# Patient Record
Sex: FEMALE | Race: WHITE | NOT HISPANIC OR LATINO | Employment: OTHER | URBAN - METROPOLITAN AREA
[De-identification: names, ages, dates, MRNs, and addresses within clinical notes are randomized per-mention and may not be internally consistent; named-entity substitution may affect disease eponyms.]

---

## 2017-01-01 ENCOUNTER — APPOINTMENT (OUTPATIENT)
Dept: CT IMAGING | Facility: HOSPITAL | Age: 77
End: 2017-01-01
Attending: PSYCHIATRY & NEUROLOGY

## 2017-01-01 ENCOUNTER — APPOINTMENT (OUTPATIENT)
Dept: GENERAL RADIOLOGY | Facility: HOSPITAL | Age: 77
End: 2017-01-01

## 2017-01-01 ENCOUNTER — HOSPITAL ENCOUNTER (EMERGENCY)
Facility: HOSPITAL | Age: 77
Discharge: ANOTHER HOSPITAL | End: 2017-11-18
Attending: EMERGENCY MEDICINE | Admitting: EMERGENCY MEDICINE

## 2017-01-01 ENCOUNTER — APPOINTMENT (OUTPATIENT)
Dept: MRI IMAGING | Facility: HOSPITAL | Age: 77
End: 2017-01-01

## 2017-01-01 ENCOUNTER — HOSPITAL ENCOUNTER (INPATIENT)
Facility: HOSPITAL | Age: 77
LOS: 202 days | Discharge: NURSING, REHAB OR LONG-TERM CARE | End: 2017-09-01
Attending: FAMILY MEDICINE | Admitting: FAMILY MEDICINE

## 2017-01-01 ENCOUNTER — APPOINTMENT (OUTPATIENT)
Dept: CT IMAGING | Facility: HOSPITAL | Age: 77
End: 2017-01-01
Attending: EMERGENCY MEDICINE

## 2017-01-01 ENCOUNTER — APPOINTMENT (OUTPATIENT)
Dept: CT IMAGING | Facility: HOSPITAL | Age: 77
End: 2017-01-01

## 2017-01-01 ENCOUNTER — APPOINTMENT (OUTPATIENT)
Dept: ULTRASOUND IMAGING | Facility: HOSPITAL | Age: 77
End: 2017-01-01

## 2017-01-01 ENCOUNTER — HOSPITAL ENCOUNTER (INPATIENT)
Facility: HOSPITAL | Age: 77
LOS: 8 days | Discharge: ANOTHER HOSPITAL | End: 2017-09-09
Attending: FAMILY MEDICINE | Admitting: FAMILY MEDICINE

## 2017-01-01 ENCOUNTER — HOSPITAL ENCOUNTER (INPATIENT)
Facility: HOSPITAL | Age: 77
LOS: 8 days | Discharge: NURSING, REHAB OR LONG-TERM CARE | End: 2017-02-11
Attending: HOSPITALIST | Admitting: HOSPITALIST

## 2017-01-01 ENCOUNTER — HOSPITAL ENCOUNTER (INPATIENT)
Facility: HOSPITAL | Age: 77
LOS: 11 days | Discharge: NURSING, REHAB OR LONG-TERM CARE | End: 2017-09-20
Attending: EMERGENCY MEDICINE | Admitting: INTERNAL MEDICINE

## 2017-01-01 ENCOUNTER — HOSPITAL ENCOUNTER (INPATIENT)
Facility: HOSPITAL | Age: 77
LOS: 57 days | Discharge: NURSING, REHAB OR LONG-TERM CARE | End: 2018-01-16
Attending: FAMILY MEDICINE | Admitting: FAMILY MEDICINE

## 2017-01-01 ENCOUNTER — HOSPITAL ENCOUNTER (INPATIENT)
Facility: HOSPITAL | Age: 77
LOS: 58 days | Discharge: ANOTHER HOSPITAL | End: 2017-11-17
Attending: FAMILY MEDICINE | Admitting: FAMILY MEDICINE

## 2017-01-01 ENCOUNTER — APPOINTMENT (OUTPATIENT)
Dept: CARDIOLOGY | Facility: HOSPITAL | Age: 77
End: 2017-01-01
Attending: INTERNAL MEDICINE

## 2017-01-01 ENCOUNTER — HOSPITAL ENCOUNTER (EMERGENCY)
Facility: HOSPITAL | Age: 77
Discharge: HOME | End: 2017-10-18
Attending: EMERGENCY MEDICINE | Admitting: EMERGENCY MEDICINE

## 2017-01-01 VITALS
HEART RATE: 82 BPM | SYSTOLIC BLOOD PRESSURE: 119 MMHG | WEIGHT: 252 LBS | OXYGEN SATURATION: 96 % | DIASTOLIC BLOOD PRESSURE: 70 MMHG | HEIGHT: 63 IN | RESPIRATION RATE: 18 BRPM | TEMPERATURE: 98.3 F | BODY MASS INDEX: 44.65 KG/M2

## 2017-01-01 VITALS
RESPIRATION RATE: 20 BRPM | DIASTOLIC BLOOD PRESSURE: 73 MMHG | BODY MASS INDEX: 44.51 KG/M2 | OXYGEN SATURATION: 97 % | TEMPERATURE: 97.5 F | HEART RATE: 121 BPM | SYSTOLIC BLOOD PRESSURE: 121 MMHG | HEIGHT: 65 IN | WEIGHT: 267.13 LBS

## 2017-01-01 VITALS
DIASTOLIC BLOOD PRESSURE: 79 MMHG | RESPIRATION RATE: 19 BRPM | SYSTOLIC BLOOD PRESSURE: 129 MMHG | WEIGHT: 241.38 LBS | OXYGEN SATURATION: 92 % | HEIGHT: 68 IN | BODY MASS INDEX: 36.58 KG/M2 | TEMPERATURE: 98.4 F | HEART RATE: 112 BPM

## 2017-01-01 VITALS
RESPIRATION RATE: 16 BRPM | OXYGEN SATURATION: 81 % | BODY MASS INDEX: 35.46 KG/M2 | HEIGHT: 68 IN | WEIGHT: 234 LBS | HEART RATE: 171 BPM | TEMPERATURE: 97.5 F | SYSTOLIC BLOOD PRESSURE: 113 MMHG | DIASTOLIC BLOOD PRESSURE: 78 MMHG

## 2017-01-01 VITALS
BODY MASS INDEX: 47.2 KG/M2 | SYSTOLIC BLOOD PRESSURE: 138 MMHG | WEIGHT: 250 LBS | RESPIRATION RATE: 18 BRPM | TEMPERATURE: 98.1 F | HEIGHT: 61 IN | DIASTOLIC BLOOD PRESSURE: 90 MMHG | OXYGEN SATURATION: 98 % | HEART RATE: 106 BPM

## 2017-01-01 VITALS
WEIGHT: 213.5 LBS | BODY MASS INDEX: 32.36 KG/M2 | HEIGHT: 68 IN | DIASTOLIC BLOOD PRESSURE: 83 MMHG | RESPIRATION RATE: 18 BRPM | OXYGEN SATURATION: 99 % | HEART RATE: 92 BPM | TEMPERATURE: 97.9 F | SYSTOLIC BLOOD PRESSURE: 130 MMHG

## 2017-01-01 VITALS
HEART RATE: 91 BPM | OXYGEN SATURATION: 98 % | SYSTOLIC BLOOD PRESSURE: 100 MMHG | TEMPERATURE: 98.7 F | DIASTOLIC BLOOD PRESSURE: 71 MMHG | RESPIRATION RATE: 18 BRPM

## 2017-01-01 DIAGNOSIS — J96.01 ACUTE RESPIRATORY FAILURE WITH HYPOXIA (HCC): ICD-10-CM

## 2017-01-01 DIAGNOSIS — M62.89 MUSCLE HYPERTONICITY: Primary | ICD-10-CM

## 2017-01-01 DIAGNOSIS — R13.12 OROPHARYNGEAL DYSPHAGIA: ICD-10-CM

## 2017-01-01 DIAGNOSIS — N17.9 AKI (ACUTE KIDNEY INJURY) (HCC): ICD-10-CM

## 2017-01-01 DIAGNOSIS — E86.0 DEHYDRATION: ICD-10-CM

## 2017-01-01 DIAGNOSIS — A41.9 SEPSIS, DUE TO UNSPECIFIED ORGANISM: ICD-10-CM

## 2017-01-01 DIAGNOSIS — I69.320 APHASIA AS LATE EFFECT OF CEREBROVASCULAR ACCIDENT: ICD-10-CM

## 2017-01-01 DIAGNOSIS — E87.5 HYPERKALEMIA: ICD-10-CM

## 2017-01-01 DIAGNOSIS — N18.2 CHRONIC KIDNEY DISEASE, STAGE 2, MILDLY DECREASED GFR: ICD-10-CM

## 2017-01-01 DIAGNOSIS — E03.9 ACQUIRED HYPOTHYROIDISM: ICD-10-CM

## 2017-01-01 DIAGNOSIS — R41.82 ALTERED MENTAL STATUS, UNSPECIFIED ALTERED MENTAL STATUS TYPE: Primary | ICD-10-CM

## 2017-01-01 DIAGNOSIS — R41.89 DECREASED LEVEL OF CONSCIOUSNESS: Primary | ICD-10-CM

## 2017-01-01 DIAGNOSIS — N39.0 ACUTE UTI: ICD-10-CM

## 2017-01-01 DIAGNOSIS — K11.20 SIALOADENITIS OF SUBMANDIBULAR GLAND: Primary | ICD-10-CM

## 2017-01-01 DIAGNOSIS — J18.9 PNEUMONIA OF LEFT LUNG DUE TO INFECTIOUS ORGANISM, UNSPECIFIED PART OF LUNG: ICD-10-CM

## 2017-01-01 LAB
25(OH)D3 SERPL-MCNC: 43.1 NG/ML
25(OH)D3 SERPL-MCNC: 49.8 NG/ML
25(OH)D3 SERPL-MCNC: 52.2 NG/ML
ABO + RH BLD: NORMAL
ABO + RH BLD: NORMAL
ABO GROUP BLD: NORMAL
ALBUMIN SERPL-MCNC: 2.2 G/DL (ref 3.5–5.2)
ALBUMIN SERPL-MCNC: 2.6 G/DL (ref 3.5–5.2)
ALBUMIN SERPL-MCNC: 2.7 G/DL (ref 3.5–5.2)
ALBUMIN SERPL-MCNC: 2.8 G/DL (ref 3.5–5.2)
ALBUMIN SERPL-MCNC: 2.9 G/DL (ref 3.5–5.2)
ALBUMIN SERPL-MCNC: 3 G/DL (ref 3.5–5.2)
ALBUMIN SERPL-MCNC: 3.2 G/DL (ref 3.5–5.2)
ALBUMIN SERPL-MCNC: 3.2 G/DL (ref 3.5–5.2)
ALBUMIN SERPL-MCNC: 3.3 G/DL (ref 3.5–5.2)
ALBUMIN/GLOB SERPL: 0.6 G/DL
ALBUMIN/GLOB SERPL: 0.7 G/DL
ALBUMIN/GLOB SERPL: 0.8 G/DL
ALBUMIN/GLOB SERPL: 0.9 G/DL
ALP SERPL-CCNC: 173 U/L (ref 40–129)
ALP SERPL-CCNC: 61 U/L (ref 40–129)
ALP SERPL-CCNC: 64 U/L (ref 40–129)
ALP SERPL-CCNC: 68 U/L (ref 40–129)
ALP SERPL-CCNC: 72 U/L (ref 40–129)
ALP SERPL-CCNC: 90 U/L (ref 40–129)
ALP SERPL-CCNC: 93 U/L (ref 40–129)
ALP SERPL-CCNC: 96 U/L (ref 40–129)
ALT SERPL W P-5'-P-CCNC: 13 U/L (ref 5–33)
ALT SERPL W P-5'-P-CCNC: 46 U/L (ref 5–33)
ALT SERPL W P-5'-P-CCNC: 5 U/L (ref 5–33)
ALT SERPL W P-5'-P-CCNC: 6 U/L (ref 5–33)
ALT SERPL W P-5'-P-CCNC: 7 U/L (ref 5–33)
ALT SERPL W P-5'-P-CCNC: 9 U/L (ref 5–33)
ALT SERPL W P-5'-P-CCNC: 9 U/L (ref 5–33)
ALT SERPL W P-5'-P-CCNC: <5 U/L (ref 5–33)
AMMONIA BLD-SCNC: 21 UMOL/L (ref 11–51)
ANION GAP SERPL CALCULATED.3IONS-SCNC: 10 MMOL/L
ANION GAP SERPL CALCULATED.3IONS-SCNC: 10.3 MMOL/L
ANION GAP SERPL CALCULATED.3IONS-SCNC: 10.3 MMOL/L
ANION GAP SERPL CALCULATED.3IONS-SCNC: 10.4 MMOL/L
ANION GAP SERPL CALCULATED.3IONS-SCNC: 10.5 MMOL/L
ANION GAP SERPL CALCULATED.3IONS-SCNC: 10.6 MMOL/L
ANION GAP SERPL CALCULATED.3IONS-SCNC: 10.7 MMOL/L
ANION GAP SERPL CALCULATED.3IONS-SCNC: 11 MMOL/L
ANION GAP SERPL CALCULATED.3IONS-SCNC: 11 MMOL/L
ANION GAP SERPL CALCULATED.3IONS-SCNC: 11.1 MMOL/L
ANION GAP SERPL CALCULATED.3IONS-SCNC: 11.1 MMOL/L
ANION GAP SERPL CALCULATED.3IONS-SCNC: 11.2 MMOL/L
ANION GAP SERPL CALCULATED.3IONS-SCNC: 11.2 MMOL/L
ANION GAP SERPL CALCULATED.3IONS-SCNC: 11.3 MMOL/L
ANION GAP SERPL CALCULATED.3IONS-SCNC: 11.3 MMOL/L
ANION GAP SERPL CALCULATED.3IONS-SCNC: 11.4 MMOL/L
ANION GAP SERPL CALCULATED.3IONS-SCNC: 11.5 MMOL/L
ANION GAP SERPL CALCULATED.3IONS-SCNC: 11.5 MMOL/L
ANION GAP SERPL CALCULATED.3IONS-SCNC: 11.6 MMOL/L
ANION GAP SERPL CALCULATED.3IONS-SCNC: 11.6 MMOL/L
ANION GAP SERPL CALCULATED.3IONS-SCNC: 11.7 MMOL/L
ANION GAP SERPL CALCULATED.3IONS-SCNC: 11.8 MMOL/L
ANION GAP SERPL CALCULATED.3IONS-SCNC: 11.9 MMOL/L
ANION GAP SERPL CALCULATED.3IONS-SCNC: 11.9 MMOL/L
ANION GAP SERPL CALCULATED.3IONS-SCNC: 12.1 MMOL/L
ANION GAP SERPL CALCULATED.3IONS-SCNC: 12.2 MMOL/L
ANION GAP SERPL CALCULATED.3IONS-SCNC: 12.3 MMOL/L
ANION GAP SERPL CALCULATED.3IONS-SCNC: 12.4 MMOL/L
ANION GAP SERPL CALCULATED.3IONS-SCNC: 12.5 MMOL/L
ANION GAP SERPL CALCULATED.3IONS-SCNC: 12.6 MMOL/L
ANION GAP SERPL CALCULATED.3IONS-SCNC: 12.7 MMOL/L
ANION GAP SERPL CALCULATED.3IONS-SCNC: 12.8 MMOL/L
ANION GAP SERPL CALCULATED.3IONS-SCNC: 13.1 MMOL/L
ANION GAP SERPL CALCULATED.3IONS-SCNC: 13.1 MMOL/L
ANION GAP SERPL CALCULATED.3IONS-SCNC: 13.2 MMOL/L
ANION GAP SERPL CALCULATED.3IONS-SCNC: 13.2 MMOL/L
ANION GAP SERPL CALCULATED.3IONS-SCNC: 13.3 MMOL/L
ANION GAP SERPL CALCULATED.3IONS-SCNC: 13.4 MMOL/L
ANION GAP SERPL CALCULATED.3IONS-SCNC: 13.5 MMOL/L
ANION GAP SERPL CALCULATED.3IONS-SCNC: 13.6 MMOL/L
ANION GAP SERPL CALCULATED.3IONS-SCNC: 13.7 MMOL/L
ANION GAP SERPL CALCULATED.3IONS-SCNC: 13.7 MMOL/L
ANION GAP SERPL CALCULATED.3IONS-SCNC: 13.8 MMOL/L
ANION GAP SERPL CALCULATED.3IONS-SCNC: 13.8 MMOL/L
ANION GAP SERPL CALCULATED.3IONS-SCNC: 13.9 MMOL/L
ANION GAP SERPL CALCULATED.3IONS-SCNC: 14 MMOL/L
ANION GAP SERPL CALCULATED.3IONS-SCNC: 14 MMOL/L
ANION GAP SERPL CALCULATED.3IONS-SCNC: 14.1 MMOL/L
ANION GAP SERPL CALCULATED.3IONS-SCNC: 14.1 MMOL/L
ANION GAP SERPL CALCULATED.3IONS-SCNC: 14.2 MMOL/L
ANION GAP SERPL CALCULATED.3IONS-SCNC: 14.3 MMOL/L
ANION GAP SERPL CALCULATED.3IONS-SCNC: 14.4 MMOL/L
ANION GAP SERPL CALCULATED.3IONS-SCNC: 14.4 MMOL/L
ANION GAP SERPL CALCULATED.3IONS-SCNC: 14.6 MMOL/L
ANION GAP SERPL CALCULATED.3IONS-SCNC: 15 MMOL/L
ANION GAP SERPL CALCULATED.3IONS-SCNC: 15.4 MMOL/L
ANION GAP SERPL CALCULATED.3IONS-SCNC: 15.8 MMOL/L
ANION GAP SERPL CALCULATED.3IONS-SCNC: 16 MMOL/L
ANION GAP SERPL CALCULATED.3IONS-SCNC: 7.2 MMOL/L
ANION GAP SERPL CALCULATED.3IONS-SCNC: 7.6 MMOL/L
ANION GAP SERPL CALCULATED.3IONS-SCNC: 7.6 MMOL/L
ANION GAP SERPL CALCULATED.3IONS-SCNC: 7.8 MMOL/L
ANION GAP SERPL CALCULATED.3IONS-SCNC: 7.8 MMOL/L
ANION GAP SERPL CALCULATED.3IONS-SCNC: 8.4 MMOL/L
ANION GAP SERPL CALCULATED.3IONS-SCNC: 8.6 MMOL/L
ANION GAP SERPL CALCULATED.3IONS-SCNC: 8.8 MMOL/L
ANION GAP SERPL CALCULATED.3IONS-SCNC: 8.8 MMOL/L
ANION GAP SERPL CALCULATED.3IONS-SCNC: 9 MMOL/L
ANION GAP SERPL CALCULATED.3IONS-SCNC: 9 MMOL/L
ANION GAP SERPL CALCULATED.3IONS-SCNC: 9.1 MMOL/L
ANION GAP SERPL CALCULATED.3IONS-SCNC: 9.1 MMOL/L
ANION GAP SERPL CALCULATED.3IONS-SCNC: 9.3 MMOL/L
ANION GAP SERPL CALCULATED.3IONS-SCNC: 9.4 MMOL/L
ANION GAP SERPL CALCULATED.3IONS-SCNC: 9.5 MMOL/L
ANION GAP SERPL CALCULATED.3IONS-SCNC: 9.7 MMOL/L
ANION GAP SERPL CALCULATED.3IONS-SCNC: 9.8 MMOL/L
ANION GAP SERPL CALCULATED.3IONS-SCNC: 9.8 MMOL/L
ANION GAP SERPL CALCULATED.3IONS-SCNC: 9.9 MMOL/L
ANION GAP SERPL CALCULATED.3IONS-SCNC: 9.9 MMOL/L
ANTIBODY TO LOW FREQUENCY ANTIGEN: NORMAL
APTT PPP: 29.3 SECONDS (ref 24.3–38.1)
APTT PPP: 29.4 SECONDS (ref 24.3–38.1)
APTT PPP: 35.2 SECONDS (ref 24.3–38.1)
ARTERIAL PATENCY WRIST A: ABNORMAL
ARTERIAL PATENCY WRIST A: POSITIVE
ARTERIAL PATENCY WRIST A: POSITIVE
ASCENDING AORTA: 2.5 CM
AST SERPL-CCNC: 16 U/L (ref 5–32)
AST SERPL-CCNC: 21 U/L (ref 5–32)
AST SERPL-CCNC: 5 U/L (ref 5–32)
AST SERPL-CCNC: 6 U/L (ref 5–32)
AST SERPL-CCNC: 7 U/L (ref 5–32)
AST SERPL-CCNC: 8 U/L (ref 5–32)
AST SERPL-CCNC: 9 U/L (ref 5–32)
AST SERPL-CCNC: 9 U/L (ref 5–32)
ATMOSPHERIC PRESS: 744 MMHG
ATMOSPHERIC PRESS: 747 MMHG
ATMOSPHERIC PRESS: 747 MMHG
B PERT DNA SPEC QL NAA+PROBE: NOT DETECTED
BACTERIA BLD CULT: ABNORMAL
BACTERIA SPEC AEROBE CULT: ABNORMAL
BACTERIA SPEC AEROBE CULT: NO GROWTH
BACTERIA SPEC AEROBE CULT: NORMAL
BACTERIA UR QL AUTO: ABNORMAL /HPF
BASE EXCESS BLDA CALC-SCNC: 1.1 MMOL/L (ref 0–2)
BASE EXCESS BLDA CALC-SCNC: 1.7 MMOL/L (ref 0–2)
BASE EXCESS BLDA CALC-SCNC: 3.3 MMOL/L (ref 0–2)
BASOPHILS # BLD AUTO: 0.04 10*3/MM3 (ref 0–0.2)
BASOPHILS # BLD AUTO: 0.04 10*3/MM3 (ref 0–0.2)
BASOPHILS # BLD AUTO: 0.05 10*3/MM3 (ref 0–0.2)
BASOPHILS # BLD AUTO: 0.06 10*3/MM3 (ref 0–0.2)
BASOPHILS # BLD AUTO: 0.06 10*3/MM3 (ref 0–0.2)
BASOPHILS # BLD AUTO: 0.08 10*3/MM3 (ref 0–0.2)
BASOPHILS # BLD AUTO: 0.09 10*3/MM3 (ref 0–0.2)
BASOPHILS # BLD AUTO: 0.1 10*3/MM3 (ref 0–0.2)
BASOPHILS # BLD AUTO: 0.1 10*3/MM3 (ref 0–0.2)
BASOPHILS NFR BLD AUTO: 0.4 % (ref 0–2)
BASOPHILS NFR BLD AUTO: 0.6 % (ref 0–2)
BASOPHILS NFR BLD AUTO: 0.7 % (ref 0–2)
BASOPHILS NFR BLD AUTO: 0.7 % (ref 0–2)
BASOPHILS NFR BLD AUTO: 1 % (ref 0–2)
BASOPHILS NFR BLD AUTO: 1.2 % (ref 0–2)
BASOPHILS NFR BLD AUTO: 1.3 % (ref 0–2)
BDY SITE: ABNORMAL
BH BB BLOOD EXPIRATION DATE: NORMAL
BH BB BLOOD EXPIRATION DATE: NORMAL
BH BB BLOOD TYPE BARCODE: 7300
BH BB BLOOD TYPE BARCODE: 7300
BH BB DISPENSE STATUS: NORMAL
BH BB DISPENSE STATUS: NORMAL
BH BB PRODUCT CODE: NORMAL
BH BB PRODUCT CODE: NORMAL
BH BB UNIT NUMBER: NORMAL
BH BB UNIT NUMBER: NORMAL
BH CV ECHO MEAS - ACS: 0.8 CM
BH CV ECHO MEAS - AO MAX PG (FULL): 27.2 MMHG
BH CV ECHO MEAS - AO MAX PG: 29.5 MMHG
BH CV ECHO MEAS - AO MEAN PG (FULL): 15 MMHG
BH CV ECHO MEAS - AO MEAN PG: 16 MMHG
BH CV ECHO MEAS - AO ROOT AREA (BSA CORRECTED): 1.4
BH CV ECHO MEAS - AO ROOT AREA: 7.1 CM^2
BH CV ECHO MEAS - AO ROOT DIAM: 3 CM
BH CV ECHO MEAS - AO V2 MAX: 269.8 CM/SEC
BH CV ECHO MEAS - AO V2 MEAN: 181.8 CM/SEC
BH CV ECHO MEAS - AO V2 VTI: 49.1 CM
BH CV ECHO MEAS - ASC AORTA: 2.5 CM
BH CV ECHO MEAS - AVA(I,A): 1.2 CM^2
BH CV ECHO MEAS - AVA(I,D): 1.2 CM^2
BH CV ECHO MEAS - AVA(V,A): 1.1 CM^2
BH CV ECHO MEAS - AVA(V,D): 1.1 CM^2
BH CV ECHO MEAS - BSA(HAYCOCK): 2.3 M^2
BH CV ECHO MEAS - BSA: 2.1 M^2
BH CV ECHO MEAS - BZI_BMI: 44.3 KILOGRAMS/M^2
BH CV ECHO MEAS - BZI_METRIC_HEIGHT: 157.5 CM
BH CV ECHO MEAS - BZI_METRIC_WEIGHT: 109.8 KG
BH CV ECHO MEAS - CONTRAST EF (2CH): 54.4 ML/M^2
BH CV ECHO MEAS - CONTRAST EF 4CH: 55.6 ML/M^2
BH CV ECHO MEAS - EDV(CUBED): 30.1 ML
BH CV ECHO MEAS - EDV(MOD-SP2): 68.6 ML
BH CV ECHO MEAS - EDV(MOD-SP4): 59.7 ML
BH CV ECHO MEAS - EDV(TEICH): 38.2 ML
BH CV ECHO MEAS - EF(CUBED): 63.6 %
BH CV ECHO MEAS - EF(MOD-SP2): 54.4 %
BH CV ECHO MEAS - EF(MOD-SP4): 55.6 %
BH CV ECHO MEAS - EF(TEICH): 56.6 %
BH CV ECHO MEAS - ESV(CUBED): 10.9 ML
BH CV ECHO MEAS - ESV(MOD-SP2): 31.3 ML
BH CV ECHO MEAS - ESV(MOD-SP4): 26.5 ML
BH CV ECHO MEAS - ESV(TEICH): 16.6 ML
BH CV ECHO MEAS - FS: 28.6 %
BH CV ECHO MEAS - IVS/LVPW: 1
BH CV ECHO MEAS - IVSD: 1.1 CM
BH CV ECHO MEAS - LAT PEAK E' VEL: 7 CM/SEC
BH CV ECHO MEAS - LV DIASTOLIC VOL/BSA (35-75): 28.8 ML/M^2
BH CV ECHO MEAS - LV MASS(C)D: 103 GRAMS
BH CV ECHO MEAS - LV MASS(C)DI: 49.7 GRAMS/M^2
BH CV ECHO MEAS - LV MAX PG: 2.3 MMHG
BH CV ECHO MEAS - LV MEAN PG: 1 MMHG
BH CV ECHO MEAS - LV SYSTOLIC VOL/BSA (12-30): 12.8 ML/M^2
BH CV ECHO MEAS - LV V1 MAX: 75.8 CM/SEC
BH CV ECHO MEAS - LV V1 MEAN: 48.4 CM/SEC
BH CV ECHO MEAS - LV V1 VTI: 15.6 CM
BH CV ECHO MEAS - LVIDD: 3.1 CM
BH CV ECHO MEAS - LVIDS: 2.2 CM
BH CV ECHO MEAS - LVLD AP2: 7.4 CM
BH CV ECHO MEAS - LVLD AP4: 7.6 CM
BH CV ECHO MEAS - LVLS AP2: 6.5 CM
BH CV ECHO MEAS - LVLS AP4: 7 CM
BH CV ECHO MEAS - LVOT AREA (M): 3.8 CM^2
BH CV ECHO MEAS - LVOT AREA: 3.8 CM^2
BH CV ECHO MEAS - LVOT DIAM: 2.2 CM
BH CV ECHO MEAS - LVPWD: 1.1 CM
BH CV ECHO MEAS - MED PEAK E' VEL: 6 CM/SEC
BH CV ECHO MEAS - MV DEC SLOPE: 647 CM/SEC^2
BH CV ECHO MEAS - MV DEC TIME: 0.23 SEC
BH CV ECHO MEAS - MV E MAX VEL: 150.5 CM/SEC
BH CV ECHO MEAS - MV E/A: 2.6
BH CV ECHO MEAS - MV MAX PG: 7.8 MMHG
BH CV ECHO MEAS - MV MEAN PG: 3 MMHG
BH CV ECHO MEAS - MV P1/2T MAX VEL: 143 CM/SEC
BH CV ECHO MEAS - MV P1/2T: 64.7 MSEC
BH CV ECHO MEAS - MV V2 MAX: 140 CM/SEC
BH CV ECHO MEAS - MV V2 MEAN: 79.9 CM/SEC
BH CV ECHO MEAS - MV V2 VTI: 21.2 CM
BH CV ECHO MEAS - MVA P1/2T LCG: 1.5 CM^2
BH CV ECHO MEAS - MVA(P1/2T): 3.4 CM^2
BH CV ECHO MEAS - MVA(VTI): 2.8 CM^2
BH CV ECHO MEAS - PA ACC TIME: 0.1 SEC
BH CV ECHO MEAS - PA MAX PG (FULL): 15.4 MMHG
BH CV ECHO MEAS - PA MAX PG: 17 MMHG
BH CV ECHO MEAS - PA PR(ACCEL): 33.1 MMHG
BH CV ECHO MEAS - PA V2 MAX: 206 CM/SEC
BH CV ECHO MEAS - PVA(V,A): 0.96 CM^2
BH CV ECHO MEAS - PVA(V,D): 0.96 CM^2
BH CV ECHO MEAS - QP/QS: 0.5
BH CV ECHO MEAS - RAP SYSTOLE: 8 MMHG
BH CV ECHO MEAS - RV MAX PG: 1.6 MMHG
BH CV ECHO MEAS - RV MEAN PG: 1 MMHG
BH CV ECHO MEAS - RV V1 MAX: 62.8 CM/SEC
BH CV ECHO MEAS - RV V1 MEAN: 34.7 CM/SEC
BH CV ECHO MEAS - RV V1 VTI: 9.4 CM
BH CV ECHO MEAS - RVOT AREA: 3.1 CM^2
BH CV ECHO MEAS - RVOT DIAM: 2 CM
BH CV ECHO MEAS - RVSP: 40 MMHG
BH CV ECHO MEAS - SI(AO): 167.4 ML/M^2
BH CV ECHO MEAS - SI(CUBED): 9.2 ML/M^2
BH CV ECHO MEAS - SI(LVOT): 28.6 ML/M^2
BH CV ECHO MEAS - SI(MOD-SP2): 18 ML/M^2
BH CV ECHO MEAS - SI(MOD-SP4): 16 ML/M^2
BH CV ECHO MEAS - SI(TEICH): 10.4 ML/M^2
BH CV ECHO MEAS - SUP REN AO DIAM: 2.1 CM
BH CV ECHO MEAS - SV(AO): 346.9 ML
BH CV ECHO MEAS - SV(CUBED): 19.1 ML
BH CV ECHO MEAS - SV(LVOT): 59.3 ML
BH CV ECHO MEAS - SV(MOD-SP2): 37.3 ML
BH CV ECHO MEAS - SV(MOD-SP4): 33.2 ML
BH CV ECHO MEAS - SV(RVOT): 29.4 ML
BH CV ECHO MEAS - SV(TEICH): 21.6 ML
BH CV ECHO MEAS - TR MAX VEL: 312.3 CM/SEC
BH CV VAS BP RIGHT ARM: NORMAL MMHG
BH CV XLRA - RV BASE: 4.9 CM
BH CV XLRA - TDI S': 14 CM/SEC
BILIRUB SERPL-MCNC: 0.2 MG/DL (ref 0.2–1.2)
BILIRUB SERPL-MCNC: 0.3 MG/DL (ref 0.2–1.2)
BILIRUB SERPL-MCNC: <0.2 MG/DL (ref 0.2–1.2)
BILIRUB UR QL STRIP: ABNORMAL
BILIRUB UR QL STRIP: ABNORMAL
BILIRUB UR QL STRIP: NEGATIVE
BLD GP AB SCN SERPL QL: NEGATIVE
BLD GP AB SCN SERPL QL: POSITIVE
BODY TEMPERATURE: 37 C
BUN BLD-MCNC: 10 MG/DL (ref 8–23)
BUN BLD-MCNC: 13 MG/DL (ref 8–23)
BUN BLD-MCNC: 14 MG/DL (ref 8–23)
BUN BLD-MCNC: 15 MG/DL (ref 8–23)
BUN BLD-MCNC: 16 MG/DL (ref 8–23)
BUN BLD-MCNC: 17 MG/DL (ref 8–23)
BUN BLD-MCNC: 18 MG/DL (ref 8–23)
BUN BLD-MCNC: 19 MG/DL (ref 8–23)
BUN BLD-MCNC: 20 MG/DL (ref 8–23)
BUN BLD-MCNC: 21 MG/DL (ref 8–23)
BUN BLD-MCNC: 21 MG/DL (ref 8–23)
BUN BLD-MCNC: 22 MG/DL (ref 8–23)
BUN BLD-MCNC: 24 MG/DL (ref 8–23)
BUN BLD-MCNC: 25 MG/DL (ref 8–23)
BUN BLD-MCNC: 27 MG/DL (ref 8–23)
BUN BLD-MCNC: 28 MG/DL (ref 8–23)
BUN BLD-MCNC: 29 MG/DL (ref 8–23)
BUN BLD-MCNC: 30 MG/DL (ref 8–23)
BUN BLD-MCNC: 31 MG/DL (ref 8–23)
BUN BLD-MCNC: 31 MG/DL (ref 8–23)
BUN BLD-MCNC: 32 MG/DL (ref 8–23)
BUN BLD-MCNC: 32 MG/DL (ref 8–23)
BUN BLD-MCNC: 33 MG/DL (ref 8–23)
BUN BLD-MCNC: 34 MG/DL (ref 8–23)
BUN BLD-MCNC: 35 MG/DL (ref 8–23)
BUN BLD-MCNC: 36 MG/DL (ref 8–23)
BUN BLD-MCNC: 37 MG/DL (ref 8–23)
BUN BLD-MCNC: 38 MG/DL (ref 8–23)
BUN BLD-MCNC: 39 MG/DL (ref 8–23)
BUN BLD-MCNC: 40 MG/DL (ref 8–23)
BUN BLD-MCNC: 41 MG/DL (ref 8–23)
BUN BLD-MCNC: 43 MG/DL (ref 8–23)
BUN BLD-MCNC: 44 MG/DL (ref 8–23)
BUN BLD-MCNC: 44 MG/DL (ref 8–23)
BUN BLD-MCNC: 45 MG/DL (ref 8–23)
BUN BLD-MCNC: 46 MG/DL (ref 8–23)
BUN BLD-MCNC: 50 MG/DL (ref 8–23)
BUN BLD-MCNC: 50 MG/DL (ref 8–23)
BUN BLD-MCNC: 53 MG/DL (ref 8–23)
BUN BLD-MCNC: 6 MG/DL (ref 8–23)
BUN BLD-MCNC: 73 MG/DL (ref 8–23)
BUN BLD-MCNC: 8 MG/DL (ref 8–23)
BUN BLD-MCNC: 82 MG/DL (ref 8–23)
BUN BLD-MCNC: 88 MG/DL (ref 8–23)
BUN BLD-MCNC: 9 MG/DL (ref 8–23)
BUN/CREAT SERPL: 10.4 (ref 7–25)
BUN/CREAT SERPL: 10.5 (ref 7–25)
BUN/CREAT SERPL: 10.8 (ref 7–25)
BUN/CREAT SERPL: 11.3 (ref 7–25)
BUN/CREAT SERPL: 11.3 (ref 7–25)
BUN/CREAT SERPL: 11.5 (ref 7–25)
BUN/CREAT SERPL: 11.8 (ref 7–25)
BUN/CREAT SERPL: 12.2 (ref 7–25)
BUN/CREAT SERPL: 12.3 (ref 7–25)
BUN/CREAT SERPL: 13.7 (ref 7–25)
BUN/CREAT SERPL: 13.9 (ref 7–25)
BUN/CREAT SERPL: 14.1 (ref 7–25)
BUN/CREAT SERPL: 14.3 (ref 7–25)
BUN/CREAT SERPL: 14.4 (ref 7–25)
BUN/CREAT SERPL: 14.6 (ref 7–25)
BUN/CREAT SERPL: 15.5 (ref 7–25)
BUN/CREAT SERPL: 15.7 (ref 7–25)
BUN/CREAT SERPL: 16.7 (ref 7–25)
BUN/CREAT SERPL: 17.4 (ref 7–25)
BUN/CREAT SERPL: 17.9 (ref 7–25)
BUN/CREAT SERPL: 18.2 (ref 7–25)
BUN/CREAT SERPL: 19.6 (ref 7–25)
BUN/CREAT SERPL: 19.7 (ref 7–25)
BUN/CREAT SERPL: 19.8 (ref 7–25)
BUN/CREAT SERPL: 19.9 (ref 7–25)
BUN/CREAT SERPL: 20.1 (ref 7–25)
BUN/CREAT SERPL: 20.2 (ref 7–25)
BUN/CREAT SERPL: 20.2 (ref 7–25)
BUN/CREAT SERPL: 21.3 (ref 7–25)
BUN/CREAT SERPL: 21.4 (ref 7–25)
BUN/CREAT SERPL: 22.2 (ref 7–25)
BUN/CREAT SERPL: 22.2 (ref 7–25)
BUN/CREAT SERPL: 22.3 (ref 7–25)
BUN/CREAT SERPL: 22.4 (ref 7–25)
BUN/CREAT SERPL: 22.5 (ref 7–25)
BUN/CREAT SERPL: 23.1 (ref 7–25)
BUN/CREAT SERPL: 23.2 (ref 7–25)
BUN/CREAT SERPL: 23.3 (ref 7–25)
BUN/CREAT SERPL: 23.9 (ref 7–25)
BUN/CREAT SERPL: 24.3 (ref 7–25)
BUN/CREAT SERPL: 24.4 (ref 7–25)
BUN/CREAT SERPL: 24.5 (ref 7–25)
BUN/CREAT SERPL: 26.1 (ref 7–25)
BUN/CREAT SERPL: 26.3 (ref 7–25)
BUN/CREAT SERPL: 26.5 (ref 7–25)
BUN/CREAT SERPL: 26.5 (ref 7–25)
BUN/CREAT SERPL: 26.9 (ref 7–25)
BUN/CREAT SERPL: 27.5 (ref 7–25)
BUN/CREAT SERPL: 27.8 (ref 7–25)
BUN/CREAT SERPL: 28.3 (ref 7–25)
BUN/CREAT SERPL: 28.7 (ref 7–25)
BUN/CREAT SERPL: 28.9 (ref 7–25)
BUN/CREAT SERPL: 29.2 (ref 7–25)
BUN/CREAT SERPL: 29.2 (ref 7–25)
BUN/CREAT SERPL: 29.7 (ref 7–25)
BUN/CREAT SERPL: 29.9 (ref 7–25)
BUN/CREAT SERPL: 29.9 (ref 7–25)
BUN/CREAT SERPL: 30.1 (ref 7–25)
BUN/CREAT SERPL: 30.3 (ref 7–25)
BUN/CREAT SERPL: 30.8 (ref 7–25)
BUN/CREAT SERPL: 30.9 (ref 7–25)
BUN/CREAT SERPL: 31 (ref 7–25)
BUN/CREAT SERPL: 31.3 (ref 7–25)
BUN/CREAT SERPL: 32 (ref 7–25)
BUN/CREAT SERPL: 32.2 (ref 7–25)
BUN/CREAT SERPL: 32.3 (ref 7–25)
BUN/CREAT SERPL: 32.4 (ref 7–25)
BUN/CREAT SERPL: 32.5 (ref 7–25)
BUN/CREAT SERPL: 32.7 (ref 7–25)
BUN/CREAT SERPL: 33 (ref 7–25)
BUN/CREAT SERPL: 33 (ref 7–25)
BUN/CREAT SERPL: 33.3 (ref 7–25)
BUN/CREAT SERPL: 33.6 (ref 7–25)
BUN/CREAT SERPL: 33.9 (ref 7–25)
BUN/CREAT SERPL: 34 (ref 7–25)
BUN/CREAT SERPL: 34 (ref 7–25)
BUN/CREAT SERPL: 34.5 (ref 7–25)
BUN/CREAT SERPL: 35 (ref 7–25)
BUN/CREAT SERPL: 35.5 (ref 7–25)
BUN/CREAT SERPL: 36 (ref 7–25)
BUN/CREAT SERPL: 36.6 (ref 7–25)
BUN/CREAT SERPL: 37.4 (ref 7–25)
BUN/CREAT SERPL: 38.5 (ref 7–25)
BUN/CREAT SERPL: 38.6 (ref 7–25)
BUN/CREAT SERPL: 38.6 (ref 7–25)
BUN/CREAT SERPL: 38.7 (ref 7–25)
BUN/CREAT SERPL: 38.9 (ref 7–25)
BUN/CREAT SERPL: 40 (ref 7–25)
BUN/CREAT SERPL: 40.2 (ref 7–25)
BUN/CREAT SERPL: 40.2 (ref 7–25)
BUN/CREAT SERPL: 40.6 (ref 7–25)
BUN/CREAT SERPL: 41.2 (ref 7–25)
BUN/CREAT SERPL: 42.5 (ref 7–25)
BUN/CREAT SERPL: 43.4 (ref 7–25)
BUN/CREAT SERPL: 8.8 (ref 7–25)
BUN/CREAT SERPL: 8.9 (ref 7–25)
BUN/CREAT SERPL: 9.2 (ref 7–25)
BUN/CREAT SERPL: 9.3 (ref 7–25)
C PNEUM DNA NPH QL NAA+NON-PROBE: NOT DETECTED
CALCIUM SPEC-SCNC: 10 MG/DL (ref 8.8–10.5)
CALCIUM SPEC-SCNC: 10.1 MG/DL (ref 8.8–10.5)
CALCIUM SPEC-SCNC: 10.4 MG/DL (ref 8.8–10.5)
CALCIUM SPEC-SCNC: 10.4 MG/DL (ref 8.8–10.5)
CALCIUM SPEC-SCNC: 10.5 MG/DL (ref 8.8–10.5)
CALCIUM SPEC-SCNC: 11.8 MG/DL (ref 8.8–10.5)
CALCIUM SPEC-SCNC: 11.9 MG/DL (ref 8.8–10.5)
CALCIUM SPEC-SCNC: 12.6 MG/DL (ref 8.8–10.5)
CALCIUM SPEC-SCNC: 15.4 MG/DL (ref 8.8–10.5)
CALCIUM SPEC-SCNC: 16.5 MG/DL (ref 8.8–10.5)
CALCIUM SPEC-SCNC: 7.4 MG/DL (ref 8.8–10.5)
CALCIUM SPEC-SCNC: 7.7 MG/DL (ref 8.8–10.5)
CALCIUM SPEC-SCNC: 7.8 MG/DL (ref 8.8–10.5)
CALCIUM SPEC-SCNC: 7.9 MG/DL (ref 8.8–10.5)
CALCIUM SPEC-SCNC: 8 MG/DL (ref 8.8–10.5)
CALCIUM SPEC-SCNC: 8.1 MG/DL (ref 8.8–10.5)
CALCIUM SPEC-SCNC: 8.2 MG/DL (ref 8.8–10.5)
CALCIUM SPEC-SCNC: 8.3 MG/DL (ref 8.8–10.5)
CALCIUM SPEC-SCNC: 8.4 MG/DL (ref 8.8–10.5)
CALCIUM SPEC-SCNC: 8.4 MG/DL (ref 8.8–10.5)
CALCIUM SPEC-SCNC: 8.5 MG/DL (ref 8.8–10.5)
CALCIUM SPEC-SCNC: 8.6 MG/DL (ref 8.8–10.5)
CALCIUM SPEC-SCNC: 8.6 MG/DL (ref 8.8–10.5)
CALCIUM SPEC-SCNC: 8.7 MG/DL (ref 8.8–10.5)
CALCIUM SPEC-SCNC: 8.8 MG/DL (ref 8.8–10.5)
CALCIUM SPEC-SCNC: 8.9 MG/DL (ref 8.8–10.5)
CALCIUM SPEC-SCNC: 9 MG/DL (ref 8.8–10.5)
CALCIUM SPEC-SCNC: 9.1 MG/DL (ref 8.8–10.5)
CALCIUM SPEC-SCNC: 9.2 MG/DL (ref 8.8–10.5)
CALCIUM SPEC-SCNC: 9.3 MG/DL (ref 8.8–10.5)
CALCIUM SPEC-SCNC: 9.4 MG/DL (ref 8.8–10.5)
CALCIUM SPEC-SCNC: 9.5 MG/DL (ref 8.8–10.5)
CALCIUM SPEC-SCNC: 9.6 MG/DL (ref 8.8–10.5)
CALCIUM SPEC-SCNC: 9.7 MG/DL (ref 8.8–10.5)
CALCIUM SPEC-SCNC: 9.9 MG/DL (ref 8.8–10.5)
CHLORIDE SERPL-SCNC: 100 MMOL/L (ref 98–107)
CHLORIDE SERPL-SCNC: 101 MMOL/L (ref 98–107)
CHLORIDE SERPL-SCNC: 102 MMOL/L (ref 98–107)
CHLORIDE SERPL-SCNC: 103 MMOL/L (ref 98–107)
CHLORIDE SERPL-SCNC: 104 MMOL/L (ref 98–107)
CHLORIDE SERPL-SCNC: 104 MMOL/L (ref 98–107)
CHLORIDE SERPL-SCNC: 105 MMOL/L (ref 98–107)
CHLORIDE SERPL-SCNC: 106 MMOL/L (ref 98–107)
CHLORIDE SERPL-SCNC: 107 MMOL/L (ref 98–107)
CHLORIDE SERPL-SCNC: 107 MMOL/L (ref 98–107)
CHLORIDE SERPL-SCNC: 108 MMOL/L (ref 98–107)
CHLORIDE SERPL-SCNC: 109 MMOL/L (ref 98–107)
CHLORIDE SERPL-SCNC: 109 MMOL/L (ref 98–107)
CHLORIDE SERPL-SCNC: 110 MMOL/L (ref 98–107)
CHLORIDE SERPL-SCNC: 111 MMOL/L (ref 98–107)
CHLORIDE SERPL-SCNC: 118 MMOL/L (ref 98–107)
CHLORIDE SERPL-SCNC: 94 MMOL/L (ref 98–107)
CHLORIDE SERPL-SCNC: 95 MMOL/L (ref 98–107)
CHLORIDE SERPL-SCNC: 95 MMOL/L (ref 98–107)
CHLORIDE SERPL-SCNC: 96 MMOL/L (ref 98–107)
CHLORIDE SERPL-SCNC: 97 MMOL/L (ref 98–107)
CHLORIDE SERPL-SCNC: 97 MMOL/L (ref 98–107)
CHLORIDE SERPL-SCNC: 98 MMOL/L (ref 98–107)
CHLORIDE SERPL-SCNC: 99 MMOL/L (ref 98–107)
CHLORIDE UR-SCNC: 25 MMOL/L
CHOLEST SERPL-MCNC: 254 MG/DL (ref 0–200)
CK SERPL-CCNC: 20 U/L (ref 26–142)
CLARITY UR: ABNORMAL
CLARITY UR: CLEAR
CLARITY UR: CLEAR
CO2 SERPL-SCNC: 15.4 MMOL/L (ref 22–29)
CO2 SERPL-SCNC: 17.4 MMOL/L (ref 22–29)
CO2 SERPL-SCNC: 18.7 MMOL/L (ref 22–29)
CO2 SERPL-SCNC: 18.7 MMOL/L (ref 22–29)
CO2 SERPL-SCNC: 19.6 MMOL/L (ref 22–29)
CO2 SERPL-SCNC: 20 MMOL/L (ref 22–29)
CO2 SERPL-SCNC: 20.3 MMOL/L (ref 22–29)
CO2 SERPL-SCNC: 20.6 MMOL/L (ref 22–29)
CO2 SERPL-SCNC: 20.8 MMOL/L (ref 22–29)
CO2 SERPL-SCNC: 20.9 MMOL/L (ref 22–29)
CO2 SERPL-SCNC: 21 MMOL/L (ref 22–29)
CO2 SERPL-SCNC: 21.3 MMOL/L (ref 22–29)
CO2 SERPL-SCNC: 21.6 MMOL/L (ref 22–29)
CO2 SERPL-SCNC: 21.7 MMOL/L (ref 22–29)
CO2 SERPL-SCNC: 21.7 MMOL/L (ref 22–29)
CO2 SERPL-SCNC: 21.8 MMOL/L (ref 22–29)
CO2 SERPL-SCNC: 21.9 MMOL/L (ref 22–29)
CO2 SERPL-SCNC: 22.2 MMOL/L (ref 22–29)
CO2 SERPL-SCNC: 22.3 MMOL/L (ref 22–29)
CO2 SERPL-SCNC: 22.5 MMOL/L (ref 22–29)
CO2 SERPL-SCNC: 22.5 MMOL/L (ref 22–29)
CO2 SERPL-SCNC: 22.6 MMOL/L (ref 22–29)
CO2 SERPL-SCNC: 22.7 MMOL/L (ref 22–29)
CO2 SERPL-SCNC: 22.7 MMOL/L (ref 22–29)
CO2 SERPL-SCNC: 22.8 MMOL/L (ref 22–29)
CO2 SERPL-SCNC: 22.8 MMOL/L (ref 22–29)
CO2 SERPL-SCNC: 23 MMOL/L (ref 22–29)
CO2 SERPL-SCNC: 23.1 MMOL/L (ref 22–29)
CO2 SERPL-SCNC: 23.1 MMOL/L (ref 22–29)
CO2 SERPL-SCNC: 23.2 MMOL/L (ref 22–29)
CO2 SERPL-SCNC: 23.3 MMOL/L (ref 22–29)
CO2 SERPL-SCNC: 23.3 MMOL/L (ref 22–29)
CO2 SERPL-SCNC: 23.6 MMOL/L (ref 22–29)
CO2 SERPL-SCNC: 23.7 MMOL/L (ref 22–29)
CO2 SERPL-SCNC: 23.7 MMOL/L (ref 22–29)
CO2 SERPL-SCNC: 23.8 MMOL/L (ref 22–29)
CO2 SERPL-SCNC: 23.9 MMOL/L (ref 22–29)
CO2 SERPL-SCNC: 24.2 MMOL/L (ref 22–29)
CO2 SERPL-SCNC: 24.2 MMOL/L (ref 22–29)
CO2 SERPL-SCNC: 24.3 MMOL/L (ref 22–29)
CO2 SERPL-SCNC: 24.3 MMOL/L (ref 22–29)
CO2 SERPL-SCNC: 24.4 MMOL/L (ref 22–29)
CO2 SERPL-SCNC: 24.5 MMOL/L (ref 22–29)
CO2 SERPL-SCNC: 24.5 MMOL/L (ref 22–29)
CO2 SERPL-SCNC: 24.6 MMOL/L (ref 22–29)
CO2 SERPL-SCNC: 25.1 MMOL/L (ref 22–29)
CO2 SERPL-SCNC: 25.2 MMOL/L (ref 22–29)
CO2 SERPL-SCNC: 25.2 MMOL/L (ref 22–29)
CO2 SERPL-SCNC: 25.4 MMOL/L (ref 22–29)
CO2 SERPL-SCNC: 25.6 MMOL/L (ref 22–29)
CO2 SERPL-SCNC: 25.7 MMOL/L (ref 22–29)
CO2 SERPL-SCNC: 25.8 MMOL/L (ref 22–29)
CO2 SERPL-SCNC: 25.8 MMOL/L (ref 22–29)
CO2 SERPL-SCNC: 25.9 MMOL/L (ref 22–29)
CO2 SERPL-SCNC: 26.1 MMOL/L (ref 22–29)
CO2 SERPL-SCNC: 26.2 MMOL/L (ref 22–29)
CO2 SERPL-SCNC: 26.3 MMOL/L (ref 22–29)
CO2 SERPL-SCNC: 26.3 MMOL/L (ref 22–29)
CO2 SERPL-SCNC: 26.5 MMOL/L (ref 22–29)
CO2 SERPL-SCNC: 26.6 MMOL/L (ref 22–29)
CO2 SERPL-SCNC: 27.2 MMOL/L (ref 22–29)
CO2 SERPL-SCNC: 27.3 MMOL/L (ref 22–29)
CO2 SERPL-SCNC: 27.4 MMOL/L (ref 22–29)
CO2 SERPL-SCNC: 27.4 MMOL/L (ref 22–29)
CO2 SERPL-SCNC: 27.7 MMOL/L (ref 22–29)
CO2 SERPL-SCNC: 27.8 MMOL/L (ref 22–29)
CO2 SERPL-SCNC: 27.9 MMOL/L (ref 22–29)
CO2 SERPL-SCNC: 27.9 MMOL/L (ref 22–29)
CO2 SERPL-SCNC: 28 MMOL/L (ref 22–29)
CO2 SERPL-SCNC: 28.2 MMOL/L (ref 22–29)
CO2 SERPL-SCNC: 28.3 MMOL/L (ref 22–29)
CO2 SERPL-SCNC: 29.5 MMOL/L (ref 22–29)
CO2 SERPL-SCNC: 30 MMOL/L (ref 22–29)
CO2 SERPL-SCNC: 30.4 MMOL/L (ref 22–29)
CO2 SERPL-SCNC: 30.9 MMOL/L (ref 22–29)
CO2 SERPL-SCNC: 32.1 MMOL/L (ref 22–29)
CO2 SERPL-SCNC: 32.2 MMOL/L (ref 22–29)
CO2 SERPL-SCNC: 32.6 MMOL/L (ref 22–29)
CO2 SERPL-SCNC: 33 MMOL/L (ref 22–29)
CO2 SERPL-SCNC: 33.8 MMOL/L (ref 22–29)
CO2 SERPL-SCNC: 34.4 MMOL/L (ref 22–29)
CO2 SERPL-SCNC: 35 MMOL/L (ref 22–29)
CO2 SERPL-SCNC: 35.2 MMOL/L (ref 22–29)
COLOR UR: ABNORMAL
COLOR UR: YELLOW
CREAT BLD-MCNC: 0.68 MG/DL (ref 0.57–1)
CREAT BLD-MCNC: 0.71 MG/DL (ref 0.57–1)
CREAT BLD-MCNC: 0.71 MG/DL (ref 0.57–1)
CREAT BLD-MCNC: 0.74 MG/DL (ref 0.57–1)
CREAT BLD-MCNC: 0.76 MG/DL (ref 0.57–1)
CREAT BLD-MCNC: 0.77 MG/DL (ref 0.57–1)
CREAT BLD-MCNC: 0.8 MG/DL (ref 0.57–1)
CREAT BLD-MCNC: 0.8 MG/DL (ref 0.57–1)
CREAT BLD-MCNC: 0.81 MG/DL (ref 0.57–1)
CREAT BLD-MCNC: 0.81 MG/DL (ref 0.57–1)
CREAT BLD-MCNC: 0.82 MG/DL (ref 0.57–1)
CREAT BLD-MCNC: 0.83 MG/DL (ref 0.57–1)
CREAT BLD-MCNC: 0.83 MG/DL (ref 0.57–1)
CREAT BLD-MCNC: 0.84 MG/DL (ref 0.57–1)
CREAT BLD-MCNC: 0.84 MG/DL (ref 0.57–1)
CREAT BLD-MCNC: 0.85 MG/DL (ref 0.57–1)
CREAT BLD-MCNC: 0.85 MG/DL (ref 0.57–1)
CREAT BLD-MCNC: 0.86 MG/DL (ref 0.57–1)
CREAT BLD-MCNC: 0.87 MG/DL (ref 0.57–1)
CREAT BLD-MCNC: 0.88 MG/DL (ref 0.57–1)
CREAT BLD-MCNC: 0.89 MG/DL (ref 0.57–1)
CREAT BLD-MCNC: 0.9 MG/DL (ref 0.57–1)
CREAT BLD-MCNC: 0.91 MG/DL (ref 0.57–1)
CREAT BLD-MCNC: 0.92 MG/DL (ref 0.57–1)
CREAT BLD-MCNC: 0.93 MG/DL (ref 0.57–1)
CREAT BLD-MCNC: 0.94 MG/DL (ref 0.57–1)
CREAT BLD-MCNC: 0.95 MG/DL (ref 0.57–1)
CREAT BLD-MCNC: 0.97 MG/DL (ref 0.57–1)
CREAT BLD-MCNC: 0.98 MG/DL (ref 0.57–1)
CREAT BLD-MCNC: 0.99 MG/DL (ref 0.57–1)
CREAT BLD-MCNC: 1 MG/DL (ref 0.57–1)
CREAT BLD-MCNC: 1 MG/DL (ref 0.57–1)
CREAT BLD-MCNC: 1.01 MG/DL (ref 0.57–1)
CREAT BLD-MCNC: 1.01 MG/DL (ref 0.57–1)
CREAT BLD-MCNC: 1.02 MG/DL (ref 0.57–1)
CREAT BLD-MCNC: 1.03 MG/DL (ref 0.57–1)
CREAT BLD-MCNC: 1.06 MG/DL (ref 0.57–1)
CREAT BLD-MCNC: 1.07 MG/DL (ref 0.57–1)
CREAT BLD-MCNC: 1.07 MG/DL (ref 0.57–1)
CREAT BLD-MCNC: 1.08 MG/DL (ref 0.57–1)
CREAT BLD-MCNC: 1.08 MG/DL (ref 0.57–1)
CREAT BLD-MCNC: 1.1 MG/DL (ref 0.57–1)
CREAT BLD-MCNC: 1.11 MG/DL (ref 0.57–1)
CREAT BLD-MCNC: 1.12 MG/DL (ref 0.57–1)
CREAT BLD-MCNC: 1.13 MG/DL (ref 0.57–1)
CREAT BLD-MCNC: 1.14 MG/DL (ref 0.57–1)
CREAT BLD-MCNC: 1.15 MG/DL (ref 0.57–1)
CREAT BLD-MCNC: 1.16 MG/DL (ref 0.57–1)
CREAT BLD-MCNC: 1.19 MG/DL (ref 0.57–1)
CREAT BLD-MCNC: 1.21 MG/DL (ref 0.57–1)
CREAT BLD-MCNC: 1.25 MG/DL (ref 0.57–1)
CREAT BLD-MCNC: 1.25 MG/DL (ref 0.57–1)
CREAT BLD-MCNC: 1.27 MG/DL (ref 0.57–1)
CREAT BLD-MCNC: 1.27 MG/DL (ref 0.57–1)
CREAT BLD-MCNC: 1.33 MG/DL (ref 0.57–1)
CREAT BLD-MCNC: 1.37 MG/DL (ref 0.57–1)
CREAT BLD-MCNC: 1.4 MG/DL (ref 0.57–1)
CREAT BLD-MCNC: 1.43 MG/DL (ref 0.57–1)
CREAT BLD-MCNC: 1.45 MG/DL (ref 0.57–1)
CREAT BLD-MCNC: 1.46 MG/DL (ref 0.57–1)
CREAT BLD-MCNC: 1.49 MG/DL (ref 0.57–1)
CREAT BLD-MCNC: 1.54 MG/DL (ref 0.57–1)
CREAT BLD-MCNC: 1.58 MG/DL (ref 0.57–1)
CREAT BLD-MCNC: 1.6 MG/DL (ref 0.57–1)
CREAT BLD-MCNC: 1.76 MG/DL (ref 0.57–1)
CREAT BLD-MCNC: 1.89 MG/DL (ref 0.57–1)
CREAT BLD-MCNC: 1.93 MG/DL (ref 0.57–1)
CREAT BLD-MCNC: 2.17 MG/DL (ref 0.57–1)
CREAT BLD-MCNC: 2.21 MG/DL (ref 0.57–1)
CREAT BLD-MCNC: 2.43 MG/DL (ref 0.57–1)
CREAT BLD-MCNC: 3.13 MG/DL (ref 0.57–1)
CREAT UR-MCNC: 147.6 MG/DL
CROSSMATCH INTERPRETATION: NORMAL
CROSSMATCH INTERPRETATION: NORMAL
D-LACTATE SERPL-SCNC: 0.6 MMOL/L (ref 0.5–2)
D-LACTATE SERPL-SCNC: 0.7 MMOL/L (ref 0.5–2)
D-LACTATE SERPL-SCNC: 0.9 MMOL/L (ref 0.5–2)
D-LACTATE SERPL-SCNC: 1.4 MMOL/L (ref 0.5–2)
D-LACTATE SERPL-SCNC: 2.5 MMOL/L (ref 0.5–2)
DEPRECATED RDW RBC AUTO: 50.4 FL (ref 37–54)
DEPRECATED RDW RBC AUTO: 50.7 FL (ref 37–54)
DEPRECATED RDW RBC AUTO: 51 FL (ref 37–54)
DEPRECATED RDW RBC AUTO: 51.1 FL (ref 37–54)
DEPRECATED RDW RBC AUTO: 51.2 FL (ref 37–54)
DEPRECATED RDW RBC AUTO: 51.9 FL (ref 37–54)
DEPRECATED RDW RBC AUTO: 51.9 FL (ref 37–54)
DEPRECATED RDW RBC AUTO: 52 FL (ref 37–54)
DEPRECATED RDW RBC AUTO: 52 FL (ref 37–54)
DEPRECATED RDW RBC AUTO: 53.1 FL (ref 37–54)
DEPRECATED RDW RBC AUTO: 53.3 FL (ref 37–54)
DEPRECATED RDW RBC AUTO: 53.4 FL (ref 37–54)
DEPRECATED RDW RBC AUTO: 53.4 FL (ref 37–54)
DEPRECATED RDW RBC AUTO: 53.5 FL (ref 37–54)
DEPRECATED RDW RBC AUTO: 53.9 FL (ref 37–54)
DEPRECATED RDW RBC AUTO: 54 FL (ref 37–54)
DEPRECATED RDW RBC AUTO: 54.2 FL (ref 37–54)
DEPRECATED RDW RBC AUTO: 54.3 FL (ref 37–54)
DEPRECATED RDW RBC AUTO: 54.4 FL (ref 37–54)
DEPRECATED RDW RBC AUTO: 54.5 FL (ref 37–54)
DEPRECATED RDW RBC AUTO: 54.7 FL (ref 37–54)
DEPRECATED RDW RBC AUTO: 54.7 FL (ref 37–54)
DEPRECATED RDW RBC AUTO: 54.8 FL (ref 37–54)
DEPRECATED RDW RBC AUTO: 54.9 FL (ref 37–54)
DEPRECATED RDW RBC AUTO: 55.1 FL (ref 37–54)
DEPRECATED RDW RBC AUTO: 55.3 FL (ref 37–54)
DEPRECATED RDW RBC AUTO: 55.3 FL (ref 37–54)
DEPRECATED RDW RBC AUTO: 55.6 FL (ref 37–54)
DEPRECATED RDW RBC AUTO: 55.7 FL (ref 37–54)
DEPRECATED RDW RBC AUTO: 56 FL (ref 37–54)
DEPRECATED RDW RBC AUTO: 56.1 FL (ref 37–54)
DEPRECATED RDW RBC AUTO: 56.2 FL (ref 37–54)
DEPRECATED RDW RBC AUTO: 56.3 FL (ref 37–54)
DEPRECATED RDW RBC AUTO: 56.6 FL (ref 37–54)
DEPRECATED RDW RBC AUTO: 57.2 FL (ref 37–54)
DEPRECATED RDW RBC AUTO: 57.4 FL (ref 37–54)
DEPRECATED RDW RBC AUTO: 57.6 FL (ref 37–54)
DEPRECATED RDW RBC AUTO: 58.6 FL (ref 37–54)
DEPRECATED RDW RBC AUTO: 59.1 FL (ref 37–54)
E/E' RATIO: 23
EOSINOPHIL # BLD AUTO: 0.12 10*3/MM3 (ref 0.1–0.3)
EOSINOPHIL # BLD AUTO: 0.14 10*3/MM3 (ref 0.1–0.3)
EOSINOPHIL # BLD AUTO: 0.15 10*3/MM3 (ref 0.1–0.3)
EOSINOPHIL # BLD AUTO: 0.15 10*3/MM3 (ref 0.1–0.3)
EOSINOPHIL # BLD AUTO: 0.17 10*3/MM3 (ref 0.1–0.3)
EOSINOPHIL # BLD AUTO: 0.22 10*3/MM3 (ref 0.1–0.3)
EOSINOPHIL # BLD AUTO: 0.22 10*3/MM3 (ref 0.1–0.3)
EOSINOPHIL # BLD AUTO: 0.26 10*3/MM3 (ref 0.1–0.3)
EOSINOPHIL # BLD AUTO: 0.3 10*3/MM3 (ref 0.1–0.3)
EOSINOPHIL # BLD MANUAL: 0.08 10*3/MM3 (ref 0.1–0.3)
EOSINOPHIL NFR BLD AUTO: 0.8 % (ref 0–4)
EOSINOPHIL NFR BLD AUTO: 1.5 % (ref 0–4)
EOSINOPHIL NFR BLD AUTO: 1.5 % (ref 0–4)
EOSINOPHIL NFR BLD AUTO: 1.9 % (ref 0–4)
EOSINOPHIL NFR BLD AUTO: 2 % (ref 0–4)
EOSINOPHIL NFR BLD AUTO: 2.7 % (ref 0–4)
EOSINOPHIL NFR BLD AUTO: 2.8 % (ref 0–4)
EOSINOPHIL NFR BLD AUTO: 3.3 % (ref 0–4)
EOSINOPHIL NFR BLD AUTO: 4 % (ref 0–4)
EOSINOPHIL NFR BLD MANUAL: 1 % (ref 0–4)
EOSINOPHIL SPEC QL MICRO: 0 % EOS/100 CELLS (ref 0–0)
ERYTHROCYTE [DISTWIDTH] IN BLOOD BY AUTOMATED COUNT: 14.5 % (ref 11.5–14.5)
ERYTHROCYTE [DISTWIDTH] IN BLOOD BY AUTOMATED COUNT: 14.6 % (ref 11.5–14.5)
ERYTHROCYTE [DISTWIDTH] IN BLOOD BY AUTOMATED COUNT: 14.7 % (ref 11.5–14.5)
ERYTHROCYTE [DISTWIDTH] IN BLOOD BY AUTOMATED COUNT: 14.7 % (ref 11.5–14.5)
ERYTHROCYTE [DISTWIDTH] IN BLOOD BY AUTOMATED COUNT: 14.8 % (ref 11.5–14.5)
ERYTHROCYTE [DISTWIDTH] IN BLOOD BY AUTOMATED COUNT: 14.9 % (ref 11.5–14.5)
ERYTHROCYTE [DISTWIDTH] IN BLOOD BY AUTOMATED COUNT: 15 % (ref 11.5–14.5)
ERYTHROCYTE [DISTWIDTH] IN BLOOD BY AUTOMATED COUNT: 15.1 % (ref 11.5–14.5)
ERYTHROCYTE [DISTWIDTH] IN BLOOD BY AUTOMATED COUNT: 15.1 % (ref 11.5–14.5)
ERYTHROCYTE [DISTWIDTH] IN BLOOD BY AUTOMATED COUNT: 15.2 % (ref 11.5–14.5)
ERYTHROCYTE [DISTWIDTH] IN BLOOD BY AUTOMATED COUNT: 15.3 % (ref 11.5–14.5)
ERYTHROCYTE [DISTWIDTH] IN BLOOD BY AUTOMATED COUNT: 15.4 % (ref 11.5–14.5)
ERYTHROCYTE [DISTWIDTH] IN BLOOD BY AUTOMATED COUNT: 15.5 % (ref 11.5–14.5)
ERYTHROCYTE [DISTWIDTH] IN BLOOD BY AUTOMATED COUNT: 15.8 % (ref 11.5–14.5)
ERYTHROCYTE [DISTWIDTH] IN BLOOD BY AUTOMATED COUNT: 15.9 % (ref 11.5–14.5)
ERYTHROCYTE [DISTWIDTH] IN BLOOD BY AUTOMATED COUNT: 16 % (ref 11.5–14.5)
ERYTHROCYTE [DISTWIDTH] IN BLOOD BY AUTOMATED COUNT: 16 % (ref 11.5–14.5)
ERYTHROCYTE [DISTWIDTH] IN BLOOD BY AUTOMATED COUNT: 16.1 % (ref 11.5–14.5)
ERYTHROCYTE [DISTWIDTH] IN BLOOD BY AUTOMATED COUNT: 16.1 % (ref 11.5–14.5)
ERYTHROCYTE [DISTWIDTH] IN BLOOD BY AUTOMATED COUNT: 16.2 % (ref 11.5–14.5)
FERRITIN SERPL-MCNC: 147.5 NG/ML (ref 13–150)
FLUAV H1 2009 PAND RNA NPH QL NAA+PROBE: NOT DETECTED
FLUAV H1 HA GENE NPH QL NAA+PROBE: NOT DETECTED
FLUAV H3 RNA NPH QL NAA+PROBE: NOT DETECTED
FLUAV SUBTYP SPEC NAA+PROBE: NOT DETECTED
FLUBV RNA ISLT QL NAA+PROBE: NOT DETECTED
FOLATE SERPL-MCNC: 11.93 NG/ML (ref 4.78–20)
FOLATE SERPL-MCNC: >20 NG/ML (ref 4.78–24.2)
GAS FLOW AIRWAY: 2 LPM
GASTROCULT GAST QL: NEGATIVE
GFR SERPL CREATININE-BSD FRML MDRD: 14 ML/MIN/1.73
GFR SERPL CREATININE-BSD FRML MDRD: 19 ML/MIN/1.73
GFR SERPL CREATININE-BSD FRML MDRD: 22 ML/MIN/1.73
GFR SERPL CREATININE-BSD FRML MDRD: 22 ML/MIN/1.73
GFR SERPL CREATININE-BSD FRML MDRD: 25 ML/MIN/1.73
GFR SERPL CREATININE-BSD FRML MDRD: 26 ML/MIN/1.73
GFR SERPL CREATININE-BSD FRML MDRD: 28 ML/MIN/1.73
GFR SERPL CREATININE-BSD FRML MDRD: 31 ML/MIN/1.73
GFR SERPL CREATININE-BSD FRML MDRD: 32 ML/MIN/1.73
GFR SERPL CREATININE-BSD FRML MDRD: 33 ML/MIN/1.73
GFR SERPL CREATININE-BSD FRML MDRD: 34 ML/MIN/1.73
GFR SERPL CREATININE-BSD FRML MDRD: 35 ML/MIN/1.73
GFR SERPL CREATININE-BSD FRML MDRD: 35 ML/MIN/1.73
GFR SERPL CREATININE-BSD FRML MDRD: 36 ML/MIN/1.73
GFR SERPL CREATININE-BSD FRML MDRD: 36 ML/MIN/1.73
GFR SERPL CREATININE-BSD FRML MDRD: 37 ML/MIN/1.73
GFR SERPL CREATININE-BSD FRML MDRD: 39 ML/MIN/1.73
GFR SERPL CREATININE-BSD FRML MDRD: 41 ML/MIN/1.73
GFR SERPL CREATININE-BSD FRML MDRD: 41 ML/MIN/1.73
GFR SERPL CREATININE-BSD FRML MDRD: 42 ML/MIN/1.73
GFR SERPL CREATININE-BSD FRML MDRD: 42 ML/MIN/1.73
GFR SERPL CREATININE-BSD FRML MDRD: 43 ML/MIN/1.73
GFR SERPL CREATININE-BSD FRML MDRD: 44 ML/MIN/1.73
GFR SERPL CREATININE-BSD FRML MDRD: 45 ML/MIN/1.73
GFR SERPL CREATININE-BSD FRML MDRD: 46 ML/MIN/1.73
GFR SERPL CREATININE-BSD FRML MDRD: 46 ML/MIN/1.73
GFR SERPL CREATININE-BSD FRML MDRD: 47 ML/MIN/1.73
GFR SERPL CREATININE-BSD FRML MDRD: 48 ML/MIN/1.73
GFR SERPL CREATININE-BSD FRML MDRD: 48 ML/MIN/1.73
GFR SERPL CREATININE-BSD FRML MDRD: 49 ML/MIN/1.73
GFR SERPL CREATININE-BSD FRML MDRD: 49 ML/MIN/1.73
GFR SERPL CREATININE-BSD FRML MDRD: 50 ML/MIN/1.73
GFR SERPL CREATININE-BSD FRML MDRD: 52 ML/MIN/1.73
GFR SERPL CREATININE-BSD FRML MDRD: 53 ML/MIN/1.73
GFR SERPL CREATININE-BSD FRML MDRD: 54 ML/MIN/1.73
GFR SERPL CREATININE-BSD FRML MDRD: 55 ML/MIN/1.73
GFR SERPL CREATININE-BSD FRML MDRD: 56 ML/MIN/1.73
GFR SERPL CREATININE-BSD FRML MDRD: 57 ML/MIN/1.73
GFR SERPL CREATININE-BSD FRML MDRD: 58 ML/MIN/1.73
GFR SERPL CREATININE-BSD FRML MDRD: 59 ML/MIN/1.73
GFR SERPL CREATININE-BSD FRML MDRD: 59 ML/MIN/1.73
GFR SERPL CREATININE-BSD FRML MDRD: 60 ML/MIN/1.73
GFR SERPL CREATININE-BSD FRML MDRD: 61 ML/MIN/1.73
GFR SERPL CREATININE-BSD FRML MDRD: 62 ML/MIN/1.73
GFR SERPL CREATININE-BSD FRML MDRD: 62 ML/MIN/1.73
GFR SERPL CREATININE-BSD FRML MDRD: 63 ML/MIN/1.73
GFR SERPL CREATININE-BSD FRML MDRD: 64 ML/MIN/1.73
GFR SERPL CREATININE-BSD FRML MDRD: 65 ML/MIN/1.73
GFR SERPL CREATININE-BSD FRML MDRD: 65 ML/MIN/1.73
GFR SERPL CREATININE-BSD FRML MDRD: 66 ML/MIN/1.73
GFR SERPL CREATININE-BSD FRML MDRD: 66 ML/MIN/1.73
GFR SERPL CREATININE-BSD FRML MDRD: 67 ML/MIN/1.73
GFR SERPL CREATININE-BSD FRML MDRD: 67 ML/MIN/1.73
GFR SERPL CREATININE-BSD FRML MDRD: 68 ML/MIN/1.73
GFR SERPL CREATININE-BSD FRML MDRD: 69 ML/MIN/1.73
GFR SERPL CREATININE-BSD FRML MDRD: 69 ML/MIN/1.73
GFR SERPL CREATININE-BSD FRML MDRD: 70 ML/MIN/1.73
GFR SERPL CREATININE-BSD FRML MDRD: 70 ML/MIN/1.73
GFR SERPL CREATININE-BSD FRML MDRD: 73 ML/MIN/1.73
GFR SERPL CREATININE-BSD FRML MDRD: 74 ML/MIN/1.73
GFR SERPL CREATININE-BSD FRML MDRD: 76 ML/MIN/1.73
GFR SERPL CREATININE-BSD FRML MDRD: 80 ML/MIN/1.73
GFR SERPL CREATININE-BSD FRML MDRD: 80 ML/MIN/1.73
GFR SERPL CREATININE-BSD FRML MDRD: 84 ML/MIN/1.73
GLOBULIN UR ELPH-MCNC: 3.1 GM/DL
GLOBULIN UR ELPH-MCNC: 3.5 GM/DL
GLOBULIN UR ELPH-MCNC: 3.5 GM/DL
GLOBULIN UR ELPH-MCNC: 3.7 GM/DL
GLOBULIN UR ELPH-MCNC: 3.8 GM/DL
GLOBULIN UR ELPH-MCNC: 4.3 GM/DL
GLOBULIN UR ELPH-MCNC: 4.4 GM/DL
GLOBULIN UR ELPH-MCNC: 4.4 GM/DL
GLUCOSE BLD-MCNC: 100 MG/DL (ref 65–99)
GLUCOSE BLD-MCNC: 103 MG/DL (ref 65–99)
GLUCOSE BLD-MCNC: 106 MG/DL (ref 65–99)
GLUCOSE BLD-MCNC: 107 MG/DL (ref 65–99)
GLUCOSE BLD-MCNC: 109 MG/DL (ref 65–99)
GLUCOSE BLD-MCNC: 110 MG/DL (ref 65–99)
GLUCOSE BLD-MCNC: 110 MG/DL (ref 65–99)
GLUCOSE BLD-MCNC: 111 MG/DL (ref 65–99)
GLUCOSE BLD-MCNC: 112 MG/DL (ref 65–99)
GLUCOSE BLD-MCNC: 113 MG/DL (ref 65–99)
GLUCOSE BLD-MCNC: 114 MG/DL (ref 65–99)
GLUCOSE BLD-MCNC: 114 MG/DL (ref 65–99)
GLUCOSE BLD-MCNC: 115 MG/DL (ref 65–99)
GLUCOSE BLD-MCNC: 117 MG/DL (ref 65–99)
GLUCOSE BLD-MCNC: 118 MG/DL (ref 65–99)
GLUCOSE BLD-MCNC: 118 MG/DL (ref 65–99)
GLUCOSE BLD-MCNC: 119 MG/DL (ref 65–99)
GLUCOSE BLD-MCNC: 120 MG/DL (ref 65–99)
GLUCOSE BLD-MCNC: 122 MG/DL (ref 65–99)
GLUCOSE BLD-MCNC: 123 MG/DL (ref 65–99)
GLUCOSE BLD-MCNC: 125 MG/DL (ref 65–99)
GLUCOSE BLD-MCNC: 126 MG/DL (ref 65–99)
GLUCOSE BLD-MCNC: 128 MG/DL (ref 65–99)
GLUCOSE BLD-MCNC: 129 MG/DL (ref 65–99)
GLUCOSE BLD-MCNC: 129 MG/DL (ref 65–99)
GLUCOSE BLD-MCNC: 132 MG/DL (ref 65–99)
GLUCOSE BLD-MCNC: 133 MG/DL (ref 65–99)
GLUCOSE BLD-MCNC: 135 MG/DL (ref 65–99)
GLUCOSE BLD-MCNC: 135 MG/DL (ref 65–99)
GLUCOSE BLD-MCNC: 137 MG/DL (ref 65–99)
GLUCOSE BLD-MCNC: 137 MG/DL (ref 65–99)
GLUCOSE BLD-MCNC: 139 MG/DL (ref 65–99)
GLUCOSE BLD-MCNC: 141 MG/DL (ref 65–99)
GLUCOSE BLD-MCNC: 142 MG/DL (ref 65–99)
GLUCOSE BLD-MCNC: 144 MG/DL (ref 65–99)
GLUCOSE BLD-MCNC: 146 MG/DL (ref 65–99)
GLUCOSE BLD-MCNC: 147 MG/DL (ref 65–99)
GLUCOSE BLD-MCNC: 149 MG/DL (ref 65–99)
GLUCOSE BLD-MCNC: 150 MG/DL (ref 65–99)
GLUCOSE BLD-MCNC: 151 MG/DL (ref 65–99)
GLUCOSE BLD-MCNC: 153 MG/DL (ref 65–99)
GLUCOSE BLD-MCNC: 153 MG/DL (ref 65–99)
GLUCOSE BLD-MCNC: 155 MG/DL (ref 65–99)
GLUCOSE BLD-MCNC: 156 MG/DL (ref 65–99)
GLUCOSE BLD-MCNC: 157 MG/DL (ref 65–99)
GLUCOSE BLD-MCNC: 158 MG/DL (ref 65–99)
GLUCOSE BLD-MCNC: 159 MG/DL (ref 65–99)
GLUCOSE BLD-MCNC: 159 MG/DL (ref 65–99)
GLUCOSE BLD-MCNC: 160 MG/DL (ref 65–99)
GLUCOSE BLD-MCNC: 161 MG/DL (ref 65–99)
GLUCOSE BLD-MCNC: 162 MG/DL (ref 65–99)
GLUCOSE BLD-MCNC: 162 MG/DL (ref 65–99)
GLUCOSE BLD-MCNC: 163 MG/DL (ref 65–99)
GLUCOSE BLD-MCNC: 164 MG/DL (ref 65–99)
GLUCOSE BLD-MCNC: 165 MG/DL (ref 65–99)
GLUCOSE BLD-MCNC: 165 MG/DL (ref 65–99)
GLUCOSE BLD-MCNC: 167 MG/DL (ref 65–99)
GLUCOSE BLD-MCNC: 169 MG/DL (ref 65–99)
GLUCOSE BLD-MCNC: 171 MG/DL (ref 65–99)
GLUCOSE BLD-MCNC: 171 MG/DL (ref 65–99)
GLUCOSE BLD-MCNC: 173 MG/DL (ref 65–99)
GLUCOSE BLD-MCNC: 173 MG/DL (ref 65–99)
GLUCOSE BLD-MCNC: 179 MG/DL (ref 65–99)
GLUCOSE BLD-MCNC: 185 MG/DL (ref 65–99)
GLUCOSE BLD-MCNC: 191 MG/DL (ref 65–99)
GLUCOSE BLD-MCNC: 196 MG/DL (ref 65–99)
GLUCOSE BLD-MCNC: 197 MG/DL (ref 65–99)
GLUCOSE BLD-MCNC: 199 MG/DL (ref 65–99)
GLUCOSE BLD-MCNC: 200 MG/DL (ref 65–99)
GLUCOSE BLD-MCNC: 201 MG/DL (ref 65–99)
GLUCOSE BLD-MCNC: 202 MG/DL (ref 65–99)
GLUCOSE BLD-MCNC: 204 MG/DL (ref 65–99)
GLUCOSE BLD-MCNC: 207 MG/DL (ref 65–99)
GLUCOSE BLD-MCNC: 212 MG/DL (ref 65–99)
GLUCOSE BLD-MCNC: 214 MG/DL (ref 65–99)
GLUCOSE BLD-MCNC: 215 MG/DL (ref 65–99)
GLUCOSE BLD-MCNC: 220 MG/DL (ref 65–99)
GLUCOSE BLD-MCNC: 220 MG/DL (ref 65–99)
GLUCOSE BLD-MCNC: 223 MG/DL (ref 65–99)
GLUCOSE BLD-MCNC: 229 MG/DL (ref 65–99)
GLUCOSE BLD-MCNC: 247 MG/DL (ref 65–99)
GLUCOSE BLD-MCNC: 294 MG/DL (ref 65–99)
GLUCOSE BLD-MCNC: 71 MG/DL (ref 65–99)
GLUCOSE BLD-MCNC: 80 MG/DL (ref 65–99)
GLUCOSE BLD-MCNC: 84 MG/DL (ref 65–99)
GLUCOSE BLD-MCNC: 93 MG/DL (ref 65–99)
GLUCOSE BLD-MCNC: 96 MG/DL (ref 65–99)
GLUCOSE BLDC GLUCOMTR-MCNC: 100 MG/DL (ref 70–130)
GLUCOSE BLDC GLUCOMTR-MCNC: 101 MG/DL (ref 70–130)
GLUCOSE BLDC GLUCOMTR-MCNC: 101 MG/DL (ref 70–130)
GLUCOSE BLDC GLUCOMTR-MCNC: 102 MG/DL (ref 70–130)
GLUCOSE BLDC GLUCOMTR-MCNC: 103 MG/DL (ref 70–130)
GLUCOSE BLDC GLUCOMTR-MCNC: 104 MG/DL (ref 70–130)
GLUCOSE BLDC GLUCOMTR-MCNC: 105 MG/DL (ref 70–130)
GLUCOSE BLDC GLUCOMTR-MCNC: 106 MG/DL (ref 70–130)
GLUCOSE BLDC GLUCOMTR-MCNC: 106 MG/DL (ref 70–130)
GLUCOSE BLDC GLUCOMTR-MCNC: 107 MG/DL (ref 70–130)
GLUCOSE BLDC GLUCOMTR-MCNC: 108 MG/DL (ref 70–130)
GLUCOSE BLDC GLUCOMTR-MCNC: 108 MG/DL (ref 70–130)
GLUCOSE BLDC GLUCOMTR-MCNC: 109 MG/DL (ref 70–130)
GLUCOSE BLDC GLUCOMTR-MCNC: 110 MG/DL (ref 70–130)
GLUCOSE BLDC GLUCOMTR-MCNC: 111 MG/DL (ref 70–130)
GLUCOSE BLDC GLUCOMTR-MCNC: 112 MG/DL (ref 70–130)
GLUCOSE BLDC GLUCOMTR-MCNC: 113 MG/DL (ref 70–130)
GLUCOSE BLDC GLUCOMTR-MCNC: 114 MG/DL (ref 70–130)
GLUCOSE BLDC GLUCOMTR-MCNC: 115 MG/DL (ref 70–130)
GLUCOSE BLDC GLUCOMTR-MCNC: 116 MG/DL (ref 70–130)
GLUCOSE BLDC GLUCOMTR-MCNC: 117 MG/DL (ref 70–130)
GLUCOSE BLDC GLUCOMTR-MCNC: 118 MG/DL (ref 70–130)
GLUCOSE BLDC GLUCOMTR-MCNC: 119 MG/DL (ref 70–130)
GLUCOSE BLDC GLUCOMTR-MCNC: 119 MG/DL (ref 70–130)
GLUCOSE BLDC GLUCOMTR-MCNC: 120 MG/DL (ref 70–130)
GLUCOSE BLDC GLUCOMTR-MCNC: 121 MG/DL (ref 70–130)
GLUCOSE BLDC GLUCOMTR-MCNC: 121 MG/DL (ref 70–130)
GLUCOSE BLDC GLUCOMTR-MCNC: 122 MG/DL (ref 70–130)
GLUCOSE BLDC GLUCOMTR-MCNC: 123 MG/DL (ref 70–130)
GLUCOSE BLDC GLUCOMTR-MCNC: 124 MG/DL (ref 70–130)
GLUCOSE BLDC GLUCOMTR-MCNC: 125 MG/DL (ref 70–130)
GLUCOSE BLDC GLUCOMTR-MCNC: 126 MG/DL (ref 70–130)
GLUCOSE BLDC GLUCOMTR-MCNC: 127 MG/DL (ref 70–130)
GLUCOSE BLDC GLUCOMTR-MCNC: 128 MG/DL (ref 70–130)
GLUCOSE BLDC GLUCOMTR-MCNC: 129 MG/DL (ref 70–130)
GLUCOSE BLDC GLUCOMTR-MCNC: 130 MG/DL (ref 70–130)
GLUCOSE BLDC GLUCOMTR-MCNC: 131 MG/DL (ref 70–130)
GLUCOSE BLDC GLUCOMTR-MCNC: 132 MG/DL (ref 70–130)
GLUCOSE BLDC GLUCOMTR-MCNC: 133 MG/DL (ref 70–130)
GLUCOSE BLDC GLUCOMTR-MCNC: 134 MG/DL (ref 70–130)
GLUCOSE BLDC GLUCOMTR-MCNC: 135 MG/DL (ref 70–130)
GLUCOSE BLDC GLUCOMTR-MCNC: 136 MG/DL (ref 70–130)
GLUCOSE BLDC GLUCOMTR-MCNC: 137 MG/DL (ref 70–130)
GLUCOSE BLDC GLUCOMTR-MCNC: 137 MG/DL (ref 70–130)
GLUCOSE BLDC GLUCOMTR-MCNC: 138 MG/DL (ref 70–130)
GLUCOSE BLDC GLUCOMTR-MCNC: 139 MG/DL (ref 70–130)
GLUCOSE BLDC GLUCOMTR-MCNC: 140 MG/DL (ref 70–130)
GLUCOSE BLDC GLUCOMTR-MCNC: 141 MG/DL (ref 70–130)
GLUCOSE BLDC GLUCOMTR-MCNC: 142 MG/DL (ref 70–130)
GLUCOSE BLDC GLUCOMTR-MCNC: 143 MG/DL (ref 70–130)
GLUCOSE BLDC GLUCOMTR-MCNC: 144 MG/DL (ref 70–130)
GLUCOSE BLDC GLUCOMTR-MCNC: 145 MG/DL (ref 70–130)
GLUCOSE BLDC GLUCOMTR-MCNC: 146 MG/DL (ref 70–130)
GLUCOSE BLDC GLUCOMTR-MCNC: 147 MG/DL (ref 70–130)
GLUCOSE BLDC GLUCOMTR-MCNC: 147 MG/DL (ref 70–130)
GLUCOSE BLDC GLUCOMTR-MCNC: 148 MG/DL (ref 70–130)
GLUCOSE BLDC GLUCOMTR-MCNC: 148 MG/DL (ref 70–130)
GLUCOSE BLDC GLUCOMTR-MCNC: 149 MG/DL (ref 70–130)
GLUCOSE BLDC GLUCOMTR-MCNC: 150 MG/DL (ref 70–130)
GLUCOSE BLDC GLUCOMTR-MCNC: 151 MG/DL (ref 70–130)
GLUCOSE BLDC GLUCOMTR-MCNC: 152 MG/DL (ref 70–130)
GLUCOSE BLDC GLUCOMTR-MCNC: 153 MG/DL (ref 70–130)
GLUCOSE BLDC GLUCOMTR-MCNC: 154 MG/DL (ref 70–130)
GLUCOSE BLDC GLUCOMTR-MCNC: 155 MG/DL (ref 70–130)
GLUCOSE BLDC GLUCOMTR-MCNC: 156 MG/DL (ref 70–130)
GLUCOSE BLDC GLUCOMTR-MCNC: 157 MG/DL (ref 70–130)
GLUCOSE BLDC GLUCOMTR-MCNC: 158 MG/DL (ref 70–130)
GLUCOSE BLDC GLUCOMTR-MCNC: 159 MG/DL (ref 70–130)
GLUCOSE BLDC GLUCOMTR-MCNC: 160 MG/DL (ref 70–130)
GLUCOSE BLDC GLUCOMTR-MCNC: 161 MG/DL (ref 70–130)
GLUCOSE BLDC GLUCOMTR-MCNC: 162 MG/DL (ref 70–130)
GLUCOSE BLDC GLUCOMTR-MCNC: 163 MG/DL (ref 70–130)
GLUCOSE BLDC GLUCOMTR-MCNC: 164 MG/DL (ref 70–130)
GLUCOSE BLDC GLUCOMTR-MCNC: 165 MG/DL (ref 70–130)
GLUCOSE BLDC GLUCOMTR-MCNC: 166 MG/DL (ref 70–130)
GLUCOSE BLDC GLUCOMTR-MCNC: 167 MG/DL (ref 70–130)
GLUCOSE BLDC GLUCOMTR-MCNC: 168 MG/DL (ref 70–130)
GLUCOSE BLDC GLUCOMTR-MCNC: 169 MG/DL (ref 70–130)
GLUCOSE BLDC GLUCOMTR-MCNC: 170 MG/DL (ref 70–130)
GLUCOSE BLDC GLUCOMTR-MCNC: 171 MG/DL (ref 70–130)
GLUCOSE BLDC GLUCOMTR-MCNC: 172 MG/DL (ref 70–130)
GLUCOSE BLDC GLUCOMTR-MCNC: 173 MG/DL (ref 70–130)
GLUCOSE BLDC GLUCOMTR-MCNC: 174 MG/DL (ref 70–130)
GLUCOSE BLDC GLUCOMTR-MCNC: 175 MG/DL (ref 70–130)
GLUCOSE BLDC GLUCOMTR-MCNC: 175 MG/DL (ref 70–130)
GLUCOSE BLDC GLUCOMTR-MCNC: 176 MG/DL (ref 70–130)
GLUCOSE BLDC GLUCOMTR-MCNC: 177 MG/DL (ref 70–130)
GLUCOSE BLDC GLUCOMTR-MCNC: 178 MG/DL (ref 70–130)
GLUCOSE BLDC GLUCOMTR-MCNC: 179 MG/DL (ref 70–130)
GLUCOSE BLDC GLUCOMTR-MCNC: 180 MG/DL (ref 70–130)
GLUCOSE BLDC GLUCOMTR-MCNC: 181 MG/DL (ref 70–130)
GLUCOSE BLDC GLUCOMTR-MCNC: 182 MG/DL (ref 70–130)
GLUCOSE BLDC GLUCOMTR-MCNC: 183 MG/DL (ref 70–130)
GLUCOSE BLDC GLUCOMTR-MCNC: 184 MG/DL (ref 70–130)
GLUCOSE BLDC GLUCOMTR-MCNC: 185 MG/DL (ref 70–130)
GLUCOSE BLDC GLUCOMTR-MCNC: 185 MG/DL (ref 70–130)
GLUCOSE BLDC GLUCOMTR-MCNC: 187 MG/DL (ref 70–130)
GLUCOSE BLDC GLUCOMTR-MCNC: 188 MG/DL (ref 70–130)
GLUCOSE BLDC GLUCOMTR-MCNC: 189 MG/DL (ref 70–130)
GLUCOSE BLDC GLUCOMTR-MCNC: 192 MG/DL (ref 70–130)
GLUCOSE BLDC GLUCOMTR-MCNC: 193 MG/DL (ref 70–130)
GLUCOSE BLDC GLUCOMTR-MCNC: 194 MG/DL (ref 70–130)
GLUCOSE BLDC GLUCOMTR-MCNC: 195 MG/DL (ref 70–130)
GLUCOSE BLDC GLUCOMTR-MCNC: 196 MG/DL (ref 70–130)
GLUCOSE BLDC GLUCOMTR-MCNC: 198 MG/DL (ref 70–130)
GLUCOSE BLDC GLUCOMTR-MCNC: 199 MG/DL (ref 70–130)
GLUCOSE BLDC GLUCOMTR-MCNC: 200 MG/DL (ref 70–130)
GLUCOSE BLDC GLUCOMTR-MCNC: 200 MG/DL (ref 70–130)
GLUCOSE BLDC GLUCOMTR-MCNC: 201 MG/DL (ref 70–130)
GLUCOSE BLDC GLUCOMTR-MCNC: 201 MG/DL (ref 70–130)
GLUCOSE BLDC GLUCOMTR-MCNC: 202 MG/DL (ref 70–130)
GLUCOSE BLDC GLUCOMTR-MCNC: 203 MG/DL (ref 70–130)
GLUCOSE BLDC GLUCOMTR-MCNC: 204 MG/DL (ref 70–130)
GLUCOSE BLDC GLUCOMTR-MCNC: 206 MG/DL (ref 70–130)
GLUCOSE BLDC GLUCOMTR-MCNC: 210 MG/DL (ref 70–130)
GLUCOSE BLDC GLUCOMTR-MCNC: 211 MG/DL (ref 70–130)
GLUCOSE BLDC GLUCOMTR-MCNC: 212 MG/DL (ref 70–130)
GLUCOSE BLDC GLUCOMTR-MCNC: 213 MG/DL (ref 70–130)
GLUCOSE BLDC GLUCOMTR-MCNC: 214 MG/DL (ref 70–130)
GLUCOSE BLDC GLUCOMTR-MCNC: 214 MG/DL (ref 70–130)
GLUCOSE BLDC GLUCOMTR-MCNC: 216 MG/DL (ref 70–130)
GLUCOSE BLDC GLUCOMTR-MCNC: 217 MG/DL (ref 70–130)
GLUCOSE BLDC GLUCOMTR-MCNC: 221 MG/DL (ref 70–130)
GLUCOSE BLDC GLUCOMTR-MCNC: 221 MG/DL (ref 70–130)
GLUCOSE BLDC GLUCOMTR-MCNC: 227 MG/DL (ref 70–130)
GLUCOSE BLDC GLUCOMTR-MCNC: 229 MG/DL (ref 70–130)
GLUCOSE BLDC GLUCOMTR-MCNC: 229 MG/DL (ref 70–130)
GLUCOSE BLDC GLUCOMTR-MCNC: 232 MG/DL (ref 70–130)
GLUCOSE BLDC GLUCOMTR-MCNC: 234 MG/DL (ref 70–130)
GLUCOSE BLDC GLUCOMTR-MCNC: 241 MG/DL (ref 70–130)
GLUCOSE BLDC GLUCOMTR-MCNC: 243 MG/DL (ref 70–130)
GLUCOSE BLDC GLUCOMTR-MCNC: 244 MG/DL (ref 70–130)
GLUCOSE BLDC GLUCOMTR-MCNC: 252 MG/DL (ref 70–130)
GLUCOSE BLDC GLUCOMTR-MCNC: 254 MG/DL (ref 70–130)
GLUCOSE BLDC GLUCOMTR-MCNC: 291 MG/DL (ref 70–130)
GLUCOSE BLDC GLUCOMTR-MCNC: 67 MG/DL (ref 70–130)
GLUCOSE BLDC GLUCOMTR-MCNC: 75 MG/DL (ref 70–130)
GLUCOSE BLDC GLUCOMTR-MCNC: 81 MG/DL (ref 70–130)
GLUCOSE BLDC GLUCOMTR-MCNC: 82 MG/DL (ref 70–130)
GLUCOSE BLDC GLUCOMTR-MCNC: 83 MG/DL (ref 70–130)
GLUCOSE BLDC GLUCOMTR-MCNC: 83 MG/DL (ref 70–130)
GLUCOSE BLDC GLUCOMTR-MCNC: 85 MG/DL (ref 70–130)
GLUCOSE BLDC GLUCOMTR-MCNC: 88 MG/DL (ref 70–130)
GLUCOSE BLDC GLUCOMTR-MCNC: 90 MG/DL (ref 70–130)
GLUCOSE BLDC GLUCOMTR-MCNC: 93 MG/DL (ref 70–130)
GLUCOSE BLDC GLUCOMTR-MCNC: 94 MG/DL (ref 70–130)
GLUCOSE BLDC GLUCOMTR-MCNC: 94 MG/DL (ref 70–130)
GLUCOSE BLDC GLUCOMTR-MCNC: 98 MG/DL (ref 70–130)
GLUCOSE UR STRIP-MCNC: NEGATIVE MG/DL
GRAM STN SPEC: ABNORMAL
HADV DNA SPEC NAA+PROBE: NOT DETECTED
HBA1C MFR BLD: 6.2 % (ref 4.8–5.6)
HBA1C MFR BLD: 6.4 % (ref 4.8–5.6)
HBA1C MFR BLD: 6.6 % (ref 4.8–5.6)
HBA1C MFR BLD: 7.5 % (ref 4.8–5.6)
HCO3 BLDA-SCNC: 28.6 MMOL/L (ref 20–26)
HCO3 BLDA-SCNC: 28.7 MMOL/L (ref 20–26)
HCO3 BLDA-SCNC: 30 MMOL/L (ref 20–26)
HCOV 229E RNA SPEC QL NAA+PROBE: NOT DETECTED
HCOV HKU1 RNA SPEC QL NAA+PROBE: NOT DETECTED
HCOV NL63 RNA SPEC QL NAA+PROBE: NOT DETECTED
HCOV OC43 RNA SPEC QL NAA+PROBE: NOT DETECTED
HCT VFR BLD AUTO: 22.8 % (ref 37–47)
HCT VFR BLD AUTO: 23.6 % (ref 37–47)
HCT VFR BLD AUTO: 25.1 % (ref 37–47)
HCT VFR BLD AUTO: 25.2 % (ref 37–47)
HCT VFR BLD AUTO: 25.3 % (ref 37–47)
HCT VFR BLD AUTO: 26.7 % (ref 37–47)
HCT VFR BLD AUTO: 26.7 % (ref 37–47)
HCT VFR BLD AUTO: 27.5 % (ref 37–47)
HCT VFR BLD AUTO: 27.5 % (ref 37–47)
HCT VFR BLD AUTO: 27.9 % (ref 37–47)
HCT VFR BLD AUTO: 28.1 % (ref 37–47)
HCT VFR BLD AUTO: 28.1 % (ref 37–47)
HCT VFR BLD AUTO: 28.3 % (ref 37–47)
HCT VFR BLD AUTO: 29.6 % (ref 37–47)
HCT VFR BLD AUTO: 30 % (ref 37–47)
HCT VFR BLD AUTO: 31.1 % (ref 37–47)
HCT VFR BLD AUTO: 31.4 % (ref 37–47)
HCT VFR BLD AUTO: 31.7 % (ref 37–47)
HCT VFR BLD AUTO: 31.9 % (ref 37–47)
HCT VFR BLD AUTO: 32.1 % (ref 37–47)
HCT VFR BLD AUTO: 32.4 % (ref 37–47)
HCT VFR BLD AUTO: 32.5 % (ref 37–47)
HCT VFR BLD AUTO: 32.7 % (ref 37–47)
HCT VFR BLD AUTO: 32.8 % (ref 37–47)
HCT VFR BLD AUTO: 33.5 % (ref 37–47)
HCT VFR BLD AUTO: 33.6 % (ref 37–47)
HCT VFR BLD AUTO: 34.2 % (ref 37–47)
HCT VFR BLD AUTO: 34.4 % (ref 37–47)
HCT VFR BLD AUTO: 34.7 % (ref 37–47)
HCT VFR BLD AUTO: 35.3 % (ref 37–47)
HCT VFR BLD AUTO: 35.5 % (ref 37–47)
HCT VFR BLD AUTO: 37.2 % (ref 37–47)
HCT VFR BLD AUTO: 37.7 % (ref 37–47)
HCT VFR BLD AUTO: 39 % (ref 37–47)
HCT VFR BLD AUTO: 41.1 % (ref 37–47)
HCT VFR BLD AUTO: 43.4 % (ref 37–47)
HCT VFR BLD AUTO: 45.8 % (ref 37–47)
HDLC SERPL-MCNC: 27 MG/DL (ref 40–60)
HEMOCCULT STL QL: NEGATIVE
HGB BLD-MCNC: 10.1 G/DL (ref 12–16)
HGB BLD-MCNC: 10.1 G/DL (ref 12–16)
HGB BLD-MCNC: 10.3 G/DL (ref 12–16)
HGB BLD-MCNC: 10.4 G/DL (ref 12–16)
HGB BLD-MCNC: 10.4 G/DL (ref 12–16)
HGB BLD-MCNC: 10.5 G/DL (ref 12–16)
HGB BLD-MCNC: 10.5 G/DL (ref 12–16)
HGB BLD-MCNC: 10.6 G/DL (ref 12–16)
HGB BLD-MCNC: 10.7 G/DL (ref 12–16)
HGB BLD-MCNC: 10.9 G/DL (ref 12–16)
HGB BLD-MCNC: 11.4 G/DL (ref 12–16)
HGB BLD-MCNC: 11.5 G/DL (ref 12–16)
HGB BLD-MCNC: 11.6 G/DL (ref 12–16)
HGB BLD-MCNC: 12.5 G/DL (ref 12–16)
HGB BLD-MCNC: 12.7 G/DL (ref 12–16)
HGB BLD-MCNC: 13.2 G/DL (ref 12–16)
HGB BLD-MCNC: 13.7 G/DL (ref 12–16)
HGB BLD-MCNC: 14.6 G/DL (ref 12–16)
HGB BLD-MCNC: 7.6 G/DL (ref 12–16)
HGB BLD-MCNC: 7.8 G/DL (ref 12–16)
HGB BLD-MCNC: 7.8 G/DL (ref 12–16)
HGB BLD-MCNC: 8 G/DL (ref 12–16)
HGB BLD-MCNC: 8.1 G/DL (ref 12–16)
HGB BLD-MCNC: 8.5 G/DL (ref 12–16)
HGB BLD-MCNC: 8.6 G/DL (ref 12–16)
HGB BLD-MCNC: 8.6 G/DL (ref 12–16)
HGB BLD-MCNC: 8.8 G/DL (ref 12–16)
HGB BLD-MCNC: 8.8 G/DL (ref 12–16)
HGB BLD-MCNC: 9.3 G/DL (ref 12–16)
HGB BLD-MCNC: 9.6 G/DL (ref 12–16)
HGB BLD-MCNC: 9.8 G/DL (ref 12–16)
HGB RETIC QN: 29 PG (ref 29.8–36.1)
HGB UR QL STRIP.AUTO: ABNORMAL
HMPV RNA NPH QL NAA+NON-PROBE: NOT DETECTED
HOLD SPECIMEN: NORMAL
HPIV1 RNA SPEC QL NAA+PROBE: NOT DETECTED
HPIV2 RNA SPEC QL NAA+PROBE: NOT DETECTED
HPIV3 RNA NPH QL NAA+PROBE: NOT DETECTED
HPIV4 P GENE NPH QL NAA+PROBE: NOT DETECTED
HYALINE CASTS UR QL AUTO: ABNORMAL /LPF
HYPOCHROMIA BLD QL: ABNORMAL
IMM GRANULOCYTES # BLD: 0.12 10*3/MM3 (ref 0–0.03)
IMM GRANULOCYTES # BLD: 0.23 10*3/MM3 (ref 0–0.03)
IMM GRANULOCYTES # BLD: 0.24 10*3/MM3 (ref 0–0.03)
IMM GRANULOCYTES # BLD: 0.26 10*3/MM3 (ref 0–0.03)
IMM GRANULOCYTES # BLD: 0.28 10*3/MM3 (ref 0–0.03)
IMM GRANULOCYTES # BLD: 0.29 10*3/MM3 (ref 0–0.03)
IMM GRANULOCYTES # BLD: 0.33 10*3/MM3 (ref 0–0.03)
IMM GRANULOCYTES # BLD: 0.4 10*3/MM3 (ref 0–0.03)
IMM GRANULOCYTES # BLD: 0.62 10*3/MM3 (ref 0–0.03)
IMM GRANULOCYTES NFR BLD: 1.4 % (ref 0–0.5)
IMM GRANULOCYTES NFR BLD: 1.5 % (ref 0–0.5)
IMM GRANULOCYTES NFR BLD: 1.9 % (ref 0–0.5)
IMM GRANULOCYTES NFR BLD: 2.4 % (ref 0–0.5)
IMM GRANULOCYTES NFR BLD: 3.8 % (ref 0–0.5)
IMM GRANULOCYTES NFR BLD: 4 % (ref 0–0.5)
IMM GRANULOCYTES NFR BLD: 4.3 % (ref 0–0.5)
IMM GRANULOCYTES NFR BLD: 6.6 % (ref 0–0.5)
IMM GRANULOCYTES NFR BLD: 7.7 % (ref 0–0.5)
IMM RETICS NFR: 23.4 % (ref 3–15.8)
INR PPP: 1.23 (ref 0.9–1.1)
INR PPP: 1.27 (ref 0.9–1.1)
INR PPP: 2 (ref 0.9–1.1)
IRON 24H UR-MRATE: 188 MCG/DL (ref 37–145)
IRON 24H UR-MRATE: 25 MCG/DL (ref 37–145)
IRON 24H UR-MRATE: 32 MCG/DL (ref 37–145)
IRON SATN MFR SERPL: 15 %
IRON SATN MFR SERPL: 18 %
IRON SATN MFR SERPL: ABNORMAL %
ISOLATED FROM: ABNORMAL
KETONES UR QL STRIP: ABNORMAL
KETONES UR QL STRIP: ABNORMAL
KETONES UR QL STRIP: NEGATIVE
LDLC SERPL CALC-MCNC: 172 MG/DL (ref 0–100)
LDLC/HDLC SERPL: 6.38 {RATIO}
LEFT ATRIUM VOLUME INDEX: 40 ML/M2
LEUKOCYTE ESTERASE UR QL STRIP.AUTO: ABNORMAL
LIPASE SERPL-CCNC: 52 U/L (ref 13–60)
LYMPHOCYTES # BLD AUTO: 0.45 10*3/MM3 (ref 0.6–4.8)
LYMPHOCYTES # BLD AUTO: 0.5 10*3/MM3 (ref 0.6–4.8)
LYMPHOCYTES # BLD AUTO: 0.57 10*3/MM3 (ref 0.6–4.8)
LYMPHOCYTES # BLD AUTO: 0.73 10*3/MM3 (ref 0.6–4.8)
LYMPHOCYTES # BLD AUTO: 0.82 10*3/MM3 (ref 0.6–4.8)
LYMPHOCYTES # BLD AUTO: 0.93 10*3/MM3 (ref 0.6–4.8)
LYMPHOCYTES # BLD AUTO: 1.24 10*3/MM3 (ref 0.6–4.8)
LYMPHOCYTES # BLD AUTO: 1.28 10*3/MM3 (ref 0.6–4.8)
LYMPHOCYTES # BLD AUTO: 1.43 10*3/MM3 (ref 0.6–4.8)
LYMPHOCYTES # BLD MANUAL: 1.36 10*3/MM3 (ref 0.6–4.8)
LYMPHOCYTES NFR BLD AUTO: 12.1 % (ref 20–45)
LYMPHOCYTES NFR BLD AUTO: 15.5 % (ref 20–45)
LYMPHOCYTES NFR BLD AUTO: 16.9 % (ref 20–45)
LYMPHOCYTES NFR BLD AUTO: 17.5 % (ref 20–45)
LYMPHOCYTES NFR BLD AUTO: 3.1 % (ref 20–45)
LYMPHOCYTES NFR BLD AUTO: 5.5 % (ref 20–45)
LYMPHOCYTES NFR BLD AUTO: 6.3 % (ref 20–45)
LYMPHOCYTES NFR BLD AUTO: 8.3 % (ref 20–45)
LYMPHOCYTES NFR BLD AUTO: 9.4 % (ref 20–45)
LYMPHOCYTES NFR BLD MANUAL: 12 % (ref 3–8)
LYMPHOCYTES NFR BLD MANUAL: 17 % (ref 20–45)
Lab: ABNORMAL
Lab: ABNORMAL
M PNEUMO IGG SER IA-ACNC: NOT DETECTED
MAGNESIUM SERPL-MCNC: 1.5 MG/DL (ref 1.7–2.5)
MAGNESIUM SERPL-MCNC: 2 MG/DL (ref 1.7–2.5)
MAGNESIUM SERPL-MCNC: 2.6 MG/DL (ref 1.7–2.5)
MCH RBC QN AUTO: 29 PG (ref 27–31)
MCH RBC QN AUTO: 29.2 PG (ref 27–31)
MCH RBC QN AUTO: 29.2 PG (ref 27–31)
MCH RBC QN AUTO: 29.4 PG (ref 27–31)
MCH RBC QN AUTO: 29.6 PG (ref 27–31)
MCH RBC QN AUTO: 29.7 PG (ref 27–31)
MCH RBC QN AUTO: 29.9 PG (ref 27–31)
MCH RBC QN AUTO: 30 PG (ref 27–31)
MCH RBC QN AUTO: 30.9 PG (ref 27–31)
MCH RBC QN AUTO: 31 PG (ref 27–31)
MCH RBC QN AUTO: 31 PG (ref 27–31)
MCH RBC QN AUTO: 31.1 PG (ref 27–31)
MCH RBC QN AUTO: 31.2 PG (ref 27–31)
MCH RBC QN AUTO: 31.3 PG (ref 27–31)
MCH RBC QN AUTO: 31.4 PG (ref 27–31)
MCH RBC QN AUTO: 31.5 PG (ref 27–31)
MCH RBC QN AUTO: 31.6 PG (ref 27–31)
MCH RBC QN AUTO: 31.7 PG (ref 27–31)
MCH RBC QN AUTO: 31.8 PG (ref 27–31)
MCH RBC QN AUTO: 32 PG (ref 27–31)
MCH RBC QN AUTO: 32.2 PG (ref 27–31)
MCHC RBC AUTO-ENTMCNC: 29.7 G/DL (ref 31–37)
MCHC RBC AUTO-ENTMCNC: 30.4 G/DL (ref 31–37)
MCHC RBC AUTO-ENTMCNC: 30.5 G/DL (ref 31–37)
MCHC RBC AUTO-ENTMCNC: 30.8 G/DL (ref 31–37)
MCHC RBC AUTO-ENTMCNC: 30.8 G/DL (ref 31–37)
MCHC RBC AUTO-ENTMCNC: 30.9 G/DL (ref 31–37)
MCHC RBC AUTO-ENTMCNC: 31 G/DL (ref 31–37)
MCHC RBC AUTO-ENTMCNC: 31 G/DL (ref 31–37)
MCHC RBC AUTO-ENTMCNC: 31.2 G/DL (ref 31–37)
MCHC RBC AUTO-ENTMCNC: 31.3 G/DL (ref 31–37)
MCHC RBC AUTO-ENTMCNC: 31.5 G/DL (ref 31–37)
MCHC RBC AUTO-ENTMCNC: 31.6 G/DL (ref 31–37)
MCHC RBC AUTO-ENTMCNC: 31.7 G/DL (ref 31–37)
MCHC RBC AUTO-ENTMCNC: 31.7 G/DL (ref 31–37)
MCHC RBC AUTO-ENTMCNC: 31.8 G/DL (ref 31–37)
MCHC RBC AUTO-ENTMCNC: 31.9 G/DL (ref 31–37)
MCHC RBC AUTO-ENTMCNC: 31.9 G/DL (ref 31–37)
MCHC RBC AUTO-ENTMCNC: 32 G/DL (ref 31–37)
MCHC RBC AUTO-ENTMCNC: 32.1 G/DL (ref 31–37)
MCHC RBC AUTO-ENTMCNC: 32.2 G/DL (ref 31–37)
MCHC RBC AUTO-ENTMCNC: 32.3 G/DL (ref 31–37)
MCHC RBC AUTO-ENTMCNC: 32.4 G/DL (ref 31–37)
MCHC RBC AUTO-ENTMCNC: 32.6 G/DL (ref 31–37)
MCHC RBC AUTO-ENTMCNC: 32.6 G/DL (ref 31–37)
MCHC RBC AUTO-ENTMCNC: 32.7 G/DL (ref 31–37)
MCHC RBC AUTO-ENTMCNC: 32.9 G/DL (ref 31–37)
MCHC RBC AUTO-ENTMCNC: 33.1 G/DL (ref 31–37)
MCHC RBC AUTO-ENTMCNC: 33.2 G/DL (ref 31–37)
MCHC RBC AUTO-ENTMCNC: 33.3 G/DL (ref 31–37)
MCV RBC AUTO: 100 FL (ref 81–99)
MCV RBC AUTO: 100.3 FL (ref 81–99)
MCV RBC AUTO: 100.6 FL (ref 81–99)
MCV RBC AUTO: 101.1 FL (ref 81–99)
MCV RBC AUTO: 101.1 FL (ref 81–99)
MCV RBC AUTO: 93.1 FL (ref 81–99)
MCV RBC AUTO: 93.2 FL (ref 81–99)
MCV RBC AUTO: 94.3 FL (ref 81–99)
MCV RBC AUTO: 94.5 FL (ref 81–99)
MCV RBC AUTO: 94.6 FL (ref 81–99)
MCV RBC AUTO: 95.3 FL (ref 81–99)
MCV RBC AUTO: 95.5 FL (ref 81–99)
MCV RBC AUTO: 95.6 FL (ref 81–99)
MCV RBC AUTO: 95.8 FL (ref 81–99)
MCV RBC AUTO: 95.9 FL (ref 81–99)
MCV RBC AUTO: 96.2 FL (ref 81–99)
MCV RBC AUTO: 96.2 FL (ref 81–99)
MCV RBC AUTO: 96.3 FL (ref 81–99)
MCV RBC AUTO: 96.5 FL (ref 81–99)
MCV RBC AUTO: 96.6 FL (ref 81–99)
MCV RBC AUTO: 96.7 FL (ref 81–99)
MCV RBC AUTO: 96.9 FL (ref 81–99)
MCV RBC AUTO: 97.6 FL (ref 81–99)
MCV RBC AUTO: 97.7 FL (ref 81–99)
MCV RBC AUTO: 97.7 FL (ref 81–99)
MCV RBC AUTO: 97.9 FL (ref 81–99)
MCV RBC AUTO: 98.1 FL (ref 81–99)
MCV RBC AUTO: 98.1 FL (ref 81–99)
MCV RBC AUTO: 98.2 FL (ref 81–99)
MCV RBC AUTO: 98.4 FL (ref 81–99)
MCV RBC AUTO: 98.8 FL (ref 81–99)
MCV RBC AUTO: 99.1 FL (ref 81–99)
MCV RBC AUTO: 99.1 FL (ref 81–99)
MCV RBC AUTO: 99.3 FL (ref 81–99)
MCV RBC AUTO: 99.3 FL (ref 81–99)
MCV RBC AUTO: 99.4 FL (ref 81–99)
MCV RBC AUTO: 99.7 FL (ref 81–99)
MODALITY: ABNORMAL
MONOCYTES # BLD AUTO: 0.45 10*3/MM3 (ref 0–1)
MONOCYTES # BLD AUTO: 0.52 10*3/MM3 (ref 0–1)
MONOCYTES # BLD AUTO: 0.58 10*3/MM3 (ref 0–1)
MONOCYTES # BLD AUTO: 0.63 10*3/MM3 (ref 0–1)
MONOCYTES # BLD AUTO: 0.72 10*3/MM3 (ref 0–1)
MONOCYTES # BLD AUTO: 0.79 10*3/MM3 (ref 0–1)
MONOCYTES # BLD AUTO: 0.8 10*3/MM3 (ref 0–1)
MONOCYTES # BLD AUTO: 0.86 10*3/MM3 (ref 0–1)
MONOCYTES # BLD AUTO: 0.96 10*3/MM3 (ref 0–1)
MONOCYTES # BLD AUTO: 1.35 10*3/MM3 (ref 0–1)
MONOCYTES NFR BLD AUTO: 10.7 % (ref 3–8)
MONOCYTES NFR BLD AUTO: 13.2 % (ref 3–8)
MONOCYTES NFR BLD AUTO: 4.6 % (ref 3–8)
MONOCYTES NFR BLD AUTO: 5.4 % (ref 3–8)
MONOCYTES NFR BLD AUTO: 7.3 % (ref 3–8)
MONOCYTES NFR BLD AUTO: 8 % (ref 3–8)
MONOCYTES NFR BLD AUTO: 8.3 % (ref 3–8)
MONOCYTES NFR BLD AUTO: 8.6 % (ref 3–8)
MONOCYTES NFR BLD AUTO: 8.8 % (ref 3–8)
MRSA SPEC QL CULT: NORMAL
NEUTROPHILS # BLD AUTO: 12.7 10*3/MM3 (ref 1.5–8.3)
NEUTROPHILS # BLD AUTO: 14.25 10*3/MM3 (ref 1.5–8.3)
NEUTROPHILS # BLD AUTO: 4.11 10*3/MM3 (ref 1.5–8.3)
NEUTROPHILS # BLD AUTO: 4.31 10*3/MM3 (ref 1.5–8.3)
NEUTROPHILS # BLD AUTO: 4.99 10*3/MM3 (ref 1.5–8.3)
NEUTROPHILS # BLD AUTO: 5.05 10*3/MM3 (ref 1.5–8.3)
NEUTROPHILS # BLD AUTO: 5.41 10*3/MM3 (ref 1.5–8.3)
NEUTROPHILS # BLD AUTO: 5.61 10*3/MM3 (ref 1.5–8.3)
NEUTROPHILS # BLD AUTO: 6.44 10*3/MM3 (ref 1.5–8.3)
NEUTROPHILS # BLD AUTO: 8.11 10*3/MM3 (ref 1.5–8.3)
NEUTROPHILS NFR BLD AUTO: 63 % (ref 45–70)
NEUTROPHILS NFR BLD AUTO: 65.7 % (ref 45–70)
NEUTROPHILS NFR BLD AUTO: 66 % (ref 45–70)
NEUTROPHILS NFR BLD AUTO: 68.1 % (ref 45–70)
NEUTROPHILS NFR BLD AUTO: 71.5 % (ref 45–70)
NEUTROPHILS NFR BLD AUTO: 81 % (ref 45–70)
NEUTROPHILS NFR BLD AUTO: 82.6 % (ref 45–70)
NEUTROPHILS NFR BLD AUTO: 83.9 % (ref 45–70)
NEUTROPHILS NFR BLD AUTO: 87.5 % (ref 45–70)
NEUTROPHILS NFR BLD MANUAL: 64 % (ref 45–70)
NEUTS BAND NFR BLD MANUAL: 6 % (ref 0–5)
NITRITE UR QL STRIP: NEGATIVE
NITRITE UR QL STRIP: POSITIVE
NRBC BLD MANUAL-RTO: 0 /100 WBC (ref 0–0)
NRBC BLD MANUAL-RTO: 0.2 /100 WBC (ref 0–0)
NT-PROBNP SERPL-MCNC: 1156 PG/ML (ref 5–450)
NT-PROBNP SERPL-MCNC: 2366 PG/ML (ref 5–450)
NT-PROBNP SERPL-MCNC: 3326 PG/ML (ref 5–450)
PCO2 BLDA: 53.4 MM HG (ref 35–45)
PCO2 BLDA: 54 MM HG (ref 35–45)
PCO2 BLDA: 58.7 MM HG (ref 35–45)
PCO2 TEMP ADJ BLD: 53.4 MM HG (ref 35–45)
PCO2 TEMP ADJ BLD: 54 MM HG (ref 35–45)
PCO2 TEMP ADJ BLD: 58.7 MM HG (ref 35–45)
PH BLDA: 7.3 PH UNITS (ref 7.35–7.45)
PH BLDA: 7.33 PH UNITS (ref 7.35–7.45)
PH BLDA: 7.36 PH UNITS (ref 7.35–7.45)
PH UR STRIP.AUTO: 5.5 [PH] (ref 4.5–8)
PH UR STRIP.AUTO: 5.5 [PH] (ref 4.5–8)
PH UR STRIP.AUTO: 6 [PH] (ref 4.5–8)
PH UR STRIP.AUTO: <=5 [PH] (ref 4.5–8)
PH, TEMP CORRECTED: 7.3 PH UNITS (ref 7.35–7.45)
PH, TEMP CORRECTED: 7.33 PH UNITS (ref 7.35–7.45)
PH, TEMP CORRECTED: 7.36 PH UNITS (ref 7.35–7.45)
PHOSPHATE SERPL-MCNC: 2.3 MG/DL (ref 2.7–4.5)
PHOSPHATE SERPL-MCNC: 2.9 MG/DL (ref 2.7–4.5)
PHOSPHATE SERPL-MCNC: 3.1 MG/DL (ref 2.7–4.5)
PHOSPHATE SERPL-MCNC: 3.5 MG/DL (ref 2.7–4.5)
PLATELET # BLD AUTO: 120 10*3/MM3 (ref 140–500)
PLATELET # BLD AUTO: 131 10*3/MM3 (ref 140–500)
PLATELET # BLD AUTO: 132 10*3/MM3 (ref 140–500)
PLATELET # BLD AUTO: 133 10*3/MM3 (ref 140–500)
PLATELET # BLD AUTO: 142 10*3/MM3 (ref 140–500)
PLATELET # BLD AUTO: 148 10*3/MM3 (ref 140–500)
PLATELET # BLD AUTO: 154 10*3/MM3 (ref 140–500)
PLATELET # BLD AUTO: 155 10*3/MM3 (ref 140–500)
PLATELET # BLD AUTO: 157 10*3/MM3 (ref 140–500)
PLATELET # BLD AUTO: 157 10*3/MM3 (ref 140–500)
PLATELET # BLD AUTO: 164 10*3/MM3 (ref 140–500)
PLATELET # BLD AUTO: 167 10*3/MM3 (ref 140–500)
PLATELET # BLD AUTO: 173 10*3/MM3 (ref 140–500)
PLATELET # BLD AUTO: 177 10*3/MM3 (ref 140–500)
PLATELET # BLD AUTO: 186 10*3/MM3 (ref 140–500)
PLATELET # BLD AUTO: 190 10*3/MM3 (ref 140–500)
PLATELET # BLD AUTO: 192 10*3/MM3 (ref 140–500)
PLATELET # BLD AUTO: 194 10*3/MM3 (ref 140–500)
PLATELET # BLD AUTO: 196 10*3/MM3 (ref 140–500)
PLATELET # BLD AUTO: 197 10*3/MM3 (ref 140–500)
PLATELET # BLD AUTO: 202 10*3/MM3 (ref 140–500)
PLATELET # BLD AUTO: 204 10*3/MM3 (ref 140–500)
PLATELET # BLD AUTO: 207 10*3/MM3 (ref 140–500)
PLATELET # BLD AUTO: 208 10*3/MM3 (ref 140–500)
PLATELET # BLD AUTO: 210 10*3/MM3 (ref 140–500)
PLATELET # BLD AUTO: 214 10*3/MM3 (ref 140–500)
PLATELET # BLD AUTO: 218 10*3/MM3 (ref 140–500)
PLATELET # BLD AUTO: 225 10*3/MM3 (ref 140–500)
PLATELET # BLD AUTO: 228 10*3/MM3 (ref 140–500)
PLATELET # BLD AUTO: 238 10*3/MM3 (ref 140–500)
PLATELET # BLD AUTO: 239 10*3/MM3 (ref 140–500)
PLATELET # BLD AUTO: 240 10*3/MM3 (ref 140–500)
PLATELET # BLD AUTO: 240 10*3/MM3 (ref 140–500)
PLATELET # BLD AUTO: 250 10*3/MM3 (ref 140–500)
PLATELET # BLD AUTO: 273 10*3/MM3 (ref 140–500)
PLATELET # BLD AUTO: 280 10*3/MM3 (ref 140–500)
PLATELET # BLD AUTO: 282 10*3/MM3 (ref 140–500)
PMV BLD AUTO: 10 FL (ref 7.4–10.4)
PMV BLD AUTO: 10.1 FL (ref 7.4–10.4)
PMV BLD AUTO: 10.2 FL (ref 7.4–10.4)
PMV BLD AUTO: 10.2 FL (ref 7.4–10.4)
PMV BLD AUTO: 10.3 FL (ref 7.4–10.4)
PMV BLD AUTO: 10.4 FL (ref 7.4–10.4)
PMV BLD AUTO: 10.5 FL (ref 7.4–10.4)
PMV BLD AUTO: 10.6 FL (ref 7.4–10.4)
PMV BLD AUTO: 10.6 FL (ref 7.4–10.4)
PMV BLD AUTO: 10.7 FL (ref 7.4–10.4)
PMV BLD AUTO: 10.8 FL (ref 7.4–10.4)
PMV BLD AUTO: 10.9 FL (ref 7.4–10.4)
PMV BLD AUTO: 11.1 FL (ref 7.4–10.4)
PMV BLD AUTO: 11.2 FL (ref 7.4–10.4)
PMV BLD AUTO: 11.2 FL (ref 7.4–10.4)
PMV BLD AUTO: 11.3 FL (ref 7.4–10.4)
PMV BLD AUTO: 11.3 FL (ref 7.4–10.4)
PMV BLD AUTO: 11.4 FL (ref 7.4–10.4)
PMV BLD AUTO: 11.4 FL (ref 7.4–10.4)
PMV BLD AUTO: 11.6 FL (ref 7.4–10.4)
PMV BLD AUTO: 11.7 FL (ref 7.4–10.4)
PMV BLD AUTO: 11.9 FL (ref 7.4–10.4)
PMV BLD AUTO: 11.9 FL (ref 7.4–10.4)
PMV BLD AUTO: 12 FL (ref 7.4–10.4)
PMV BLD AUTO: 12.4 FL (ref 7.4–10.4)
PMV BLD AUTO: 12.6 FL (ref 7.4–10.4)
PMV BLD AUTO: 9.8 FL (ref 7.4–10.4)
PMV BLD AUTO: 9.8 FL (ref 7.4–10.4)
PO2 BLDA: 109 MM HG (ref 83–108)
PO2 BLDA: 79.6 MM HG (ref 83–108)
PO2 BLDA: 88.5 MM HG (ref 83–108)
PO2 TEMP ADJ BLD: 109 MM HG (ref 83–108)
PO2 TEMP ADJ BLD: 79.6 MM HG (ref 83–108)
PO2 TEMP ADJ BLD: 88.5 MM HG (ref 83–108)
POTASSIUM BLD-SCNC: 2.8 MMOL/L (ref 3.5–5.2)
POTASSIUM BLD-SCNC: 3 MMOL/L (ref 3.5–5.2)
POTASSIUM BLD-SCNC: 3 MMOL/L (ref 3.5–5.2)
POTASSIUM BLD-SCNC: 3.1 MMOL/L (ref 3.5–5.2)
POTASSIUM BLD-SCNC: 3.2 MMOL/L (ref 3.5–5.2)
POTASSIUM BLD-SCNC: 3.3 MMOL/L (ref 3.5–5.2)
POTASSIUM BLD-SCNC: 3.4 MMOL/L (ref 3.5–5.2)
POTASSIUM BLD-SCNC: 3.5 MMOL/L (ref 3.5–5.2)
POTASSIUM BLD-SCNC: 3.6 MMOL/L (ref 3.5–5.2)
POTASSIUM BLD-SCNC: 3.7 MMOL/L (ref 3.5–5.2)
POTASSIUM BLD-SCNC: 3.8 MMOL/L (ref 3.5–5.2)
POTASSIUM BLD-SCNC: 3.9 MMOL/L (ref 3.5–5.2)
POTASSIUM BLD-SCNC: 4 MMOL/L (ref 3.5–5.2)
POTASSIUM BLD-SCNC: 4.1 MMOL/L (ref 3.5–5.2)
POTASSIUM BLD-SCNC: 4.2 MMOL/L (ref 3.5–5.2)
POTASSIUM BLD-SCNC: 4.3 MMOL/L (ref 3.5–5.2)
POTASSIUM BLD-SCNC: 4.4 MMOL/L (ref 3.5–5.2)
POTASSIUM BLD-SCNC: 4.5 MMOL/L (ref 3.5–5.2)
POTASSIUM BLD-SCNC: 4.6 MMOL/L (ref 3.5–5.2)
POTASSIUM BLD-SCNC: 4.7 MMOL/L (ref 3.5–5.2)
POTASSIUM BLD-SCNC: 4.8 MMOL/L (ref 3.5–5.2)
POTASSIUM BLD-SCNC: 4.8 MMOL/L (ref 3.5–5.2)
POTASSIUM BLD-SCNC: 4.9 MMOL/L (ref 3.5–5.2)
POTASSIUM BLD-SCNC: 4.9 MMOL/L (ref 3.5–5.2)
POTASSIUM BLD-SCNC: 5 MMOL/L (ref 3.5–5.2)
POTASSIUM BLD-SCNC: 5.1 MMOL/L (ref 3.5–5.2)
POTASSIUM BLD-SCNC: 5.2 MMOL/L (ref 3.5–5.2)
POTASSIUM BLD-SCNC: 6.4 MMOL/L (ref 3.5–5.2)
PREALB SERPL-MCNC: 7.4 MG/DL (ref 20–40)
PROCALCITONIN SERPL-MCNC: 0.13 NG/ML (ref 0.1–0.25)
PROT SERPL-MCNC: 5.7 G/DL (ref 6–8.5)
PROT SERPL-MCNC: 5.9 G/DL (ref 6–8.5)
PROT SERPL-MCNC: 6.4 G/DL (ref 6–8.5)
PROT SERPL-MCNC: 6.5 G/DL (ref 6–8.5)
PROT SERPL-MCNC: 6.9 G/DL (ref 6–8.5)
PROT SERPL-MCNC: 7.3 G/DL (ref 6–8.5)
PROT SERPL-MCNC: 7.6 G/DL (ref 6–8.5)
PROT SERPL-MCNC: 7.6 G/DL (ref 6–8.5)
PROT UR QL STRIP: ABNORMAL
PROT UR QL STRIP: NEGATIVE
PROT UR-MCNC: 42 MG/DL
PROTHROMBIN TIME: 15.6 SECONDS (ref 12.1–15)
PROTHROMBIN TIME: 16 SECONDS (ref 12.1–15)
PROTHROMBIN TIME: 23 SECONDS (ref 12.1–15)
RBC # BLD AUTO: 2.41 10*6/MM3 (ref 4.2–5.4)
RBC # BLD AUTO: 2.47 10*6/MM3 (ref 4.2–5.4)
RBC # BLD AUTO: 2.57 10*6/MM3 (ref 4.2–5.4)
RBC # BLD AUTO: 2.59 10*6/MM3 (ref 4.2–5.4)
RBC # BLD AUTO: 2.63 10*6/MM3 (ref 4.2–5.4)
RBC # BLD AUTO: 2.76 10*6/MM3 (ref 4.2–5.4)
RBC # BLD AUTO: 2.83 10*6/MM3 (ref 4.2–5.4)
RBC # BLD AUTO: 2.87 10*6/MM3 (ref 4.2–5.4)
RBC # BLD AUTO: 2.89 10*6/MM3 (ref 4.2–5.4)
RBC # BLD AUTO: 2.91 10*6/MM3 (ref 4.2–5.4)
RBC # BLD AUTO: 2.92 10*6/MM3 (ref 4.2–5.4)
RBC # BLD AUTO: 2.94 10*6/MM3 (ref 4.2–5.4)
RBC # BLD AUTO: 2.95 10*6/MM3 (ref 4.2–5.4)
RBC # BLD AUTO: 3.02 10*6/MM3 (ref 4.2–5.4)
RBC # BLD AUTO: 3.03 10*6/MM3 (ref 4.2–5.4)
RBC # BLD AUTO: 3.17 10*6/MM3 (ref 4.2–5.4)
RBC # BLD AUTO: 3.22 10*6/MM3 (ref 4.2–5.4)
RBC # BLD AUTO: 3.22 10*6/MM3 (ref 4.2–5.4)
RBC # BLD AUTO: 3.25 10*6/MM3 (ref 4.2–5.4)
RBC # BLD AUTO: 3.26 10*6/MM3 (ref 4.2–5.4)
RBC # BLD AUTO: 3.29 10*6/MM3 (ref 4.2–5.4)
RBC # BLD AUTO: 3.3 10*6/MM3 (ref 4.2–5.4)
RBC # BLD AUTO: 3.31 10*6/MM3 (ref 4.2–5.4)
RBC # BLD AUTO: 3.33 10*6/MM3 (ref 4.2–5.4)
RBC # BLD AUTO: 3.34 10*6/MM3 (ref 4.2–5.4)
RBC # BLD AUTO: 3.35 10*6/MM3 (ref 4.2–5.4)
RBC # BLD AUTO: 3.37 10*6/MM3 (ref 4.2–5.4)
RBC # BLD AUTO: 3.41 10*6/MM3 (ref 4.2–5.4)
RBC # BLD AUTO: 3.41 10*6/MM3 (ref 4.2–5.4)
RBC # BLD AUTO: 3.45 10*6/MM3 (ref 4.2–5.4)
RBC # BLD AUTO: 3.49 10*6/MM3 (ref 4.2–5.4)
RBC # BLD AUTO: 3.62 10*6/MM3 (ref 4.2–5.4)
RBC # BLD AUTO: 3.68 10*6/MM3 (ref 4.2–5.4)
RBC # BLD AUTO: 3.69 10*6/MM3 (ref 4.2–5.4)
RBC # BLD AUTO: 4.05 10*6/MM3 (ref 4.2–5.4)
RBC # BLD AUTO: 4.05 10*6/MM3 (ref 4.2–5.4)
RBC # BLD AUTO: 4.2 10*6/MM3 (ref 4.2–5.4)
RBC # BLD AUTO: 4.37 10*6/MM3 (ref 4.2–5.4)
RBC # BLD AUTO: 4.69 10*6/MM3 (ref 4.2–5.4)
RBC # UR: ABNORMAL /HPF
REF LAB TEST METHOD: ABNORMAL
RENAL EPI CELLS #/AREA URNS HPF: ABNORMAL /HPF
RETICS/RBC NFR AUTO: 1.41 % (ref 0.5–1.5)
RETICS/RBC NFR AUTO: 1.71 % (ref 0.5–1.5)
RETICS/RBC NFR AUTO: 3.48 % (ref 0.5–1.5)
RH BLD: POSITIVE
RHINOVIRUS RNA SPEC NAA+PROBE: NOT DETECTED
RSV RNA NPH QL NAA+NON-PROBE: NOT DETECTED
SAO2 % BLDCOA: 96.2 % (ref 94–99)
SAO2 % BLDCOA: 97.1 % (ref 94–99)
SAO2 % BLDCOA: 98.7 % (ref 94–99)
SCAN SLIDE: NORMAL
SMALL PLATELETS BLD QL SMEAR: ADEQUATE
SODIUM BLD-SCNC: 131 MMOL/L (ref 136–145)
SODIUM BLD-SCNC: 133 MMOL/L (ref 136–145)
SODIUM BLD-SCNC: 133 MMOL/L (ref 136–145)
SODIUM BLD-SCNC: 134 MMOL/L (ref 136–145)
SODIUM BLD-SCNC: 135 MMOL/L (ref 136–145)
SODIUM BLD-SCNC: 136 MMOL/L (ref 136–145)
SODIUM BLD-SCNC: 137 MMOL/L (ref 136–145)
SODIUM BLD-SCNC: 138 MMOL/L (ref 136–145)
SODIUM BLD-SCNC: 139 MMOL/L (ref 136–145)
SODIUM BLD-SCNC: 140 MMOL/L (ref 136–145)
SODIUM BLD-SCNC: 141 MMOL/L (ref 136–145)
SODIUM BLD-SCNC: 142 MMOL/L (ref 136–145)
SODIUM BLD-SCNC: 143 MMOL/L (ref 136–145)
SODIUM BLD-SCNC: 144 MMOL/L (ref 136–145)
SODIUM BLD-SCNC: 149 MMOL/L (ref 136–145)
SODIUM UR-SCNC: 43 MMOL/L
SP GR UR STRIP: 1.01 (ref 1–1.03)
SP GR UR STRIP: 1.01 (ref 1–1.03)
SP GR UR STRIP: 1.02 (ref 1–1.03)
SQUAMOUS #/AREA URNS HPF: ABNORMAL /HPF
T4 FREE SERPL-MCNC: 1.25 NG/DL (ref 0.93–1.7)
T4 FREE SERPL-MCNC: 1.4 NG/DL (ref 0.93–1.7)
TIBC SERPL-MCNC: 166 MCG/DL (ref 261–478)
TIBC SERPL-MCNC: 174 MCG/DL (ref 261–478)
TIBC SERPL-MCNC: ABNORMAL MCG/DL (ref 261–478)
TRANS CELLS #/AREA URNS HPF: ABNORMAL /HPF
TRIGL SERPL-MCNC: 274 MG/DL (ref 0–150)
TROPONIN T SERPL-MCNC: 0.02 NG/ML (ref 0–0.03)
TSH SERPL DL<=0.05 MIU/L-ACNC: 1.17 MIU/ML (ref 0.27–4.2)
TSH SERPL DL<=0.05 MIU/L-ACNC: 2.06 MIU/ML (ref 0.27–4.2)
TSH SERPL DL<=0.05 MIU/L-ACNC: 2.31 MIU/ML (ref 0.27–4.2)
TSH SERPL DL<=0.05 MIU/L-ACNC: 2.46 MIU/ML (ref 0.27–4.2)
TSH SERPL DL<=0.05 MIU/L-ACNC: 3.26 MIU/ML (ref 0.27–4.2)
UIBC SERPL-MCNC: 141 MCG/DL (ref 112–346)
UIBC SERPL-MCNC: 142 MCG/DL (ref 112–346)
UIBC SERPL-MCNC: <16 MCG/DL (ref 112–346)
UNIT  ABO: NORMAL
UNIT  ABO: NORMAL
UNIT  RH: NORMAL
UNIT  RH: NORMAL
URATE SERPL-MCNC: 4.6 MG/DL (ref 3.4–7)
URATE SERPL-MCNC: 5.2 MG/DL (ref 3.4–7)
URATE SERPL-MCNC: 5.5 MG/DL (ref 3.4–7)
URATE SERPL-MCNC: 6.4 MG/DL (ref 3.4–7)
UROBILINOGEN UR QL STRIP: ABNORMAL
VENTILATOR MODE: ABNORMAL
VIT B12 BLD-MCNC: 1023 PG/ML (ref 211–946)
VIT B12 BLD-MCNC: 318 PG/ML (ref 232–1245)
VLDLC SERPL-MCNC: 54.8 MG/DL (ref 7–27)
VRE SPEC QL CULT: NORMAL
WBC CLUMPS # UR AUTO: ABNORMAL /HPF
WBC MORPH BLD: NORMAL
WBC NRBC COR # BLD: 10.68 10*3/MM3 (ref 4.8–10.8)
WBC NRBC COR # BLD: 12.66 10*3/MM3 (ref 4.8–10.8)
WBC NRBC COR # BLD: 14.53 10*3/MM3 (ref 4.8–10.8)
WBC NRBC COR # BLD: 16.96 10*3/MM3 (ref 4.8–10.8)
WBC NRBC COR # BLD: 5.13 10*3/MM3 (ref 4.8–10.8)
WBC NRBC COR # BLD: 6.03 10*3/MM3 (ref 4.8–10.8)
WBC NRBC COR # BLD: 6.04 10*3/MM3 (ref 4.8–10.8)
WBC NRBC COR # BLD: 6.22 10*3/MM3 (ref 4.8–10.8)
WBC NRBC COR # BLD: 6.25 10*3/MM3 (ref 4.8–10.8)
WBC NRBC COR # BLD: 6.31 10*3/MM3 (ref 4.8–10.8)
WBC NRBC COR # BLD: 6.42 10*3/MM3 (ref 4.8–10.8)
WBC NRBC COR # BLD: 6.44 10*3/MM3 (ref 4.8–10.8)
WBC NRBC COR # BLD: 6.47 10*3/MM3 (ref 4.8–10.8)
WBC NRBC COR # BLD: 6.49 10*3/MM3 (ref 4.8–10.8)
WBC NRBC COR # BLD: 6.58 10*3/MM3 (ref 4.8–10.8)
WBC NRBC COR # BLD: 6.67 10*3/MM3 (ref 4.8–10.8)
WBC NRBC COR # BLD: 6.7 10*3/MM3 (ref 4.8–10.8)
WBC NRBC COR # BLD: 6.76 10*3/MM3 (ref 4.8–10.8)
WBC NRBC COR # BLD: 6.89 10*3/MM3 (ref 4.8–10.8)
WBC NRBC COR # BLD: 7.04 10*3/MM3 (ref 4.8–10.8)
WBC NRBC COR # BLD: 7.32 10*3/MM3 (ref 4.8–10.8)
WBC NRBC COR # BLD: 7.32 10*3/MM3 (ref 4.8–10.8)
WBC NRBC COR # BLD: 7.36 10*3/MM3 (ref 4.8–10.8)
WBC NRBC COR # BLD: 7.59 10*3/MM3 (ref 4.8–10.8)
WBC NRBC COR # BLD: 7.62 10*3/MM3 (ref 4.8–10.8)
WBC NRBC COR # BLD: 7.67 10*3/MM3 (ref 4.8–10.8)
WBC NRBC COR # BLD: 7.8 10*3/MM3 (ref 4.8–10.8)
WBC NRBC COR # BLD: 7.93 10*3/MM3 (ref 4.8–10.8)
WBC NRBC COR # BLD: 7.95 10*3/MM3 (ref 4.8–10.8)
WBC NRBC COR # BLD: 7.96 10*3/MM3 (ref 4.8–10.8)
WBC NRBC COR # BLD: 8.02 10*3/MM3 (ref 4.8–10.8)
WBC NRBC COR # BLD: 8.19 10*3/MM3 (ref 4.8–10.8)
WBC NRBC COR # BLD: 8.41 10*3/MM3 (ref 4.8–10.8)
WBC NRBC COR # BLD: 8.64 10*3/MM3 (ref 4.8–10.8)
WBC NRBC COR # BLD: 8.68 10*3/MM3 (ref 4.8–10.8)
WBC NRBC COR # BLD: 9 10*3/MM3 (ref 4.8–10.8)
WBC NRBC COR # BLD: 9.12 10*3/MM3 (ref 4.8–10.8)
WBC NRBC COR # BLD: 9.27 10*3/MM3 (ref 4.8–10.8)
WBC NRBC COR # BLD: 9.83 10*3/MM3 (ref 4.8–10.8)
WBC UR QL AUTO: ABNORMAL /HPF
WHOLE BLOOD HOLD SPECIMEN: NORMAL
WHOLE BLOOD HOLD SPECIMEN: NORMAL
YEAST URNS QL MICRO: ABNORMAL /HPF

## 2017-01-01 PROCEDURE — 36600 WITHDRAWAL OF ARTERIAL BLOOD: CPT

## 2017-01-01 PROCEDURE — 82962 GLUCOSE BLOOD TEST: CPT

## 2017-01-01 PROCEDURE — 92507 TX SP LANG VOICE COMM INDIV: CPT

## 2017-01-01 PROCEDURE — 94640 AIRWAY INHALATION TREATMENT: CPT

## 2017-01-01 PROCEDURE — 63710000001 INSULIN ASPART PER 5 UNITS: Performed by: FAMILY MEDICINE

## 2017-01-01 PROCEDURE — 96374 THER/PROPH/DIAG INJ IV PUSH: CPT

## 2017-01-01 PROCEDURE — G9160 LANG COMP GOAL STATUS: HCPCS

## 2017-01-01 PROCEDURE — 80053 COMPREHEN METABOLIC PANEL: CPT | Performed by: EMERGENCY MEDICINE

## 2017-01-01 PROCEDURE — 94799 UNLISTED PULMONARY SVC/PX: CPT

## 2017-01-01 PROCEDURE — 80048 BASIC METABOLIC PNL TOTAL CA: CPT | Performed by: FAMILY MEDICINE

## 2017-01-01 PROCEDURE — 63710000001 DIPHENHYDRAMINE PER 50 MG: Performed by: FAMILY MEDICINE

## 2017-01-01 PROCEDURE — 99223 1ST HOSP IP/OBS HIGH 75: CPT | Performed by: INTERNAL MEDICINE

## 2017-01-01 PROCEDURE — 71010 HC CHEST PA OR AP: CPT

## 2017-01-01 PROCEDURE — 99291 CRITICAL CARE FIRST HOUR: CPT | Performed by: EMERGENCY MEDICINE

## 2017-01-01 PROCEDURE — 25010000002 MEROPENEM PER 100 MG: Performed by: FAMILY MEDICINE

## 2017-01-01 PROCEDURE — 93005 ELECTROCARDIOGRAM TRACING: CPT | Performed by: EMERGENCY MEDICINE

## 2017-01-01 PROCEDURE — 84156 ASSAY OF PROTEIN URINE: CPT | Performed by: INTERNAL MEDICINE

## 2017-01-01 PROCEDURE — 85027 COMPLETE CBC AUTOMATED: CPT | Performed by: FAMILY MEDICINE

## 2017-01-01 PROCEDURE — 84550 ASSAY OF BLOOD/URIC ACID: CPT | Performed by: FAMILY MEDICINE

## 2017-01-01 PROCEDURE — 96367 TX/PROPH/DG ADDL SEQ IV INF: CPT

## 2017-01-01 PROCEDURE — 86900 BLOOD TYPING SEROLOGIC ABO: CPT

## 2017-01-01 PROCEDURE — 99222 1ST HOSP IP/OBS MODERATE 55: CPT | Performed by: NURSE PRACTITIONER

## 2017-01-01 PROCEDURE — 25010000002 ENOXAPARIN PER 10 MG: Performed by: INTERNAL MEDICINE

## 2017-01-01 PROCEDURE — 85610 PROTHROMBIN TIME: CPT | Performed by: EMERGENCY MEDICINE

## 2017-01-01 PROCEDURE — 83036 HEMOGLOBIN GLYCOSYLATED A1C: CPT | Performed by: FAMILY MEDICINE

## 2017-01-01 PROCEDURE — 86901 BLOOD TYPING SEROLOGIC RH(D): CPT

## 2017-01-01 PROCEDURE — 85730 THROMBOPLASTIN TIME PARTIAL: CPT | Performed by: EMERGENCY MEDICINE

## 2017-01-01 PROCEDURE — 83605 ASSAY OF LACTIC ACID: CPT | Performed by: EMERGENCY MEDICINE

## 2017-01-01 PROCEDURE — 84443 ASSAY THYROID STIM HORMONE: CPT | Performed by: FAMILY MEDICINE

## 2017-01-01 PROCEDURE — 70450 CT HEAD/BRAIN W/O DYE: CPT

## 2017-01-01 PROCEDURE — 70490 CT SOFT TISSUE NECK W/O DYE: CPT

## 2017-01-01 PROCEDURE — 83550 IRON BINDING TEST: CPT | Performed by: FAMILY MEDICINE

## 2017-01-01 PROCEDURE — 82306 VITAMIN D 25 HYDROXY: CPT | Performed by: FAMILY MEDICINE

## 2017-01-01 PROCEDURE — 86920 COMPATIBILITY TEST SPIN: CPT

## 2017-01-01 PROCEDURE — 99285 EMERGENCY DEPT VISIT HI MDM: CPT

## 2017-01-01 PROCEDURE — 82728 ASSAY OF FERRITIN: CPT | Performed by: INTERNAL MEDICINE

## 2017-01-01 PROCEDURE — C1894 INTRO/SHEATH, NON-LASER: HCPCS

## 2017-01-01 PROCEDURE — 95816 EEG AWAKE AND DROWSY: CPT

## 2017-01-01 PROCEDURE — 25010000002 PIPERACILLIN-TAZOBACTAM: Performed by: INTERNAL MEDICINE

## 2017-01-01 PROCEDURE — 81001 URINALYSIS AUTO W/SCOPE: CPT | Performed by: FAMILY MEDICINE

## 2017-01-01 PROCEDURE — 85025 COMPLETE CBC W/AUTO DIFF WBC: CPT | Performed by: FAMILY MEDICINE

## 2017-01-01 PROCEDURE — 87581 M.PNEUMON DNA AMP PROBE: CPT | Performed by: NURSE PRACTITIONER

## 2017-01-01 PROCEDURE — 85025 COMPLETE CBC W/AUTO DIFF WBC: CPT | Performed by: EMERGENCY MEDICINE

## 2017-01-01 PROCEDURE — 87186 SC STD MICRODIL/AGAR DIL: CPT | Performed by: FAMILY MEDICINE

## 2017-01-01 PROCEDURE — 87086 URINE CULTURE/COLONY COUNT: CPT | Performed by: FAMILY MEDICINE

## 2017-01-01 PROCEDURE — 83540 ASSAY OF IRON: CPT | Performed by: FAMILY MEDICINE

## 2017-01-01 PROCEDURE — 83605 ASSAY OF LACTIC ACID: CPT | Performed by: FAMILY MEDICINE

## 2017-01-01 PROCEDURE — 25010000002 LEVOFLOXACIN PER 250 MG: Performed by: NURSE PRACTITIONER

## 2017-01-01 PROCEDURE — 0HBRXZZ EXCISION OF TOE NAIL, EXTERNAL APPROACH: ICD-10-PCS | Performed by: PODIATRIST

## 2017-01-01 PROCEDURE — 93010 ELECTROCARDIOGRAM REPORT: CPT | Performed by: INTERNAL MEDICINE

## 2017-01-01 PROCEDURE — 82436 ASSAY OF URINE CHLORIDE: CPT | Performed by: INTERNAL MEDICINE

## 2017-01-01 PROCEDURE — 85007 BL SMEAR W/DIFF WBC COUNT: CPT | Performed by: FAMILY MEDICINE

## 2017-01-01 PROCEDURE — G8997 SWALLOW GOAL STATUS: HCPCS

## 2017-01-01 PROCEDURE — 87106 FUNGI IDENTIFICATION YEAST: CPT | Performed by: FAMILY MEDICINE

## 2017-01-01 PROCEDURE — 25010000002 PIPERACILLIN SOD-TAZOBACTAM PER 1 G: Performed by: HOSPITALIST

## 2017-01-01 PROCEDURE — 80048 BASIC METABOLIC PNL TOTAL CA: CPT | Performed by: NURSE PRACTITIONER

## 2017-01-01 PROCEDURE — 82803 BLOOD GASES ANY COMBINATION: CPT

## 2017-01-01 PROCEDURE — 25010000002 IRON SUCROSE PER 1 MG: Performed by: FAMILY MEDICINE

## 2017-01-01 PROCEDURE — 81001 URINALYSIS AUTO W/SCOPE: CPT | Performed by: EMERGENCY MEDICINE

## 2017-01-01 PROCEDURE — 83880 ASSAY OF NATRIURETIC PEPTIDE: CPT | Performed by: FAMILY MEDICINE

## 2017-01-01 PROCEDURE — 25010000002 DIGOXIN PER 500 MCG: Performed by: NURSE PRACTITIONER

## 2017-01-01 PROCEDURE — 85025 COMPLETE CBC W/AUTO DIFF WBC: CPT | Performed by: INTERNAL MEDICINE

## 2017-01-01 PROCEDURE — 82746 ASSAY OF FOLIC ACID SERUM: CPT | Performed by: FAMILY MEDICINE

## 2017-01-01 PROCEDURE — 82270 OCCULT BLOOD FECES: CPT | Performed by: INTERNAL MEDICINE

## 2017-01-01 PROCEDURE — 84100 ASSAY OF PHOSPHORUS: CPT | Performed by: EMERGENCY MEDICINE

## 2017-01-01 PROCEDURE — 83880 ASSAY OF NATRIURETIC PEPTIDE: CPT | Performed by: EMERGENCY MEDICINE

## 2017-01-01 PROCEDURE — 70551 MRI BRAIN STEM W/O DYE: CPT

## 2017-01-01 PROCEDURE — 99231 SBSQ HOSP IP/OBS SF/LOW 25: CPT | Performed by: HOSPITALIST

## 2017-01-01 PROCEDURE — 82272 OCCULT BLD FECES 1-3 TESTS: CPT | Performed by: FAMILY MEDICINE

## 2017-01-01 PROCEDURE — 25010000002 ENOXAPARIN PER 10 MG: Performed by: HOSPITALIST

## 2017-01-01 PROCEDURE — 84550 ASSAY OF BLOOD/URIC ACID: CPT | Performed by: INTERNAL MEDICINE

## 2017-01-01 PROCEDURE — 82607 VITAMIN B-12: CPT | Performed by: FAMILY MEDICINE

## 2017-01-01 PROCEDURE — 80053 COMPREHEN METABOLIC PANEL: CPT | Performed by: FAMILY MEDICINE

## 2017-01-01 PROCEDURE — 63710000001 INSULIN ASPART PER 5 UNITS: Performed by: INTERNAL MEDICINE

## 2017-01-01 PROCEDURE — 87147 CULTURE TYPE IMMUNOLOGIC: CPT | Performed by: EMERGENCY MEDICINE

## 2017-01-01 PROCEDURE — 25010000002 FLUCONAZOLE 100-0.9 MG/50ML-% SOLUTION: Performed by: FAMILY MEDICINE

## 2017-01-01 PROCEDURE — 99238 HOSP IP/OBS DSCHRG MGMT 30/<: CPT | Performed by: INTERNAL MEDICINE

## 2017-01-01 PROCEDURE — 87040 BLOOD CULTURE FOR BACTERIA: CPT | Performed by: EMERGENCY MEDICINE

## 2017-01-01 PROCEDURE — 83605 ASSAY OF LACTIC ACID: CPT | Performed by: NURSE PRACTITIONER

## 2017-01-01 PROCEDURE — 82570 ASSAY OF URINE CREATININE: CPT | Performed by: INTERNAL MEDICINE

## 2017-01-01 PROCEDURE — 93306 TTE W/DOPPLER COMPLETE: CPT

## 2017-01-01 PROCEDURE — 02HV33Z INSERTION OF INFUSION DEVICE INTO SUPERIOR VENA CAVA, PERCUTANEOUS APPROACH: ICD-10-PCS | Performed by: FAMILY MEDICINE

## 2017-01-01 PROCEDURE — 86901 BLOOD TYPING SEROLOGIC RH(D): CPT | Performed by: FAMILY MEDICINE

## 2017-01-01 PROCEDURE — 86900 BLOOD TYPING SEROLOGIC ABO: CPT | Performed by: FAMILY MEDICINE

## 2017-01-01 PROCEDURE — 83735 ASSAY OF MAGNESIUM: CPT | Performed by: INTERNAL MEDICINE

## 2017-01-01 PROCEDURE — 97165 OT EVAL LOW COMPLEX 30 MIN: CPT

## 2017-01-01 PROCEDURE — G8996 SWALLOW CURRENT STATUS: HCPCS

## 2017-01-01 PROCEDURE — 84300 ASSAY OF URINE SODIUM: CPT | Performed by: INTERNAL MEDICINE

## 2017-01-01 PROCEDURE — 25010000002 PIPERACILLIN SOD-TAZOBACTAM PER 1 G: Performed by: INTERNAL MEDICINE

## 2017-01-01 PROCEDURE — 86850 RBC ANTIBODY SCREEN: CPT

## 2017-01-01 PROCEDURE — 93005 ELECTROCARDIOGRAM TRACING: CPT | Performed by: INTERNAL MEDICINE

## 2017-01-01 PROCEDURE — 97530 THERAPEUTIC ACTIVITIES: CPT

## 2017-01-01 PROCEDURE — 80069 RENAL FUNCTION PANEL: CPT | Performed by: INTERNAL MEDICINE

## 2017-01-01 PROCEDURE — 25010000002 INFLUENZA VAC SUBUNIT QUAD 0.5 ML SUSPENSION PREFILLED SYRINGE: Performed by: FAMILY MEDICINE

## 2017-01-01 PROCEDURE — 85046 RETICYTE/HGB CONCENTRATE: CPT | Performed by: INTERNAL MEDICINE

## 2017-01-01 PROCEDURE — 25010000002 AMIODARONE IN DEXTROSE 5% 360 MG/200ML SOLUTION: Performed by: INTERNAL MEDICINE

## 2017-01-01 PROCEDURE — 99232 SBSQ HOSP IP/OBS MODERATE 35: CPT | Performed by: INTERNAL MEDICINE

## 2017-01-01 PROCEDURE — 36430 TRANSFUSION BLD/BLD COMPNT: CPT

## 2017-01-01 PROCEDURE — 86850 RBC ANTIBODY SCREEN: CPT | Performed by: FAMILY MEDICINE

## 2017-01-01 PROCEDURE — 80048 BASIC METABOLIC PNL TOTAL CA: CPT | Performed by: HOSPITALIST

## 2017-01-01 PROCEDURE — 99231 SBSQ HOSP IP/OBS SF/LOW 25: CPT | Performed by: INTERNAL MEDICINE

## 2017-01-01 PROCEDURE — G8998 SWALLOW D/C STATUS: HCPCS

## 2017-01-01 PROCEDURE — 93306 TTE W/DOPPLER COMPLETE: CPT | Performed by: INTERNAL MEDICINE

## 2017-01-01 PROCEDURE — 86922 COMPATIBILITY TEST ANTIGLOB: CPT

## 2017-01-01 PROCEDURE — G9161 LANG COMP D/C STATUS: HCPCS

## 2017-01-01 PROCEDURE — 74000 HC ABDOMEN KUB: CPT

## 2017-01-01 PROCEDURE — 76775 US EXAM ABDO BACK WALL LIM: CPT

## 2017-01-01 PROCEDURE — 92523 SPEECH SOUND LANG COMPREHEN: CPT

## 2017-01-01 PROCEDURE — 93005 ELECTROCARDIOGRAM TRACING: CPT | Performed by: FAMILY MEDICINE

## 2017-01-01 PROCEDURE — 85045 AUTOMATED RETICULOCYTE COUNT: CPT | Performed by: FAMILY MEDICINE

## 2017-01-01 PROCEDURE — 25010000002 CEFTRIAXONE PER 250 MG: Performed by: FAMILY MEDICINE

## 2017-01-01 PROCEDURE — 82550 ASSAY OF CK (CPK): CPT | Performed by: INTERNAL MEDICINE

## 2017-01-01 PROCEDURE — 87486 CHLMYD PNEUM DNA AMP PROBE: CPT | Performed by: NURSE PRACTITIONER

## 2017-01-01 PROCEDURE — 84443 ASSAY THYROID STIM HORMONE: CPT | Performed by: INTERNAL MEDICINE

## 2017-01-01 PROCEDURE — 80048 BASIC METABOLIC PNL TOTAL CA: CPT | Performed by: INTERNAL MEDICINE

## 2017-01-01 PROCEDURE — 84443 ASSAY THYROID STIM HORMONE: CPT | Performed by: EMERGENCY MEDICINE

## 2017-01-01 PROCEDURE — 92610 EVALUATE SWALLOWING FUNCTION: CPT

## 2017-01-01 PROCEDURE — 82140 ASSAY OF AMMONIA: CPT | Performed by: EMERGENCY MEDICINE

## 2017-01-01 PROCEDURE — 87086 URINE CULTURE/COLONY COUNT: CPT | Performed by: EMERGENCY MEDICINE

## 2017-01-01 PROCEDURE — 87150 DNA/RNA AMPLIFIED PROBE: CPT | Performed by: EMERGENCY MEDICINE

## 2017-01-01 PROCEDURE — 96361 HYDRATE IV INFUSION ADD-ON: CPT

## 2017-01-01 PROCEDURE — 25010000002 VANCOMYCIN: Performed by: EMERGENCY MEDICINE

## 2017-01-01 PROCEDURE — 83540 ASSAY OF IRON: CPT | Performed by: INTERNAL MEDICINE

## 2017-01-01 PROCEDURE — 84439 ASSAY OF FREE THYROXINE: CPT | Performed by: NURSE PRACTITIONER

## 2017-01-01 PROCEDURE — 90661 CCIIV3 VAC ABX FR 0.5 ML IM: CPT | Performed by: FAMILY MEDICINE

## 2017-01-01 PROCEDURE — 84439 ASSAY OF FREE THYROXINE: CPT | Performed by: INTERNAL MEDICINE

## 2017-01-01 PROCEDURE — 83036 HEMOGLOBIN GLYCOSYLATED A1C: CPT | Performed by: PSYCHIATRY & NEUROLOGY

## 2017-01-01 PROCEDURE — 87205 SMEAR GRAM STAIN: CPT | Performed by: INTERNAL MEDICINE

## 2017-01-01 PROCEDURE — 25010000002 DIGOXIN PER 500 MCG: Performed by: FAMILY MEDICINE

## 2017-01-01 PROCEDURE — 25010000002 ENOXAPARIN PER 10 MG: Performed by: FAMILY MEDICINE

## 2017-01-01 PROCEDURE — 99285 EMERGENCY DEPT VISIT HI MDM: CPT | Performed by: EMERGENCY MEDICINE

## 2017-01-01 PROCEDURE — 87081 CULTURE SCREEN ONLY: CPT | Performed by: FAMILY MEDICINE

## 2017-01-01 PROCEDURE — 25010000002 FUROSEMIDE PER 20 MG: Performed by: FAMILY MEDICINE

## 2017-01-01 PROCEDURE — 84134 ASSAY OF PREALBUMIN: CPT | Performed by: FAMILY MEDICINE

## 2017-01-01 PROCEDURE — 25010000002 DEXAMETHASONE PER 1 MG: Performed by: EMERGENCY MEDICINE

## 2017-01-01 PROCEDURE — 83550 IRON BINDING TEST: CPT | Performed by: INTERNAL MEDICINE

## 2017-01-01 PROCEDURE — 25010000002 ALTEPLASE 2 MG RECONSTITUTED SOLUTION 1 EACH VIAL: Performed by: FAMILY MEDICINE

## 2017-01-01 PROCEDURE — 86870 RBC ANTIBODY IDENTIFICATION: CPT

## 2017-01-01 PROCEDURE — 85025 COMPLETE CBC W/AUTO DIFF WBC: CPT | Performed by: NURSE PRACTITIONER

## 2017-01-01 PROCEDURE — 25010000002 PIPERACILLIN-TAZOBACTAM: Performed by: EMERGENCY MEDICINE

## 2017-01-01 PROCEDURE — 84145 PROCALCITONIN (PCT): CPT | Performed by: NURSE PRACTITIONER

## 2017-01-01 PROCEDURE — 82746 ASSAY OF FOLIC ACID SERUM: CPT | Performed by: INTERNAL MEDICINE

## 2017-01-01 PROCEDURE — 87798 DETECT AGENT NOS DNA AMP: CPT | Performed by: NURSE PRACTITIONER

## 2017-01-01 PROCEDURE — 82607 VITAMIN B-12: CPT | Performed by: INTERNAL MEDICINE

## 2017-01-01 PROCEDURE — G9159 LANG COMP CURRENT STATUS: HCPCS

## 2017-01-01 PROCEDURE — 84484 ASSAY OF TROPONIN QUANT: CPT | Performed by: EMERGENCY MEDICINE

## 2017-01-01 PROCEDURE — 87633 RESP VIRUS 12-25 TARGETS: CPT | Performed by: NURSE PRACTITIONER

## 2017-01-01 PROCEDURE — 80061 LIPID PANEL: CPT | Performed by: PSYCHIATRY & NEUROLOGY

## 2017-01-01 PROCEDURE — P9016 RBC LEUKOCYTES REDUCED: HCPCS

## 2017-01-01 PROCEDURE — 83690 ASSAY OF LIPASE: CPT | Performed by: EMERGENCY MEDICINE

## 2017-01-01 PROCEDURE — 96365 THER/PROPH/DIAG IV INF INIT: CPT

## 2017-01-01 PROCEDURE — 96375 TX/PRO/DX INJ NEW DRUG ADDON: CPT

## 2017-01-01 PROCEDURE — G0008 ADMIN INFLUENZA VIRUS VAC: HCPCS | Performed by: FAMILY MEDICINE

## 2017-01-01 PROCEDURE — 25010000003 LEVETIRACETAM IN NACL 0.75% 1000 MG/100ML SOLUTION: Performed by: EMERGENCY MEDICINE

## 2017-01-01 PROCEDURE — 87040 BLOOD CULTURE FOR BACTERIA: CPT | Performed by: HOSPITALIST

## 2017-01-01 PROCEDURE — 83735 ASSAY OF MAGNESIUM: CPT | Performed by: EMERGENCY MEDICINE

## 2017-01-01 RX ORDER — MAGNESIUM HYDROXIDE/ALUMINUM HYDROXICE/SIMETHICONE 120; 1200; 1200 MG/30ML; MG/30ML; MG/30ML
30 SUSPENSION ORAL EVERY 6 HOURS PRN
Status: DISCONTINUED | OUTPATIENT
Start: 2017-01-01 | End: 2017-01-01 | Stop reason: CLARIF

## 2017-01-01 RX ORDER — POTASSIUM CHLORIDE 20 MEQ/1
40 TABLET, EXTENDED RELEASE ORAL ONCE
Status: COMPLETED | OUTPATIENT
Start: 2017-01-01 | End: 2017-01-01

## 2017-01-01 RX ORDER — LEVETIRACETAM 500 MG/1
500 TABLET ORAL 2 TIMES DAILY
Status: DISCONTINUED | OUTPATIENT
Start: 2017-01-01 | End: 2017-01-01 | Stop reason: HOSPADM

## 2017-01-01 RX ORDER — L.ACID,PARA/B.BIFIDUM/S.THERM 8B CELL
1 CAPSULE ORAL DAILY
Status: CANCELLED | OUTPATIENT
Start: 2017-01-01

## 2017-01-01 RX ORDER — ALLOPURINOL 100 MG/1
100 TABLET ORAL EVERY 12 HOURS
Status: DISCONTINUED | OUTPATIENT
Start: 2017-01-01 | End: 2017-01-01 | Stop reason: HOSPADM

## 2017-01-01 RX ORDER — BUDESONIDE 0.5 MG/2ML
0.5 INHALANT ORAL
Status: DISCONTINUED | OUTPATIENT
Start: 2017-01-01 | End: 2017-01-01 | Stop reason: HOSPADM

## 2017-01-01 RX ORDER — SENNA AND DOCUSATE SODIUM 50; 8.6 MG/1; MG/1
1 TABLET, FILM COATED ORAL EVERY 12 HOURS SCHEDULED
Status: DISCONTINUED | OUTPATIENT
Start: 2017-01-01 | End: 2017-01-01 | Stop reason: HOSPADM

## 2017-01-01 RX ORDER — FAMOTIDINE 20 MG/1
40 TABLET, FILM COATED ORAL NIGHTLY
Status: DISCONTINUED | OUTPATIENT
Start: 2017-01-01 | End: 2017-01-01 | Stop reason: HOSPADM

## 2017-01-01 RX ORDER — HYDROCODONE BITARTRATE AND ACETAMINOPHEN 5; 325 MG/1; MG/1
TABLET ORAL
Status: COMPLETED
Start: 2017-01-01 | End: 2017-01-01

## 2017-01-01 RX ORDER — LEVOTHYROXINE SODIUM 0.15 MG/1
150 TABLET ORAL
Status: DISCONTINUED | OUTPATIENT
Start: 2017-01-01 | End: 2017-01-01 | Stop reason: HOSPADM

## 2017-01-01 RX ORDER — LEVETIRACETAM 500 MG/1
500 TABLET ORAL 2 TIMES DAILY
Status: DISCONTINUED | OUTPATIENT
Start: 2017-01-01 | End: 2018-01-01 | Stop reason: HOSPADM

## 2017-01-01 RX ORDER — SENNA PLUS 8.6 MG/1
1 TABLET ORAL 2 TIMES DAILY
Status: DISCONTINUED | OUTPATIENT
Start: 2017-01-01 | End: 2017-01-01 | Stop reason: HOSPADM

## 2017-01-01 RX ORDER — NITROGLYCERIN 0.4 MG/1
0.4 TABLET SUBLINGUAL
Status: DISCONTINUED | OUTPATIENT
Start: 2017-01-01 | End: 2017-01-01 | Stop reason: HOSPADM

## 2017-01-01 RX ORDER — DILTIAZEM HYDROCHLORIDE 120 MG/1
240 CAPSULE, COATED, EXTENDED RELEASE ORAL DAILY
Status: DISCONTINUED | OUTPATIENT
Start: 2017-01-01 | End: 2017-01-01 | Stop reason: HOSPADM

## 2017-01-01 RX ORDER — IPRATROPIUM BROMIDE AND ALBUTEROL SULFATE 2.5; .5 MG/3ML; MG/3ML
3 SOLUTION RESPIRATORY (INHALATION)
Status: DISCONTINUED | OUTPATIENT
Start: 2017-01-01 | End: 2017-01-01

## 2017-01-01 RX ORDER — LEVOTHYROXINE SODIUM 0.15 MG/1
150 TABLET ORAL
Status: DISCONTINUED | OUTPATIENT
Start: 2017-01-01 | End: 2018-01-01 | Stop reason: HOSPADM

## 2017-01-01 RX ORDER — SODIUM CHLORIDE 0.9 % (FLUSH) 0.9 %
10 SYRINGE (ML) INJECTION AS NEEDED
Status: DISCONTINUED | OUTPATIENT
Start: 2017-01-01 | End: 2017-01-01

## 2017-01-01 RX ORDER — DILTIAZEM HYDROCHLORIDE 120 MG/1
240 CAPSULE, COATED, EXTENDED RELEASE ORAL EVERY OTHER DAY
Status: DISCONTINUED | OUTPATIENT
Start: 2017-01-01 | End: 2017-01-01

## 2017-01-01 RX ORDER — POTASSIUM CHLORIDE 20 MEQ/1
40 TABLET, EXTENDED RELEASE ORAL
Status: COMPLETED | OUTPATIENT
Start: 2017-01-01 | End: 2017-01-01

## 2017-01-01 RX ORDER — GLIPIZIDE 5 MG/1
5 TABLET, FILM COATED, EXTENDED RELEASE ORAL
Status: CANCELLED | OUTPATIENT
Start: 2017-01-01

## 2017-01-01 RX ORDER — LISINOPRIL 10 MG/1
10 TABLET ORAL
Status: DISCONTINUED | OUTPATIENT
Start: 2017-01-01 | End: 2017-01-01

## 2017-01-01 RX ORDER — L.ACID,PARA/B.BIFIDUM/S.THERM 8B CELL
1 CAPSULE ORAL DAILY
Status: DISCONTINUED | OUTPATIENT
Start: 2017-01-01 | End: 2017-01-01 | Stop reason: HOSPADM

## 2017-01-01 RX ORDER — FUROSEMIDE 40 MG/1
40 TABLET ORAL DAILY
Status: DISCONTINUED | OUTPATIENT
Start: 2017-01-01 | End: 2017-01-01

## 2017-01-01 RX ORDER — OLOPATADINE HYDROCHLORIDE 1 MG/ML
1 SOLUTION/ DROPS OPHTHALMIC 2 TIMES DAILY
Status: DISCONTINUED | OUTPATIENT
Start: 2017-01-01 | End: 2017-01-01 | Stop reason: CLARIF

## 2017-01-01 RX ORDER — SODIUM CHLORIDE 9 MG/ML
INJECTION, SOLUTION INTRAVENOUS
Status: COMPLETED
Start: 2017-01-01 | End: 2017-01-01

## 2017-01-01 RX ORDER — IBANDRONATE SODIUM 150 MG/1
150 TABLET, FILM COATED ORAL
Status: DISCONTINUED | OUTPATIENT
Start: 2017-01-01 | End: 2017-01-01 | Stop reason: HOSPADM

## 2017-01-01 RX ORDER — KETOTIFEN FUMARATE 0.35 MG/ML
1 SOLUTION/ DROPS OPHTHALMIC 2 TIMES DAILY
COMMUNITY

## 2017-01-01 RX ORDER — FLUCONAZOLE 100 MG/1
100 TABLET ORAL EVERY 24 HOURS
Status: COMPLETED | OUTPATIENT
Start: 2017-01-01 | End: 2017-01-01

## 2017-01-01 RX ORDER — DILTIAZEM HYDROCHLORIDE 60 MG/1
120 TABLET, FILM COATED ORAL 2 TIMES DAILY
Status: DISCONTINUED | OUTPATIENT
Start: 2017-01-01 | End: 2018-01-01 | Stop reason: HOSPADM

## 2017-01-01 RX ORDER — MAGNESIUM HYDROXIDE/ALUMINUM HYDROXICE/SIMETHICONE 120; 1200; 1200 MG/30ML; MG/30ML; MG/30ML
30 SUSPENSION ORAL EVERY 6 HOURS PRN
Status: DISCONTINUED | OUTPATIENT
Start: 2017-01-01 | End: 2017-01-01 | Stop reason: HOSPADM

## 2017-01-01 RX ORDER — BUMETANIDE 1 MG/1
2 TABLET ORAL ONCE
Status: COMPLETED | OUTPATIENT
Start: 2017-01-01 | End: 2017-01-01

## 2017-01-01 RX ORDER — SODIUM CHLORIDE 9 MG/ML
75 INJECTION, SOLUTION INTRAVENOUS CONTINUOUS
Status: DISCONTINUED | OUTPATIENT
Start: 2017-01-01 | End: 2017-01-01

## 2017-01-01 RX ORDER — METOPROLOL SUCCINATE 50 MG/1
100 TABLET, EXTENDED RELEASE ORAL EVERY 12 HOURS SCHEDULED
Status: CANCELLED | OUTPATIENT
Start: 2017-01-01

## 2017-01-01 RX ORDER — ACETAMINOPHEN 325 MG/1
650 TABLET ORAL EVERY 4 HOURS PRN
Status: DISCONTINUED | OUTPATIENT
Start: 2017-01-01 | End: 2017-01-01 | Stop reason: HOSPADM

## 2017-01-01 RX ORDER — DIPHENHYDRAMINE HCL 25 MG
25 CAPSULE ORAL EVERY 6 HOURS PRN
Status: DISCONTINUED | OUTPATIENT
Start: 2017-01-01 | End: 2017-01-01 | Stop reason: HOSPADM

## 2017-01-01 RX ORDER — BUDESONIDE 0.5 MG/2ML
0.5 INHALANT ORAL
Status: CANCELLED | OUTPATIENT
Start: 2017-01-01

## 2017-01-01 RX ORDER — SODIUM CHLORIDE 9 MG/ML
150 INJECTION, SOLUTION INTRAVENOUS CONTINUOUS
Status: DISCONTINUED | OUTPATIENT
Start: 2017-01-01 | End: 2017-01-01 | Stop reason: HOSPADM

## 2017-01-01 RX ORDER — AMIODARONE HYDROCHLORIDE 200 MG/1
200 TABLET ORAL EVERY 12 HOURS SCHEDULED
Status: CANCELLED | OUTPATIENT
Start: 2017-01-01

## 2017-01-01 RX ORDER — ONDANSETRON 4 MG/1
4 TABLET, ORALLY DISINTEGRATING ORAL EVERY 6 HOURS PRN
Status: DISCONTINUED | OUTPATIENT
Start: 2017-01-01 | End: 2018-01-01 | Stop reason: HOSPADM

## 2017-01-01 RX ORDER — HYDROCODONE BITARTRATE AND ACETAMINOPHEN 5; 325 MG/1; MG/1
1 TABLET ORAL EVERY 6 HOURS PRN
Status: CANCELLED | OUTPATIENT
Start: 2017-01-01

## 2017-01-01 RX ORDER — ALLOPURINOL 100 MG/1
100 TABLET ORAL 2 TIMES DAILY
Status: CANCELLED | OUTPATIENT
Start: 2017-01-01

## 2017-01-01 RX ORDER — SODIUM CHLORIDE 450 MG/100ML
75 INJECTION, SOLUTION INTRAVENOUS CONTINUOUS
Status: DISCONTINUED | OUTPATIENT
Start: 2017-01-01 | End: 2017-01-01

## 2017-01-01 RX ORDER — KETOTIFEN FUMARATE 0.35 MG/ML
1 SOLUTION/ DROPS OPHTHALMIC 2 TIMES DAILY
Status: DISCONTINUED | OUTPATIENT
Start: 2017-01-01 | End: 2018-01-01 | Stop reason: HOSPADM

## 2017-01-01 RX ORDER — NITROGLYCERIN 0.4 MG/1
0.4 TABLET SUBLINGUAL
Status: CANCELLED | OUTPATIENT
Start: 2017-01-01

## 2017-01-01 RX ORDER — IPRATROPIUM BROMIDE AND ALBUTEROL SULFATE 2.5; .5 MG/3ML; MG/3ML
3 SOLUTION RESPIRATORY (INHALATION)
Status: DISCONTINUED | OUTPATIENT
Start: 2017-01-01 | End: 2017-01-01 | Stop reason: HOSPADM

## 2017-01-01 RX ORDER — SODIUM CHLORIDE 9 MG/ML
100 INJECTION, SOLUTION INTRAVENOUS CONTINUOUS
Status: DISCONTINUED | OUTPATIENT
Start: 2017-01-01 | End: 2017-01-01

## 2017-01-01 RX ORDER — ISOSORBIDE DINITRATE 10 MG/1
10 TABLET ORAL EVERY 12 HOURS SCHEDULED
Status: DISCONTINUED | OUTPATIENT
Start: 2017-01-01 | End: 2017-01-01 | Stop reason: HOSPADM

## 2017-01-01 RX ORDER — SODIUM CHLORIDE 9 MG/ML
500 INJECTION, SOLUTION INTRAVENOUS ONCE
Status: COMPLETED | OUTPATIENT
Start: 2017-01-01 | End: 2017-01-01

## 2017-01-01 RX ORDER — MEGESTROL ACETATE 40 MG/ML
200 SUSPENSION ORAL 2 TIMES DAILY
Status: DISCONTINUED | OUTPATIENT
Start: 2017-01-01 | End: 2017-01-01 | Stop reason: HOSPADM

## 2017-01-01 RX ORDER — ACETAMINOPHEN 325 MG/1
650 TABLET ORAL NIGHTLY
Status: DISCONTINUED | OUTPATIENT
Start: 2017-01-01 | End: 2017-01-01 | Stop reason: HOSPADM

## 2017-01-01 RX ORDER — GUAIFENESIN 600 MG/1
1200 TABLET, EXTENDED RELEASE ORAL 2 TIMES DAILY
Status: CANCELLED | OUTPATIENT
Start: 2017-01-01

## 2017-01-01 RX ORDER — L.ACID,PARA/B.BIFIDUM/S.THERM 8B CELL
1 CAPSULE ORAL DAILY
Status: DISCONTINUED | OUTPATIENT
Start: 2017-01-01 | End: 2018-01-01 | Stop reason: HOSPADM

## 2017-01-01 RX ORDER — POTASSIUM CHLORIDE 20 MEQ/1
20 TABLET, EXTENDED RELEASE ORAL DAILY
Status: DISCONTINUED | OUTPATIENT
Start: 2017-01-01 | End: 2017-01-01

## 2017-01-01 RX ORDER — MIRTAZAPINE 15 MG/1
15 TABLET, FILM COATED ORAL NIGHTLY
Status: DISCONTINUED | OUTPATIENT
Start: 2017-01-01 | End: 2017-01-01

## 2017-01-01 RX ORDER — ACETAMINOPHEN 325 MG/1
650 TABLET ORAL EVERY 4 HOURS PRN
Status: CANCELLED | OUTPATIENT
Start: 2017-01-01

## 2017-01-01 RX ORDER — ISOSORBIDE DINITRATE 10 MG/1
10 TABLET ORAL EVERY 12 HOURS SCHEDULED
Status: CANCELLED | OUTPATIENT
Start: 2017-01-01

## 2017-01-01 RX ORDER — DONEPEZIL HYDROCHLORIDE 5 MG/1
10 TABLET, FILM COATED ORAL NIGHTLY
Status: DISCONTINUED | OUTPATIENT
Start: 2017-01-01 | End: 2017-01-01 | Stop reason: HOSPADM

## 2017-01-01 RX ORDER — SODIUM CHLORIDE, SODIUM LACTATE, POTASSIUM CHLORIDE, CALCIUM CHLORIDE 600; 310; 30; 20 MG/100ML; MG/100ML; MG/100ML; MG/100ML
75 INJECTION, SOLUTION INTRAVENOUS CONTINUOUS
Status: DISCONTINUED | OUTPATIENT
Start: 2017-01-01 | End: 2017-01-01

## 2017-01-01 RX ORDER — POTASSIUM CHLORIDE 1.5 G/1.77G
40 POWDER, FOR SOLUTION ORAL AS NEEDED
Status: DISCONTINUED | OUTPATIENT
Start: 2017-01-01 | End: 2017-01-01 | Stop reason: HOSPADM

## 2017-01-01 RX ORDER — POTASSIUM CHLORIDE 750 MG/1
20 TABLET, EXTENDED RELEASE ORAL DAILY
COMMUNITY

## 2017-01-01 RX ORDER — AMIODARONE HYDROCHLORIDE 200 MG/1
200 TABLET ORAL EVERY 12 HOURS SCHEDULED
Status: DISCONTINUED | OUTPATIENT
Start: 2017-01-01 | End: 2017-01-01 | Stop reason: HOSPADM

## 2017-01-01 RX ORDER — KETOTIFEN FUMARATE 0.35 MG/ML
1 SOLUTION/ DROPS OPHTHALMIC 2 TIMES DAILY
Status: DISCONTINUED | OUTPATIENT
Start: 2017-01-01 | End: 2017-01-01 | Stop reason: HOSPADM

## 2017-01-01 RX ORDER — DONEPEZIL HYDROCHLORIDE 5 MG/1
10 TABLET, FILM COATED ORAL NIGHTLY
Status: DISCONTINUED | OUTPATIENT
Start: 2017-01-01 | End: 2017-01-01 | Stop reason: SDUPTHER

## 2017-01-01 RX ORDER — BISACODYL 5 MG/1
10 TABLET, DELAYED RELEASE ORAL DAILY PRN
Status: DISCONTINUED | OUTPATIENT
Start: 2017-01-01 | End: 2017-01-01 | Stop reason: HOSPADM

## 2017-01-01 RX ORDER — ASPIRIN 81 MG/1
81 TABLET ORAL DAILY
Status: DISCONTINUED | OUTPATIENT
Start: 2017-01-01 | End: 2017-01-01 | Stop reason: HOSPADM

## 2017-01-01 RX ORDER — METOPROLOL TARTRATE 50 MG/1
100 TABLET, FILM COATED ORAL EVERY 12 HOURS SCHEDULED
Status: CANCELLED | OUTPATIENT
Start: 2017-01-01

## 2017-01-01 RX ORDER — IRON POLYSACCHARIDE COMPLEX 150 MG
150 CAPSULE ORAL 2 TIMES DAILY
Status: DISCONTINUED | OUTPATIENT
Start: 2017-01-01 | End: 2018-01-01 | Stop reason: HOSPADM

## 2017-01-01 RX ORDER — AMIODARONE HYDROCHLORIDE 200 MG/1
200 TABLET ORAL 2 TIMES DAILY
Status: DISCONTINUED | OUTPATIENT
Start: 2017-01-01 | End: 2018-01-01 | Stop reason: HOSPADM

## 2017-01-01 RX ORDER — LEVETIRACETAM 10 MG/ML
1000 INJECTION INTRAVASCULAR ONCE
Status: COMPLETED | OUTPATIENT
Start: 2017-01-01 | End: 2017-01-01

## 2017-01-01 RX ORDER — ERGOCALCIFEROL 1.25 MG/1
50000 CAPSULE ORAL
Status: CANCELLED | OUTPATIENT
Start: 2017-01-01

## 2017-01-01 RX ORDER — LEVOFLOXACIN 250 MG/1
250 TABLET ORAL EVERY OTHER DAY
Status: DISCONTINUED | OUTPATIENT
Start: 2017-01-01 | End: 2017-01-01

## 2017-01-01 RX ORDER — DILTIAZEM HYDROCHLORIDE 100 MG/1
INJECTION, POWDER, LYOPHILIZED, FOR SOLUTION INTRAVENOUS
Status: COMPLETED
Start: 2017-01-01 | End: 2017-01-01

## 2017-01-01 RX ORDER — FUROSEMIDE 40 MG/1
40 TABLET ORAL DAILY
Status: CANCELLED | OUTPATIENT
Start: 2017-01-01

## 2017-01-01 RX ORDER — AMIODARONE HYDROCHLORIDE 200 MG/1
200 TABLET ORAL EVERY 12 HOURS SCHEDULED
Status: DISCONTINUED | OUTPATIENT
Start: 2017-01-01 | End: 2017-01-01

## 2017-01-01 RX ORDER — SODIUM CHLORIDE 9 MG/ML
INJECTION, SOLUTION INTRAVENOUS
Status: DISPENSED
Start: 2017-01-01 | End: 2017-01-01

## 2017-01-01 RX ORDER — NICOTINE POLACRILEX 4 MG
15 LOZENGE BUCCAL
Status: DISCONTINUED | OUTPATIENT
Start: 2017-01-01 | End: 2017-01-01 | Stop reason: HOSPADM

## 2017-01-01 RX ORDER — LEVETIRACETAM 500 MG/1
500 TABLET ORAL EVERY 12 HOURS SCHEDULED
Status: CANCELLED | OUTPATIENT
Start: 2017-01-01

## 2017-01-01 RX ORDER — SODIUM CHLORIDE 9 MG/ML
200 INJECTION, SOLUTION INTRAVENOUS ONCE
Status: COMPLETED | OUTPATIENT
Start: 2017-01-01 | End: 2017-01-01

## 2017-01-01 RX ORDER — GLIPIZIDE 5 MG/1
5 TABLET, FILM COATED, EXTENDED RELEASE ORAL
Status: DISCONTINUED | OUTPATIENT
Start: 2017-01-01 | End: 2017-01-01 | Stop reason: HOSPADM

## 2017-01-01 RX ORDER — PANTOPRAZOLE SODIUM 40 MG/10ML
40 INJECTION, POWDER, LYOPHILIZED, FOR SOLUTION INTRAVENOUS
Status: DISCONTINUED | OUTPATIENT
Start: 2017-01-01 | End: 2017-01-01

## 2017-01-01 RX ORDER — SENNA AND DOCUSATE SODIUM 50; 8.6 MG/1; MG/1
1 TABLET, FILM COATED ORAL EVERY 12 HOURS SCHEDULED
Status: CANCELLED | OUTPATIENT
Start: 2017-01-01

## 2017-01-01 RX ORDER — GLIPIZIDE 5 MG/1
5 TABLET ORAL
COMMUNITY

## 2017-01-01 RX ORDER — LEVETIRACETAM 500 MG/1
500 TABLET ORAL EVERY 12 HOURS SCHEDULED
Status: DISCONTINUED | OUTPATIENT
Start: 2017-01-01 | End: 2017-01-01 | Stop reason: HOSPADM

## 2017-01-01 RX ORDER — LEVETIRACETAM 500 MG/1
500 TABLET ORAL 2 TIMES DAILY
Status: CANCELLED | OUTPATIENT
Start: 2017-01-01

## 2017-01-01 RX ORDER — TRAMADOL HYDROCHLORIDE 50 MG/1
100 TABLET ORAL NIGHTLY PRN
Status: DISCONTINUED | OUTPATIENT
Start: 2017-01-01 | End: 2017-01-01

## 2017-01-01 RX ORDER — POTASSIUM CHLORIDE 20 MEQ/1
20 TABLET, EXTENDED RELEASE ORAL DAILY
Status: CANCELLED | OUTPATIENT
Start: 2017-01-01

## 2017-01-01 RX ORDER — BUMETANIDE 0.25 MG/ML
2 INJECTION INTRAMUSCULAR; INTRAVENOUS ONCE
Status: COMPLETED | OUTPATIENT
Start: 2017-01-01 | End: 2017-01-01

## 2017-01-01 RX ORDER — IRON POLYSACCHARIDE COMPLEX 150 MG
150 CAPSULE ORAL 2 TIMES DAILY
Status: DISCONTINUED | OUTPATIENT
Start: 2017-01-01 | End: 2017-01-01 | Stop reason: HOSPADM

## 2017-01-01 RX ORDER — FLUCONAZOLE 100 MG/1
100 TABLET ORAL EVERY 24 HOURS
Status: DISCONTINUED | OUTPATIENT
Start: 2017-01-01 | End: 2017-01-01 | Stop reason: HOSPADM

## 2017-01-01 RX ORDER — LORAZEPAM 0.5 MG/1
0.5 TABLET ORAL EVERY 8 HOURS PRN
Status: DISCONTINUED | OUTPATIENT
Start: 2017-01-01 | End: 2017-01-01 | Stop reason: HOSPADM

## 2017-01-01 RX ORDER — BUMETANIDE 0.25 MG/ML
2 INJECTION INTRAMUSCULAR; INTRAVENOUS ONCE
Status: DISCONTINUED | OUTPATIENT
Start: 2017-01-01 | End: 2017-01-01

## 2017-01-01 RX ORDER — LORAZEPAM 0.5 MG/1
0.5 TABLET ORAL EVERY 8 HOURS PRN
Status: DISCONTINUED | OUTPATIENT
Start: 2017-01-01 | End: 2018-01-01 | Stop reason: HOSPADM

## 2017-01-01 RX ORDER — POTASSIUM CHLORIDE 20 MEQ/1
20 TABLET, EXTENDED RELEASE ORAL DAILY
Status: DISCONTINUED | OUTPATIENT
Start: 2017-01-01 | End: 2017-01-01 | Stop reason: HOSPADM

## 2017-01-01 RX ORDER — MAGNESIUM SULFATE HEPTAHYDRATE 40 MG/ML
INJECTION, SOLUTION INTRAVENOUS
Status: COMPLETED
Start: 2017-01-01 | End: 2017-01-01

## 2017-01-01 RX ORDER — TRAMADOL HYDROCHLORIDE 50 MG/1
100 TABLET ORAL NIGHTLY PRN
Status: CANCELLED | OUTPATIENT
Start: 2017-01-01 | End: 2017-01-01

## 2017-01-01 RX ORDER — IPRATROPIUM BROMIDE AND ALBUTEROL SULFATE 2.5; .5 MG/3ML; MG/3ML
3 SOLUTION RESPIRATORY (INHALATION) EVERY 6 HOURS PRN
Status: DISCONTINUED | OUTPATIENT
Start: 2017-01-01 | End: 2017-01-01 | Stop reason: HOSPADM

## 2017-01-01 RX ORDER — FAMOTIDINE 20 MG/1
20 TABLET, FILM COATED ORAL 2 TIMES DAILY
Status: DISCONTINUED | OUTPATIENT
Start: 2017-01-01 | End: 2017-01-01 | Stop reason: HOSPADM

## 2017-01-01 RX ORDER — DOXAZOSIN 2 MG/1
2 TABLET ORAL EVERY 12 HOURS SCHEDULED
Status: DISCONTINUED | OUTPATIENT
Start: 2017-01-01 | End: 2017-01-01 | Stop reason: HOSPADM

## 2017-01-01 RX ORDER — NICOTINE POLACRILEX 4 MG
15 LOZENGE BUCCAL
Status: DISCONTINUED | OUTPATIENT
Start: 2017-01-01 | End: 2018-01-01 | Stop reason: HOSPADM

## 2017-01-01 RX ORDER — LEVOTHYROXINE SODIUM 0.15 MG/1
150 TABLET ORAL
Status: CANCELLED | OUTPATIENT
Start: 2017-01-01

## 2017-01-01 RX ORDER — FAMOTIDINE 10 MG/ML
20 INJECTION, SOLUTION INTRAVENOUS 2 TIMES DAILY
Status: DISCONTINUED | OUTPATIENT
Start: 2017-01-01 | End: 2017-01-01

## 2017-01-01 RX ORDER — GLIPIZIDE 5 MG/1
5 TABLET ORAL
Status: DISCONTINUED | OUTPATIENT
Start: 2017-01-01 | End: 2017-01-01 | Stop reason: HOSPADM

## 2017-01-01 RX ORDER — SENNA AND DOCUSATE SODIUM 50; 8.6 MG/1; MG/1
2 TABLET, FILM COATED ORAL 2 TIMES DAILY PRN
Status: DISCONTINUED | OUTPATIENT
Start: 2017-01-01 | End: 2018-01-01 | Stop reason: HOSPADM

## 2017-01-01 RX ORDER — ALUMINA, MAGNESIA, AND SIMETHICONE 2400; 2400; 240 MG/30ML; MG/30ML; MG/30ML
15 SUSPENSION ORAL EVERY 6 HOURS PRN
Status: DISCONTINUED | OUTPATIENT
Start: 2017-01-01 | End: 2017-01-01 | Stop reason: HOSPADM

## 2017-01-01 RX ORDER — GLIPIZIDE 2.5 MG/1
2.5 TABLET, EXTENDED RELEASE ORAL
Status: DISCONTINUED | OUTPATIENT
Start: 2017-01-01 | End: 2017-01-01

## 2017-01-01 RX ORDER — FLUCONAZOLE, SODIUM CHLORIDE 2 MG/ML
100 INJECTION INTRAVENOUS DAILY
Status: DISCONTINUED | OUTPATIENT
Start: 2017-01-01 | End: 2017-01-01

## 2017-01-01 RX ORDER — CLINDAMYCIN HYDROCHLORIDE 150 MG/1
450 CAPSULE ORAL 3 TIMES DAILY
Status: ON HOLD | COMMUNITY
End: 2018-01-01

## 2017-01-01 RX ORDER — HYDROCODONE BITARTRATE AND ACETAMINOPHEN 5; 325 MG/1; MG/1
1 TABLET ORAL EVERY 6 HOURS PRN
Status: DISCONTINUED | OUTPATIENT
Start: 2017-01-01 | End: 2017-01-01

## 2017-01-01 RX ORDER — ONDANSETRON 4 MG/1
4 TABLET, FILM COATED ORAL EVERY 6 HOURS PRN
Status: DISCONTINUED | OUTPATIENT
Start: 2017-01-01 | End: 2018-01-01 | Stop reason: HOSPADM

## 2017-01-01 RX ORDER — AMIODARONE HYDROCHLORIDE 200 MG/1
200 TABLET ORAL 2 TIMES DAILY
COMMUNITY

## 2017-01-01 RX ORDER — ADENOSINE 3 MG/ML
12 INJECTION, SOLUTION INTRAVENOUS ONCE
Status: DISCONTINUED | OUTPATIENT
Start: 2017-01-01 | End: 2017-01-01

## 2017-01-01 RX ORDER — ONDANSETRON 2 MG/ML
4 INJECTION INTRAMUSCULAR; INTRAVENOUS EVERY 6 HOURS PRN
Status: DISCONTINUED | OUTPATIENT
Start: 2017-01-01 | End: 2018-01-01 | Stop reason: HOSPADM

## 2017-01-01 RX ORDER — IBANDRONATE SODIUM 150 MG/1
150 TABLET, FILM COATED ORAL
Status: DISCONTINUED | OUTPATIENT
Start: 2017-01-01 | End: 2017-01-01 | Stop reason: CLARIF

## 2017-01-01 RX ORDER — AMIODARONE HYDROCHLORIDE 200 MG/1
200 TABLET ORAL
Status: DISCONTINUED | OUTPATIENT
Start: 2017-01-01 | End: 2017-01-01

## 2017-01-01 RX ORDER — MULTIPLE VITAMINS W/ MINERALS TAB 9MG-400MCG
1 TAB ORAL DAILY
Status: DISCONTINUED | OUTPATIENT
Start: 2017-01-01 | End: 2017-01-01 | Stop reason: HOSPADM

## 2017-01-01 RX ORDER — DIGOXIN 0.25 MG/ML
125 INJECTION INTRAMUSCULAR; INTRAVENOUS ONCE
Status: COMPLETED | OUTPATIENT
Start: 2017-01-01 | End: 2017-01-01

## 2017-01-01 RX ORDER — KETOTIFEN FUMARATE 0.35 MG/ML
1 SOLUTION/ DROPS OPHTHALMIC 2 TIMES DAILY
Status: CANCELLED | OUTPATIENT
Start: 2017-01-01

## 2017-01-01 RX ORDER — GUAIFENESIN 600 MG/1
1200 TABLET, EXTENDED RELEASE ORAL 2 TIMES DAILY
Status: DISCONTINUED | OUTPATIENT
Start: 2017-01-01 | End: 2017-01-01 | Stop reason: HOSPADM

## 2017-01-01 RX ORDER — CLINDAMYCIN HYDROCHLORIDE 150 MG/1
450 CAPSULE ORAL EVERY 8 HOURS SCHEDULED
Status: COMPLETED | OUTPATIENT
Start: 2017-01-01 | End: 2017-01-01

## 2017-01-01 RX ORDER — SENNA PLUS 8.6 MG/1
1 TABLET ORAL 2 TIMES DAILY
Status: CANCELLED | OUTPATIENT
Start: 2017-01-01

## 2017-01-01 RX ORDER — DOXAZOSIN 2 MG/1
2 TABLET ORAL EVERY 12 HOURS SCHEDULED
Status: CANCELLED | OUTPATIENT
Start: 2017-01-01

## 2017-01-01 RX ORDER — FAMOTIDINE 20 MG/1
40 TABLET, FILM COATED ORAL NIGHTLY
Status: CANCELLED | OUTPATIENT
Start: 2017-01-01

## 2017-01-01 RX ORDER — DILTIAZEM HYDROCHLORIDE 5 MG/ML
INJECTION INTRAVENOUS
Status: DISPENSED
Start: 2017-01-01 | End: 2017-01-01

## 2017-01-01 RX ORDER — MAGNESIUM HYDROXIDE/ALUMINUM HYDROXICE/SIMETHICONE 120; 1200; 1200 MG/30ML; MG/30ML; MG/30ML
30 SUSPENSION ORAL EVERY 6 HOURS PRN
Status: CANCELLED | OUTPATIENT
Start: 2017-01-01

## 2017-01-01 RX ORDER — SENNA PLUS 8.6 MG/1
1 TABLET ORAL 2 TIMES DAILY
Status: DISCONTINUED | OUTPATIENT
Start: 2017-01-01 | End: 2017-01-01 | Stop reason: SDUPTHER

## 2017-01-01 RX ORDER — GLIPIZIDE 5 MG/1
5 TABLET ORAL
Status: DISCONTINUED | OUTPATIENT
Start: 2017-01-01 | End: 2017-01-01

## 2017-01-01 RX ORDER — FAMOTIDINE 20 MG/1
20 TABLET, FILM COATED ORAL 2 TIMES DAILY
Status: DISCONTINUED | OUTPATIENT
Start: 2017-01-01 | End: 2018-01-01 | Stop reason: HOSPADM

## 2017-01-01 RX ORDER — POTASSIUM CHLORIDE 750 MG/1
40 CAPSULE, EXTENDED RELEASE ORAL AS NEEDED
Status: DISCONTINUED | OUTPATIENT
Start: 2017-01-01 | End: 2017-01-01 | Stop reason: HOSPADM

## 2017-01-01 RX ORDER — IBANDRONATE SODIUM 150 MG/1
150 TABLET, FILM COATED ORAL
Status: DISCONTINUED | OUTPATIENT
Start: 2017-01-01 | End: 2017-01-01

## 2017-01-01 RX ORDER — TRAMADOL HYDROCHLORIDE 50 MG/1
50 TABLET ORAL
COMMUNITY

## 2017-01-01 RX ORDER — ALLOPURINOL 100 MG/1
100 TABLET ORAL 2 TIMES DAILY
Status: DISCONTINUED | OUTPATIENT
Start: 2017-01-01 | End: 2017-01-01 | Stop reason: HOSPADM

## 2017-01-01 RX ORDER — LEVOFLOXACIN 750 MG/1
750 TABLET ORAL DAILY
Qty: 10 TABLET | Refills: 0 | Status: ON HOLD | OUTPATIENT
Start: 2017-01-01 | End: 2017-01-01

## 2017-01-01 RX ORDER — TRAMADOL HYDROCHLORIDE 50 MG/1
100 TABLET ORAL EVERY 8 HOURS PRN
Status: CANCELLED | OUTPATIENT
Start: 2017-01-01 | End: 2017-01-01

## 2017-01-01 RX ORDER — DILTIAZEM HYDROCHLORIDE 5 MG/ML
5 INJECTION INTRAVENOUS ONCE
Status: COMPLETED | OUTPATIENT
Start: 2017-01-01 | End: 2017-01-01

## 2017-01-01 RX ORDER — DILTIAZEM HYDROCHLORIDE 120 MG/1
240 CAPSULE, COATED, EXTENDED RELEASE ORAL DAILY
Status: CANCELLED | OUTPATIENT
Start: 2017-01-01

## 2017-01-01 RX ORDER — ACETAMINOPHEN 500 MG
500 TABLET ORAL EVERY 4 HOURS PRN
Status: DISCONTINUED | OUTPATIENT
Start: 2017-01-01 | End: 2018-01-01 | Stop reason: HOSPADM

## 2017-01-01 RX ORDER — LEVOFLOXACIN 500 MG/1
500 TABLET, FILM COATED ORAL EVERY 24 HOURS
Status: DISCONTINUED | OUTPATIENT
Start: 2017-01-01 | End: 2017-01-01

## 2017-01-01 RX ORDER — ALLOPURINOL 100 MG/1
100 TABLET ORAL EVERY 12 HOURS
Status: CANCELLED | OUTPATIENT
Start: 2017-01-01

## 2017-01-01 RX ORDER — METOPROLOL TARTRATE 50 MG/1
50 TABLET, FILM COATED ORAL 2 TIMES DAILY
Status: DISCONTINUED | OUTPATIENT
Start: 2017-01-01 | End: 2017-01-01

## 2017-01-01 RX ORDER — DILTIAZEM HYDROCHLORIDE 120 MG/1
240 CAPSULE, COATED, EXTENDED RELEASE ORAL DAILY
Status: DISCONTINUED | OUTPATIENT
Start: 2017-01-01 | End: 2017-01-01

## 2017-01-01 RX ORDER — LISINOPRIL 5 MG/1
5 TABLET ORAL
Status: DISCONTINUED | OUTPATIENT
Start: 2017-01-01 | End: 2017-01-01

## 2017-01-01 RX ORDER — DIGOXIN 0.25 MG/ML
250 INJECTION INTRAMUSCULAR; INTRAVENOUS
Status: DISCONTINUED | OUTPATIENT
Start: 2017-01-01 | End: 2017-01-01

## 2017-01-01 RX ORDER — AMLODIPINE BESYLATE 5 MG/1
5 TABLET ORAL
Status: DISCONTINUED | OUTPATIENT
Start: 2017-01-01 | End: 2017-01-01

## 2017-01-01 RX ORDER — BUMETANIDE 0.25 MG/ML
INJECTION INTRAMUSCULAR; INTRAVENOUS
Status: COMPLETED
Start: 2017-01-01 | End: 2017-01-01

## 2017-01-01 RX ORDER — CLINDAMYCIN PHOSPHATE 600 MG/50ML
600 INJECTION INTRAVENOUS ONCE
Status: COMPLETED | OUTPATIENT
Start: 2017-01-01 | End: 2017-01-01

## 2017-01-01 RX ORDER — ASPIRIN 81 MG/1
81 TABLET ORAL DAILY
Status: DISCONTINUED | OUTPATIENT
Start: 2017-01-01 | End: 2018-01-01 | Stop reason: HOSPADM

## 2017-01-01 RX ORDER — DOXAZOSIN 2 MG/1
2 TABLET ORAL EVERY 12 HOURS SCHEDULED
Status: COMPLETED | OUTPATIENT
Start: 2017-01-01 | End: 2017-01-01

## 2017-01-01 RX ORDER — NALOXONE HYDROCHLORIDE 1 MG/ML
2 INJECTION INTRAMUSCULAR; INTRAVENOUS; SUBCUTANEOUS ONCE
Status: COMPLETED | OUTPATIENT
Start: 2017-01-01 | End: 2017-01-01

## 2017-01-01 RX ORDER — CALCIUM CARBONATE 200(500)MG
1 TABLET,CHEWABLE ORAL 2 TIMES DAILY PRN
Status: DISCONTINUED | OUTPATIENT
Start: 2017-01-01 | End: 2018-01-01 | Stop reason: HOSPADM

## 2017-01-01 RX ORDER — FUROSEMIDE 10 MG/ML
40 INJECTION INTRAMUSCULAR; INTRAVENOUS 2 TIMES DAILY
Status: DISCONTINUED | OUTPATIENT
Start: 2017-01-01 | End: 2017-01-01

## 2017-01-01 RX ORDER — SENNA PLUS 8.6 MG/1
1 TABLET ORAL 2 TIMES DAILY
Status: DISCONTINUED | OUTPATIENT
Start: 2017-01-01 | End: 2018-01-01 | Stop reason: HOSPADM

## 2017-01-01 RX ORDER — SODIUM CHLORIDE 450 MG/100ML
INJECTION, SOLUTION INTRAVENOUS
Status: COMPLETED
Start: 2017-01-01 | End: 2017-01-01

## 2017-01-01 RX ORDER — DONEPEZIL HYDROCHLORIDE 5 MG/1
10 TABLET, FILM COATED ORAL NIGHTLY
Status: CANCELLED | OUTPATIENT
Start: 2017-01-01

## 2017-01-01 RX ORDER — METOPROLOL TARTRATE 50 MG/1
100 TABLET, FILM COATED ORAL EVERY 12 HOURS SCHEDULED
Status: DISCONTINUED | OUTPATIENT
Start: 2017-01-01 | End: 2017-01-01

## 2017-01-01 RX ORDER — LORAZEPAM 0.5 MG/1
0.5 TABLET ORAL EVERY 8 HOURS PRN
Status: CANCELLED | OUTPATIENT
Start: 2017-01-01

## 2017-01-01 RX ORDER — SODIUM CHLORIDE 0.9 % (FLUSH) 0.9 %
10 SYRINGE (ML) INJECTION AS NEEDED
Status: DISCONTINUED | OUTPATIENT
Start: 2017-01-01 | End: 2017-01-01 | Stop reason: HOSPADM

## 2017-01-01 RX ORDER — FAMOTIDINE 20 MG/1
40 TABLET, FILM COATED ORAL 2 TIMES DAILY
Status: DISCONTINUED | OUTPATIENT
Start: 2017-01-01 | End: 2017-01-01 | Stop reason: HOSPADM

## 2017-01-01 RX ORDER — METOPROLOL TARTRATE 50 MG/1
100 TABLET, FILM COATED ORAL EVERY 12 HOURS SCHEDULED
Status: DISCONTINUED | OUTPATIENT
Start: 2017-01-01 | End: 2017-01-01 | Stop reason: HOSPADM

## 2017-01-01 RX ORDER — SODIUM CHLORIDE 0.9 % (FLUSH) 0.9 %
1-10 SYRINGE (ML) INJECTION AS NEEDED
Status: DISCONTINUED | OUTPATIENT
Start: 2017-01-01 | End: 2017-01-01 | Stop reason: HOSPADM

## 2017-01-01 RX ORDER — TRAMADOL HYDROCHLORIDE 50 MG/1
100 TABLET ORAL NIGHTLY PRN
Status: DISCONTINUED | OUTPATIENT
Start: 2017-01-01 | End: 2017-01-01 | Stop reason: HOSPADM

## 2017-01-01 RX ORDER — LEVOTHYROXINE SODIUM ANHYDROUS 100 UG/5ML
100 INJECTION, POWDER, LYOPHILIZED, FOR SOLUTION INTRAVENOUS
Status: DISCONTINUED | OUTPATIENT
Start: 2017-01-01 | End: 2017-01-01

## 2017-01-01 RX ORDER — METOPROLOL SUCCINATE 50 MG/1
100 TABLET, EXTENDED RELEASE ORAL EVERY 12 HOURS SCHEDULED
Status: DISCONTINUED | OUTPATIENT
Start: 2017-01-01 | End: 2017-01-01 | Stop reason: HOSPADM

## 2017-01-01 RX ORDER — LEVETIRACETAM 500 MG/5ML
INJECTION, SOLUTION, CONCENTRATE INTRAVENOUS
Status: DISPENSED
Start: 2017-01-01 | End: 2017-01-01

## 2017-01-01 RX ORDER — FAMOTIDINE 20 MG/1
20 TABLET, FILM COATED ORAL 2 TIMES DAILY
Status: CANCELLED | OUTPATIENT
Start: 2017-01-01

## 2017-01-01 RX ORDER — IRON POLYSACCHARIDE COMPLEX 150 MG
150 CAPSULE ORAL 2 TIMES DAILY
Status: CANCELLED | OUTPATIENT
Start: 2017-01-01

## 2017-01-01 RX ORDER — POTASSIUM CHLORIDE 750 MG/1
40 CAPSULE, EXTENDED RELEASE ORAL EVERY 4 HOURS
Status: DISCONTINUED | OUTPATIENT
Start: 2017-01-01 | End: 2017-01-01

## 2017-01-01 RX ORDER — ALLOPURINOL 100 MG/1
100 TABLET ORAL 2 TIMES DAILY
Status: DISCONTINUED | OUTPATIENT
Start: 2017-01-01 | End: 2018-01-01 | Stop reason: HOSPADM

## 2017-01-01 RX ORDER — MULTIPLE VITAMINS W/ MINERALS TAB 9MG-400MCG
1 TAB ORAL DAILY
Status: CANCELLED | OUTPATIENT
Start: 2017-01-01

## 2017-01-01 RX ORDER — BISACODYL 5 MG/1
5 TABLET, DELAYED RELEASE ORAL DAILY PRN
Status: DISCONTINUED | OUTPATIENT
Start: 2017-01-01 | End: 2018-01-01 | Stop reason: HOSPADM

## 2017-01-01 RX ORDER — ALUMINA, MAGNESIA, AND SIMETHICONE 2400; 2400; 240 MG/30ML; MG/30ML; MG/30ML
15 SUSPENSION ORAL EVERY 6 HOURS PRN
Status: CANCELLED | OUTPATIENT
Start: 2017-01-01

## 2017-01-01 RX ORDER — DEXTROSE MONOHYDRATE 25 G/50ML
25 INJECTION, SOLUTION INTRAVENOUS
Status: DISCONTINUED | OUTPATIENT
Start: 2017-01-01 | End: 2018-01-01 | Stop reason: HOSPADM

## 2017-01-01 RX ORDER — NITROGLYCERIN 0.4 MG/1
0.4 TABLET SUBLINGUAL
Status: DISCONTINUED | OUTPATIENT
Start: 2017-01-01 | End: 2018-01-01 | Stop reason: HOSPADM

## 2017-01-01 RX ORDER — ALBUTEROL SULFATE 2.5 MG/3ML
2.5 SOLUTION RESPIRATORY (INHALATION) EVERY 4 HOURS PRN
Status: DISCONTINUED | OUTPATIENT
Start: 2017-01-01 | End: 2017-01-01

## 2017-01-01 RX ORDER — MAGNESIUM SULFATE HEPTAHYDRATE 40 MG/ML
4 INJECTION, SOLUTION INTRAVENOUS ONCE
Status: COMPLETED | OUTPATIENT
Start: 2017-01-01 | End: 2017-01-01

## 2017-01-01 RX ORDER — IPRATROPIUM BROMIDE AND ALBUTEROL SULFATE 2.5; .5 MG/3ML; MG/3ML
3 SOLUTION RESPIRATORY (INHALATION) EVERY 6 HOURS PRN
Status: CANCELLED | OUTPATIENT
Start: 2017-01-01

## 2017-01-01 RX ORDER — LEVOTHYROXINE SODIUM 0.15 MG/1
150 TABLET ORAL DAILY
Status: CANCELLED | OUTPATIENT
Start: 2017-01-01

## 2017-01-01 RX ORDER — SODIUM POLYSTYRENE SULFONATE 15 G/60ML
30 SUSPENSION ORAL; RECTAL ONCE
Status: COMPLETED | OUTPATIENT
Start: 2017-01-01 | End: 2017-01-01

## 2017-01-01 RX ORDER — METOPROLOL TARTRATE 50 MG/1
50 TABLET, FILM COATED ORAL EVERY 12 HOURS SCHEDULED
Status: DISCONTINUED | OUTPATIENT
Start: 2017-01-01 | End: 2017-01-01

## 2017-01-01 RX ORDER — ASPIRIN 81 MG/1
81 TABLET ORAL DAILY
Status: DISCONTINUED | OUTPATIENT
Start: 2017-01-01 | End: 2017-01-01

## 2017-01-01 RX ORDER — FLUCONAZOLE 100 MG/1
100 TABLET ORAL DAILY
Status: ON HOLD | COMMUNITY
End: 2018-01-01

## 2017-01-01 RX ORDER — FUROSEMIDE 40 MG/1
40 TABLET ORAL DAILY
Status: DISCONTINUED | OUTPATIENT
Start: 2017-01-01 | End: 2017-01-01 | Stop reason: HOSPADM

## 2017-01-01 RX ORDER — LEVOFLOXACIN 750 MG/1
750 TABLET ORAL EVERY 24 HOURS
Status: DISCONTINUED | OUTPATIENT
Start: 2017-01-01 | End: 2017-01-01

## 2017-01-01 RX ORDER — LEVOTHYROXINE SODIUM 0.15 MG/1
150 TABLET ORAL DAILY
Status: DISCONTINUED | OUTPATIENT
Start: 2017-01-01 | End: 2017-01-01 | Stop reason: SDUPTHER

## 2017-01-01 RX ORDER — FLUCONAZOLE 100 MG/1
100 TABLET ORAL EVERY 24 HOURS
Status: CANCELLED | OUTPATIENT
Start: 2017-01-01 | End: 2017-01-01

## 2017-01-01 RX ORDER — L.ACID,PARA/B.BIFIDUM/S.THERM 8B CELL
1 CAPSULE ORAL DAILY
Status: DISCONTINUED | OUTPATIENT
Start: 2017-01-01 | End: 2017-01-01 | Stop reason: SDUPTHER

## 2017-01-01 RX ORDER — DIGOXIN 0.25 MG/ML
250 INJECTION INTRAMUSCULAR; INTRAVENOUS ONCE
Status: COMPLETED | OUTPATIENT
Start: 2017-01-01 | End: 2017-01-01

## 2017-01-01 RX ORDER — DONEPEZIL HYDROCHLORIDE 5 MG/1
10 TABLET, FILM COATED ORAL NIGHTLY
Status: DISCONTINUED | OUTPATIENT
Start: 2017-01-01 | End: 2017-01-01

## 2017-01-01 RX ORDER — TRAMADOL HYDROCHLORIDE 50 MG/1
100 TABLET ORAL EVERY 8 HOURS PRN
Status: DISCONTINUED | OUTPATIENT
Start: 2017-01-01 | End: 2017-01-01 | Stop reason: HOSPADM

## 2017-01-01 RX ORDER — TRAMADOL HYDROCHLORIDE 50 MG/1
50 TABLET ORAL EVERY 6 HOURS PRN
Status: DISCONTINUED | OUTPATIENT
Start: 2017-01-01 | End: 2017-01-01 | Stop reason: HOSPADM

## 2017-01-01 RX ORDER — MEGESTROL ACETATE 40 MG/ML
200 SUSPENSION ORAL 2 TIMES DAILY
COMMUNITY

## 2017-01-01 RX ORDER — LEVOTHYROXINE SODIUM 0.05 MG/1
TABLET ORAL
Status: COMPLETED
Start: 2017-01-01 | End: 2017-01-01

## 2017-01-01 RX ORDER — POTASSIUM CHLORIDE 20 MEQ/1
20 TABLET, EXTENDED RELEASE ORAL 2 TIMES DAILY WITH MEALS
Status: DISCONTINUED | OUTPATIENT
Start: 2017-01-01 | End: 2017-01-01

## 2017-01-01 RX ORDER — DIPHENHYDRAMINE HCL 25 MG
25 CAPSULE ORAL EVERY 6 HOURS PRN
Status: CANCELLED | OUTPATIENT
Start: 2017-01-01

## 2017-01-01 RX ORDER — SULFAMETHOXAZOLE AND TRIMETHOPRIM 800; 160 MG/1; MG/1
1 TABLET ORAL EVERY 12 HOURS SCHEDULED
Status: DISCONTINUED | OUTPATIENT
Start: 2017-01-01 | End: 2017-01-01

## 2017-01-01 RX ORDER — LEVOFLOXACIN 500 MG/1
TABLET, FILM COATED ORAL
Status: COMPLETED
Start: 2017-01-01 | End: 2017-01-01

## 2017-01-01 RX ORDER — LEVOFLOXACIN 5 MG/ML
750 INJECTION, SOLUTION INTRAVENOUS EVERY 24 HOURS
Status: DISCONTINUED | OUTPATIENT
Start: 2017-01-01 | End: 2017-01-01 | Stop reason: HOSPADM

## 2017-01-01 RX ORDER — DEXTROSE MONOHYDRATE 25 G/50ML
25 INJECTION, SOLUTION INTRAVENOUS
Status: DISCONTINUED | OUTPATIENT
Start: 2017-01-01 | End: 2017-01-01 | Stop reason: HOSPADM

## 2017-01-01 RX ORDER — CALCIUM CARBONATE 200(500)MG
1 TABLET,CHEWABLE ORAL EVERY 4 HOURS PRN
Status: DISCONTINUED | OUTPATIENT
Start: 2017-01-01 | End: 2017-01-01

## 2017-01-01 RX ORDER — IPRATROPIUM BROMIDE AND ALBUTEROL SULFATE 2.5; .5 MG/3ML; MG/3ML
3 SOLUTION RESPIRATORY (INHALATION)
Status: CANCELLED | OUTPATIENT
Start: 2017-01-01

## 2017-01-01 RX ORDER — POTASSIUM CHLORIDE 20 MEQ/1
40 TABLET, EXTENDED RELEASE ORAL
Status: DISCONTINUED | OUTPATIENT
Start: 2017-01-01 | End: 2017-01-01

## 2017-01-01 RX ORDER — SODIUM CHLORIDE 0.9 % (FLUSH) 0.9 %
10 SYRINGE (ML) INJECTION EVERY 12 HOURS SCHEDULED
Status: DISCONTINUED | OUTPATIENT
Start: 2017-01-01 | End: 2017-01-01

## 2017-01-01 RX ORDER — TRAMADOL HYDROCHLORIDE 50 MG/1
50 TABLET ORAL EVERY 6 HOURS PRN
Status: DISCONTINUED | OUTPATIENT
Start: 2017-01-01 | End: 2018-01-01 | Stop reason: HOSPADM

## 2017-01-01 RX ORDER — LEVOTHYROXINE SODIUM 0.15 MG/1
150 TABLET ORAL DAILY
Status: DISCONTINUED | OUTPATIENT
Start: 2017-01-01 | End: 2017-01-01 | Stop reason: HOSPADM

## 2017-01-01 RX ORDER — TRAMADOL HYDROCHLORIDE 50 MG/1
50 TABLET ORAL EVERY 4 HOURS PRN
Status: DISPENSED | OUTPATIENT
Start: 2017-01-01 | End: 2017-01-01

## 2017-01-01 RX ORDER — L.ACID,PARA/B.BIFIDUM/S.THERM 8B CELL
1 CAPSULE ORAL DAILY
Status: DISCONTINUED | OUTPATIENT
Start: 2017-01-01 | End: 2017-01-01

## 2017-01-01 RX ORDER — BUDESONIDE 0.5 MG/2ML
0.5 INHALANT ORAL
Status: DISCONTINUED | OUTPATIENT
Start: 2017-01-01 | End: 2017-01-01

## 2017-01-01 RX ORDER — NALOXONE HYDROCHLORIDE 1 MG/ML
INJECTION INTRAMUSCULAR; INTRAVENOUS; SUBCUTANEOUS
Status: COMPLETED
Start: 2017-01-01 | End: 2017-01-01

## 2017-01-01 RX ORDER — FAMOTIDINE 20 MG/1
20 TABLET, FILM COATED ORAL 2 TIMES DAILY
COMMUNITY

## 2017-01-01 RX ORDER — DEXAMETHASONE SODIUM PHOSPHATE 10 MG/ML
INJECTION INTRAMUSCULAR; INTRAVENOUS
Status: DISCONTINUED
Start: 2017-01-01 | End: 2017-01-01 | Stop reason: HOSPADM

## 2017-01-01 RX ORDER — CALCIUM CARBONATE 200(500)MG
1 TABLET,CHEWABLE ORAL EVERY 4 HOURS PRN
Status: DISCONTINUED | OUTPATIENT
Start: 2017-01-01 | End: 2018-01-01 | Stop reason: HOSPADM

## 2017-01-01 RX ORDER — LEVOFLOXACIN 250 MG/1
250 TABLET ORAL EVERY 24 HOURS
Status: DISCONTINUED | OUTPATIENT
Start: 2017-01-01 | End: 2017-01-01 | Stop reason: DRUGHIGH

## 2017-01-01 RX ORDER — POTASSIUM CHLORIDE 750 MG/1
10 TABLET, FILM COATED, EXTENDED RELEASE ORAL DAILY
Status: DISCONTINUED | OUTPATIENT
Start: 2017-01-01 | End: 2017-01-01 | Stop reason: HOSPADM

## 2017-01-01 RX ORDER — ERGOCALCIFEROL 1.25 MG/1
50000 CAPSULE ORAL
Status: DISCONTINUED | OUTPATIENT
Start: 2017-01-01 | End: 2017-01-01 | Stop reason: CLARIF

## 2017-01-01 RX ORDER — MEGESTROL ACETATE 40 MG/ML
200 SUSPENSION ORAL 2 TIMES DAILY
Status: DISCONTINUED | OUTPATIENT
Start: 2017-01-01 | End: 2018-01-01 | Stop reason: HOSPADM

## 2017-01-01 RX ORDER — LEVOFLOXACIN 250 MG/1
250 TABLET ORAL EVERY 24 HOURS
Status: DISCONTINUED | OUTPATIENT
Start: 2017-01-01 | End: 2017-01-01 | Stop reason: HOSPADM

## 2017-01-01 RX ORDER — BUMETANIDE 0.25 MG/ML
2 INJECTION INTRAMUSCULAR; INTRAVENOUS EVERY 12 HOURS
Status: DISCONTINUED | OUTPATIENT
Start: 2017-01-01 | End: 2017-01-01

## 2017-01-01 RX ORDER — ERGOCALCIFEROL 1.25 MG/1
50000 CAPSULE ORAL
Status: DISCONTINUED | OUTPATIENT
Start: 2017-01-01 | End: 2017-01-01 | Stop reason: HOSPADM

## 2017-01-01 RX ORDER — HYDROCODONE BITARTRATE AND ACETAMINOPHEN 5; 325 MG/1; MG/1
1 TABLET ORAL EVERY 6 HOURS PRN
Status: DISCONTINUED | OUTPATIENT
Start: 2017-01-01 | End: 2017-01-01 | Stop reason: HOSPADM

## 2017-01-01 RX ORDER — POTASSIUM CHLORIDE 750 MG/1
10 TABLET, FILM COATED, EXTENDED RELEASE ORAL DAILY
Status: DISCONTINUED | OUTPATIENT
Start: 2017-01-01 | End: 2017-01-01

## 2017-01-01 RX ORDER — BISACODYL 5 MG/1
10 TABLET, DELAYED RELEASE ORAL DAILY PRN
Status: CANCELLED | OUTPATIENT
Start: 2017-01-01

## 2017-01-01 RX ORDER — TERAZOSIN 1 MG/1
2 CAPSULE ORAL EVERY 12 HOURS SCHEDULED
Status: DISCONTINUED | OUTPATIENT
Start: 2017-01-01 | End: 2017-01-01

## 2017-01-01 RX ORDER — SODIUM CHLORIDE 9 MG/ML
INJECTION, SOLUTION INTRAVENOUS
Status: DISCONTINUED
Start: 2017-01-01 | End: 2017-01-01 | Stop reason: HOSPADM

## 2017-01-01 RX ORDER — SODIUM CHLORIDE 9 MG/ML
100 INJECTION, SOLUTION INTRAVENOUS CONTINUOUS
Status: ACTIVE | OUTPATIENT
Start: 2017-01-01 | End: 2017-01-01

## 2017-01-01 RX ORDER — ACETAMINOPHEN 325 MG/1
650 TABLET ORAL EVERY 4 HOURS PRN
Status: DISCONTINUED | OUTPATIENT
Start: 2017-01-01 | End: 2018-01-01 | Stop reason: HOSPADM

## 2017-01-01 RX ADMIN — Medication 150 MG: at 17:44

## 2017-01-01 RX ADMIN — Medication 150 MG: at 17:26

## 2017-01-01 RX ADMIN — GUAIFENESIN 1200 MG: 600 TABLET, EXTENDED RELEASE ORAL at 09:00

## 2017-01-01 RX ADMIN — BUDESONIDE 0.5 MG: 0.5 SUSPENSION RESPIRATORY (INHALATION) at 07:41

## 2017-01-01 RX ADMIN — DOXAZOSIN MESYLATE 2 MG: 2 TABLET ORAL at 08:25

## 2017-01-01 RX ADMIN — Medication 1 TABLET: at 08:45

## 2017-01-01 RX ADMIN — DOXAZOSIN MESYLATE 2 MG: 2 TABLET ORAL at 21:12

## 2017-01-01 RX ADMIN — IPRATROPIUM BROMIDE AND ALBUTEROL SULFATE 3 ML: .5; 3 SOLUTION RESPIRATORY (INHALATION) at 16:30

## 2017-01-01 RX ADMIN — TRAMADOL HYDROCHLORIDE 100 MG: 50 TABLET, FILM COATED ORAL at 20:30

## 2017-01-01 RX ADMIN — GUAIFENESIN 1200 MG: 600 TABLET, EXTENDED RELEASE ORAL at 09:05

## 2017-01-01 RX ADMIN — DEXAMETHASONE SODIUM PHOSPHATE 10 MG: 4 INJECTION, SOLUTION INTRAMUSCULAR; INTRAVENOUS at 22:53

## 2017-01-01 RX ADMIN — SENNOSIDES AND DOCUSATE SODIUM 1 TABLET: 8.6; 5 TABLET ORAL at 08:36

## 2017-01-01 RX ADMIN — GUAIFENESIN 1200 MG: 600 TABLET, EXTENDED RELEASE ORAL at 18:21

## 2017-01-01 RX ADMIN — FAMOTIDINE 40 MG: 20 TABLET, FILM COATED ORAL at 18:02

## 2017-01-01 RX ADMIN — INSULIN ASPART 2 UNITS: 100 INJECTION, SOLUTION INTRAVENOUS; SUBCUTANEOUS at 17:23

## 2017-01-01 RX ADMIN — MICONAZOLE NITRATE: 20 POWDER TOPICAL at 20:51

## 2017-01-01 RX ADMIN — DILTIAZEM HYDROCHLORIDE 120 MG: 60 TABLET, FILM COATED ORAL at 08:39

## 2017-01-01 RX ADMIN — Medication 1 TABLET: at 17:26

## 2017-01-01 RX ADMIN — METOPROLOL SUCCINATE 100 MG: 50 TABLET, EXTENDED RELEASE ORAL at 08:35

## 2017-01-01 RX ADMIN — Medication 1 TABLET: at 08:20

## 2017-01-01 RX ADMIN — FUROSEMIDE 40 MG: 40 TABLET ORAL at 09:37

## 2017-01-01 RX ADMIN — POTASSIUM CHLORIDE 20 MEQ: 1500 TABLET, EXTENDED RELEASE ORAL at 08:23

## 2017-01-01 RX ADMIN — DILTIAZEM HYDROCHLORIDE 120 MG: 60 TABLET, FILM COATED ORAL at 09:34

## 2017-01-01 RX ADMIN — ALLOPURINOL 100 MG: 100 TABLET ORAL at 17:07

## 2017-01-01 RX ADMIN — Medication 1 CAPSULE: at 09:04

## 2017-01-01 RX ADMIN — Medication 150 MG: at 09:53

## 2017-01-01 RX ADMIN — DILTIAZEM HYDROCHLORIDE 300 MG: 180 CAPSULE, COATED, EXTENDED RELEASE ORAL at 11:57

## 2017-01-01 RX ADMIN — STANDARDIZED SENNA CONCENTRATE 1 TABLET: 8.6 TABLET ORAL at 18:02

## 2017-01-01 RX ADMIN — Medication 150 MG: at 16:53

## 2017-01-01 RX ADMIN — TERAZOSIN HYDROCHLORIDE 2 MG: 1 CAPSULE ORAL at 08:12

## 2017-01-01 RX ADMIN — AMIODARONE HYDROCHLORIDE 200 MG: 200 TABLET ORAL at 17:36

## 2017-01-01 RX ADMIN — Medication 1 TABLET: at 17:05

## 2017-01-01 RX ADMIN — ISOSORBIDE DINITRATE 10 MG: 10 TABLET ORAL at 21:40

## 2017-01-01 RX ADMIN — MICONAZOLE NITRATE: 20 POWDER TOPICAL at 08:20

## 2017-01-01 RX ADMIN — GUAIFENESIN 1200 MG: 600 TABLET, EXTENDED RELEASE ORAL at 18:27

## 2017-01-01 RX ADMIN — METOPROLOL SUCCINATE 100 MG: 50 TABLET, EXTENDED RELEASE ORAL at 07:46

## 2017-01-01 RX ADMIN — FAMOTIDINE 20 MG: 20 TABLET, FILM COATED ORAL at 17:49

## 2017-01-01 RX ADMIN — KETOTIFEN FUMARATE 1 DROP: 0.25 SOLUTION/ DROPS OPHTHALMIC at 17:57

## 2017-01-01 RX ADMIN — CALCIUM CARBONATE 500 MG (1,250 MG)-VITAMIN D3 200 UNIT TABLET 500 MG: at 09:47

## 2017-01-01 RX ADMIN — LEVOTHYROXINE SODIUM 150 MCG: 150 TABLET ORAL at 05:23

## 2017-01-01 RX ADMIN — Medication 1 TABLET: at 17:42

## 2017-01-01 RX ADMIN — MICONAZOLE NITRATE 1 APPLICATION: 20 POWDER TOPICAL at 20:55

## 2017-01-01 RX ADMIN — MICONAZOLE NITRATE: 20 POWDER TOPICAL at 08:45

## 2017-01-01 RX ADMIN — CALCIUM CARBONATE 500 MG (1,250 MG)-VITAMIN D3 200 UNIT TABLET 500 MG: at 09:20

## 2017-01-01 RX ADMIN — Medication 150 MG: at 17:33

## 2017-01-01 RX ADMIN — Medication 1 TABLET: at 18:06

## 2017-01-01 RX ADMIN — Medication 1 TABLET: at 08:28

## 2017-01-01 RX ADMIN — DOXAZOSIN MESYLATE 2 MG: 2 TABLET ORAL at 21:02

## 2017-01-01 RX ADMIN — Medication 1 TABLET: at 08:12

## 2017-01-01 RX ADMIN — ALLOPURINOL 100 MG: 100 TABLET ORAL at 09:26

## 2017-01-01 RX ADMIN — LEVETIRACETAM 500 MG: 500 TABLET, FILM COATED ORAL at 19:59

## 2017-01-01 RX ADMIN — APIXABAN 5 MG: 2.5 TABLET, FILM COATED ORAL at 22:11

## 2017-01-01 RX ADMIN — ALLOPURINOL 100 MG: 100 TABLET ORAL at 20:46

## 2017-01-01 RX ADMIN — GUAIFENESIN 1200 MG: 600 TABLET, EXTENDED RELEASE ORAL at 17:40

## 2017-01-01 RX ADMIN — DILTIAZEM HYDROCHLORIDE 240 MG: 120 CAPSULE, COATED, EXTENDED RELEASE ORAL at 09:32

## 2017-01-01 RX ADMIN — TRAMADOL HYDROCHLORIDE 100 MG: 50 TABLET, FILM COATED ORAL at 23:33

## 2017-01-01 RX ADMIN — METOPROLOL TARTRATE 75 MG: 25 TABLET, FILM COATED ORAL at 08:29

## 2017-01-01 RX ADMIN — BUDESONIDE 0.5 MG: 0.5 SUSPENSION RESPIRATORY (INHALATION) at 20:38

## 2017-01-01 RX ADMIN — MEGESTROL ACETATE 200 MG: 40 SUSPENSION ORAL at 08:44

## 2017-01-01 RX ADMIN — MEGESTROL ACETATE 200 MG: 40 SUSPENSION ORAL at 09:23

## 2017-01-01 RX ADMIN — GUAIFENESIN 1200 MG: 600 TABLET, EXTENDED RELEASE ORAL at 07:38

## 2017-01-01 RX ADMIN — FUROSEMIDE 40 MG: 40 TABLET ORAL at 10:38

## 2017-01-01 RX ADMIN — LEVETIRACETAM 500 MG: 500 TABLET, FILM COATED ORAL at 07:27

## 2017-01-01 RX ADMIN — LEVETIRACETAM 500 MG: 500 TABLET, FILM COATED ORAL at 21:46

## 2017-01-01 RX ADMIN — KETOTIFEN FUMARATE 1 DROP: 0.25 SOLUTION/ DROPS OPHTHALMIC at 17:44

## 2017-01-01 RX ADMIN — LEVOTHYROXINE SODIUM 150 MCG: 150 TABLET ORAL at 05:57

## 2017-01-01 RX ADMIN — ISOSORBIDE DINITRATE 10 MG: 10 TABLET ORAL at 20:07

## 2017-01-01 RX ADMIN — SERTRALINE HYDROCHLORIDE 100 MG: 50 TABLET ORAL at 21:09

## 2017-01-01 RX ADMIN — MICONAZOLE NITRATE: 20 POWDER TOPICAL at 22:11

## 2017-01-01 RX ADMIN — GUAIFENESIN 1200 MG: 600 TABLET, EXTENDED RELEASE ORAL at 17:52

## 2017-01-01 RX ADMIN — LINAGLIPTIN 5 MG: 5 TABLET, FILM COATED ORAL at 09:51

## 2017-01-01 RX ADMIN — ACETAMINOPHEN 650 MG: 325 TABLET, FILM COATED ORAL at 20:36

## 2017-01-01 RX ADMIN — TRAMADOL HYDROCHLORIDE 50 MG: 50 TABLET, FILM COATED ORAL at 14:05

## 2017-01-01 RX ADMIN — FAMOTIDINE 20 MG: 20 TABLET, FILM COATED ORAL at 17:00

## 2017-01-01 RX ADMIN — KETOTIFEN FUMARATE 1 DROP: 0.25 SOLUTION/ DROPS OPHTHALMIC at 08:08

## 2017-01-01 RX ADMIN — SERTRALINE HYDROCHLORIDE 100 MG: 50 TABLET ORAL at 20:59

## 2017-01-01 RX ADMIN — APIXABAN 5 MG: 2.5 TABLET, FILM COATED ORAL at 20:52

## 2017-01-01 RX ADMIN — ALLOPURINOL 100 MG: 100 TABLET ORAL at 08:53

## 2017-01-01 RX ADMIN — IPRATROPIUM BROMIDE AND ALBUTEROL SULFATE 3 ML: .5; 3 SOLUTION RESPIRATORY (INHALATION) at 15:46

## 2017-01-01 RX ADMIN — SERTRALINE 100 MG: 50 TABLET, FILM COATED ORAL at 21:50

## 2017-01-01 RX ADMIN — GLIPIZIDE 5 MG: 5 TABLET, FILM COATED, EXTENDED RELEASE ORAL at 08:14

## 2017-01-01 RX ADMIN — FUROSEMIDE 40 MG: 40 TABLET ORAL at 08:57

## 2017-01-01 RX ADMIN — APIXABAN 5 MG: 2.5 TABLET, FILM COATED ORAL at 09:20

## 2017-01-01 RX ADMIN — Medication 1 TABLET: at 09:37

## 2017-01-01 RX ADMIN — METOPROLOL TARTRATE 100 MG: 25 TABLET, FILM COATED ORAL at 09:33

## 2017-01-01 RX ADMIN — Medication 1 TABLET: at 10:44

## 2017-01-01 RX ADMIN — ISOSORBIDE DINITRATE 10 MG: 10 TABLET ORAL at 08:40

## 2017-01-01 RX ADMIN — ISOSORBIDE DINITRATE 10 MG: 10 TABLET ORAL at 22:12

## 2017-01-01 RX ADMIN — APIXABAN 5 MG: 2.5 TABLET, FILM COATED ORAL at 20:59

## 2017-01-01 RX ADMIN — LEVOTHYROXINE SODIUM 150 MCG: 150 TABLET ORAL at 06:20

## 2017-01-01 RX ADMIN — ASPIRIN 81 MG: 81 TABLET, COATED ORAL at 08:27

## 2017-01-01 RX ADMIN — IPRATROPIUM BROMIDE AND ALBUTEROL SULFATE 3 ML: .5; 3 SOLUTION RESPIRATORY (INHALATION) at 09:10

## 2017-01-01 RX ADMIN — SENNOSIDES AND DOCUSATE SODIUM 1 TABLET: 8.6; 5 TABLET ORAL at 08:34

## 2017-01-01 RX ADMIN — CONJUGATED ESTROGENS 1 APPLICATION: 0.62 CREAM VAGINAL at 21:27

## 2017-01-01 RX ADMIN — MICONAZOLE NITRATE: 20 POWDER TOPICAL at 08:52

## 2017-01-01 RX ADMIN — STANDARDIZED SENNA CONCENTRATE 1 TABLET: 8.6 TABLET ORAL at 08:24

## 2017-01-01 RX ADMIN — AMIODARONE HYDROCHLORIDE 200 MG: 200 TABLET ORAL at 08:54

## 2017-01-01 RX ADMIN — METOPROLOL TARTRATE 75 MG: 25 TABLET, FILM COATED ORAL at 08:35

## 2017-01-01 RX ADMIN — GUAIFENESIN 1200 MG: 600 TABLET, EXTENDED RELEASE ORAL at 09:08

## 2017-01-01 RX ADMIN — ALLOPURINOL 100 MG: 100 TABLET ORAL at 08:21

## 2017-01-01 RX ADMIN — SENNOSIDES AND DOCUSATE SODIUM 1 TABLET: 8.6; 5 TABLET ORAL at 09:05

## 2017-01-01 RX ADMIN — FAMOTIDINE 20 MG: 20 TABLET ORAL at 08:34

## 2017-01-01 RX ADMIN — Medication 1 TABLET: at 17:57

## 2017-01-01 RX ADMIN — POTASSIUM CHLORIDE 20 MEQ: 1500 TABLET, EXTENDED RELEASE ORAL at 09:49

## 2017-01-01 RX ADMIN — STANDARDIZED SENNA CONCENTRATE 1 TABLET: 8.6 TABLET ORAL at 08:36

## 2017-01-01 RX ADMIN — TERAZOSIN HYDROCHLORIDE 2 MG: 1 CAPSULE ORAL at 20:38

## 2017-01-01 RX ADMIN — FAMOTIDINE 40 MG: 20 TABLET ORAL at 20:24

## 2017-01-01 RX ADMIN — Medication 1 TABLET: at 10:00

## 2017-01-01 RX ADMIN — ALLOPURINOL 100 MG: 100 TABLET ORAL at 17:31

## 2017-01-01 RX ADMIN — FUROSEMIDE 40 MG: 40 TABLET ORAL at 10:08

## 2017-01-01 RX ADMIN — Medication 10 ML: at 20:31

## 2017-01-01 RX ADMIN — LEVETIRACETAM 500 MG: 500 TABLET, FILM COATED ORAL at 08:57

## 2017-01-01 RX ADMIN — AMIODARONE HYDROCHLORIDE 200 MG: 200 TABLET ORAL at 10:11

## 2017-01-01 RX ADMIN — POTASSIUM CHLORIDE 20 MEQ: 1500 TABLET, EXTENDED RELEASE ORAL at 09:26

## 2017-01-01 RX ADMIN — AMIODARONE HYDROCHLORIDE 200 MG: 200 TABLET ORAL at 08:14

## 2017-01-01 RX ADMIN — FUROSEMIDE 40 MG: 40 TABLET ORAL at 08:12

## 2017-01-01 RX ADMIN — DOXAZOSIN MESYLATE 2 MG: 2 TABLET ORAL at 08:07

## 2017-01-01 RX ADMIN — ALLOPURINOL 100 MG: 100 TABLET ORAL at 10:04

## 2017-01-01 RX ADMIN — IPRATROPIUM BROMIDE AND ALBUTEROL SULFATE 3 ML: .5; 3 SOLUTION RESPIRATORY (INHALATION) at 15:18

## 2017-01-01 RX ADMIN — ISOSORBIDE DINITRATE 10 MG: 10 TABLET ORAL at 21:09

## 2017-01-01 RX ADMIN — Medication 1 CAPSULE: at 09:20

## 2017-01-01 RX ADMIN — DILTIAZEM HYDROCHLORIDE 120 MG: 60 TABLET, FILM COATED ORAL at 18:31

## 2017-01-01 RX ADMIN — MICONAZOLE NITRATE: 20 POWDER TOPICAL at 08:39

## 2017-01-01 RX ADMIN — SENNOSIDES AND DOCUSATE SODIUM 1 TABLET: 8.6; 5 TABLET ORAL at 20:30

## 2017-01-01 RX ADMIN — ALLOPURINOL 100 MG: 100 TABLET ORAL at 08:37

## 2017-01-01 RX ADMIN — LEVETIRACETAM 500 MG: 500 TABLET, FILM COATED ORAL at 20:12

## 2017-01-01 RX ADMIN — MICONAZOLE NITRATE: 20 POWDER TOPICAL at 20:39

## 2017-01-01 RX ADMIN — DONEPEZIL HYDROCHLORIDE 10 MG: 5 TABLET, FILM COATED ORAL at 20:28

## 2017-01-01 RX ADMIN — KETOTIFEN FUMARATE 1 DROP: 0.25 SOLUTION/ DROPS OPHTHALMIC at 17:54

## 2017-01-01 RX ADMIN — Medication 1 CAPSULE: at 08:28

## 2017-01-01 RX ADMIN — KETOTIFEN FUMARATE 1 DROP: 0.25 SOLUTION/ DROPS OPHTHALMIC at 17:58

## 2017-01-01 RX ADMIN — ISOSORBIDE DINITRATE 10 MG: 10 TABLET ORAL at 20:55

## 2017-01-01 RX ADMIN — KETOTIFEN FUMARATE 1 DROP: 0.35 SOLUTION/ DROPS OPHTHALMIC at 08:32

## 2017-01-01 RX ADMIN — FAMOTIDINE 20 MG: 20 TABLET ORAL at 19:19

## 2017-01-01 RX ADMIN — METOPROLOL TARTRATE 75 MG: 25 TABLET, FILM COATED ORAL at 09:03

## 2017-01-01 RX ADMIN — POTASSIUM CHLORIDE 10 MEQ: 750 TABLET, FILM COATED, EXTENDED RELEASE ORAL at 08:26

## 2017-01-01 RX ADMIN — KETOTIFEN FUMARATE 1 DROP: 0.25 SOLUTION/ DROPS OPHTHALMIC at 17:53

## 2017-01-01 RX ADMIN — ALLOPURINOL 100 MG: 100 TABLET ORAL at 10:16

## 2017-01-01 RX ADMIN — Medication 150 MG: at 08:42

## 2017-01-01 RX ADMIN — KETOTIFEN FUMARATE 1 DROP: 0.25 SOLUTION/ DROPS OPHTHALMIC at 17:37

## 2017-01-01 RX ADMIN — ALLOPURINOL 100 MG: 100 TABLET ORAL at 21:02

## 2017-01-01 RX ADMIN — Medication 1 CAPSULE: at 08:36

## 2017-01-01 RX ADMIN — METOPROLOL TARTRATE 25 MG: 25 TABLET, FILM COATED ORAL at 17:47

## 2017-01-01 RX ADMIN — Medication 1 CAPSULE: at 08:40

## 2017-01-01 RX ADMIN — SERTRALINE 100 MG: 50 TABLET, FILM COATED ORAL at 20:24

## 2017-01-01 RX ADMIN — Medication 150 MG: at 17:23

## 2017-01-01 RX ADMIN — IPRATROPIUM BROMIDE AND ALBUTEROL SULFATE 3 ML: .5; 3 SOLUTION RESPIRATORY (INHALATION) at 20:46

## 2017-01-01 RX ADMIN — PIPERACILLIN SODIUM AND TAZOBACTAM SODIUM 3.38 G: 3; .375 INJECTION, POWDER, LYOPHILIZED, FOR SOLUTION INTRAVENOUS at 13:53

## 2017-01-01 RX ADMIN — APIXABAN 5 MG: 2.5 TABLET, FILM COATED ORAL at 21:33

## 2017-01-01 RX ADMIN — ALLOPURINOL 100 MG: 100 TABLET ORAL at 09:07

## 2017-01-01 RX ADMIN — FUROSEMIDE 40 MG: 40 TABLET ORAL at 09:24

## 2017-01-01 RX ADMIN — Medication 1 TABLET: at 17:49

## 2017-01-01 RX ADMIN — MICONAZOLE NITRATE 1 APPLICATION: 20 POWDER TOPICAL at 08:22

## 2017-01-01 RX ADMIN — MICONAZOLE NITRATE: 20 POWDER TOPICAL at 09:51

## 2017-01-01 RX ADMIN — STANDARDIZED SENNA CONCENTRATE 1 TABLET: 8.6 TABLET ORAL at 08:31

## 2017-01-01 RX ADMIN — METOPROLOL SUCCINATE 100 MG: 50 TABLET, EXTENDED RELEASE ORAL at 20:34

## 2017-01-01 RX ADMIN — DIPHENHYDRAMINE HYDROCHLORIDE 25 MG: 25 CAPSULE ORAL at 08:08

## 2017-01-01 RX ADMIN — Medication 1 TABLET: at 08:03

## 2017-01-01 RX ADMIN — DILTIAZEM HYDROCHLORIDE 300 MG: 180 CAPSULE, COATED, EXTENDED RELEASE ORAL at 11:18

## 2017-01-01 RX ADMIN — APIXABAN 5 MG: 2.5 TABLET, FILM COATED ORAL at 20:38

## 2017-01-01 RX ADMIN — POTASSIUM CHLORIDE 20 MEQ: 1500 TABLET, EXTENDED RELEASE ORAL at 10:50

## 2017-01-01 RX ADMIN — KETOTIFEN FUMARATE 1 DROP: 0.25 SOLUTION/ DROPS OPHTHALMIC at 17:26

## 2017-01-01 RX ADMIN — GLIPIZIDE 2.5 MG: 2.5 TABLET, FILM COATED, EXTENDED RELEASE ORAL at 09:00

## 2017-01-01 RX ADMIN — Medication 150 MG: at 08:33

## 2017-01-01 RX ADMIN — IPRATROPIUM BROMIDE AND ALBUTEROL SULFATE 3 ML: .5; 3 SOLUTION RESPIRATORY (INHALATION) at 07:56

## 2017-01-01 RX ADMIN — APIXABAN 5 MG: 2.5 TABLET, FILM COATED ORAL at 07:43

## 2017-01-01 RX ADMIN — APIXABAN 5 MG: 2.5 TABLET, FILM COATED ORAL at 08:43

## 2017-01-01 RX ADMIN — Medication 1 TABLET: at 08:57

## 2017-01-01 RX ADMIN — APIXABAN 5 MG: 2.5 TABLET, FILM COATED ORAL at 08:42

## 2017-01-01 RX ADMIN — SENNOSIDES AND DOCUSATE SODIUM 1 TABLET: 8.6; 5 TABLET ORAL at 10:10

## 2017-01-01 RX ADMIN — Medication 1 TABLET: at 17:22

## 2017-01-01 RX ADMIN — MICONAZOLE NITRATE: 20 POWDER TOPICAL at 08:01

## 2017-01-01 RX ADMIN — SENNOSIDES AND DOCUSATE SODIUM 1 TABLET: 8.6; 5 TABLET ORAL at 09:07

## 2017-01-01 RX ADMIN — METOPROLOL TARTRATE 75 MG: 25 TABLET, FILM COATED ORAL at 08:22

## 2017-01-01 RX ADMIN — Medication 1 TABLET: at 10:08

## 2017-01-01 RX ADMIN — LEVOTHYROXINE SODIUM 150 MCG: 150 TABLET ORAL at 05:16

## 2017-01-01 RX ADMIN — KETOTIFEN FUMARATE 1 DROP: 0.25 SOLUTION/ DROPS OPHTHALMIC at 08:33

## 2017-01-01 RX ADMIN — IPRATROPIUM BROMIDE AND ALBUTEROL SULFATE 3 ML: .5; 3 SOLUTION RESPIRATORY (INHALATION) at 10:14

## 2017-01-01 RX ADMIN — GUAIFENESIN 1200 MG: 600 TABLET, EXTENDED RELEASE ORAL at 17:33

## 2017-01-01 RX ADMIN — SENNOSIDES AND DOCUSATE SODIUM 1 TABLET: 8.6; 5 TABLET ORAL at 20:33

## 2017-01-01 RX ADMIN — POTASSIUM CHLORIDE 20 MEQ: 1500 TABLET, EXTENDED RELEASE ORAL at 09:37

## 2017-01-01 RX ADMIN — ALLOPURINOL 100 MG: 100 TABLET ORAL at 17:37

## 2017-01-01 RX ADMIN — ISOSORBIDE DINITRATE 10 MG: 10 TABLET ORAL at 09:20

## 2017-01-01 RX ADMIN — INSULIN ASPART 3 UNITS: 100 INJECTION, SOLUTION INTRAVENOUS; SUBCUTANEOUS at 11:57

## 2017-01-01 RX ADMIN — FAMOTIDINE 40 MG: 20 TABLET ORAL at 21:41

## 2017-01-01 RX ADMIN — IPRATROPIUM BROMIDE AND ALBUTEROL SULFATE 3 ML: .5; 3 SOLUTION RESPIRATORY (INHALATION) at 11:46

## 2017-01-01 RX ADMIN — POTASSIUM CHLORIDE 20 MEQ: 1500 TABLET, EXTENDED RELEASE ORAL at 09:29

## 2017-01-01 RX ADMIN — ALLOPURINOL 100 MG: 100 TABLET ORAL at 10:00

## 2017-01-01 RX ADMIN — CONJUGATED ESTROGENS 1 APPLICATION: 0.62 CREAM VAGINAL at 19:56

## 2017-01-01 RX ADMIN — STANDARDIZED SENNA CONCENTRATE 1 TABLET: 8.6 TABLET ORAL at 08:30

## 2017-01-01 RX ADMIN — AMIODARONE HYDROCHLORIDE 200 MG: 200 TABLET ORAL at 16:18

## 2017-01-01 RX ADMIN — STANDARDIZED SENNA CONCENTRATE 1 TABLET: 8.6 TABLET ORAL at 09:44

## 2017-01-01 RX ADMIN — TRAMADOL HYDROCHLORIDE 50 MG: 50 TABLET, FILM COATED ORAL at 17:33

## 2017-01-01 RX ADMIN — ALLOPURINOL 100 MG: 100 TABLET ORAL at 08:13

## 2017-01-01 RX ADMIN — MICONAZOLE NITRATE: 20 POWDER TOPICAL at 21:40

## 2017-01-01 RX ADMIN — ALLOPURINOL 100 MG: 100 TABLET ORAL at 08:50

## 2017-01-01 RX ADMIN — SERTRALINE 100 MG: 50 TABLET, FILM COATED ORAL at 22:24

## 2017-01-01 RX ADMIN — APIXABAN 5 MG: 2.5 TABLET, FILM COATED ORAL at 20:13

## 2017-01-01 RX ADMIN — SENNOSIDES AND DOCUSATE SODIUM 1 TABLET: 8.6; 5 TABLET ORAL at 22:35

## 2017-01-01 RX ADMIN — APIXABAN 5 MG: 2.5 TABLET, FILM COATED ORAL at 07:42

## 2017-01-01 RX ADMIN — ALLOPURINOL 100 MG: 100 TABLET ORAL at 09:20

## 2017-01-01 RX ADMIN — KETOTIFEN FUMARATE 1 DROP: 0.25 SOLUTION/ DROPS OPHTHALMIC at 10:50

## 2017-01-01 RX ADMIN — AMIODARONE HYDROCHLORIDE 200 MG: 200 TABLET ORAL at 09:11

## 2017-01-01 RX ADMIN — ALLOPURINOL 100 MG: 100 TABLET ORAL at 08:44

## 2017-01-01 RX ADMIN — ASPIRIN 81 MG: 81 TABLET, COATED ORAL at 09:27

## 2017-01-01 RX ADMIN — FAMOTIDINE 40 MG: 20 TABLET ORAL at 21:01

## 2017-01-01 RX ADMIN — ISOSORBIDE DINITRATE 10 MG: 10 TABLET ORAL at 19:58

## 2017-01-01 RX ADMIN — INSULIN ASPART 3 UNITS: 100 INJECTION, SOLUTION INTRAVENOUS; SUBCUTANEOUS at 20:49

## 2017-01-01 RX ADMIN — ALLOPURINOL 100 MG: 100 TABLET ORAL at 20:39

## 2017-01-01 RX ADMIN — TRAMADOL HYDROCHLORIDE 100 MG: 50 TABLET, FILM COATED ORAL at 23:55

## 2017-01-01 RX ADMIN — LEVETIRACETAM 500 MG: 500 TABLET, FILM COATED ORAL at 09:40

## 2017-01-01 RX ADMIN — LEVETIRACETAM 500 MG: 500 TABLET, FILM COATED ORAL at 20:01

## 2017-01-01 RX ADMIN — STANDARDIZED SENNA CONCENTRATE 1 TABLET: 8.6 TABLET ORAL at 16:57

## 2017-01-01 RX ADMIN — Medication 1 TABLET: at 17:33

## 2017-01-01 RX ADMIN — APIXABAN 5 MG: 2.5 TABLET, FILM COATED ORAL at 21:00

## 2017-01-01 RX ADMIN — INSULIN ASPART 2 UNITS: 100 INJECTION, SOLUTION INTRAVENOUS; SUBCUTANEOUS at 20:45

## 2017-01-01 RX ADMIN — DILTIAZEM HYDROCHLORIDE 120 MG: 60 TABLET, FILM COATED ORAL at 17:13

## 2017-01-01 RX ADMIN — MEGESTROL ACETATE 200 MG: 40 SUSPENSION ORAL at 17:54

## 2017-01-01 RX ADMIN — FAMOTIDINE 20 MG: 10 INJECTION, SOLUTION INTRAVENOUS at 17:50

## 2017-01-01 RX ADMIN — LEVOTHYROXINE SODIUM 150 MCG: 150 TABLET ORAL at 05:53

## 2017-01-01 RX ADMIN — Medication 1 TABLET: at 08:47

## 2017-01-01 RX ADMIN — Medication 1 TABLET: at 17:27

## 2017-01-01 RX ADMIN — LINAGLIPTIN 5 MG: 5 TABLET, FILM COATED ORAL at 08:34

## 2017-01-01 RX ADMIN — LEVETIRACETAM 500 MG: 500 TABLET, FILM COATED ORAL at 09:00

## 2017-01-01 RX ADMIN — DILTIAZEM HYDROCHLORIDE 300 MG: 180 CAPSULE, COATED, EXTENDED RELEASE ORAL at 12:00

## 2017-01-01 RX ADMIN — ALLOPURINOL 100 MG: 100 TABLET ORAL at 08:36

## 2017-01-01 RX ADMIN — IPRATROPIUM BROMIDE AND ALBUTEROL SULFATE 3 ML: .5; 3 SOLUTION RESPIRATORY (INHALATION) at 08:19

## 2017-01-01 RX ADMIN — FAMOTIDINE 20 MG: 20 TABLET ORAL at 18:01

## 2017-01-01 RX ADMIN — LEVETIRACETAM 500 MG: 500 TABLET, FILM COATED ORAL at 07:28

## 2017-01-01 RX ADMIN — KETOTIFEN FUMARATE 1 DROP: 0.25 SOLUTION/ DROPS OPHTHALMIC at 09:46

## 2017-01-01 RX ADMIN — KETOTIFEN FUMARATE 1 DROP: 0.25 SOLUTION/ DROPS OPHTHALMIC at 17:13

## 2017-01-01 RX ADMIN — MICONAZOLE NITRATE: 20 POWDER TOPICAL at 21:13

## 2017-01-01 RX ADMIN — IPRATROPIUM BROMIDE AND ALBUTEROL SULFATE 3 ML: .5; 3 SOLUTION RESPIRATORY (INHALATION) at 08:57

## 2017-01-01 RX ADMIN — SERTRALINE HYDROCHLORIDE 100 MG: 50 TABLET ORAL at 20:11

## 2017-01-01 RX ADMIN — METOPROLOL SUCCINATE 100 MG: 50 TABLET, EXTENDED RELEASE ORAL at 08:48

## 2017-01-01 RX ADMIN — ALLOPURINOL 100 MG: 100 TABLET ORAL at 09:37

## 2017-01-01 RX ADMIN — KETOTIFEN FUMARATE 1 DROP: 0.25 SOLUTION/ DROPS OPHTHALMIC at 17:31

## 2017-01-01 RX ADMIN — Medication 1 CAPSULE: at 08:00

## 2017-01-01 RX ADMIN — FUROSEMIDE 40 MG: 40 TABLET ORAL at 09:00

## 2017-01-01 RX ADMIN — STANDARDIZED SENNA CONCENTRATE 1 TABLET: 8.6 TABLET ORAL at 17:55

## 2017-01-01 RX ADMIN — IBANDRONATE SODIUM 150 MG: 150 TABLET, FILM COATED ORAL at 10:14

## 2017-01-01 RX ADMIN — LEVOTHYROXINE SODIUM 150 MCG: 150 TABLET ORAL at 07:19

## 2017-01-01 RX ADMIN — KETOTIFEN FUMARATE 1 DROP: 0.25 SOLUTION/ DROPS OPHTHALMIC at 10:14

## 2017-01-01 RX ADMIN — BUDESONIDE 0.5 MG: 0.5 SUSPENSION RESPIRATORY (INHALATION) at 08:42

## 2017-01-01 RX ADMIN — Medication 1 CAPSULE: at 08:34

## 2017-01-01 RX ADMIN — ISOSORBIDE DINITRATE 10 MG: 10 TABLET ORAL at 08:23

## 2017-01-01 RX ADMIN — BUDESONIDE 0.5 MG: 0.5 SUSPENSION RESPIRATORY (INHALATION) at 08:30

## 2017-01-01 RX ADMIN — APIXABAN 5 MG: 2.5 TABLET, FILM COATED ORAL at 21:02

## 2017-01-01 RX ADMIN — ISOSORBIDE DINITRATE 10 MG: 10 TABLET ORAL at 20:35

## 2017-01-01 RX ADMIN — FUROSEMIDE 40 MG: 40 TABLET ORAL at 09:20

## 2017-01-01 RX ADMIN — MEGESTROL ACETATE 200 MG: 40 SUSPENSION ORAL at 17:45

## 2017-01-01 RX ADMIN — IPRATROPIUM BROMIDE AND ALBUTEROL SULFATE 3 ML: .5; 3 SOLUTION RESPIRATORY (INHALATION) at 17:40

## 2017-01-01 RX ADMIN — ENOXAPARIN SODIUM 40 MG: 40 INJECTION SUBCUTANEOUS at 19:31

## 2017-01-01 RX ADMIN — METOPROLOL SUCCINATE 100 MG: 50 TABLET, EXTENDED RELEASE ORAL at 20:33

## 2017-01-01 RX ADMIN — DIPHENHYDRAMINE HYDROCHLORIDE 25 MG: 25 CAPSULE ORAL at 22:03

## 2017-01-01 RX ADMIN — KETOTIFEN FUMARATE 1 DROP: 0.25 SOLUTION/ DROPS OPHTHALMIC at 18:33

## 2017-01-01 RX ADMIN — POTASSIUM CHLORIDE 20 MEQ: 1500 TABLET, EXTENDED RELEASE ORAL at 09:17

## 2017-01-01 RX ADMIN — FUROSEMIDE 40 MG: 40 TABLET ORAL at 08:17

## 2017-01-01 RX ADMIN — GUAIFENESIN 1200 MG: 600 TABLET, EXTENDED RELEASE ORAL at 16:47

## 2017-01-01 RX ADMIN — CONJUGATED ESTROGENS 1 APPLICATION: 0.62 CREAM VAGINAL at 20:35

## 2017-01-01 RX ADMIN — STANDARDIZED SENNA CONCENTRATE 1 TABLET: 8.6 TABLET ORAL at 10:36

## 2017-01-01 RX ADMIN — FUROSEMIDE 40 MG: 40 TABLET ORAL at 08:45

## 2017-01-01 RX ADMIN — SENNOSIDES AND DOCUSATE SODIUM 1 TABLET: 8.6; 5 TABLET ORAL at 08:23

## 2017-01-01 RX ADMIN — INSULIN ASPART 3 UNITS: 100 INJECTION, SOLUTION INTRAVENOUS; SUBCUTANEOUS at 12:23

## 2017-01-01 RX ADMIN — SENNOSIDES AND DOCUSATE SODIUM 1 TABLET: 8.6; 5 TABLET ORAL at 20:37

## 2017-01-01 RX ADMIN — Medication 1 CAPSULE: at 09:58

## 2017-01-01 RX ADMIN — ISOSORBIDE DINITRATE 10 MG: 10 TABLET ORAL at 20:47

## 2017-01-01 RX ADMIN — IPRATROPIUM BROMIDE AND ALBUTEROL SULFATE 3 ML: .5; 3 SOLUTION RESPIRATORY (INHALATION) at 21:03

## 2017-01-01 RX ADMIN — FAMOTIDINE 40 MG: 20 TABLET ORAL at 20:53

## 2017-01-01 RX ADMIN — SENNOSIDES AND DOCUSATE SODIUM 1 TABLET: 8.6; 5 TABLET ORAL at 09:00

## 2017-01-01 RX ADMIN — POTASSIUM CHLORIDE 20 MEQ: 1500 TABLET, EXTENDED RELEASE ORAL at 08:51

## 2017-01-01 RX ADMIN — APIXABAN 5 MG: 2.5 TABLET, FILM COATED ORAL at 21:15

## 2017-01-01 RX ADMIN — FAMOTIDINE 40 MG: 20 TABLET ORAL at 20:47

## 2017-01-01 RX ADMIN — METOPROLOL SUCCINATE 100 MG: 50 TABLET, EXTENDED RELEASE ORAL at 08:56

## 2017-01-01 RX ADMIN — BUDESONIDE 0.5 MG: 0.5 SUSPENSION RESPIRATORY (INHALATION) at 21:55

## 2017-01-01 RX ADMIN — ISOSORBIDE DINITRATE 10 MG: 10 TABLET ORAL at 21:02

## 2017-01-01 RX ADMIN — TERAZOSIN HYDROCHLORIDE 2 MG: 1 CAPSULE ORAL at 20:39

## 2017-01-01 RX ADMIN — IPRATROPIUM BROMIDE AND ALBUTEROL SULFATE 3 ML: .5; 3 SOLUTION RESPIRATORY (INHALATION) at 17:42

## 2017-01-01 RX ADMIN — IPRATROPIUM BROMIDE AND ALBUTEROL SULFATE 3 ML: .5; 3 SOLUTION RESPIRATORY (INHALATION) at 15:51

## 2017-01-01 RX ADMIN — METOPROLOL SUCCINATE 100 MG: 50 TABLET, EXTENDED RELEASE ORAL at 20:47

## 2017-01-01 RX ADMIN — FUROSEMIDE 40 MG: 40 TABLET ORAL at 09:21

## 2017-01-01 RX ADMIN — MICONAZOLE NITRATE: 20 POWDER TOPICAL at 10:42

## 2017-01-01 RX ADMIN — ASPIRIN 81 MG: 81 TABLET, COATED ORAL at 08:53

## 2017-01-01 RX ADMIN — IPRATROPIUM BROMIDE AND ALBUTEROL SULFATE 3 ML: .5; 3 SOLUTION RESPIRATORY (INHALATION) at 13:10

## 2017-01-01 RX ADMIN — MICONAZOLE NITRATE: 20 POWDER TOPICAL at 20:17

## 2017-01-01 RX ADMIN — Medication 150 MG: at 08:49

## 2017-01-01 RX ADMIN — ISOSORBIDE DINITRATE 10 MG: 10 TABLET ORAL at 21:45

## 2017-01-01 RX ADMIN — LEVETIRACETAM 500 MG: 500 TABLET, FILM COATED ORAL at 17:16

## 2017-01-01 RX ADMIN — ALLOPURINOL 100 MG: 100 TABLET ORAL at 20:36

## 2017-01-01 RX ADMIN — BUDESONIDE 0.5 MG: 0.5 SUSPENSION RESPIRATORY (INHALATION) at 20:29

## 2017-01-01 RX ADMIN — ISOSORBIDE DINITRATE 10 MG: 10 TABLET ORAL at 20:28

## 2017-01-01 RX ADMIN — SERTRALINE 100 MG: 50 TABLET, FILM COATED ORAL at 22:28

## 2017-01-01 RX ADMIN — APIXABAN 5 MG: 2.5 TABLET, FILM COATED ORAL at 20:28

## 2017-01-01 RX ADMIN — Medication 1 TABLET: at 09:44

## 2017-01-01 RX ADMIN — CALCIUM CARBONATE 500 MG (1,250 MG)-VITAMIN D3 200 UNIT TABLET 500 MG: at 10:25

## 2017-01-01 RX ADMIN — GUAIFENESIN 1200 MG: 600 TABLET, EXTENDED RELEASE ORAL at 17:56

## 2017-01-01 RX ADMIN — DONEPEZIL HYDROCHLORIDE 10 MG: 5 TABLET ORAL at 20:39

## 2017-01-01 RX ADMIN — GUAIFENESIN 1200 MG: 600 TABLET, EXTENDED RELEASE ORAL at 10:15

## 2017-01-01 RX ADMIN — MICONAZOLE NITRATE: 20 POWDER TOPICAL at 09:17

## 2017-01-01 RX ADMIN — SERTRALINE 100 MG: 50 TABLET, FILM COATED ORAL at 21:08

## 2017-01-01 RX ADMIN — Medication 150 MG: at 09:17

## 2017-01-01 RX ADMIN — MEGESTROL ACETATE 200 MG: 40 SUSPENSION ORAL at 17:13

## 2017-01-01 RX ADMIN — METOPROLOL TARTRATE 2.5 MG: 5 INJECTION INTRAVENOUS at 12:28

## 2017-01-01 RX ADMIN — Medication 1 TABLET: at 08:34

## 2017-01-01 RX ADMIN — LEVOTHYROXINE SODIUM 150 MCG: 150 TABLET ORAL at 06:08

## 2017-01-01 RX ADMIN — GUAIFENESIN 1200 MG: 600 TABLET, EXTENDED RELEASE ORAL at 17:23

## 2017-01-01 RX ADMIN — DONEPEZIL HYDROCHLORIDE 10 MG: 5 TABLET ORAL at 20:41

## 2017-01-01 RX ADMIN — Medication 150 MG: at 18:20

## 2017-01-01 RX ADMIN — FAMOTIDINE 20 MG: 20 TABLET ORAL at 08:30

## 2017-01-01 RX ADMIN — Medication 1 CAPSULE: at 09:11

## 2017-01-01 RX ADMIN — APIXABAN 5 MG: 2.5 TABLET, FILM COATED ORAL at 20:47

## 2017-01-01 RX ADMIN — GUAIFENESIN 1200 MG: 600 TABLET, EXTENDED RELEASE ORAL at 08:37

## 2017-01-01 RX ADMIN — BUDESONIDE 0.5 MG: 0.5 SUSPENSION RESPIRATORY (INHALATION) at 21:05

## 2017-01-01 RX ADMIN — DONEPEZIL HYDROCHLORIDE 10 MG: 5 TABLET ORAL at 20:58

## 2017-01-01 RX ADMIN — TRAMADOL HYDROCHLORIDE 50 MG: 50 TABLET, FILM COATED ORAL at 11:35

## 2017-01-01 RX ADMIN — AMIODARONE HYDROCHLORIDE 200 MG: 200 TABLET ORAL at 09:29

## 2017-01-01 RX ADMIN — OFLOXACIN 50000 UNITS: 300 TABLET, COATED ORAL at 12:49

## 2017-01-01 RX ADMIN — Medication 1 TABLET: at 17:07

## 2017-01-01 RX ADMIN — Medication 1 TABLET: at 08:49

## 2017-01-01 RX ADMIN — IPRATROPIUM BROMIDE AND ALBUTEROL SULFATE 3 ML: .5; 3 SOLUTION RESPIRATORY (INHALATION) at 17:24

## 2017-01-01 RX ADMIN — Medication 150 MG: at 18:00

## 2017-01-01 RX ADMIN — KETOTIFEN FUMARATE 1 DROP: 0.25 SOLUTION/ DROPS OPHTHALMIC at 07:41

## 2017-01-01 RX ADMIN — FUROSEMIDE 40 MG: 40 TABLET ORAL at 09:52

## 2017-01-01 RX ADMIN — ASPIRIN 81 MG: 81 TABLET, COATED ORAL at 08:14

## 2017-01-01 RX ADMIN — INSULIN ASPART 2 UNITS: 100 INJECTION, SOLUTION INTRAVENOUS; SUBCUTANEOUS at 17:49

## 2017-01-01 RX ADMIN — ALLOPURINOL 100 MG: 100 TABLET ORAL at 08:17

## 2017-01-01 RX ADMIN — ISOSORBIDE DINITRATE 10 MG: 10 TABLET ORAL at 09:50

## 2017-01-01 RX ADMIN — SENNOSIDES AND DOCUSATE SODIUM 1 TABLET: 8.6; 5 TABLET ORAL at 20:27

## 2017-01-01 RX ADMIN — FAMOTIDINE 40 MG: 20 TABLET ORAL at 20:46

## 2017-01-01 RX ADMIN — ALLOPURINOL 100 MG: 100 TABLET ORAL at 09:51

## 2017-01-01 RX ADMIN — APIXABAN 5 MG: 2.5 TABLET, FILM COATED ORAL at 09:59

## 2017-01-01 RX ADMIN — AMIODARONE HYDROCHLORIDE 200 MG: 200 TABLET ORAL at 17:41

## 2017-01-01 RX ADMIN — Medication 150 MG: at 09:09

## 2017-01-01 RX ADMIN — Medication 150 MG: at 08:39

## 2017-01-01 RX ADMIN — HYDROCODONE BITARTRATE AND ACETAMINOPHEN 1 TABLET: 5; 325 TABLET ORAL at 08:46

## 2017-01-01 RX ADMIN — ISOSORBIDE DINITRATE 10 MG: 10 TABLET ORAL at 09:24

## 2017-01-01 RX ADMIN — GUAIFENESIN 1200 MG: 600 TABLET, EXTENDED RELEASE ORAL at 12:35

## 2017-01-01 RX ADMIN — IPRATROPIUM BROMIDE AND ALBUTEROL SULFATE 3 ML: .5; 3 SOLUTION RESPIRATORY (INHALATION) at 17:13

## 2017-01-01 RX ADMIN — AMIODARONE HYDROCHLORIDE 200 MG: 200 TABLET ORAL at 17:12

## 2017-01-01 RX ADMIN — ISOSORBIDE DINITRATE 10 MG: 10 TABLET ORAL at 09:23

## 2017-01-01 RX ADMIN — INSULIN ASPART 2 UNITS: 100 INJECTION, SOLUTION INTRAVENOUS; SUBCUTANEOUS at 21:23

## 2017-01-01 RX ADMIN — ISOSORBIDE DINITRATE 10 MG: 10 TABLET ORAL at 22:03

## 2017-01-01 RX ADMIN — IPRATROPIUM BROMIDE AND ALBUTEROL SULFATE 3 ML: .5; 3 SOLUTION RESPIRATORY (INHALATION) at 20:40

## 2017-01-01 RX ADMIN — KETOTIFEN FUMARATE 1 DROP: 0.25 SOLUTION/ DROPS OPHTHALMIC at 17:42

## 2017-01-01 RX ADMIN — Medication 150 MG: at 17:35

## 2017-01-01 RX ADMIN — ACETAMINOPHEN 650 MG: 325 TABLET, FILM COATED ORAL at 21:11

## 2017-01-01 RX ADMIN — LEVETIRACETAM 500 MG: 500 TABLET, FILM COATED ORAL at 08:19

## 2017-01-01 RX ADMIN — CONJUGATED ESTROGENS 1 APPLICATION: 0.62 CREAM VAGINAL at 20:33

## 2017-01-01 RX ADMIN — MICONAZOLE NITRATE: 20 POWDER TOPICAL at 20:37

## 2017-01-01 RX ADMIN — KETOTIFEN FUMARATE 1 DROP: 0.25 SOLUTION/ DROPS OPHTHALMIC at 09:29

## 2017-01-01 RX ADMIN — MICONAZOLE NITRATE: 20 POWDER TOPICAL at 20:15

## 2017-01-01 RX ADMIN — LEVOTHYROXINE SODIUM 150 MCG: 150 TABLET ORAL at 05:49

## 2017-01-01 RX ADMIN — CALCIUM CARBONATE 500 MG (1,250 MG)-VITAMIN D3 200 UNIT TABLET 500 MG: at 20:38

## 2017-01-01 RX ADMIN — APIXABAN 5 MG: 2.5 TABLET, FILM COATED ORAL at 20:34

## 2017-01-01 RX ADMIN — Medication 1 TABLET: at 17:35

## 2017-01-01 RX ADMIN — KETOTIFEN FUMARATE 1 DROP: 0.25 SOLUTION/ DROPS OPHTHALMIC at 07:53

## 2017-01-01 RX ADMIN — FAMOTIDINE 20 MG: 20 TABLET ORAL at 09:26

## 2017-01-01 RX ADMIN — BUDESONIDE 0.5 MG: 0.5 SUSPENSION RESPIRATORY (INHALATION) at 20:44

## 2017-01-01 RX ADMIN — GUAIFENESIN 1200 MG: 600 TABLET, EXTENDED RELEASE ORAL at 17:11

## 2017-01-01 RX ADMIN — SERTRALINE 100 MG: 50 TABLET, FILM COATED ORAL at 20:06

## 2017-01-01 RX ADMIN — METOPROLOL SUCCINATE 100 MG: 50 TABLET, EXTENDED RELEASE ORAL at 07:53

## 2017-01-01 RX ADMIN — SENNOSIDES AND DOCUSATE SODIUM 1 TABLET: 8.6; 5 TABLET ORAL at 20:50

## 2017-01-01 RX ADMIN — Medication 150 MG: at 17:58

## 2017-01-01 RX ADMIN — DILTIAZEM HYDROCHLORIDE 300 MG: 180 CAPSULE, COATED, EXTENDED RELEASE ORAL at 11:45

## 2017-01-01 RX ADMIN — FAMOTIDINE 40 MG: 20 TABLET ORAL at 22:33

## 2017-01-01 RX ADMIN — ALLOPURINOL 100 MG: 100 TABLET ORAL at 18:47

## 2017-01-01 RX ADMIN — Medication 1 TABLET: at 09:32

## 2017-01-01 RX ADMIN — FAMOTIDINE 40 MG: 20 TABLET ORAL at 21:31

## 2017-01-01 RX ADMIN — DILTIAZEM HYDROCHLORIDE 300 MG: 180 CAPSULE, COATED, EXTENDED RELEASE ORAL at 13:25

## 2017-01-01 RX ADMIN — DONEPEZIL HYDROCHLORIDE 10 MG: 5 TABLET ORAL at 23:28

## 2017-01-01 RX ADMIN — Medication 1 CAPSULE: at 09:24

## 2017-01-01 RX ADMIN — GUAIFENESIN 1200 MG: 600 TABLET, EXTENDED RELEASE ORAL at 09:20

## 2017-01-01 RX ADMIN — GUAIFENESIN 1200 MG: 600 TABLET, EXTENDED RELEASE ORAL at 18:00

## 2017-01-01 RX ADMIN — SODIUM CHLORIDE 100 ML/HR: 9 INJECTION, SOLUTION INTRAVENOUS at 06:44

## 2017-01-01 RX ADMIN — ALLOPURINOL 100 MG: 100 TABLET ORAL at 08:16

## 2017-01-01 RX ADMIN — KETOTIFEN FUMARATE 1 DROP: 0.35 SOLUTION/ DROPS OPHTHALMIC at 08:14

## 2017-01-01 RX ADMIN — Medication 150 MG: at 08:37

## 2017-01-01 RX ADMIN — FAMOTIDINE 40 MG: 20 TABLET, FILM COATED ORAL at 08:12

## 2017-01-01 RX ADMIN — FAMOTIDINE 40 MG: 20 TABLET ORAL at 21:20

## 2017-01-01 RX ADMIN — Medication 150 MG: at 08:20

## 2017-01-01 RX ADMIN — Medication 150 MG: at 10:22

## 2017-01-01 RX ADMIN — ISOSORBIDE DINITRATE 10 MG: 10 TABLET ORAL at 20:41

## 2017-01-01 RX ADMIN — ISOSORBIDE DINITRATE 10 MG: 10 TABLET ORAL at 10:16

## 2017-01-01 RX ADMIN — SERTRALINE 100 MG: 50 TABLET, FILM COATED ORAL at 21:38

## 2017-01-01 RX ADMIN — CONJUGATED ESTROGENS 1 APPLICATION: 0.62 CREAM VAGINAL at 20:40

## 2017-01-01 RX ADMIN — Medication 1 TABLET: at 08:13

## 2017-01-01 RX ADMIN — POTASSIUM CHLORIDE 20 MEQ: 1500 TABLET, EXTENDED RELEASE ORAL at 08:04

## 2017-01-01 RX ADMIN — METOPROLOL SUCCINATE 100 MG: 50 TABLET, EXTENDED RELEASE ORAL at 09:09

## 2017-01-01 RX ADMIN — CALCIUM CARBONATE 500 MG (1,250 MG)-VITAMIN D3 200 UNIT TABLET 500 MG: at 21:16

## 2017-01-01 RX ADMIN — KETOTIFEN FUMARATE 1 DROP: 0.25 SOLUTION/ DROPS OPHTHALMIC at 17:28

## 2017-01-01 RX ADMIN — LEVETIRACETAM 500 MG: 500 TABLET, FILM COATED ORAL at 08:21

## 2017-01-01 RX ADMIN — Medication 1 TABLET: at 09:26

## 2017-01-01 RX ADMIN — GUAIFENESIN 1200 MG: 600 TABLET, EXTENDED RELEASE ORAL at 17:21

## 2017-01-01 RX ADMIN — DIPHENHYDRAMINE HYDROCHLORIDE 25 MG: 25 CAPSULE ORAL at 20:03

## 2017-01-01 RX ADMIN — ALLOPURINOL 100 MG: 100 TABLET ORAL at 20:27

## 2017-01-01 RX ADMIN — LEVETIRACETAM 500 MG: 500 TABLET, FILM COATED ORAL at 08:31

## 2017-01-01 RX ADMIN — SENNOSIDES AND DOCUSATE SODIUM 1 TABLET: 8.6; 5 TABLET ORAL at 10:00

## 2017-01-01 RX ADMIN — POTASSIUM CHLORIDE 20 MEQ: 1500 TABLET, EXTENDED RELEASE ORAL at 09:00

## 2017-01-01 RX ADMIN — KETOTIFEN FUMARATE 1 DROP: 0.35 SOLUTION/ DROPS OPHTHALMIC at 08:56

## 2017-01-01 RX ADMIN — DONEPEZIL HYDROCHLORIDE 10 MG: 5 TABLET ORAL at 20:28

## 2017-01-01 RX ADMIN — MICONAZOLE NITRATE: 20 POWDER TOPICAL at 22:32

## 2017-01-01 RX ADMIN — SERTRALINE 100 MG: 50 TABLET, FILM COATED ORAL at 23:06

## 2017-01-01 RX ADMIN — ISOSORBIDE DINITRATE 10 MG: 10 TABLET ORAL at 09:00

## 2017-01-01 RX ADMIN — MICONAZOLE NITRATE: 20 POWDER TOPICAL at 21:28

## 2017-01-01 RX ADMIN — Medication 150 MG: at 08:25

## 2017-01-01 RX ADMIN — INSULIN ASPART 3 UNITS: 100 INJECTION, SOLUTION INTRAVENOUS; SUBCUTANEOUS at 17:33

## 2017-01-01 RX ADMIN — BUMETANIDE 2 MG: 0.25 INJECTION INTRAMUSCULAR; INTRAVENOUS at 18:02

## 2017-01-01 RX ADMIN — METOPROLOL TARTRATE 100 MG: 25 TABLET, FILM COATED ORAL at 08:39

## 2017-01-01 RX ADMIN — MICONAZOLE NITRATE: 20 POWDER TOPICAL at 08:33

## 2017-01-01 RX ADMIN — ALLOPURINOL 100 MG: 100 TABLET ORAL at 21:40

## 2017-01-01 RX ADMIN — Medication 1 TABLET: at 08:56

## 2017-01-01 RX ADMIN — SENNOSIDES AND DOCUSATE SODIUM 1 TABLET: 8.6; 5 TABLET ORAL at 20:23

## 2017-01-01 RX ADMIN — ASPIRIN 81 MG: 81 TABLET, COATED ORAL at 08:21

## 2017-01-01 RX ADMIN — LEVOTHYROXINE SODIUM 150 MCG: 150 TABLET ORAL at 06:09

## 2017-01-01 RX ADMIN — GUAIFENESIN 1200 MG: 600 TABLET, EXTENDED RELEASE ORAL at 09:19

## 2017-01-01 RX ADMIN — Medication 1 CAPSULE: at 08:22

## 2017-01-01 RX ADMIN — FAMOTIDINE 40 MG: 20 TABLET ORAL at 21:08

## 2017-01-01 RX ADMIN — INSULIN ASPART 2 UNITS: 100 INJECTION, SOLUTION INTRAVENOUS; SUBCUTANEOUS at 08:18

## 2017-01-01 RX ADMIN — ALLOPURINOL 100 MG: 100 TABLET ORAL at 20:50

## 2017-01-01 RX ADMIN — IPRATROPIUM BROMIDE AND ALBUTEROL SULFATE 3 ML: .5; 3 SOLUTION RESPIRATORY (INHALATION) at 20:25

## 2017-01-01 RX ADMIN — DILTIAZEM HYDROCHLORIDE 300 MG: 180 CAPSULE, COATED, EXTENDED RELEASE ORAL at 11:44

## 2017-01-01 RX ADMIN — LEVOTHYROXINE SODIUM 150 MCG: 150 TABLET ORAL at 05:50

## 2017-01-01 RX ADMIN — Medication 1 CAPSULE: at 09:27

## 2017-01-01 RX ADMIN — SERTRALINE HYDROCHLORIDE 100 MG: 50 TABLET ORAL at 20:31

## 2017-01-01 RX ADMIN — LEVETIRACETAM 500 MG: 500 TABLET, FILM COATED ORAL at 08:08

## 2017-01-01 RX ADMIN — Medication 1 TABLET: at 09:00

## 2017-01-01 RX ADMIN — ISOSORBIDE DINITRATE 10 MG: 10 TABLET ORAL at 08:54

## 2017-01-01 RX ADMIN — Medication 1 TABLET: at 18:00

## 2017-01-01 RX ADMIN — Medication 1 TABLET: at 18:08

## 2017-01-01 RX ADMIN — METOPROLOL SUCCINATE 100 MG: 50 TABLET, EXTENDED RELEASE ORAL at 08:07

## 2017-01-01 RX ADMIN — KETOTIFEN FUMARATE 1 DROP: 0.25 SOLUTION/ DROPS OPHTHALMIC at 08:19

## 2017-01-01 RX ADMIN — GUAIFENESIN 1200 MG: 600 TABLET, EXTENDED RELEASE ORAL at 08:19

## 2017-01-01 RX ADMIN — MICONAZOLE NITRATE: 20 POWDER TOPICAL at 09:22

## 2017-01-01 RX ADMIN — Medication 1 TABLET: at 08:32

## 2017-01-01 RX ADMIN — SERTRALINE 100 MG: 50 TABLET, FILM COATED ORAL at 20:35

## 2017-01-01 RX ADMIN — FAMOTIDINE 40 MG: 20 TABLET ORAL at 21:39

## 2017-01-01 RX ADMIN — Medication 1 TABLET: at 17:21

## 2017-01-01 RX ADMIN — GUAIFENESIN 1200 MG: 600 TABLET, EXTENDED RELEASE ORAL at 17:34

## 2017-01-01 RX ADMIN — SENNOSIDES AND DOCUSATE SODIUM 1 TABLET: 8.6; 5 TABLET ORAL at 23:06

## 2017-01-01 RX ADMIN — LEVETIRACETAM 500 MG: 500 TABLET, FILM COATED ORAL at 09:16

## 2017-01-01 RX ADMIN — APIXABAN 5 MG: 2.5 TABLET, FILM COATED ORAL at 21:55

## 2017-01-01 RX ADMIN — ISOSORBIDE DINITRATE 10 MG: 10 TABLET ORAL at 21:59

## 2017-01-01 RX ADMIN — FAMOTIDINE 20 MG: 20 TABLET, FILM COATED ORAL at 07:44

## 2017-01-01 RX ADMIN — FAMOTIDINE 20 MG: 20 TABLET ORAL at 08:22

## 2017-01-01 RX ADMIN — GUAIFENESIN 1200 MG: 600 TABLET, EXTENDED RELEASE ORAL at 10:48

## 2017-01-01 RX ADMIN — TRAMADOL HYDROCHLORIDE 100 MG: 50 TABLET, FILM COATED ORAL at 17:23

## 2017-01-01 RX ADMIN — POTASSIUM CHLORIDE 20 MEQ: 1500 TABLET, EXTENDED RELEASE ORAL at 09:04

## 2017-01-01 RX ADMIN — METOPROLOL SUCCINATE 100 MG: 50 TABLET, EXTENDED RELEASE ORAL at 09:03

## 2017-01-01 RX ADMIN — DONEPEZIL HYDROCHLORIDE 10 MG: 5 TABLET, FILM COATED ORAL at 20:10

## 2017-01-01 RX ADMIN — ALLOPURINOL 100 MG: 100 TABLET ORAL at 08:28

## 2017-01-01 RX ADMIN — LEVETIRACETAM 500 MG: 500 TABLET, FILM COATED ORAL at 21:22

## 2017-01-01 RX ADMIN — LEVETIRACETAM 500 MG: 500 TABLET, FILM COATED ORAL at 20:23

## 2017-01-01 RX ADMIN — FAMOTIDINE 20 MG: 20 TABLET, FILM COATED ORAL at 18:14

## 2017-01-01 RX ADMIN — Medication 150 MG: at 09:25

## 2017-01-01 RX ADMIN — TRAMADOL HYDROCHLORIDE 50 MG: 50 TABLET, FILM COATED ORAL at 00:13

## 2017-01-01 RX ADMIN — DILTIAZEM HYDROCHLORIDE 300 MG: 180 CAPSULE, COATED, EXTENDED RELEASE ORAL at 13:22

## 2017-01-01 RX ADMIN — STANDARDIZED SENNA CONCENTRATE 1 TABLET: 8.6 TABLET ORAL at 17:34

## 2017-01-01 RX ADMIN — KETOTIFEN FUMARATE 1 DROP: 0.25 SOLUTION/ DROPS OPHTHALMIC at 08:26

## 2017-01-01 RX ADMIN — IPRATROPIUM BROMIDE AND ALBUTEROL SULFATE 3 ML: .5; 3 SOLUTION RESPIRATORY (INHALATION) at 20:54

## 2017-01-01 RX ADMIN — CONJUGATED ESTROGENS 1 APPLICATION: 0.62 CREAM VAGINAL at 21:44

## 2017-01-01 RX ADMIN — ISOSORBIDE DINITRATE 10 MG: 10 TABLET ORAL at 10:08

## 2017-01-01 RX ADMIN — MICONAZOLE NITRATE: 20 POWDER TOPICAL at 20:06

## 2017-01-01 RX ADMIN — Medication 150 MG: at 08:40

## 2017-01-01 RX ADMIN — Medication 1 CAPSULE: at 09:30

## 2017-01-01 RX ADMIN — GUAIFENESIN 1200 MG: 600 TABLET, EXTENDED RELEASE ORAL at 07:54

## 2017-01-01 RX ADMIN — MICONAZOLE NITRATE: 20 POWDER TOPICAL at 20:27

## 2017-01-01 RX ADMIN — GUAIFENESIN 1200 MG: 600 TABLET, EXTENDED RELEASE ORAL at 09:24

## 2017-01-01 RX ADMIN — FAMOTIDINE 40 MG: 20 TABLET, FILM COATED ORAL at 17:04

## 2017-01-01 RX ADMIN — DONEPEZIL HYDROCHLORIDE 10 MG: 5 TABLET ORAL at 21:22

## 2017-01-01 RX ADMIN — LEVETIRACETAM 500 MG: 500 TABLET, FILM COATED ORAL at 18:07

## 2017-01-01 RX ADMIN — DONEPEZIL HYDROCHLORIDE 10 MG: 5 TABLET ORAL at 20:02

## 2017-01-01 RX ADMIN — MEROPENEM 1 G: 1 INJECTION, POWDER, FOR SOLUTION INTRAVENOUS at 11:24

## 2017-01-01 RX ADMIN — IPRATROPIUM BROMIDE AND ALBUTEROL SULFATE 3 ML: .5; 3 SOLUTION RESPIRATORY (INHALATION) at 17:27

## 2017-01-01 RX ADMIN — SERTRALINE 100 MG: 50 TABLET, FILM COATED ORAL at 20:54

## 2017-01-01 RX ADMIN — IPRATROPIUM BROMIDE AND ALBUTEROL SULFATE 3 ML: .5; 3 SOLUTION RESPIRATORY (INHALATION) at 09:53

## 2017-01-01 RX ADMIN — MEGESTROL ACETATE 200 MG: 40 SUSPENSION ORAL at 08:09

## 2017-01-01 RX ADMIN — CALCIUM CARBONATE 500 MG (1,250 MG)-VITAMIN D3 200 UNIT TABLET 500 MG: at 21:58

## 2017-01-01 RX ADMIN — KETOTIFEN FUMARATE 1 DROP: 0.25 SOLUTION/ DROPS OPHTHALMIC at 09:27

## 2017-01-01 RX ADMIN — APIXABAN 5 MG: 2.5 TABLET, FILM COATED ORAL at 09:37

## 2017-01-01 RX ADMIN — ALLOPURINOL 100 MG: 100 TABLET ORAL at 19:59

## 2017-01-01 RX ADMIN — LINAGLIPTIN 5 MG: 5 TABLET, FILM COATED ORAL at 08:13

## 2017-01-01 RX ADMIN — Medication 150 MG: at 08:13

## 2017-01-01 RX ADMIN — GUAIFENESIN 1200 MG: 600 TABLET, EXTENDED RELEASE ORAL at 10:18

## 2017-01-01 RX ADMIN — Medication 1 TABLET: at 08:42

## 2017-01-01 RX ADMIN — METOPROLOL TARTRATE 100 MG: 25 TABLET, FILM COATED ORAL at 12:11

## 2017-01-01 RX ADMIN — AMIODARONE HYDROCHLORIDE 200 MG: 200 TABLET ORAL at 19:21

## 2017-01-01 RX ADMIN — CONJUGATED ESTROGENS 0.75 APPLICATOR: 0.62 CREAM VAGINAL at 20:08

## 2017-01-01 RX ADMIN — DILTIAZEM HYDROCHLORIDE 300 MG: 180 CAPSULE, COATED, EXTENDED RELEASE ORAL at 13:39

## 2017-01-01 RX ADMIN — TRAMADOL HYDROCHLORIDE 50 MG: 50 TABLET, FILM COATED ORAL at 17:16

## 2017-01-01 RX ADMIN — APIXABAN 5 MG: 2.5 TABLET, FILM COATED ORAL at 22:15

## 2017-01-01 RX ADMIN — Medication 1 TABLET: at 09:11

## 2017-01-01 RX ADMIN — IPRATROPIUM BROMIDE AND ALBUTEROL SULFATE 3 ML: .5; 3 SOLUTION RESPIRATORY (INHALATION) at 08:30

## 2017-01-01 RX ADMIN — FLUCONAZOLE 100 MG: 100 TABLET ORAL at 11:24

## 2017-01-01 RX ADMIN — IPRATROPIUM BROMIDE AND ALBUTEROL SULFATE 3 ML: .5; 3 SOLUTION RESPIRATORY (INHALATION) at 18:08

## 2017-01-01 RX ADMIN — METOPROLOL SUCCINATE 100 MG: 50 TABLET, EXTENDED RELEASE ORAL at 07:41

## 2017-01-01 RX ADMIN — GUAIFENESIN 1200 MG: 600 TABLET, EXTENDED RELEASE ORAL at 17:16

## 2017-01-01 RX ADMIN — SERTRALINE HYDROCHLORIDE 100 MG: 50 TABLET ORAL at 21:23

## 2017-01-01 RX ADMIN — Medication 150 MG: at 18:09

## 2017-01-01 RX ADMIN — Medication 1 CAPSULE: at 08:07

## 2017-01-01 RX ADMIN — POTASSIUM CHLORIDE 20 MEQ: 1500 TABLET, EXTENDED RELEASE ORAL at 08:57

## 2017-01-01 RX ADMIN — AMIODARONE HYDROCHLORIDE 200 MG: 200 TABLET ORAL at 08:37

## 2017-01-01 RX ADMIN — STANDARDIZED SENNA CONCENTRATE 1 TABLET: 8.6 TABLET ORAL at 17:16

## 2017-01-01 RX ADMIN — TRAMADOL HYDROCHLORIDE 100 MG: 50 TABLET, FILM COATED ORAL at 20:36

## 2017-01-01 RX ADMIN — MICONAZOLE NITRATE: 20 POWDER TOPICAL at 22:08

## 2017-01-01 RX ADMIN — KETOTIFEN FUMARATE 1 DROP: 0.35 SOLUTION/ DROPS OPHTHALMIC at 17:17

## 2017-01-01 RX ADMIN — ISOSORBIDE DINITRATE 10 MG: 10 TABLET ORAL at 08:20

## 2017-01-01 RX ADMIN — Medication 150 MG: at 08:34

## 2017-01-01 RX ADMIN — ALLOPURINOL 100 MG: 100 TABLET ORAL at 23:08

## 2017-01-01 RX ADMIN — Medication 1 CAPSULE: at 08:20

## 2017-01-01 RX ADMIN — MICONAZOLE NITRATE: 20 POWDER TOPICAL at 20:52

## 2017-01-01 RX ADMIN — SENNOSIDES AND DOCUSATE SODIUM 1 TABLET: 8.6; 5 TABLET ORAL at 20:35

## 2017-01-01 RX ADMIN — Medication 150 MG: at 17:32

## 2017-01-01 RX ADMIN — Medication 150 MG: at 17:48

## 2017-01-01 RX ADMIN — TRAMADOL HYDROCHLORIDE 50 MG: 50 TABLET, FILM COATED ORAL at 00:00

## 2017-01-01 RX ADMIN — DILTIAZEM HYDROCHLORIDE 300 MG: 180 CAPSULE, EXTENDED RELEASE ORAL at 13:11

## 2017-01-01 RX ADMIN — DILTIAZEM HYDROCHLORIDE 120 MG: 60 TABLET, FILM COATED ORAL at 17:41

## 2017-01-01 RX ADMIN — MEROPENEM 1 G: 1 INJECTION, POWDER, FOR SOLUTION INTRAVENOUS at 02:25

## 2017-01-01 RX ADMIN — DILTIAZEM HYDROCHLORIDE 240 MG: 120 CAPSULE, COATED, EXTENDED RELEASE ORAL at 09:28

## 2017-01-01 RX ADMIN — FAMOTIDINE 40 MG: 20 TABLET ORAL at 21:42

## 2017-01-01 RX ADMIN — FAMOTIDINE 40 MG: 20 TABLET ORAL at 23:09

## 2017-01-01 RX ADMIN — METOPROLOL TARTRATE 75 MG: 25 TABLET, FILM COATED ORAL at 17:34

## 2017-01-01 RX ADMIN — Medication 1 TABLET: at 17:11

## 2017-01-01 RX ADMIN — IPRATROPIUM BROMIDE AND ALBUTEROL SULFATE 3 ML: .5; 3 SOLUTION RESPIRATORY (INHALATION) at 17:31

## 2017-01-01 RX ADMIN — APIXABAN 5 MG: 2.5 TABLET, FILM COATED ORAL at 09:16

## 2017-01-01 RX ADMIN — MEGESTROL ACETATE 200 MG: 40 SUSPENSION ORAL at 17:27

## 2017-01-01 RX ADMIN — POTASSIUM CHLORIDE 20 MEQ: 1500 TABLET, EXTENDED RELEASE ORAL at 09:30

## 2017-01-01 RX ADMIN — APIXABAN 5 MG: 2.5 TABLET, FILM COATED ORAL at 10:21

## 2017-01-01 RX ADMIN — IPRATROPIUM BROMIDE AND ALBUTEROL SULFATE 3 ML: .5; 3 SOLUTION RESPIRATORY (INHALATION) at 11:58

## 2017-01-01 RX ADMIN — SENNOSIDES AND DOCUSATE SODIUM 1 TABLET: 8.6; 5 TABLET ORAL at 20:13

## 2017-01-01 RX ADMIN — Medication 150 MG: at 08:19

## 2017-01-01 RX ADMIN — ALLOPURINOL 100 MG: 100 TABLET ORAL at 20:30

## 2017-01-01 RX ADMIN — ISOSORBIDE DINITRATE 10 MG: 10 TABLET ORAL at 20:17

## 2017-01-01 RX ADMIN — ALLOPURINOL 100 MG: 100 TABLET ORAL at 21:59

## 2017-01-01 RX ADMIN — DILTIAZEM HYDROCHLORIDE 240 MG: 120 CAPSULE, COATED, EXTENDED RELEASE ORAL at 08:15

## 2017-01-01 RX ADMIN — Medication 1 CAPSULE: at 09:29

## 2017-01-01 RX ADMIN — Medication 1 TABLET: at 08:51

## 2017-01-01 RX ADMIN — APIXABAN 5 MG: 2.5 TABLET, FILM COATED ORAL at 20:44

## 2017-01-01 RX ADMIN — AMIODARONE HYDROCHLORIDE 200 MG: 200 TABLET ORAL at 20:52

## 2017-01-01 RX ADMIN — MICONAZOLE NITRATE: 20 POWDER TOPICAL at 22:25

## 2017-01-01 RX ADMIN — KETOTIFEN FUMARATE 1 DROP: 0.25 SOLUTION/ DROPS OPHTHALMIC at 12:08

## 2017-01-01 RX ADMIN — APIXABAN 5 MG: 2.5 TABLET, FILM COATED ORAL at 20:23

## 2017-01-01 RX ADMIN — POTASSIUM CHLORIDE 20 MEQ: 1500 TABLET, EXTENDED RELEASE ORAL at 08:43

## 2017-01-01 RX ADMIN — Medication 1 CAPSULE: at 08:47

## 2017-01-01 RX ADMIN — KETOTIFEN FUMARATE 1 DROP: 0.25 SOLUTION/ DROPS OPHTHALMIC at 17:05

## 2017-01-01 RX ADMIN — LEVETIRACETAM 500 MG: 500 TABLET, FILM COATED ORAL at 17:04

## 2017-01-01 RX ADMIN — AMIODARONE HYDROCHLORIDE 200 MG: 200 TABLET ORAL at 20:45

## 2017-01-01 RX ADMIN — LEVETIRACETAM 500 MG: 500 TABLET, FILM COATED ORAL at 18:44

## 2017-01-01 RX ADMIN — MICONAZOLE NITRATE: 20 POWDER TOPICAL at 09:24

## 2017-01-01 RX ADMIN — INSULIN ASPART 2 UNITS: 100 INJECTION, SOLUTION INTRAVENOUS; SUBCUTANEOUS at 12:23

## 2017-01-01 RX ADMIN — SERTRALINE 100 MG: 50 TABLET, FILM COATED ORAL at 21:31

## 2017-01-01 RX ADMIN — DILTIAZEM HYDROCHLORIDE 240 MG: 120 CAPSULE, COATED, EXTENDED RELEASE ORAL at 09:21

## 2017-01-01 RX ADMIN — POTASSIUM CHLORIDE 20 MEQ: 1500 TABLET, EXTENDED RELEASE ORAL at 09:53

## 2017-01-01 RX ADMIN — IPRATROPIUM BROMIDE AND ALBUTEROL SULFATE 3 ML: .5; 3 SOLUTION RESPIRATORY (INHALATION) at 08:20

## 2017-01-01 RX ADMIN — SENNOSIDES AND DOCUSATE SODIUM 1 TABLET: 8.6; 5 TABLET ORAL at 10:26

## 2017-01-01 RX ADMIN — BUDESONIDE 0.5 MG: 0.5 SUSPENSION RESPIRATORY (INHALATION) at 10:22

## 2017-01-01 RX ADMIN — DONEPEZIL HYDROCHLORIDE 10 MG: 5 TABLET ORAL at 22:14

## 2017-01-01 RX ADMIN — Medication 150 MG: at 17:16

## 2017-01-01 RX ADMIN — MEGESTROL ACETATE 200 MG: 40 SUSPENSION ORAL at 08:31

## 2017-01-01 RX ADMIN — MICONAZOLE NITRATE: 20 POWDER TOPICAL at 08:54

## 2017-01-01 RX ADMIN — METOPROLOL SUCCINATE 100 MG: 50 TABLET, EXTENDED RELEASE ORAL at 08:54

## 2017-01-01 RX ADMIN — METOPROLOL SUCCINATE 100 MG: 50 TABLET, EXTENDED RELEASE ORAL at 09:37

## 2017-01-01 RX ADMIN — MICONAZOLE NITRATE: 20 POWDER TOPICAL at 09:23

## 2017-01-01 RX ADMIN — FAMOTIDINE 40 MG: 20 TABLET, FILM COATED ORAL at 18:51

## 2017-01-01 RX ADMIN — APIXABAN 5 MG: 2.5 TABLET, FILM COATED ORAL at 09:49

## 2017-01-01 RX ADMIN — MEGESTROL ACETATE 200 MG: 40 SUSPENSION ORAL at 09:24

## 2017-01-01 RX ADMIN — DONEPEZIL HYDROCHLORIDE 10 MG: 5 TABLET ORAL at 20:14

## 2017-01-01 RX ADMIN — SENNOSIDES AND DOCUSATE SODIUM 1 TABLET: 8.6; 5 TABLET ORAL at 10:23

## 2017-01-01 RX ADMIN — CLINDAMYCIN HYDROCHLORIDE 450 MG: 150 CAPSULE ORAL at 06:21

## 2017-01-01 RX ADMIN — INSULIN ASPART 2 UNITS: 100 INJECTION, SOLUTION INTRAVENOUS; SUBCUTANEOUS at 13:46

## 2017-01-01 RX ADMIN — Medication 150 MG: at 17:38

## 2017-01-01 RX ADMIN — Medication 1 CAPSULE: at 08:31

## 2017-01-01 RX ADMIN — METOPROLOL SUCCINATE 100 MG: 50 TABLET, EXTENDED RELEASE ORAL at 20:26

## 2017-01-01 RX ADMIN — ALLOPURINOL 100 MG: 100 TABLET ORAL at 17:34

## 2017-01-01 RX ADMIN — TRAMADOL HYDROCHLORIDE 100 MG: 50 TABLET, FILM COATED ORAL at 20:35

## 2017-01-01 RX ADMIN — LEVOFLOXACIN 750 MG: 500 TABLET, FILM COATED ORAL at 22:20

## 2017-01-01 RX ADMIN — LEVETIRACETAM 500 MG: 500 TABLET, FILM COATED ORAL at 21:01

## 2017-01-01 RX ADMIN — METOPROLOL TARTRATE 100 MG: 50 TABLET, FILM COATED ORAL at 09:17

## 2017-01-01 RX ADMIN — ISOSORBIDE DINITRATE 10 MG: 10 TABLET ORAL at 09:45

## 2017-01-01 RX ADMIN — METOPROLOL SUCCINATE 100 MG: 50 TABLET, EXTENDED RELEASE ORAL at 21:53

## 2017-01-01 RX ADMIN — SENNOSIDES AND DOCUSATE SODIUM 1 TABLET: 8.6; 5 TABLET ORAL at 08:29

## 2017-01-01 RX ADMIN — OFLOXACIN 50000 UNITS: 300 TABLET, COATED ORAL at 13:05

## 2017-01-01 RX ADMIN — KETOTIFEN FUMARATE 1 DROP: 0.25 SOLUTION/ DROPS OPHTHALMIC at 17:36

## 2017-01-01 RX ADMIN — MICONAZOLE NITRATE 1 APPLICATION: 20 POWDER TOPICAL at 20:04

## 2017-01-01 RX ADMIN — LEVOTHYROXINE SODIUM 150 MCG: 150 TABLET ORAL at 06:22

## 2017-01-01 RX ADMIN — ACETAMINOPHEN 650 MG: 325 TABLET, FILM COATED ORAL at 20:31

## 2017-01-01 RX ADMIN — LEVETIRACETAM 500 MG: 500 TABLET, FILM COATED ORAL at 17:37

## 2017-01-01 RX ADMIN — MEGESTROL ACETATE 200 MG: 40 SUSPENSION ORAL at 08:55

## 2017-01-01 RX ADMIN — FUROSEMIDE 40 MG: 40 TABLET ORAL at 09:59

## 2017-01-01 RX ADMIN — METOPROLOL SUCCINATE 100 MG: 50 TABLET, EXTENDED RELEASE ORAL at 08:14

## 2017-01-01 RX ADMIN — FAMOTIDINE 40 MG: 20 TABLET ORAL at 21:50

## 2017-01-01 RX ADMIN — KETOTIFEN FUMARATE 1 DROP: 0.25 SOLUTION/ DROPS OPHTHALMIC at 17:21

## 2017-01-01 RX ADMIN — LEVETIRACETAM 500 MG: 500 TABLET, FILM COATED ORAL at 22:34

## 2017-01-01 RX ADMIN — GUAIFENESIN 1200 MG: 600 TABLET, EXTENDED RELEASE ORAL at 08:54

## 2017-01-01 RX ADMIN — GUAIFENESIN 1200 MG: 600 TABLET, EXTENDED RELEASE ORAL at 08:34

## 2017-01-01 RX ADMIN — Medication 150 MG: at 09:18

## 2017-01-01 RX ADMIN — KETOTIFEN FUMARATE 1 DROP: 0.25 SOLUTION/ DROPS OPHTHALMIC at 17:34

## 2017-01-01 RX ADMIN — ALLOPURINOL 100 MG: 100 TABLET ORAL at 08:35

## 2017-01-01 RX ADMIN — ALLOPURINOL 100 MG: 100 TABLET ORAL at 17:42

## 2017-01-01 RX ADMIN — ALLOPURINOL 100 MG: 100 TABLET ORAL at 21:22

## 2017-01-01 RX ADMIN — Medication 150 MG: at 17:46

## 2017-01-01 RX ADMIN — ISOSORBIDE DINITRATE 10 MG: 10 TABLET ORAL at 21:54

## 2017-01-01 RX ADMIN — SERTRALINE HYDROCHLORIDE 100 MG: 50 TABLET ORAL at 20:46

## 2017-01-01 RX ADMIN — TRAMADOL HYDROCHLORIDE 100 MG: 50 TABLET, FILM COATED ORAL at 17:36

## 2017-01-01 RX ADMIN — LEVETIRACETAM 500 MG: 500 TABLET, FILM COATED ORAL at 17:54

## 2017-01-01 RX ADMIN — BUDESONIDE 0.5 MG: 0.5 SUSPENSION RESPIRATORY (INHALATION) at 20:02

## 2017-01-01 RX ADMIN — MICONAZOLE NITRATE: 20 POWDER TOPICAL at 20:21

## 2017-01-01 RX ADMIN — MICONAZOLE NITRATE: 20 POWDER TOPICAL at 20:36

## 2017-01-01 RX ADMIN — Medication 1 TABLET: at 08:44

## 2017-01-01 RX ADMIN — METOPROLOL TARTRATE 50 MG: 50 TABLET, FILM COATED ORAL at 17:52

## 2017-01-01 RX ADMIN — Medication 150 MG: at 08:31

## 2017-01-01 RX ADMIN — POTASSIUM CHLORIDE 20 MEQ: 1500 TABLET, EXTENDED RELEASE ORAL at 08:28

## 2017-01-01 RX ADMIN — LEVETIRACETAM 500 MG: 500 TABLET, FILM COATED ORAL at 17:46

## 2017-01-01 RX ADMIN — POTASSIUM CHLORIDE 40 MEQ: 1500 TABLET, EXTENDED RELEASE ORAL at 18:55

## 2017-01-01 RX ADMIN — APIXABAN 5 MG: 2.5 TABLET, FILM COATED ORAL at 08:32

## 2017-01-01 RX ADMIN — METOPROLOL TARTRATE 25 MG: 25 TABLET, FILM COATED ORAL at 17:08

## 2017-01-01 RX ADMIN — MICONAZOLE NITRATE: 20 POWDER TOPICAL at 20:12

## 2017-01-01 RX ADMIN — METOPROLOL SUCCINATE 100 MG: 50 TABLET, EXTENDED RELEASE ORAL at 20:23

## 2017-01-01 RX ADMIN — METOPROLOL SUCCINATE 100 MG: 50 TABLET, EXTENDED RELEASE ORAL at 09:44

## 2017-01-01 RX ADMIN — FAMOTIDINE 20 MG: 20 TABLET, FILM COATED ORAL at 18:02

## 2017-01-01 RX ADMIN — GUAIFENESIN 1200 MG: 600 TABLET, EXTENDED RELEASE ORAL at 17:14

## 2017-01-01 RX ADMIN — MEGESTROL ACETATE 200 MG: 40 SUSPENSION ORAL at 16:51

## 2017-01-01 RX ADMIN — POTASSIUM CHLORIDE 40 MEQ: 1500 TABLET, EXTENDED RELEASE ORAL at 13:01

## 2017-01-01 RX ADMIN — FUROSEMIDE 40 MG: 40 TABLET ORAL at 08:13

## 2017-01-01 RX ADMIN — Medication 10 ML: at 09:49

## 2017-01-01 RX ADMIN — CONJUGATED ESTROGENS 1 APPLICATION: 0.62 CREAM VAGINAL at 20:47

## 2017-01-01 RX ADMIN — SENNOSIDES AND DOCUSATE SODIUM 1 TABLET: 8.6; 5 TABLET ORAL at 09:11

## 2017-01-01 RX ADMIN — ALLOPURINOL 100 MG: 100 TABLET ORAL at 09:19

## 2017-01-01 RX ADMIN — LEVETIRACETAM 500 MG: 500 TABLET, FILM COATED ORAL at 08:28

## 2017-01-01 RX ADMIN — MICONAZOLE NITRATE: 20 POWDER TOPICAL at 20:57

## 2017-01-01 RX ADMIN — Medication 150 MG: at 08:28

## 2017-01-01 RX ADMIN — METOPROLOL SUCCINATE 100 MG: 50 TABLET, EXTENDED RELEASE ORAL at 20:40

## 2017-01-01 RX ADMIN — KETOTIFEN FUMARATE 1 DROP: 0.35 SOLUTION/ DROPS OPHTHALMIC at 17:11

## 2017-01-01 RX ADMIN — LEVOTHYROXINE SODIUM 150 MCG: 150 TABLET ORAL at 05:42

## 2017-01-01 RX ADMIN — Medication 1 TABLET: at 08:00

## 2017-01-01 RX ADMIN — GUAIFENESIN 1200 MG: 600 TABLET, EXTENDED RELEASE ORAL at 10:30

## 2017-01-01 RX ADMIN — STANDARDIZED SENNA CONCENTRATE 1 TABLET: 8.6 TABLET ORAL at 17:56

## 2017-01-01 RX ADMIN — INSULIN ASPART 2 UNITS: 100 INJECTION, SOLUTION INTRAVENOUS; SUBCUTANEOUS at 20:33

## 2017-01-01 RX ADMIN — MICONAZOLE NITRATE: 20 POWDER TOPICAL at 09:47

## 2017-01-01 RX ADMIN — LEVETIRACETAM 500 MG: 500 TABLET, FILM COATED ORAL at 08:24

## 2017-01-01 RX ADMIN — STANDARDIZED SENNA CONCENTRATE 1 TABLET: 8.6 TABLET ORAL at 09:28

## 2017-01-01 RX ADMIN — IPRATROPIUM BROMIDE AND ALBUTEROL SULFATE 3 ML: .5; 3 SOLUTION RESPIRATORY (INHALATION) at 12:46

## 2017-01-01 RX ADMIN — FAMOTIDINE 20 MG: 20 TABLET ORAL at 09:11

## 2017-01-01 RX ADMIN — LINAGLIPTIN 5 MG: 5 TABLET, FILM COATED ORAL at 08:00

## 2017-01-01 RX ADMIN — Medication 1 TABLET: at 17:12

## 2017-01-01 RX ADMIN — ISOSORBIDE DINITRATE 10 MG: 10 TABLET ORAL at 11:09

## 2017-01-01 RX ADMIN — Medication 150 MG: at 17:30

## 2017-01-01 RX ADMIN — APIXABAN 5 MG: 2.5 TABLET, FILM COATED ORAL at 20:54

## 2017-01-01 RX ADMIN — Medication 150 MG: at 09:28

## 2017-01-01 RX ADMIN — LEVOTHYROXINE SODIUM 150 MCG: 150 TABLET ORAL at 05:20

## 2017-01-01 RX ADMIN — LEVETIRACETAM 500 MG: 500 TABLET, FILM COATED ORAL at 08:20

## 2017-01-01 RX ADMIN — GUAIFENESIN 1200 MG: 600 TABLET, EXTENDED RELEASE ORAL at 08:48

## 2017-01-01 RX ADMIN — LEVETIRACETAM 500 MG: 500 TABLET, FILM COATED ORAL at 09:57

## 2017-01-01 RX ADMIN — MICONAZOLE NITRATE: 20 POWDER TOPICAL at 10:51

## 2017-01-01 RX ADMIN — IPRATROPIUM BROMIDE AND ALBUTEROL SULFATE 3 ML: .5; 3 SOLUTION RESPIRATORY (INHALATION) at 13:14

## 2017-01-01 RX ADMIN — STANDARDIZED SENNA CONCENTRATE 1 TABLET: 8.6 TABLET ORAL at 18:01

## 2017-01-01 RX ADMIN — KETOTIFEN FUMARATE 1 DROP: 0.25 SOLUTION/ DROPS OPHTHALMIC at 09:09

## 2017-01-01 RX ADMIN — POTASSIUM CHLORIDE 20 MEQ: 1500 TABLET, EXTENDED RELEASE ORAL at 10:51

## 2017-01-01 RX ADMIN — ALLOPURINOL 100 MG: 100 TABLET ORAL at 10:24

## 2017-01-01 RX ADMIN — DILTIAZEM HYDROCHLORIDE 240 MG: 120 CAPSULE, COATED, EXTENDED RELEASE ORAL at 08:51

## 2017-01-01 RX ADMIN — AMIODARONE HYDROCHLORIDE 200 MG: 200 TABLET ORAL at 21:05

## 2017-01-01 RX ADMIN — Medication 1 TABLET: at 17:38

## 2017-01-01 RX ADMIN — IPRATROPIUM BROMIDE AND ALBUTEROL SULFATE 3 ML: .5; 3 SOLUTION RESPIRATORY (INHALATION) at 16:00

## 2017-01-01 RX ADMIN — DONEPEZIL HYDROCHLORIDE 10 MG: 5 TABLET ORAL at 20:54

## 2017-01-01 RX ADMIN — Medication 1 CAPSULE: at 09:19

## 2017-01-01 RX ADMIN — Medication 150 MG: at 18:07

## 2017-01-01 RX ADMIN — Medication 1 TABLET: at 18:19

## 2017-01-01 RX ADMIN — MEGESTROL ACETATE 200 MG: 40 SUSPENSION ORAL at 17:30

## 2017-01-01 RX ADMIN — KETOTIFEN FUMARATE 1 DROP: 0.25 SOLUTION/ DROPS OPHTHALMIC at 17:23

## 2017-01-01 RX ADMIN — IPRATROPIUM BROMIDE AND ALBUTEROL SULFATE 3 ML: .5; 3 SOLUTION RESPIRATORY (INHALATION) at 08:08

## 2017-01-01 RX ADMIN — TRAMADOL HYDROCHLORIDE 50 MG: 50 TABLET, FILM COATED ORAL at 21:10

## 2017-01-01 RX ADMIN — Medication 1 TABLET: at 08:37

## 2017-01-01 RX ADMIN — SENNOSIDES AND DOCUSATE SODIUM 1 TABLET: 8.6; 5 TABLET ORAL at 20:46

## 2017-01-01 RX ADMIN — KETOTIFEN FUMARATE 1 DROP: 0.35 SOLUTION/ DROPS OPHTHALMIC at 17:27

## 2017-01-01 RX ADMIN — SERTRALINE 100 MG: 50 TABLET, FILM COATED ORAL at 22:34

## 2017-01-01 RX ADMIN — ACETAMINOPHEN 650 MG: 325 TABLET, FILM COATED ORAL at 10:45

## 2017-01-01 RX ADMIN — METOPROLOL SUCCINATE 100 MG: 50 TABLET, EXTENDED RELEASE ORAL at 08:29

## 2017-01-01 RX ADMIN — BUDESONIDE 0.5 MG: 0.5 SUSPENSION RESPIRATORY (INHALATION) at 09:07

## 2017-01-01 RX ADMIN — DONEPEZIL HYDROCHLORIDE 10 MG: 5 TABLET, FILM COATED ORAL at 20:57

## 2017-01-01 RX ADMIN — INSULIN ASPART 3 UNITS: 100 INJECTION, SOLUTION INTRAVENOUS; SUBCUTANEOUS at 08:31

## 2017-01-01 RX ADMIN — METOPROLOL TARTRATE 100 MG: 25 TABLET, FILM COATED ORAL at 08:43

## 2017-01-01 RX ADMIN — DONEPEZIL HYDROCHLORIDE 10 MG: 5 TABLET ORAL at 20:33

## 2017-01-01 RX ADMIN — CONJUGATED ESTROGENS 0.75 APPLICATION: 0.62 CREAM VAGINAL at 21:19

## 2017-01-01 RX ADMIN — Medication 1 TABLET: at 08:43

## 2017-01-01 RX ADMIN — METOPROLOL TARTRATE 25 MG: 25 TABLET, FILM COATED ORAL at 06:55

## 2017-01-01 RX ADMIN — SERTRALINE 100 MG: 50 TABLET, FILM COATED ORAL at 20:34

## 2017-01-01 RX ADMIN — Medication 1 TABLET: at 08:48

## 2017-01-01 RX ADMIN — SERTRALINE 100 MG: 50 TABLET, FILM COATED ORAL at 20:39

## 2017-01-01 RX ADMIN — Medication 1 TABLET: at 09:20

## 2017-01-01 RX ADMIN — METOPROLOL SUCCINATE 100 MG: 50 TABLET, EXTENDED RELEASE ORAL at 09:05

## 2017-01-01 RX ADMIN — Medication 1 TABLET: at 09:19

## 2017-01-01 RX ADMIN — POTASSIUM CHLORIDE 40 MEQ: 1500 TABLET, EXTENDED RELEASE ORAL at 10:11

## 2017-01-01 RX ADMIN — DONEPEZIL HYDROCHLORIDE 10 MG: 5 TABLET, FILM COATED ORAL at 20:31

## 2017-01-01 RX ADMIN — DONEPEZIL HYDROCHLORIDE 10 MG: 5 TABLET, FILM COATED ORAL at 20:16

## 2017-01-01 RX ADMIN — MICONAZOLE NITRATE: 20 POWDER TOPICAL at 21:44

## 2017-01-01 RX ADMIN — DILTIAZEM HYDROCHLORIDE 300 MG: 180 CAPSULE, COATED, EXTENDED RELEASE ORAL at 12:09

## 2017-01-01 RX ADMIN — MEGESTROL ACETATE 200 MG: 40 SUSPENSION ORAL at 17:23

## 2017-01-01 RX ADMIN — POTASSIUM CHLORIDE 20 MEQ: 1500 TABLET, EXTENDED RELEASE ORAL at 17:50

## 2017-01-01 RX ADMIN — DIGOXIN 250 MCG: 0.25 INJECTION INTRAMUSCULAR; INTRAVENOUS at 13:43

## 2017-01-01 RX ADMIN — BUDESONIDE 0.5 MG: 0.5 SUSPENSION RESPIRATORY (INHALATION) at 09:39

## 2017-01-01 RX ADMIN — ISOSORBIDE DINITRATE 10 MG: 10 TABLET ORAL at 09:56

## 2017-01-01 RX ADMIN — ISOSORBIDE DINITRATE 10 MG: 10 TABLET ORAL at 20:56

## 2017-01-01 RX ADMIN — SERTRALINE 100 MG: 50 TABLET, FILM COATED ORAL at 21:33

## 2017-01-01 RX ADMIN — LEVOTHYROXINE SODIUM 150 MCG: 150 TABLET ORAL at 05:59

## 2017-01-01 RX ADMIN — ALLOPURINOL 100 MG: 100 TABLET ORAL at 08:30

## 2017-01-01 RX ADMIN — GUAIFENESIN 1200 MG: 600 TABLET, EXTENDED RELEASE ORAL at 09:53

## 2017-01-01 RX ADMIN — KETOTIFEN FUMARATE 1 DROP: 0.25 SOLUTION/ DROPS OPHTHALMIC at 08:42

## 2017-01-01 RX ADMIN — POTASSIUM CHLORIDE 10 MEQ: 750 TABLET, FILM COATED, EXTENDED RELEASE ORAL at 09:13

## 2017-01-01 RX ADMIN — KETOTIFEN FUMARATE 1 DROP: 0.25 SOLUTION/ DROPS OPHTHALMIC at 18:00

## 2017-01-01 RX ADMIN — ISOSORBIDE DINITRATE 10 MG: 10 TABLET ORAL at 08:31

## 2017-01-01 RX ADMIN — MICONAZOLE NITRATE: 20 POWDER TOPICAL at 20:29

## 2017-01-01 RX ADMIN — DILTIAZEM HYDROCHLORIDE 240 MG: 120 CAPSULE, COATED, EXTENDED RELEASE ORAL at 08:26

## 2017-01-01 RX ADMIN — BUDESONIDE 0.5 MG: 0.5 SUSPENSION RESPIRATORY (INHALATION) at 10:15

## 2017-01-01 RX ADMIN — Medication 1 TABLET: at 08:54

## 2017-01-01 RX ADMIN — DILTIAZEM HYDROCHLORIDE 240 MG: 120 CAPSULE, COATED, EXTENDED RELEASE ORAL at 08:41

## 2017-01-01 RX ADMIN — MICONAZOLE NITRATE: 20 POWDER TOPICAL at 09:00

## 2017-01-01 RX ADMIN — METOPROLOL SUCCINATE 100 MG: 50 TABLET, EXTENDED RELEASE ORAL at 09:20

## 2017-01-01 RX ADMIN — ALLOPURINOL 100 MG: 100 TABLET ORAL at 09:21

## 2017-01-01 RX ADMIN — METOPROLOL TARTRATE 75 MG: 25 TABLET, FILM COATED ORAL at 08:17

## 2017-01-01 RX ADMIN — GUAIFENESIN 1200 MG: 600 TABLET, EXTENDED RELEASE ORAL at 08:14

## 2017-01-01 RX ADMIN — LEVETIRACETAM 500 MG: 500 TABLET, FILM COATED ORAL at 22:44

## 2017-01-01 RX ADMIN — KETOTIFEN FUMARATE 1 DROP: 0.25 SOLUTION/ DROPS OPHTHALMIC at 17:04

## 2017-01-01 RX ADMIN — ISOSORBIDE DINITRATE 10 MG: 10 TABLET ORAL at 09:19

## 2017-01-01 RX ADMIN — IPRATROPIUM BROMIDE AND ALBUTEROL SULFATE 3 ML: .5; 3 SOLUTION RESPIRATORY (INHALATION) at 12:30

## 2017-01-01 RX ADMIN — LEVETIRACETAM 500 MG: 500 TABLET, FILM COATED ORAL at 17:15

## 2017-01-01 RX ADMIN — SENNOSIDES AND DOCUSATE SODIUM 1 TABLET: 8.6; 5 TABLET ORAL at 09:03

## 2017-01-01 RX ADMIN — MICONAZOLE NITRATE: 20 POWDER TOPICAL at 21:02

## 2017-01-01 RX ADMIN — Medication 150 MG: at 18:49

## 2017-01-01 RX ADMIN — TERAZOSIN HYDROCHLORIDE 2 MG: 1 CAPSULE ORAL at 20:42

## 2017-01-01 RX ADMIN — CONJUGATED ESTROGENS 0.75 APPLICATOR: 0.62 CREAM VAGINAL at 20:24

## 2017-01-01 RX ADMIN — FUROSEMIDE 40 MG: 40 TABLET ORAL at 09:01

## 2017-01-01 RX ADMIN — APIXABAN 5 MG: 2.5 TABLET, FILM COATED ORAL at 10:14

## 2017-01-01 RX ADMIN — KETOTIFEN FUMARATE 1 DROP: 0.25 SOLUTION/ DROPS OPHTHALMIC at 17:24

## 2017-01-01 RX ADMIN — SENNOSIDES AND DOCUSATE SODIUM 1 TABLET: 8.6; 5 TABLET ORAL at 21:02

## 2017-01-01 RX ADMIN — BUDESONIDE 0.5 MG: 0.5 SUSPENSION RESPIRATORY (INHALATION) at 20:13

## 2017-01-01 RX ADMIN — IPRATROPIUM BROMIDE AND ALBUTEROL SULFATE 3 ML: .5; 3 SOLUTION RESPIRATORY (INHALATION) at 20:19

## 2017-01-01 RX ADMIN — LEVETIRACETAM 500 MG: 500 TABLET, FILM COATED ORAL at 20:55

## 2017-01-01 RX ADMIN — IPRATROPIUM BROMIDE AND ALBUTEROL SULFATE 3 ML: .5; 3 SOLUTION RESPIRATORY (INHALATION) at 16:41

## 2017-01-01 RX ADMIN — BUDESONIDE 0.5 MG: 0.5 SUSPENSION RESPIRATORY (INHALATION) at 22:25

## 2017-01-01 RX ADMIN — LEVETIRACETAM 500 MG: 500 TABLET, FILM COATED ORAL at 08:42

## 2017-01-01 RX ADMIN — ALLOPURINOL 100 MG: 100 TABLET ORAL at 09:49

## 2017-01-01 RX ADMIN — GUAIFENESIN 1200 MG: 600 TABLET, EXTENDED RELEASE ORAL at 10:09

## 2017-01-01 RX ADMIN — ALLOPURINOL 100 MG: 100 TABLET ORAL at 20:58

## 2017-01-01 RX ADMIN — FUROSEMIDE 40 MG: 40 TABLET ORAL at 09:40

## 2017-01-01 RX ADMIN — LEVETIRACETAM 500 MG: 500 TABLET, FILM COATED ORAL at 08:41

## 2017-01-01 RX ADMIN — ISOSORBIDE DINITRATE 10 MG: 10 TABLET ORAL at 23:00

## 2017-01-01 RX ADMIN — MICONAZOLE NITRATE: 20 POWDER TOPICAL at 08:47

## 2017-01-01 RX ADMIN — ALLOPURINOL 100 MG: 100 TABLET ORAL at 22:03

## 2017-01-01 RX ADMIN — LEVOTHYROXINE SODIUM 150 MCG: 150 TABLET ORAL at 06:12

## 2017-01-01 RX ADMIN — FAMOTIDINE 40 MG: 20 TABLET ORAL at 20:30

## 2017-01-01 RX ADMIN — ALLOPURINOL 100 MG: 100 TABLET ORAL at 10:45

## 2017-01-01 RX ADMIN — MICONAZOLE NITRATE: 20 POWDER TOPICAL at 20:40

## 2017-01-01 RX ADMIN — MICONAZOLE NITRATE: 20 POWDER TOPICAL at 10:01

## 2017-01-01 RX ADMIN — IRON SUCROSE 200 MG: 20 INJECTION, SOLUTION INTRAVENOUS at 07:54

## 2017-01-01 RX ADMIN — GUAIFENESIN 1200 MG: 600 TABLET, EXTENDED RELEASE ORAL at 09:48

## 2017-01-01 RX ADMIN — Medication 10 ML: at 20:37

## 2017-01-01 RX ADMIN — LEVOTHYROXINE SODIUM 150 MCG: 150 TABLET ORAL at 09:14

## 2017-01-01 RX ADMIN — Medication 10 ML: at 20:42

## 2017-01-01 RX ADMIN — METOPROLOL SUCCINATE 100 MG: 50 TABLET, EXTENDED RELEASE ORAL at 20:54

## 2017-01-01 RX ADMIN — Medication 150 MG: at 08:57

## 2017-01-01 RX ADMIN — Medication 1 TABLET: at 19:03

## 2017-01-01 RX ADMIN — LEVETIRACETAM 500 MG: 500 TABLET, FILM COATED ORAL at 08:38

## 2017-01-01 RX ADMIN — DIPHENHYDRAMINE HYDROCHLORIDE 25 MG: 25 CAPSULE ORAL at 06:26

## 2017-01-01 RX ADMIN — BUDESONIDE 0.5 MG: 0.5 SUSPENSION RESPIRATORY (INHALATION) at 07:58

## 2017-01-01 RX ADMIN — APIXABAN 5 MG: 2.5 TABLET, FILM COATED ORAL at 07:52

## 2017-01-01 RX ADMIN — ALLOPURINOL 100 MG: 100 TABLET ORAL at 09:03

## 2017-01-01 RX ADMIN — STANDARDIZED SENNA CONCENTRATE 1 TABLET: 8.6 TABLET ORAL at 17:28

## 2017-01-01 RX ADMIN — Medication 1 CAPSULE: at 08:12

## 2017-01-01 RX ADMIN — LEVETIRACETAM 500 MG: 500 TABLET, FILM COATED ORAL at 12:11

## 2017-01-01 RX ADMIN — DONEPEZIL HYDROCHLORIDE 10 MG: 5 TABLET ORAL at 21:29

## 2017-01-01 RX ADMIN — METOPROLOL SUCCINATE 100 MG: 50 TABLET, EXTENDED RELEASE ORAL at 21:28

## 2017-01-01 RX ADMIN — DILTIAZEM HYDROCHLORIDE 300 MG: 180 CAPSULE, COATED, EXTENDED RELEASE ORAL at 11:55

## 2017-01-01 RX ADMIN — Medication 1 CAPSULE: at 11:18

## 2017-01-01 RX ADMIN — MICONAZOLE NITRATE: 20 POWDER TOPICAL at 09:44

## 2017-01-01 RX ADMIN — INSULIN ASPART 2 UNITS: 100 INJECTION, SOLUTION INTRAVENOUS; SUBCUTANEOUS at 09:33

## 2017-01-01 RX ADMIN — ISOSORBIDE DINITRATE 10 MG: 10 TABLET ORAL at 08:27

## 2017-01-01 RX ADMIN — ISOSORBIDE DINITRATE 10 MG: 10 TABLET ORAL at 08:21

## 2017-01-01 RX ADMIN — TRAMADOL HYDROCHLORIDE 50 MG: 50 TABLET, FILM COATED ORAL at 00:44

## 2017-01-01 RX ADMIN — APIXABAN 5 MG: 2.5 TABLET, FILM COATED ORAL at 08:48

## 2017-01-01 RX ADMIN — DIPHENHYDRAMINE HYDROCHLORIDE 25 MG: 25 CAPSULE ORAL at 20:12

## 2017-01-01 RX ADMIN — Medication 150 MG: at 07:47

## 2017-01-01 RX ADMIN — FAMOTIDINE 20 MG: 20 TABLET, FILM COATED ORAL at 17:34

## 2017-01-01 RX ADMIN — FUROSEMIDE 40 MG: 40 TABLET ORAL at 09:02

## 2017-01-01 RX ADMIN — SERTRALINE 100 MG: 50 TABLET, FILM COATED ORAL at 20:01

## 2017-01-01 RX ADMIN — ACETAMINOPHEN 650 MG: 325 TABLET, FILM COATED ORAL at 21:52

## 2017-01-01 RX ADMIN — IPRATROPIUM BROMIDE AND ALBUTEROL SULFATE 3 ML: .5; 3 SOLUTION RESPIRATORY (INHALATION) at 16:49

## 2017-01-01 RX ADMIN — Medication 1 CAPSULE: at 09:25

## 2017-01-01 RX ADMIN — CONJUGATED ESTROGENS 1 APPLICATION: 0.62 CREAM VAGINAL at 21:42

## 2017-01-01 RX ADMIN — Medication 1 CAPSULE: at 09:23

## 2017-01-01 RX ADMIN — FUROSEMIDE 40 MG: 40 TABLET ORAL at 09:39

## 2017-01-01 RX ADMIN — SERTRALINE 100 MG: 50 TABLET, FILM COATED ORAL at 20:23

## 2017-01-01 RX ADMIN — Medication 150 MG: at 18:11

## 2017-01-01 RX ADMIN — BUDESONIDE 0.5 MG: 0.5 SUSPENSION RESPIRATORY (INHALATION) at 10:17

## 2017-01-01 RX ADMIN — METOPROLOL TARTRATE 75 MG: 25 TABLET, FILM COATED ORAL at 09:10

## 2017-01-01 RX ADMIN — DILTIAZEM HYDROCHLORIDE 300 MG: 180 CAPSULE, COATED, EXTENDED RELEASE ORAL at 13:15

## 2017-01-01 RX ADMIN — APIXABAN 5 MG: 2.5 TABLET, FILM COATED ORAL at 20:37

## 2017-01-01 RX ADMIN — FAMOTIDINE 40 MG: 20 TABLET ORAL at 22:47

## 2017-01-01 RX ADMIN — LEVETIRACETAM 500 MG: 500 TABLET, FILM COATED ORAL at 21:02

## 2017-01-01 RX ADMIN — FUROSEMIDE 40 MG: 40 TABLET ORAL at 09:53

## 2017-01-01 RX ADMIN — SENNOSIDES AND DOCUSATE SODIUM 1 TABLET: 8.6; 5 TABLET ORAL at 21:45

## 2017-01-01 RX ADMIN — GUAIFENESIN 1200 MG: 600 TABLET, EXTENDED RELEASE ORAL at 08:08

## 2017-01-01 RX ADMIN — Medication 150 MG: at 07:42

## 2017-01-01 RX ADMIN — LEVOTHYROXINE SODIUM 150 MCG: 150 TABLET ORAL at 06:45

## 2017-01-01 RX ADMIN — LEVETIRACETAM 500 MG: 500 TABLET, FILM COATED ORAL at 20:35

## 2017-01-01 RX ADMIN — Medication 1 CAPSULE: at 10:30

## 2017-01-01 RX ADMIN — MEGESTROL ACETATE 200 MG: 40 SUSPENSION ORAL at 08:54

## 2017-01-01 RX ADMIN — LEVETIRACETAM 500 MG: 500 TABLET, FILM COATED ORAL at 20:29

## 2017-01-01 RX ADMIN — APIXABAN 5 MG: 2.5 TABLET, FILM COATED ORAL at 10:49

## 2017-01-01 RX ADMIN — FUROSEMIDE 40 MG: 40 TABLET ORAL at 08:48

## 2017-01-01 RX ADMIN — Medication 150 MG: at 09:36

## 2017-01-01 RX ADMIN — ALLOPURINOL 100 MG: 100 TABLET ORAL at 17:41

## 2017-01-01 RX ADMIN — DONEPEZIL HYDROCHLORIDE 10 MG: 5 TABLET, FILM COATED ORAL at 21:45

## 2017-01-01 RX ADMIN — MICONAZOLE NITRATE: 20 POWDER TOPICAL at 20:04

## 2017-01-01 RX ADMIN — BUDESONIDE 0.5 MG: 0.5 SUSPENSION RESPIRATORY (INHALATION) at 20:27

## 2017-01-01 RX ADMIN — METOPROLOL TARTRATE 25 MG: 25 TABLET ORAL at 18:38

## 2017-01-01 RX ADMIN — Medication 1 TABLET: at 09:34

## 2017-01-01 RX ADMIN — DONEPEZIL HYDROCHLORIDE 10 MG: 5 TABLET ORAL at 20:34

## 2017-01-01 RX ADMIN — MICONAZOLE NITRATE: 20 POWDER TOPICAL at 20:54

## 2017-01-01 RX ADMIN — POTASSIUM CHLORIDE 20 MEQ: 1500 TABLET, EXTENDED RELEASE ORAL at 10:44

## 2017-01-01 RX ADMIN — AMIODARONE HYDROCHLORIDE 200 MG: 200 TABLET ORAL at 09:48

## 2017-01-01 RX ADMIN — KETOTIFEN FUMARATE 1 DROP: 0.25 SOLUTION/ DROPS OPHTHALMIC at 17:29

## 2017-01-01 RX ADMIN — LEVOTHYROXINE SODIUM 150 MCG: 150 TABLET ORAL at 05:33

## 2017-01-01 RX ADMIN — MICONAZOLE NITRATE: 20 POWDER TOPICAL at 21:59

## 2017-01-01 RX ADMIN — FAMOTIDINE 20 MG: 20 TABLET, FILM COATED ORAL at 17:13

## 2017-01-01 RX ADMIN — CALCIUM CARBONATE 500 MG (1,250 MG)-VITAMIN D3 200 UNIT TABLET 500 MG: at 22:42

## 2017-01-01 RX ADMIN — LEVOTHYROXINE SODIUM 150 MCG: 150 TABLET ORAL at 06:06

## 2017-01-01 RX ADMIN — BUDESONIDE 0.5 MG: 0.5 SUSPENSION RESPIRATORY (INHALATION) at 08:41

## 2017-01-01 RX ADMIN — Medication 1 CAPSULE: at 09:32

## 2017-01-01 RX ADMIN — Medication 150 MG: at 08:32

## 2017-01-01 RX ADMIN — FUROSEMIDE 40 MG: 10 INJECTION, SOLUTION INTRAMUSCULAR; INTRAVENOUS at 08:24

## 2017-01-01 RX ADMIN — GUAIFENESIN 1200 MG: 600 TABLET, EXTENDED RELEASE ORAL at 18:35

## 2017-01-01 RX ADMIN — FAMOTIDINE 20 MG: 20 TABLET, FILM COATED ORAL at 17:41

## 2017-01-01 RX ADMIN — TRAMADOL HYDROCHLORIDE 100 MG: 50 TABLET, FILM COATED ORAL at 06:11

## 2017-01-01 RX ADMIN — DONEPEZIL HYDROCHLORIDE 10 MG: 5 TABLET ORAL at 20:03

## 2017-01-01 RX ADMIN — ALLOPURINOL 100 MG: 100 TABLET ORAL at 12:12

## 2017-01-01 RX ADMIN — KETOTIFEN FUMARATE 1 DROP: 0.25 SOLUTION/ DROPS OPHTHALMIC at 09:53

## 2017-01-01 RX ADMIN — FUROSEMIDE 40 MG: 40 TABLET ORAL at 09:09

## 2017-01-01 RX ADMIN — APIXABAN 5 MG: 2.5 TABLET, FILM COATED ORAL at 21:31

## 2017-01-01 RX ADMIN — ALLOPURINOL 100 MG: 100 TABLET ORAL at 10:09

## 2017-01-01 RX ADMIN — STANDARDIZED SENNA CONCENTRATE 1 TABLET: 8.6 TABLET ORAL at 08:23

## 2017-01-01 RX ADMIN — METOPROLOL TARTRATE 2.5 MG: 1 INJECTION, SOLUTION INTRAVENOUS at 01:21

## 2017-01-01 RX ADMIN — IPRATROPIUM BROMIDE AND ALBUTEROL SULFATE 3 ML: .5; 3 SOLUTION RESPIRATORY (INHALATION) at 09:39

## 2017-01-01 RX ADMIN — GUAIFENESIN 1200 MG: 600 TABLET, EXTENDED RELEASE ORAL at 08:46

## 2017-01-01 RX ADMIN — GUAIFENESIN 1200 MG: 600 TABLET, EXTENDED RELEASE ORAL at 17:22

## 2017-01-01 RX ADMIN — ALLOPURINOL 100 MG: 100 TABLET ORAL at 20:33

## 2017-01-01 RX ADMIN — Medication 150 MG: at 18:27

## 2017-01-01 RX ADMIN — DILTIAZEM HYDROCHLORIDE 120 MG: 60 TABLET, FILM COATED ORAL at 17:38

## 2017-01-01 RX ADMIN — Medication 1 TABLET: at 08:09

## 2017-01-01 RX ADMIN — ALLOPURINOL 100 MG: 100 TABLET ORAL at 09:09

## 2017-01-01 RX ADMIN — ALLOPURINOL 100 MG: 100 TABLET ORAL at 20:41

## 2017-01-01 RX ADMIN — DIPHENHYDRAMINE HYDROCHLORIDE 25 MG: 25 CAPSULE ORAL at 13:19

## 2017-01-01 RX ADMIN — ISOSORBIDE DINITRATE 10 MG: 10 TABLET ORAL at 20:38

## 2017-01-01 RX ADMIN — POTASSIUM CHLORIDE 20 MEQ: 1500 TABLET, EXTENDED RELEASE ORAL at 08:41

## 2017-01-01 RX ADMIN — FAMOTIDINE 40 MG: 20 TABLET ORAL at 20:38

## 2017-01-01 RX ADMIN — FAMOTIDINE 40 MG: 20 TABLET ORAL at 20:08

## 2017-01-01 RX ADMIN — ALLOPURINOL 100 MG: 100 TABLET ORAL at 08:32

## 2017-01-01 RX ADMIN — DICLOFENAC SODIUM 4 G: 10 GEL TOPICAL at 18:24

## 2017-01-01 RX ADMIN — FAMOTIDINE 20 MG: 20 TABLET, FILM COATED ORAL at 17:17

## 2017-01-01 RX ADMIN — ALLOPURINOL 100 MG: 100 TABLET ORAL at 17:17

## 2017-01-01 RX ADMIN — SENNOSIDES AND DOCUSATE SODIUM 1 TABLET: 8.6; 5 TABLET ORAL at 20:31

## 2017-01-01 RX ADMIN — LEVETIRACETAM 500 MG: 500 TABLET, FILM COATED ORAL at 20:45

## 2017-01-01 RX ADMIN — SENNOSIDES AND DOCUSATE SODIUM 1 TABLET: 8.6; 5 TABLET ORAL at 08:37

## 2017-01-01 RX ADMIN — ALLOPURINOL 100 MG: 100 TABLET ORAL at 09:29

## 2017-01-01 RX ADMIN — CLINDAMYCIN HYDROCHLORIDE 450 MG: 150 CAPSULE ORAL at 21:41

## 2017-01-01 RX ADMIN — MICONAZOLE NITRATE: 20 POWDER TOPICAL at 08:08

## 2017-01-01 RX ADMIN — FUROSEMIDE 40 MG: 40 TABLET ORAL at 08:00

## 2017-01-01 RX ADMIN — BUDESONIDE 0.5 MG: 0.5 SUSPENSION RESPIRATORY (INHALATION) at 22:44

## 2017-01-01 RX ADMIN — DILTIAZEM HYDROCHLORIDE 300 MG: 180 CAPSULE, COATED, EXTENDED RELEASE ORAL at 12:25

## 2017-01-01 RX ADMIN — Medication 150 MG: at 17:40

## 2017-01-01 RX ADMIN — Medication 1 TABLET: at 19:19

## 2017-01-01 RX ADMIN — Medication 1 CAPSULE: at 08:14

## 2017-01-01 RX ADMIN — KETOTIFEN FUMARATE 1 DROP: 0.25 SOLUTION/ DROPS OPHTHALMIC at 17:27

## 2017-01-01 RX ADMIN — BUDESONIDE 0.5 MG: 0.5 SUSPENSION RESPIRATORY (INHALATION) at 08:52

## 2017-01-01 RX ADMIN — ALLOPURINOL 100 MG: 100 TABLET ORAL at 23:32

## 2017-01-01 RX ADMIN — APIXABAN 5 MG: 2.5 TABLET, FILM COATED ORAL at 08:40

## 2017-01-01 RX ADMIN — MICONAZOLE NITRATE: 20 POWDER TOPICAL at 22:33

## 2017-01-01 RX ADMIN — ACETAMINOPHEN 650 MG: 325 TABLET, FILM COATED ORAL at 05:18

## 2017-01-01 RX ADMIN — APIXABAN 5 MG: 2.5 TABLET, FILM COATED ORAL at 08:53

## 2017-01-01 RX ADMIN — IPRATROPIUM BROMIDE AND ALBUTEROL SULFATE 3 ML: .5; 3 SOLUTION RESPIRATORY (INHALATION) at 17:43

## 2017-01-01 RX ADMIN — ALLOPURINOL 100 MG: 100 TABLET ORAL at 08:34

## 2017-01-01 RX ADMIN — Medication 150 MG: at 08:27

## 2017-01-01 RX ADMIN — FAMOTIDINE 40 MG: 20 TABLET, FILM COATED ORAL at 21:11

## 2017-01-01 RX ADMIN — STANDARDIZED SENNA CONCENTRATE 1 TABLET: 8.6 TABLET ORAL at 09:53

## 2017-01-01 RX ADMIN — ISOSORBIDE DINITRATE 10 MG: 10 TABLET ORAL at 21:29

## 2017-01-01 RX ADMIN — MICONAZOLE NITRATE: 20 POWDER TOPICAL at 22:15

## 2017-01-01 RX ADMIN — LEVETIRACETAM 500 MG: 500 TABLET, FILM COATED ORAL at 08:34

## 2017-01-01 RX ADMIN — APIXABAN 5 MG: 2.5 TABLET, FILM COATED ORAL at 09:53

## 2017-01-01 RX ADMIN — DICLOFENAC SODIUM 4 G: 10 GEL TOPICAL at 21:49

## 2017-01-01 RX ADMIN — Medication 1 TABLET: at 16:25

## 2017-01-01 RX ADMIN — LINAGLIPTIN 5 MG: 5 TABLET, FILM COATED ORAL at 08:14

## 2017-01-01 RX ADMIN — ISOSORBIDE DINITRATE 10 MG: 10 TABLET ORAL at 20:20

## 2017-01-01 RX ADMIN — LEVETIRACETAM 500 MG: 500 TABLET, FILM COATED ORAL at 20:15

## 2017-01-01 RX ADMIN — LEVETIRACETAM 500 MG: 500 TABLET, FILM COATED ORAL at 21:09

## 2017-01-01 RX ADMIN — LINAGLIPTIN 5 MG: 5 TABLET, FILM COATED ORAL at 08:18

## 2017-01-01 RX ADMIN — MICONAZOLE NITRATE: 20 POWDER TOPICAL at 22:04

## 2017-01-01 RX ADMIN — LEVETIRACETAM 500 MG: 500 TABLET, FILM COATED ORAL at 10:35

## 2017-01-01 RX ADMIN — LEVOTHYROXINE SODIUM 150 MCG: 150 TABLET ORAL at 06:01

## 2017-01-01 RX ADMIN — KETOTIFEN FUMARATE 1 DROP: 0.25 SOLUTION/ DROPS OPHTHALMIC at 17:09

## 2017-01-01 RX ADMIN — Medication 1 CAPSULE: at 09:16

## 2017-01-01 RX ADMIN — BUDESONIDE 0.5 MG: 0.5 SUSPENSION RESPIRATORY (INHALATION) at 07:37

## 2017-01-01 RX ADMIN — BUDESONIDE 0.5 MG: 0.5 SUSPENSION RESPIRATORY (INHALATION) at 20:33

## 2017-01-01 RX ADMIN — METOPROLOL SUCCINATE 100 MG: 50 TABLET, EXTENDED RELEASE ORAL at 08:32

## 2017-01-01 RX ADMIN — ISOSORBIDE DINITRATE 10 MG: 10 TABLET ORAL at 20:14

## 2017-01-01 RX ADMIN — APIXABAN 5 MG: 2.5 TABLET, FILM COATED ORAL at 08:21

## 2017-01-01 RX ADMIN — AMIODARONE HYDROCHLORIDE 200 MG: 200 TABLET ORAL at 17:30

## 2017-01-01 RX ADMIN — LEVETIRACETAM 500 MG: 500 TABLET, FILM COATED ORAL at 17:13

## 2017-01-01 RX ADMIN — Medication 10 ML: at 09:25

## 2017-01-01 RX ADMIN — BUDESONIDE 0.5 MG: 0.5 SUSPENSION RESPIRATORY (INHALATION) at 19:33

## 2017-01-01 RX ADMIN — INSULIN ASPART 3 UNITS: 100 INJECTION, SOLUTION INTRAVENOUS; SUBCUTANEOUS at 13:04

## 2017-01-01 RX ADMIN — IPRATROPIUM BROMIDE AND ALBUTEROL SULFATE 3 ML: .5; 3 SOLUTION RESPIRATORY (INHALATION) at 22:30

## 2017-01-01 RX ADMIN — MICONAZOLE NITRATE: 20 POWDER TOPICAL at 09:04

## 2017-01-01 RX ADMIN — APIXABAN 5 MG: 2.5 TABLET, FILM COATED ORAL at 19:59

## 2017-01-01 RX ADMIN — MICONAZOLE NITRATE: 20 POWDER TOPICAL at 21:41

## 2017-01-01 RX ADMIN — Medication 1 TABLET: at 09:27

## 2017-01-01 RX ADMIN — ISOSORBIDE DINITRATE 10 MG: 10 TABLET ORAL at 20:31

## 2017-01-01 RX ADMIN — IPRATROPIUM BROMIDE AND ALBUTEROL SULFATE 3 ML: .5; 3 SOLUTION RESPIRATORY (INHALATION) at 07:27

## 2017-01-01 RX ADMIN — BUDESONIDE 0.5 MG: 0.5 SUSPENSION RESPIRATORY (INHALATION) at 20:37

## 2017-01-01 RX ADMIN — Medication 150 MG: at 09:26

## 2017-01-01 RX ADMIN — GUAIFENESIN 1200 MG: 600 TABLET, EXTENDED RELEASE ORAL at 17:49

## 2017-01-01 RX ADMIN — INSULIN ASPART 2 UNITS: 100 INJECTION, SOLUTION INTRAVENOUS; SUBCUTANEOUS at 12:30

## 2017-01-01 RX ADMIN — FUROSEMIDE 40 MG: 40 TABLET ORAL at 08:33

## 2017-01-01 RX ADMIN — SENNOSIDES AND DOCUSATE SODIUM 1 TABLET: 8.6; 5 TABLET ORAL at 20:39

## 2017-01-01 RX ADMIN — ALLOPURINOL 100 MG: 100 TABLET ORAL at 08:00

## 2017-01-01 RX ADMIN — POTASSIUM CHLORIDE 10 MEQ: 750 TABLET, FILM COATED, EXTENDED RELEASE ORAL at 08:35

## 2017-01-01 RX ADMIN — LEVOTHYROXINE SODIUM 150 MCG: 150 TABLET ORAL at 06:05

## 2017-01-01 RX ADMIN — FAMOTIDINE 40 MG: 20 TABLET ORAL at 20:09

## 2017-01-01 RX ADMIN — KETOTIFEN FUMARATE 1 DROP: 0.25 SOLUTION/ DROPS OPHTHALMIC at 08:41

## 2017-01-01 RX ADMIN — GUAIFENESIN 1200 MG: 600 TABLET, EXTENDED RELEASE ORAL at 17:54

## 2017-01-01 RX ADMIN — APIXABAN 5 MG: 2.5 TABLET, FILM COATED ORAL at 10:41

## 2017-01-01 RX ADMIN — APIXABAN 5 MG: 2.5 TABLET, FILM COATED ORAL at 20:31

## 2017-01-01 RX ADMIN — Medication 1 TABLET: at 17:56

## 2017-01-01 RX ADMIN — SODIUM CHLORIDE 125 ML/HR: 9 INJECTION, SOLUTION INTRAVENOUS at 07:15

## 2017-01-01 RX ADMIN — FUROSEMIDE 40 MG: 10 INJECTION, SOLUTION INTRAMUSCULAR; INTRAVENOUS at 09:15

## 2017-01-01 RX ADMIN — POTASSIUM CHLORIDE 20 MEQ: 1500 TABLET, EXTENDED RELEASE ORAL at 09:52

## 2017-01-01 RX ADMIN — POTASSIUM CHLORIDE 20 MEQ: 1500 TABLET, EXTENDED RELEASE ORAL at 08:10

## 2017-01-01 RX ADMIN — ACETAMINOPHEN 650 MG: 325 TABLET, FILM COATED ORAL at 20:25

## 2017-01-01 RX ADMIN — LEVETIRACETAM 500 MG: 500 TABLET, FILM COATED ORAL at 23:33

## 2017-01-01 RX ADMIN — GUAIFENESIN 1200 MG: 600 TABLET, EXTENDED RELEASE ORAL at 08:32

## 2017-01-01 RX ADMIN — CALCIUM CARBONATE 500 MG (1,250 MG)-VITAMIN D3 200 UNIT TABLET 500 MG: at 08:45

## 2017-01-01 RX ADMIN — MICONAZOLE NITRATE: 20 POWDER TOPICAL at 20:10

## 2017-01-01 RX ADMIN — POTASSIUM CHLORIDE 20 MEQ: 1500 TABLET, EXTENDED RELEASE ORAL at 08:54

## 2017-01-01 RX ADMIN — DONEPEZIL HYDROCHLORIDE 10 MG: 5 TABLET ORAL at 20:40

## 2017-01-01 RX ADMIN — ALLOPURINOL 100 MG: 100 TABLET ORAL at 20:29

## 2017-01-01 RX ADMIN — FAMOTIDINE 40 MG: 20 TABLET ORAL at 20:27

## 2017-01-01 RX ADMIN — IPRATROPIUM BROMIDE AND ALBUTEROL SULFATE 3 ML: .5; 3 SOLUTION RESPIRATORY (INHALATION) at 17:30

## 2017-01-01 RX ADMIN — ASPIRIN 81 MG: 81 TABLET, COATED ORAL at 09:02

## 2017-01-01 RX ADMIN — MICONAZOLE NITRATE: 20 POWDER TOPICAL at 20:43

## 2017-01-01 RX ADMIN — GUAIFENESIN 1200 MG: 600 TABLET, EXTENDED RELEASE ORAL at 17:42

## 2017-01-01 RX ADMIN — Medication 1 CAPSULE: at 09:48

## 2017-01-01 RX ADMIN — DILTIAZEM HYDROCHLORIDE 240 MG: 120 CAPSULE, COATED, EXTENDED RELEASE ORAL at 09:30

## 2017-01-01 RX ADMIN — METOPROLOL SUCCINATE 100 MG: 50 TABLET, EXTENDED RELEASE ORAL at 20:16

## 2017-01-01 RX ADMIN — SENNOSIDES AND DOCUSATE SODIUM 1 TABLET: 8.6; 5 TABLET ORAL at 20:43

## 2017-01-01 RX ADMIN — ISOSORBIDE DINITRATE 10 MG: 10 TABLET ORAL at 08:28

## 2017-01-01 RX ADMIN — ACETAMINOPHEN 650 MG: 325 TABLET, FILM COATED ORAL at 20:37

## 2017-01-01 RX ADMIN — INSULIN ASPART 2 UNITS: 100 INJECTION, SOLUTION INTRAVENOUS; SUBCUTANEOUS at 17:10

## 2017-01-01 RX ADMIN — FAMOTIDINE 20 MG: 20 TABLET, FILM COATED ORAL at 08:37

## 2017-01-01 RX ADMIN — MICONAZOLE NITRATE: 20 POWDER TOPICAL at 08:32

## 2017-01-01 RX ADMIN — IPRATROPIUM BROMIDE AND ALBUTEROL SULFATE 3 ML: .5; 3 SOLUTION RESPIRATORY (INHALATION) at 17:29

## 2017-01-01 RX ADMIN — POTASSIUM CHLORIDE 20 MEQ: 1500 TABLET, EXTENDED RELEASE ORAL at 10:31

## 2017-01-01 RX ADMIN — SENNOSIDES AND DOCUSATE SODIUM 1 TABLET: 8.6; 5 TABLET ORAL at 20:54

## 2017-01-01 RX ADMIN — ERGOCALCIFEROL 50000 UNITS: 1.25 CAPSULE ORAL at 16:24

## 2017-01-01 RX ADMIN — ASPIRIN 81 MG: 81 TABLET, COATED ORAL at 08:20

## 2017-01-01 RX ADMIN — Medication 10 ML: at 20:22

## 2017-01-01 RX ADMIN — IPRATROPIUM BROMIDE AND ALBUTEROL SULFATE 3 ML: .5; 3 SOLUTION RESPIRATORY (INHALATION) at 17:00

## 2017-01-01 RX ADMIN — ALLOPURINOL 100 MG: 100 TABLET ORAL at 20:49

## 2017-01-01 RX ADMIN — LEVETIRACETAM 500 MG: 500 TABLET, FILM COATED ORAL at 20:09

## 2017-01-01 RX ADMIN — Medication 1 TABLET: at 09:50

## 2017-01-01 RX ADMIN — ALLOPURINOL 100 MG: 100 TABLET ORAL at 10:05

## 2017-01-01 RX ADMIN — MICONAZOLE NITRATE: 20 POWDER TOPICAL at 20:31

## 2017-01-01 RX ADMIN — IPRATROPIUM BROMIDE AND ALBUTEROL SULFATE 3 ML: .5; 3 SOLUTION RESPIRATORY (INHALATION) at 21:32

## 2017-01-01 RX ADMIN — METOPROLOL TARTRATE 100 MG: 50 TABLET, FILM COATED ORAL at 08:40

## 2017-01-01 RX ADMIN — SENNOSIDES AND DOCUSATE SODIUM 1 TABLET: 8.6; 5 TABLET ORAL at 08:25

## 2017-01-01 RX ADMIN — DOXAZOSIN MESYLATE 2 MG: 2 TABLET ORAL at 10:16

## 2017-01-01 RX ADMIN — ALLOPURINOL 100 MG: 100 TABLET ORAL at 08:40

## 2017-01-01 RX ADMIN — LEVETIRACETAM 500 MG: 500 TABLET, FILM COATED ORAL at 18:02

## 2017-01-01 RX ADMIN — DILTIAZEM HYDROCHLORIDE 300 MG: 180 CAPSULE, COATED, EXTENDED RELEASE ORAL at 12:57

## 2017-01-01 RX ADMIN — KETOTIFEN FUMARATE 1 DROP: 0.25 SOLUTION/ DROPS OPHTHALMIC at 09:10

## 2017-01-01 RX ADMIN — LEVETIRACETAM 500 MG: 500 TABLET, FILM COATED ORAL at 09:47

## 2017-01-01 RX ADMIN — LORAZEPAM 0.5 MG: 0.5 TABLET ORAL at 20:56

## 2017-01-01 RX ADMIN — DIPHENHYDRAMINE HYDROCHLORIDE 25 MG: 25 CAPSULE ORAL at 20:08

## 2017-01-01 RX ADMIN — APIXABAN 5 MG: 2.5 TABLET, FILM COATED ORAL at 20:58

## 2017-01-01 RX ADMIN — METOPROLOL TARTRATE 100 MG: 25 TABLET, FILM COATED ORAL at 08:14

## 2017-01-01 RX ADMIN — SENNOSIDES AND DOCUSATE SODIUM 1 TABLET: 8.6; 5 TABLET ORAL at 08:00

## 2017-01-01 RX ADMIN — BUDESONIDE 0.5 MG: 0.5 SUSPENSION RESPIRATORY (INHALATION) at 09:29

## 2017-01-01 RX ADMIN — METOPROLOL SUCCINATE 100 MG: 50 TABLET, EXTENDED RELEASE ORAL at 08:08

## 2017-01-01 RX ADMIN — GUAIFENESIN 1200 MG: 600 TABLET, EXTENDED RELEASE ORAL at 17:20

## 2017-01-01 RX ADMIN — MICONAZOLE NITRATE: 20 POWDER TOPICAL at 21:32

## 2017-01-01 RX ADMIN — IPRATROPIUM BROMIDE AND ALBUTEROL SULFATE 3 ML: .5; 3 SOLUTION RESPIRATORY (INHALATION) at 23:31

## 2017-01-01 RX ADMIN — GUAIFENESIN 1200 MG: 600 TABLET, EXTENDED RELEASE ORAL at 17:37

## 2017-01-01 RX ADMIN — IPRATROPIUM BROMIDE AND ALBUTEROL SULFATE 3 ML: .5; 3 SOLUTION RESPIRATORY (INHALATION) at 18:19

## 2017-01-01 RX ADMIN — IPRATROPIUM BROMIDE AND ALBUTEROL SULFATE 3 ML: .5; 3 SOLUTION RESPIRATORY (INHALATION) at 17:58

## 2017-01-01 RX ADMIN — Medication 150 MG: at 17:49

## 2017-01-01 RX ADMIN — APIXABAN 5 MG: 2.5 TABLET, FILM COATED ORAL at 20:51

## 2017-01-01 RX ADMIN — AMIODARONE HYDROCHLORIDE 200 MG: 200 TABLET ORAL at 08:35

## 2017-01-01 RX ADMIN — ASPIRIN 81 MG: 81 TABLET, COATED ORAL at 08:41

## 2017-01-01 RX ADMIN — DILTIAZEM HYDROCHLORIDE 300 MG: 180 CAPSULE, COATED, EXTENDED RELEASE ORAL at 13:28

## 2017-01-01 RX ADMIN — SODIUM CHLORIDE 1000 ML: 9 INJECTION, SOLUTION INTRAVENOUS at 22:21

## 2017-01-01 RX ADMIN — MICONAZOLE NITRATE: 20 POWDER TOPICAL at 09:01

## 2017-01-01 RX ADMIN — METOPROLOL TARTRATE 75 MG: 25 TABLET, FILM COATED ORAL at 08:28

## 2017-01-01 RX ADMIN — ISOSORBIDE DINITRATE 10 MG: 10 TABLET ORAL at 09:07

## 2017-01-01 RX ADMIN — LEVOTHYROXINE SODIUM 150 MCG: 150 TABLET ORAL at 06:10

## 2017-01-01 RX ADMIN — Medication 150 MG: at 08:24

## 2017-01-01 RX ADMIN — ISOSORBIDE DINITRATE 10 MG: 10 TABLET ORAL at 09:37

## 2017-01-01 RX ADMIN — Medication 150 MG: at 18:06

## 2017-01-01 RX ADMIN — FUROSEMIDE 40 MG: 40 TABLET ORAL at 09:19

## 2017-01-01 RX ADMIN — METOPROLOL SUCCINATE 100 MG: 50 TABLET, EXTENDED RELEASE ORAL at 20:20

## 2017-01-01 RX ADMIN — POTASSIUM CHLORIDE 20 MEQ: 1500 TABLET, EXTENDED RELEASE ORAL at 08:53

## 2017-01-01 RX ADMIN — AMIODARONE HYDROCHLORIDE 200 MG: 200 TABLET ORAL at 08:18

## 2017-01-01 RX ADMIN — IPRATROPIUM BROMIDE AND ALBUTEROL SULFATE 3 ML: .5; 3 SOLUTION RESPIRATORY (INHALATION) at 20:39

## 2017-01-01 RX ADMIN — Medication 1 TABLET: at 08:18

## 2017-01-01 RX ADMIN — SENNOSIDES AND DOCUSATE SODIUM 1 TABLET: 8.6; 5 TABLET ORAL at 09:22

## 2017-01-01 RX ADMIN — SENNOSIDES AND DOCUSATE SODIUM 1 TABLET: 8.6; 5 TABLET ORAL at 10:14

## 2017-01-01 RX ADMIN — BUDESONIDE 0.5 MG: 0.5 SUSPENSION RESPIRATORY (INHALATION) at 19:59

## 2017-01-01 RX ADMIN — POTASSIUM CHLORIDE 20 MEQ: 1500 TABLET, EXTENDED RELEASE ORAL at 08:42

## 2017-01-01 RX ADMIN — Medication 150 MG: at 08:41

## 2017-01-01 RX ADMIN — MEGESTROL ACETATE 200 MG: 40 SUSPENSION ORAL at 17:53

## 2017-01-01 RX ADMIN — LEVOTHYROXINE SODIUM 150 MCG: 150 TABLET ORAL at 05:26

## 2017-01-01 RX ADMIN — MICONAZOLE NITRATE: 20 POWDER TOPICAL at 09:26

## 2017-01-01 RX ADMIN — KETOTIFEN FUMARATE 1 DROP: 0.25 SOLUTION/ DROPS OPHTHALMIC at 17:48

## 2017-01-01 RX ADMIN — SERTRALINE HYDROCHLORIDE 100 MG: 50 TABLET ORAL at 20:28

## 2017-01-01 RX ADMIN — FUROSEMIDE 40 MG: 40 TABLET ORAL at 08:30

## 2017-01-01 RX ADMIN — INSULIN ASPART 5 UNITS: 100 INJECTION, SOLUTION INTRAVENOUS; SUBCUTANEOUS at 20:17

## 2017-01-01 RX ADMIN — INSULIN ASPART 2 UNITS: 100 INJECTION, SOLUTION INTRAVENOUS; SUBCUTANEOUS at 17:20

## 2017-01-01 RX ADMIN — LEVETIRACETAM 500 MG: 500 TABLET, FILM COATED ORAL at 10:05

## 2017-01-01 RX ADMIN — APIXABAN 5 MG: 2.5 TABLET, FILM COATED ORAL at 09:54

## 2017-01-01 RX ADMIN — GUAIFENESIN 1200 MG: 600 TABLET, EXTENDED RELEASE ORAL at 17:30

## 2017-01-01 RX ADMIN — SENNOSIDES AND DOCUSATE SODIUM 1 TABLET: 8.6; 5 TABLET ORAL at 09:50

## 2017-01-01 RX ADMIN — DONEPEZIL HYDROCHLORIDE 10 MG: 5 TABLET, FILM COATED ORAL at 21:44

## 2017-01-01 RX ADMIN — MICONAZOLE NITRATE: 20 POWDER TOPICAL at 08:14

## 2017-01-01 RX ADMIN — Medication 1 CAPSULE: at 11:44

## 2017-01-01 RX ADMIN — LINAGLIPTIN 5 MG: 5 TABLET, FILM COATED ORAL at 09:33

## 2017-01-01 RX ADMIN — LEVETIRACETAM 500 MG: 500 TABLET, FILM COATED ORAL at 20:33

## 2017-01-01 RX ADMIN — METOPROLOL TARTRATE 100 MG: 25 TABLET, FILM COATED ORAL at 20:00

## 2017-01-01 RX ADMIN — SERTRALINE HYDROCHLORIDE 100 MG: 50 TABLET ORAL at 19:50

## 2017-01-01 RX ADMIN — LEVOTHYROXINE SODIUM 150 MCG: 150 TABLET ORAL at 07:09

## 2017-01-01 RX ADMIN — SERTRALINE 100 MG: 50 TABLET, FILM COATED ORAL at 20:47

## 2017-01-01 RX ADMIN — KETOTIFEN FUMARATE 1 DROP: 0.25 SOLUTION/ DROPS OPHTHALMIC at 17:30

## 2017-01-01 RX ADMIN — MICONAZOLE NITRATE: 20 POWDER TOPICAL at 09:32

## 2017-01-01 RX ADMIN — SENNOSIDES AND DOCUSATE SODIUM 1 TABLET: 8.6; 5 TABLET ORAL at 09:37

## 2017-01-01 RX ADMIN — STANDARDIZED SENNA CONCENTRATE 1 TABLET: 8.6 TABLET ORAL at 17:42

## 2017-01-01 RX ADMIN — SENNOSIDES AND DOCUSATE SODIUM 1 TABLET: 8.6; 5 TABLET ORAL at 20:25

## 2017-01-01 RX ADMIN — DILTIAZEM HYDROCHLORIDE 300 MG: 180 CAPSULE, COATED, EXTENDED RELEASE ORAL at 12:56

## 2017-01-01 RX ADMIN — STANDARDIZED SENNA CONCENTRATE 1 TABLET: 8.6 TABLET ORAL at 17:10

## 2017-01-01 RX ADMIN — MEGESTROL ACETATE 200 MG: 40 SUSPENSION ORAL at 08:41

## 2017-01-01 RX ADMIN — INSULIN ASPART 4 UNITS: 100 INJECTION, SOLUTION INTRAVENOUS; SUBCUTANEOUS at 17:21

## 2017-01-01 RX ADMIN — METOPROLOL SUCCINATE 100 MG: 50 TABLET, EXTENDED RELEASE ORAL at 09:56

## 2017-01-01 RX ADMIN — BUDESONIDE 0.5 MG: 0.5 SUSPENSION RESPIRATORY (INHALATION) at 20:48

## 2017-01-01 RX ADMIN — DILTIAZEM HYDROCHLORIDE 300 MG: 180 CAPSULE, COATED, EXTENDED RELEASE ORAL at 12:48

## 2017-01-01 RX ADMIN — ISOSORBIDE DINITRATE 10 MG: 10 TABLET ORAL at 21:24

## 2017-01-01 RX ADMIN — BUDESONIDE 0.5 MG: 0.5 SUSPENSION RESPIRATORY (INHALATION) at 20:41

## 2017-01-01 RX ADMIN — GUAIFENESIN 1200 MG: 600 TABLET, EXTENDED RELEASE ORAL at 17:48

## 2017-01-01 RX ADMIN — GUAIFENESIN 1200 MG: 600 TABLET, EXTENDED RELEASE ORAL at 08:12

## 2017-01-01 RX ADMIN — SENNOSIDES AND DOCUSATE SODIUM 1 TABLET: 8.6; 5 TABLET ORAL at 21:41

## 2017-01-01 RX ADMIN — ALLOPURINOL 100 MG: 100 TABLET ORAL at 20:52

## 2017-01-01 RX ADMIN — DILTIAZEM HYDROCHLORIDE 300 MG: 180 CAPSULE, COATED, EXTENDED RELEASE ORAL at 13:01

## 2017-01-01 RX ADMIN — METOPROLOL SUCCINATE 100 MG: 50 TABLET, EXTENDED RELEASE ORAL at 20:52

## 2017-01-01 RX ADMIN — MICONAZOLE NITRATE: 20 POWDER TOPICAL at 22:05

## 2017-01-01 RX ADMIN — ISOSORBIDE DINITRATE 10 MG: 10 TABLET ORAL at 21:53

## 2017-01-01 RX ADMIN — ACETAMINOPHEN 650 MG: 325 TABLET, FILM COATED ORAL at 02:17

## 2017-01-01 RX ADMIN — ISOSORBIDE DINITRATE 10 MG: 10 TABLET ORAL at 08:00

## 2017-01-01 RX ADMIN — ISOSORBIDE DINITRATE 10 MG: 10 TABLET ORAL at 10:31

## 2017-01-01 RX ADMIN — KETOTIFEN FUMARATE 1 DROP: 0.35 SOLUTION/ DROPS OPHTHALMIC at 10:27

## 2017-01-01 RX ADMIN — LEVOTHYROXINE SODIUM 150 MCG: 150 TABLET ORAL at 05:46

## 2017-01-01 RX ADMIN — FUROSEMIDE 40 MG: 40 TABLET ORAL at 08:08

## 2017-01-01 RX ADMIN — LINAGLIPTIN 5 MG: 5 TABLET, FILM COATED ORAL at 09:22

## 2017-01-01 RX ADMIN — ALLOPURINOL 100 MG: 100 TABLET ORAL at 16:58

## 2017-01-01 RX ADMIN — LEVETIRACETAM 500 MG: 500 TABLET, FILM COATED ORAL at 10:09

## 2017-01-01 RX ADMIN — KETOTIFEN FUMARATE 1 DROP: 0.25 SOLUTION/ DROPS OPHTHALMIC at 08:20

## 2017-01-01 RX ADMIN — ALLOPURINOL 100 MG: 100 TABLET ORAL at 08:03

## 2017-01-01 RX ADMIN — METOPROLOL TARTRATE 50 MG: 50 TABLET, FILM COATED ORAL at 09:37

## 2017-01-01 RX ADMIN — SERTRALINE 100 MG: 50 TABLET, FILM COATED ORAL at 20:44

## 2017-01-01 RX ADMIN — LEVOTHYROXINE SODIUM 150 MCG: 150 TABLET ORAL at 05:47

## 2017-01-01 RX ADMIN — KETOTIFEN FUMARATE 1 DROP: 0.25 SOLUTION/ DROPS OPHTHALMIC at 12:38

## 2017-01-01 RX ADMIN — DILTIAZEM HYDROCHLORIDE 120 MG: 60 TABLET, FILM COATED ORAL at 17:55

## 2017-01-01 RX ADMIN — APIXABAN 5 MG: 2.5 TABLET, FILM COATED ORAL at 20:41

## 2017-01-01 RX ADMIN — Medication 150 MG: at 10:08

## 2017-01-01 RX ADMIN — DILTIAZEM HYDROCHLORIDE 300 MG: 180 CAPSULE, COATED, EXTENDED RELEASE ORAL at 12:08

## 2017-01-01 RX ADMIN — DILTIAZEM HYDROCHLORIDE 300 MG: 180 CAPSULE, COATED, EXTENDED RELEASE ORAL at 13:19

## 2017-01-01 RX ADMIN — APIXABAN 5 MG: 2.5 TABLET, FILM COATED ORAL at 21:07

## 2017-01-01 RX ADMIN — FUROSEMIDE 40 MG: 40 TABLET ORAL at 09:17

## 2017-01-01 RX ADMIN — Medication 1 CAPSULE: at 08:45

## 2017-01-01 RX ADMIN — METOPROLOL TARTRATE 100 MG: 25 TABLET, FILM COATED ORAL at 08:47

## 2017-01-01 RX ADMIN — LEVOTHYROXINE SODIUM 150 MCG: 150 TABLET ORAL at 06:03

## 2017-01-01 RX ADMIN — SERTRALINE 100 MG: 50 TABLET, FILM COATED ORAL at 20:17

## 2017-01-01 RX ADMIN — Medication 1 TABLET: at 17:40

## 2017-01-01 RX ADMIN — KETOTIFEN FUMARATE 1 DROP: 0.25 SOLUTION/ DROPS OPHTHALMIC at 09:31

## 2017-01-01 RX ADMIN — FLUCONAZOLE 100 MG: 100 TABLET ORAL at 10:50

## 2017-01-01 RX ADMIN — KETOTIFEN FUMARATE 1 DROP: 0.35 SOLUTION/ DROPS OPHTHALMIC at 18:08

## 2017-01-01 RX ADMIN — POTASSIUM CHLORIDE 30 MEQ: 1500 TABLET, EXTENDED RELEASE ORAL at 08:54

## 2017-01-01 RX ADMIN — BUDESONIDE 0.5 MG: 0.5 SUSPENSION RESPIRATORY (INHALATION) at 20:21

## 2017-01-01 RX ADMIN — ALLOPURINOL 100 MG: 100 TABLET ORAL at 20:20

## 2017-01-01 RX ADMIN — IPRATROPIUM BROMIDE AND ALBUTEROL SULFATE 3 ML: .5; 3 SOLUTION RESPIRATORY (INHALATION) at 13:20

## 2017-01-01 RX ADMIN — APIXABAN 5 MG: 2.5 TABLET, FILM COATED ORAL at 08:29

## 2017-01-01 RX ADMIN — FAMOTIDINE 20 MG: 20 TABLET, FILM COATED ORAL at 10:50

## 2017-01-01 RX ADMIN — LEVOTHYROXINE SODIUM 150 MCG: 150 TABLET ORAL at 06:11

## 2017-01-01 RX ADMIN — GUAIFENESIN 1200 MG: 600 TABLET, EXTENDED RELEASE ORAL at 08:20

## 2017-01-01 RX ADMIN — ALLOPURINOL 100 MG: 100 TABLET ORAL at 09:18

## 2017-01-01 RX ADMIN — POTASSIUM CHLORIDE 20 MEQ: 1500 TABLET, EXTENDED RELEASE ORAL at 08:14

## 2017-01-01 RX ADMIN — APIXABAN 5 MG: 2.5 TABLET, FILM COATED ORAL at 12:23

## 2017-01-01 RX ADMIN — DILTIAZEM HYDROCHLORIDE 300 MG: 180 CAPSULE, COATED, EXTENDED RELEASE ORAL at 11:00

## 2017-01-01 RX ADMIN — ISOSORBIDE DINITRATE 10 MG: 10 TABLET ORAL at 20:37

## 2017-01-01 RX ADMIN — AMIODARONE HYDROCHLORIDE 200 MG: 200 TABLET ORAL at 08:23

## 2017-01-01 RX ADMIN — METOPROLOL TARTRATE 100 MG: 50 TABLET, FILM COATED ORAL at 22:42

## 2017-01-01 RX ADMIN — DONEPEZIL HYDROCHLORIDE 10 MG: 5 TABLET ORAL at 17:21

## 2017-01-01 RX ADMIN — MICONAZOLE NITRATE: 20 POWDER TOPICAL at 11:03

## 2017-01-01 RX ADMIN — METOPROLOL SUCCINATE 100 MG: 50 TABLET, EXTENDED RELEASE ORAL at 20:01

## 2017-01-01 RX ADMIN — Medication 150 MG: at 17:43

## 2017-01-01 RX ADMIN — GUAIFENESIN 1200 MG: 600 TABLET, EXTENDED RELEASE ORAL at 08:24

## 2017-01-01 RX ADMIN — IPRATROPIUM BROMIDE AND ALBUTEROL SULFATE 3 ML: .5; 3 SOLUTION RESPIRATORY (INHALATION) at 20:01

## 2017-01-01 RX ADMIN — IPRATROPIUM BROMIDE AND ALBUTEROL SULFATE 3 ML: .5; 3 SOLUTION RESPIRATORY (INHALATION) at 21:11

## 2017-01-01 RX ADMIN — LEVETIRACETAM 500 MG: 500 TABLET, FILM COATED ORAL at 08:04

## 2017-01-01 RX ADMIN — BUDESONIDE 0.5 MG: 0.5 SUSPENSION RESPIRATORY (INHALATION) at 20:23

## 2017-01-01 RX ADMIN — Medication 1 CAPSULE: at 08:51

## 2017-01-01 RX ADMIN — LORAZEPAM 0.5 MG: 0.5 TABLET ORAL at 20:33

## 2017-01-01 RX ADMIN — IPRATROPIUM BROMIDE AND ALBUTEROL SULFATE 3 ML: .5; 3 SOLUTION RESPIRATORY (INHALATION) at 08:00

## 2017-01-01 RX ADMIN — Medication 150 MG: at 17:13

## 2017-01-01 RX ADMIN — ACETAMINOPHEN 500 MG: 500 TABLET, FILM COATED ORAL at 17:12

## 2017-01-01 RX ADMIN — AMIODARONE HYDROCHLORIDE 200 MG: 200 TABLET ORAL at 09:52

## 2017-01-01 RX ADMIN — KETOTIFEN FUMARATE 1 DROP: 0.25 SOLUTION/ DROPS OPHTHALMIC at 09:00

## 2017-01-01 RX ADMIN — STANDARDIZED SENNA CONCENTRATE 1 TABLET: 8.6 TABLET ORAL at 17:31

## 2017-01-01 RX ADMIN — ALLOPURINOL 100 MG: 100 TABLET ORAL at 20:08

## 2017-01-01 RX ADMIN — Medication 150 MG: at 09:30

## 2017-01-01 RX ADMIN — BUDESONIDE 0.5 MG: 0.5 SUSPENSION RESPIRATORY (INHALATION) at 10:14

## 2017-01-01 RX ADMIN — ALLOPURINOL 100 MG: 100 TABLET ORAL at 18:00

## 2017-01-01 RX ADMIN — ISOSORBIDE DINITRATE 10 MG: 10 TABLET ORAL at 20:52

## 2017-01-01 RX ADMIN — METOPROLOL SUCCINATE 100 MG: 50 TABLET, EXTENDED RELEASE ORAL at 20:05

## 2017-01-01 RX ADMIN — AMIODARONE HYDROCHLORIDE 200 MG: 200 TABLET ORAL at 17:55

## 2017-01-01 RX ADMIN — APIXABAN 5 MG: 2.5 TABLET, FILM COATED ORAL at 21:45

## 2017-01-01 RX ADMIN — ALLOPURINOL 100 MG: 100 TABLET ORAL at 08:31

## 2017-01-01 RX ADMIN — APIXABAN 5 MG: 2.5 TABLET, FILM COATED ORAL at 08:09

## 2017-01-01 RX ADMIN — IPRATROPIUM BROMIDE AND ALBUTEROL SULFATE 3 ML: .5; 3 SOLUTION RESPIRATORY (INHALATION) at 11:50

## 2017-01-01 RX ADMIN — SENNOSIDES AND DOCUSATE SODIUM 1 TABLET: 8.6; 5 TABLET ORAL at 09:20

## 2017-01-01 RX ADMIN — Medication 1 TABLET: at 17:50

## 2017-01-01 RX ADMIN — KETOTIFEN FUMARATE 1 DROP: 0.25 SOLUTION/ DROPS OPHTHALMIC at 09:21

## 2017-01-01 RX ADMIN — Medication 1 TABLET: at 17:31

## 2017-01-01 RX ADMIN — LEVETIRACETAM 500 MG: 500 TABLET, FILM COATED ORAL at 20:28

## 2017-01-01 RX ADMIN — SERTRALINE 100 MG: 50 TABLET, FILM COATED ORAL at 20:28

## 2017-01-01 RX ADMIN — Medication 1 TABLET: at 10:22

## 2017-01-01 RX ADMIN — FUROSEMIDE 40 MG: 40 TABLET ORAL at 10:45

## 2017-01-01 RX ADMIN — BUMETANIDE 2 MG: 0.25 INJECTION INTRAMUSCULAR; INTRAVENOUS at 11:55

## 2017-01-01 RX ADMIN — ALLOPURINOL 100 MG: 100 TABLET ORAL at 08:39

## 2017-01-01 RX ADMIN — APIXABAN 5 MG: 2.5 TABLET, FILM COATED ORAL at 20:39

## 2017-01-01 RX ADMIN — Medication 150 MG: at 17:42

## 2017-01-01 RX ADMIN — METOPROLOL TARTRATE 75 MG: 25 TABLET, FILM COATED ORAL at 17:37

## 2017-01-01 RX ADMIN — DONEPEZIL HYDROCHLORIDE 10 MG: 5 TABLET ORAL at 20:56

## 2017-01-01 RX ADMIN — BUDESONIDE 0.5 MG: 0.5 SUSPENSION RESPIRATORY (INHALATION) at 20:40

## 2017-01-01 RX ADMIN — FAMOTIDINE 40 MG: 20 TABLET, FILM COATED ORAL at 20:28

## 2017-01-01 RX ADMIN — APIXABAN 5 MG: 2.5 TABLET, FILM COATED ORAL at 08:07

## 2017-01-01 RX ADMIN — Medication 1 CAPSULE: at 08:23

## 2017-01-01 RX ADMIN — Medication 10 ML: at 20:20

## 2017-01-01 RX ADMIN — ENOXAPARIN SODIUM 110 MG: 120 INJECTION SUBCUTANEOUS at 21:07

## 2017-01-01 RX ADMIN — AMIODARONE HYDROCHLORIDE 200 MG: 200 TABLET ORAL at 08:36

## 2017-01-01 RX ADMIN — AMIODARONE HYDROCHLORIDE 200 MG: 200 TABLET ORAL at 08:19

## 2017-01-01 RX ADMIN — GUAIFENESIN 1200 MG: 600 TABLET, EXTENDED RELEASE ORAL at 18:16

## 2017-01-01 RX ADMIN — ALLOPURINOL 100 MG: 100 TABLET ORAL at 20:37

## 2017-01-01 RX ADMIN — ACETAMINOPHEN 650 MG: 325 TABLET, FILM COATED ORAL at 20:46

## 2017-01-01 RX ADMIN — APIXABAN 5 MG: 2.5 TABLET, FILM COATED ORAL at 23:06

## 2017-01-01 RX ADMIN — ALLOPURINOL 100 MG: 100 TABLET ORAL at 12:23

## 2017-01-01 RX ADMIN — METOPROLOL TARTRATE 75 MG: 25 TABLET, FILM COATED ORAL at 09:31

## 2017-01-01 RX ADMIN — METOPROLOL TARTRATE 75 MG: 25 TABLET, FILM COATED ORAL at 10:09

## 2017-01-01 RX ADMIN — KETOTIFEN FUMARATE 1 DROP: 0.25 SOLUTION/ DROPS OPHTHALMIC at 08:31

## 2017-01-01 RX ADMIN — TRAMADOL HYDROCHLORIDE 50 MG: 50 TABLET, FILM COATED ORAL at 20:52

## 2017-01-01 RX ADMIN — METOPROLOL TARTRATE 100 MG: 25 TABLET, FILM COATED ORAL at 20:42

## 2017-01-01 RX ADMIN — POTASSIUM CHLORIDE 20 MEQ: 1500 TABLET, EXTENDED RELEASE ORAL at 12:39

## 2017-01-01 RX ADMIN — SENNOSIDES AND DOCUSATE SODIUM 1 TABLET: 8.6; 5 TABLET ORAL at 09:36

## 2017-01-01 RX ADMIN — MICONAZOLE NITRATE: 20 POWDER TOPICAL at 09:41

## 2017-01-01 RX ADMIN — MICONAZOLE NITRATE: 20 POWDER TOPICAL at 08:48

## 2017-01-01 RX ADMIN — ISOSORBIDE DINITRATE 10 MG: 10 TABLET ORAL at 20:40

## 2017-01-01 RX ADMIN — SERTRALINE 100 MG: 50 TABLET, FILM COATED ORAL at 20:33

## 2017-01-01 RX ADMIN — SERTRALINE 100 MG: 50 TABLET, FILM COATED ORAL at 20:25

## 2017-01-01 RX ADMIN — SODIUM CHLORIDE 100 ML/HR: 4.5 INJECTION, SOLUTION INTRAVENOUS at 00:43

## 2017-01-01 RX ADMIN — FUROSEMIDE 40 MG: 40 TABLET ORAL at 09:36

## 2017-01-01 RX ADMIN — MICONAZOLE NITRATE 1 APPLICATION: 20 POWDER TOPICAL at 20:33

## 2017-01-01 RX ADMIN — FAMOTIDINE 20 MG: 20 TABLET ORAL at 17:05

## 2017-01-01 RX ADMIN — Medication 10 ML: at 19:58

## 2017-01-01 RX ADMIN — IPRATROPIUM BROMIDE AND ALBUTEROL SULFATE 3 ML: .5; 3 SOLUTION RESPIRATORY (INHALATION) at 16:13

## 2017-01-01 RX ADMIN — FUROSEMIDE 40 MG: 40 TABLET ORAL at 09:29

## 2017-01-01 RX ADMIN — IPRATROPIUM BROMIDE AND ALBUTEROL SULFATE 3 ML: .5; 3 SOLUTION RESPIRATORY (INHALATION) at 20:38

## 2017-01-01 RX ADMIN — APIXABAN 5 MG: 2.5 TABLET, FILM COATED ORAL at 22:20

## 2017-01-01 RX ADMIN — ISOSORBIDE DINITRATE 10 MG: 10 TABLET ORAL at 08:41

## 2017-01-01 RX ADMIN — KETOTIFEN FUMARATE 1 DROP: 0.35 SOLUTION/ DROPS OPHTHALMIC at 17:35

## 2017-01-01 RX ADMIN — ALLOPURINOL 100 MG: 100 TABLET ORAL at 07:44

## 2017-01-01 RX ADMIN — DILTIAZEM HYDROCHLORIDE 300 MG: 180 CAPSULE, COATED, EXTENDED RELEASE ORAL at 13:48

## 2017-01-01 RX ADMIN — METOPROLOL TARTRATE 75 MG: 25 TABLET, FILM COATED ORAL at 09:27

## 2017-01-01 RX ADMIN — METOPROLOL TARTRATE 75 MG: 25 TABLET, FILM COATED ORAL at 17:59

## 2017-01-01 RX ADMIN — GLIPIZIDE 5 MG: 2.5 TABLET, FILM COATED, EXTENDED RELEASE ORAL at 09:17

## 2017-01-01 RX ADMIN — SODIUM CHLORIDE 75 ML/HR: 4.5 INJECTION, SOLUTION INTRAVENOUS at 20:49

## 2017-01-01 RX ADMIN — GUAIFENESIN 1200 MG: 600 TABLET, EXTENDED RELEASE ORAL at 09:39

## 2017-01-01 RX ADMIN — Medication 150 MG: at 08:01

## 2017-01-01 RX ADMIN — LEVETIRACETAM 500 MG: 500 TABLET, FILM COATED ORAL at 22:23

## 2017-01-01 RX ADMIN — BUDESONIDE 0.5 MG: 0.5 SUSPENSION RESPIRATORY (INHALATION) at 10:38

## 2017-01-01 RX ADMIN — IRON SUCROSE 200 MG: 20 INJECTION, SOLUTION INTRAVENOUS at 09:37

## 2017-01-01 RX ADMIN — AMIODARONE HYDROCHLORIDE 200 MG: 200 TABLET ORAL at 22:03

## 2017-01-01 RX ADMIN — DOXAZOSIN MESYLATE 2 MG: 2 TABLET ORAL at 10:21

## 2017-01-01 RX ADMIN — APIXABAN 5 MG: 2.5 TABLET, FILM COATED ORAL at 20:48

## 2017-01-01 RX ADMIN — FAMOTIDINE 40 MG: 20 TABLET ORAL at 20:31

## 2017-01-01 RX ADMIN — AMIODARONE HYDROCHLORIDE 200 MG: 200 TABLET ORAL at 22:46

## 2017-01-01 RX ADMIN — LEVETIRACETAM 500 MG: 500 TABLET, FILM COATED ORAL at 20:37

## 2017-01-01 RX ADMIN — POTASSIUM CHLORIDE 20 MEQ: 1500 TABLET, EXTENDED RELEASE ORAL at 10:08

## 2017-01-01 RX ADMIN — Medication 150 MG: at 09:59

## 2017-01-01 RX ADMIN — SENNOSIDES AND DOCUSATE SODIUM 1 TABLET: 8.6; 5 TABLET ORAL at 08:40

## 2017-01-01 RX ADMIN — DILTIAZEM HYDROCHLORIDE 300 MG: 180 CAPSULE, COATED, EXTENDED RELEASE ORAL at 13:11

## 2017-01-01 RX ADMIN — APIXABAN 5 MG: 2.5 TABLET, FILM COATED ORAL at 21:09

## 2017-01-01 RX ADMIN — METOPROLOL SUCCINATE 100 MG: 50 TABLET, EXTENDED RELEASE ORAL at 23:05

## 2017-01-01 RX ADMIN — LEVETIRACETAM 500 MG: 500 TABLET, FILM COATED ORAL at 17:23

## 2017-01-01 RX ADMIN — ISOSORBIDE DINITRATE 10 MG: 10 TABLET ORAL at 08:52

## 2017-01-01 RX ADMIN — METOPROLOL TARTRATE 75 MG: 25 TABLET, FILM COATED ORAL at 17:28

## 2017-01-01 RX ADMIN — BUDESONIDE 0.5 MG: 0.5 SUSPENSION RESPIRATORY (INHALATION) at 08:13

## 2017-01-01 RX ADMIN — ALLOPURINOL 100 MG: 100 TABLET ORAL at 08:01

## 2017-01-01 RX ADMIN — IPRATROPIUM BROMIDE AND ALBUTEROL SULFATE 3 ML: .5; 3 SOLUTION RESPIRATORY (INHALATION) at 13:41

## 2017-01-01 RX ADMIN — LEVETIRACETAM 500 MG: 500 TABLET, FILM COATED ORAL at 10:00

## 2017-01-01 RX ADMIN — LEVETIRACETAM 500 MG: 500 TABLET, FILM COATED ORAL at 20:24

## 2017-01-01 RX ADMIN — FAMOTIDINE 20 MG: 20 TABLET, FILM COATED ORAL at 18:45

## 2017-01-01 RX ADMIN — POTASSIUM CHLORIDE 10 MEQ: 750 TABLET, FILM COATED, EXTENDED RELEASE ORAL at 10:15

## 2017-01-01 RX ADMIN — FUROSEMIDE 40 MG: 40 TABLET ORAL at 09:08

## 2017-01-01 RX ADMIN — SERTRALINE HYDROCHLORIDE 100 MG: 50 TABLET ORAL at 20:24

## 2017-01-01 RX ADMIN — LEVETIRACETAM 500 MG: 500 TABLET, FILM COATED ORAL at 08:51

## 2017-01-01 RX ADMIN — BUDESONIDE 0.5 MG: 0.5 SUSPENSION RESPIRATORY (INHALATION) at 21:32

## 2017-01-01 RX ADMIN — Medication 10 ML: at 20:44

## 2017-01-01 RX ADMIN — BUDESONIDE 0.5 MG: 0.5 SUSPENSION RESPIRATORY (INHALATION) at 10:49

## 2017-01-01 RX ADMIN — ALLOPURINOL 100 MG: 100 TABLET ORAL at 09:44

## 2017-01-01 RX ADMIN — LEVETIRACETAM 500 MG: 500 TABLET, FILM COATED ORAL at 20:46

## 2017-01-01 RX ADMIN — KETOTIFEN FUMARATE 1 DROP: 0.25 SOLUTION/ DROPS OPHTHALMIC at 17:18

## 2017-01-01 RX ADMIN — SERTRALINE HYDROCHLORIDE 100 MG: 50 TABLET ORAL at 19:47

## 2017-01-01 RX ADMIN — IPRATROPIUM BROMIDE AND ALBUTEROL SULFATE 3 ML: .5; 3 SOLUTION RESPIRATORY (INHALATION) at 08:31

## 2017-01-01 RX ADMIN — SERTRALINE HYDROCHLORIDE 100 MG: 50 TABLET ORAL at 20:18

## 2017-01-01 RX ADMIN — GUAIFENESIN 1200 MG: 600 TABLET, EXTENDED RELEASE ORAL at 17:12

## 2017-01-01 RX ADMIN — APIXABAN 5 MG: 2.5 TABLET, FILM COATED ORAL at 21:21

## 2017-01-01 RX ADMIN — Medication 150 MG: at 08:35

## 2017-01-01 RX ADMIN — BUDESONIDE 0.5 MG: 0.5 SUSPENSION RESPIRATORY (INHALATION) at 08:38

## 2017-01-01 RX ADMIN — FAMOTIDINE 20 MG: 20 TABLET, FILM COATED ORAL at 08:29

## 2017-01-01 RX ADMIN — ALLOPURINOL 100 MG: 100 TABLET ORAL at 20:45

## 2017-01-01 RX ADMIN — GLIPIZIDE 5 MG: 5 TABLET, FILM COATED, EXTENDED RELEASE ORAL at 09:06

## 2017-01-01 RX ADMIN — SENNOSIDES AND DOCUSATE SODIUM 1 TABLET: 8.6; 5 TABLET ORAL at 20:20

## 2017-01-01 RX ADMIN — INSULIN ASPART 3 UNITS: 100 INJECTION, SOLUTION INTRAVENOUS; SUBCUTANEOUS at 17:13

## 2017-01-01 RX ADMIN — AMIODARONE HYDROCHLORIDE 200 MG: 200 TABLET ORAL at 20:31

## 2017-01-01 RX ADMIN — DILTIAZEM HYDROCHLORIDE 240 MG: 120 CAPSULE, COATED, EXTENDED RELEASE ORAL at 08:30

## 2017-01-01 RX ADMIN — MICONAZOLE NITRATE 1 APPLICATION: 20 POWDER TOPICAL at 08:37

## 2017-01-01 RX ADMIN — Medication 1 CAPSULE: at 08:50

## 2017-01-01 RX ADMIN — VANCOMYCIN HYDROCHLORIDE 1500 MG: 750 INJECTION, POWDER, LYOPHILIZED, FOR SOLUTION INTRAVENOUS at 23:58

## 2017-01-01 RX ADMIN — ACETAMINOPHEN 650 MG: 325 TABLET, FILM COATED ORAL at 20:56

## 2017-01-01 RX ADMIN — SENNOSIDES AND DOCUSATE SODIUM 1 TABLET: 8.6; 5 TABLET ORAL at 21:21

## 2017-01-01 RX ADMIN — FAMOTIDINE 20 MG: 20 TABLET, FILM COATED ORAL at 17:30

## 2017-01-01 RX ADMIN — METOPROLOL TARTRATE 25 MG: 25 TABLET ORAL at 21:58

## 2017-01-01 RX ADMIN — Medication 1 TABLET: at 17:54

## 2017-01-01 RX ADMIN — POTASSIUM CHLORIDE 10 MEQ: 750 TABLET, FILM COATED, EXTENDED RELEASE ORAL at 08:12

## 2017-01-01 RX ADMIN — FAMOTIDINE 20 MG: 20 TABLET, FILM COATED ORAL at 16:51

## 2017-01-01 RX ADMIN — DONEPEZIL HYDROCHLORIDE 10 MG: 5 TABLET ORAL at 20:32

## 2017-01-01 RX ADMIN — LEVETIRACETAM 500 MG: 500 TABLET, FILM COATED ORAL at 09:33

## 2017-01-01 RX ADMIN — ALLOPURINOL 100 MG: 100 TABLET ORAL at 08:12

## 2017-01-01 RX ADMIN — CONJUGATED ESTROGENS 1 APPLICATION: 0.62 CREAM VAGINAL at 20:51

## 2017-01-01 RX ADMIN — IRON SUCROSE 200 MG: 20 INJECTION, SOLUTION INTRAVENOUS at 09:04

## 2017-01-01 RX ADMIN — KETOTIFEN FUMARATE 1 DROP: 0.25 SOLUTION/ DROPS OPHTHALMIC at 09:35

## 2017-01-01 RX ADMIN — ALLOPURINOL 100 MG: 100 TABLET ORAL at 21:31

## 2017-01-01 RX ADMIN — LINAGLIPTIN 5 MG: 5 TABLET, FILM COATED ORAL at 08:52

## 2017-01-01 RX ADMIN — CALCIUM CARBONATE 500 MG (1,250 MG)-VITAMIN D3 200 UNIT TABLET 500 MG: at 08:11

## 2017-01-01 RX ADMIN — FAMOTIDINE 20 MG: 20 TABLET, FILM COATED ORAL at 10:09

## 2017-01-01 RX ADMIN — KETOTIFEN FUMARATE 1 DROP: 0.25 SOLUTION/ DROPS OPHTHALMIC at 08:16

## 2017-01-01 RX ADMIN — KETOTIFEN FUMARATE 1 DROP: 0.35 SOLUTION/ DROPS OPHTHALMIC at 08:40

## 2017-01-01 RX ADMIN — Medication 1 TABLET: at 08:11

## 2017-01-01 RX ADMIN — ISOSORBIDE DINITRATE 10 MG: 10 TABLET ORAL at 11:17

## 2017-01-01 RX ADMIN — KETOTIFEN FUMARATE 1 DROP: 0.25 SOLUTION/ DROPS OPHTHALMIC at 10:11

## 2017-01-01 RX ADMIN — POTASSIUM CHLORIDE 20 MEQ: 1500 TABLET, EXTENDED RELEASE ORAL at 10:09

## 2017-01-01 RX ADMIN — STANDARDIZED SENNA CONCENTRATE 1 TABLET: 8.6 TABLET ORAL at 08:26

## 2017-01-01 RX ADMIN — MICONAZOLE NITRATE 1 APPLICATION: 20 POWDER TOPICAL at 10:43

## 2017-01-01 RX ADMIN — ISOSORBIDE DINITRATE 10 MG: 10 TABLET ORAL at 10:23

## 2017-01-01 RX ADMIN — METOPROLOL TARTRATE 75 MG: 25 TABLET, FILM COATED ORAL at 08:24

## 2017-01-01 RX ADMIN — KETOTIFEN FUMARATE 1 DROP: 0.35 SOLUTION/ DROPS OPHTHALMIC at 17:40

## 2017-01-01 RX ADMIN — SENNOSIDES AND DOCUSATE SODIUM 1 TABLET: 8.6; 5 TABLET ORAL at 21:14

## 2017-01-01 RX ADMIN — DONEPEZIL HYDROCHLORIDE 10 MG: 5 TABLET ORAL at 21:54

## 2017-01-01 RX ADMIN — METOPROLOL SUCCINATE 100 MG: 50 TABLET, EXTENDED RELEASE ORAL at 08:31

## 2017-01-01 RX ADMIN — SERTRALINE 100 MG: 50 TABLET, FILM COATED ORAL at 20:27

## 2017-01-01 RX ADMIN — Medication 1 TABLET: at 17:29

## 2017-01-01 RX ADMIN — DONEPEZIL HYDROCHLORIDE 10 MG: 5 TABLET ORAL at 22:24

## 2017-01-01 RX ADMIN — METOPROLOL TARTRATE 100 MG: 50 TABLET, FILM COATED ORAL at 21:26

## 2017-01-01 RX ADMIN — Medication 1 CAPSULE: at 07:59

## 2017-01-01 RX ADMIN — SERTRALINE 100 MG: 50 TABLET, FILM COATED ORAL at 20:32

## 2017-01-01 RX ADMIN — Medication 150 MG: at 17:34

## 2017-01-01 RX ADMIN — SENNOSIDES AND DOCUSATE SODIUM 1 TABLET: 8.6; 5 TABLET ORAL at 09:19

## 2017-01-01 RX ADMIN — SERTRALINE 100 MG: 50 TABLET, FILM COATED ORAL at 21:00

## 2017-01-01 RX ADMIN — APIXABAN 5 MG: 2.5 TABLET, FILM COATED ORAL at 20:32

## 2017-01-01 RX ADMIN — Medication 150 MG: at 17:54

## 2017-01-01 RX ADMIN — ALLOPURINOL 100 MG: 100 TABLET ORAL at 10:22

## 2017-01-01 RX ADMIN — POTASSIUM CHLORIDE 10 MEQ: 750 TABLET, FILM COATED, EXTENDED RELEASE ORAL at 08:52

## 2017-01-01 RX ADMIN — ALLOPURINOL 100 MG: 100 TABLET ORAL at 08:43

## 2017-01-01 RX ADMIN — MICONAZOLE NITRATE: 20 POWDER TOPICAL at 08:49

## 2017-01-01 RX ADMIN — APIXABAN 5 MG: 2.5 TABLET, FILM COATED ORAL at 08:45

## 2017-01-01 RX ADMIN — KETOTIFEN FUMARATE 1 DROP: 0.25 SOLUTION/ DROPS OPHTHALMIC at 18:50

## 2017-01-01 RX ADMIN — SENNOSIDES AND DOCUSATE SODIUM 1 TABLET: 8.6; 5 TABLET ORAL at 09:59

## 2017-01-01 RX ADMIN — Medication 1 TABLET: at 08:41

## 2017-01-01 RX ADMIN — METOPROLOL SUCCINATE 100 MG: 50 TABLET, EXTENDED RELEASE ORAL at 20:25

## 2017-01-01 RX ADMIN — DONEPEZIL HYDROCHLORIDE 10 MG: 5 TABLET, FILM COATED ORAL at 20:41

## 2017-01-01 RX ADMIN — DILTIAZEM HYDROCHLORIDE 240 MG: 120 CAPSULE, COATED, EXTENDED RELEASE ORAL at 09:59

## 2017-01-01 RX ADMIN — METOPROLOL TARTRATE 75 MG: 25 TABLET, FILM COATED ORAL at 17:36

## 2017-01-01 RX ADMIN — DONEPEZIL HYDROCHLORIDE 10 MG: 5 TABLET ORAL at 20:23

## 2017-01-01 RX ADMIN — APIXABAN 5 MG: 2.5 TABLET, FILM COATED ORAL at 08:34

## 2017-01-01 RX ADMIN — MICONAZOLE NITRATE: 20 POWDER TOPICAL at 20:41

## 2017-01-01 RX ADMIN — IPRATROPIUM BROMIDE AND ALBUTEROL SULFATE 3 ML: .5; 3 SOLUTION RESPIRATORY (INHALATION) at 17:35

## 2017-01-01 RX ADMIN — Medication 1 TABLET: at 09:24

## 2017-01-01 RX ADMIN — KETOTIFEN FUMARATE 1 DROP: 0.25 SOLUTION/ DROPS OPHTHALMIC at 09:22

## 2017-01-01 RX ADMIN — Medication 1 TABLET: at 09:36

## 2017-01-01 RX ADMIN — GUAIFENESIN 1200 MG: 600 TABLET, EXTENDED RELEASE ORAL at 08:51

## 2017-01-01 RX ADMIN — LEVOTHYROXINE SODIUM 150 MCG: 150 TABLET ORAL at 09:46

## 2017-01-01 RX ADMIN — LEVETIRACETAM 500 MG: 500 TABLET, FILM COATED ORAL at 12:25

## 2017-01-01 RX ADMIN — Medication 150 MG: at 17:08

## 2017-01-01 RX ADMIN — FAMOTIDINE 20 MG: 20 TABLET ORAL at 17:56

## 2017-01-01 RX ADMIN — LEVOTHYROXINE SODIUM 150 MCG: 150 TABLET ORAL at 06:27

## 2017-01-01 RX ADMIN — LEVOTHYROXINE SODIUM 150 MCG: 150 TABLET ORAL at 06:00

## 2017-01-01 RX ADMIN — GUAIFENESIN 1200 MG: 600 TABLET, EXTENDED RELEASE ORAL at 17:27

## 2017-01-01 RX ADMIN — LEVETIRACETAM 500 MG: 500 TABLET, FILM COATED ORAL at 20:05

## 2017-01-01 RX ADMIN — IPRATROPIUM BROMIDE AND ALBUTEROL SULFATE 3 ML: .5; 3 SOLUTION RESPIRATORY (INHALATION) at 20:27

## 2017-01-01 RX ADMIN — ALLOPURINOL 100 MG: 100 TABLET ORAL at 21:54

## 2017-01-01 RX ADMIN — POTASSIUM CHLORIDE 20 MEQ: 1500 TABLET, EXTENDED RELEASE ORAL at 08:50

## 2017-01-01 RX ADMIN — Medication 1 TABLET: at 10:15

## 2017-01-01 RX ADMIN — ALLOPURINOL 100 MG: 100 TABLET ORAL at 18:08

## 2017-01-01 RX ADMIN — MICONAZOLE NITRATE: 20 POWDER TOPICAL at 10:56

## 2017-01-01 RX ADMIN — Medication 150 MG: at 17:52

## 2017-01-01 RX ADMIN — KETOTIFEN FUMARATE 1 DROP: 0.25 SOLUTION/ DROPS OPHTHALMIC at 09:04

## 2017-01-01 RX ADMIN — ISOSORBIDE DINITRATE 10 MG: 10 TABLET ORAL at 20:23

## 2017-01-01 RX ADMIN — METOPROLOL SUCCINATE 100 MG: 50 TABLET, EXTENDED RELEASE ORAL at 07:28

## 2017-01-01 RX ADMIN — Medication 1 TABLET: at 17:20

## 2017-01-01 RX ADMIN — Medication 1 CAPSULE: at 08:35

## 2017-01-01 RX ADMIN — SERTRALINE HYDROCHLORIDE 100 MG: 50 TABLET ORAL at 20:22

## 2017-01-01 RX ADMIN — MICONAZOLE NITRATE: 20 POWDER TOPICAL at 09:09

## 2017-01-01 RX ADMIN — ASPIRIN 81 MG: 81 TABLET, COATED ORAL at 08:25

## 2017-01-01 RX ADMIN — BUDESONIDE 0.5 MG: 0.5 SUSPENSION RESPIRATORY (INHALATION) at 09:11

## 2017-01-01 RX ADMIN — MEGESTROL ACETATE 200 MG: 40 SUSPENSION ORAL at 10:08

## 2017-01-01 RX ADMIN — POTASSIUM CHLORIDE 20 MEQ: 1500 TABLET, EXTENDED RELEASE ORAL at 09:33

## 2017-01-01 RX ADMIN — MEGESTROL ACETATE 200 MG: 40 SUSPENSION ORAL at 19:20

## 2017-01-01 RX ADMIN — Medication 150 MG: at 08:43

## 2017-01-01 RX ADMIN — GUAIFENESIN 1200 MG: 600 TABLET, EXTENDED RELEASE ORAL at 09:11

## 2017-01-01 RX ADMIN — LEVETIRACETAM 500 MG: 500 TABLET, FILM COATED ORAL at 17:34

## 2017-01-01 RX ADMIN — LEVETIRACETAM 500 MG: 500 TABLET, FILM COATED ORAL at 18:14

## 2017-01-01 RX ADMIN — KETOTIFEN FUMARATE 1 DROP: 0.25 SOLUTION/ DROPS OPHTHALMIC at 09:11

## 2017-01-01 RX ADMIN — Medication 1 TABLET: at 17:13

## 2017-01-01 RX ADMIN — AMIODARONE HYDROCHLORIDE 200 MG: 200 TABLET ORAL at 20:03

## 2017-01-01 RX ADMIN — INSULIN ASPART 2 UNITS: 100 INJECTION, SOLUTION INTRAVENOUS; SUBCUTANEOUS at 21:21

## 2017-01-01 RX ADMIN — Medication 150 MG: at 17:39

## 2017-01-01 RX ADMIN — ACETAMINOPHEN 650 MG: 325 TABLET, FILM COATED ORAL at 21:17

## 2017-01-01 RX ADMIN — ISOSORBIDE DINITRATE 10 MG: 10 TABLET ORAL at 21:00

## 2017-01-01 RX ADMIN — INSULIN ASPART 3 UNITS: 100 INJECTION, SOLUTION INTRAVENOUS; SUBCUTANEOUS at 20:15

## 2017-01-01 RX ADMIN — ALLOPURINOL 100 MG: 100 TABLET ORAL at 21:00

## 2017-01-01 RX ADMIN — STANDARDIZED SENNA CONCENTRATE 1 TABLET: 8.6 TABLET ORAL at 16:45

## 2017-01-01 RX ADMIN — APIXABAN 5 MG: 2.5 TABLET, FILM COATED ORAL at 20:27

## 2017-01-01 RX ADMIN — AMIODARONE HYDROCHLORIDE 200 MG: 200 TABLET ORAL at 20:16

## 2017-01-01 RX ADMIN — LINAGLIPTIN 5 MG: 5 TABLET, FILM COATED ORAL at 08:30

## 2017-01-01 RX ADMIN — DILTIAZEM HYDROCHLORIDE 300 MG: 180 CAPSULE, COATED, EXTENDED RELEASE ORAL at 12:45

## 2017-01-01 RX ADMIN — ALLOPURINOL 100 MG: 100 TABLET ORAL at 08:45

## 2017-01-01 RX ADMIN — IPRATROPIUM BROMIDE AND ALBUTEROL SULFATE 3 ML: .5; 3 SOLUTION RESPIRATORY (INHALATION) at 09:05

## 2017-01-01 RX ADMIN — ALLOPURINOL 100 MG: 100 TABLET ORAL at 08:57

## 2017-01-01 RX ADMIN — LEVOTHYROXINE SODIUM 150 MCG: 150 TABLET ORAL at 06:47

## 2017-01-01 RX ADMIN — GUAIFENESIN 1200 MG: 600 TABLET, EXTENDED RELEASE ORAL at 09:26

## 2017-01-01 RX ADMIN — APIXABAN 5 MG: 2.5 TABLET, FILM COATED ORAL at 22:35

## 2017-01-01 RX ADMIN — MICONAZOLE NITRATE: 20 POWDER TOPICAL at 08:36

## 2017-01-01 RX ADMIN — DILTIAZEM HYDROCHLORIDE 300 MG: 180 CAPSULE, COATED, EXTENDED RELEASE ORAL at 12:54

## 2017-01-01 RX ADMIN — ISOSORBIDE DINITRATE 10 MG: 10 TABLET ORAL at 09:36

## 2017-01-01 RX ADMIN — LEVETIRACETAM 500 MG: 500 TABLET, FILM COATED ORAL at 09:24

## 2017-01-01 RX ADMIN — APIXABAN 5 MG: 2.5 TABLET, FILM COATED ORAL at 08:30

## 2017-01-01 RX ADMIN — APIXABAN 5 MG: 2.5 TABLET, FILM COATED ORAL at 09:13

## 2017-01-01 RX ADMIN — DONEPEZIL HYDROCHLORIDE 10 MG: 5 TABLET ORAL at 23:17

## 2017-01-01 RX ADMIN — POTASSIUM CHLORIDE 10 MEQ: 750 TABLET, FILM COATED, EXTENDED RELEASE ORAL at 09:02

## 2017-01-01 RX ADMIN — FAMOTIDINE 20 MG: 20 TABLET, FILM COATED ORAL at 17:23

## 2017-01-01 RX ADMIN — SENNOSIDES AND DOCUSATE SODIUM 1 TABLET: 8.6; 5 TABLET ORAL at 20:21

## 2017-01-01 RX ADMIN — ALLOPURINOL 100 MG: 100 TABLET ORAL at 17:47

## 2017-01-01 RX ADMIN — METOPROLOL SUCCINATE 100 MG: 50 TABLET, EXTENDED RELEASE ORAL at 08:42

## 2017-01-01 RX ADMIN — FAMOTIDINE 40 MG: 20 TABLET ORAL at 20:34

## 2017-01-01 RX ADMIN — FUROSEMIDE 40 MG: 40 TABLET ORAL at 09:35

## 2017-01-01 RX ADMIN — ALLOPURINOL 100 MG: 100 TABLET ORAL at 20:05

## 2017-01-01 RX ADMIN — ALLOPURINOL 100 MG: 100 TABLET ORAL at 08:25

## 2017-01-01 RX ADMIN — ISOSORBIDE DINITRATE 10 MG: 10 TABLET ORAL at 22:24

## 2017-01-01 RX ADMIN — LEVETIRACETAM 500 MG: 500 TABLET, FILM COATED ORAL at 08:39

## 2017-01-01 RX ADMIN — LEVETIRACETAM 500 MG: 500 TABLET, FILM COATED ORAL at 17:31

## 2017-01-01 RX ADMIN — MICONAZOLE NITRATE: 20 POWDER TOPICAL at 09:25

## 2017-01-01 RX ADMIN — DILTIAZEM HYDROCHLORIDE 120 MG: 60 TABLET, FILM COATED ORAL at 09:28

## 2017-01-01 RX ADMIN — MICONAZOLE NITRATE: 20 POWDER TOPICAL at 20:53

## 2017-01-01 RX ADMIN — DIPHENHYDRAMINE HYDROCHLORIDE 25 MG: 25 CAPSULE ORAL at 20:19

## 2017-01-01 RX ADMIN — METOPROLOL TARTRATE 25 MG: 25 TABLET, FILM COATED ORAL at 10:07

## 2017-01-01 RX ADMIN — ISOSORBIDE DINITRATE 10 MG: 10 TABLET ORAL at 22:47

## 2017-01-01 RX ADMIN — MICONAZOLE NITRATE 1 APPLICATION: 20 POWDER TOPICAL at 08:19

## 2017-01-01 RX ADMIN — Medication 1 TABLET: at 09:28

## 2017-01-01 RX ADMIN — DILTIAZEM HYDROCHLORIDE 300 MG: 180 CAPSULE, COATED, EXTENDED RELEASE ORAL at 12:16

## 2017-01-01 RX ADMIN — MEGESTROL ACETATE 200 MG: 40 SUSPENSION ORAL at 17:24

## 2017-01-01 RX ADMIN — ISOSORBIDE DINITRATE 10 MG: 10 TABLET ORAL at 07:46

## 2017-01-01 RX ADMIN — TRAMADOL HYDROCHLORIDE 100 MG: 50 TABLET, FILM COATED ORAL at 16:47

## 2017-01-01 RX ADMIN — KETOTIFEN FUMARATE 1 DROP: 0.25 SOLUTION/ DROPS OPHTHALMIC at 16:56

## 2017-01-01 RX ADMIN — TRAMADOL HYDROCHLORIDE 100 MG: 50 TABLET, FILM COATED ORAL at 17:30

## 2017-01-01 RX ADMIN — LEVETIRACETAM 500 MG: 500 TABLET, FILM COATED ORAL at 10:10

## 2017-01-01 RX ADMIN — DOXAZOSIN MESYLATE 2 MG: 2 TABLET ORAL at 09:58

## 2017-01-01 RX ADMIN — ISOSORBIDE DINITRATE 10 MG: 10 TABLET ORAL at 20:33

## 2017-01-01 RX ADMIN — GUAIFENESIN 1200 MG: 600 TABLET, EXTENDED RELEASE ORAL at 10:13

## 2017-01-01 RX ADMIN — ISOSORBIDE DINITRATE 10 MG: 10 TABLET ORAL at 20:51

## 2017-01-01 RX ADMIN — ISOSORBIDE DINITRATE 10 MG: 10 TABLET ORAL at 07:28

## 2017-01-01 RX ADMIN — SODIUM BICARBONATE: 84 INJECTION, SOLUTION INTRAVENOUS at 09:16

## 2017-01-01 RX ADMIN — SENNOSIDES AND DOCUSATE SODIUM 1 TABLET: 8.6; 5 TABLET ORAL at 08:42

## 2017-01-01 RX ADMIN — MEGESTROL ACETATE 200 MG: 40 SUSPENSION ORAL at 18:08

## 2017-01-01 RX ADMIN — LEVETIRACETAM 500 MG: 500 TABLET, FILM COATED ORAL at 18:10

## 2017-01-01 RX ADMIN — IPRATROPIUM BROMIDE AND ALBUTEROL SULFATE 3 ML: .5; 3 SOLUTION RESPIRATORY (INHALATION) at 21:01

## 2017-01-01 RX ADMIN — INSULIN ASPART 2 UNITS: 100 INJECTION, SOLUTION INTRAVENOUS; SUBCUTANEOUS at 12:04

## 2017-01-01 RX ADMIN — ISOSORBIDE DINITRATE 10 MG: 10 TABLET ORAL at 09:27

## 2017-01-01 RX ADMIN — DONEPEZIL HYDROCHLORIDE 10 MG: 5 TABLET ORAL at 20:55

## 2017-01-01 RX ADMIN — DILTIAZEM HYDROCHLORIDE 300 MG: 180 CAPSULE, COATED, EXTENDED RELEASE ORAL at 12:40

## 2017-01-01 RX ADMIN — STANDARDIZED SENNA CONCENTRATE 1 TABLET: 8.6 TABLET ORAL at 17:13

## 2017-01-01 RX ADMIN — Medication 150 MG: at 17:12

## 2017-01-01 RX ADMIN — ISOSORBIDE DINITRATE 10 MG: 10 TABLET ORAL at 08:47

## 2017-01-01 RX ADMIN — TRAMADOL HYDROCHLORIDE 100 MG: 50 TABLET, FILM COATED ORAL at 16:21

## 2017-01-01 RX ADMIN — BUDESONIDE 0.5 MG: 0.5 SUSPENSION RESPIRATORY (INHALATION) at 21:03

## 2017-01-01 RX ADMIN — Medication 150 MG: at 09:19

## 2017-01-01 RX ADMIN — Medication 10 ML: at 08:53

## 2017-01-01 RX ADMIN — Medication 1 CAPSULE: at 10:08

## 2017-01-01 RX ADMIN — KETOTIFEN FUMARATE 1 DROP: 0.25 SOLUTION/ DROPS OPHTHALMIC at 14:41

## 2017-01-01 RX ADMIN — DOXAZOSIN MESYLATE 2 MG: 2 TABLET ORAL at 10:24

## 2017-01-01 RX ADMIN — Medication 1 TABLET: at 09:18

## 2017-01-01 RX ADMIN — KETOTIFEN FUMARATE 1 DROP: 0.25 SOLUTION/ DROPS OPHTHALMIC at 08:43

## 2017-01-01 RX ADMIN — METOPROLOL TARTRATE 75 MG: 25 TABLET, FILM COATED ORAL at 08:43

## 2017-01-01 RX ADMIN — Medication 150 MG: at 08:08

## 2017-01-01 RX ADMIN — SENNOSIDES AND DOCUSATE SODIUM 1 TABLET: 8.6; 5 TABLET ORAL at 20:17

## 2017-01-01 RX ADMIN — ALLOPURINOL 100 MG: 100 TABLET ORAL at 08:41

## 2017-01-01 RX ADMIN — KETOTIFEN FUMARATE 1 DROP: 0.25 SOLUTION/ DROPS OPHTHALMIC at 09:25

## 2017-01-01 RX ADMIN — AMIODARONE HYDROCHLORIDE 200 MG: 200 TABLET ORAL at 17:13

## 2017-01-01 RX ADMIN — LEVETIRACETAM 500 MG: 500 TABLET, FILM COATED ORAL at 09:04

## 2017-01-01 RX ADMIN — DILTIAZEM HYDROCHLORIDE 120 MG: 60 TABLET, FILM COATED ORAL at 17:26

## 2017-01-01 RX ADMIN — Medication 150 MG: at 17:29

## 2017-01-01 RX ADMIN — LEVETIRACETAM 500 MG: 500 TABLET, FILM COATED ORAL at 08:56

## 2017-01-01 RX ADMIN — FUROSEMIDE 40 MG: 40 TABLET ORAL at 11:17

## 2017-01-01 RX ADMIN — SERTRALINE 100 MG: 50 TABLET, FILM COATED ORAL at 21:35

## 2017-01-01 RX ADMIN — TERAZOSIN HYDROCHLORIDE 2 MG: 1 CAPSULE ORAL at 08:34

## 2017-01-01 RX ADMIN — Medication 1 TABLET: at 08:29

## 2017-01-01 RX ADMIN — IPRATROPIUM BROMIDE AND ALBUTEROL SULFATE 3 ML: .5; 3 SOLUTION RESPIRATORY (INHALATION) at 16:05

## 2017-01-01 RX ADMIN — KETOTIFEN FUMARATE 1 DROP: 0.25 SOLUTION/ DROPS OPHTHALMIC at 18:07

## 2017-01-01 RX ADMIN — ALLOPURINOL 100 MG: 100 TABLET ORAL at 07:59

## 2017-01-01 RX ADMIN — FUROSEMIDE 40 MG: 40 TABLET ORAL at 09:03

## 2017-01-01 RX ADMIN — FUROSEMIDE 40 MG: 40 TABLET ORAL at 09:32

## 2017-01-01 RX ADMIN — LEVOTHYROXINE SODIUM 150 MCG: 150 TABLET ORAL at 06:46

## 2017-01-01 RX ADMIN — IPRATROPIUM BROMIDE AND ALBUTEROL SULFATE 3 ML: .5; 3 SOLUTION RESPIRATORY (INHALATION) at 13:28

## 2017-01-01 RX ADMIN — ISOSORBIDE DINITRATE 10 MG: 10 TABLET ORAL at 08:13

## 2017-01-01 RX ADMIN — METOPROLOL SUCCINATE 100 MG: 50 TABLET, EXTENDED RELEASE ORAL at 09:11

## 2017-01-01 RX ADMIN — CONJUGATED ESTROGENS 1 APPLICATION: 0.62 CREAM VAGINAL at 21:05

## 2017-01-01 RX ADMIN — METOPROLOL TARTRATE 25 MG: 25 TABLET, FILM COATED ORAL at 17:17

## 2017-01-01 RX ADMIN — APIXABAN 5 MG: 2.5 TABLET, FILM COATED ORAL at 20:08

## 2017-01-01 RX ADMIN — GUAIFENESIN 1200 MG: 600 TABLET, EXTENDED RELEASE ORAL at 17:31

## 2017-01-01 RX ADMIN — SERTRALINE 100 MG: 50 TABLET, FILM COATED ORAL at 20:53

## 2017-01-01 RX ADMIN — ASPIRIN 81 MG: 81 TABLET, COATED ORAL at 08:31

## 2017-01-01 RX ADMIN — INSULIN ASPART 5 UNITS: 100 INJECTION, SOLUTION INTRAVENOUS; SUBCUTANEOUS at 11:43

## 2017-01-01 RX ADMIN — POTASSIUM CHLORIDE 20 MEQ: 1500 TABLET, EXTENDED RELEASE ORAL at 08:08

## 2017-01-01 RX ADMIN — ALLOPURINOL 100 MG: 100 TABLET ORAL at 10:35

## 2017-01-01 RX ADMIN — BUDESONIDE 0.5 MG: 0.5 SUSPENSION RESPIRATORY (INHALATION) at 07:40

## 2017-01-01 RX ADMIN — TERAZOSIN HYDROCHLORIDE 2 MG: 1 CAPSULE ORAL at 09:33

## 2017-01-01 RX ADMIN — LEVETIRACETAM 500 MG: 500 TABLET, FILM COATED ORAL at 20:32

## 2017-01-01 RX ADMIN — SENNOSIDES AND DOCUSATE SODIUM 1 TABLET: 8.6; 5 TABLET ORAL at 09:27

## 2017-01-01 RX ADMIN — SENNOSIDES AND DOCUSATE SODIUM 1 TABLET: 8.6; 5 TABLET ORAL at 20:26

## 2017-01-01 RX ADMIN — STANDARDIZED SENNA CONCENTRATE 1 TABLET: 8.6 TABLET ORAL at 07:45

## 2017-01-01 RX ADMIN — LINAGLIPTIN 5 MG: 5 TABLET, FILM COATED ORAL at 09:54

## 2017-01-01 RX ADMIN — BUDESONIDE 0.5 MG: 0.5 SUSPENSION RESPIRATORY (INHALATION) at 08:19

## 2017-01-01 RX ADMIN — DONEPEZIL HYDROCHLORIDE 10 MG: 5 TABLET ORAL at 22:12

## 2017-01-01 RX ADMIN — LINAGLIPTIN 5 MG: 5 TABLET, FILM COATED ORAL at 09:30

## 2017-01-01 RX ADMIN — MICONAZOLE NITRATE: 20 POWDER TOPICAL at 21:19

## 2017-01-01 RX ADMIN — APIXABAN 5 MG: 2.5 TABLET, FILM COATED ORAL at 08:44

## 2017-01-01 RX ADMIN — Medication 150 MG: at 17:41

## 2017-01-01 RX ADMIN — APIXABAN 5 MG: 2.5 TABLET, FILM COATED ORAL at 09:00

## 2017-01-01 RX ADMIN — ISOSORBIDE DINITRATE 10 MG: 10 TABLET ORAL at 23:05

## 2017-01-01 RX ADMIN — BUDESONIDE 0.5 MG: 0.5 SUSPENSION RESPIRATORY (INHALATION) at 20:01

## 2017-01-01 RX ADMIN — GUAIFENESIN 1200 MG: 600 TABLET, EXTENDED RELEASE ORAL at 08:42

## 2017-01-01 RX ADMIN — POTASSIUM CHLORIDE 20 MEQ: 1500 TABLET, EXTENDED RELEASE ORAL at 09:36

## 2017-01-01 RX ADMIN — Medication 150 MG: at 09:44

## 2017-01-01 RX ADMIN — DONEPEZIL HYDROCHLORIDE 10 MG: 5 TABLET ORAL at 20:47

## 2017-01-01 RX ADMIN — DILTIAZEM HYDROCHLORIDE 300 MG: 180 CAPSULE, COATED, EXTENDED RELEASE ORAL at 12:27

## 2017-01-01 RX ADMIN — Medication 1 TABLET: at 08:08

## 2017-01-01 RX ADMIN — Medication 1 CAPSULE: at 08:30

## 2017-01-01 RX ADMIN — KETOTIFEN FUMARATE 1 DROP: 0.25 SOLUTION/ DROPS OPHTHALMIC at 08:49

## 2017-01-01 RX ADMIN — SENNOSIDES AND DOCUSATE SODIUM 1 TABLET: 8.6; 5 TABLET ORAL at 20:03

## 2017-01-01 RX ADMIN — FAMOTIDINE 40 MG: 20 TABLET ORAL at 20:40

## 2017-01-01 RX ADMIN — Medication 1 TABLET: at 09:12

## 2017-01-01 RX ADMIN — Medication 1 CAPSULE: at 08:41

## 2017-01-01 RX ADMIN — ISOSORBIDE DINITRATE 10 MG: 10 TABLET ORAL at 08:32

## 2017-01-01 RX ADMIN — Medication 1 CAPSULE: at 09:52

## 2017-01-01 RX ADMIN — Medication 10 ML: at 21:23

## 2017-01-01 RX ADMIN — DILTIAZEM HYDROCHLORIDE 300 MG: 180 CAPSULE, COATED, EXTENDED RELEASE ORAL at 12:12

## 2017-01-01 RX ADMIN — DILTIAZEM HYDROCHLORIDE 300 MG: 180 CAPSULE, COATED, EXTENDED RELEASE ORAL at 11:59

## 2017-01-01 RX ADMIN — APIXABAN 5 MG: 2.5 TABLET, FILM COATED ORAL at 10:09

## 2017-01-01 RX ADMIN — Medication 150 MG: at 08:10

## 2017-01-01 RX ADMIN — KETOTIFEN FUMARATE 1 DROP: 0.25 SOLUTION/ DROPS OPHTHALMIC at 08:00

## 2017-01-01 RX ADMIN — METOPROLOL TARTRATE 75 MG: 25 TABLET, FILM COATED ORAL at 17:31

## 2017-01-01 RX ADMIN — LEVETIRACETAM 500 MG: 500 TABLET, FILM COATED ORAL at 08:23

## 2017-01-01 RX ADMIN — BUDESONIDE 0.5 MG: 0.5 SUSPENSION RESPIRATORY (INHALATION) at 20:53

## 2017-01-01 RX ADMIN — INSULIN ASPART 3 UNITS: 100 INJECTION, SOLUTION INTRAVENOUS; SUBCUTANEOUS at 11:59

## 2017-01-01 RX ADMIN — DONEPEZIL HYDROCHLORIDE 10 MG: 5 TABLET ORAL at 21:45

## 2017-01-01 RX ADMIN — FAMOTIDINE 20 MG: 20 TABLET, FILM COATED ORAL at 08:50

## 2017-01-01 RX ADMIN — Medication 1 TABLET: at 08:16

## 2017-01-01 RX ADMIN — GUAIFENESIN 1200 MG: 600 TABLET, EXTENDED RELEASE ORAL at 09:49

## 2017-01-01 RX ADMIN — ALLOPURINOL 100 MG: 100 TABLET ORAL at 07:43

## 2017-01-01 RX ADMIN — FUROSEMIDE 40 MG: 40 TABLET ORAL at 09:31

## 2017-01-01 RX ADMIN — IPRATROPIUM BROMIDE AND ALBUTEROL SULFATE 3 ML: .5; 3 SOLUTION RESPIRATORY (INHALATION) at 20:53

## 2017-01-01 RX ADMIN — METOPROLOL TARTRATE 100 MG: 25 TABLET, FILM COATED ORAL at 22:14

## 2017-01-01 RX ADMIN — APIXABAN 5 MG: 2.5 TABLET, FILM COATED ORAL at 21:35

## 2017-01-01 RX ADMIN — SENNOSIDES AND DOCUSATE SODIUM 1 TABLET: 8.6; 5 TABLET ORAL at 20:53

## 2017-01-01 RX ADMIN — IPRATROPIUM BROMIDE AND ALBUTEROL SULFATE 3 ML: .5; 3 SOLUTION RESPIRATORY (INHALATION) at 12:49

## 2017-01-01 RX ADMIN — GUAIFENESIN 1200 MG: 600 TABLET, EXTENDED RELEASE ORAL at 17:18

## 2017-01-01 RX ADMIN — Medication 150 MG: at 12:11

## 2017-01-01 RX ADMIN — APIXABAN 5 MG: 2.5 TABLET, FILM COATED ORAL at 20:35

## 2017-01-01 RX ADMIN — STANDARDIZED SENNA CONCENTRATE 1 TABLET: 8.6 TABLET ORAL at 17:17

## 2017-01-01 RX ADMIN — LEVETIRACETAM 500 MG: 500 TABLET, FILM COATED ORAL at 09:20

## 2017-01-01 RX ADMIN — MEGESTROL ACETATE 200 MG: 40 SUSPENSION ORAL at 09:20

## 2017-01-01 RX ADMIN — SERTRALINE 100 MG: 50 TABLET, FILM COATED ORAL at 21:37

## 2017-01-01 RX ADMIN — IPRATROPIUM BROMIDE AND ALBUTEROL SULFATE 3 ML: .5; 3 SOLUTION RESPIRATORY (INHALATION) at 22:38

## 2017-01-01 RX ADMIN — METOPROLOL SUCCINATE 100 MG: 50 TABLET, EXTENDED RELEASE ORAL at 21:50

## 2017-01-01 RX ADMIN — AMIODARONE HYDROCHLORIDE 200 MG: 200 TABLET ORAL at 10:04

## 2017-01-01 RX ADMIN — METOPROLOL TARTRATE 75 MG: 25 TABLET, FILM COATED ORAL at 17:12

## 2017-01-01 RX ADMIN — ASPIRIN 81 MG: 81 TABLET, COATED ORAL at 09:59

## 2017-01-01 RX ADMIN — DILTIAZEM HYDROCHLORIDE 300 MG: 180 CAPSULE, COATED, EXTENDED RELEASE ORAL at 12:07

## 2017-01-01 RX ADMIN — Medication 1 CAPSULE: at 08:10

## 2017-01-01 RX ADMIN — GUAIFENESIN 1200 MG: 600 TABLET, EXTENDED RELEASE ORAL at 10:14

## 2017-01-01 RX ADMIN — MICONAZOLE NITRATE: 20 POWDER TOPICAL at 20:44

## 2017-01-01 RX ADMIN — METOPROLOL TARTRATE 100 MG: 25 TABLET, FILM COATED ORAL at 23:08

## 2017-01-01 RX ADMIN — FUROSEMIDE 40 MG: 40 TABLET ORAL at 09:11

## 2017-01-01 RX ADMIN — Medication 1 TABLET: at 10:21

## 2017-01-01 RX ADMIN — LEVETIRACETAM 500 MG: 500 TABLET, FILM COATED ORAL at 08:25

## 2017-01-01 RX ADMIN — FAMOTIDINE 20 MG: 20 TABLET, FILM COATED ORAL at 09:14

## 2017-01-01 RX ADMIN — FAMOTIDINE 40 MG: 20 TABLET ORAL at 22:25

## 2017-01-01 RX ADMIN — DONEPEZIL HYDROCHLORIDE 10 MG: 5 TABLET ORAL at 20:38

## 2017-01-01 RX ADMIN — AMIODARONE HYDROCHLORIDE 200 MG: 200 TABLET ORAL at 20:25

## 2017-01-01 RX ADMIN — KETOTIFEN FUMARATE 1 DROP: 0.25 SOLUTION/ DROPS OPHTHALMIC at 17:56

## 2017-01-01 RX ADMIN — SERTRALINE 100 MG: 50 TABLET, FILM COATED ORAL at 20:29

## 2017-01-01 RX ADMIN — LEVOTHYROXINE SODIUM 150 MCG: 150 TABLET ORAL at 06:41

## 2017-01-01 RX ADMIN — ENOXAPARIN SODIUM 100 MG: 100 INJECTION SUBCUTANEOUS at 13:33

## 2017-01-01 RX ADMIN — Medication 150 MG: at 18:05

## 2017-01-01 RX ADMIN — ALLOPURINOL 100 MG: 100 TABLET ORAL at 22:35

## 2017-01-01 RX ADMIN — Medication 1 CAPSULE: at 09:26

## 2017-01-01 RX ADMIN — FAMOTIDINE 40 MG: 20 TABLET ORAL at 20:03

## 2017-01-01 RX ADMIN — IPRATROPIUM BROMIDE AND ALBUTEROL SULFATE 3 ML: .5; 3 SOLUTION RESPIRATORY (INHALATION) at 13:03

## 2017-01-01 RX ADMIN — SENNOSIDES AND DOCUSATE SODIUM 1 TABLET: 8.6; 5 TABLET ORAL at 09:40

## 2017-01-01 RX ADMIN — ISOSORBIDE DINITRATE 10 MG: 10 TABLET ORAL at 20:53

## 2017-01-01 RX ADMIN — LINAGLIPTIN 5 MG: 5 TABLET, FILM COATED ORAL at 09:27

## 2017-01-01 RX ADMIN — LEVETIRACETAM 500 MG: 500 TABLET, FILM COATED ORAL at 10:23

## 2017-01-01 RX ADMIN — LEVETIRACETAM 500 MG: 500 TABLET, FILM COATED ORAL at 09:35

## 2017-01-01 RX ADMIN — STANDARDIZED SENNA CONCENTRATE 1 TABLET: 8.6 TABLET ORAL at 08:35

## 2017-01-01 RX ADMIN — FUROSEMIDE 40 MG: 40 TABLET ORAL at 08:03

## 2017-01-01 RX ADMIN — POTASSIUM CHLORIDE 20 MEQ: 1500 TABLET, EXTENDED RELEASE ORAL at 08:27

## 2017-01-01 RX ADMIN — DONEPEZIL HYDROCHLORIDE 10 MG: 5 TABLET, FILM COATED ORAL at 21:09

## 2017-01-01 RX ADMIN — APIXABAN 5 MG: 2.5 TABLET, FILM COATED ORAL at 23:25

## 2017-01-01 RX ADMIN — Medication 150 MG: at 09:08

## 2017-01-01 RX ADMIN — STANDARDIZED SENNA CONCENTRATE 1 TABLET: 8.6 TABLET ORAL at 18:05

## 2017-01-01 RX ADMIN — ALLOPURINOL 100 MG: 100 TABLET ORAL at 16:18

## 2017-01-01 RX ADMIN — DONEPEZIL HYDROCHLORIDE 10 MG: 5 TABLET ORAL at 21:38

## 2017-01-01 RX ADMIN — MICONAZOLE NITRATE: 20 POWDER TOPICAL at 13:02

## 2017-01-01 RX ADMIN — Medication 150 MG: at 08:52

## 2017-01-01 RX ADMIN — Medication 150 MG: at 08:14

## 2017-01-01 RX ADMIN — FAMOTIDINE 40 MG: 20 TABLET ORAL at 21:29

## 2017-01-01 RX ADMIN — TERAZOSIN HYDROCHLORIDE 2 MG: 1 CAPSULE ORAL at 08:42

## 2017-01-01 RX ADMIN — FAMOTIDINE 40 MG: 20 TABLET ORAL at 23:33

## 2017-01-01 RX ADMIN — KETOTIFEN FUMARATE 1 DROP: 0.25 SOLUTION/ DROPS OPHTHALMIC at 08:37

## 2017-01-01 RX ADMIN — SENNOSIDES AND DOCUSATE SODIUM 1 TABLET: 8.6; 5 TABLET ORAL at 08:43

## 2017-01-01 RX ADMIN — Medication 1 CAPSULE: at 10:35

## 2017-01-01 RX ADMIN — ALLOPURINOL 100 MG: 100 TABLET ORAL at 09:33

## 2017-01-01 RX ADMIN — SENNOSIDES AND DOCUSATE SODIUM 1 TABLET: 8.6; 5 TABLET ORAL at 20:42

## 2017-01-01 RX ADMIN — Medication 1 CAPSULE: at 09:31

## 2017-01-01 RX ADMIN — FLUCONAZOLE 100 MG: 2 INJECTION INTRAVENOUS at 13:38

## 2017-01-01 RX ADMIN — APIXABAN 5 MG: 2.5 TABLET, FILM COATED ORAL at 20:30

## 2017-01-01 RX ADMIN — INSULIN ASPART 2 UNITS: 100 INJECTION, SOLUTION INTRAVENOUS; SUBCUTANEOUS at 18:02

## 2017-01-01 RX ADMIN — MICONAZOLE NITRATE: 20 POWDER TOPICAL at 08:38

## 2017-01-01 RX ADMIN — KETOTIFEN FUMARATE 1 DROP: 0.35 SOLUTION/ DROPS OPHTHALMIC at 17:59

## 2017-01-01 RX ADMIN — IPRATROPIUM BROMIDE AND ALBUTEROL SULFATE 3 ML: .5; 3 SOLUTION RESPIRATORY (INHALATION) at 08:51

## 2017-01-01 RX ADMIN — BUDESONIDE 0.5 MG: 0.5 SUSPENSION RESPIRATORY (INHALATION) at 20:42

## 2017-01-01 RX ADMIN — FLUCONAZOLE 100 MG: 100 TABLET ORAL at 11:41

## 2017-01-01 RX ADMIN — ALLOPURINOL 100 MG: 100 TABLET ORAL at 17:30

## 2017-01-01 RX ADMIN — LEVETIRACETAM 500 MG: 500 TABLET, FILM COATED ORAL at 20:51

## 2017-01-01 RX ADMIN — ENOXAPARIN SODIUM 110 MG: 120 INJECTION SUBCUTANEOUS at 08:42

## 2017-01-01 RX ADMIN — SERTRALINE 100 MG: 50 TABLET, FILM COATED ORAL at 20:12

## 2017-01-01 RX ADMIN — METOPROLOL SUCCINATE 100 MG: 50 TABLET, EXTENDED RELEASE ORAL at 21:22

## 2017-01-01 RX ADMIN — FUROSEMIDE 40 MG: 40 TABLET ORAL at 08:28

## 2017-01-01 RX ADMIN — MICONAZOLE NITRATE: 20 POWDER TOPICAL at 20:14

## 2017-01-01 RX ADMIN — KETOTIFEN FUMARATE 1 DROP: 0.35 SOLUTION/ DROPS OPHTHALMIC at 08:34

## 2017-01-01 RX ADMIN — METOPROLOL SUCCINATE 100 MG: 50 TABLET, EXTENDED RELEASE ORAL at 21:15

## 2017-01-01 RX ADMIN — AMIODARONE HYDROCHLORIDE 200 MG: 200 TABLET ORAL at 17:22

## 2017-01-01 RX ADMIN — ISOSORBIDE DINITRATE 10 MG: 10 TABLET ORAL at 08:07

## 2017-01-01 RX ADMIN — FUROSEMIDE 40 MG: 40 TABLET ORAL at 08:54

## 2017-01-01 RX ADMIN — SODIUM CHLORIDE 75 ML/HR: 9 INJECTION, SOLUTION INTRAVENOUS at 13:53

## 2017-01-01 RX ADMIN — Medication 1 TABLET: at 08:22

## 2017-01-01 RX ADMIN — AMIODARONE HYDROCHLORIDE 200 MG: 200 TABLET ORAL at 09:00

## 2017-01-01 RX ADMIN — BUDESONIDE 0.5 MG: 0.5 SUSPENSION RESPIRATORY (INHALATION) at 08:21

## 2017-01-01 RX ADMIN — STANDARDIZED SENNA CONCENTRATE 1 TABLET: 8.6 TABLET ORAL at 10:12

## 2017-01-01 RX ADMIN — GUAIFENESIN 1200 MG: 600 TABLET, EXTENDED RELEASE ORAL at 18:42

## 2017-01-01 RX ADMIN — IPRATROPIUM BROMIDE AND ALBUTEROL SULFATE 3 ML: .5; 3 SOLUTION RESPIRATORY (INHALATION) at 20:43

## 2017-01-01 RX ADMIN — Medication 150 MG: at 17:21

## 2017-01-01 RX ADMIN — SENNOSIDES AND DOCUSATE SODIUM 1 TABLET: 8.6; 5 TABLET ORAL at 08:19

## 2017-01-01 RX ADMIN — STANDARDIZED SENNA CONCENTRATE 1 TABLET: 8.6 TABLET ORAL at 10:15

## 2017-01-01 RX ADMIN — GUAIFENESIN 1200 MG: 600 TABLET, EXTENDED RELEASE ORAL at 17:07

## 2017-01-01 RX ADMIN — SERTRALINE 100 MG: 50 TABLET, FILM COATED ORAL at 20:43

## 2017-01-01 RX ADMIN — KETOTIFEN FUMARATE 1 DROP: 0.25 SOLUTION/ DROPS OPHTHALMIC at 09:50

## 2017-01-01 RX ADMIN — KETOTIFEN FUMARATE 1 DROP: 0.25 SOLUTION/ DROPS OPHTHALMIC at 08:40

## 2017-01-01 RX ADMIN — MICONAZOLE NITRATE: 20 POWDER TOPICAL at 10:14

## 2017-01-01 RX ADMIN — ALLOPURINOL 100 MG: 100 TABLET ORAL at 21:29

## 2017-01-01 RX ADMIN — METOPROLOL TARTRATE 25 MG: 25 TABLET, FILM COATED ORAL at 12:25

## 2017-01-01 RX ADMIN — AMIODARONE HYDROCHLORIDE 200 MG: 200 TABLET ORAL at 17:53

## 2017-01-01 RX ADMIN — FAMOTIDINE 40 MG: 20 TABLET ORAL at 21:22

## 2017-01-01 RX ADMIN — Medication 150 MG: at 17:27

## 2017-01-01 RX ADMIN — AMIODARONE HYDROCHLORIDE 200 MG: 200 TABLET ORAL at 08:53

## 2017-01-01 RX ADMIN — MEGESTROL ACETATE 200 MG: 40 SUSPENSION ORAL at 17:55

## 2017-01-01 RX ADMIN — CONJUGATED ESTROGENS 0.75 APPLICATION: 0.62 CREAM VAGINAL at 20:41

## 2017-01-01 RX ADMIN — BUDESONIDE 0.5 MG: 0.5 SUSPENSION RESPIRATORY (INHALATION) at 20:28

## 2017-01-01 RX ADMIN — IPRATROPIUM BROMIDE AND ALBUTEROL SULFATE 3 ML: .5; 3 SOLUTION RESPIRATORY (INHALATION) at 19:33

## 2017-01-01 RX ADMIN — MICONAZOLE NITRATE: 20 POWDER TOPICAL at 08:37

## 2017-01-01 RX ADMIN — FAMOTIDINE 40 MG: 20 TABLET ORAL at 21:03

## 2017-01-01 RX ADMIN — Medication 1 TABLET: at 17:51

## 2017-01-01 RX ADMIN — TRAMADOL HYDROCHLORIDE 100 MG: 50 TABLET, FILM COATED ORAL at 12:56

## 2017-01-01 RX ADMIN — Medication 1 TABLET: at 18:45

## 2017-01-01 RX ADMIN — SERTRALINE HYDROCHLORIDE 100 MG: 50 TABLET ORAL at 19:58

## 2017-01-01 RX ADMIN — IPRATROPIUM BROMIDE AND ALBUTEROL SULFATE 3 ML: .5; 3 SOLUTION RESPIRATORY (INHALATION) at 10:20

## 2017-01-01 RX ADMIN — STANDARDIZED SENNA CONCENTRATE 1 TABLET: 8.6 TABLET ORAL at 17:05

## 2017-01-01 RX ADMIN — APIXABAN 5 MG: 2.5 TABLET, FILM COATED ORAL at 20:33

## 2017-01-01 RX ADMIN — METOPROLOL SUCCINATE 100 MG: 50 TABLET, EXTENDED RELEASE ORAL at 22:35

## 2017-01-01 RX ADMIN — LINAGLIPTIN 5 MG: 5 TABLET, FILM COATED ORAL at 08:28

## 2017-01-01 RX ADMIN — LINAGLIPTIN 5 MG: 5 TABLET, FILM COATED ORAL at 09:04

## 2017-01-01 RX ADMIN — FAMOTIDINE 20 MG: 20 TABLET, FILM COATED ORAL at 09:48

## 2017-01-01 RX ADMIN — MEGESTROL ACETATE 200 MG: 40 SUSPENSION ORAL at 08:34

## 2017-01-01 RX ADMIN — GUAIFENESIN 1200 MG: 600 TABLET, EXTENDED RELEASE ORAL at 08:28

## 2017-01-01 RX ADMIN — POTASSIUM CHLORIDE 20 MEQ: 1500 TABLET, EXTENDED RELEASE ORAL at 07:42

## 2017-01-01 RX ADMIN — Medication 150 MG: at 11:15

## 2017-01-01 RX ADMIN — Medication 1 TABLET: at 08:24

## 2017-01-01 RX ADMIN — LEVETIRACETAM 500 MG: 500 TABLET, FILM COATED ORAL at 23:01

## 2017-01-01 RX ADMIN — ALLOPURINOL 100 MG: 100 TABLET ORAL at 21:03

## 2017-01-01 RX ADMIN — SENNOSIDES AND DOCUSATE SODIUM 1 TABLET: 8.6; 5 TABLET ORAL at 09:49

## 2017-01-01 RX ADMIN — Medication 1 TABLET: at 09:57

## 2017-01-01 RX ADMIN — Medication 1 CAPSULE: at 07:46

## 2017-01-01 RX ADMIN — Medication 1 CAPSULE: at 09:18

## 2017-01-01 RX ADMIN — ISOSORBIDE DINITRATE 10 MG: 10 TABLET ORAL at 20:34

## 2017-01-01 RX ADMIN — Medication 10 ML: at 08:42

## 2017-01-01 RX ADMIN — DILTIAZEM HYDROCHLORIDE 120 MG: 60 TABLET, FILM COATED ORAL at 18:14

## 2017-01-01 RX ADMIN — DILTIAZEM HYDROCHLORIDE 120 MG: 60 TABLET, FILM COATED ORAL at 08:00

## 2017-01-01 RX ADMIN — SENNOSIDES AND DOCUSATE SODIUM 1 TABLET: 8.6; 5 TABLET ORAL at 08:09

## 2017-01-01 RX ADMIN — Medication 1 TABLET: at 09:51

## 2017-01-01 RX ADMIN — MICONAZOLE NITRATE: 20 POWDER TOPICAL at 19:59

## 2017-01-01 RX ADMIN — GUAIFENESIN 1200 MG: 600 TABLET, EXTENDED RELEASE ORAL at 17:09

## 2017-01-01 RX ADMIN — MICONAZOLE NITRATE: 20 POWDER TOPICAL at 22:49

## 2017-01-01 RX ADMIN — GUAIFENESIN 1200 MG: 600 TABLET, EXTENDED RELEASE ORAL at 17:26

## 2017-01-01 RX ADMIN — POTASSIUM CHLORIDE 20 MEQ: 1500 TABLET, EXTENDED RELEASE ORAL at 09:16

## 2017-01-01 RX ADMIN — ALLOPURINOL 100 MG: 100 TABLET ORAL at 22:08

## 2017-01-01 RX ADMIN — KETOTIFEN FUMARATE 1 DROP: 0.35 SOLUTION/ DROPS OPHTHALMIC at 17:44

## 2017-01-01 RX ADMIN — IPRATROPIUM BROMIDE AND ALBUTEROL SULFATE 3 ML: .5; 3 SOLUTION RESPIRATORY (INHALATION) at 07:55

## 2017-01-01 RX ADMIN — GUAIFENESIN 1200 MG: 600 TABLET, EXTENDED RELEASE ORAL at 10:26

## 2017-01-01 RX ADMIN — ISOSORBIDE DINITRATE 10 MG: 10 TABLET ORAL at 09:16

## 2017-01-01 RX ADMIN — APIXABAN 5 MG: 2.5 TABLET, FILM COATED ORAL at 20:01

## 2017-01-01 RX ADMIN — IPRATROPIUM BROMIDE AND ALBUTEROL SULFATE 3 ML: .5; 3 SOLUTION RESPIRATORY (INHALATION) at 16:09

## 2017-01-01 RX ADMIN — KETOTIFEN FUMARATE 1 DROP: 0.25 SOLUTION/ DROPS OPHTHALMIC at 16:28

## 2017-01-01 RX ADMIN — DILTIAZEM HYDROCHLORIDE 300 MG: 180 CAPSULE, COATED, EXTENDED RELEASE ORAL at 12:01

## 2017-01-01 RX ADMIN — ALLOPURINOL 100 MG: 100 TABLET ORAL at 09:28

## 2017-01-01 RX ADMIN — ALLOPURINOL 100 MG: 100 TABLET ORAL at 08:51

## 2017-01-01 RX ADMIN — METOPROLOL TARTRATE 75 MG: 25 TABLET, FILM COATED ORAL at 08:49

## 2017-01-01 RX ADMIN — DOXAZOSIN MESYLATE 2 MG: 2 TABLET ORAL at 21:16

## 2017-01-01 RX ADMIN — Medication 150 MG: at 10:20

## 2017-01-01 RX ADMIN — MICONAZOLE NITRATE: 20 POWDER TOPICAL at 08:27

## 2017-01-01 RX ADMIN — DOXAZOSIN MESYLATE 2 MG: 2 TABLET ORAL at 23:04

## 2017-01-01 RX ADMIN — AMIODARONE HYDROCHLORIDE 200 MG: 200 TABLET ORAL at 08:27

## 2017-01-01 RX ADMIN — GUAIFENESIN 1200 MG: 600 TABLET, EXTENDED RELEASE ORAL at 08:25

## 2017-01-01 RX ADMIN — LINAGLIPTIN 5 MG: 5 TABLET, FILM COATED ORAL at 09:09

## 2017-01-01 RX ADMIN — SENNOSIDES AND DOCUSATE SODIUM 1 TABLET: 8.6; 5 TABLET ORAL at 08:32

## 2017-01-01 RX ADMIN — KETOTIFEN FUMARATE 1 DROP: 0.25 SOLUTION/ DROPS OPHTHALMIC at 08:44

## 2017-01-01 RX ADMIN — ALLOPURINOL 100 MG: 100 TABLET ORAL at 07:28

## 2017-01-01 RX ADMIN — TRAMADOL HYDROCHLORIDE 100 MG: 50 TABLET, FILM COATED ORAL at 22:40

## 2017-01-01 RX ADMIN — Medication 150 MG: at 07:44

## 2017-01-01 RX ADMIN — MEROPENEM 1 G: 1 INJECTION, POWDER, FOR SOLUTION INTRAVENOUS at 18:22

## 2017-01-01 RX ADMIN — POTASSIUM CHLORIDE 20 MEQ: 1500 TABLET, EXTENDED RELEASE ORAL at 08:30

## 2017-01-01 RX ADMIN — MICONAZOLE NITRATE: 20 POWDER TOPICAL at 20:48

## 2017-01-01 RX ADMIN — DILTIAZEM HYDROCHLORIDE 120 MG: 60 TABLET, FILM COATED ORAL at 08:34

## 2017-01-01 RX ADMIN — GUAIFENESIN 1200 MG: 600 TABLET, EXTENDED RELEASE ORAL at 08:41

## 2017-01-01 RX ADMIN — ALLOPURINOL 100 MG: 100 TABLET ORAL at 17:38

## 2017-01-01 RX ADMIN — MICONAZOLE NITRATE: 20 POWDER TOPICAL at 22:24

## 2017-01-01 RX ADMIN — ALLOPURINOL 100 MG: 100 TABLET ORAL at 20:02

## 2017-01-01 RX ADMIN — POTASSIUM CHLORIDE 20 MEQ: 1500 TABLET, EXTENDED RELEASE ORAL at 08:48

## 2017-01-01 RX ADMIN — CONJUGATED ESTROGENS 0.75 APPLICATION: 0.62 CREAM VAGINAL at 20:13

## 2017-01-01 RX ADMIN — METOPROLOL TARTRATE 75 MG: 25 TABLET, FILM COATED ORAL at 10:39

## 2017-01-01 RX ADMIN — GUAIFENESIN 1200 MG: 600 TABLET, EXTENDED RELEASE ORAL at 08:30

## 2017-01-01 RX ADMIN — LINAGLIPTIN 5 MG: 5 TABLET, FILM COATED ORAL at 08:54

## 2017-01-01 RX ADMIN — DILTIAZEM HYDROCHLORIDE 240 MG: 120 CAPSULE, COATED, EXTENDED RELEASE ORAL at 09:45

## 2017-01-01 RX ADMIN — ALLOPURINOL 100 MG: 100 TABLET ORAL at 17:22

## 2017-01-01 RX ADMIN — KETOTIFEN FUMARATE 1 DROP: 0.25 SOLUTION/ DROPS OPHTHALMIC at 09:03

## 2017-01-01 RX ADMIN — Medication 150 MG: at 17:37

## 2017-01-01 RX ADMIN — METOPROLOL TARTRATE 75 MG: 25 TABLET, FILM COATED ORAL at 17:35

## 2017-01-01 RX ADMIN — ALLOPURINOL 100 MG: 100 TABLET ORAL at 08:08

## 2017-01-01 RX ADMIN — DILTIAZEM HYDROCHLORIDE 240 MG: 120 CAPSULE, COATED, EXTENDED RELEASE ORAL at 11:41

## 2017-01-01 RX ADMIN — LEVETIRACETAM 500 MG: 500 TABLET, FILM COATED ORAL at 23:05

## 2017-01-01 RX ADMIN — LEVOTHYROXINE SODIUM 150 MCG: 150 TABLET ORAL at 06:51

## 2017-01-01 RX ADMIN — GUAIFENESIN 1200 MG: 600 TABLET, EXTENDED RELEASE ORAL at 10:11

## 2017-01-01 RX ADMIN — LEVETIRACETAM 500 MG: 500 TABLET, FILM COATED ORAL at 21:12

## 2017-01-01 RX ADMIN — IPRATROPIUM BROMIDE AND ALBUTEROL SULFATE 3 ML: .5; 3 SOLUTION RESPIRATORY (INHALATION) at 20:37

## 2017-01-01 RX ADMIN — Medication 1 TABLET: at 08:55

## 2017-01-01 RX ADMIN — Medication 1 TABLET: at 09:14

## 2017-01-01 RX ADMIN — ALLOPURINOL 100 MG: 100 TABLET ORAL at 10:08

## 2017-01-01 RX ADMIN — Medication 1 TABLET: at 18:11

## 2017-01-01 RX ADMIN — SENNOSIDES AND DOCUSATE SODIUM 1 TABLET: 8.6; 5 TABLET ORAL at 20:28

## 2017-01-01 RX ADMIN — IPRATROPIUM BROMIDE AND ALBUTEROL SULFATE 3 ML: .5; 3 SOLUTION RESPIRATORY (INHALATION) at 09:27

## 2017-01-01 RX ADMIN — ACETAMINOPHEN 650 MG: 325 TABLET, FILM COATED ORAL at 10:59

## 2017-01-01 RX ADMIN — ALLOPURINOL 100 MG: 100 TABLET ORAL at 22:33

## 2017-01-01 RX ADMIN — FAMOTIDINE 40 MG: 20 TABLET ORAL at 20:32

## 2017-01-01 RX ADMIN — APIXABAN 5 MG: 2.5 TABLET, FILM COATED ORAL at 21:03

## 2017-01-01 RX ADMIN — BUDESONIDE 0.5 MG: 0.5 SUSPENSION RESPIRATORY (INHALATION) at 08:31

## 2017-01-01 RX ADMIN — STANDARDIZED SENNA CONCENTRATE 1 TABLET: 8.6 TABLET ORAL at 09:24

## 2017-01-01 RX ADMIN — ALLOPURINOL 100 MG: 100 TABLET ORAL at 20:35

## 2017-01-01 RX ADMIN — APIXABAN 5 MG: 2.5 TABLET, FILM COATED ORAL at 21:38

## 2017-01-01 RX ADMIN — IPRATROPIUM BROMIDE AND ALBUTEROL SULFATE 3 ML: .5; 3 SOLUTION RESPIRATORY (INHALATION) at 17:48

## 2017-01-01 RX ADMIN — CONJUGATED ESTROGENS 0.75 APPLICATOR: 0.62 CREAM VAGINAL at 19:49

## 2017-01-01 RX ADMIN — Medication 10 ML: at 21:04

## 2017-01-01 RX ADMIN — METOPROLOL SUCCINATE 100 MG: 50 TABLET, EXTENDED RELEASE ORAL at 23:06

## 2017-01-01 RX ADMIN — DILTIAZEM HYDROCHLORIDE 300 MG: 180 CAPSULE, COATED, EXTENDED RELEASE ORAL at 13:14

## 2017-01-01 RX ADMIN — APIXABAN 5 MG: 2.5 TABLET, FILM COATED ORAL at 20:56

## 2017-01-01 RX ADMIN — GUAIFENESIN 1200 MG: 600 TABLET, EXTENDED RELEASE ORAL at 09:51

## 2017-01-01 RX ADMIN — DICLOFENAC SODIUM 4 G: 10 GEL TOPICAL at 02:16

## 2017-01-01 RX ADMIN — TRAMADOL HYDROCHLORIDE 50 MG: 50 TABLET, FILM COATED ORAL at 00:12

## 2017-01-01 RX ADMIN — LEVETIRACETAM 500 MG: 500 TABLET, FILM COATED ORAL at 09:03

## 2017-01-01 RX ADMIN — MICONAZOLE NITRATE: 20 POWDER TOPICAL at 09:52

## 2017-01-01 RX ADMIN — ALLOPURINOL 100 MG: 100 TABLET ORAL at 21:01

## 2017-01-01 RX ADMIN — KETOTIFEN FUMARATE 1 DROP: 0.35 SOLUTION/ DROPS OPHTHALMIC at 17:05

## 2017-01-01 RX ADMIN — ALLOPURINOL 100 MG: 100 TABLET ORAL at 20:47

## 2017-01-01 RX ADMIN — ISOSORBIDE DINITRATE 10 MG: 10 TABLET ORAL at 10:00

## 2017-01-01 RX ADMIN — MICONAZOLE NITRATE 1 APPLICATION: 20 POWDER TOPICAL at 08:35

## 2017-01-01 RX ADMIN — ALLOPURINOL 100 MG: 100 TABLET ORAL at 17:46

## 2017-01-01 RX ADMIN — ALLOPURINOL 100 MG: 100 TABLET ORAL at 09:50

## 2017-01-01 RX ADMIN — Medication 150 MG: at 18:48

## 2017-01-01 RX ADMIN — DIPHENHYDRAMINE HYDROCHLORIDE 25 MG: 25 CAPSULE ORAL at 20:23

## 2017-01-01 RX ADMIN — APIXABAN 5 MG: 2.5 TABLET, FILM COATED ORAL at 23:09

## 2017-01-01 RX ADMIN — SENNOSIDES AND DOCUSATE SODIUM 1 TABLET: 8.6; 5 TABLET ORAL at 09:26

## 2017-01-01 RX ADMIN — Medication 1 TABLET: at 17:43

## 2017-01-01 RX ADMIN — ISOSORBIDE DINITRATE 10 MG: 10 TABLET ORAL at 08:39

## 2017-01-01 RX ADMIN — DILTIAZEM HYDROCHLORIDE 300 MG: 180 CAPSULE, COATED, EXTENDED RELEASE ORAL at 12:14

## 2017-01-01 RX ADMIN — METOPROLOL SUCCINATE 100 MG: 50 TABLET, EXTENDED RELEASE ORAL at 21:20

## 2017-01-01 RX ADMIN — Medication 1 CAPSULE: at 07:27

## 2017-01-01 RX ADMIN — LINAGLIPTIN 5 MG: 5 TABLET, FILM COATED ORAL at 07:45

## 2017-01-01 RX ADMIN — AMIODARONE HYDROCHLORIDE 200 MG: 200 TABLET ORAL at 08:32

## 2017-01-01 RX ADMIN — OFLOXACIN 50000 UNITS: 300 TABLET, COATED ORAL at 11:57

## 2017-01-01 RX ADMIN — BUDESONIDE 0.5 MG: 0.5 SUSPENSION RESPIRATORY (INHALATION) at 10:29

## 2017-01-01 RX ADMIN — MICONAZOLE NITRATE: 20 POWDER TOPICAL at 07:52

## 2017-01-01 RX ADMIN — CONJUGATED ESTROGENS 0.75 APPLICATOR: 0.62 CREAM VAGINAL at 20:47

## 2017-01-01 RX ADMIN — LINAGLIPTIN 5 MG: 5 TABLET, FILM COATED ORAL at 08:38

## 2017-01-01 RX ADMIN — MEGESTROL ACETATE 200 MG: 40 SUSPENSION ORAL at 08:30

## 2017-01-01 RX ADMIN — KETOTIFEN FUMARATE 1 DROP: 0.25 SOLUTION/ DROPS OPHTHALMIC at 18:43

## 2017-01-01 RX ADMIN — APIXABAN 5 MG: 2.5 TABLET, FILM COATED ORAL at 09:02

## 2017-01-01 RX ADMIN — ALLOPURINOL 100 MG: 100 TABLET ORAL at 20:34

## 2017-01-01 RX ADMIN — GUAIFENESIN 1200 MG: 600 TABLET, EXTENDED RELEASE ORAL at 18:24

## 2017-01-01 RX ADMIN — APIXABAN 5 MG: 2.5 TABLET, FILM COATED ORAL at 08:20

## 2017-01-01 RX ADMIN — FAMOTIDINE 20 MG: 20 TABLET ORAL at 17:17

## 2017-01-01 RX ADMIN — METOPROLOL SUCCINATE 100 MG: 50 TABLET, EXTENDED RELEASE ORAL at 08:40

## 2017-01-01 RX ADMIN — LEVETIRACETAM 500 MG: 500 TABLET, FILM COATED ORAL at 10:21

## 2017-01-01 RX ADMIN — Medication 1 TABLET: at 08:52

## 2017-01-01 RX ADMIN — Medication 150 MG: at 09:27

## 2017-01-01 RX ADMIN — METOPROLOL TARTRATE 25 MG: 25 TABLET, FILM COATED ORAL at 17:33

## 2017-01-01 RX ADMIN — ALLOPURINOL 100 MG: 100 TABLET ORAL at 09:17

## 2017-01-01 RX ADMIN — POTASSIUM CHLORIDE 20 MEQ: 1500 TABLET, EXTENDED RELEASE ORAL at 09:48

## 2017-01-01 RX ADMIN — LEVOTHYROXINE SODIUM 150 MCG: 150 TABLET ORAL at 05:56

## 2017-01-01 RX ADMIN — LEVETIRACETAM 500 MG: 500 TABLET, FILM COATED ORAL at 20:39

## 2017-01-01 RX ADMIN — IPRATROPIUM BROMIDE AND ALBUTEROL SULFATE 3 ML: .5; 3 SOLUTION RESPIRATORY (INHALATION) at 08:35

## 2017-01-01 RX ADMIN — ALLOPURINOL 100 MG: 100 TABLET ORAL at 10:32

## 2017-01-01 RX ADMIN — Medication 1 TABLET: at 08:23

## 2017-01-01 RX ADMIN — FAMOTIDINE 20 MG: 20 TABLET ORAL at 17:16

## 2017-01-01 RX ADMIN — DONEPEZIL HYDROCHLORIDE 10 MG: 5 TABLET, FILM COATED ORAL at 21:16

## 2017-01-01 RX ADMIN — KETOTIFEN FUMARATE 1 DROP: 0.25 SOLUTION/ DROPS OPHTHALMIC at 18:14

## 2017-01-01 RX ADMIN — APIXABAN 5 MG: 2.5 TABLET, FILM COATED ORAL at 21:22

## 2017-01-01 RX ADMIN — KETOTIFEN FUMARATE 1 DROP: 0.25 SOLUTION/ DROPS OPHTHALMIC at 08:32

## 2017-01-01 RX ADMIN — KETOTIFEN FUMARATE 1 DROP: 0.25 SOLUTION/ DROPS OPHTHALMIC at 08:10

## 2017-01-01 RX ADMIN — AMIODARONE HYDROCHLORIDE 200 MG: 200 TABLET ORAL at 21:22

## 2017-01-01 RX ADMIN — LEVETIRACETAM 500 MG: 500 TABLET, FILM COATED ORAL at 20:17

## 2017-01-01 RX ADMIN — APIXABAN 5 MG: 2.5 TABLET, FILM COATED ORAL at 22:07

## 2017-01-01 RX ADMIN — FAMOTIDINE 20 MG: 20 TABLET ORAL at 18:14

## 2017-01-01 RX ADMIN — TRAMADOL HYDROCHLORIDE 50 MG: 50 TABLET, FILM COATED ORAL at 20:09

## 2017-01-01 RX ADMIN — STANDARDIZED SENNA CONCENTRATE 1 TABLET: 8.6 TABLET ORAL at 08:15

## 2017-01-01 RX ADMIN — SERTRALINE HYDROCHLORIDE 100 MG: 50 TABLET ORAL at 20:08

## 2017-01-01 RX ADMIN — TRAMADOL HYDROCHLORIDE 50 MG: 50 TABLET, FILM COATED ORAL at 14:03

## 2017-01-01 RX ADMIN — CLINDAMYCIN HYDROCHLORIDE 450 MG: 150 CAPSULE ORAL at 13:59

## 2017-01-01 RX ADMIN — AMIODARONE HYDROCHLORIDE 200 MG: 200 TABLET ORAL at 08:51

## 2017-01-01 RX ADMIN — MICONAZOLE NITRATE: 20 POWDER TOPICAL at 22:59

## 2017-01-01 RX ADMIN — Medication 150 MG: at 08:47

## 2017-01-01 RX ADMIN — IPRATROPIUM BROMIDE AND ALBUTEROL SULFATE 3 ML: .5; 3 SOLUTION RESPIRATORY (INHALATION) at 20:33

## 2017-01-01 RX ADMIN — DONEPEZIL HYDROCHLORIDE 10 MG: 5 TABLET ORAL at 20:08

## 2017-01-01 RX ADMIN — METOPROLOL SUCCINATE 100 MG: 50 TABLET, EXTENDED RELEASE ORAL at 09:22

## 2017-01-01 RX ADMIN — Medication 150 MG: at 17:57

## 2017-01-01 RX ADMIN — SODIUM CHLORIDE 125 ML/HR: 9 INJECTION, SOLUTION INTRAVENOUS at 15:05

## 2017-01-01 RX ADMIN — AMIODARONE HYDROCHLORIDE 200 MG: 200 TABLET ORAL at 20:28

## 2017-01-01 RX ADMIN — ISOSORBIDE DINITRATE 10 MG: 10 TABLET ORAL at 23:11

## 2017-01-01 RX ADMIN — FUROSEMIDE 40 MG: 40 TABLET ORAL at 08:32

## 2017-01-01 RX ADMIN — GUAIFENESIN 1200 MG: 600 TABLET, EXTENDED RELEASE ORAL at 18:05

## 2017-01-01 RX ADMIN — LEVOTHYROXINE SODIUM 150 MCG: 150 TABLET ORAL at 04:56

## 2017-01-01 RX ADMIN — MEGESTROL ACETATE 200 MG: 40 SUSPENSION ORAL at 08:22

## 2017-01-01 RX ADMIN — INSULIN ASPART 2 UNITS: 100 INJECTION, SOLUTION INTRAVENOUS; SUBCUTANEOUS at 21:04

## 2017-01-01 RX ADMIN — FAMOTIDINE 20 MG: 20 TABLET ORAL at 17:21

## 2017-01-01 RX ADMIN — SERTRALINE 100 MG: 50 TABLET, FILM COATED ORAL at 22:14

## 2017-01-01 RX ADMIN — DONEPEZIL HYDROCHLORIDE 10 MG: 5 TABLET ORAL at 22:48

## 2017-01-01 RX ADMIN — LEVOTHYROXINE SODIUM 150 MCG: 150 TABLET ORAL at 05:29

## 2017-01-01 RX ADMIN — Medication 150 MG: at 08:04

## 2017-01-01 RX ADMIN — DIPHENHYDRAMINE HYDROCHLORIDE 25 MG: 25 CAPSULE ORAL at 19:56

## 2017-01-01 RX ADMIN — SENNOSIDES AND DOCUSATE SODIUM 1 TABLET: 8.6; 5 TABLET ORAL at 08:30

## 2017-01-01 RX ADMIN — AMIODARONE HYDROCHLORIDE 200 MG: 200 TABLET ORAL at 17:56

## 2017-01-01 RX ADMIN — DILTIAZEM HYDROCHLORIDE 5 MG/HR: 100 INJECTION, POWDER, LYOPHILIZED, FOR SOLUTION INTRAVENOUS at 00:53

## 2017-01-01 RX ADMIN — DILTIAZEM HYDROCHLORIDE 120 MG: 60 TABLET, FILM COATED ORAL at 09:11

## 2017-01-01 RX ADMIN — FAMOTIDINE 20 MG: 20 TABLET ORAL at 17:13

## 2017-01-01 RX ADMIN — APIXABAN 5 MG: 2.5 TABLET, FILM COATED ORAL at 20:53

## 2017-01-01 RX ADMIN — Medication 150 MG: at 21:38

## 2017-01-01 RX ADMIN — CONJUGATED ESTROGENS 1 APPLICATION: 0.62 CREAM VAGINAL at 21:28

## 2017-01-01 RX ADMIN — IPRATROPIUM BROMIDE AND ALBUTEROL SULFATE 3 ML: .5; 3 SOLUTION RESPIRATORY (INHALATION) at 08:10

## 2017-01-01 RX ADMIN — FAMOTIDINE 20 MG: 20 TABLET, FILM COATED ORAL at 16:52

## 2017-01-01 RX ADMIN — MICONAZOLE NITRATE: 20 POWDER TOPICAL at 08:43

## 2017-01-01 RX ADMIN — Medication 1 CAPSULE: at 08:29

## 2017-01-01 RX ADMIN — STANDARDIZED SENNA CONCENTRATE 1 TABLET: 8.6 TABLET ORAL at 08:37

## 2017-01-01 RX ADMIN — ISOSORBIDE DINITRATE 10 MG: 10 TABLET ORAL at 21:36

## 2017-01-01 RX ADMIN — IPRATROPIUM BROMIDE AND ALBUTEROL SULFATE 3 ML: .5; 3 SOLUTION RESPIRATORY (INHALATION) at 08:33

## 2017-01-01 RX ADMIN — SERTRALINE 100 MG: 50 TABLET, FILM COATED ORAL at 22:32

## 2017-01-01 RX ADMIN — BUDESONIDE 0.5 MG: 0.5 SUSPENSION RESPIRATORY (INHALATION) at 08:45

## 2017-01-01 RX ADMIN — DONEPEZIL HYDROCHLORIDE 10 MG: 5 TABLET, FILM COATED ORAL at 20:44

## 2017-01-01 RX ADMIN — METOPROLOL SUCCINATE 100 MG: 50 TABLET, EXTENDED RELEASE ORAL at 20:29

## 2017-01-01 RX ADMIN — FUROSEMIDE 40 MG: 40 TABLET ORAL at 10:16

## 2017-01-01 RX ADMIN — Medication 1 TABLET: at 07:30

## 2017-01-01 RX ADMIN — DONEPEZIL HYDROCHLORIDE 10 MG: 5 TABLET ORAL at 22:59

## 2017-01-01 RX ADMIN — LEVOTHYROXINE SODIUM 150 MCG: 150 TABLET ORAL at 06:18

## 2017-01-01 RX ADMIN — METOPROLOL SUCCINATE 100 MG: 50 TABLET, EXTENDED RELEASE ORAL at 20:17

## 2017-01-01 RX ADMIN — INSULIN ASPART 3 UNITS: 100 INJECTION, SOLUTION INTRAVENOUS; SUBCUTANEOUS at 20:34

## 2017-01-01 RX ADMIN — AMIODARONE HYDROCHLORIDE 200 MG: 200 TABLET ORAL at 07:58

## 2017-01-01 RX ADMIN — SERTRALINE HYDROCHLORIDE 100 MG: 50 TABLET ORAL at 20:07

## 2017-01-01 RX ADMIN — Medication 150 MG: at 08:00

## 2017-01-01 RX ADMIN — MULTIPLE VITAMINS W/ MINERALS TAB 1 TABLET: TAB at 09:16

## 2017-01-01 RX ADMIN — MEGESTROL ACETATE 200 MG: 40 SUSPENSION ORAL at 08:33

## 2017-01-01 RX ADMIN — ISOSORBIDE DINITRATE 10 MG: 10 TABLET ORAL at 20:46

## 2017-01-01 RX ADMIN — ALLOPURINOL 100 MG: 100 TABLET ORAL at 17:23

## 2017-01-01 RX ADMIN — LEVETIRACETAM 500 MG: 500 TABLET, FILM COATED ORAL at 09:22

## 2017-01-01 RX ADMIN — Medication 150 MG: at 09:05

## 2017-01-01 RX ADMIN — SENNOSIDES AND DOCUSATE SODIUM 1 TABLET: 8.6; 5 TABLET ORAL at 09:30

## 2017-01-01 RX ADMIN — BUDESONIDE 0.5 MG: 0.5 SUSPENSION RESPIRATORY (INHALATION) at 09:27

## 2017-01-01 RX ADMIN — SENNOSIDES AND DOCUSATE SODIUM 1 TABLET: 8.6; 5 TABLET ORAL at 21:39

## 2017-01-01 RX ADMIN — STANDARDIZED SENNA CONCENTRATE 1 TABLET: 8.6 TABLET ORAL at 09:11

## 2017-01-01 RX ADMIN — DOXAZOSIN MESYLATE 2 MG: 2 TABLET ORAL at 20:57

## 2017-01-01 RX ADMIN — LEVOTHYROXINE SODIUM 150 MCG: 150 TABLET ORAL at 05:11

## 2017-01-01 RX ADMIN — ISOSORBIDE DINITRATE 10 MG: 10 TABLET ORAL at 21:50

## 2017-01-01 RX ADMIN — DONEPEZIL HYDROCHLORIDE 10 MG: 5 TABLET ORAL at 20:45

## 2017-01-01 RX ADMIN — FAMOTIDINE 40 MG: 20 TABLET ORAL at 20:26

## 2017-01-01 RX ADMIN — IPRATROPIUM BROMIDE AND ALBUTEROL SULFATE 3 ML: .5; 3 SOLUTION RESPIRATORY (INHALATION) at 11:56

## 2017-01-01 RX ADMIN — SENNOSIDES AND DOCUSATE SODIUM 1 TABLET: 8.6; 5 TABLET ORAL at 12:34

## 2017-01-01 RX ADMIN — ALLOPURINOL 100 MG: 100 TABLET ORAL at 21:42

## 2017-01-01 RX ADMIN — STANDARDIZED SENNA CONCENTRATE 1 TABLET: 8.6 TABLET ORAL at 08:18

## 2017-01-01 RX ADMIN — APIXABAN 5 MG: 2.5 TABLET, FILM COATED ORAL at 08:25

## 2017-01-01 RX ADMIN — Medication 1 TABLET: at 10:18

## 2017-01-01 RX ADMIN — IPRATROPIUM BROMIDE AND ALBUTEROL SULFATE 3 ML: .5; 3 SOLUTION RESPIRATORY (INHALATION) at 09:11

## 2017-01-01 RX ADMIN — ALLOPURINOL 100 MG: 100 TABLET ORAL at 10:14

## 2017-01-01 RX ADMIN — APIXABAN 5 MG: 2.5 TABLET, FILM COATED ORAL at 20:15

## 2017-01-01 RX ADMIN — IPRATROPIUM BROMIDE AND ALBUTEROL SULFATE 3 ML: .5; 3 SOLUTION RESPIRATORY (INHALATION) at 13:54

## 2017-01-01 RX ADMIN — TERAZOSIN HYDROCHLORIDE 2 MG: 1 CAPSULE ORAL at 20:34

## 2017-01-01 RX ADMIN — APIXABAN 5 MG: 2.5 TABLET, FILM COATED ORAL at 09:24

## 2017-01-01 RX ADMIN — DILTIAZEM HYDROCHLORIDE 300 MG: 180 CAPSULE, COATED, EXTENDED RELEASE ORAL at 11:36

## 2017-01-01 RX ADMIN — LEVOTHYROXINE SODIUM 150 MCG: 150 TABLET ORAL at 05:52

## 2017-01-01 RX ADMIN — POTASSIUM CHLORIDE 20 MEQ: 1500 TABLET, EXTENDED RELEASE ORAL at 09:56

## 2017-01-01 RX ADMIN — AMIODARONE HYDROCHLORIDE 200 MG: 200 TABLET ORAL at 17:21

## 2017-01-01 RX ADMIN — DOCUSATE SODIUM AND SENNOSIDES 1 TABLET: 8.6; 5 TABLET, FILM COATED ORAL at 21:26

## 2017-01-01 RX ADMIN — SENNOSIDES AND DOCUSATE SODIUM 1 TABLET: 8.6; 5 TABLET ORAL at 21:50

## 2017-01-01 RX ADMIN — DILTIAZEM HYDROCHLORIDE 240 MG: 120 CAPSULE, COATED, EXTENDED RELEASE ORAL at 10:50

## 2017-01-01 RX ADMIN — BUDESONIDE 0.5 MG: 0.5 SUSPENSION RESPIRATORY (INHALATION) at 09:24

## 2017-01-01 RX ADMIN — Medication 150 MG: at 08:11

## 2017-01-01 RX ADMIN — FAMOTIDINE 20 MG: 20 TABLET ORAL at 08:33

## 2017-01-01 RX ADMIN — Medication 10 ML: at 08:16

## 2017-01-01 RX ADMIN — CALCIUM CARBONATE 500 MG (1,250 MG)-VITAMIN D3 200 UNIT TABLET 500 MG: at 08:41

## 2017-01-01 RX ADMIN — FAMOTIDINE 40 MG: 20 TABLET ORAL at 21:36

## 2017-01-01 RX ADMIN — DONEPEZIL HYDROCHLORIDE 10 MG: 5 TABLET ORAL at 20:18

## 2017-01-01 RX ADMIN — KETOTIFEN FUMARATE 1 DROP: 0.25 SOLUTION/ DROPS OPHTHALMIC at 09:16

## 2017-01-01 RX ADMIN — METOPROLOL SUCCINATE 100 MG: 50 TABLET, EXTENDED RELEASE ORAL at 08:49

## 2017-01-01 RX ADMIN — Medication 10 ML: at 20:38

## 2017-01-01 RX ADMIN — Medication 150 MG: at 09:32

## 2017-01-01 RX ADMIN — SERTRALINE 100 MG: 50 TABLET, FILM COATED ORAL at 20:38

## 2017-01-01 RX ADMIN — DONEPEZIL HYDROCHLORIDE 10 MG: 5 TABLET, FILM COATED ORAL at 20:35

## 2017-01-01 RX ADMIN — METOPROLOL SUCCINATE 100 MG: 50 TABLET, EXTENDED RELEASE ORAL at 22:46

## 2017-01-01 RX ADMIN — APIXABAN 5 MG: 2.5 TABLET, FILM COATED ORAL at 20:29

## 2017-01-01 RX ADMIN — LEVETIRACETAM 500 MG: 500 TABLET, FILM COATED ORAL at 09:37

## 2017-01-01 RX ADMIN — FAMOTIDINE 40 MG: 20 TABLET ORAL at 22:07

## 2017-01-01 RX ADMIN — CONJUGATED ESTROGENS 0.75 APPLICATOR: 0.62 CREAM VAGINAL at 20:28

## 2017-01-01 RX ADMIN — ISOSORBIDE DINITRATE 10 MG: 10 TABLET ORAL at 20:18

## 2017-01-01 RX ADMIN — KETOTIFEN FUMARATE 1 DROP: 0.25 SOLUTION/ DROPS OPHTHALMIC at 08:11

## 2017-01-01 RX ADMIN — GUAIFENESIN 1200 MG: 600 TABLET, EXTENDED RELEASE ORAL at 17:59

## 2017-01-01 RX ADMIN — DONEPEZIL HYDROCHLORIDE 10 MG: 5 TABLET, FILM COATED ORAL at 21:26

## 2017-01-01 RX ADMIN — IPRATROPIUM BROMIDE AND ALBUTEROL SULFATE 3 ML: .5; 3 SOLUTION RESPIRATORY (INHALATION) at 14:09

## 2017-01-01 RX ADMIN — DILTIAZEM HYDROCHLORIDE 300 MG: 180 CAPSULE, COATED, EXTENDED RELEASE ORAL at 14:34

## 2017-01-01 RX ADMIN — DILTIAZEM HYDROCHLORIDE 240 MG: 120 CAPSULE, COATED, EXTENDED RELEASE ORAL at 12:31

## 2017-01-01 RX ADMIN — GUAIFENESIN 1200 MG: 600 TABLET, EXTENDED RELEASE ORAL at 18:01

## 2017-01-01 RX ADMIN — SERTRALINE 100 MG: 50 TABLET, FILM COATED ORAL at 21:29

## 2017-01-01 RX ADMIN — ALLOPURINOL 100 MG: 100 TABLET ORAL at 17:05

## 2017-01-01 RX ADMIN — KETOTIFEN FUMARATE 1 DROP: 0.25 SOLUTION/ DROPS OPHTHALMIC at 17:49

## 2017-01-01 RX ADMIN — ALLOPURINOL 100 MG: 100 TABLET ORAL at 08:10

## 2017-01-01 RX ADMIN — INSULIN ASPART 2 UNITS: 100 INJECTION, SOLUTION INTRAVENOUS; SUBCUTANEOUS at 16:59

## 2017-01-01 RX ADMIN — AMIODARONE HYDROCHLORIDE 200 MG: 200 TABLET ORAL at 20:48

## 2017-01-01 RX ADMIN — INSULIN ASPART 3 UNITS: 100 INJECTION, SOLUTION INTRAVENOUS; SUBCUTANEOUS at 18:03

## 2017-01-01 RX ADMIN — Medication 150 MG: at 09:33

## 2017-01-01 RX ADMIN — LINAGLIPTIN 5 MG: 5 TABLET, FILM COATED ORAL at 09:37

## 2017-01-01 RX ADMIN — LEVETIRACETAM 500 MG: 500 TABLET, FILM COATED ORAL at 20:34

## 2017-01-01 RX ADMIN — AMIODARONE HYDROCHLORIDE 200 MG: 200 TABLET ORAL at 17:31

## 2017-01-01 RX ADMIN — LEVOFLOXACIN 500 MG: 500 TABLET, FILM COATED ORAL at 10:45

## 2017-01-01 RX ADMIN — Medication 1 CAPSULE: at 10:11

## 2017-01-01 RX ADMIN — POTASSIUM CHLORIDE 40 MEQ: 1500 TABLET, EXTENDED RELEASE ORAL at 15:00

## 2017-01-01 RX ADMIN — SERTRALINE 100 MG: 50 TABLET, FILM COATED ORAL at 20:08

## 2017-01-01 RX ADMIN — Medication 1 TABLET: at 09:06

## 2017-01-01 RX ADMIN — METOPROLOL SUCCINATE 100 MG: 50 TABLET, EXTENDED RELEASE ORAL at 20:35

## 2017-01-01 RX ADMIN — KETOTIFEN FUMARATE 1 DROP: 0.25 SOLUTION/ DROPS OPHTHALMIC at 08:38

## 2017-01-01 RX ADMIN — IPRATROPIUM BROMIDE AND ALBUTEROL SULFATE 3 ML: .5; 3 SOLUTION RESPIRATORY (INHALATION) at 08:29

## 2017-01-01 RX ADMIN — ISOSORBIDE DINITRATE 10 MG: 10 TABLET ORAL at 22:06

## 2017-01-01 RX ADMIN — ASPIRIN 81 MG: 81 TABLET, COATED ORAL at 08:29

## 2017-01-01 RX ADMIN — METOPROLOL TARTRATE 75 MG: 25 TABLET, FILM COATED ORAL at 08:30

## 2017-01-01 RX ADMIN — IPRATROPIUM BROMIDE AND ALBUTEROL SULFATE 3 ML: .5; 3 SOLUTION RESPIRATORY (INHALATION) at 12:01

## 2017-01-01 RX ADMIN — SERTRALINE 100 MG: 50 TABLET, FILM COATED ORAL at 20:22

## 2017-01-01 RX ADMIN — APIXABAN 5 MG: 2.5 TABLET, FILM COATED ORAL at 09:27

## 2017-01-01 RX ADMIN — FUROSEMIDE 40 MG: 40 TABLET ORAL at 10:10

## 2017-01-01 RX ADMIN — APIXABAN 5 MG: 2.5 TABLET, FILM COATED ORAL at 09:36

## 2017-01-01 RX ADMIN — GUAIFENESIN 1200 MG: 600 TABLET, EXTENDED RELEASE ORAL at 08:11

## 2017-01-01 RX ADMIN — ISOSORBIDE DINITRATE 10 MG: 10 TABLET ORAL at 08:12

## 2017-01-01 RX ADMIN — Medication 150 MG: at 18:01

## 2017-01-01 RX ADMIN — ALLOPURINOL 100 MG: 100 TABLET ORAL at 08:49

## 2017-01-01 RX ADMIN — FAMOTIDINE 40 MG: 20 TABLET ORAL at 20:15

## 2017-01-01 RX ADMIN — FUROSEMIDE 40 MG: 40 TABLET ORAL at 09:07

## 2017-01-01 RX ADMIN — Medication 1 TABLET: at 18:02

## 2017-01-01 RX ADMIN — FUROSEMIDE 40 MG: 40 TABLET ORAL at 08:55

## 2017-01-01 RX ADMIN — KETOTIFEN FUMARATE 1 DROP: 0.35 SOLUTION/ DROPS OPHTHALMIC at 08:00

## 2017-01-01 RX ADMIN — METOPROLOL SUCCINATE 100 MG: 50 TABLET, EXTENDED RELEASE ORAL at 20:39

## 2017-01-01 RX ADMIN — Medication 10 ML: at 09:11

## 2017-01-01 RX ADMIN — FAMOTIDINE 20 MG: 20 TABLET ORAL at 17:38

## 2017-01-01 RX ADMIN — METOPROLOL TARTRATE 100 MG: 25 TABLET, FILM COATED ORAL at 09:34

## 2017-01-01 RX ADMIN — IPRATROPIUM BROMIDE AND ALBUTEROL SULFATE 3 ML: .5; 3 SOLUTION RESPIRATORY (INHALATION) at 22:59

## 2017-01-01 RX ADMIN — CALCIUM CARBONATE 500 MG (1,250 MG)-VITAMIN D3 200 UNIT TABLET 500 MG: at 08:35

## 2017-01-01 RX ADMIN — METOPROLOL TARTRATE 75 MG: 25 TABLET, FILM COATED ORAL at 08:31

## 2017-01-01 RX ADMIN — SERTRALINE 100 MG: 50 TABLET, FILM COATED ORAL at 22:46

## 2017-01-01 RX ADMIN — METOPROLOL SUCCINATE 100 MG: 50 TABLET, EXTENDED RELEASE ORAL at 20:46

## 2017-01-01 RX ADMIN — GUAIFENESIN 1200 MG: 600 TABLET, EXTENDED RELEASE ORAL at 07:46

## 2017-01-01 RX ADMIN — CONJUGATED ESTROGENS 1 APPLICATION: 0.62 CREAM VAGINAL at 20:43

## 2017-01-01 RX ADMIN — DONEPEZIL HYDROCHLORIDE 10 MG: 5 TABLET ORAL at 20:26

## 2017-01-01 RX ADMIN — ALLOPURINOL 100 MG: 100 TABLET ORAL at 22:21

## 2017-01-01 RX ADMIN — SERTRALINE HYDROCHLORIDE 100 MG: 50 TABLET ORAL at 21:42

## 2017-01-01 RX ADMIN — POTASSIUM CHLORIDE 20 MEQ: 1500 TABLET, EXTENDED RELEASE ORAL at 09:20

## 2017-01-01 RX ADMIN — ISOSORBIDE DINITRATE 10 MG: 10 TABLET ORAL at 21:44

## 2017-01-01 RX ADMIN — GUAIFENESIN 1200 MG: 600 TABLET, EXTENDED RELEASE ORAL at 18:34

## 2017-01-01 RX ADMIN — FAMOTIDINE 20 MG: 20 TABLET, FILM COATED ORAL at 17:44

## 2017-01-01 RX ADMIN — Medication 150 MG: at 18:02

## 2017-01-01 RX ADMIN — METOPROLOL SUCCINATE 100 MG: 50 TABLET, EXTENDED RELEASE ORAL at 19:58

## 2017-01-01 RX ADMIN — SERTRALINE 100 MG: 50 TABLET, FILM COATED ORAL at 21:01

## 2017-01-01 RX ADMIN — ACETAMINOPHEN 650 MG: 325 TABLET, FILM COATED ORAL at 17:25

## 2017-01-01 RX ADMIN — FAMOTIDINE 20 MG: 20 TABLET ORAL at 17:28

## 2017-01-01 RX ADMIN — MICONAZOLE NITRATE: 20 POWDER TOPICAL at 22:37

## 2017-01-01 RX ADMIN — FAMOTIDINE 40 MG: 20 TABLET ORAL at 21:56

## 2017-01-01 RX ADMIN — APIXABAN 5 MG: 2.5 TABLET, FILM COATED ORAL at 09:01

## 2017-01-01 RX ADMIN — DIPHENHYDRAMINE HYDROCHLORIDE 25 MG: 25 CAPSULE ORAL at 20:40

## 2017-01-01 RX ADMIN — DOXAZOSIN MESYLATE 2 MG: 2 TABLET ORAL at 22:11

## 2017-01-01 RX ADMIN — MEGESTROL ACETATE 200 MG: 40 SUSPENSION ORAL at 17:16

## 2017-01-01 RX ADMIN — ALLOPURINOL 100 MG: 100 TABLET ORAL at 10:15

## 2017-01-01 RX ADMIN — FAMOTIDINE 20 MG: 20 TABLET, FILM COATED ORAL at 17:33

## 2017-01-01 RX ADMIN — SERTRALINE 100 MG: 50 TABLET, FILM COATED ORAL at 21:36

## 2017-01-01 RX ADMIN — SERTRALINE HYDROCHLORIDE 100 MG: 50 TABLET ORAL at 20:50

## 2017-01-01 RX ADMIN — ACETAMINOPHEN 650 MG: 325 TABLET, FILM COATED ORAL at 20:39

## 2017-01-01 RX ADMIN — OFLOXACIN 50000 UNITS: 300 TABLET, COATED ORAL at 14:04

## 2017-01-01 RX ADMIN — MICONAZOLE NITRATE: 20 POWDER TOPICAL at 08:50

## 2017-01-01 RX ADMIN — TRAMADOL HYDROCHLORIDE 100 MG: 50 TABLET, FILM COATED ORAL at 13:19

## 2017-01-01 RX ADMIN — Medication 1 TABLET: at 08:39

## 2017-01-01 RX ADMIN — MEGESTROL ACETATE 200 MG: 40 SUSPENSION ORAL at 17:41

## 2017-01-01 RX ADMIN — ASPIRIN 81 MG: 81 TABLET, COATED ORAL at 09:29

## 2017-01-01 RX ADMIN — IPRATROPIUM BROMIDE AND ALBUTEROL SULFATE 3 ML: .5; 3 SOLUTION RESPIRATORY (INHALATION) at 18:44

## 2017-01-01 RX ADMIN — ISOSORBIDE DINITRATE 10 MG: 10 TABLET ORAL at 21:01

## 2017-01-01 RX ADMIN — Medication 1 TABLET: at 17:24

## 2017-01-01 RX ADMIN — SERTRALINE HYDROCHLORIDE 100 MG: 50 TABLET ORAL at 20:16

## 2017-01-01 RX ADMIN — FLUCONAZOLE 100 MG: 100 TABLET ORAL at 09:23

## 2017-01-01 RX ADMIN — ASPIRIN 81 MG: 81 TABLET, COATED ORAL at 10:50

## 2017-01-01 RX ADMIN — METOPROLOL TARTRATE 75 MG: 25 TABLET, FILM COATED ORAL at 08:37

## 2017-01-01 RX ADMIN — ALLOPURINOL 100 MG: 100 TABLET ORAL at 10:11

## 2017-01-01 RX ADMIN — KETOTIFEN FUMARATE 1 DROP: 0.25 SOLUTION/ DROPS OPHTHALMIC at 17:52

## 2017-01-01 RX ADMIN — DILTIAZEM HYDROCHLORIDE 120 MG: 60 TABLET, FILM COATED ORAL at 18:22

## 2017-01-01 RX ADMIN — MICONAZOLE NITRATE: 20 POWDER TOPICAL at 21:23

## 2017-01-01 RX ADMIN — ASPIRIN 81 MG: 81 TABLET, COATED ORAL at 09:52

## 2017-01-01 RX ADMIN — Medication 1 TABLET: at 16:53

## 2017-01-01 RX ADMIN — MEGESTROL ACETATE 200 MG: 40 SUSPENSION ORAL at 09:29

## 2017-01-01 RX ADMIN — BUDESONIDE 0.5 MG: 0.5 SUSPENSION RESPIRATORY (INHALATION) at 20:22

## 2017-01-01 RX ADMIN — POTASSIUM CHLORIDE 10 MEQ: 750 TABLET, FILM COATED, EXTENDED RELEASE ORAL at 08:45

## 2017-01-01 RX ADMIN — METOPROLOL SUCCINATE 100 MG: 50 TABLET, EXTENDED RELEASE ORAL at 09:28

## 2017-01-01 RX ADMIN — ACETAMINOPHEN 650 MG: 325 TABLET, FILM COATED ORAL at 21:15

## 2017-01-01 RX ADMIN — KETOTIFEN FUMARATE 1 DROP: 0.25 SOLUTION/ DROPS OPHTHALMIC at 17:16

## 2017-01-01 RX ADMIN — DILTIAZEM HYDROCHLORIDE 300 MG: 180 CAPSULE, COATED, EXTENDED RELEASE ORAL at 11:32

## 2017-01-01 RX ADMIN — SENNOSIDES AND DOCUSATE SODIUM 1 TABLET: 8.6; 5 TABLET ORAL at 21:33

## 2017-01-01 RX ADMIN — METOPROLOL TARTRATE 100 MG: 25 TABLET, FILM COATED ORAL at 09:32

## 2017-01-01 RX ADMIN — LEVETIRACETAM 500 MG: 500 TABLET, FILM COATED ORAL at 10:46

## 2017-01-01 RX ADMIN — METOPROLOL SUCCINATE 100 MG: 50 TABLET, EXTENDED RELEASE ORAL at 10:15

## 2017-01-01 RX ADMIN — Medication 1 TABLET: at 18:14

## 2017-01-01 RX ADMIN — ISOSORBIDE DINITRATE 10 MG: 10 TABLET ORAL at 08:44

## 2017-01-01 RX ADMIN — SENNOSIDES AND DOCUSATE SODIUM 1 TABLET: 8.6; 5 TABLET ORAL at 10:15

## 2017-01-01 RX ADMIN — METOPROLOL TARTRATE 75 MG: 25 TABLET, FILM COATED ORAL at 18:08

## 2017-01-01 RX ADMIN — FAMOTIDINE 20 MG: 20 TABLET, FILM COATED ORAL at 18:08

## 2017-01-01 RX ADMIN — LEVETIRACETAM 500 MG: 500 TABLET, FILM COATED ORAL at 09:07

## 2017-01-01 RX ADMIN — INSULIN ASPART 2 UNITS: 100 INJECTION, SOLUTION INTRAVENOUS; SUBCUTANEOUS at 21:17

## 2017-01-01 RX ADMIN — Medication 150 MG: at 17:14

## 2017-01-01 RX ADMIN — LEVOTHYROXINE SODIUM 150 MCG: 150 TABLET ORAL at 05:55

## 2017-01-01 RX ADMIN — MICONAZOLE NITRATE: 20 POWDER TOPICAL at 21:55

## 2017-01-01 RX ADMIN — DILTIAZEM HYDROCHLORIDE 120 MG: 60 TABLET, FILM COATED ORAL at 17:30

## 2017-01-01 RX ADMIN — APIXABAN 5 MG: 2.5 TABLET, FILM COATED ORAL at 22:46

## 2017-01-01 RX ADMIN — APIXABAN 5 MG: 2.5 TABLET, FILM COATED ORAL at 09:44

## 2017-01-01 RX ADMIN — ALLOPURINOL 100 MG: 100 TABLET ORAL at 09:12

## 2017-01-01 RX ADMIN — Medication 10 ML: at 08:12

## 2017-01-01 RX ADMIN — MICONAZOLE NITRATE: 20 POWDER TOPICAL at 20:47

## 2017-01-01 RX ADMIN — BUDESONIDE 0.5 MG: 0.5 SUSPENSION RESPIRATORY (INHALATION) at 22:33

## 2017-01-01 RX ADMIN — SENNOSIDES AND DOCUSATE SODIUM 1 TABLET: 8.6; 5 TABLET ORAL at 20:08

## 2017-01-01 RX ADMIN — CONJUGATED ESTROGENS 1 APPLICATION: 0.62 CREAM VAGINAL at 20:31

## 2017-01-01 RX ADMIN — MICONAZOLE NITRATE: 20 POWDER TOPICAL at 20:24

## 2017-01-01 RX ADMIN — DONEPEZIL HYDROCHLORIDE 10 MG: 5 TABLET, FILM COATED ORAL at 21:24

## 2017-01-01 RX ADMIN — LEVOTHYROXINE SODIUM 150 MCG: 150 TABLET ORAL at 05:37

## 2017-01-01 RX ADMIN — LEVETIRACETAM 500 MG: 500 TABLET, FILM COATED ORAL at 17:55

## 2017-01-01 RX ADMIN — METOPROLOL TARTRATE 100 MG: 25 TABLET, FILM COATED ORAL at 21:58

## 2017-01-01 RX ADMIN — Medication 150 MG: at 17:17

## 2017-01-01 RX ADMIN — ISOSORBIDE DINITRATE 10 MG: 10 TABLET ORAL at 20:27

## 2017-01-01 RX ADMIN — METOPROLOL TARTRATE 75 MG: 25 TABLET, FILM COATED ORAL at 09:26

## 2017-01-01 RX ADMIN — GUAIFENESIN 1200 MG: 600 TABLET, EXTENDED RELEASE ORAL at 17:13

## 2017-01-01 RX ADMIN — STANDARDIZED SENNA CONCENTRATE 1 TABLET: 8.6 TABLET ORAL at 18:29

## 2017-01-01 RX ADMIN — BUDESONIDE 0.5 MG: 0.5 SUSPENSION RESPIRATORY (INHALATION) at 07:27

## 2017-01-01 RX ADMIN — Medication 150 MG: at 19:19

## 2017-01-01 RX ADMIN — METOPROLOL SUCCINATE 100 MG: 50 TABLET, EXTENDED RELEASE ORAL at 10:12

## 2017-01-01 RX ADMIN — POTASSIUM CHLORIDE 20 MEQ: 1500 TABLET, EXTENDED RELEASE ORAL at 08:19

## 2017-01-01 RX ADMIN — LEVOTHYROXINE SODIUM 150 MCG: 150 TABLET ORAL at 05:54

## 2017-01-01 RX ADMIN — CONJUGATED ESTROGENS 1 APPLICATION: 0.62 CREAM VAGINAL at 20:16

## 2017-01-01 RX ADMIN — KETOTIFEN FUMARATE 1 DROP: 0.25 SOLUTION/ DROPS OPHTHALMIC at 12:24

## 2017-01-01 RX ADMIN — KETOTIFEN FUMARATE 1 DROP: 0.25 SOLUTION/ DROPS OPHTHALMIC at 09:19

## 2017-01-01 RX ADMIN — SENNOSIDES AND DOCUSATE SODIUM 1 TABLET: 8.6; 5 TABLET ORAL at 21:55

## 2017-01-01 RX ADMIN — DILTIAZEM HYDROCHLORIDE 300 MG: 180 CAPSULE, COATED, EXTENDED RELEASE ORAL at 13:10

## 2017-01-01 RX ADMIN — METOPROLOL SUCCINATE 100 MG: 50 TABLET, EXTENDED RELEASE ORAL at 21:07

## 2017-01-01 RX ADMIN — METOPROLOL SUCCINATE 100 MG: 50 TABLET, EXTENDED RELEASE ORAL at 09:01

## 2017-01-01 RX ADMIN — SERTRALINE HYDROCHLORIDE 100 MG: 50 TABLET ORAL at 20:23

## 2017-01-01 RX ADMIN — LEVETIRACETAM 500 MG: 500 TABLET, FILM COATED ORAL at 08:35

## 2017-01-01 RX ADMIN — APIXABAN 5 MG: 2.5 TABLET, FILM COATED ORAL at 20:19

## 2017-01-01 RX ADMIN — Medication 150 MG: at 17:07

## 2017-01-01 RX ADMIN — TUBERCULIN PURIFIED PROTEIN DERIVATIVE 5 UNITS: 5 INJECTION INTRADERMAL at 17:37

## 2017-01-01 RX ADMIN — LINAGLIPTIN 5 MG: 5 TABLET, FILM COATED ORAL at 08:40

## 2017-01-01 RX ADMIN — FAMOTIDINE 40 MG: 20 TABLET ORAL at 20:49

## 2017-01-01 RX ADMIN — DIPHENHYDRAMINE HYDROCHLORIDE 25 MG: 25 CAPSULE ORAL at 00:35

## 2017-01-01 RX ADMIN — TRAMADOL HYDROCHLORIDE 100 MG: 50 TABLET, FILM COATED ORAL at 22:11

## 2017-01-01 RX ADMIN — POTASSIUM CHLORIDE 20 MEQ: 1500 TABLET, EXTENDED RELEASE ORAL at 07:45

## 2017-01-01 RX ADMIN — INSULIN ASPART 5 UNITS: 100 INJECTION, SOLUTION INTRAVENOUS; SUBCUTANEOUS at 20:24

## 2017-01-01 RX ADMIN — FAMOTIDINE 20 MG: 20 TABLET, FILM COATED ORAL at 10:15

## 2017-01-01 RX ADMIN — GUAIFENESIN 1200 MG: 600 TABLET, EXTENDED RELEASE ORAL at 16:54

## 2017-01-01 RX ADMIN — IBANDRONATE SODIUM 150 MG: 150 TABLET, FILM COATED ORAL at 11:05

## 2017-01-01 RX ADMIN — GUAIFENESIN 1200 MG: 600 TABLET, EXTENDED RELEASE ORAL at 09:57

## 2017-01-01 RX ADMIN — CONJUGATED ESTROGENS 1 APPLICATION: 0.62 CREAM VAGINAL at 20:49

## 2017-01-01 RX ADMIN — MICONAZOLE NITRATE: 20 POWDER TOPICAL at 08:35

## 2017-01-01 RX ADMIN — IPRATROPIUM BROMIDE AND ALBUTEROL SULFATE 3 ML: .5; 3 SOLUTION RESPIRATORY (INHALATION) at 09:03

## 2017-01-01 RX ADMIN — FAMOTIDINE 20 MG: 20 TABLET, FILM COATED ORAL at 20:15

## 2017-01-01 RX ADMIN — GUAIFENESIN 1200 MG: 600 TABLET, EXTENDED RELEASE ORAL at 09:03

## 2017-01-01 RX ADMIN — GLIPIZIDE 5 MG: 2.5 TABLET, FILM COATED, EXTENDED RELEASE ORAL at 09:54

## 2017-01-01 RX ADMIN — METOPROLOL SUCCINATE 100 MG: 50 TABLET, EXTENDED RELEASE ORAL at 20:07

## 2017-01-01 RX ADMIN — LEVETIRACETAM 500 MG: 500 TABLET, FILM COATED ORAL at 20:36

## 2017-01-01 RX ADMIN — LEVETIRACETAM 500 MG: 500 TABLET, FILM COATED ORAL at 08:01

## 2017-01-01 RX ADMIN — GUAIFENESIN 1200 MG: 600 TABLET, EXTENDED RELEASE ORAL at 18:43

## 2017-01-01 RX ADMIN — POTASSIUM CHLORIDE 20 MEQ: 1500 TABLET, EXTENDED RELEASE ORAL at 09:02

## 2017-01-01 RX ADMIN — Medication 150 MG: at 08:29

## 2017-01-01 RX ADMIN — MIRTAZAPINE 15 MG: 15 TABLET, FILM COATED ORAL at 20:50

## 2017-01-01 RX ADMIN — MICONAZOLE NITRATE: 20 POWDER TOPICAL at 08:34

## 2017-01-01 RX ADMIN — DILTIAZEM HYDROCHLORIDE 120 MG: 60 TABLET, FILM COATED ORAL at 08:14

## 2017-01-01 RX ADMIN — DONEPEZIL HYDROCHLORIDE 10 MG: 5 TABLET ORAL at 22:15

## 2017-01-01 RX ADMIN — LEVETIRACETAM 500 MG: 500 TABLET, FILM COATED ORAL at 21:34

## 2017-01-01 RX ADMIN — GUAIFENESIN 1200 MG: 600 TABLET, EXTENDED RELEASE ORAL at 18:06

## 2017-01-01 RX ADMIN — SENNOSIDES AND DOCUSATE SODIUM 1 TABLET: 8.6; 5 TABLET ORAL at 22:59

## 2017-01-01 RX ADMIN — METOPROLOL SUCCINATE 100 MG: 50 TABLET, EXTENDED RELEASE ORAL at 08:46

## 2017-01-01 RX ADMIN — METOPROLOL SUCCINATE 100 MG: 50 TABLET, EXTENDED RELEASE ORAL at 09:31

## 2017-01-01 RX ADMIN — TRAMADOL HYDROCHLORIDE 100 MG: 50 TABLET, FILM COATED ORAL at 20:43

## 2017-01-01 RX ADMIN — APIXABAN 5 MG: 2.5 TABLET, FILM COATED ORAL at 21:24

## 2017-01-01 RX ADMIN — FAMOTIDINE 20 MG: 10 INJECTION, SOLUTION INTRAVENOUS at 08:30

## 2017-01-01 RX ADMIN — GUAIFENESIN 1200 MG: 600 TABLET, EXTENDED RELEASE ORAL at 18:04

## 2017-01-01 RX ADMIN — KETOTIFEN FUMARATE 1 DROP: 0.25 SOLUTION/ DROPS OPHTHALMIC at 08:23

## 2017-01-01 RX ADMIN — INSULIN ASPART 2 UNITS: 100 INJECTION, SOLUTION INTRAVENOUS; SUBCUTANEOUS at 13:41

## 2017-01-01 RX ADMIN — KETOTIFEN FUMARATE 1 DROP: 0.25 SOLUTION/ DROPS OPHTHALMIC at 08:35

## 2017-01-01 RX ADMIN — Medication 150 MG: at 08:17

## 2017-01-01 RX ADMIN — Medication 1 TABLET: at 09:49

## 2017-01-01 RX ADMIN — SENNOSIDES AND DOCUSATE SODIUM 1 TABLET: 8.6; 5 TABLET ORAL at 20:47

## 2017-01-01 RX ADMIN — AMIODARONE HYDROCHLORIDE 200 MG: 200 TABLET ORAL at 20:26

## 2017-01-01 RX ADMIN — APIXABAN 5 MG: 2.5 TABLET, FILM COATED ORAL at 20:21

## 2017-01-01 RX ADMIN — CONJUGATED ESTROGENS 1 APPLICATION: 0.62 CREAM VAGINAL at 20:23

## 2017-01-01 RX ADMIN — IPRATROPIUM BROMIDE AND ALBUTEROL SULFATE 3 ML: .5; 3 SOLUTION RESPIRATORY (INHALATION) at 09:47

## 2017-01-01 RX ADMIN — SERTRALINE 100 MG: 50 TABLET, FILM COATED ORAL at 21:21

## 2017-01-01 RX ADMIN — MICONAZOLE NITRATE: 20 POWDER TOPICAL at 20:22

## 2017-01-01 RX ADMIN — MICONAZOLE NITRATE: 20 POWDER TOPICAL at 08:40

## 2017-01-01 RX ADMIN — LEVETIRACETAM 500 MG: 500 TABLET, FILM COATED ORAL at 08:29

## 2017-01-01 RX ADMIN — FAMOTIDINE 20 MG: 20 TABLET, FILM COATED ORAL at 09:21

## 2017-01-01 RX ADMIN — METOPROLOL SUCCINATE 100 MG: 50 TABLET, EXTENDED RELEASE ORAL at 09:26

## 2017-01-01 RX ADMIN — IPRATROPIUM BROMIDE AND ALBUTEROL SULFATE 3 ML: .5; 3 SOLUTION RESPIRATORY (INHALATION) at 23:04

## 2017-01-01 RX ADMIN — POTASSIUM CHLORIDE 20 MEQ: 1500 TABLET, EXTENDED RELEASE ORAL at 09:44

## 2017-01-01 RX ADMIN — KETOTIFEN FUMARATE 1 DROP: 0.25 SOLUTION/ DROPS OPHTHALMIC at 09:32

## 2017-01-01 RX ADMIN — FAMOTIDINE 20 MG: 20 TABLET ORAL at 08:32

## 2017-01-01 RX ADMIN — CALCIUM CARBONATE 500 MG (1,250 MG)-VITAMIN D3 200 UNIT TABLET 500 MG: at 21:04

## 2017-01-01 RX ADMIN — MICONAZOLE NITRATE: 20 POWDER TOPICAL at 21:01

## 2017-01-01 RX ADMIN — Medication 1 TABLET: at 09:03

## 2017-01-01 RX ADMIN — LEVETIRACETAM 500 MG: 500 TABLET, FILM COATED ORAL at 21:24

## 2017-01-01 RX ADMIN — DILTIAZEM HYDROCHLORIDE 300 MG: 180 CAPSULE, COATED, EXTENDED RELEASE ORAL at 13:12

## 2017-01-01 RX ADMIN — LEVOTHYROXINE SODIUM 150 MCG: 150 TABLET ORAL at 04:45

## 2017-01-01 RX ADMIN — SENNOSIDES AND DOCUSATE SODIUM 1 TABLET: 8.6; 5 TABLET ORAL at 09:44

## 2017-01-01 RX ADMIN — KETOTIFEN FUMARATE 1 DROP: 0.35 SOLUTION/ DROPS OPHTHALMIC at 17:41

## 2017-01-01 RX ADMIN — Medication 1 TABLET: at 09:39

## 2017-01-01 RX ADMIN — ALLOPURINOL 100 MG: 100 TABLET ORAL at 10:41

## 2017-01-01 RX ADMIN — AMIODARONE HYDROCHLORIDE 200 MG: 200 TABLET ORAL at 17:17

## 2017-01-01 RX ADMIN — STANDARDIZED SENNA CONCENTRATE 1 TABLET: 8.6 TABLET ORAL at 08:38

## 2017-01-01 RX ADMIN — APIXABAN 5 MG: 2.5 TABLET, FILM COATED ORAL at 09:05

## 2017-01-01 RX ADMIN — SODIUM CHLORIDE 200 ML/HR: 9 INJECTION, SOLUTION INTRAVENOUS at 23:05

## 2017-01-01 RX ADMIN — GUAIFENESIN 1200 MG: 600 TABLET, EXTENDED RELEASE ORAL at 18:12

## 2017-01-01 RX ADMIN — STANDARDIZED SENNA CONCENTRATE 1 TABLET: 8.6 TABLET ORAL at 18:03

## 2017-01-01 RX ADMIN — DONEPEZIL HYDROCHLORIDE 10 MG: 5 TABLET, FILM COATED ORAL at 21:59

## 2017-01-01 RX ADMIN — Medication 1 CAPSULE: at 08:11

## 2017-01-01 RX ADMIN — LEVETIRACETAM 500 MG: 500 TABLET, FILM COATED ORAL at 08:43

## 2017-01-01 RX ADMIN — KETOTIFEN FUMARATE 1 DROP: 0.25 SOLUTION/ DROPS OPHTHALMIC at 17:46

## 2017-01-01 RX ADMIN — FUROSEMIDE 40 MG: 40 TABLET ORAL at 08:34

## 2017-01-01 RX ADMIN — KETOTIFEN FUMARATE 1 DROP: 0.25 SOLUTION/ DROPS OPHTHALMIC at 17:50

## 2017-01-01 RX ADMIN — GUAIFENESIN 1200 MG: 600 TABLET, EXTENDED RELEASE ORAL at 10:23

## 2017-01-01 RX ADMIN — APIXABAN 5 MG: 2.5 TABLET, FILM COATED ORAL at 19:58

## 2017-01-01 RX ADMIN — Medication 150 MG: at 09:49

## 2017-01-01 RX ADMIN — IRON SUCROSE 200 MG: 20 INJECTION, SOLUTION INTRAVENOUS at 09:41

## 2017-01-01 RX ADMIN — POTASSIUM CHLORIDE 20 MEQ: 1500 TABLET, EXTENDED RELEASE ORAL at 09:21

## 2017-01-01 RX ADMIN — Medication 1 TABLET: at 18:35

## 2017-01-01 RX ADMIN — IPRATROPIUM BROMIDE AND ALBUTEROL SULFATE 3 ML: .5; 3 SOLUTION RESPIRATORY (INHALATION) at 12:09

## 2017-01-01 RX ADMIN — Medication 1 CAPSULE: at 08:16

## 2017-01-01 RX ADMIN — APIXABAN 5 MG: 2.5 TABLET, FILM COATED ORAL at 10:30

## 2017-01-01 RX ADMIN — LEVETIRACETAM 500 MG: 500 TABLET, FILM COATED ORAL at 20:20

## 2017-01-01 RX ADMIN — MICONAZOLE NITRATE: 20 POWDER TOPICAL at 08:31

## 2017-01-01 RX ADMIN — FUROSEMIDE 40 MG: 40 TABLET ORAL at 10:00

## 2017-01-01 RX ADMIN — DILTIAZEM HYDROCHLORIDE 120 MG: 60 TABLET, FILM COATED ORAL at 09:27

## 2017-01-01 RX ADMIN — FUROSEMIDE 40 MG: 40 TABLET ORAL at 07:47

## 2017-01-01 RX ADMIN — DONEPEZIL HYDROCHLORIDE 10 MG: 5 TABLET, FILM COATED ORAL at 20:36

## 2017-01-01 RX ADMIN — MICONAZOLE NITRATE: 20 POWDER TOPICAL at 08:02

## 2017-01-01 RX ADMIN — SENNOSIDES AND DOCUSATE SODIUM 1 TABLET: 8.6; 5 TABLET ORAL at 09:12

## 2017-01-01 RX ADMIN — APIXABAN 5 MG: 2.5 TABLET, FILM COATED ORAL at 09:52

## 2017-01-01 RX ADMIN — FAMOTIDINE 20 MG: 20 TABLET ORAL at 09:27

## 2017-01-01 RX ADMIN — LEVOTHYROXINE SODIUM 150 MCG: 150 TABLET ORAL at 06:07

## 2017-01-01 RX ADMIN — SENNOSIDES AND DOCUSATE SODIUM 1 TABLET: 8.6; 5 TABLET ORAL at 09:53

## 2017-01-01 RX ADMIN — DONEPEZIL HYDROCHLORIDE 10 MG: 5 TABLET, FILM COATED ORAL at 20:37

## 2017-01-01 RX ADMIN — Medication 1 TABLET: at 18:27

## 2017-01-01 RX ADMIN — GUAIFENESIN 1200 MG: 600 TABLET, EXTENDED RELEASE ORAL at 09:18

## 2017-01-01 RX ADMIN — DILTIAZEM HYDROCHLORIDE 120 MG: 60 TABLET, FILM COATED ORAL at 17:14

## 2017-01-01 RX ADMIN — CONJUGATED ESTROGENS 0.75 APPLICATION: 0.62 CREAM VAGINAL at 20:33

## 2017-01-01 RX ADMIN — ISOSORBIDE DINITRATE 10 MG: 10 TABLET ORAL at 08:30

## 2017-01-01 RX ADMIN — ACETAMINOPHEN 650 MG: 325 TABLET, FILM COATED ORAL at 05:36

## 2017-01-01 RX ADMIN — FAMOTIDINE 40 MG: 20 TABLET, FILM COATED ORAL at 09:46

## 2017-01-01 RX ADMIN — LEVOTHYROXINE SODIUM 150 MCG: 150 TABLET ORAL at 06:15

## 2017-01-01 RX ADMIN — SERTRALINE HYDROCHLORIDE 100 MG: 50 TABLET, FILM COATED ORAL at 21:16

## 2017-01-01 RX ADMIN — INSULIN ASPART 3 UNITS: 100 INJECTION, SOLUTION INTRAVENOUS; SUBCUTANEOUS at 17:37

## 2017-01-01 RX ADMIN — KETOTIFEN FUMARATE 1 DROP: 0.25 SOLUTION/ DROPS OPHTHALMIC at 17:51

## 2017-01-01 RX ADMIN — Medication 150 MG: at 10:40

## 2017-01-01 RX ADMIN — MICONAZOLE NITRATE: 20 POWDER TOPICAL at 08:21

## 2017-01-01 RX ADMIN — FAMOTIDINE 40 MG: 20 TABLET ORAL at 20:59

## 2017-01-01 RX ADMIN — IPRATROPIUM BROMIDE AND ALBUTEROL SULFATE 3 ML: .5; 3 SOLUTION RESPIRATORY (INHALATION) at 21:00

## 2017-01-01 RX ADMIN — ALLOPURINOL 100 MG: 100 TABLET ORAL at 10:49

## 2017-01-01 RX ADMIN — APIXABAN 5 MG: 2.5 TABLET, FILM COATED ORAL at 08:23

## 2017-01-01 RX ADMIN — DONEPEZIL HYDROCHLORIDE 10 MG: 5 TABLET, FILM COATED ORAL at 22:08

## 2017-01-01 RX ADMIN — LISINOPRIL 10 MG: 10 TABLET ORAL at 09:42

## 2017-01-01 RX ADMIN — LEVETIRACETAM 500 MG: 500 TABLET, FILM COATED ORAL at 09:18

## 2017-01-01 RX ADMIN — STANDARDIZED SENNA CONCENTRATE 1 TABLET: 8.6 TABLET ORAL at 09:54

## 2017-01-01 RX ADMIN — FUROSEMIDE 40 MG: 40 TABLET ORAL at 08:46

## 2017-01-01 RX ADMIN — GUAIFENESIN 1200 MG: 600 TABLET, EXTENDED RELEASE ORAL at 07:28

## 2017-01-01 RX ADMIN — CALCIUM CARBONATE 500 MG (1,250 MG)-VITAMIN D3 200 UNIT TABLET 500 MG: at 22:34

## 2017-01-01 RX ADMIN — FLUCONAZOLE 100 MG: 100 TABLET ORAL at 09:11

## 2017-01-01 RX ADMIN — LEVETIRACETAM 500 MG: 500 TABLET, FILM COATED ORAL at 08:30

## 2017-01-01 RX ADMIN — LEVOFLOXACIN 750 MG: 750 TABLET, FILM COATED ORAL at 22:19

## 2017-01-01 RX ADMIN — POTASSIUM CHLORIDE 20 MEQ: 1500 TABLET, EXTENDED RELEASE ORAL at 12:12

## 2017-01-01 RX ADMIN — FUROSEMIDE 40 MG: 40 TABLET ORAL at 08:24

## 2017-01-01 RX ADMIN — STANDARDIZED SENNA CONCENTRATE 1 TABLET: 8.6 TABLET ORAL at 18:07

## 2017-01-01 RX ADMIN — POTASSIUM CHLORIDE 20 MEQ: 1500 TABLET, EXTENDED RELEASE ORAL at 09:08

## 2017-01-01 RX ADMIN — METOPROLOL SUCCINATE 100 MG: 50 TABLET, EXTENDED RELEASE ORAL at 09:08

## 2017-01-01 RX ADMIN — INSULIN ASPART 2 UNITS: 100 INJECTION, SOLUTION INTRAVENOUS; SUBCUTANEOUS at 18:47

## 2017-01-01 RX ADMIN — STANDARDIZED SENNA CONCENTRATE 1 TABLET: 8.6 TABLET ORAL at 17:45

## 2017-01-01 RX ADMIN — Medication 150 MG: at 17:56

## 2017-01-01 RX ADMIN — BUDESONIDE 0.5 MG: 0.5 SUSPENSION RESPIRATORY (INHALATION) at 09:14

## 2017-01-01 RX ADMIN — MICONAZOLE NITRATE: 20 POWDER TOPICAL at 10:06

## 2017-01-01 RX ADMIN — GLIPIZIDE 2.5 MG: 2.5 TABLET, FILM COATED, EXTENDED RELEASE ORAL at 10:23

## 2017-01-01 RX ADMIN — Medication 150 MG: at 10:32

## 2017-01-01 RX ADMIN — DILTIAZEM HYDROCHLORIDE 5 MG/HR: 100 INJECTION, POWDER, LYOPHILIZED, FOR SOLUTION INTRAVENOUS at 17:47

## 2017-01-01 RX ADMIN — GLIPIZIDE 5 MG: 5 TABLET, FILM COATED, EXTENDED RELEASE ORAL at 08:08

## 2017-01-01 RX ADMIN — INSULIN ASPART 2 UNITS: 100 INJECTION, SOLUTION INTRAVENOUS; SUBCUTANEOUS at 17:33

## 2017-01-01 RX ADMIN — METOPROLOL SUCCINATE 100 MG: 50 TABLET, EXTENDED RELEASE ORAL at 08:01

## 2017-01-01 RX ADMIN — GUAIFENESIN 1200 MG: 600 TABLET, EXTENDED RELEASE ORAL at 17:41

## 2017-01-01 RX ADMIN — ALLOPURINOL 100 MG: 100 TABLET ORAL at 17:13

## 2017-01-01 RX ADMIN — SERTRALINE HYDROCHLORIDE 100 MG: 50 TABLET ORAL at 20:03

## 2017-01-01 RX ADMIN — MEROPENEM 1 G: 1 INJECTION, POWDER, FOR SOLUTION INTRAVENOUS at 02:58

## 2017-01-01 RX ADMIN — DILTIAZEM HYDROCHLORIDE 240 MG: 120 CAPSULE, COATED, EXTENDED RELEASE ORAL at 09:37

## 2017-01-01 RX ADMIN — IPRATROPIUM BROMIDE AND ALBUTEROL SULFATE 3 ML: .5; 3 SOLUTION RESPIRATORY (INHALATION) at 13:09

## 2017-01-01 RX ADMIN — METOPROLOL SUCCINATE 100 MG: 50 TABLET, EXTENDED RELEASE ORAL at 21:38

## 2017-01-01 RX ADMIN — LEVETIRACETAM 500 MG: 500 TABLET, FILM COATED ORAL at 22:20

## 2017-01-01 RX ADMIN — LEVOTHYROXINE SODIUM 150 MCG: 150 TABLET ORAL at 05:51

## 2017-01-01 RX ADMIN — APIXABAN 5 MG: 2.5 TABLET, FILM COATED ORAL at 08:00

## 2017-01-01 RX ADMIN — LEVETIRACETAM 500 MG: 500 TABLET, FILM COATED ORAL at 17:49

## 2017-01-01 RX ADMIN — LEVETIRACETAM 500 MG: 500 TABLET, FILM COATED ORAL at 21:26

## 2017-01-01 RX ADMIN — POTASSIUM CHLORIDE 20 MEQ: 1500 TABLET, EXTENDED RELEASE ORAL at 10:15

## 2017-01-01 RX ADMIN — DONEPEZIL HYDROCHLORIDE 10 MG: 5 TABLET ORAL at 20:59

## 2017-01-01 RX ADMIN — MEGESTROL ACETATE 200 MG: 40 SUSPENSION ORAL at 08:40

## 2017-01-01 RX ADMIN — METOPROLOL TARTRATE 75 MG: 25 TABLET, FILM COATED ORAL at 18:02

## 2017-01-01 RX ADMIN — ALLOPURINOL 100 MG: 100 TABLET ORAL at 21:30

## 2017-01-01 RX ADMIN — KETOTIFEN FUMARATE 1 DROP: 0.25 SOLUTION/ DROPS OPHTHALMIC at 08:52

## 2017-01-01 RX ADMIN — INSULIN ASPART 2 UNITS: 100 INJECTION, SOLUTION INTRAVENOUS; SUBCUTANEOUS at 11:59

## 2017-01-01 RX ADMIN — ALLOPURINOL 100 MG: 100 TABLET ORAL at 09:47

## 2017-01-01 RX ADMIN — BUDESONIDE 0.5 MG: 0.5 SUSPENSION RESPIRATORY (INHALATION) at 09:15

## 2017-01-01 RX ADMIN — OFLOXACIN 50000 UNITS: 300 TABLET, COATED ORAL at 12:00

## 2017-01-01 RX ADMIN — PIPERACILLIN SODIUM AND TAZOBACTAM SODIUM 3.38 G: 3; .375 INJECTION, POWDER, LYOPHILIZED, FOR SOLUTION INTRAVENOUS at 05:47

## 2017-01-01 RX ADMIN — MICONAZOLE NITRATE: 20 POWDER TOPICAL at 20:01

## 2017-01-01 RX ADMIN — LEVOTHYROXINE SODIUM 150 MCG: 150 TABLET ORAL at 05:15

## 2017-01-01 RX ADMIN — GUAIFENESIN 1200 MG: 600 TABLET, EXTENDED RELEASE ORAL at 08:23

## 2017-01-01 RX ADMIN — Medication 150 MG: at 09:23

## 2017-01-01 RX ADMIN — CONJUGATED ESTROGENS 0.75 APPLICATION: 0.62 CREAM VAGINAL at 21:39

## 2017-01-01 RX ADMIN — INSULIN ASPART 2 UNITS: 100 INJECTION, SOLUTION INTRAVENOUS; SUBCUTANEOUS at 07:53

## 2017-01-01 RX ADMIN — ASPIRIN 81 MG: 81 TABLET, COATED ORAL at 10:11

## 2017-01-01 RX ADMIN — BUDESONIDE 0.5 MG: 0.5 SUSPENSION RESPIRATORY (INHALATION) at 09:19

## 2017-01-01 RX ADMIN — FUROSEMIDE 40 MG: 40 TABLET ORAL at 07:59

## 2017-01-01 RX ADMIN — LEVETIRACETAM 500 MG: 500 TABLET, FILM COATED ORAL at 20:48

## 2017-01-01 RX ADMIN — INSULIN ASPART 2 UNITS: 100 INJECTION, SOLUTION INTRAVENOUS; SUBCUTANEOUS at 17:54

## 2017-01-01 RX ADMIN — DOXAZOSIN MESYLATE 2 MG: 2 TABLET ORAL at 21:04

## 2017-01-01 RX ADMIN — FAMOTIDINE 20 MG: 10 INJECTION, SOLUTION INTRAVENOUS at 08:32

## 2017-01-01 RX ADMIN — METOPROLOL TARTRATE 75 MG: 25 TABLET, FILM COATED ORAL at 08:25

## 2017-01-01 RX ADMIN — METOPROLOL SUCCINATE 100 MG: 50 TABLET, EXTENDED RELEASE ORAL at 08:57

## 2017-01-01 RX ADMIN — SERTRALINE HYDROCHLORIDE 100 MG: 50 TABLET ORAL at 20:47

## 2017-01-01 RX ADMIN — SERTRALINE 100 MG: 50 TABLET, FILM COATED ORAL at 21:09

## 2017-01-01 RX ADMIN — METOPROLOL SUCCINATE 100 MG: 50 TABLET, EXTENDED RELEASE ORAL at 23:10

## 2017-01-01 RX ADMIN — DOXAZOSIN MESYLATE 2 MG: 2 TABLET ORAL at 23:33

## 2017-01-01 RX ADMIN — Medication 1 CAPSULE: at 08:54

## 2017-01-01 RX ADMIN — INSULIN ASPART 3 UNITS: 100 INJECTION, SOLUTION INTRAVENOUS; SUBCUTANEOUS at 19:51

## 2017-01-01 RX ADMIN — MEROPENEM 1 G: 1 INJECTION, POWDER, FOR SOLUTION INTRAVENOUS at 02:42

## 2017-01-01 RX ADMIN — DILTIAZEM HYDROCHLORIDE 120 MG: 60 TABLET, FILM COATED ORAL at 08:29

## 2017-01-01 RX ADMIN — CALCIUM CARBONATE 500 MG (1,250 MG)-VITAMIN D3 200 UNIT TABLET 500 MG: at 10:50

## 2017-01-01 RX ADMIN — Medication 1 TABLET: at 17:30

## 2017-01-01 RX ADMIN — APIXABAN 5 MG: 2.5 TABLET, FILM COATED ORAL at 09:26

## 2017-01-01 RX ADMIN — DICLOFENAC SODIUM 4 G: 10 GEL TOPICAL at 20:15

## 2017-01-01 RX ADMIN — POTASSIUM CHLORIDE 20 MEQ: 1500 TABLET, EXTENDED RELEASE ORAL at 08:38

## 2017-01-01 RX ADMIN — MICONAZOLE NITRATE: 20 POWDER TOPICAL at 09:37

## 2017-01-01 RX ADMIN — IPRATROPIUM BROMIDE AND ALBUTEROL SULFATE 3 ML: .5; 3 SOLUTION RESPIRATORY (INHALATION) at 13:12

## 2017-01-01 RX ADMIN — TRAMADOL HYDROCHLORIDE 100 MG: 50 TABLET, FILM COATED ORAL at 23:20

## 2017-01-01 RX ADMIN — METOPROLOL SUCCINATE 100 MG: 50 TABLET, EXTENDED RELEASE ORAL at 21:01

## 2017-01-01 RX ADMIN — FUROSEMIDE 40 MG: 40 TABLET ORAL at 08:09

## 2017-01-01 RX ADMIN — SENNOSIDES AND DOCUSATE SODIUM 1 TABLET: 8.6; 5 TABLET ORAL at 08:27

## 2017-01-01 RX ADMIN — ASPIRIN 81 MG: 81 TABLET, COATED ORAL at 08:35

## 2017-01-01 RX ADMIN — MEGESTROL ACETATE 200 MG: 40 SUSPENSION ORAL at 17:11

## 2017-01-01 RX ADMIN — ALLOPURINOL 100 MG: 100 TABLET ORAL at 18:51

## 2017-01-01 RX ADMIN — LEVETIRACETAM 500 MG: 500 TABLET, FILM COATED ORAL at 10:59

## 2017-01-01 RX ADMIN — ALLOPURINOL 100 MG: 100 TABLET ORAL at 17:55

## 2017-01-01 RX ADMIN — ALLOPURINOL 100 MG: 100 TABLET ORAL at 22:44

## 2017-01-01 RX ADMIN — METOPROLOL TARTRATE 25 MG: 25 TABLET, FILM COATED ORAL at 17:43

## 2017-01-01 RX ADMIN — FUROSEMIDE 40 MG: 40 TABLET ORAL at 10:14

## 2017-01-01 RX ADMIN — ALLOPURINOL 100 MG: 100 TABLET ORAL at 08:11

## 2017-01-01 RX ADMIN — ALLOPURINOL 100 MG: 100 TABLET ORAL at 09:00

## 2017-01-01 RX ADMIN — Medication 1 TABLET: at 08:30

## 2017-01-01 RX ADMIN — FUROSEMIDE 40 MG: 10 INJECTION, SOLUTION INTRAMUSCULAR; INTRAVENOUS at 17:21

## 2017-01-01 RX ADMIN — ALLOPURINOL 100 MG: 100 TABLET ORAL at 21:07

## 2017-01-01 RX ADMIN — STANDARDIZED SENNA CONCENTRATE 1 TABLET: 8.6 TABLET ORAL at 08:16

## 2017-01-01 RX ADMIN — SENNOSIDES AND DOCUSATE SODIUM 1 TABLET: 8.6; 5 TABLET ORAL at 20:48

## 2017-01-01 RX ADMIN — BUDESONIDE 0.5 MG: 0.5 SUSPENSION RESPIRATORY (INHALATION) at 08:54

## 2017-01-01 RX ADMIN — ALLOPURINOL 100 MG: 100 TABLET ORAL at 08:23

## 2017-01-01 RX ADMIN — APIXABAN 5 MG: 2.5 TABLET, FILM COATED ORAL at 08:08

## 2017-01-01 RX ADMIN — MICONAZOLE NITRATE 1 APPLICATION: 20 POWDER TOPICAL at 08:42

## 2017-01-01 RX ADMIN — STANDARDIZED SENNA CONCENTRATE 1 TABLET: 8.6 TABLET ORAL at 09:27

## 2017-01-01 RX ADMIN — LEVOTHYROXINE SODIUM 150 MCG: 150 TABLET ORAL at 06:17

## 2017-01-01 RX ADMIN — METOPROLOL TARTRATE 75 MG: 25 TABLET, FILM COATED ORAL at 08:38

## 2017-01-01 RX ADMIN — KETOTIFEN FUMARATE 1 DROP: 0.25 SOLUTION/ DROPS OPHTHALMIC at 23:03

## 2017-01-01 RX ADMIN — STANDARDIZED SENNA CONCENTRATE 1 TABLET: 8.6 TABLET ORAL at 09:26

## 2017-01-01 RX ADMIN — AMIODARONE HYDROCHLORIDE 200 MG: 200 TABLET ORAL at 20:57

## 2017-01-01 RX ADMIN — SENNOSIDES AND DOCUSATE SODIUM 1 TABLET: 8.6; 5 TABLET ORAL at 08:46

## 2017-01-01 RX ADMIN — KETOTIFEN FUMARATE 1 DROP: 0.25 SOLUTION/ DROPS OPHTHALMIC at 08:07

## 2017-01-01 RX ADMIN — IPRATROPIUM BROMIDE AND ALBUTEROL SULFATE 3 ML: .5; 3 SOLUTION RESPIRATORY (INHALATION) at 08:12

## 2017-01-01 RX ADMIN — IPRATROPIUM BROMIDE AND ALBUTEROL SULFATE 3 ML: .5; 3 SOLUTION RESPIRATORY (INHALATION) at 10:50

## 2017-01-01 RX ADMIN — INSULIN ASPART 3 UNITS: 100 INJECTION, SOLUTION INTRAVENOUS; SUBCUTANEOUS at 17:01

## 2017-01-01 RX ADMIN — LEVOTHYROXINE SODIUM 150 MCG: 150 TABLET ORAL at 06:04

## 2017-01-01 RX ADMIN — DILTIAZEM HYDROCHLORIDE 300 MG: 180 CAPSULE, COATED, EXTENDED RELEASE ORAL at 12:04

## 2017-01-01 RX ADMIN — LEVOTHYROXINE SODIUM 150 MCG: 150 TABLET ORAL at 06:53

## 2017-01-01 RX ADMIN — TERAZOSIN HYDROCHLORIDE 2 MG: 1 CAPSULE ORAL at 20:16

## 2017-01-01 RX ADMIN — Medication 150 MG: at 11:52

## 2017-01-01 RX ADMIN — MICONAZOLE NITRATE: 20 POWDER TOPICAL at 20:56

## 2017-01-01 RX ADMIN — DILTIAZEM HYDROCHLORIDE 300 MG: 180 CAPSULE, COATED, EXTENDED RELEASE ORAL at 14:10

## 2017-01-01 RX ADMIN — Medication 1 TABLET: at 08:14

## 2017-01-01 RX ADMIN — DILTIAZEM HYDROCHLORIDE 300 MG: 180 CAPSULE, COATED, EXTENDED RELEASE ORAL at 13:05

## 2017-01-01 RX ADMIN — AMIODARONE HYDROCHLORIDE 200 MG: 200 TABLET ORAL at 20:41

## 2017-01-01 RX ADMIN — METOPROLOL SUCCINATE 100 MG: 50 TABLET, EXTENDED RELEASE ORAL at 08:34

## 2017-01-01 RX ADMIN — METOPROLOL SUCCINATE 100 MG: 50 TABLET, EXTENDED RELEASE ORAL at 20:00

## 2017-01-01 RX ADMIN — FAMOTIDINE 20 MG: 20 TABLET, FILM COATED ORAL at 08:20

## 2017-01-01 RX ADMIN — KETOTIFEN FUMARATE 1 DROP: 0.25 SOLUTION/ DROPS OPHTHALMIC at 08:48

## 2017-01-01 RX ADMIN — MEGESTROL ACETATE 200 MG: 40 SUSPENSION ORAL at 10:12

## 2017-01-01 RX ADMIN — DONEPEZIL HYDROCHLORIDE 10 MG: 5 TABLET ORAL at 22:27

## 2017-01-01 RX ADMIN — MEGESTROL ACETATE 200 MG: 40 SUSPENSION ORAL at 18:02

## 2017-01-01 RX ADMIN — LEVETIRACETAM 500 MG: 500 TABLET, FILM COATED ORAL at 08:33

## 2017-01-01 RX ADMIN — Medication 1 TABLET: at 18:34

## 2017-01-01 RX ADMIN — APIXABAN 5 MG: 2.5 TABLET, FILM COATED ORAL at 20:20

## 2017-01-01 RX ADMIN — LEVETIRACETAM 500 MG: 500 TABLET, FILM COATED ORAL at 17:00

## 2017-01-01 RX ADMIN — POTASSIUM CHLORIDE 40 MEQ: 1500 TABLET, EXTENDED RELEASE ORAL at 16:07

## 2017-01-01 RX ADMIN — LEVETIRACETAM 500 MG: 500 TABLET, FILM COATED ORAL at 08:54

## 2017-01-01 RX ADMIN — GUAIFENESIN 1200 MG: 600 TABLET, EXTENDED RELEASE ORAL at 18:03

## 2017-01-01 RX ADMIN — GUAIFENESIN 1200 MG: 600 TABLET, EXTENDED RELEASE ORAL at 09:35

## 2017-01-01 RX ADMIN — DOXAZOSIN MESYLATE 2 MG: 2 TABLET ORAL at 09:21

## 2017-01-01 RX ADMIN — ASPIRIN 81 MG: 81 TABLET, COATED ORAL at 09:28

## 2017-01-01 RX ADMIN — KETOTIFEN FUMARATE 1 DROP: 0.25 SOLUTION/ DROPS OPHTHALMIC at 16:50

## 2017-01-01 RX ADMIN — ISOSORBIDE DINITRATE 10 MG: 10 TABLET ORAL at 08:29

## 2017-01-01 RX ADMIN — LORAZEPAM 0.5 MG: 0.5 TABLET ORAL at 23:42

## 2017-01-01 RX ADMIN — Medication 1 CAPSULE: at 10:32

## 2017-01-01 RX ADMIN — IBANDRONATE SODIUM 150 MG: 150 TABLET, FILM COATED ORAL at 11:20

## 2017-01-01 RX ADMIN — METOPROLOL SUCCINATE 100 MG: 50 TABLET, EXTENDED RELEASE ORAL at 20:42

## 2017-01-01 RX ADMIN — GUAIFENESIN 1200 MG: 600 TABLET, EXTENDED RELEASE ORAL at 17:19

## 2017-01-01 RX ADMIN — Medication 1 CAPSULE: at 10:34

## 2017-01-01 RX ADMIN — ALLOPURINOL 100 MG: 100 TABLET ORAL at 08:18

## 2017-01-01 RX ADMIN — DONEPEZIL HYDROCHLORIDE 10 MG: 5 TABLET, FILM COATED ORAL at 20:54

## 2017-01-01 RX ADMIN — METOPROLOL TARTRATE 100 MG: 50 TABLET, FILM COATED ORAL at 20:58

## 2017-01-01 RX ADMIN — ALLOPURINOL 100 MG: 100 TABLET ORAL at 20:23

## 2017-01-01 RX ADMIN — FUROSEMIDE 40 MG: 40 TABLET ORAL at 08:31

## 2017-01-01 RX ADMIN — LINAGLIPTIN 5 MG: 5 TABLET, FILM COATED ORAL at 09:48

## 2017-01-01 RX ADMIN — APIXABAN 5 MG: 2.5 TABLET, FILM COATED ORAL at 08:15

## 2017-01-01 RX ADMIN — MICONAZOLE NITRATE: 20 POWDER TOPICAL at 09:27

## 2017-01-01 RX ADMIN — ALLOPURINOL 100 MG: 100 TABLET ORAL at 08:27

## 2017-01-01 RX ADMIN — ALLOPURINOL 100 MG: 100 TABLET ORAL at 17:36

## 2017-01-01 RX ADMIN — AMIODARONE HYDROCHLORIDE 200 MG: 200 TABLET ORAL at 19:51

## 2017-01-01 RX ADMIN — POTASSIUM CHLORIDE 10 MEQ: 750 TABLET, FILM COATED, EXTENDED RELEASE ORAL at 07:47

## 2017-01-01 RX ADMIN — DONEPEZIL HYDROCHLORIDE 10 MG: 5 TABLET ORAL at 21:01

## 2017-01-01 RX ADMIN — BUDESONIDE 0.5 MG: 0.5 SUSPENSION RESPIRATORY (INHALATION) at 08:33

## 2017-01-01 RX ADMIN — KETOTIFEN FUMARATE 1 DROP: 0.35 SOLUTION/ DROPS OPHTHALMIC at 09:31

## 2017-01-01 RX ADMIN — Medication 1 TABLET: at 09:17

## 2017-01-01 RX ADMIN — SERTRALINE HYDROCHLORIDE 75 MG: 50 TABLET ORAL at 20:12

## 2017-01-01 RX ADMIN — POTASSIUM CHLORIDE 20 MEQ: 1500 TABLET, EXTENDED RELEASE ORAL at 09:27

## 2017-01-01 RX ADMIN — KETOTIFEN FUMARATE 1 DROP: 0.35 SOLUTION/ DROPS OPHTHALMIC at 08:25

## 2017-01-01 RX ADMIN — APIXABAN 5 MG: 2.5 TABLET, FILM COATED ORAL at 21:16

## 2017-01-01 RX ADMIN — MICONAZOLE NITRATE: 20 POWDER TOPICAL at 20:28

## 2017-01-01 RX ADMIN — DOXAZOSIN MESYLATE 2 MG: 2 TABLET ORAL at 08:22

## 2017-01-01 RX ADMIN — MICONAZOLE NITRATE: 20 POWDER TOPICAL at 09:05

## 2017-01-01 RX ADMIN — METOPROLOL TARTRATE 25 MG: 25 TABLET, FILM COATED ORAL at 17:34

## 2017-01-01 RX ADMIN — IPRATROPIUM BROMIDE AND ALBUTEROL SULFATE 3 ML: .5; 3 SOLUTION RESPIRATORY (INHALATION) at 20:02

## 2017-01-01 RX ADMIN — POTASSIUM CHLORIDE 20 MEQ: 1500 TABLET, EXTENDED RELEASE ORAL at 09:05

## 2017-01-01 RX ADMIN — FAMOTIDINE 20 MG: 20 TABLET ORAL at 16:29

## 2017-01-01 RX ADMIN — KETOTIFEN FUMARATE 1 DROP: 0.25 SOLUTION/ DROPS OPHTHALMIC at 10:47

## 2017-01-01 RX ADMIN — FAMOTIDINE 20 MG: 20 TABLET ORAL at 17:55

## 2017-01-01 RX ADMIN — METOPROLOL TARTRATE 75 MG: 25 TABLET, FILM COATED ORAL at 08:15

## 2017-01-01 RX ADMIN — FAMOTIDINE 20 MG: 20 TABLET, FILM COATED ORAL at 08:43

## 2017-01-01 RX ADMIN — ISOSORBIDE DINITRATE 10 MG: 10 TABLET ORAL at 08:11

## 2017-01-01 RX ADMIN — DILTIAZEM HYDROCHLORIDE 300 MG: 180 CAPSULE, COATED, EXTENDED RELEASE ORAL at 12:35

## 2017-01-01 RX ADMIN — DILTIAZEM HYDROCHLORIDE 300 MG: 180 CAPSULE, COATED, EXTENDED RELEASE ORAL at 13:02

## 2017-01-01 RX ADMIN — SENNOSIDES AND DOCUSATE SODIUM 1 TABLET: 8.6; 5 TABLET ORAL at 20:34

## 2017-01-01 RX ADMIN — Medication 1 TABLET: at 10:16

## 2017-01-01 RX ADMIN — LINAGLIPTIN 5 MG: 5 TABLET, FILM COATED ORAL at 09:19

## 2017-01-01 RX ADMIN — ALLOPURINOL 100 MG: 100 TABLET ORAL at 17:33

## 2017-01-01 RX ADMIN — FAMOTIDINE 20 MG: 20 TABLET, FILM COATED ORAL at 08:26

## 2017-01-01 RX ADMIN — Medication 1 TABLET: at 08:17

## 2017-01-01 RX ADMIN — APIXABAN 5 MG: 2.5 TABLET, FILM COATED ORAL at 20:25

## 2017-01-01 RX ADMIN — DONEPEZIL HYDROCHLORIDE 10 MG: 5 TABLET ORAL at 21:03

## 2017-01-01 RX ADMIN — LEVOTHYROXINE SODIUM 150 MCG: 150 TABLET ORAL at 04:55

## 2017-01-01 RX ADMIN — TRAMADOL HYDROCHLORIDE 100 MG: 50 TABLET, FILM COATED ORAL at 20:02

## 2017-01-01 RX ADMIN — LEVETIRACETAM 500 MG: 500 TABLET, FILM COATED ORAL at 20:40

## 2017-01-01 RX ADMIN — MICONAZOLE NITRATE: 20 POWDER TOPICAL at 09:21

## 2017-01-01 RX ADMIN — METOPROLOL SUCCINATE 100 MG: 50 TABLET, EXTENDED RELEASE ORAL at 09:35

## 2017-01-01 RX ADMIN — KETOTIFEN FUMARATE 1 DROP: 0.25 SOLUTION/ DROPS OPHTHALMIC at 07:27

## 2017-01-01 RX ADMIN — KETOTIFEN FUMARATE 1 DROP: 0.25 SOLUTION/ DROPS OPHTHALMIC at 18:19

## 2017-01-01 RX ADMIN — IPRATROPIUM BROMIDE AND ALBUTEROL SULFATE 3 ML: .5; 3 SOLUTION RESPIRATORY (INHALATION) at 20:45

## 2017-01-01 RX ADMIN — METOPROLOL SUCCINATE 100 MG: 50 TABLET, EXTENDED RELEASE ORAL at 07:59

## 2017-01-01 RX ADMIN — KETOTIFEN FUMARATE 1 DROP: 0.25 SOLUTION/ DROPS OPHTHALMIC at 09:02

## 2017-01-01 RX ADMIN — METOPROLOL TARTRATE 75 MG: 25 TABLET, FILM COATED ORAL at 07:44

## 2017-01-01 RX ADMIN — CONJUGATED ESTROGENS 0.75 APPLICATION: 0.62 CREAM VAGINAL at 21:42

## 2017-01-01 RX ADMIN — ASPIRIN 81 MG: 81 TABLET, COATED ORAL at 08:34

## 2017-01-01 RX ADMIN — BUDESONIDE 0.5 MG: 0.5 SUSPENSION RESPIRATORY (INHALATION) at 09:21

## 2017-01-01 RX ADMIN — DOCUSATE SODIUM AND SENNOSIDES 1 TABLET: 8.6; 5 TABLET, FILM COATED ORAL at 08:39

## 2017-01-01 RX ADMIN — DONEPEZIL HYDROCHLORIDE 10 MG: 5 TABLET ORAL at 21:00

## 2017-01-01 RX ADMIN — Medication 1 TABLET: at 09:53

## 2017-01-01 RX ADMIN — SERTRALINE 100 MG: 50 TABLET, FILM COATED ORAL at 20:05

## 2017-01-01 RX ADMIN — SERTRALINE HYDROCHLORIDE 100 MG: 50 TABLET ORAL at 20:21

## 2017-01-01 RX ADMIN — ISOSORBIDE DINITRATE 10 MG: 10 TABLET ORAL at 09:40

## 2017-01-01 RX ADMIN — TRAMADOL HYDROCHLORIDE 100 MG: 50 TABLET, FILM COATED ORAL at 17:53

## 2017-01-01 RX ADMIN — CONJUGATED ESTROGENS 1 APPLICATION: 0.62 CREAM VAGINAL at 20:57

## 2017-01-01 RX ADMIN — METOPROLOL SUCCINATE 100 MG: 50 TABLET, EXTENDED RELEASE ORAL at 20:49

## 2017-01-01 RX ADMIN — METOPROLOL TARTRATE 25 MG: 25 TABLET, FILM COATED ORAL at 09:46

## 2017-01-01 RX ADMIN — STANDARDIZED SENNA CONCENTRATE 1 TABLET: 8.6 TABLET ORAL at 08:42

## 2017-01-01 RX ADMIN — ACETAMINOPHEN 650 MG: 325 TABLET, FILM COATED ORAL at 21:50

## 2017-01-01 RX ADMIN — IPRATROPIUM BROMIDE AND ALBUTEROL SULFATE 3 ML: .5; 3 SOLUTION RESPIRATORY (INHALATION) at 12:25

## 2017-01-01 RX ADMIN — GUAIFENESIN 1200 MG: 600 TABLET, EXTENDED RELEASE ORAL at 18:17

## 2017-01-01 RX ADMIN — ISOSORBIDE DINITRATE 10 MG: 10 TABLET ORAL at 22:46

## 2017-01-01 RX ADMIN — APIXABAN 5 MG: 2.5 TABLET, FILM COATED ORAL at 09:06

## 2017-01-01 RX ADMIN — DILTIAZEM HYDROCHLORIDE 300 MG: 180 CAPSULE, EXTENDED RELEASE ORAL at 12:00

## 2017-01-01 RX ADMIN — ASPIRIN 81 MG: 81 TABLET, COATED ORAL at 09:47

## 2017-01-01 RX ADMIN — METOPROLOL TARTRATE 100 MG: 25 TABLET, FILM COATED ORAL at 20:36

## 2017-01-01 RX ADMIN — METOPROLOL SUCCINATE 100 MG: 50 TABLET, EXTENDED RELEASE ORAL at 08:28

## 2017-01-01 RX ADMIN — METOPROLOL SUCCINATE 100 MG: 50 TABLET, EXTENDED RELEASE ORAL at 09:43

## 2017-01-01 RX ADMIN — Medication 1 TABLET: at 09:55

## 2017-01-01 RX ADMIN — LEVOTHYROXINE SODIUM 150 MCG: 150 TABLET ORAL at 06:13

## 2017-01-01 RX ADMIN — ISOSORBIDE DINITRATE 10 MG: 10 TABLET ORAL at 20:03

## 2017-01-01 RX ADMIN — FAMOTIDINE 40 MG: 20 TABLET ORAL at 20:22

## 2017-01-01 RX ADMIN — ACETAMINOPHEN 650 MG: 325 TABLET, FILM COATED ORAL at 17:39

## 2017-01-01 RX ADMIN — Medication 150 MG: at 17:31

## 2017-01-01 RX ADMIN — OFLOXACIN 50000 UNITS: 300 TABLET, COATED ORAL at 11:44

## 2017-01-01 RX ADMIN — LEVETIRACETAM 500 MG: 500 TABLET, FILM COATED ORAL at 18:08

## 2017-01-01 RX ADMIN — INSULIN ASPART 2 UNITS: 100 INJECTION, SOLUTION INTRAVENOUS; SUBCUTANEOUS at 17:55

## 2017-01-01 RX ADMIN — Medication 1 CAPSULE: at 08:32

## 2017-01-01 RX ADMIN — SERTRALINE HYDROCHLORIDE 100 MG: 50 TABLET ORAL at 20:09

## 2017-01-01 RX ADMIN — DILTIAZEM HYDROCHLORIDE 300 MG: 180 CAPSULE, COATED, EXTENDED RELEASE ORAL at 12:02

## 2017-01-01 RX ADMIN — AMIODARONE HYDROCHLORIDE 200 MG: 200 TABLET ORAL at 20:20

## 2017-01-01 RX ADMIN — BUDESONIDE 0.5 MG: 0.5 SUSPENSION RESPIRATORY (INHALATION) at 09:20

## 2017-01-01 RX ADMIN — ISOSORBIDE DINITRATE 10 MG: 10 TABLET ORAL at 10:10

## 2017-01-01 RX ADMIN — SENNOSIDES AND DOCUSATE SODIUM 1 TABLET: 8.6; 5 TABLET ORAL at 20:32

## 2017-01-01 RX ADMIN — METOPROLOL TARTRATE 75 MG: 25 TABLET, FILM COATED ORAL at 08:19

## 2017-01-01 RX ADMIN — AMIODARONE HYDROCHLORIDE 200 MG: 200 TABLET ORAL at 08:44

## 2017-01-01 RX ADMIN — SENNOSIDES AND DOCUSATE SODIUM 1 TABLET: 8.6; 5 TABLET ORAL at 22:48

## 2017-01-01 RX ADMIN — ALLOPURINOL 100 MG: 100 TABLET ORAL at 08:24

## 2017-01-01 RX ADMIN — TRAMADOL HYDROCHLORIDE 50 MG: 50 TABLET, FILM COATED ORAL at 20:07

## 2017-01-01 RX ADMIN — STANDARDIZED SENNA CONCENTRATE 1 TABLET: 8.6 TABLET ORAL at 16:52

## 2017-01-01 RX ADMIN — METOPROLOL TARTRATE 75 MG: 25 TABLET, FILM COATED ORAL at 17:06

## 2017-01-01 RX ADMIN — ALLOPURINOL 100 MG: 100 TABLET ORAL at 08:29

## 2017-01-01 RX ADMIN — LEVETIRACETAM 500 MG: 500 TABLET, FILM COATED ORAL at 22:03

## 2017-01-01 RX ADMIN — Medication 1 CAPSULE: at 10:14

## 2017-01-01 RX ADMIN — APIXABAN 5 MG: 2.5 TABLET, FILM COATED ORAL at 09:18

## 2017-01-01 RX ADMIN — SENNOSIDES AND DOCUSATE SODIUM 1 TABLET: 8.6; 5 TABLET ORAL at 08:13

## 2017-01-01 RX ADMIN — LEVOFLOXACIN 750 MG: 750 TABLET, FILM COATED ORAL at 23:09

## 2017-01-01 RX ADMIN — METOPROLOL SUCCINATE 100 MG: 50 TABLET, EXTENDED RELEASE ORAL at 20:10

## 2017-01-01 RX ADMIN — Medication 150 MG: at 18:14

## 2017-01-01 RX ADMIN — Medication 150 MG: at 18:19

## 2017-01-01 RX ADMIN — GUAIFENESIN 1200 MG: 600 TABLET, EXTENDED RELEASE ORAL at 09:40

## 2017-01-01 RX ADMIN — OFLOXACIN 50000 UNITS: 300 TABLET, COATED ORAL at 13:16

## 2017-01-01 RX ADMIN — KETOTIFEN FUMARATE 1 DROP: 0.25 SOLUTION/ DROPS OPHTHALMIC at 18:34

## 2017-01-01 RX ADMIN — GUAIFENESIN 1200 MG: 600 TABLET, EXTENDED RELEASE ORAL at 17:57

## 2017-01-01 RX ADMIN — Medication 10 ML: at 08:30

## 2017-01-01 RX ADMIN — DILTIAZEM HYDROCHLORIDE 120 MG: 60 TABLET, FILM COATED ORAL at 08:27

## 2017-01-01 RX ADMIN — Medication 150 MG: at 08:54

## 2017-01-01 RX ADMIN — FAMOTIDINE 20 MG: 20 TABLET, FILM COATED ORAL at 10:34

## 2017-01-01 RX ADMIN — MICONAZOLE NITRATE 1 APPLICATION: 20 POWDER TOPICAL at 08:30

## 2017-01-01 RX ADMIN — APIXABAN 5 MG: 2.5 TABLET, FILM COATED ORAL at 21:11

## 2017-01-01 RX ADMIN — METOPROLOL SUCCINATE 100 MG: 50 TABLET, EXTENDED RELEASE ORAL at 20:53

## 2017-01-01 RX ADMIN — ALLOPURINOL 100 MG: 100 TABLET ORAL at 17:44

## 2017-01-01 RX ADMIN — Medication 150 MG: at 16:34

## 2017-01-01 RX ADMIN — MICONAZOLE NITRATE: 20 POWDER TOPICAL at 23:01

## 2017-01-01 RX ADMIN — FUROSEMIDE 40 MG: 40 TABLET ORAL at 10:17

## 2017-01-01 RX ADMIN — POTASSIUM CHLORIDE 20 MEQ: 1500 TABLET, EXTENDED RELEASE ORAL at 08:32

## 2017-01-01 RX ADMIN — INSULIN ASPART 3 UNITS: 100 INJECTION, SOLUTION INTRAVENOUS; SUBCUTANEOUS at 20:31

## 2017-01-01 RX ADMIN — MEGESTROL ACETATE 200 MG: 40 SUSPENSION ORAL at 09:27

## 2017-01-01 RX ADMIN — TRAMADOL HYDROCHLORIDE 100 MG: 50 TABLET, FILM COATED ORAL at 22:20

## 2017-01-01 RX ADMIN — IPRATROPIUM BROMIDE AND ALBUTEROL SULFATE 3 ML: .5; 3 SOLUTION RESPIRATORY (INHALATION) at 17:20

## 2017-01-01 RX ADMIN — GUAIFENESIN 1200 MG: 600 TABLET, EXTENDED RELEASE ORAL at 08:27

## 2017-01-01 RX ADMIN — DIPHENHYDRAMINE HYDROCHLORIDE 25 MG: 25 CAPSULE ORAL at 22:30

## 2017-01-01 RX ADMIN — DILTIAZEM HYDROCHLORIDE 300 MG: 180 CAPSULE, COATED, EXTENDED RELEASE ORAL at 12:28

## 2017-01-01 RX ADMIN — SERTRALINE HYDROCHLORIDE 100 MG: 50 TABLET ORAL at 22:22

## 2017-01-01 RX ADMIN — ACETAMINOPHEN 650 MG: 325 TABLET, FILM COATED ORAL at 22:37

## 2017-01-01 RX ADMIN — KETOTIFEN FUMARATE 1 DROP: 0.25 SOLUTION/ DROPS OPHTHALMIC at 10:59

## 2017-01-01 RX ADMIN — STANDARDIZED SENNA CONCENTRATE 1 TABLET: 8.6 TABLET ORAL at 17:47

## 2017-01-01 RX ADMIN — LEVOTHYROXINE SODIUM 150 MCG: 150 TABLET ORAL at 06:02

## 2017-01-01 RX ADMIN — MICONAZOLE NITRATE: 20 POWDER TOPICAL at 20:33

## 2017-01-01 RX ADMIN — SERTRALINE HYDROCHLORIDE 100 MG: 50 TABLET ORAL at 20:57

## 2017-01-01 RX ADMIN — APIXABAN 5 MG: 2.5 TABLET, FILM COATED ORAL at 09:22

## 2017-01-01 RX ADMIN — KETOTIFEN FUMARATE 1 DROP: 0.35 SOLUTION/ DROPS OPHTHALMIC at 18:14

## 2017-01-01 RX ADMIN — Medication 1 CAPSULE: at 10:53

## 2017-01-01 RX ADMIN — ISOSORBIDE DINITRATE 10 MG: 10 TABLET ORAL at 20:05

## 2017-01-01 RX ADMIN — AMIODARONE HYDROCHLORIDE 200 MG: 200 TABLET ORAL at 09:28

## 2017-01-01 RX ADMIN — SENNOSIDES AND DOCUSATE SODIUM 1 TABLET: 8.6; 5 TABLET ORAL at 09:32

## 2017-01-01 RX ADMIN — INSULIN ASPART 3 UNITS: 100 INJECTION, SOLUTION INTRAVENOUS; SUBCUTANEOUS at 13:09

## 2017-01-01 RX ADMIN — KETOTIFEN FUMARATE 1 DROP: 0.25 SOLUTION/ DROPS OPHTHALMIC at 18:20

## 2017-01-01 RX ADMIN — ASPIRIN 81 MG: 81 TABLET, COATED ORAL at 09:23

## 2017-01-01 RX ADMIN — ACETAMINOPHEN 650 MG: 325 TABLET, FILM COATED ORAL at 21:07

## 2017-01-01 RX ADMIN — INSULIN ASPART 2 UNITS: 100 INJECTION, SOLUTION INTRAVENOUS; SUBCUTANEOUS at 12:03

## 2017-01-01 RX ADMIN — INSULIN ASPART 3 UNITS: 100 INJECTION, SOLUTION INTRAVENOUS; SUBCUTANEOUS at 20:18

## 2017-01-01 RX ADMIN — AMIODARONE HYDROCHLORIDE 200 MG: 200 TABLET ORAL at 09:32

## 2017-01-01 RX ADMIN — LEVETIRACETAM 500 MG: 500 TABLET, FILM COATED ORAL at 09:12

## 2017-01-01 RX ADMIN — DILTIAZEM HYDROCHLORIDE 300 MG: 180 CAPSULE, COATED, EXTENDED RELEASE ORAL at 12:33

## 2017-01-01 RX ADMIN — KETOTIFEN FUMARATE 1 DROP: 0.25 SOLUTION/ DROPS OPHTHALMIC at 17:33

## 2017-01-01 RX ADMIN — FUROSEMIDE 40 MG: 40 TABLET ORAL at 10:20

## 2017-01-01 RX ADMIN — METOPROLOL TARTRATE 75 MG: 25 TABLET, FILM COATED ORAL at 17:55

## 2017-01-01 RX ADMIN — MEGESTROL ACETATE 200 MG: 40 SUSPENSION ORAL at 17:31

## 2017-01-01 RX ADMIN — LEVETIRACETAM 500 MG: 500 TABLET, FILM COATED ORAL at 22:30

## 2017-01-01 RX ADMIN — FUROSEMIDE 40 MG: 40 TABLET ORAL at 08:11

## 2017-01-01 RX ADMIN — ISOSORBIDE DINITRATE 10 MG: 10 TABLET ORAL at 09:47

## 2017-01-01 RX ADMIN — FUROSEMIDE 40 MG: 40 TABLET ORAL at 10:09

## 2017-01-01 RX ADMIN — TERAZOSIN HYDROCHLORIDE 2 MG: 1 CAPSULE ORAL at 08:31

## 2017-01-01 RX ADMIN — GUAIFENESIN 1200 MG: 600 TABLET, EXTENDED RELEASE ORAL at 17:05

## 2017-01-01 RX ADMIN — CONJUGATED ESTROGENS 0.75 APPLICATION: 0.62 CREAM VAGINAL at 20:15

## 2017-01-01 RX ADMIN — LEVOTHYROXINE SODIUM 150 MCG: 150 TABLET ORAL at 05:40

## 2017-01-01 RX ADMIN — ALLOPURINOL 100 MG: 100 TABLET ORAL at 08:22

## 2017-01-01 RX ADMIN — IPRATROPIUM BROMIDE AND ALBUTEROL SULFATE 3 ML: .5; 3 SOLUTION RESPIRATORY (INHALATION) at 14:06

## 2017-01-01 RX ADMIN — BUDESONIDE 0.5 MG: 0.5 SUSPENSION RESPIRATORY (INHALATION) at 08:20

## 2017-01-01 RX ADMIN — APIXABAN 5 MG: 2.5 TABLET, FILM COATED ORAL at 21:12

## 2017-01-01 RX ADMIN — APIXABAN 5 MG: 2.5 TABLET, FILM COATED ORAL at 22:04

## 2017-01-01 RX ADMIN — TRAMADOL HYDROCHLORIDE 100 MG: 50 TABLET, FILM COATED ORAL at 23:10

## 2017-01-01 RX ADMIN — OFLOXACIN 50000 UNITS: 300 TABLET, COATED ORAL at 12:43

## 2017-01-01 RX ADMIN — APIXABAN 5 MG: 2.5 TABLET, FILM COATED ORAL at 11:10

## 2017-01-01 RX ADMIN — LEVETIRACETAM 500 MG: 500 TABLET, FILM COATED ORAL at 20:14

## 2017-01-01 RX ADMIN — TRAMADOL HYDROCHLORIDE 50 MG: 50 TABLET, FILM COATED ORAL at 22:04

## 2017-01-01 RX ADMIN — MEGESTROL ACETATE 200 MG: 40 SUSPENSION ORAL at 17:42

## 2017-01-01 RX ADMIN — Medication 1 TABLET: at 17:03

## 2017-01-01 RX ADMIN — ALLOPURINOL 100 MG: 100 TABLET ORAL at 18:10

## 2017-01-01 RX ADMIN — SENNOSIDES AND DOCUSATE SODIUM 1 TABLET: 8.6; 5 TABLET ORAL at 21:22

## 2017-01-01 RX ADMIN — Medication 1 TABLET: at 17:37

## 2017-01-01 RX ADMIN — POTASSIUM CHLORIDE 40 MEQ: 1500 TABLET, EXTENDED RELEASE ORAL at 08:43

## 2017-01-01 RX ADMIN — ALLOPURINOL 100 MG: 100 TABLET ORAL at 20:53

## 2017-01-01 RX ADMIN — LEVETIRACETAM 500 MG: 500 TABLET, FILM COATED ORAL at 20:50

## 2017-01-01 RX ADMIN — METOPROLOL SUCCINATE 100 MG: 50 TABLET, EXTENDED RELEASE ORAL at 20:27

## 2017-01-01 RX ADMIN — STANDARDIZED SENNA CONCENTRATE 1 TABLET: 8.6 TABLET ORAL at 09:37

## 2017-01-01 RX ADMIN — MICONAZOLE NITRATE: 20 POWDER TOPICAL at 20:35

## 2017-01-01 RX ADMIN — METOPROLOL TARTRATE 100 MG: 50 TABLET, FILM COATED ORAL at 21:17

## 2017-01-01 RX ADMIN — DILTIAZEM HYDROCHLORIDE 240 MG: 120 CAPSULE, COATED, EXTENDED RELEASE ORAL at 09:11

## 2017-01-01 RX ADMIN — MICONAZOLE NITRATE: 20 POWDER TOPICAL at 22:09

## 2017-01-01 RX ADMIN — SERTRALINE HYDROCHLORIDE 100 MG: 50 TABLET ORAL at 19:56

## 2017-01-01 RX ADMIN — ALLOPURINOL 100 MG: 100 TABLET ORAL at 20:13

## 2017-01-01 RX ADMIN — AMIODARONE HYDROCHLORIDE 200 MG: 200 TABLET ORAL at 20:06

## 2017-01-01 RX ADMIN — GLIPIZIDE 5 MG: 2.5 TABLET, FILM COATED, EXTENDED RELEASE ORAL at 09:35

## 2017-01-01 RX ADMIN — FAMOTIDINE 20 MG: 20 TABLET, FILM COATED ORAL at 09:32

## 2017-01-01 RX ADMIN — Medication 1 CAPSULE: at 08:56

## 2017-01-01 RX ADMIN — ISOSORBIDE DINITRATE 10 MG: 10 TABLET ORAL at 09:51

## 2017-01-01 RX ADMIN — SENNOSIDES AND DOCUSATE SODIUM 1 TABLET: 8.6; 5 TABLET ORAL at 20:36

## 2017-01-01 RX ADMIN — FAMOTIDINE 40 MG: 20 TABLET ORAL at 20:25

## 2017-01-01 RX ADMIN — SERTRALINE 100 MG: 50 TABLET, FILM COATED ORAL at 21:58

## 2017-01-01 RX ADMIN — KETOTIFEN FUMARATE 1 DROP: 0.25 SOLUTION/ DROPS OPHTHALMIC at 16:44

## 2017-01-01 RX ADMIN — Medication 1 TABLET: at 18:17

## 2017-01-01 RX ADMIN — METOPROLOL SUCCINATE 100 MG: 50 TABLET, EXTENDED RELEASE ORAL at 09:51

## 2017-01-01 RX ADMIN — FAMOTIDINE 40 MG: 20 TABLET ORAL at 20:00

## 2017-01-01 RX ADMIN — LEVETIRACETAM 500 MG: 500 TABLET, FILM COATED ORAL at 17:56

## 2017-01-01 RX ADMIN — GUAIFENESIN 1200 MG: 600 TABLET, EXTENDED RELEASE ORAL at 09:12

## 2017-01-01 RX ADMIN — Medication 1 TABLET: at 17:28

## 2017-01-01 RX ADMIN — METOPROLOL TARTRATE 75 MG: 25 TABLET, FILM COATED ORAL at 09:23

## 2017-01-01 RX ADMIN — APIXABAN 5 MG: 2.5 TABLET, FILM COATED ORAL at 22:17

## 2017-01-01 RX ADMIN — ALLOPURINOL 100 MG: 100 TABLET ORAL at 22:09

## 2017-01-01 RX ADMIN — LEVETIRACETAM 500 MG: 500 TABLET, FILM COATED ORAL at 20:31

## 2017-01-01 RX ADMIN — FUROSEMIDE 40 MG: 40 TABLET ORAL at 08:29

## 2017-01-01 RX ADMIN — DILTIAZEM HYDROCHLORIDE 240 MG: 120 CAPSULE, COATED, EXTENDED RELEASE ORAL at 08:29

## 2017-01-01 RX ADMIN — LINAGLIPTIN 5 MG: 5 TABLET, FILM COATED ORAL at 08:47

## 2017-01-01 RX ADMIN — BUDESONIDE 0.5 MG: 0.5 SUSPENSION RESPIRATORY (INHALATION) at 08:58

## 2017-01-01 RX ADMIN — METOPROLOL TARTRATE 75 MG: 25 TABLET, FILM COATED ORAL at 17:47

## 2017-01-01 RX ADMIN — KETOTIFEN FUMARATE 1 DROP: 0.35 SOLUTION/ DROPS OPHTHALMIC at 17:21

## 2017-01-01 RX ADMIN — DILTIAZEM HYDROCHLORIDE 300 MG: 180 CAPSULE, COATED, EXTENDED RELEASE ORAL at 11:49

## 2017-01-01 RX ADMIN — Medication 10 ML: at 13:14

## 2017-01-01 RX ADMIN — SENNOSIDES AND DOCUSATE SODIUM 1 TABLET: 8.6; 5 TABLET ORAL at 19:59

## 2017-01-01 RX ADMIN — APIXABAN 5 MG: 2.5 TABLET, FILM COATED ORAL at 08:49

## 2017-01-01 RX ADMIN — GUAIFENESIN 1200 MG: 600 TABLET, EXTENDED RELEASE ORAL at 10:20

## 2017-01-01 RX ADMIN — CLINDAMYCIN HYDROCHLORIDE 450 MG: 150 CAPSULE ORAL at 05:34

## 2017-01-01 RX ADMIN — LEVOTHYROXINE SODIUM 150 MCG: 150 TABLET ORAL at 05:39

## 2017-01-01 RX ADMIN — IBANDRONATE SODIUM 150 MG: 150 TABLET, FILM COATED ORAL at 18:13

## 2017-01-01 RX ADMIN — Medication 1 TABLET: at 17:41

## 2017-01-01 RX ADMIN — METOPROLOL SUCCINATE 100 MG: 50 TABLET, EXTENDED RELEASE ORAL at 08:33

## 2017-01-01 RX ADMIN — Medication 150 MG: at 10:16

## 2017-01-01 RX ADMIN — CONJUGATED ESTROGENS 0.75 APPLICATION: 0.62 CREAM VAGINAL at 20:54

## 2017-01-01 RX ADMIN — ERGOCALCIFEROL 50000 UNITS: 1.25 CAPSULE ORAL at 15:00

## 2017-01-01 RX ADMIN — ISOSORBIDE DINITRATE 10 MG: 10 TABLET ORAL at 07:54

## 2017-01-01 RX ADMIN — SENNOSIDES AND DOCUSATE SODIUM 1 TABLET: 8.6; 5 TABLET ORAL at 20:45

## 2017-01-01 RX ADMIN — DONEPEZIL HYDROCHLORIDE 10 MG: 5 TABLET ORAL at 20:53

## 2017-01-01 RX ADMIN — DOXAZOSIN MESYLATE 2 MG: 2 TABLET ORAL at 20:43

## 2017-01-01 RX ADMIN — ALLOPURINOL 100 MG: 100 TABLET ORAL at 21:35

## 2017-01-01 RX ADMIN — Medication 1 TABLET: at 08:38

## 2017-01-01 RX ADMIN — FAMOTIDINE 20 MG: 20 TABLET, FILM COATED ORAL at 10:35

## 2017-01-01 RX ADMIN — FAMOTIDINE 40 MG: 20 TABLET ORAL at 20:35

## 2017-01-01 RX ADMIN — MICONAZOLE NITRATE: 20 POWDER TOPICAL at 08:07

## 2017-01-01 RX ADMIN — DILTIAZEM HYDROCHLORIDE 240 MG: 120 CAPSULE, COATED, EXTENDED RELEASE ORAL at 12:23

## 2017-01-01 RX ADMIN — DICLOFENAC SODIUM 4 G: 10 GEL TOPICAL at 21:26

## 2017-01-01 RX ADMIN — LINAGLIPTIN 5 MG: 5 TABLET, FILM COATED ORAL at 08:36

## 2017-01-01 RX ADMIN — POTASSIUM CHLORIDE 20 MEQ: 1500 TABLET, EXTENDED RELEASE ORAL at 09:07

## 2017-01-01 RX ADMIN — SERTRALINE HYDROCHLORIDE 100 MG: 50 TABLET ORAL at 20:27

## 2017-01-01 RX ADMIN — ISOSORBIDE DINITRATE 10 MG: 10 TABLET ORAL at 08:53

## 2017-01-01 RX ADMIN — Medication 1 TABLET: at 10:23

## 2017-01-01 RX ADMIN — ISOSORBIDE DINITRATE 10 MG: 10 TABLET ORAL at 21:08

## 2017-01-01 RX ADMIN — METOPROLOL TARTRATE 100 MG: 25 TABLET, FILM COATED ORAL at 23:03

## 2017-01-01 RX ADMIN — BUDESONIDE 0.5 MG: 0.5 SUSPENSION RESPIRATORY (INHALATION) at 07:46

## 2017-01-01 RX ADMIN — ALLOPURINOL 100 MG: 100 TABLET ORAL at 23:00

## 2017-01-01 RX ADMIN — ALLOPURINOL 100 MG: 100 TABLET ORAL at 17:00

## 2017-01-01 RX ADMIN — APIXABAN 5 MG: 2.5 TABLET, FILM COATED ORAL at 22:23

## 2017-01-01 RX ADMIN — Medication 1 TABLET: at 09:04

## 2017-01-01 RX ADMIN — GUAIFENESIN 1200 MG: 600 TABLET, EXTENDED RELEASE ORAL at 18:50

## 2017-01-01 RX ADMIN — CONJUGATED ESTROGENS 0.75 APPLICATOR: 0.62 CREAM VAGINAL at 20:06

## 2017-01-01 RX ADMIN — GLIPIZIDE 5 MG: 5 TABLET ORAL at 17:34

## 2017-01-01 RX ADMIN — MEGESTROL ACETATE 200 MG: 40 SUSPENSION ORAL at 09:10

## 2017-01-01 RX ADMIN — METOPROLOL SUCCINATE 100 MG: 50 TABLET, EXTENDED RELEASE ORAL at 21:33

## 2017-01-01 RX ADMIN — ALLOPURINOL 100 MG: 100 TABLET ORAL at 08:26

## 2017-01-01 RX ADMIN — ALLOPURINOL 100 MG: 100 TABLET ORAL at 20:17

## 2017-01-01 RX ADMIN — AMIODARONE HYDROCHLORIDE 200 MG: 200 TABLET ORAL at 08:38

## 2017-01-01 RX ADMIN — Medication 1 TABLET: at 09:52

## 2017-01-01 RX ADMIN — GUAIFENESIN 1200 MG: 600 TABLET, EXTENDED RELEASE ORAL at 09:17

## 2017-01-01 RX ADMIN — FAMOTIDINE 20 MG: 20 TABLET, FILM COATED ORAL at 17:35

## 2017-01-01 RX ADMIN — TRAMADOL HYDROCHLORIDE 50 MG: 50 TABLET, FILM COATED ORAL at 23:50

## 2017-01-01 RX ADMIN — OFLOXACIN 50000 UNITS: 300 TABLET, COATED ORAL at 11:01

## 2017-01-01 RX ADMIN — METOPROLOL TARTRATE 25 MG: 25 TABLET, FILM COATED ORAL at 17:28

## 2017-01-01 RX ADMIN — CONJUGATED ESTROGENS 0.75 APPLICATOR: 0.62 CREAM VAGINAL at 20:23

## 2017-01-01 RX ADMIN — METOPROLOL SUCCINATE 100 MG: 50 TABLET, EXTENDED RELEASE ORAL at 09:55

## 2017-01-01 RX ADMIN — OFLOXACIN 50000 UNITS: 300 TABLET, COATED ORAL at 10:50

## 2017-01-01 RX ADMIN — ACETAMINOPHEN 650 MG: 325 TABLET, FILM COATED ORAL at 20:02

## 2017-01-01 RX ADMIN — Medication 1 CAPSULE: at 08:38

## 2017-01-01 RX ADMIN — ENOXAPARIN SODIUM 40 MG: 40 INJECTION SUBCUTANEOUS at 18:30

## 2017-01-01 RX ADMIN — Medication 1 TABLET: at 17:16

## 2017-01-01 RX ADMIN — APIXABAN 5 MG: 2.5 TABLET, FILM COATED ORAL at 08:54

## 2017-01-01 RX ADMIN — GUAIFENESIN 1200 MG: 600 TABLET, EXTENDED RELEASE ORAL at 10:06

## 2017-01-01 RX ADMIN — DILTIAZEM HYDROCHLORIDE 300 MG: 180 CAPSULE, COATED, EXTENDED RELEASE ORAL at 13:32

## 2017-01-01 RX ADMIN — INSULIN ASPART 3 UNITS: 100 INJECTION, SOLUTION INTRAVENOUS; SUBCUTANEOUS at 20:24

## 2017-01-01 RX ADMIN — Medication 150 MG: at 08:56

## 2017-01-01 RX ADMIN — DILTIAZEM HYDROCHLORIDE 300 MG: 180 CAPSULE, COATED, EXTENDED RELEASE ORAL at 13:30

## 2017-01-01 RX ADMIN — LEVETIRACETAM 500 MG: 500 TABLET, FILM COATED ORAL at 20:27

## 2017-01-01 RX ADMIN — AMIODARONE HYDROCHLORIDE 200 MG: 200 TABLET ORAL at 17:38

## 2017-01-01 RX ADMIN — DONEPEZIL HYDROCHLORIDE 10 MG: 5 TABLET ORAL at 22:06

## 2017-01-01 RX ADMIN — MICONAZOLE NITRATE: 20 POWDER TOPICAL at 09:53

## 2017-01-01 RX ADMIN — LEVOTHYROXINE SODIUM 150 MCG: 150 TABLET ORAL at 06:54

## 2017-01-01 RX ADMIN — INSULIN ASPART 2 UNITS: 100 INJECTION, SOLUTION INTRAVENOUS; SUBCUTANEOUS at 18:16

## 2017-01-01 RX ADMIN — FUROSEMIDE 40 MG: 40 TABLET ORAL at 09:48

## 2017-01-01 RX ADMIN — METOPROLOL SUCCINATE 100 MG: 50 TABLET, EXTENDED RELEASE ORAL at 22:05

## 2017-01-01 RX ADMIN — FAMOTIDINE 40 MG: 20 TABLET ORAL at 21:54

## 2017-01-01 RX ADMIN — METOPROLOL SUCCINATE 100 MG: 50 TABLET, EXTENDED RELEASE ORAL at 07:38

## 2017-01-01 RX ADMIN — SENNOSIDES AND DOCUSATE SODIUM 1 TABLET: 8.6; 5 TABLET ORAL at 09:21

## 2017-01-01 RX ADMIN — METOPROLOL TARTRATE 100 MG: 25 TABLET, FILM COATED ORAL at 09:18

## 2017-01-01 RX ADMIN — KETOTIFEN FUMARATE 1 DROP: 0.35 SOLUTION/ DROPS OPHTHALMIC at 18:30

## 2017-01-01 RX ADMIN — SENNOSIDES AND DOCUSATE SODIUM 1 TABLET: 8.6; 5 TABLET ORAL at 09:09

## 2017-01-01 RX ADMIN — Medication 1 CAPSULE: at 09:17

## 2017-01-01 RX ADMIN — AMIODARONE HYDROCHLORIDE 200 MG: 200 TABLET ORAL at 08:26

## 2017-01-01 RX ADMIN — IPRATROPIUM BROMIDE AND ALBUTEROL SULFATE 3 ML: .5; 3 SOLUTION RESPIRATORY (INHALATION) at 20:49

## 2017-01-01 RX ADMIN — STANDARDIZED SENNA CONCENTRATE 1 TABLET: 8.6 TABLET ORAL at 08:32

## 2017-01-01 RX ADMIN — MICONAZOLE NITRATE: 20 POWDER TOPICAL at 22:36

## 2017-01-01 RX ADMIN — DILTIAZEM HYDROCHLORIDE 120 MG: 60 TABLET, FILM COATED ORAL at 17:12

## 2017-01-01 RX ADMIN — GUAIFENESIN 1200 MG: 600 TABLET, EXTENDED RELEASE ORAL at 17:39

## 2017-01-01 RX ADMIN — ALLOPURINOL 100 MG: 100 TABLET ORAL at 11:12

## 2017-01-01 RX ADMIN — SENNOSIDES AND DOCUSATE SODIUM 1 TABLET: 8.6; 5 TABLET ORAL at 21:11

## 2017-01-01 RX ADMIN — LINAGLIPTIN 5 MG: 5 TABLET, FILM COATED ORAL at 09:00

## 2017-01-01 RX ADMIN — ALLOPURINOL 100 MG: 100 TABLET ORAL at 17:52

## 2017-01-01 RX ADMIN — SERTRALINE HYDROCHLORIDE 75 MG: 50 TABLET ORAL at 20:44

## 2017-01-01 RX ADMIN — LEVOTHYROXINE SODIUM 150 MCG: 150 TABLET ORAL at 06:26

## 2017-01-01 RX ADMIN — APIXABAN 5 MG: 2.5 TABLET, FILM COATED ORAL at 08:11

## 2017-01-01 RX ADMIN — INSULIN ASPART 3 UNITS: 100 INJECTION, SOLUTION INTRAVENOUS; SUBCUTANEOUS at 17:42

## 2017-01-01 RX ADMIN — KETOTIFEN FUMARATE 1 DROP: 0.25 SOLUTION/ DROPS OPHTHALMIC at 09:24

## 2017-01-01 RX ADMIN — DOXAZOSIN MESYLATE 2 MG: 2 TABLET ORAL at 20:38

## 2017-01-01 RX ADMIN — GUAIFENESIN 1200 MG: 600 TABLET, EXTENDED RELEASE ORAL at 16:29

## 2017-01-01 RX ADMIN — METOPROLOL TARTRATE 25 MG: 25 TABLET, FILM COATED ORAL at 11:42

## 2017-01-01 RX ADMIN — METOPROLOL SUCCINATE 100 MG: 50 TABLET, EXTENDED RELEASE ORAL at 20:48

## 2017-01-01 RX ADMIN — SERTRALINE 100 MG: 50 TABLET, FILM COATED ORAL at 20:13

## 2017-01-01 RX ADMIN — KETOTIFEN FUMARATE 1 DROP: 0.25 SOLUTION/ DROPS OPHTHALMIC at 08:56

## 2017-01-01 RX ADMIN — IPRATROPIUM BROMIDE AND ALBUTEROL SULFATE 3 ML: .5; 3 SOLUTION RESPIRATORY (INHALATION) at 11:45

## 2017-01-01 RX ADMIN — DONEPEZIL HYDROCHLORIDE 10 MG: 5 TABLET ORAL at 19:57

## 2017-01-01 RX ADMIN — Medication 150 MG: at 17:18

## 2017-01-01 RX ADMIN — ALLOPURINOL 100 MG: 100 TABLET ORAL at 21:15

## 2017-01-01 RX ADMIN — IPRATROPIUM BROMIDE AND ALBUTEROL SULFATE 3 ML: .5; 3 SOLUTION RESPIRATORY (INHALATION) at 20:22

## 2017-01-01 RX ADMIN — Medication 1 CAPSULE: at 09:01

## 2017-01-01 RX ADMIN — SENNOSIDES AND DOCUSATE SODIUM 1 TABLET: 8.6; 5 TABLET ORAL at 07:51

## 2017-01-01 RX ADMIN — LINAGLIPTIN 5 MG: 5 TABLET, FILM COATED ORAL at 09:20

## 2017-01-01 RX ADMIN — MICONAZOLE NITRATE: 20 POWDER TOPICAL at 20:30

## 2017-01-01 RX ADMIN — LEVETIRACETAM 500 MG: 500 TABLET, FILM COATED ORAL at 08:17

## 2017-01-01 RX ADMIN — ALLOPURINOL 100 MG: 100 TABLET ORAL at 17:12

## 2017-01-01 RX ADMIN — LEVETIRACETAM 500 MG: 500 TABLET, FILM COATED ORAL at 21:45

## 2017-01-01 RX ADMIN — STANDARDIZED SENNA CONCENTRATE 1 TABLET: 8.6 TABLET ORAL at 08:29

## 2017-01-01 RX ADMIN — ISOSORBIDE DINITRATE 10 MG: 10 TABLET ORAL at 21:33

## 2017-01-01 RX ADMIN — GUAIFENESIN 1200 MG: 600 TABLET, EXTENDED RELEASE ORAL at 07:59

## 2017-01-01 RX ADMIN — Medication 1 CAPSULE: at 09:35

## 2017-01-01 RX ADMIN — SENNOSIDES AND DOCUSATE SODIUM 1 TABLET: 8.6; 5 TABLET ORAL at 21:08

## 2017-01-01 RX ADMIN — LEVETIRACETAM 500 MG: 500 TABLET, FILM COATED ORAL at 07:52

## 2017-01-01 RX ADMIN — FAMOTIDINE 20 MG: 20 TABLET, FILM COATED ORAL at 17:40

## 2017-01-01 RX ADMIN — METOPROLOL TARTRATE 75 MG: 25 TABLET, FILM COATED ORAL at 08:26

## 2017-01-01 RX ADMIN — LEVOTHYROXINE SODIUM 150 MCG: 150 TABLET ORAL at 08:12

## 2017-01-01 RX ADMIN — GUAIFENESIN 1200 MG: 600 TABLET, EXTENDED RELEASE ORAL at 17:51

## 2017-01-01 RX ADMIN — ALLOPURINOL 100 MG: 100 TABLET ORAL at 09:31

## 2017-01-01 RX ADMIN — LEVETIRACETAM 500 MG: 500 TABLET, FILM COATED ORAL at 20:54

## 2017-01-01 RX ADMIN — BUDESONIDE 0.5 MG: 0.5 SUSPENSION RESPIRATORY (INHALATION) at 10:20

## 2017-01-01 RX ADMIN — DILTIAZEM HYDROCHLORIDE 300 MG: 180 CAPSULE, COATED, EXTENDED RELEASE ORAL at 12:43

## 2017-01-01 RX ADMIN — APIXABAN 5 MG: 2.5 TABLET, FILM COATED ORAL at 21:58

## 2017-01-01 RX ADMIN — STANDARDIZED SENNA CONCENTRATE 1 TABLET: 8.6 TABLET ORAL at 09:32

## 2017-01-01 RX ADMIN — AMIODARONE HYDROCHLORIDE 200 MG: 200 TABLET ORAL at 08:22

## 2017-01-01 RX ADMIN — TRAMADOL HYDROCHLORIDE 100 MG: 50 TABLET, FILM COATED ORAL at 22:23

## 2017-01-01 RX ADMIN — ALLOPURINOL 100 MG: 100 TABLET ORAL at 20:38

## 2017-01-01 RX ADMIN — KETOTIFEN FUMARATE 1 DROP: 0.25 SOLUTION/ DROPS OPHTHALMIC at 17:20

## 2017-01-01 RX ADMIN — DOCUSATE SODIUM AND SENNOSIDES 1 TABLET: 8.6; 5 TABLET, FILM COATED ORAL at 08:53

## 2017-01-01 RX ADMIN — MICONAZOLE NITRATE: 20 POWDER TOPICAL at 08:30

## 2017-01-01 RX ADMIN — ALLOPURINOL 100 MG: 100 TABLET ORAL at 09:59

## 2017-01-01 RX ADMIN — INSULIN ASPART 2 UNITS: 100 INJECTION, SOLUTION INTRAVENOUS; SUBCUTANEOUS at 08:22

## 2017-01-01 RX ADMIN — KETOTIFEN FUMARATE 1 DROP: 0.35 SOLUTION/ DROPS OPHTHALMIC at 17:55

## 2017-01-01 RX ADMIN — LINAGLIPTIN 5 MG: 5 TABLET, FILM COATED ORAL at 08:44

## 2017-01-01 RX ADMIN — POTASSIUM CHLORIDE 20 MEQ: 1500 TABLET, EXTENDED RELEASE ORAL at 08:46

## 2017-01-01 RX ADMIN — GUAIFENESIN 1200 MG: 600 TABLET, EXTENDED RELEASE ORAL at 17:35

## 2017-01-01 RX ADMIN — BUDESONIDE 0.5 MG: 0.5 SUSPENSION RESPIRATORY (INHALATION) at 08:35

## 2017-01-01 RX ADMIN — DILTIAZEM HYDROCHLORIDE 120 MG: 60 TABLET, FILM COATED ORAL at 08:30

## 2017-01-01 RX ADMIN — MICONAZOLE NITRATE: 20 POWDER TOPICAL at 20:25

## 2017-01-01 RX ADMIN — ALLOPURINOL 100 MG: 100 TABLET ORAL at 18:26

## 2017-01-01 RX ADMIN — DOXAZOSIN MESYLATE 2 MG: 2 TABLET ORAL at 22:35

## 2017-01-01 RX ADMIN — IPRATROPIUM BROMIDE AND ALBUTEROL SULFATE 3 ML: .5; 3 SOLUTION RESPIRATORY (INHALATION) at 09:00

## 2017-01-01 RX ADMIN — APIXABAN 5 MG: 2.5 TABLET, FILM COATED ORAL at 20:55

## 2017-01-01 RX ADMIN — Medication 1 CAPSULE: at 08:17

## 2017-01-01 RX ADMIN — MAGNESIUM SULFATE HEPTAHYDRATE 4 G: 40 INJECTION, SOLUTION INTRAVENOUS at 01:20

## 2017-01-01 RX ADMIN — SENNOSIDES AND DOCUSATE SODIUM 1 TABLET: 8.6; 5 TABLET ORAL at 21:38

## 2017-01-01 RX ADMIN — DILTIAZEM HYDROCHLORIDE 240 MG: 120 CAPSULE, COATED, EXTENDED RELEASE ORAL at 08:19

## 2017-01-01 RX ADMIN — METOPROLOL TARTRATE 75 MG: 25 TABLET, FILM COATED ORAL at 17:24

## 2017-01-01 RX ADMIN — ACETAMINOPHEN 650 MG: 325 TABLET, FILM COATED ORAL at 14:27

## 2017-01-01 RX ADMIN — LINAGLIPTIN 5 MG: 5 TABLET, FILM COATED ORAL at 08:50

## 2017-01-01 RX ADMIN — METOPROLOL SUCCINATE 100 MG: 50 TABLET, EXTENDED RELEASE ORAL at 08:45

## 2017-01-01 RX ADMIN — Medication 1 TABLET: at 17:58

## 2017-01-01 RX ADMIN — DILTIAZEM HYDROCHLORIDE 300 MG: 180 CAPSULE, COATED, EXTENDED RELEASE ORAL at 13:29

## 2017-01-01 RX ADMIN — Medication 1 TABLET: at 09:25

## 2017-01-01 RX ADMIN — APIXABAN 5 MG: 2.5 TABLET, FILM COATED ORAL at 22:27

## 2017-01-01 RX ADMIN — MICONAZOLE NITRATE: 20 POWDER TOPICAL at 08:19

## 2017-01-01 RX ADMIN — DILTIAZEM HYDROCHLORIDE 240 MG: 120 CAPSULE, COATED, EXTENDED RELEASE ORAL at 08:54

## 2017-01-01 RX ADMIN — POTASSIUM CHLORIDE 20 MEQ: 1500 TABLET, EXTENDED RELEASE ORAL at 09:22

## 2017-01-01 RX ADMIN — APIXABAN 5 MG: 2.5 TABLET, FILM COATED ORAL at 08:31

## 2017-01-01 RX ADMIN — DONEPEZIL HYDROCHLORIDE 10 MG: 5 TABLET ORAL at 21:11

## 2017-01-01 RX ADMIN — ALLOPURINOL 100 MG: 100 TABLET ORAL at 09:54

## 2017-01-01 RX ADMIN — ASPIRIN 81 MG: 81 TABLET, COATED ORAL at 08:22

## 2017-01-01 RX ADMIN — POTASSIUM CHLORIDE 20 MEQ: 1500 TABLET, EXTENDED RELEASE ORAL at 10:16

## 2017-01-01 RX ADMIN — Medication 1 CAPSULE: at 08:48

## 2017-01-01 RX ADMIN — FAMOTIDINE 40 MG: 20 TABLET ORAL at 21:00

## 2017-01-01 RX ADMIN — APIXABAN 5 MG: 2.5 TABLET, FILM COATED ORAL at 22:03

## 2017-01-01 RX ADMIN — AMIODARONE HYDROCHLORIDE 200 MG: 200 TABLET ORAL at 10:49

## 2017-01-01 RX ADMIN — STANDARDIZED SENNA CONCENTRATE 1 TABLET: 8.6 TABLET ORAL at 08:14

## 2017-01-01 RX ADMIN — IPRATROPIUM BROMIDE AND ALBUTEROL SULFATE 3 ML: .5; 3 SOLUTION RESPIRATORY (INHALATION) at 13:49

## 2017-01-01 RX ADMIN — AMIODARONE HYDROCHLORIDE 200 MG: 200 TABLET ORAL at 08:50

## 2017-01-01 RX ADMIN — ASPIRIN 81 MG: 81 TABLET, COATED ORAL at 09:24

## 2017-01-01 RX ADMIN — METOPROLOL SUCCINATE 100 MG: 50 TABLET, EXTENDED RELEASE ORAL at 09:21

## 2017-01-01 RX ADMIN — LEVOTHYROXINE SODIUM 150 MCG: 150 TABLET ORAL at 05:18

## 2017-01-01 RX ADMIN — FAMOTIDINE 40 MG: 20 TABLET ORAL at 20:23

## 2017-01-01 RX ADMIN — MICONAZOLE NITRATE: 20 POWDER TOPICAL at 21:37

## 2017-01-01 RX ADMIN — ACETAMINOPHEN 650 MG: 325 TABLET, FILM COATED ORAL at 17:40

## 2017-01-01 RX ADMIN — CALCIUM CARBONATE 500 MG (1,250 MG)-VITAMIN D3 200 UNIT TABLET 500 MG: at 22:44

## 2017-01-01 RX ADMIN — POTASSIUM CHLORIDE 20 MEQ: 1500 TABLET, EXTENDED RELEASE ORAL at 09:25

## 2017-01-01 RX ADMIN — Medication 150 MG: at 18:33

## 2017-01-01 RX ADMIN — ISOSORBIDE DINITRATE 10 MG: 10 TABLET ORAL at 10:51

## 2017-01-01 RX ADMIN — IPRATROPIUM BROMIDE AND ALBUTEROL SULFATE 3 ML: .5; 3 SOLUTION RESPIRATORY (INHALATION) at 13:56

## 2017-01-01 RX ADMIN — MICONAZOLE NITRATE: 20 POWDER TOPICAL at 21:06

## 2017-01-01 RX ADMIN — ISOSORBIDE DINITRATE 10 MG: 10 TABLET ORAL at 21:18

## 2017-01-01 RX ADMIN — SERTRALINE HYDROCHLORIDE 100 MG: 50 TABLET ORAL at 20:36

## 2017-01-01 RX ADMIN — METOPROLOL TARTRATE 25 MG: 25 TABLET, FILM COATED ORAL at 18:51

## 2017-01-01 RX ADMIN — KETOTIFEN FUMARATE 1 DROP: 0.25 SOLUTION/ DROPS OPHTHALMIC at 07:55

## 2017-01-01 RX ADMIN — LEVETIRACETAM 500 MG: 500 TABLET, FILM COATED ORAL at 09:11

## 2017-01-01 RX ADMIN — APIXABAN 5 MG: 2.5 TABLET, FILM COATED ORAL at 08:26

## 2017-01-01 RX ADMIN — MEGESTROL ACETATE 200 MG: 40 SUSPENSION ORAL at 17:33

## 2017-01-01 RX ADMIN — APIXABAN 5 MG: 2.5 TABLET, FILM COATED ORAL at 09:32

## 2017-01-01 RX ADMIN — STANDARDIZED SENNA CONCENTRATE 1 TABLET: 8.6 TABLET ORAL at 08:39

## 2017-01-01 RX ADMIN — LEVOTHYROXINE SODIUM 150 MCG: 150 TABLET ORAL at 05:48

## 2017-01-01 RX ADMIN — LEVOTHYROXINE SODIUM 150 MCG: 150 TABLET ORAL at 06:23

## 2017-01-01 RX ADMIN — INSULIN ASPART 2 UNITS: 100 INJECTION, SOLUTION INTRAVENOUS; SUBCUTANEOUS at 17:50

## 2017-01-01 RX ADMIN — DILTIAZEM HYDROCHLORIDE 300 MG: 180 CAPSULE, EXTENDED RELEASE ORAL at 12:56

## 2017-01-01 RX ADMIN — LEVETIRACETAM 500 MG: 500 TABLET, FILM COATED ORAL at 08:32

## 2017-01-01 RX ADMIN — ISOSORBIDE DINITRATE 10 MG: 10 TABLET ORAL at 21:55

## 2017-01-01 RX ADMIN — ACETAMINOPHEN 650 MG: 325 TABLET, FILM COATED ORAL at 23:30

## 2017-01-01 RX ADMIN — KETOTIFEN FUMARATE 1 DROP: 0.25 SOLUTION/ DROPS OPHTHALMIC at 08:25

## 2017-01-01 RX ADMIN — DIPHENHYDRAMINE HYDROCHLORIDE 25 MG: 25 CAPSULE ORAL at 06:07

## 2017-01-01 RX ADMIN — Medication 150 MG: at 08:30

## 2017-01-01 RX ADMIN — AMIODARONE HYDROCHLORIDE 200 MG: 200 TABLET ORAL at 17:33

## 2017-01-01 RX ADMIN — LEVETIRACETAM 500 MG: 500 TABLET, FILM COATED ORAL at 20:56

## 2017-01-01 RX ADMIN — APIXABAN 5 MG: 2.5 TABLET, FILM COATED ORAL at 07:28

## 2017-01-01 RX ADMIN — PIPERACILLIN SODIUM AND TAZOBACTAM SODIUM 3.38 G: 3; .375 INJECTION, POWDER, LYOPHILIZED, FOR SOLUTION INTRAVENOUS at 21:26

## 2017-01-01 RX ADMIN — METOPROLOL SUCCINATE 100 MG: 50 TABLET, EXTENDED RELEASE ORAL at 22:59

## 2017-01-01 RX ADMIN — APIXABAN 5 MG: 2.5 TABLET, FILM COATED ORAL at 20:42

## 2017-01-01 RX ADMIN — APIXABAN 5 MG: 2.5 TABLET, FILM COATED ORAL at 09:09

## 2017-01-01 RX ADMIN — LINAGLIPTIN 5 MG: 5 TABLET, FILM COATED ORAL at 08:07

## 2017-01-01 RX ADMIN — Medication 1 CAPSULE: at 09:33

## 2017-01-01 RX ADMIN — GUAIFENESIN 1200 MG: 600 TABLET, EXTENDED RELEASE ORAL at 07:42

## 2017-01-01 RX ADMIN — DIPHENHYDRAMINE HYDROCHLORIDE 25 MG: 25 CAPSULE ORAL at 20:28

## 2017-01-01 RX ADMIN — DILTIAZEM HYDROCHLORIDE 300 MG: 180 CAPSULE, EXTENDED RELEASE ORAL at 13:38

## 2017-01-01 RX ADMIN — Medication 1 TABLET: at 18:43

## 2017-01-01 RX ADMIN — Medication 10 ML: at 20:30

## 2017-01-01 RX ADMIN — Medication 10 ML: at 20:05

## 2017-01-01 RX ADMIN — ALLOPURINOL 100 MG: 100 TABLET ORAL at 09:58

## 2017-01-01 RX ADMIN — LEVETIRACETAM 500 MG: 500 TABLET, FILM COATED ORAL at 17:51

## 2017-01-01 RX ADMIN — MEROPENEM 1 G: 1 INJECTION, POWDER, FOR SOLUTION INTRAVENOUS at 02:33

## 2017-01-01 RX ADMIN — METOPROLOL SUCCINATE 100 MG: 50 TABLET, EXTENDED RELEASE ORAL at 09:06

## 2017-01-01 RX ADMIN — LINAGLIPTIN 5 MG: 5 TABLET, FILM COATED ORAL at 07:50

## 2017-01-01 RX ADMIN — LEVETIRACETAM 500 MG: 500 TABLET, FILM COATED ORAL at 09:08

## 2017-01-01 RX ADMIN — METOPROLOL TARTRATE 75 MG: 25 TABLET, FILM COATED ORAL at 08:32

## 2017-01-01 RX ADMIN — METOPROLOL TARTRATE 100 MG: 25 TABLET, FILM COATED ORAL at 22:45

## 2017-01-01 RX ADMIN — MICONAZOLE NITRATE 1 APPLICATION: 20 POWDER TOPICAL at 21:36

## 2017-01-01 RX ADMIN — LEVOTHYROXINE SODIUM 150 MCG: 150 TABLET ORAL at 06:16

## 2017-01-01 RX ADMIN — SENNOSIDES AND DOCUSATE SODIUM 1 TABLET: 8.6; 5 TABLET ORAL at 08:54

## 2017-01-01 RX ADMIN — FAMOTIDINE 20 MG: 20 TABLET, FILM COATED ORAL at 08:15

## 2017-01-01 RX ADMIN — FAMOTIDINE 40 MG: 20 TABLET, FILM COATED ORAL at 17:43

## 2017-01-01 RX ADMIN — ALLOPURINOL 100 MG: 100 TABLET ORAL at 18:37

## 2017-01-01 RX ADMIN — MEGESTROL ACETATE 200 MG: 40 SUSPENSION ORAL at 08:01

## 2017-01-01 RX ADMIN — POTASSIUM CHLORIDE 20 MEQ: 1500 TABLET, EXTENDED RELEASE ORAL at 09:54

## 2017-01-01 RX ADMIN — CONJUGATED ESTROGENS 1 APPLICATION: 0.62 CREAM VAGINAL at 20:03

## 2017-01-01 RX ADMIN — BUDESONIDE 0.5 MG: 0.5 SUSPENSION RESPIRATORY (INHALATION) at 11:34

## 2017-01-01 RX ADMIN — ISOSORBIDE DINITRATE 10 MG: 10 TABLET ORAL at 07:59

## 2017-01-01 RX ADMIN — BUDESONIDE 0.5 MG: 0.5 SUSPENSION RESPIRATORY (INHALATION) at 19:58

## 2017-01-01 RX ADMIN — ALLOPURINOL 100 MG: 100 TABLET ORAL at 09:25

## 2017-01-01 RX ADMIN — STANDARDIZED SENNA CONCENTRATE 1 TABLET: 8.6 TABLET ORAL at 17:27

## 2017-01-01 RX ADMIN — IPRATROPIUM BROMIDE AND ALBUTEROL SULFATE 3 ML: .5; 3 SOLUTION RESPIRATORY (INHALATION) at 11:53

## 2017-01-01 RX ADMIN — LEVETIRACETAM 500 MG: 500 TABLET, FILM COATED ORAL at 08:26

## 2017-01-01 RX ADMIN — GUAIFENESIN 1200 MG: 600 TABLET, EXTENDED RELEASE ORAL at 17:50

## 2017-01-01 RX ADMIN — POTASSIUM CHLORIDE 20 MEQ: 1500 TABLET, EXTENDED RELEASE ORAL at 08:24

## 2017-01-01 RX ADMIN — KETOTIFEN FUMARATE 1 DROP: 0.25 SOLUTION/ DROPS OPHTHALMIC at 18:21

## 2017-01-01 RX ADMIN — IPRATROPIUM BROMIDE AND ALBUTEROL SULFATE 3 ML: .5; 3 SOLUTION RESPIRATORY (INHALATION) at 16:16

## 2017-01-01 RX ADMIN — KETOTIFEN FUMARATE 1 DROP: 0.25 SOLUTION/ DROPS OPHTHALMIC at 10:24

## 2017-01-01 RX ADMIN — BUDESONIDE 0.5 MG: 0.5 SUSPENSION RESPIRATORY (INHALATION) at 07:54

## 2017-01-01 RX ADMIN — KETOTIFEN FUMARATE 1 DROP: 0.35 SOLUTION/ DROPS OPHTHALMIC at 19:16

## 2017-01-01 RX ADMIN — Medication 150 MG: at 08:46

## 2017-01-01 RX ADMIN — METOPROLOL TARTRATE 25 MG: 25 TABLET, FILM COATED ORAL at 09:31

## 2017-01-01 RX ADMIN — LEVETIRACETAM 500 MG: 500 TABLET, FILM COATED ORAL at 17:22

## 2017-01-01 RX ADMIN — DONEPEZIL HYDROCHLORIDE 10 MG: 5 TABLET ORAL at 21:58

## 2017-01-01 RX ADMIN — Medication 10 ML: at 10:40

## 2017-01-01 RX ADMIN — SODIUM CHLORIDE 100 ML/HR: 4.5 INJECTION, SOLUTION INTRAVENOUS at 08:28

## 2017-01-01 RX ADMIN — METOPROLOL TARTRATE 2.5 MG: 1 INJECTION, SOLUTION INTRAVENOUS at 13:53

## 2017-01-01 RX ADMIN — GUAIFENESIN 1200 MG: 600 TABLET, EXTENDED RELEASE ORAL at 18:02

## 2017-01-01 RX ADMIN — LEVETIRACETAM 500 MG: 500 TABLET, FILM COATED ORAL at 21:38

## 2017-01-01 RX ADMIN — POTASSIUM CHLORIDE 20 MEQ: 1500 TABLET, EXTENDED RELEASE ORAL at 09:11

## 2017-01-01 RX ADMIN — KETOTIFEN FUMARATE 1 DROP: 0.25 SOLUTION/ DROPS OPHTHALMIC at 10:07

## 2017-01-01 RX ADMIN — Medication 1 TABLET: at 17:09

## 2017-01-01 RX ADMIN — DOXAZOSIN MESYLATE 2 MG: 2 TABLET ORAL at 20:34

## 2017-01-01 RX ADMIN — LEVETIRACETAM 500 MG: 500 TABLET, FILM COATED ORAL at 20:52

## 2017-01-01 RX ADMIN — BUDESONIDE 0.5 MG: 0.5 SUSPENSION RESPIRATORY (INHALATION) at 21:00

## 2017-01-01 RX ADMIN — DILTIAZEM HYDROCHLORIDE 300 MG: 180 CAPSULE, COATED, EXTENDED RELEASE ORAL at 13:09

## 2017-01-01 RX ADMIN — SERTRALINE HYDROCHLORIDE 100 MG: 50 TABLET ORAL at 21:16

## 2017-01-01 RX ADMIN — SERTRALINE 100 MG: 50 TABLET, FILM COATED ORAL at 22:12

## 2017-01-01 RX ADMIN — MEGESTROL ACETATE 200 MG: 40 SUSPENSION ORAL at 08:24

## 2017-01-01 RX ADMIN — APIXABAN 5 MG: 2.5 TABLET, FILM COATED ORAL at 07:54

## 2017-01-01 RX ADMIN — LEVETIRACETAM 500 MG: 500 TABLET, FILM COATED ORAL at 23:08

## 2017-01-01 RX ADMIN — Medication 1 TABLET: at 07:38

## 2017-01-01 RX ADMIN — LEVOTHYROXINE SODIUM 150 MCG: 150 TABLET ORAL at 06:35

## 2017-01-01 RX ADMIN — AMIODARONE HYDROCHLORIDE 0.5 MG/MIN: 1.8 INJECTION, SOLUTION INTRAVENOUS at 08:27

## 2017-01-01 RX ADMIN — AMIODARONE HYDROCHLORIDE 200 MG: 200 TABLET ORAL at 09:04

## 2017-01-01 RX ADMIN — MICONAZOLE NITRATE 1 APPLICATION: 20 POWDER TOPICAL at 08:27

## 2017-01-01 RX ADMIN — METOPROLOL SUCCINATE 100 MG: 50 TABLET, EXTENDED RELEASE ORAL at 08:20

## 2017-01-01 RX ADMIN — POTASSIUM CHLORIDE 20 MEQ: 1500 TABLET, EXTENDED RELEASE ORAL at 09:51

## 2017-01-01 RX ADMIN — LEVETIRACETAM 500 MG: 500 TABLET, FILM COATED ORAL at 21:20

## 2017-01-01 RX ADMIN — ALLOPURINOL 100 MG: 100 TABLET ORAL at 20:32

## 2017-01-01 RX ADMIN — MICONAZOLE NITRATE: 20 POWDER TOPICAL at 22:12

## 2017-01-01 RX ADMIN — METOPROLOL TARTRATE 75 MG: 25 TABLET, FILM COATED ORAL at 17:38

## 2017-01-01 RX ADMIN — GUAIFENESIN 1200 MG: 600 TABLET, EXTENDED RELEASE ORAL at 17:53

## 2017-01-01 RX ADMIN — DONEPEZIL HYDROCHLORIDE 10 MG: 5 TABLET, FILM COATED ORAL at 20:50

## 2017-01-01 RX ADMIN — POTASSIUM CHLORIDE 20 MEQ: 1500 TABLET, EXTENDED RELEASE ORAL at 08:18

## 2017-01-01 RX ADMIN — APIXABAN 5 MG: 2.5 TABLET, FILM COATED ORAL at 09:17

## 2017-01-01 RX ADMIN — APIXABAN 5 MG: 2.5 TABLET, FILM COATED ORAL at 09:46

## 2017-01-01 RX ADMIN — ALLOPURINOL 100 MG: 100 TABLET ORAL at 18:05

## 2017-01-01 RX ADMIN — SENNOSIDES AND DOCUSATE SODIUM 1 TABLET: 8.6; 5 TABLET ORAL at 07:41

## 2017-01-01 RX ADMIN — KETOTIFEN FUMARATE 1 DROP: 0.25 SOLUTION/ DROPS OPHTHALMIC at 10:35

## 2017-01-01 RX ADMIN — BUDESONIDE 0.5 MG: 0.5 SUSPENSION RESPIRATORY (INHALATION) at 21:39

## 2017-01-01 RX ADMIN — METOPROLOL TARTRATE 75 MG: 25 TABLET, FILM COATED ORAL at 17:52

## 2017-01-01 RX ADMIN — STANDARDIZED SENNA CONCENTRATE 1 TABLET: 8.6 TABLET ORAL at 18:14

## 2017-01-01 RX ADMIN — INSULIN ASPART 2 UNITS: 100 INJECTION, SOLUTION INTRAVENOUS; SUBCUTANEOUS at 13:14

## 2017-01-01 RX ADMIN — ALLOPURINOL 100 MG: 100 TABLET ORAL at 07:51

## 2017-01-01 RX ADMIN — ISOSORBIDE DINITRATE 10 MG: 10 TABLET ORAL at 20:32

## 2017-01-01 RX ADMIN — SENNOSIDES AND DOCUSATE SODIUM 1 TABLET: 8.6; 5 TABLET ORAL at 08:58

## 2017-01-01 RX ADMIN — FAMOTIDINE 20 MG: 20 TABLET ORAL at 08:19

## 2017-01-01 RX ADMIN — APIXABAN 5 MG: 2.5 TABLET, FILM COATED ORAL at 10:16

## 2017-01-01 RX ADMIN — LEVETIRACETAM 500 MG: 500 TABLET, FILM COATED ORAL at 20:38

## 2017-01-01 RX ADMIN — Medication 1 TABLET: at 18:05

## 2017-01-01 RX ADMIN — IPRATROPIUM BROMIDE AND ALBUTEROL SULFATE 3 ML: .5; 3 SOLUTION RESPIRATORY (INHALATION) at 09:30

## 2017-01-01 RX ADMIN — Medication 1 CAPSULE: at 09:47

## 2017-01-01 RX ADMIN — SODIUM CHLORIDE, POTASSIUM CHLORIDE, SODIUM LACTATE AND CALCIUM CHLORIDE 75 ML/HR: 600; 310; 30; 20 INJECTION, SOLUTION INTRAVENOUS at 03:21

## 2017-01-01 RX ADMIN — TRAMADOL HYDROCHLORIDE 100 MG: 50 TABLET, FILM COATED ORAL at 20:03

## 2017-01-01 RX ADMIN — GUAIFENESIN 1200 MG: 600 TABLET, EXTENDED RELEASE ORAL at 08:40

## 2017-01-01 RX ADMIN — DIPHENHYDRAMINE HYDROCHLORIDE 25 MG: 25 CAPSULE ORAL at 20:09

## 2017-01-01 RX ADMIN — SERTRALINE HYDROCHLORIDE 100 MG: 50 TABLET, FILM COATED ORAL at 21:22

## 2017-01-01 RX ADMIN — FUROSEMIDE 40 MG: 40 TABLET ORAL at 09:26

## 2017-01-01 RX ADMIN — SERTRALINE 100 MG: 50 TABLET, FILM COATED ORAL at 23:09

## 2017-01-01 RX ADMIN — SULFAMETHOXAZOLE AND TRIMETHOPRIM 160 MG: 800; 160 TABLET ORAL at 09:29

## 2017-01-01 RX ADMIN — IPRATROPIUM BROMIDE AND ALBUTEROL SULFATE 3 ML: .5; 3 SOLUTION RESPIRATORY (INHALATION) at 09:44

## 2017-01-01 RX ADMIN — METOPROLOL TARTRATE 2.5 MG: 1 INJECTION, SOLUTION INTRAVENOUS at 06:32

## 2017-01-01 RX ADMIN — FAMOTIDINE 20 MG: 20 TABLET ORAL at 08:29

## 2017-01-01 RX ADMIN — LEVOFLOXACIN 750 MG: 5 INJECTION, SOLUTION INTRAVENOUS at 09:12

## 2017-01-01 RX ADMIN — ALLOPURINOL 100 MG: 100 TABLET ORAL at 09:11

## 2017-01-01 RX ADMIN — KETOTIFEN FUMARATE 1 DROP: 0.25 SOLUTION/ DROPS OPHTHALMIC at 18:11

## 2017-01-01 RX ADMIN — Medication 1 TABLET: at 09:05

## 2017-01-01 RX ADMIN — APIXABAN 5 MG: 2.5 TABLET, FILM COATED ORAL at 20:03

## 2017-01-01 RX ADMIN — ASPIRIN 81 MG: 81 TABLET, COATED ORAL at 08:30

## 2017-01-01 RX ADMIN — SERTRALINE 100 MG: 50 TABLET, FILM COATED ORAL at 19:59

## 2017-01-01 RX ADMIN — Medication 1 TABLET: at 11:21

## 2017-01-01 RX ADMIN — LEVETIRACETAM 500 MG: 500 TABLET, FILM COATED ORAL at 10:14

## 2017-01-01 RX ADMIN — GUAIFENESIN 1200 MG: 600 TABLET, EXTENDED RELEASE ORAL at 08:31

## 2017-01-01 RX ADMIN — GUAIFENESIN 1200 MG: 600 TABLET, EXTENDED RELEASE ORAL at 18:32

## 2017-01-01 RX ADMIN — SENNOSIDES AND DOCUSATE SODIUM 1 TABLET: 8.6; 5 TABLET ORAL at 08:11

## 2017-01-01 RX ADMIN — FUROSEMIDE 40 MG: 40 TABLET ORAL at 08:41

## 2017-01-01 RX ADMIN — TRAMADOL HYDROCHLORIDE 50 MG: 50 TABLET, FILM COATED ORAL at 20:26

## 2017-01-01 RX ADMIN — LEVETIRACETAM 500 MG: 500 TABLET, FILM COATED ORAL at 17:47

## 2017-01-01 RX ADMIN — FAMOTIDINE 20 MG: 20 TABLET ORAL at 18:10

## 2017-01-01 RX ADMIN — IPRATROPIUM BROMIDE AND ALBUTEROL SULFATE 3 ML: .5; 3 SOLUTION RESPIRATORY (INHALATION) at 08:13

## 2017-01-01 RX ADMIN — KETOTIFEN FUMARATE 1 DROP: 0.35 SOLUTION/ DROPS OPHTHALMIC at 08:19

## 2017-01-01 RX ADMIN — METOPROLOL TARTRATE 75 MG: 25 TABLET, FILM COATED ORAL at 17:13

## 2017-01-01 RX ADMIN — MICONAZOLE NITRATE 1 APPLICATION: 20 POWDER TOPICAL at 21:55

## 2017-01-01 RX ADMIN — SERTRALINE 100 MG: 50 TABLET, FILM COATED ORAL at 20:45

## 2017-01-01 RX ADMIN — POTASSIUM CHLORIDE 40 MEQ: 1500 TABLET, EXTENDED RELEASE ORAL at 06:04

## 2017-01-01 RX ADMIN — SENNOSIDES AND DOCUSATE SODIUM 1 TABLET: 8.6; 5 TABLET ORAL at 20:55

## 2017-01-01 RX ADMIN — MICONAZOLE NITRATE: 20 POWDER TOPICAL at 10:19

## 2017-01-01 RX ADMIN — SODIUM CHLORIDE 75 ML/HR: 9 INJECTION, SOLUTION INTRAVENOUS at 00:48

## 2017-01-01 RX ADMIN — AMIODARONE HYDROCHLORIDE 200 MG: 200 TABLET ORAL at 08:34

## 2017-01-01 RX ADMIN — CONJUGATED ESTROGENS 1 APPLICATION: 0.62 CREAM VAGINAL at 21:11

## 2017-01-01 RX ADMIN — LEVETIRACETAM 500 MG: 500 TABLET, FILM COATED ORAL at 10:15

## 2017-01-01 RX ADMIN — MICONAZOLE NITRATE: 20 POWDER TOPICAL at 08:44

## 2017-01-01 RX ADMIN — FUROSEMIDE 40 MG: 40 TABLET ORAL at 08:37

## 2017-01-01 RX ADMIN — AMIODARONE HYDROCHLORIDE 200 MG: 200 TABLET ORAL at 18:05

## 2017-01-01 RX ADMIN — ALLOPURINOL 100 MG: 100 TABLET ORAL at 08:19

## 2017-01-01 RX ADMIN — METOPROLOL SUCCINATE 100 MG: 50 TABLET, EXTENDED RELEASE ORAL at 09:39

## 2017-01-01 RX ADMIN — POTASSIUM CHLORIDE 10 MEQ: 750 TABLET, FILM COATED, EXTENDED RELEASE ORAL at 09:22

## 2017-01-01 RX ADMIN — FAMOTIDINE 20 MG: 20 TABLET, FILM COATED ORAL at 17:27

## 2017-01-01 RX ADMIN — METOPROLOL SUCCINATE 100 MG: 50 TABLET, EXTENDED RELEASE ORAL at 20:15

## 2017-01-01 RX ADMIN — POTASSIUM CHLORIDE 10 MEQ: 750 TABLET, FILM COATED, EXTENDED RELEASE ORAL at 09:55

## 2017-01-01 RX ADMIN — MICONAZOLE NITRATE 1 APPLICATION: 20 POWDER TOPICAL at 21:54

## 2017-01-01 RX ADMIN — DOXAZOSIN MESYLATE 2 MG: 2 TABLET ORAL at 08:41

## 2017-01-01 RX ADMIN — AMIODARONE HYDROCHLORIDE 200 MG: 200 TABLET ORAL at 10:18

## 2017-01-01 RX ADMIN — SENNOSIDES AND DOCUSATE SODIUM 1 TABLET: 8.6; 5 TABLET ORAL at 09:51

## 2017-01-01 RX ADMIN — LEVETIRACETAM 500 MG: 500 TABLET, FILM COATED ORAL at 22:47

## 2017-01-01 RX ADMIN — KETOTIFEN FUMARATE 1 DROP: 0.35 SOLUTION/ DROPS OPHTHALMIC at 17:13

## 2017-01-01 RX ADMIN — ISOSORBIDE DINITRATE 10 MG: 10 TABLET ORAL at 22:20

## 2017-01-01 RX ADMIN — Medication 1 TABLET: at 08:40

## 2017-01-01 RX ADMIN — SERTRALINE 100 MG: 50 TABLET, FILM COATED ORAL at 21:24

## 2017-01-01 RX ADMIN — POTASSIUM CHLORIDE 20 MEQ: 1500 TABLET, EXTENDED RELEASE ORAL at 10:43

## 2017-01-01 RX ADMIN — MICONAZOLE NITRATE: 20 POWDER TOPICAL at 08:16

## 2017-01-01 RX ADMIN — FAMOTIDINE 20 MG: 20 TABLET ORAL at 18:05

## 2017-01-01 RX ADMIN — FAMOTIDINE 20 MG: 20 TABLET ORAL at 19:03

## 2017-01-01 RX ADMIN — POTASSIUM CHLORIDE 20 MEQ: 1500 TABLET, EXTENDED RELEASE ORAL at 09:55

## 2017-01-01 RX ADMIN — FAMOTIDINE 40 MG: 20 TABLET ORAL at 20:55

## 2017-01-01 RX ADMIN — SENNOSIDES AND DOCUSATE SODIUM 1 TABLET: 8.6; 5 TABLET ORAL at 09:18

## 2017-01-01 RX ADMIN — TRAMADOL HYDROCHLORIDE 50 MG: 50 TABLET, FILM COATED ORAL at 15:49

## 2017-01-01 RX ADMIN — POTASSIUM CHLORIDE 20 MEQ: 1500 TABLET, EXTENDED RELEASE ORAL at 08:22

## 2017-01-01 RX ADMIN — DILTIAZEM HYDROCHLORIDE 300 MG: 180 CAPSULE, COATED, EXTENDED RELEASE ORAL at 13:00

## 2017-01-01 RX ADMIN — SERTRALINE HYDROCHLORIDE 100 MG: 50 TABLET ORAL at 20:06

## 2017-01-01 RX ADMIN — Medication 1 TABLET: at 07:41

## 2017-01-01 RX ADMIN — ISOSORBIDE DINITRATE 10 MG: 10 TABLET ORAL at 20:08

## 2017-01-01 RX ADMIN — STANDARDIZED SENNA CONCENTRATE 1 TABLET: 8.6 TABLET ORAL at 09:52

## 2017-01-01 RX ADMIN — MICONAZOLE NITRATE: 20 POWDER TOPICAL at 21:43

## 2017-01-01 RX ADMIN — Medication 1 CAPSULE: at 10:15

## 2017-01-01 RX ADMIN — ALLOPURINOL 100 MG: 100 TABLET ORAL at 20:15

## 2017-01-01 RX ADMIN — CONJUGATED ESTROGENS 0.75 APPLICATOR: 0.62 CREAM VAGINAL at 20:04

## 2017-01-01 RX ADMIN — ISOSORBIDE DINITRATE 10 MG: 10 TABLET ORAL at 08:49

## 2017-01-01 RX ADMIN — DONEPEZIL HYDROCHLORIDE 10 MG: 5 TABLET, FILM COATED ORAL at 21:04

## 2017-01-01 RX ADMIN — TRAMADOL HYDROCHLORIDE 100 MG: 50 TABLET, FILM COATED ORAL at 22:21

## 2017-01-01 RX ADMIN — GLIPIZIDE 5 MG: 5 TABLET ORAL at 08:32

## 2017-01-01 RX ADMIN — AMIODARONE HYDROCHLORIDE 200 MG: 200 TABLET ORAL at 20:36

## 2017-01-01 RX ADMIN — ISOSORBIDE DINITRATE 10 MG: 10 TABLET ORAL at 21:11

## 2017-01-01 RX ADMIN — IPRATROPIUM BROMIDE AND ALBUTEROL SULFATE 3 ML: .5; 3 SOLUTION RESPIRATORY (INHALATION) at 20:24

## 2017-01-01 RX ADMIN — MEGESTROL ACETATE 200 MG: 40 SUSPENSION ORAL at 08:52

## 2017-01-01 RX ADMIN — ACETAMINOPHEN 650 MG: 325 TABLET, FILM COATED ORAL at 00:25

## 2017-01-01 RX ADMIN — FUROSEMIDE 40 MG: 40 TABLET ORAL at 10:23

## 2017-01-01 RX ADMIN — STANDARDIZED SENNA CONCENTRATE 1 TABLET: 8.6 TABLET ORAL at 17:49

## 2017-01-01 RX ADMIN — GLIPIZIDE 5 MG: 5 TABLET ORAL at 08:54

## 2017-01-01 RX ADMIN — DILTIAZEM HYDROCHLORIDE 300 MG: 180 CAPSULE, COATED, EXTENDED RELEASE ORAL at 13:21

## 2017-01-01 RX ADMIN — STANDARDIZED SENNA CONCENTRATE 1 TABLET: 8.6 TABLET ORAL at 17:40

## 2017-01-01 RX ADMIN — FAMOTIDINE 20 MG: 20 TABLET ORAL at 17:06

## 2017-01-01 RX ADMIN — DOCUSATE SODIUM AND SENNOSIDES 1 TABLET: 8.6; 5 TABLET, FILM COATED ORAL at 20:59

## 2017-01-01 RX ADMIN — ISOSORBIDE DINITRATE 10 MG: 10 TABLET ORAL at 07:42

## 2017-01-01 RX ADMIN — DILTIAZEM HYDROCHLORIDE 120 MG: 60 TABLET, FILM COATED ORAL at 08:42

## 2017-01-01 RX ADMIN — AMIODARONE HYDROCHLORIDE 200 MG: 200 TABLET ORAL at 08:28

## 2017-01-01 RX ADMIN — Medication 10 ML: at 09:28

## 2017-01-01 RX ADMIN — FUROSEMIDE 40 MG: 40 TABLET ORAL at 08:35

## 2017-01-01 RX ADMIN — MEGESTROL ACETATE 200 MG: 40 SUSPENSION ORAL at 18:15

## 2017-01-01 RX ADMIN — KETOTIFEN FUMARATE 1 DROP: 0.25 SOLUTION/ DROPS OPHTHALMIC at 17:07

## 2017-01-01 RX ADMIN — ALLOPURINOL 100 MG: 100 TABLET ORAL at 21:45

## 2017-01-01 RX ADMIN — IPRATROPIUM BROMIDE AND ALBUTEROL SULFATE 3 ML: .5; 3 SOLUTION RESPIRATORY (INHALATION) at 09:34

## 2017-01-01 RX ADMIN — DOCUSATE SODIUM AND SENNOSIDES 1 TABLET: 8.6; 5 TABLET, FILM COATED ORAL at 20:27

## 2017-01-01 RX ADMIN — TRAMADOL HYDROCHLORIDE 100 MG: 50 TABLET, FILM COATED ORAL at 20:37

## 2017-01-01 RX ADMIN — Medication 150 MG: at 16:29

## 2017-01-01 RX ADMIN — SENNOSIDES AND DOCUSATE SODIUM 1 TABLET: 8.6; 5 TABLET ORAL at 08:57

## 2017-01-01 RX ADMIN — KETOTIFEN FUMARATE 1 DROP: 0.25 SOLUTION/ DROPS OPHTHALMIC at 18:05

## 2017-01-01 RX ADMIN — INSULIN ASPART 2 UNITS: 100 INJECTION, SOLUTION INTRAVENOUS; SUBCUTANEOUS at 08:07

## 2017-01-01 RX ADMIN — ACETAMINOPHEN 650 MG: 325 TABLET, FILM COATED ORAL at 12:23

## 2017-01-01 RX ADMIN — ALLOPURINOL 100 MG: 100 TABLET ORAL at 23:09

## 2017-01-01 RX ADMIN — IPRATROPIUM BROMIDE AND ALBUTEROL SULFATE 3 ML: .5; 3 SOLUTION RESPIRATORY (INHALATION) at 08:47

## 2017-01-01 RX ADMIN — ACETAMINOPHEN 650 MG: 325 TABLET, FILM COATED ORAL at 21:46

## 2017-01-01 RX ADMIN — ISOSORBIDE DINITRATE 10 MG: 10 TABLET ORAL at 21:31

## 2017-01-01 RX ADMIN — TRAMADOL HYDROCHLORIDE 50 MG: 50 TABLET, FILM COATED ORAL at 04:15

## 2017-01-01 RX ADMIN — APIXABAN 5 MG: 2.5 TABLET, FILM COATED ORAL at 11:20

## 2017-01-01 RX ADMIN — Medication 150 MG: at 18:12

## 2017-01-01 RX ADMIN — METOPROLOL SUCCINATE 100 MG: 50 TABLET, EXTENDED RELEASE ORAL at 21:00

## 2017-01-01 RX ADMIN — ALLOPURINOL 100 MG: 100 TABLET ORAL at 08:42

## 2017-01-01 RX ADMIN — SENNOSIDES AND DOCUSATE SODIUM 1 TABLET: 8.6; 5 TABLET ORAL at 09:04

## 2017-01-01 RX ADMIN — APIXABAN 5 MG: 2.5 TABLET, FILM COATED ORAL at 20:00

## 2017-01-01 RX ADMIN — FAMOTIDINE 20 MG: 20 TABLET, FILM COATED ORAL at 17:55

## 2017-01-01 RX ADMIN — GUAIFENESIN 1200 MG: 600 TABLET, EXTENDED RELEASE ORAL at 10:16

## 2017-01-01 RX ADMIN — METOPROLOL TARTRATE 100 MG: 25 TABLET, FILM COATED ORAL at 22:32

## 2017-01-01 RX ADMIN — SERTRALINE 100 MG: 50 TABLET, FILM COATED ORAL at 20:19

## 2017-01-01 RX ADMIN — IPRATROPIUM BROMIDE AND ALBUTEROL SULFATE 3 ML: .5; 3 SOLUTION RESPIRATORY (INHALATION) at 08:41

## 2017-01-01 RX ADMIN — DONEPEZIL HYDROCHLORIDE 10 MG: 5 TABLET ORAL at 21:35

## 2017-01-01 RX ADMIN — Medication 1 TABLET: at 17:14

## 2017-01-01 RX ADMIN — FUROSEMIDE 40 MG: 40 TABLET ORAL at 09:51

## 2017-01-01 RX ADMIN — NIFEDIPINE 240 MG: 10 CAPSULE ORAL at 08:41

## 2017-01-01 RX ADMIN — AMIODARONE HYDROCHLORIDE 200 MG: 200 TABLET ORAL at 09:21

## 2017-01-01 RX ADMIN — POTASSIUM CHLORIDE 10 MEQ: 750 TABLET, FILM COATED, EXTENDED RELEASE ORAL at 08:30

## 2017-01-01 RX ADMIN — MEGESTROL ACETATE 200 MG: 40 SUSPENSION ORAL at 17:36

## 2017-01-01 RX ADMIN — ALLOPURINOL 100 MG: 100 TABLET ORAL at 20:40

## 2017-01-01 RX ADMIN — ISOSORBIDE DINITRATE 10 MG: 10 TABLET ORAL at 21:22

## 2017-01-01 RX ADMIN — CALCIUM CARBONATE 500 MG (1,250 MG)-VITAMIN D3 200 UNIT TABLET 500 MG: at 21:19

## 2017-01-01 RX ADMIN — METOPROLOL TARTRATE 75 MG: 25 TABLET, FILM COATED ORAL at 16:19

## 2017-01-01 RX ADMIN — POTASSIUM CHLORIDE 10 MEQ: 750 TABLET, FILM COATED, EXTENDED RELEASE ORAL at 11:14

## 2017-01-01 RX ADMIN — LEVETIRACETAM 500 MG: 500 TABLET, FILM COATED ORAL at 20:47

## 2017-01-01 RX ADMIN — CONJUGATED ESTROGENS 1 APPLICATION: 0.62 CREAM VAGINAL at 20:28

## 2017-01-01 RX ADMIN — IPRATROPIUM BROMIDE AND ALBUTEROL SULFATE 3 ML: .5; 3 SOLUTION RESPIRATORY (INHALATION) at 08:48

## 2017-01-01 RX ADMIN — CONJUGATED ESTROGENS 1 APPLICATION: 0.62 CREAM VAGINAL at 20:01

## 2017-01-01 RX ADMIN — Medication 150 MG: at 08:51

## 2017-01-01 RX ADMIN — FAMOTIDINE 20 MG: 20 TABLET, FILM COATED ORAL at 17:54

## 2017-01-01 RX ADMIN — BUDESONIDE 0.5 MG: 0.5 SUSPENSION RESPIRATORY (INHALATION) at 10:08

## 2017-01-01 RX ADMIN — AMIODARONE HYDROCHLORIDE 200 MG: 200 TABLET ORAL at 22:07

## 2017-01-01 RX ADMIN — ISOSORBIDE DINITRATE 10 MG: 10 TABLET ORAL at 21:30

## 2017-01-01 RX ADMIN — APIXABAN 5 MG: 2.5 TABLET, FILM COATED ORAL at 08:28

## 2017-01-01 RX ADMIN — DILTIAZEM HYDROCHLORIDE 120 MG: 60 TABLET, FILM COATED ORAL at 17:21

## 2017-01-01 RX ADMIN — GUAIFENESIN 1200 MG: 600 TABLET, EXTENDED RELEASE ORAL at 18:10

## 2017-01-01 RX ADMIN — ISOSORBIDE DINITRATE 10 MG: 10 TABLET ORAL at 21:38

## 2017-01-01 RX ADMIN — STANDARDIZED SENNA CONCENTRATE 1 TABLET: 8.6 TABLET ORAL at 19:04

## 2017-01-01 RX ADMIN — POTASSIUM CHLORIDE 20 MEQ: 1500 TABLET, EXTENDED RELEASE ORAL at 08:39

## 2017-01-01 RX ADMIN — Medication 150 MG: at 07:41

## 2017-01-01 RX ADMIN — Medication 1 TABLET: at 09:31

## 2017-01-01 RX ADMIN — BUDESONIDE 0.5 MG: 0.5 SUSPENSION RESPIRATORY (INHALATION) at 19:13

## 2017-01-01 RX ADMIN — MEGESTROL ACETATE 200 MG: 40 SUSPENSION ORAL at 08:51

## 2017-01-01 RX ADMIN — KETOTIFEN FUMARATE 1 DROP: 0.35 SOLUTION/ DROPS OPHTHALMIC at 17:24

## 2017-01-01 RX ADMIN — METOPROLOL SUCCINATE 100 MG: 50 TABLET, EXTENDED RELEASE ORAL at 22:08

## 2017-01-01 RX ADMIN — SENNOSIDES AND DOCUSATE SODIUM 1 TABLET: 8.6; 5 TABLET ORAL at 07:29

## 2017-01-01 RX ADMIN — DONEPEZIL HYDROCHLORIDE 10 MG: 5 TABLET, FILM COATED ORAL at 20:46

## 2017-01-01 RX ADMIN — METOPROLOL TARTRATE 25 MG: 25 TABLET, FILM COATED ORAL at 09:47

## 2017-01-01 RX ADMIN — DONEPEZIL HYDROCHLORIDE 10 MG: 5 TABLET, FILM COATED ORAL at 20:03

## 2017-01-01 RX ADMIN — ALLOPURINOL 100 MG: 100 TABLET ORAL at 20:25

## 2017-01-01 RX ADMIN — LEVETIRACETAM 500 MG: 500 TABLET, FILM COATED ORAL at 17:58

## 2017-01-01 RX ADMIN — TRAMADOL HYDROCHLORIDE 100 MG: 50 TABLET, FILM COATED ORAL at 20:20

## 2017-01-01 RX ADMIN — MEGESTROL ACETATE 200 MG: 40 SUSPENSION ORAL at 18:03

## 2017-01-01 RX ADMIN — ISOSORBIDE DINITRATE 10 MG: 10 TABLET ORAL at 08:19

## 2017-01-01 RX ADMIN — DILTIAZEM HYDROCHLORIDE 240 MG: 120 CAPSULE, COATED, EXTENDED RELEASE ORAL at 08:38

## 2017-01-01 RX ADMIN — FUROSEMIDE 40 MG: 40 TABLET ORAL at 08:36

## 2017-01-01 RX ADMIN — TRAMADOL HYDROCHLORIDE 50 MG: 50 TABLET, FILM COATED ORAL at 20:21

## 2017-01-01 RX ADMIN — DILTIAZEM HYDROCHLORIDE 300 MG: 180 CAPSULE, COATED, EXTENDED RELEASE ORAL at 12:37

## 2017-01-01 RX ADMIN — KETOTIFEN FUMARATE 1 DROP: 0.25 SOLUTION/ DROPS OPHTHALMIC at 08:30

## 2017-01-01 RX ADMIN — BUDESONIDE 0.5 MG: 0.5 SUSPENSION RESPIRATORY (INHALATION) at 20:36

## 2017-01-01 RX ADMIN — BUDESONIDE 0.5 MG: 0.5 SUSPENSION RESPIRATORY (INHALATION) at 07:49

## 2017-01-01 RX ADMIN — Medication 150 MG: at 07:59

## 2017-01-01 RX ADMIN — DONEPEZIL HYDROCHLORIDE 10 MG: 5 TABLET ORAL at 21:08

## 2017-01-01 RX ADMIN — FUROSEMIDE 40 MG: 40 TABLET ORAL at 08:53

## 2017-01-01 RX ADMIN — DONEPEZIL HYDROCHLORIDE 10 MG: 5 TABLET ORAL at 20:29

## 2017-01-01 RX ADMIN — METOPROLOL TARTRATE 75 MG: 25 TABLET, FILM COATED ORAL at 17:27

## 2017-01-01 RX ADMIN — IPRATROPIUM BROMIDE AND ALBUTEROL SULFATE 3 ML: .5; 3 SOLUTION RESPIRATORY (INHALATION) at 10:31

## 2017-01-01 RX ADMIN — SERTRALINE HYDROCHLORIDE 100 MG: 50 TABLET ORAL at 20:32

## 2017-01-01 RX ADMIN — MICONAZOLE NITRATE: 20 POWDER TOPICAL at 14:39

## 2017-01-01 RX ADMIN — LEVETIRACETAM 500 MG: 500 TABLET, FILM COATED ORAL at 08:12

## 2017-01-01 RX ADMIN — ISOSORBIDE DINITRATE 10 MG: 10 TABLET ORAL at 20:54

## 2017-01-01 RX ADMIN — KETOTIFEN FUMARATE 1 DROP: 0.25 SOLUTION/ DROPS OPHTHALMIC at 17:47

## 2017-01-01 RX ADMIN — STANDARDIZED SENNA CONCENTRATE 1 TABLET: 8.6 TABLET ORAL at 08:43

## 2017-01-01 RX ADMIN — SERTRALINE 100 MG: 50 TABLET, FILM COATED ORAL at 21:02

## 2017-01-01 RX ADMIN — KETOTIFEN FUMARATE 1 DROP: 0.25 SOLUTION/ DROPS OPHTHALMIC at 10:34

## 2017-01-01 RX ADMIN — INSULIN ASPART 2 UNITS: 100 INJECTION, SOLUTION INTRAVENOUS; SUBCUTANEOUS at 09:49

## 2017-01-01 RX ADMIN — APIXABAN 5 MG: 2.5 TABLET, FILM COATED ORAL at 08:35

## 2017-01-01 RX ADMIN — METOPROLOL TARTRATE 100 MG: 25 TABLET, FILM COATED ORAL at 22:38

## 2017-01-01 RX ADMIN — LEVETIRACETAM 500 MG: 500 TABLET, FILM COATED ORAL at 20:08

## 2017-01-01 RX ADMIN — METOPROLOL TARTRATE 25 MG: 25 TABLET, FILM COATED ORAL at 08:26

## 2017-01-01 RX ADMIN — POTASSIUM CHLORIDE 20 MEQ: 1500 TABLET, EXTENDED RELEASE ORAL at 08:35

## 2017-01-01 RX ADMIN — ACETAMINOPHEN 650 MG: 325 TABLET, FILM COATED ORAL at 20:40

## 2017-01-01 RX ADMIN — KETOTIFEN FUMARATE 1 DROP: 0.25 SOLUTION/ DROPS OPHTHALMIC at 08:24

## 2017-01-01 RX ADMIN — LEVETIRACETAM 500 MG: 500 TABLET, FILM COATED ORAL at 21:33

## 2017-01-01 RX ADMIN — Medication 150 MG: at 17:05

## 2017-01-01 RX ADMIN — LEVETIRACETAM 500 MG: 500 TABLET, FILM COATED ORAL at 20:18

## 2017-01-01 RX ADMIN — LEVOFLOXACIN 750 MG: 5 INJECTION, SOLUTION INTRAVENOUS at 08:51

## 2017-01-01 RX ADMIN — ALLOPURINOL 100 MG: 100 TABLET ORAL at 09:35

## 2017-01-01 RX ADMIN — POTASSIUM CHLORIDE 20 MEQ: 1500 TABLET, EXTENDED RELEASE ORAL at 08:36

## 2017-01-01 RX ADMIN — ISOSORBIDE DINITRATE 10 MG: 10 TABLET ORAL at 10:15

## 2017-01-01 RX ADMIN — SENNOSIDES AND DOCUSATE SODIUM 1 TABLET: 8.6; 5 TABLET ORAL at 07:59

## 2017-01-01 RX ADMIN — LEVETIRACETAM 500 MG: 500 TABLET, FILM COATED ORAL at 21:44

## 2017-01-01 RX ADMIN — POTASSIUM CHLORIDE 20 MEQ: 1500 TABLET, EXTENDED RELEASE ORAL at 08:40

## 2017-01-01 RX ADMIN — LEVETIRACETAM 500 MG: 500 TABLET, FILM COATED ORAL at 09:29

## 2017-01-01 RX ADMIN — DILTIAZEM HYDROCHLORIDE 300 MG: 180 CAPSULE, COATED, EXTENDED RELEASE ORAL at 11:46

## 2017-01-01 RX ADMIN — DONEPEZIL HYDROCHLORIDE 10 MG: 5 TABLET ORAL at 20:42

## 2017-01-01 RX ADMIN — ISOSORBIDE DINITRATE 10 MG: 10 TABLET ORAL at 21:34

## 2017-01-01 RX ADMIN — KETOTIFEN FUMARATE 1 DROP: 0.25 SOLUTION/ DROPS OPHTHALMIC at 08:51

## 2017-01-01 RX ADMIN — DONEPEZIL HYDROCHLORIDE 10 MG: 5 TABLET, FILM COATED ORAL at 20:52

## 2017-01-01 RX ADMIN — POTASSIUM CHLORIDE 20 MEQ: 1500 TABLET, EXTENDED RELEASE ORAL at 10:24

## 2017-01-01 RX ADMIN — IPRATROPIUM BROMIDE AND ALBUTEROL SULFATE 3 ML: .5; 3 SOLUTION RESPIRATORY (INHALATION) at 16:54

## 2017-01-01 RX ADMIN — APIXABAN 5 MG: 2.5 TABLET, FILM COATED ORAL at 21:39

## 2017-01-01 RX ADMIN — DONEPEZIL HYDROCHLORIDE 10 MG: 5 TABLET ORAL at 23:05

## 2017-01-01 RX ADMIN — TRAMADOL HYDROCHLORIDE 100 MG: 50 TABLET, FILM COATED ORAL at 08:00

## 2017-01-01 RX ADMIN — ACETAMINOPHEN 650 MG: 325 TABLET, FILM COATED ORAL at 20:13

## 2017-01-01 RX ADMIN — BUDESONIDE 0.5 MG: 0.5 SUSPENSION RESPIRATORY (INHALATION) at 21:08

## 2017-01-01 RX ADMIN — BUDESONIDE 0.5 MG: 0.5 SUSPENSION RESPIRATORY (INHALATION) at 08:25

## 2017-01-01 RX ADMIN — CALCIUM CARBONATE 500 MG (1,250 MG)-VITAMIN D3 200 UNIT TABLET 500 MG: at 20:42

## 2017-01-01 RX ADMIN — Medication 1 TABLET: at 07:57

## 2017-01-01 RX ADMIN — ISOSORBIDE DINITRATE 10 MG: 10 TABLET ORAL at 21:47

## 2017-01-01 RX ADMIN — KETOTIFEN FUMARATE 1 DROP: 0.25 SOLUTION/ DROPS OPHTHALMIC at 09:47

## 2017-01-01 RX ADMIN — LEVETIRACETAM 500 MG: 500 TABLET, FILM COATED ORAL at 10:34

## 2017-01-01 RX ADMIN — BUDESONIDE 0.5 MG: 0.5 SUSPENSION RESPIRATORY (INHALATION) at 20:46

## 2017-01-01 RX ADMIN — IPRATROPIUM BROMIDE AND ALBUTEROL SULFATE 3 ML: .5; 3 SOLUTION RESPIRATORY (INHALATION) at 10:13

## 2017-01-01 RX ADMIN — AMIODARONE HYDROCHLORIDE 200 MG: 200 TABLET ORAL at 08:30

## 2017-01-01 RX ADMIN — METOPROLOL TARTRATE 25 MG: 25 TABLET, FILM COATED ORAL at 17:04

## 2017-01-01 RX ADMIN — ACETAMINOPHEN 650 MG: 325 TABLET, FILM COATED ORAL at 13:07

## 2017-01-01 RX ADMIN — MEGESTROL ACETATE 200 MG: 40 SUSPENSION ORAL at 08:23

## 2017-01-01 RX ADMIN — ALLOPURINOL 100 MG: 100 TABLET ORAL at 22:17

## 2017-01-01 RX ADMIN — MICONAZOLE NITRATE: 20 POWDER TOPICAL at 08:28

## 2017-01-01 RX ADMIN — STANDARDIZED SENNA CONCENTRATE 1 TABLET: 8.6 TABLET ORAL at 17:32

## 2017-01-01 RX ADMIN — IPRATROPIUM BROMIDE AND ALBUTEROL SULFATE 3 ML: .5; 3 SOLUTION RESPIRATORY (INHALATION) at 07:59

## 2017-01-01 RX ADMIN — MEGESTROL ACETATE 200 MG: 40 SUSPENSION ORAL at 16:44

## 2017-01-01 RX ADMIN — LEVETIRACETAM 500 MG: 500 TABLET, FILM COATED ORAL at 09:27

## 2017-01-01 RX ADMIN — KETOTIFEN FUMARATE 1 DROP: 0.35 SOLUTION/ DROPS OPHTHALMIC at 08:35

## 2017-01-01 RX ADMIN — AMIODARONE HYDROCHLORIDE 200 MG: 200 TABLET ORAL at 21:48

## 2017-01-01 RX ADMIN — DONEPEZIL HYDROCHLORIDE 10 MG: 5 TABLET ORAL at 22:33

## 2017-01-01 RX ADMIN — Medication 150 MG: at 09:35

## 2017-01-01 RX ADMIN — BUDESONIDE 0.5 MG: 0.5 SUSPENSION RESPIRATORY (INHALATION) at 09:26

## 2017-01-01 RX ADMIN — LEVETIRACETAM 500 MG: 500 TABLET, FILM COATED ORAL at 20:30

## 2017-01-01 RX ADMIN — POTASSIUM CHLORIDE 20 MEQ: 1500 TABLET, EXTENDED RELEASE ORAL at 08:06

## 2017-01-01 RX ADMIN — NIFEDIPINE 240 MG: 10 CAPSULE ORAL at 10:27

## 2017-01-01 RX ADMIN — METOPROLOL TARTRATE 75 MG: 25 TABLET, FILM COATED ORAL at 17:49

## 2017-01-01 RX ADMIN — DILTIAZEM HYDROCHLORIDE 300 MG: 180 CAPSULE, COATED, EXTENDED RELEASE ORAL at 12:11

## 2017-01-01 RX ADMIN — STANDARDIZED SENNA CONCENTRATE 1 TABLET: 8.6 TABLET ORAL at 08:25

## 2017-01-01 RX ADMIN — TRAMADOL HYDROCHLORIDE 50 MG: 50 TABLET, FILM COATED ORAL at 22:45

## 2017-01-01 RX ADMIN — SENNOSIDES AND DOCUSATE SODIUM 1 TABLET: 8.6; 5 TABLET ORAL at 08:31

## 2017-01-01 RX ADMIN — LEVOFLOXACIN 750 MG: 5 INJECTION, SOLUTION INTRAVENOUS at 10:31

## 2017-01-01 RX ADMIN — Medication 10 ML: at 09:03

## 2017-01-01 RX ADMIN — GUAIFENESIN 1200 MG: 600 TABLET, EXTENDED RELEASE ORAL at 08:36

## 2017-01-01 RX ADMIN — GUAIFENESIN 1200 MG: 600 TABLET, EXTENDED RELEASE ORAL at 18:29

## 2017-01-01 RX ADMIN — IPRATROPIUM BROMIDE AND ALBUTEROL SULFATE 3 ML: .5; 3 SOLUTION RESPIRATORY (INHALATION) at 16:18

## 2017-01-01 RX ADMIN — FAMOTIDINE 40 MG: 20 TABLET, FILM COATED ORAL at 09:29

## 2017-01-01 RX ADMIN — CONJUGATED ESTROGENS 1 APPLICATION: 0.62 CREAM VAGINAL at 20:36

## 2017-01-01 RX ADMIN — IPRATROPIUM BROMIDE AND ALBUTEROL SULFATE 3 ML: .5; 3 SOLUTION RESPIRATORY (INHALATION) at 18:06

## 2017-01-01 RX ADMIN — KETOTIFEN FUMARATE 1 DROP: 0.35 SOLUTION/ DROPS OPHTHALMIC at 18:29

## 2017-01-01 RX ADMIN — SERTRALINE 100 MG: 50 TABLET, FILM COATED ORAL at 22:13

## 2017-01-01 RX ADMIN — IPRATROPIUM BROMIDE AND ALBUTEROL SULFATE 3 ML: .5; 3 SOLUTION RESPIRATORY (INHALATION) at 23:00

## 2017-01-01 RX ADMIN — SERTRALINE HYDROCHLORIDE 75 MG: 50 TABLET ORAL at 17:31

## 2017-01-01 RX ADMIN — DONEPEZIL HYDROCHLORIDE 10 MG: 5 TABLET ORAL at 20:31

## 2017-01-01 RX ADMIN — APIXABAN 5 MG: 2.5 TABLET, FILM COATED ORAL at 08:13

## 2017-01-01 RX ADMIN — INSULIN ASPART 2 UNITS: 100 INJECTION, SOLUTION INTRAVENOUS; SUBCUTANEOUS at 22:15

## 2017-01-01 RX ADMIN — LINAGLIPTIN 5 MG: 5 TABLET, FILM COATED ORAL at 10:21

## 2017-01-01 RX ADMIN — Medication 10 ML: at 08:35

## 2017-01-01 RX ADMIN — FAMOTIDINE 20 MG: 20 TABLET, FILM COATED ORAL at 17:42

## 2017-01-01 RX ADMIN — DILTIAZEM HYDROCHLORIDE 240 MG: 120 CAPSULE, COATED, EXTENDED RELEASE ORAL at 08:53

## 2017-01-01 RX ADMIN — KETOTIFEN FUMARATE 1 DROP: 0.25 SOLUTION/ DROPS OPHTHALMIC at 08:34

## 2017-01-01 RX ADMIN — MICONAZOLE NITRATE: 20 POWDER TOPICAL at 09:33

## 2017-01-01 RX ADMIN — KETOTIFEN FUMARATE 1 DROP: 0.35 SOLUTION/ DROPS OPHTHALMIC at 08:39

## 2017-01-01 RX ADMIN — KETOTIFEN FUMARATE 1 DROP: 0.25 SOLUTION/ DROPS OPHTHALMIC at 09:51

## 2017-01-01 RX ADMIN — ALLOPURINOL 100 MG: 100 TABLET ORAL at 22:15

## 2017-01-01 RX ADMIN — KETOTIFEN FUMARATE 1 DROP: 0.25 SOLUTION/ DROPS OPHTHALMIC at 09:26

## 2017-01-01 RX ADMIN — ALLOPURINOL 100 MG: 100 TABLET ORAL at 17:26

## 2017-01-01 RX ADMIN — DONEPEZIL HYDROCHLORIDE 10 MG: 5 TABLET ORAL at 20:35

## 2017-01-01 RX ADMIN — SODIUM CHLORIDE 1000 ML: 9 INJECTION, SOLUTION INTRAVENOUS at 23:06

## 2017-01-01 RX ADMIN — LINAGLIPTIN 5 MG: 5 TABLET, FILM COATED ORAL at 08:58

## 2017-01-01 RX ADMIN — TRAMADOL HYDROCHLORIDE 100 MG: 50 TABLET, FILM COATED ORAL at 16:17

## 2017-01-01 RX ADMIN — GUAIFENESIN 1200 MG: 600 TABLET, EXTENDED RELEASE ORAL at 07:43

## 2017-01-01 RX ADMIN — APIXABAN 5 MG: 2.5 TABLET, FILM COATED ORAL at 22:48

## 2017-01-01 RX ADMIN — MICONAZOLE NITRATE: 20 POWDER TOPICAL at 09:11

## 2017-01-01 RX ADMIN — AMIODARONE HYDROCHLORIDE 200 MG: 200 TABLET ORAL at 08:08

## 2017-01-01 RX ADMIN — POTASSIUM CHLORIDE 20 MEQ: 1500 TABLET, EXTENDED RELEASE ORAL at 08:00

## 2017-01-01 RX ADMIN — DILTIAZEM HYDROCHLORIDE 120 MG: 60 TABLET, FILM COATED ORAL at 17:52

## 2017-01-01 RX ADMIN — IPRATROPIUM BROMIDE AND ALBUTEROL SULFATE 3 ML: .5; 3 SOLUTION RESPIRATORY (INHALATION) at 21:30

## 2017-01-01 RX ADMIN — ASPIRIN 81 MG: 81 TABLET, COATED ORAL at 08:18

## 2017-01-01 RX ADMIN — TRAMADOL HYDROCHLORIDE 50 MG: 50 TABLET, FILM COATED ORAL at 20:27

## 2017-01-01 RX ADMIN — ALLOPURINOL 100 MG: 100 TABLET ORAL at 08:20

## 2017-01-01 RX ADMIN — INSULIN ASPART 2 UNITS: 100 INJECTION, SOLUTION INTRAVENOUS; SUBCUTANEOUS at 17:28

## 2017-01-01 RX ADMIN — FAMOTIDINE 40 MG: 20 TABLET ORAL at 19:57

## 2017-01-01 RX ADMIN — Medication 1 TABLET: at 08:36

## 2017-01-01 RX ADMIN — LEVETIRACETAM 500 MG: 500 TABLET, FILM COATED ORAL at 17:26

## 2017-01-01 RX ADMIN — APIXABAN 5 MG: 2.5 TABLET, FILM COATED ORAL at 08:52

## 2017-01-01 RX ADMIN — SERTRALINE HYDROCHLORIDE 100 MG: 50 TABLET ORAL at 20:20

## 2017-01-01 RX ADMIN — Medication 10 ML: at 21:03

## 2017-01-01 RX ADMIN — GLIPIZIDE 5 MG: 2.5 TABLET, FILM COATED, EXTENDED RELEASE ORAL at 08:56

## 2017-01-01 RX ADMIN — METOPROLOL SUCCINATE 100 MG: 50 TABLET, EXTENDED RELEASE ORAL at 21:11

## 2017-01-01 RX ADMIN — CONJUGATED ESTROGENS 0.75 APPLICATION: 0.62 CREAM VAGINAL at 20:55

## 2017-01-01 RX ADMIN — BUDESONIDE 0.5 MG: 0.5 SUSPENSION RESPIRATORY (INHALATION) at 20:35

## 2017-01-01 RX ADMIN — DIPHENHYDRAMINE HYDROCHLORIDE 25 MG: 25 CAPSULE ORAL at 20:06

## 2017-01-01 RX ADMIN — TERAZOSIN HYDROCHLORIDE 2 MG: 1 CAPSULE ORAL at 09:12

## 2017-01-01 RX ADMIN — SERTRALINE 100 MG: 50 TABLET, FILM COATED ORAL at 20:42

## 2017-01-01 RX ADMIN — FAMOTIDINE 40 MG: 20 TABLET, FILM COATED ORAL at 09:58

## 2017-01-01 RX ADMIN — LEVOTHYROXINE SODIUM 150 MCG: 150 TABLET ORAL at 06:29

## 2017-01-01 RX ADMIN — Medication 1 CAPSULE: at 08:09

## 2017-01-01 RX ADMIN — DONEPEZIL HYDROCHLORIDE 10 MG: 5 TABLET ORAL at 22:19

## 2017-01-01 RX ADMIN — ALLOPURINOL 100 MG: 100 TABLET ORAL at 17:16

## 2017-01-01 RX ADMIN — POTASSIUM CHLORIDE 20 MEQ: 1500 TABLET, EXTENDED RELEASE ORAL at 08:34

## 2017-01-01 RX ADMIN — DONEPEZIL HYDROCHLORIDE 10 MG: 5 TABLET, FILM COATED ORAL at 20:20

## 2017-01-01 RX ADMIN — ALLOPURINOL 100 MG: 100 TABLET ORAL at 17:24

## 2017-01-01 RX ADMIN — ISOSORBIDE DINITRATE 10 MG: 10 TABLET ORAL at 22:35

## 2017-01-01 RX ADMIN — STANDARDIZED SENNA CONCENTRATE 1 TABLET: 8.6 TABLET ORAL at 17:26

## 2017-01-01 RX ADMIN — DONEPEZIL HYDROCHLORIDE 10 MG: 5 TABLET ORAL at 20:48

## 2017-01-01 RX ADMIN — Medication 150 MG: at 17:53

## 2017-01-01 RX ADMIN — POTASSIUM CHLORIDE 40 MEQ: 20 TABLET, EXTENDED RELEASE ORAL at 08:39

## 2017-01-01 RX ADMIN — FAMOTIDINE 40 MG: 20 TABLET ORAL at 20:45

## 2017-01-01 RX ADMIN — Medication 1 TABLET: at 10:12

## 2017-01-01 RX ADMIN — Medication 150 MG: at 08:44

## 2017-01-01 RX ADMIN — ISOSORBIDE DINITRATE 10 MG: 10 TABLET ORAL at 23:27

## 2017-01-01 RX ADMIN — APIXABAN 5 MG: 2.5 TABLET, FILM COATED ORAL at 10:00

## 2017-01-01 RX ADMIN — BUDESONIDE 0.5 MG: 0.5 SUSPENSION RESPIRATORY (INHALATION) at 09:44

## 2017-01-01 RX ADMIN — MEGESTROL ACETATE 200 MG: 40 SUSPENSION ORAL at 17:57

## 2017-01-01 RX ADMIN — IPRATROPIUM BROMIDE AND ALBUTEROL SULFATE 3 ML: .5; 3 SOLUTION RESPIRATORY (INHALATION) at 13:07

## 2017-01-01 RX ADMIN — Medication 150 MG: at 08:23

## 2017-01-01 RX ADMIN — APIXABAN 5 MG: 2.5 TABLET, FILM COATED ORAL at 21:08

## 2017-01-01 RX ADMIN — KETOTIFEN FUMARATE 1 DROP: 0.25 SOLUTION/ DROPS OPHTHALMIC at 09:15

## 2017-01-01 RX ADMIN — IPRATROPIUM BROMIDE AND ALBUTEROL SULFATE 3 ML: .5; 3 SOLUTION RESPIRATORY (INHALATION) at 09:43

## 2017-01-01 RX ADMIN — ISOSORBIDE DINITRATE 10 MG: 10 TABLET ORAL at 08:35

## 2017-01-01 RX ADMIN — ASPIRIN 81 MG: 81 TABLET, COATED ORAL at 08:32

## 2017-01-01 RX ADMIN — BUDESONIDE 0.5 MG: 0.5 SUSPENSION RESPIRATORY (INHALATION) at 22:11

## 2017-01-01 RX ADMIN — ASPIRIN 81 MG: 81 TABLET, COATED ORAL at 08:52

## 2017-01-01 RX ADMIN — KETOTIFEN FUMARATE 1 DROP: 0.25 SOLUTION/ DROPS OPHTHALMIC at 17:38

## 2017-01-01 RX ADMIN — LEVETIRACETAM 1000 MG: 10 INJECTION INTRAVENOUS at 23:44

## 2017-01-01 RX ADMIN — STANDARDIZED SENNA CONCENTRATE 1 TABLET: 8.6 TABLET ORAL at 17:52

## 2017-01-01 RX ADMIN — LEVETIRACETAM 500 MG: 500 TABLET, FILM COATED ORAL at 18:04

## 2017-01-01 RX ADMIN — SERTRALINE 100 MG: 50 TABLET, FILM COATED ORAL at 20:03

## 2017-01-01 RX ADMIN — INSULIN ASPART 2 UNITS: 100 INJECTION, SOLUTION INTRAVENOUS; SUBCUTANEOUS at 20:11

## 2017-01-01 RX ADMIN — LEVOFLOXACIN 750 MG: 5 INJECTION, SOLUTION INTRAVENOUS at 08:49

## 2017-01-01 RX ADMIN — IPRATROPIUM BROMIDE AND ALBUTEROL SULFATE 3 ML: .5; 3 SOLUTION RESPIRATORY (INHALATION) at 22:14

## 2017-01-01 RX ADMIN — ISOSORBIDE DINITRATE 10 MG: 10 TABLET ORAL at 20:12

## 2017-01-01 RX ADMIN — MICONAZOLE NITRATE: 20 POWDER TOPICAL at 20:38

## 2017-01-01 RX ADMIN — Medication 1 CAPSULE: at 08:26

## 2017-01-01 RX ADMIN — DONEPEZIL HYDROCHLORIDE 10 MG: 5 TABLET, FILM COATED ORAL at 20:55

## 2017-01-01 RX ADMIN — IPRATROPIUM BROMIDE AND ALBUTEROL SULFATE 3 ML: .5; 3 SOLUTION RESPIRATORY (INHALATION) at 07:46

## 2017-01-01 RX ADMIN — LINAGLIPTIN 5 MG: 5 TABLET, FILM COATED ORAL at 08:39

## 2017-01-01 RX ADMIN — METOPROLOL SUCCINATE 100 MG: 50 TABLET, EXTENDED RELEASE ORAL at 07:56

## 2017-01-01 RX ADMIN — ISOSORBIDE DINITRATE 10 MG: 10 TABLET ORAL at 09:58

## 2017-01-01 RX ADMIN — ACETAMINOPHEN 650 MG: 325 TABLET, FILM COATED ORAL at 14:58

## 2017-01-01 RX ADMIN — APIXABAN 5 MG: 2.5 TABLET, FILM COATED ORAL at 10:18

## 2017-01-01 RX ADMIN — SERTRALINE 100 MG: 50 TABLET, FILM COATED ORAL at 23:33

## 2017-01-01 RX ADMIN — SERTRALINE HYDROCHLORIDE 100 MG: 50 TABLET, FILM COATED ORAL at 21:58

## 2017-01-01 RX ADMIN — PANTOPRAZOLE SODIUM 40 MG: 40 INJECTION, POWDER, FOR SOLUTION INTRAVENOUS at 06:04

## 2017-01-01 RX ADMIN — FAMOTIDINE 40 MG: 20 TABLET, FILM COATED ORAL at 20:46

## 2017-01-01 RX ADMIN — MICONAZOLE NITRATE: 20 POWDER TOPICAL at 09:28

## 2017-01-01 RX ADMIN — DILTIAZEM HYDROCHLORIDE 300 MG: 180 CAPSULE, COATED, EXTENDED RELEASE ORAL at 13:23

## 2017-01-01 RX ADMIN — IPRATROPIUM BROMIDE AND ALBUTEROL SULFATE 3 ML: .5; 3 SOLUTION RESPIRATORY (INHALATION) at 16:12

## 2017-01-01 RX ADMIN — STANDARDIZED SENNA CONCENTRATE 1 TABLET: 8.6 TABLET ORAL at 17:23

## 2017-01-01 RX ADMIN — DOCUSATE SODIUM AND SENNOSIDES 1 TABLET: 8.6; 5 TABLET, FILM COATED ORAL at 22:43

## 2017-01-01 RX ADMIN — BUDESONIDE 0.5 MG: 0.5 SUSPENSION RESPIRATORY (INHALATION) at 09:23

## 2017-01-01 RX ADMIN — Medication 150 MG: at 17:36

## 2017-01-01 RX ADMIN — ISOSORBIDE DINITRATE 10 MG: 10 TABLET ORAL at 09:49

## 2017-01-01 RX ADMIN — DILTIAZEM HYDROCHLORIDE 300 MG: 180 CAPSULE, COATED, EXTENDED RELEASE ORAL at 12:59

## 2017-01-01 RX ADMIN — KETOTIFEN FUMARATE 1 DROP: 0.25 SOLUTION/ DROPS OPHTHALMIC at 17:12

## 2017-01-01 RX ADMIN — METOPROLOL SUCCINATE 100 MG: 50 TABLET, EXTENDED RELEASE ORAL at 20:31

## 2017-01-01 RX ADMIN — ALLOPURINOL 100 MG: 100 TABLET ORAL at 20:09

## 2017-01-01 RX ADMIN — IPRATROPIUM BROMIDE AND ALBUTEROL SULFATE 3 ML: .5; 3 SOLUTION RESPIRATORY (INHALATION) at 17:10

## 2017-01-01 RX ADMIN — LEVETIRACETAM 500 MG: 500 TABLET, FILM COATED ORAL at 20:58

## 2017-01-01 RX ADMIN — Medication 1 TABLET: at 09:29

## 2017-01-01 RX ADMIN — Medication 1 CAPSULE: at 08:53

## 2017-01-01 RX ADMIN — ALLOPURINOL 100 MG: 100 TABLET ORAL at 08:14

## 2017-01-01 RX ADMIN — FUROSEMIDE 40 MG: 40 TABLET ORAL at 07:44

## 2017-01-01 RX ADMIN — GUAIFENESIN 1200 MG: 600 TABLET, EXTENDED RELEASE ORAL at 18:44

## 2017-01-01 RX ADMIN — IPRATROPIUM BROMIDE AND ALBUTEROL SULFATE 3 ML: .5; 3 SOLUTION RESPIRATORY (INHALATION) at 09:20

## 2017-01-01 RX ADMIN — Medication 1 TABLET: at 11:16

## 2017-01-01 RX ADMIN — KETOTIFEN FUMARATE 1 DROP: 0.25 SOLUTION/ DROPS OPHTHALMIC at 09:05

## 2017-01-01 RX ADMIN — BUDESONIDE 0.5 MG: 0.5 SUSPENSION RESPIRATORY (INHALATION) at 20:14

## 2017-01-01 RX ADMIN — LEVOTHYROXINE SODIUM 150 MCG: 150 TABLET ORAL at 06:25

## 2017-01-01 RX ADMIN — DOXAZOSIN MESYLATE 2 MG: 2 TABLET ORAL at 21:11

## 2017-01-01 RX ADMIN — SERTRALINE HYDROCHLORIDE 100 MG: 50 TABLET ORAL at 21:27

## 2017-01-01 RX ADMIN — LEVETIRACETAM 500 MG: 500 TABLET, FILM COATED ORAL at 20:57

## 2017-01-01 RX ADMIN — Medication 1 TABLET: at 09:30

## 2017-01-01 RX ADMIN — FAMOTIDINE 40 MG: 20 TABLET ORAL at 22:10

## 2017-01-01 RX ADMIN — TRAMADOL HYDROCHLORIDE 100 MG: 50 TABLET, FILM COATED ORAL at 00:55

## 2017-01-01 RX ADMIN — FAMOTIDINE 40 MG: 20 TABLET ORAL at 21:10

## 2017-01-01 RX ADMIN — DOXAZOSIN MESYLATE 2 MG: 2 TABLET ORAL at 09:23

## 2017-01-01 RX ADMIN — DILTIAZEM HYDROCHLORIDE 300 MG: 180 CAPSULE, COATED, EXTENDED RELEASE ORAL at 13:54

## 2017-01-01 RX ADMIN — APIXABAN 5 MG: 2.5 TABLET, FILM COATED ORAL at 21:19

## 2017-01-01 RX ADMIN — LEVETIRACETAM 500 MG: 500 TABLET, FILM COATED ORAL at 09:45

## 2017-01-01 RX ADMIN — ALLOPURINOL 100 MG: 100 TABLET ORAL at 10:50

## 2017-01-01 RX ADMIN — MEGESTROL ACETATE 200 MG: 40 SUSPENSION ORAL at 09:48

## 2017-01-01 RX ADMIN — LINAGLIPTIN 5 MG: 5 TABLET, FILM COATED ORAL at 08:33

## 2017-01-01 RX ADMIN — SERTRALINE 100 MG: 50 TABLET, FILM COATED ORAL at 21:13

## 2017-01-01 RX ADMIN — LEVOTHYROXINE SODIUM 150 MCG: 150 TABLET ORAL at 08:20

## 2017-01-01 RX ADMIN — APIXABAN 5 MG: 2.5 TABLET, FILM COATED ORAL at 07:55

## 2017-01-01 RX ADMIN — IPRATROPIUM BROMIDE AND ALBUTEROL SULFATE 3 ML: .5; 3 SOLUTION RESPIRATORY (INHALATION) at 17:07

## 2017-01-01 RX ADMIN — DICLOFENAC SODIUM 4 G: 10 GEL TOPICAL at 23:57

## 2017-01-01 RX ADMIN — LEVETIRACETAM 500 MG: 500 TABLET, FILM COATED ORAL at 08:45

## 2017-01-01 RX ADMIN — Medication 1 TABLET: at 09:09

## 2017-01-01 RX ADMIN — DILTIAZEM HYDROCHLORIDE 300 MG: 180 CAPSULE, COATED, EXTENDED RELEASE ORAL at 12:13

## 2017-01-01 RX ADMIN — IPRATROPIUM BROMIDE AND ALBUTEROL SULFATE 3 ML: .5; 3 SOLUTION RESPIRATORY (INHALATION) at 09:31

## 2017-01-01 RX ADMIN — APIXABAN 5 MG: 2.5 TABLET, FILM COATED ORAL at 09:35

## 2017-01-01 RX ADMIN — IPRATROPIUM BROMIDE AND ALBUTEROL SULFATE 3 ML: .5; 3 SOLUTION RESPIRATORY (INHALATION) at 20:05

## 2017-01-01 RX ADMIN — MEROPENEM 1 G: 1 INJECTION, POWDER, FOR SOLUTION INTRAVENOUS at 17:53

## 2017-01-01 RX ADMIN — FUROSEMIDE 40 MG: 40 TABLET ORAL at 07:42

## 2017-01-01 RX ADMIN — ALLOPURINOL 100 MG: 100 TABLET ORAL at 18:02

## 2017-01-01 RX ADMIN — MULTIPLE VITAMINS W/ MINERALS TAB 1 TABLET: TAB at 08:53

## 2017-01-01 RX ADMIN — METOPROLOL TARTRATE 100 MG: 25 TABLET, FILM COATED ORAL at 08:22

## 2017-01-01 RX ADMIN — FUROSEMIDE 40 MG: 40 TABLET ORAL at 08:43

## 2017-01-01 RX ADMIN — FAMOTIDINE 20 MG: 20 TABLET ORAL at 17:31

## 2017-01-01 RX ADMIN — METOPROLOL SUCCINATE 100 MG: 50 TABLET, EXTENDED RELEASE ORAL at 09:25

## 2017-01-01 RX ADMIN — DONEPEZIL HYDROCHLORIDE 10 MG: 5 TABLET ORAL at 20:43

## 2017-01-01 RX ADMIN — MICONAZOLE NITRATE: 20 POWDER TOPICAL at 11:44

## 2017-01-01 RX ADMIN — APIXABAN 5 MG: 2.5 TABLET, FILM COATED ORAL at 21:26

## 2017-01-01 RX ADMIN — IPRATROPIUM BROMIDE AND ALBUTEROL SULFATE 3 ML: .5; 3 SOLUTION RESPIRATORY (INHALATION) at 20:35

## 2017-01-01 RX ADMIN — GUAIFENESIN 1200 MG: 600 TABLET, EXTENDED RELEASE ORAL at 10:00

## 2017-01-01 RX ADMIN — METOPROLOL TARTRATE 100 MG: 25 TABLET, FILM COATED ORAL at 21:43

## 2017-01-01 RX ADMIN — SENNOSIDES AND DOCUSATE SODIUM 1 TABLET: 8.6; 5 TABLET ORAL at 07:46

## 2017-01-01 RX ADMIN — LEVETIRACETAM 500 MG: 500 TABLET, FILM COATED ORAL at 09:09

## 2017-01-01 RX ADMIN — Medication 1 CAPSULE: at 09:05

## 2017-01-01 RX ADMIN — ISOSORBIDE DINITRATE 10 MG: 10 TABLET ORAL at 20:29

## 2017-01-01 RX ADMIN — Medication 150 MG: at 20:18

## 2017-01-01 RX ADMIN — Medication 1 TABLET: at 16:47

## 2017-01-01 RX ADMIN — METOPROLOL TARTRATE 75 MG: 25 TABLET, FILM COATED ORAL at 17:21

## 2017-01-01 RX ADMIN — LINAGLIPTIN 5 MG: 5 TABLET, FILM COATED ORAL at 08:17

## 2017-01-01 RX ADMIN — FUROSEMIDE 40 MG: 40 TABLET ORAL at 08:25

## 2017-01-01 RX ADMIN — CONJUGATED ESTROGENS 0.75 APPLICATION: 0.62 CREAM VAGINAL at 20:24

## 2017-01-01 RX ADMIN — ALLOPURINOL 100 MG: 100 TABLET ORAL at 21:48

## 2017-01-01 RX ADMIN — DIPHENHYDRAMINE HYDROCHLORIDE 25 MG: 25 CAPSULE ORAL at 22:48

## 2017-01-01 RX ADMIN — LEVETIRACETAM 500 MG: 500 TABLET, FILM COATED ORAL at 22:46

## 2017-01-01 RX ADMIN — DOXAZOSIN MESYLATE 2 MG: 2 TABLET ORAL at 08:53

## 2017-01-01 RX ADMIN — KETOTIFEN FUMARATE 1 DROP: 0.25 SOLUTION/ DROPS OPHTHALMIC at 17:17

## 2017-01-01 RX ADMIN — LEVETIRACETAM 500 MG: 500 TABLET, FILM COATED ORAL at 08:11

## 2017-01-01 RX ADMIN — INSULIN ASPART 2 UNITS: 100 INJECTION, SOLUTION INTRAVENOUS; SUBCUTANEOUS at 21:46

## 2017-01-01 RX ADMIN — IPRATROPIUM BROMIDE AND ALBUTEROL SULFATE 3 ML: .5; 3 SOLUTION RESPIRATORY (INHALATION) at 12:24

## 2017-01-01 RX ADMIN — KETOTIFEN FUMARATE 1 DROP: 0.25 SOLUTION/ DROPS OPHTHALMIC at 10:23

## 2017-01-01 RX ADMIN — ALLOPURINOL 100 MG: 100 TABLET ORAL at 20:00

## 2017-01-01 RX ADMIN — KETOTIFEN FUMARATE 1 DROP: 0.25 SOLUTION/ DROPS OPHTHALMIC at 09:18

## 2017-01-01 RX ADMIN — Medication 150 MG: at 10:52

## 2017-01-01 RX ADMIN — BUDESONIDE 0.5 MG: 0.5 SUSPENSION RESPIRATORY (INHALATION) at 21:02

## 2017-01-01 RX ADMIN — FUROSEMIDE 40 MG: 40 TABLET ORAL at 08:47

## 2017-01-01 RX ADMIN — ACETAMINOPHEN 650 MG: 325 TABLET, FILM COATED ORAL at 05:52

## 2017-01-01 RX ADMIN — ALLOPURINOL 100 MG: 100 TABLET ORAL at 21:58

## 2017-01-01 RX ADMIN — SODIUM CHLORIDE 500 ML: 9 INJECTION, SOLUTION INTRAVENOUS at 07:51

## 2017-01-01 RX ADMIN — METOPROLOL SUCCINATE 100 MG: 50 TABLET, EXTENDED RELEASE ORAL at 22:45

## 2017-01-01 RX ADMIN — DONEPEZIL HYDROCHLORIDE 10 MG: 5 TABLET ORAL at 20:44

## 2017-01-01 RX ADMIN — IPRATROPIUM BROMIDE AND ALBUTEROL SULFATE 3 ML: .5; 3 SOLUTION RESPIRATORY (INHALATION) at 14:24

## 2017-01-01 RX ADMIN — MICONAZOLE NITRATE 1 APPLICATION: 20 POWDER TOPICAL at 10:32

## 2017-01-01 RX ADMIN — POTASSIUM CHLORIDE 10 MEQ: 750 TABLET, FILM COATED, EXTENDED RELEASE ORAL at 08:36

## 2017-01-01 RX ADMIN — DIPHENHYDRAMINE HYDROCHLORIDE 25 MG: 25 CAPSULE ORAL at 00:58

## 2017-01-01 RX ADMIN — PIPERACILLIN SODIUM AND TAZOBACTAM SODIUM 3.38 G: 3; .375 INJECTION, POWDER, LYOPHILIZED, FOR SOLUTION INTRAVENOUS at 14:46

## 2017-01-01 RX ADMIN — MICONAZOLE NITRATE: 20 POWDER TOPICAL at 21:03

## 2017-01-01 RX ADMIN — Medication 1 TABLET: at 10:32

## 2017-01-01 RX ADMIN — BUDESONIDE 0.5 MG: 0.5 SUSPENSION RESPIRATORY (INHALATION) at 08:08

## 2017-01-01 RX ADMIN — DILTIAZEM HYDROCHLORIDE 240 MG: 120 CAPSULE, COATED, EXTENDED RELEASE ORAL at 08:25

## 2017-01-01 RX ADMIN — FAMOTIDINE 40 MG: 20 TABLET ORAL at 20:48

## 2017-01-01 RX ADMIN — Medication 150 MG: at 09:03

## 2017-01-01 RX ADMIN — CALCIUM CARBONATE 500 MG (1,250 MG)-VITAMIN D3 200 UNIT TABLET 500 MG: at 22:33

## 2017-01-01 RX ADMIN — FAMOTIDINE 40 MG: 20 TABLET ORAL at 20:39

## 2017-01-01 RX ADMIN — METOPROLOL SUCCINATE 100 MG: 50 TABLET, EXTENDED RELEASE ORAL at 21:41

## 2017-01-01 RX ADMIN — DILTIAZEM HYDROCHLORIDE 120 MG: 60 TABLET, FILM COATED ORAL at 07:59

## 2017-01-01 RX ADMIN — STANDARDIZED SENNA CONCENTRATE 1 TABLET: 8.6 TABLET ORAL at 08:45

## 2017-01-01 RX ADMIN — IPRATROPIUM BROMIDE AND ALBUTEROL SULFATE 3 ML: .5; 3 SOLUTION RESPIRATORY (INHALATION) at 17:18

## 2017-01-01 RX ADMIN — Medication 150 MG: at 09:40

## 2017-01-01 RX ADMIN — SERTRALINE 100 MG: 50 TABLET, FILM COATED ORAL at 22:47

## 2017-01-01 RX ADMIN — GUAIFENESIN 1200 MG: 600 TABLET, EXTENDED RELEASE ORAL at 08:00

## 2017-01-01 RX ADMIN — Medication 150 MG: at 18:24

## 2017-01-01 RX ADMIN — Medication 10 ML: at 20:45

## 2017-01-01 RX ADMIN — APIXABAN 5 MG: 2.5 TABLET, FILM COATED ORAL at 21:05

## 2017-01-01 RX ADMIN — STANDARDIZED SENNA CONCENTRATE 1 TABLET: 8.6 TABLET ORAL at 08:19

## 2017-01-01 RX ADMIN — ISOSORBIDE DINITRATE 10 MG: 10 TABLET ORAL at 09:18

## 2017-01-01 RX ADMIN — ALLOPURINOL 100 MG: 100 TABLET ORAL at 21:21

## 2017-01-01 RX ADMIN — DILTIAZEM HYDROCHLORIDE 240 MG: 120 CAPSULE, COATED, EXTENDED RELEASE ORAL at 07:44

## 2017-01-01 RX ADMIN — MEROPENEM 1 G: 1 INJECTION, POWDER, FOR SOLUTION INTRAVENOUS at 18:47

## 2017-01-01 RX ADMIN — METOPROLOL TARTRATE 25 MG: 25 TABLET, FILM COATED ORAL at 18:14

## 2017-01-01 RX ADMIN — ALLOPURINOL 100 MG: 100 TABLET ORAL at 17:43

## 2017-01-01 RX ADMIN — Medication 1 TABLET: at 09:56

## 2017-01-01 RX ADMIN — LEVOFLOXACIN 500 MG: 500 TABLET, FILM COATED ORAL at 10:33

## 2017-01-01 RX ADMIN — STANDARDIZED SENNA CONCENTRATE 1 TABLET: 8.6 TABLET ORAL at 09:48

## 2017-01-01 RX ADMIN — KETOTIFEN FUMARATE 1 DROP: 0.35 SOLUTION/ DROPS OPHTHALMIC at 10:12

## 2017-01-01 RX ADMIN — INSULIN ASPART 2 UNITS: 100 INJECTION, SOLUTION INTRAVENOUS; SUBCUTANEOUS at 17:46

## 2017-01-01 RX ADMIN — MICONAZOLE NITRATE: 20 POWDER TOPICAL at 08:55

## 2017-01-01 RX ADMIN — Medication 1 TABLET: at 07:47

## 2017-01-01 RX ADMIN — ISOSORBIDE DINITRATE 10 MG: 10 TABLET ORAL at 08:14

## 2017-01-01 RX ADMIN — Medication 1 TABLET: at 09:54

## 2017-01-01 RX ADMIN — IPRATROPIUM BROMIDE AND ALBUTEROL SULFATE 3 ML: .5; 3 SOLUTION RESPIRATORY (INHALATION) at 08:42

## 2017-01-01 RX ADMIN — BUDESONIDE 0.5 MG: 0.5 SUSPENSION RESPIRATORY (INHALATION) at 09:06

## 2017-01-01 RX ADMIN — METOPROLOL SUCCINATE 100 MG: 50 TABLET, EXTENDED RELEASE ORAL at 09:27

## 2017-01-01 RX ADMIN — DONEPEZIL HYDROCHLORIDE 10 MG: 5 TABLET ORAL at 22:13

## 2017-01-01 RX ADMIN — IPRATROPIUM BROMIDE AND ALBUTEROL SULFATE 3 ML: .5; 3 SOLUTION RESPIRATORY (INHALATION) at 22:10

## 2017-01-01 RX ADMIN — BUDESONIDE 0.5 MG: 0.5 SUSPENSION RESPIRATORY (INHALATION) at 08:34

## 2017-01-01 RX ADMIN — DIPHENHYDRAMINE HYDROCHLORIDE 25 MG: 25 CAPSULE ORAL at 20:26

## 2017-01-01 RX ADMIN — Medication 1 CAPSULE: at 09:07

## 2017-01-01 RX ADMIN — GUAIFENESIN 1200 MG: 600 TABLET, EXTENDED RELEASE ORAL at 08:44

## 2017-01-01 RX ADMIN — POTASSIUM CHLORIDE 20 MEQ: 1500 TABLET, EXTENDED RELEASE ORAL at 10:02

## 2017-01-01 RX ADMIN — SENNOSIDES AND DOCUSATE SODIUM 1 TABLET: 8.6; 5 TABLET ORAL at 21:29

## 2017-01-01 RX ADMIN — LEVOTHYROXINE SODIUM 150 MCG: 150 TABLET ORAL at 06:28

## 2017-01-01 RX ADMIN — POTASSIUM CHLORIDE 40 MEQ: 20 TABLET, EXTENDED RELEASE ORAL at 13:43

## 2017-01-01 RX ADMIN — GUAIFENESIN 1200 MG: 600 TABLET, EXTENDED RELEASE ORAL at 09:36

## 2017-01-01 RX ADMIN — GUAIFENESIN 1200 MG: 600 TABLET, EXTENDED RELEASE ORAL at 08:45

## 2017-01-01 RX ADMIN — DILTIAZEM HYDROCHLORIDE 300 MG: 180 CAPSULE, COATED, EXTENDED RELEASE ORAL at 13:03

## 2017-01-01 RX ADMIN — DOXAZOSIN MESYLATE 2 MG: 2 TABLET ORAL at 21:23

## 2017-01-01 RX ADMIN — Medication 1 TABLET: at 07:42

## 2017-01-01 RX ADMIN — TRAMADOL HYDROCHLORIDE 100 MG: 50 TABLET, FILM COATED ORAL at 22:15

## 2017-01-01 RX ADMIN — ASPIRIN 81 MG: 81 TABLET, COATED ORAL at 09:26

## 2017-01-01 RX ADMIN — LEVETIRACETAM 500 MG: 500 TABLET, FILM COATED ORAL at 09:41

## 2017-01-01 RX ADMIN — MICONAZOLE NITRATE: 20 POWDER TOPICAL at 10:41

## 2017-01-01 RX ADMIN — ALLOPURINOL 100 MG: 100 TABLET ORAL at 17:11

## 2017-01-01 RX ADMIN — ALLOPURINOL 100 MG: 100 TABLET ORAL at 08:07

## 2017-01-01 RX ADMIN — METOPROLOL TARTRATE 100 MG: 25 TABLET, FILM COATED ORAL at 21:33

## 2017-01-01 RX ADMIN — STANDARDIZED SENNA CONCENTRATE 1 TABLET: 8.6 TABLET ORAL at 17:30

## 2017-01-01 RX ADMIN — CLINDAMYCIN HYDROCHLORIDE 450 MG: 150 CAPSULE ORAL at 15:22

## 2017-01-01 RX ADMIN — TRAMADOL HYDROCHLORIDE 50 MG: 50 TABLET, FILM COATED ORAL at 19:46

## 2017-01-01 RX ADMIN — MEGESTROL ACETATE 200 MG: 40 SUSPENSION ORAL at 08:42

## 2017-01-01 RX ADMIN — TRAMADOL HYDROCHLORIDE 100 MG: 50 TABLET, FILM COATED ORAL at 08:31

## 2017-01-01 RX ADMIN — ISOSORBIDE DINITRATE 10 MG: 10 TABLET ORAL at 09:25

## 2017-01-01 RX ADMIN — FAMOTIDINE 40 MG: 20 TABLET ORAL at 21:37

## 2017-01-01 RX ADMIN — KETOTIFEN FUMARATE 1 DROP: 0.35 SOLUTION/ DROPS OPHTHALMIC at 08:58

## 2017-01-01 RX ADMIN — BUDESONIDE 0.5 MG: 0.5 SUSPENSION RESPIRATORY (INHALATION) at 10:24

## 2017-01-01 RX ADMIN — LEVETIRACETAM 500 MG: 500 TABLET, FILM COATED ORAL at 10:16

## 2017-01-01 RX ADMIN — LEVETIRACETAM 500 MG: 500 TABLET, FILM COATED ORAL at 23:30

## 2017-01-01 RX ADMIN — LEVETIRACETAM 500 MG: 500 TABLET, FILM COATED ORAL at 18:01

## 2017-01-01 RX ADMIN — Medication 150 MG: at 16:44

## 2017-01-01 RX ADMIN — DIPHENHYDRAMINE HYDROCHLORIDE 25 MG: 25 CAPSULE ORAL at 20:05

## 2017-01-01 RX ADMIN — LEVOFLOXACIN 750 MG: 750 TABLET, FILM COATED ORAL at 22:16

## 2017-01-01 RX ADMIN — SENNOSIDES AND DOCUSATE SODIUM 1 TABLET: 8.6; 5 TABLET ORAL at 07:38

## 2017-01-01 RX ADMIN — BUDESONIDE 0.5 MG: 0.5 SUSPENSION RESPIRATORY (INHALATION) at 09:50

## 2017-01-01 RX ADMIN — LEVETIRACETAM 500 MG: 500 TABLET, FILM COATED ORAL at 20:59

## 2017-01-01 RX ADMIN — DOXAZOSIN MESYLATE 2 MG: 2 TABLET ORAL at 10:51

## 2017-01-01 RX ADMIN — MEGESTROL ACETATE 200 MG: 40 SUSPENSION ORAL at 17:40

## 2017-01-01 RX ADMIN — STANDARDIZED SENNA CONCENTRATE 1 TABLET: 8.6 TABLET ORAL at 09:31

## 2017-01-01 RX ADMIN — ISOSORBIDE DINITRATE 10 MG: 10 TABLET ORAL at 09:53

## 2017-01-01 RX ADMIN — Medication 150 MG: at 08:26

## 2017-01-01 RX ADMIN — NIFEDIPINE 240 MG: 10 CAPSULE ORAL at 08:45

## 2017-01-01 RX ADMIN — METOPROLOL TARTRATE 25 MG: 25 TABLET ORAL at 08:36

## 2017-01-01 RX ADMIN — LEVETIRACETAM 500 MG: 500 TABLET, FILM COATED ORAL at 17:44

## 2017-01-01 RX ADMIN — INSULIN ASPART 3 UNITS: 100 INJECTION, SOLUTION INTRAVENOUS; SUBCUTANEOUS at 20:25

## 2017-01-01 RX ADMIN — FAMOTIDINE 20 MG: 20 TABLET, FILM COATED ORAL at 17:36

## 2017-01-01 RX ADMIN — INSULIN ASPART 3 UNITS: 100 INJECTION, SOLUTION INTRAVENOUS; SUBCUTANEOUS at 12:38

## 2017-01-01 RX ADMIN — KETOTIFEN FUMARATE 1 DROP: 0.35 SOLUTION/ DROPS OPHTHALMIC at 07:42

## 2017-01-01 RX ADMIN — ISOSORBIDE DINITRATE 10 MG: 10 TABLET ORAL at 09:29

## 2017-01-01 RX ADMIN — IPRATROPIUM BROMIDE AND ALBUTEROL SULFATE 3 ML: .5; 3 SOLUTION RESPIRATORY (INHALATION) at 21:29

## 2017-01-01 RX ADMIN — MICONAZOLE NITRATE 1 APPLICATION: 20 POWDER TOPICAL at 08:14

## 2017-01-01 RX ADMIN — STANDARDIZED SENNA CONCENTRATE 1 TABLET: 8.6 TABLET ORAL at 12:12

## 2017-01-01 RX ADMIN — IPRATROPIUM BROMIDE AND ALBUTEROL SULFATE 3 ML: .5; 3 SOLUTION RESPIRATORY (INHALATION) at 13:15

## 2017-01-01 RX ADMIN — ALLOPURINOL 100 MG: 100 TABLET ORAL at 18:14

## 2017-01-01 RX ADMIN — ISOSORBIDE DINITRATE 10 MG: 10 TABLET ORAL at 08:42

## 2017-01-01 RX ADMIN — DONEPEZIL HYDROCHLORIDE 10 MG: 5 TABLET ORAL at 21:50

## 2017-01-01 RX ADMIN — IPRATROPIUM BROMIDE AND ALBUTEROL SULFATE 3 ML: .5; 3 SOLUTION RESPIRATORY (INHALATION) at 07:41

## 2017-01-01 RX ADMIN — FUROSEMIDE 40 MG: 40 TABLET ORAL at 10:52

## 2017-01-01 RX ADMIN — SENNOSIDES AND DOCUSATE SODIUM 1 TABLET: 8.6; 5 TABLET ORAL at 20:49

## 2017-01-01 RX ADMIN — ALLOPURINOL 100 MG: 100 TABLET ORAL at 09:56

## 2017-01-01 RX ADMIN — TRAMADOL HYDROCHLORIDE 100 MG: 50 TABLET, FILM COATED ORAL at 17:43

## 2017-01-01 RX ADMIN — AMIODARONE HYDROCHLORIDE 200 MG: 200 TABLET ORAL at 22:09

## 2017-01-01 RX ADMIN — MICONAZOLE NITRATE: 20 POWDER TOPICAL at 22:45

## 2017-01-01 RX ADMIN — ALLOPURINOL 100 MG: 100 TABLET ORAL at 08:33

## 2017-01-01 RX ADMIN — FAMOTIDINE 20 MG: 20 TABLET, FILM COATED ORAL at 08:36

## 2017-01-01 RX ADMIN — Medication 10 ML: at 20:02

## 2017-01-01 RX ADMIN — IPRATROPIUM BROMIDE AND ALBUTEROL SULFATE 3 ML: .5; 3 SOLUTION RESPIRATORY (INHALATION) at 18:43

## 2017-01-01 RX ADMIN — DILTIAZEM HYDROCHLORIDE 240 MG: 120 CAPSULE, COATED, EXTENDED RELEASE ORAL at 09:29

## 2017-01-01 RX ADMIN — IBANDRONATE SODIUM 150 MG: 150 TABLET, FILM COATED ORAL at 09:26

## 2017-01-01 RX ADMIN — APIXABAN 5 MG: 2.5 TABLET, FILM COATED ORAL at 20:18

## 2017-01-01 RX ADMIN — FAMOTIDINE 40 MG: 20 TABLET, FILM COATED ORAL at 20:56

## 2017-01-01 RX ADMIN — AMIODARONE HYDROCHLORIDE 200 MG: 200 TABLET ORAL at 20:47

## 2017-01-01 RX ADMIN — APIXABAN 5 MG: 2.5 TABLET, FILM COATED ORAL at 20:36

## 2017-01-01 RX ADMIN — STANDARDIZED SENNA CONCENTRATE 1 TABLET: 8.6 TABLET ORAL at 17:36

## 2017-01-01 RX ADMIN — DILTIAZEM HYDROCHLORIDE 120 MG: 60 TABLET, FILM COATED ORAL at 08:22

## 2017-01-01 RX ADMIN — ALLOPURINOL 100 MG: 100 TABLET ORAL at 09:48

## 2017-01-01 RX ADMIN — BUDESONIDE 0.5 MG: 0.5 SUSPENSION RESPIRATORY (INHALATION) at 07:42

## 2017-01-01 RX ADMIN — FAMOTIDINE 40 MG: 20 TABLET ORAL at 20:11

## 2017-01-01 RX ADMIN — Medication 1 CAPSULE: at 09:46

## 2017-01-01 RX ADMIN — MICONAZOLE NITRATE: 20 POWDER TOPICAL at 20:18

## 2017-01-01 RX ADMIN — STANDARDIZED SENNA CONCENTRATE 1 TABLET: 8.6 TABLET ORAL at 11:01

## 2017-01-01 RX ADMIN — MICONAZOLE NITRATE: 20 POWDER TOPICAL at 09:38

## 2017-01-01 RX ADMIN — IPRATROPIUM BROMIDE AND ALBUTEROL SULFATE 3 ML: .5; 3 SOLUTION RESPIRATORY (INHALATION) at 07:37

## 2017-01-01 RX ADMIN — BUDESONIDE 0.5 MG: 0.5 SUSPENSION RESPIRATORY (INHALATION) at 20:43

## 2017-01-01 RX ADMIN — DOCUSATE SODIUM AND SENNOSIDES 1 TABLET: 8.6; 5 TABLET, FILM COATED ORAL at 21:00

## 2017-01-01 RX ADMIN — DILTIAZEM HYDROCHLORIDE 300 MG: 180 CAPSULE, COATED, EXTENDED RELEASE ORAL at 12:42

## 2017-01-01 RX ADMIN — GUAIFENESIN 1200 MG: 600 TABLET, EXTENDED RELEASE ORAL at 09:27

## 2017-01-01 RX ADMIN — LEVETIRACETAM 500 MG: 500 TABLET, FILM COATED ORAL at 07:54

## 2017-01-01 RX ADMIN — FAMOTIDINE 20 MG: 20 TABLET, FILM COATED ORAL at 18:07

## 2017-01-01 RX ADMIN — LEVOTHYROXINE SODIUM 150 MCG: 150 TABLET ORAL at 06:33

## 2017-01-01 RX ADMIN — LEVETIRACETAM 500 MG: 500 TABLET, FILM COATED ORAL at 07:58

## 2017-01-01 RX ADMIN — IPRATROPIUM BROMIDE AND ALBUTEROL SULFATE 3 ML: .5; 3 SOLUTION RESPIRATORY (INHALATION) at 08:52

## 2017-01-01 RX ADMIN — KETOTIFEN FUMARATE 1 DROP: 0.35 SOLUTION/ DROPS OPHTHALMIC at 18:02

## 2017-01-01 RX ADMIN — METOPROLOL TARTRATE 75 MG: 25 TABLET, FILM COATED ORAL at 09:19

## 2017-01-01 RX ADMIN — IPRATROPIUM BROMIDE AND ALBUTEROL SULFATE 3 ML: .5; 3 SOLUTION RESPIRATORY (INHALATION) at 09:26

## 2017-01-01 RX ADMIN — Medication 150 MG: at 10:44

## 2017-01-01 RX ADMIN — GUAIFENESIN 1200 MG: 600 TABLET, EXTENDED RELEASE ORAL at 08:33

## 2017-01-01 RX ADMIN — BUDESONIDE 0.5 MG: 0.5 SUSPENSION RESPIRATORY (INHALATION) at 08:01

## 2017-01-01 RX ADMIN — IPRATROPIUM BROMIDE AND ALBUTEROL SULFATE 3 ML: .5; 3 SOLUTION RESPIRATORY (INHALATION) at 22:28

## 2017-01-01 RX ADMIN — TRAMADOL HYDROCHLORIDE 100 MG: 50 TABLET, FILM COATED ORAL at 22:47

## 2017-01-01 RX ADMIN — Medication 1 CAPSULE: at 09:36

## 2017-01-01 RX ADMIN — IPRATROPIUM BROMIDE AND ALBUTEROL SULFATE 3 ML: .5; 3 SOLUTION RESPIRATORY (INHALATION) at 17:49

## 2017-01-01 RX ADMIN — Medication 1 TABLET: at 17:18

## 2017-01-01 RX ADMIN — Medication 1 TABLET: at 17:32

## 2017-01-01 RX ADMIN — METOPROLOL TARTRATE 75 MG: 25 TABLET, FILM COATED ORAL at 08:39

## 2017-01-01 RX ADMIN — DILTIAZEM HYDROCHLORIDE 240 MG: 120 CAPSULE, COATED, EXTENDED RELEASE ORAL at 08:35

## 2017-01-01 RX ADMIN — POTASSIUM CHLORIDE 20 MEQ: 1500 TABLET, EXTENDED RELEASE ORAL at 07:39

## 2017-01-01 RX ADMIN — METOPROLOL SUCCINATE 100 MG: 50 TABLET, EXTENDED RELEASE ORAL at 10:06

## 2017-01-01 RX ADMIN — ALLOPURINOL 100 MG: 100 TABLET ORAL at 22:46

## 2017-01-01 RX ADMIN — METOPROLOL TARTRATE 75 MG: 25 TABLET, FILM COATED ORAL at 08:58

## 2017-01-01 RX ADMIN — CALCIUM CARBONATE 500 MG (1,250 MG)-VITAMIN D3 200 UNIT TABLET 500 MG: at 11:19

## 2017-01-01 RX ADMIN — METOPROLOL SUCCINATE 100 MG: 50 TABLET, EXTENDED RELEASE ORAL at 08:11

## 2017-01-01 RX ADMIN — METOPROLOL SUCCINATE 100 MG: 50 TABLET, EXTENDED RELEASE ORAL at 08:24

## 2017-01-01 RX ADMIN — DILTIAZEM HYDROCHLORIDE 120 MG: 60 TABLET, FILM COATED ORAL at 17:16

## 2017-01-01 RX ADMIN — Medication 1 TABLET: at 08:59

## 2017-01-01 RX ADMIN — SERTRALINE HYDROCHLORIDE 100 MG: 50 TABLET ORAL at 20:19

## 2017-01-01 RX ADMIN — ALLOPURINOL 100 MG: 100 TABLET ORAL at 09:08

## 2017-01-01 RX ADMIN — METOPROLOL TARTRATE 75 MG: 25 TABLET, FILM COATED ORAL at 18:01

## 2017-01-01 RX ADMIN — ALLOPURINOL 100 MG: 100 TABLET ORAL at 09:04

## 2017-01-01 RX ADMIN — ISOSORBIDE DINITRATE 10 MG: 10 TABLET ORAL at 07:41

## 2017-01-01 RX ADMIN — MEGESTROL ACETATE 200 MG: 40 SUSPENSION ORAL at 17:46

## 2017-01-01 RX ADMIN — FUROSEMIDE 40 MG: 40 TABLET ORAL at 08:42

## 2017-01-01 RX ADMIN — SENNOSIDES AND DOCUSATE SODIUM 1 TABLET: 8.6; 5 TABLET ORAL at 21:35

## 2017-01-01 RX ADMIN — MEGESTROL ACETATE 200 MG: 40 SUSPENSION ORAL at 17:12

## 2017-01-01 RX ADMIN — LEVOTHYROXINE SODIUM 150 MCG: 150 TABLET ORAL at 06:32

## 2017-01-01 RX ADMIN — POTASSIUM CHLORIDE 20 MEQ: 1500 TABLET, EXTENDED RELEASE ORAL at 09:28

## 2017-01-01 RX ADMIN — DONEPEZIL HYDROCHLORIDE 10 MG: 5 TABLET ORAL at 21:55

## 2017-01-01 RX ADMIN — METOPROLOL TARTRATE 75 MG: 25 TABLET, FILM COATED ORAL at 10:10

## 2017-01-01 RX ADMIN — FAMOTIDINE 20 MG: 20 TABLET ORAL at 17:22

## 2017-01-01 RX ADMIN — IPRATROPIUM BROMIDE AND ALBUTEROL SULFATE 3 ML: .5; 3 SOLUTION RESPIRATORY (INHALATION) at 15:36

## 2017-01-01 RX ADMIN — LEVETIRACETAM 500 MG: 500 TABLET, FILM COATED ORAL at 09:19

## 2017-01-01 RX ADMIN — DONEPEZIL HYDROCHLORIDE 10 MG: 5 TABLET ORAL at 20:36

## 2017-01-01 RX ADMIN — SENNOSIDES AND DOCUSATE SODIUM 1 TABLET: 8.6; 5 TABLET ORAL at 21:23

## 2017-01-01 RX ADMIN — Medication 1 CAPSULE: at 09:53

## 2017-01-01 RX ADMIN — GUAIFENESIN 1200 MG: 600 TABLET, EXTENDED RELEASE ORAL at 17:29

## 2017-01-01 RX ADMIN — METOPROLOL TARTRATE 75 MG: 25 TABLET, FILM COATED ORAL at 08:53

## 2017-01-01 RX ADMIN — DIPHENHYDRAMINE HYDROCHLORIDE 25 MG: 25 CAPSULE ORAL at 21:53

## 2017-01-01 RX ADMIN — IPRATROPIUM BROMIDE AND ALBUTEROL SULFATE 3 ML: .5; 3 SOLUTION RESPIRATORY (INHALATION) at 21:58

## 2017-01-01 RX ADMIN — ISOSORBIDE DINITRATE 10 MG: 10 TABLET ORAL at 08:37

## 2017-01-01 RX ADMIN — ISOSORBIDE DINITRATE 10 MG: 10 TABLET ORAL at 21:04

## 2017-01-01 RX ADMIN — IPRATROPIUM BROMIDE AND ALBUTEROL SULFATE 3 ML: .5; 3 SOLUTION RESPIRATORY (INHALATION) at 09:06

## 2017-01-01 RX ADMIN — IPRATROPIUM BROMIDE AND ALBUTEROL SULFATE 3 ML: .5; 3 SOLUTION RESPIRATORY (INHALATION) at 09:48

## 2017-01-01 RX ADMIN — ALLOPURINOL 100 MG: 100 TABLET ORAL at 23:03

## 2017-01-01 RX ADMIN — ALLOPURINOL 100 MG: 100 TABLET ORAL at 10:30

## 2017-01-01 RX ADMIN — STANDARDIZED SENNA CONCENTRATE 1 TABLET: 8.6 TABLET ORAL at 17:44

## 2017-01-01 RX ADMIN — METOPROLOL SUCCINATE 100 MG: 50 TABLET, EXTENDED RELEASE ORAL at 09:00

## 2017-01-01 RX ADMIN — BUDESONIDE 0.5 MG: 0.5 SUSPENSION RESPIRATORY (INHALATION) at 20:25

## 2017-01-01 RX ADMIN — METOPROLOL TARTRATE 75 MG: 25 TABLET, FILM COATED ORAL at 18:28

## 2017-01-01 RX ADMIN — Medication 1 TABLET: at 08:25

## 2017-01-01 RX ADMIN — IPRATROPIUM BROMIDE AND ALBUTEROL SULFATE 3 ML: .5; 3 SOLUTION RESPIRATORY (INHALATION) at 17:26

## 2017-01-01 RX ADMIN — METOPROLOL SUCCINATE 100 MG: 50 TABLET, EXTENDED RELEASE ORAL at 20:45

## 2017-01-01 RX ADMIN — FAMOTIDINE 20 MG: 20 TABLET ORAL at 08:14

## 2017-01-01 RX ADMIN — GUAIFENESIN 1200 MG: 600 TABLET, EXTENDED RELEASE ORAL at 08:09

## 2017-01-01 RX ADMIN — POTASSIUM CHLORIDE 10 MEQ: 750 TABLET, FILM COATED, EXTENDED RELEASE ORAL at 08:28

## 2017-01-01 RX ADMIN — DONEPEZIL HYDROCHLORIDE 10 MG: 5 TABLET, FILM COATED ORAL at 20:34

## 2017-01-01 RX ADMIN — GUAIFENESIN 1200 MG: 600 TABLET, EXTENDED RELEASE ORAL at 09:10

## 2017-01-01 RX ADMIN — STANDARDIZED SENNA CONCENTRATE 1 TABLET: 8.6 TABLET ORAL at 16:59

## 2017-01-01 RX ADMIN — SERTRALINE 100 MG: 50 TABLET, FILM COATED ORAL at 23:17

## 2017-01-01 RX ADMIN — Medication 150 MG: at 09:52

## 2017-01-01 RX ADMIN — IPRATROPIUM BROMIDE AND ALBUTEROL SULFATE 3 ML: .5; 3 SOLUTION RESPIRATORY (INHALATION) at 20:44

## 2017-01-01 RX ADMIN — Medication 1 CAPSULE: at 08:52

## 2017-01-01 RX ADMIN — METOPROLOL TARTRATE 75 MG: 25 TABLET, FILM COATED ORAL at 17:41

## 2017-01-01 RX ADMIN — Medication 1 CAPSULE: at 08:06

## 2017-01-01 RX ADMIN — METOPROLOL SUCCINATE 100 MG: 50 TABLET, EXTENDED RELEASE ORAL at 10:08

## 2017-01-01 RX ADMIN — ISOSORBIDE DINITRATE 10 MG: 10 TABLET ORAL at 12:11

## 2017-01-01 RX ADMIN — Medication 1 CAPSULE: at 08:39

## 2017-01-01 RX ADMIN — TRAMADOL HYDROCHLORIDE 50 MG: 50 TABLET, FILM COATED ORAL at 00:34

## 2017-01-01 RX ADMIN — KETOTIFEN FUMARATE 1 DROP: 0.25 SOLUTION/ DROPS OPHTHALMIC at 10:03

## 2017-01-01 RX ADMIN — BUDESONIDE 0.5 MG: 0.5 SUSPENSION RESPIRATORY (INHALATION) at 08:09

## 2017-01-01 RX ADMIN — Medication 150 MG: at 17:22

## 2017-01-01 RX ADMIN — SENNOSIDES AND DOCUSATE SODIUM 1 TABLET: 8.6; 5 TABLET ORAL at 20:38

## 2017-01-01 RX ADMIN — CONJUGATED ESTROGENS 0.75 APPLICATOR: 0.62 CREAM VAGINAL at 21:12

## 2017-01-01 RX ADMIN — INSULIN ASPART 2 UNITS: 100 INJECTION, SOLUTION INTRAVENOUS; SUBCUTANEOUS at 17:31

## 2017-01-01 RX ADMIN — DIPHENHYDRAMINE HYDROCHLORIDE 25 MG: 25 CAPSULE ORAL at 23:15

## 2017-01-01 RX ADMIN — DONEPEZIL HYDROCHLORIDE 10 MG: 5 TABLET, FILM COATED ORAL at 21:22

## 2017-01-01 RX ADMIN — TRAMADOL HYDROCHLORIDE 100 MG: 50 TABLET, FILM COATED ORAL at 21:36

## 2017-01-01 RX ADMIN — TRAMADOL HYDROCHLORIDE 100 MG: 50 TABLET, FILM COATED ORAL at 20:12

## 2017-01-01 RX ADMIN — SERTRALINE HYDROCHLORIDE 100 MG: 50 TABLET ORAL at 20:45

## 2017-01-01 RX ADMIN — LEVETIRACETAM 500 MG: 500 TABLET, FILM COATED ORAL at 16:56

## 2017-01-01 RX ADMIN — GUAIFENESIN 1200 MG: 600 TABLET, EXTENDED RELEASE ORAL at 17:43

## 2017-01-01 RX ADMIN — DILTIAZEM HYDROCHLORIDE 240 MG: 120 CAPSULE, COATED, EXTENDED RELEASE ORAL at 08:28

## 2017-01-01 RX ADMIN — Medication 1 TABLET: at 18:03

## 2017-01-01 RX ADMIN — SODIUM CHLORIDE 100 ML/HR: 9 INJECTION, SOLUTION INTRAVENOUS at 14:54

## 2017-01-01 RX ADMIN — Medication 1 TABLET: at 09:08

## 2017-01-01 RX ADMIN — ALLOPURINOL 100 MG: 100 TABLET ORAL at 08:58

## 2017-01-01 RX ADMIN — PIPERACILLIN SODIUM AND TAZOBACTAM SODIUM 3.38 G: 3; .375 INJECTION, POWDER, LYOPHILIZED, FOR SOLUTION INTRAVENOUS at 13:33

## 2017-01-01 RX ADMIN — INSULIN ASPART 2 UNITS: 100 INJECTION, SOLUTION INTRAVENOUS; SUBCUTANEOUS at 18:30

## 2017-01-01 RX ADMIN — Medication 150 MG: at 17:51

## 2017-01-01 RX ADMIN — SENNOSIDES AND DOCUSATE SODIUM 1 TABLET: 8.6; 5 TABLET ORAL at 08:10

## 2017-01-01 RX ADMIN — FUROSEMIDE 40 MG: 40 TABLET ORAL at 10:50

## 2017-01-01 RX ADMIN — Medication 150 MG: at 17:28

## 2017-01-01 RX ADMIN — Medication 1 CAPSULE: at 12:11

## 2017-01-01 RX ADMIN — APIXABAN 5 MG: 2.5 TABLET, FILM COATED ORAL at 12:35

## 2017-01-01 RX ADMIN — Medication 1 TABLET: at 18:20

## 2017-01-01 RX ADMIN — FUROSEMIDE 40 MG: 40 TABLET ORAL at 08:18

## 2017-01-01 RX ADMIN — LEVETIRACETAM 500 MG: 500 TABLET, FILM COATED ORAL at 09:56

## 2017-01-01 RX ADMIN — MIRTAZAPINE 15 MG: 15 TABLET, FILM COATED ORAL at 21:16

## 2017-01-01 RX ADMIN — ALLOPURINOL 100 MG: 100 TABLET ORAL at 08:15

## 2017-01-01 RX ADMIN — ACETAMINOPHEN 650 MG: 325 TABLET, FILM COATED ORAL at 20:34

## 2017-01-01 RX ADMIN — IPRATROPIUM BROMIDE AND ALBUTEROL SULFATE 3 ML: .5; 3 SOLUTION RESPIRATORY (INHALATION) at 22:50

## 2017-01-01 RX ADMIN — SERTRALINE HYDROCHLORIDE 100 MG: 50 TABLET ORAL at 20:15

## 2017-01-01 RX ADMIN — METOPROLOL TARTRATE 75 MG: 25 TABLET, FILM COATED ORAL at 08:00

## 2017-01-01 RX ADMIN — ALLOPURINOL 100 MG: 100 TABLET ORAL at 20:56

## 2017-01-01 RX ADMIN — DILTIAZEM HYDROCHLORIDE 120 MG: 60 TABLET, FILM COATED ORAL at 18:07

## 2017-01-01 RX ADMIN — KETOTIFEN FUMARATE 1 DROP: 0.25 SOLUTION/ DROPS OPHTHALMIC at 08:54

## 2017-01-01 RX ADMIN — FAMOTIDINE 40 MG: 20 TABLET ORAL at 20:33

## 2017-01-01 RX ADMIN — ALLOPURINOL 100 MG: 100 TABLET ORAL at 21:33

## 2017-01-01 RX ADMIN — METOPROLOL SUCCINATE 100 MG: 50 TABLET, EXTENDED RELEASE ORAL at 10:29

## 2017-01-01 RX ADMIN — DILTIAZEM HYDROCHLORIDE 120 MG: 60 TABLET, FILM COATED ORAL at 08:35

## 2017-01-01 RX ADMIN — MICONAZOLE NITRATE: 20 POWDER TOPICAL at 08:51

## 2017-01-01 RX ADMIN — STANDARDIZED SENNA CONCENTRATE 1 TABLET: 8.6 TABLET ORAL at 17:46

## 2017-01-01 RX ADMIN — APIXABAN 5 MG: 2.5 TABLET, FILM COATED ORAL at 09:15

## 2017-01-01 RX ADMIN — FAMOTIDINE 40 MG: 20 TABLET, FILM COATED ORAL at 08:20

## 2017-01-01 RX ADMIN — METOPROLOL SUCCINATE 100 MG: 50 TABLET, EXTENDED RELEASE ORAL at 09:07

## 2017-01-01 RX ADMIN — ALLOPURINOL 100 MG: 100 TABLET ORAL at 20:28

## 2017-01-01 RX ADMIN — FUROSEMIDE 40 MG: 40 TABLET ORAL at 08:44

## 2017-01-01 RX ADMIN — Medication 1 TABLET: at 11:20

## 2017-01-01 RX ADMIN — FAMOTIDINE 40 MG: 20 TABLET, FILM COATED ORAL at 18:07

## 2017-01-01 RX ADMIN — MICONAZOLE NITRATE: 20 POWDER TOPICAL at 14:37

## 2017-01-01 RX ADMIN — SERTRALINE HYDROCHLORIDE 75 MG: 50 TABLET ORAL at 20:33

## 2017-01-01 RX ADMIN — ISOSORBIDE DINITRATE 10 MG: 10 TABLET ORAL at 08:46

## 2017-01-01 RX ADMIN — IPRATROPIUM BROMIDE AND ALBUTEROL SULFATE 3 ML: .5; 3 SOLUTION RESPIRATORY (INHALATION) at 17:01

## 2017-01-01 RX ADMIN — ISOSORBIDE DINITRATE 10 MG: 10 TABLET ORAL at 09:01

## 2017-01-01 RX ADMIN — BUDESONIDE 0.5 MG: 0.5 SUSPENSION RESPIRATORY (INHALATION) at 21:11

## 2017-01-01 RX ADMIN — APIXABAN 5 MG: 2.5 TABLET, FILM COATED ORAL at 08:50

## 2017-01-01 RX ADMIN — METOPROLOL TARTRATE 75 MG: 25 TABLET, FILM COATED ORAL at 08:14

## 2017-01-01 RX ADMIN — CALCIUM CARBONATE 500 MG (1,250 MG)-VITAMIN D3 200 UNIT TABLET 500 MG: at 09:31

## 2017-01-01 RX ADMIN — IPRATROPIUM BROMIDE AND ALBUTEROL SULFATE 3 ML: .5; 3 SOLUTION RESPIRATORY (INHALATION) at 12:39

## 2017-01-01 RX ADMIN — DOXAZOSIN MESYLATE 2 MG: 2 TABLET ORAL at 23:08

## 2017-01-01 RX ADMIN — ASPIRIN 81 MG: 81 TABLET, COATED ORAL at 08:08

## 2017-01-01 RX ADMIN — METOPROLOL TARTRATE 25 MG: 25 TABLET, FILM COATED ORAL at 17:51

## 2017-01-01 RX ADMIN — GUAIFENESIN 1200 MG: 600 TABLET, EXTENDED RELEASE ORAL at 09:37

## 2017-01-01 RX ADMIN — GLIPIZIDE 5 MG: 5 TABLET, FILM COATED, EXTENDED RELEASE ORAL at 09:20

## 2017-01-01 RX ADMIN — KETOTIFEN FUMARATE 1 DROP: 0.25 SOLUTION/ DROPS OPHTHALMIC at 17:32

## 2017-01-01 RX ADMIN — IPRATROPIUM BROMIDE AND ALBUTEROL SULFATE 3 ML: .5; 3 SOLUTION RESPIRATORY (INHALATION) at 11:30

## 2017-01-01 RX ADMIN — METOPROLOL TARTRATE 100 MG: 25 TABLET, FILM COATED ORAL at 20:38

## 2017-01-01 RX ADMIN — ALLOPURINOL 100 MG: 100 TABLET ORAL at 08:38

## 2017-01-01 RX ADMIN — FAMOTIDINE 20 MG: 20 TABLET ORAL at 08:00

## 2017-01-01 RX ADMIN — KETOTIFEN FUMARATE 1 DROP: 0.25 SOLUTION/ DROPS OPHTHALMIC at 18:12

## 2017-01-01 RX ADMIN — METOPROLOL SUCCINATE 100 MG: 50 TABLET, EXTENDED RELEASE ORAL at 08:36

## 2017-01-01 RX ADMIN — GLIPIZIDE 5 MG: 2.5 TABLET, FILM COATED, EXTENDED RELEASE ORAL at 08:24

## 2017-01-01 RX ADMIN — METOPROLOL SUCCINATE 100 MG: 50 TABLET, EXTENDED RELEASE ORAL at 10:48

## 2017-01-01 RX ADMIN — DILTIAZEM HYDROCHLORIDE 120 MG: 60 TABLET, FILM COATED ORAL at 09:53

## 2017-01-01 RX ADMIN — KETOTIFEN FUMARATE 1 DROP: 0.25 SOLUTION/ DROPS OPHTHALMIC at 09:52

## 2017-01-01 RX ADMIN — DIPHENHYDRAMINE HYDROCHLORIDE 25 MG: 25 CAPSULE ORAL at 00:23

## 2017-01-01 RX ADMIN — TRAMADOL HYDROCHLORIDE 50 MG: 50 TABLET, FILM COATED ORAL at 21:12

## 2017-01-01 RX ADMIN — APIXABAN 5 MG: 2.5 TABLET, FILM COATED ORAL at 21:30

## 2017-01-01 RX ADMIN — METOPROLOL SUCCINATE 100 MG: 50 TABLET, EXTENDED RELEASE ORAL at 20:56

## 2017-01-01 RX ADMIN — TRAMADOL HYDROCHLORIDE 100 MG: 50 TABLET, FILM COATED ORAL at 04:51

## 2017-01-01 RX ADMIN — LEVETIRACETAM 500 MG: 500 TABLET, FILM COATED ORAL at 08:36

## 2017-01-01 RX ADMIN — CONJUGATED ESTROGENS 1 APPLICATION: 0.62 CREAM VAGINAL at 22:02

## 2017-01-01 RX ADMIN — OFLOXACIN 50000 UNITS: 300 TABLET, COATED ORAL at 14:18

## 2017-01-01 RX ADMIN — MICONAZOLE NITRATE: 20 POWDER TOPICAL at 10:03

## 2017-01-01 RX ADMIN — GUAIFENESIN 1200 MG: 600 TABLET, EXTENDED RELEASE ORAL at 17:24

## 2017-01-01 RX ADMIN — ALLOPURINOL 100 MG: 100 TABLET ORAL at 09:27

## 2017-01-01 RX ADMIN — MICONAZOLE NITRATE: 20 POWDER TOPICAL at 23:06

## 2017-01-01 RX ADMIN — DIPHENHYDRAMINE HYDROCHLORIDE 25 MG: 25 CAPSULE ORAL at 00:03

## 2017-01-01 RX ADMIN — METOPROLOL SUCCINATE 100 MG: 50 TABLET, EXTENDED RELEASE ORAL at 21:56

## 2017-01-01 RX ADMIN — METOPROLOL SUCCINATE 100 MG: 50 TABLET, EXTENDED RELEASE ORAL at 08:44

## 2017-01-01 RX ADMIN — IPRATROPIUM BROMIDE AND ALBUTEROL SULFATE 3 ML: .5; 3 SOLUTION RESPIRATORY (INHALATION) at 12:40

## 2017-01-01 RX ADMIN — DONEPEZIL HYDROCHLORIDE 10 MG: 5 TABLET ORAL at 20:52

## 2017-01-01 RX ADMIN — Medication 150 MG: at 17:24

## 2017-01-01 RX ADMIN — LEVOTHYROXINE SODIUM 150 MCG: 150 TABLET ORAL at 06:50

## 2017-01-01 RX ADMIN — DONEPEZIL HYDROCHLORIDE 10 MG: 5 TABLET ORAL at 20:17

## 2017-01-01 RX ADMIN — FAMOTIDINE 20 MG: 20 TABLET, FILM COATED ORAL at 08:28

## 2017-01-01 RX ADMIN — Medication 1 TABLET: at 09:21

## 2017-01-01 RX ADMIN — STANDARDIZED SENNA CONCENTRATE 1 TABLET: 8.6 TABLET ORAL at 18:50

## 2017-01-01 RX ADMIN — Medication 10 ML: at 20:51

## 2017-01-01 RX ADMIN — Medication 1 CAPSULE: at 09:51

## 2017-01-01 RX ADMIN — APIXABAN 5 MG: 2.5 TABLET, FILM COATED ORAL at 23:16

## 2017-01-01 RX ADMIN — LEVETIRACETAM 500 MG: 500 TABLET, FILM COATED ORAL at 21:39

## 2017-01-01 RX ADMIN — Medication 1 CAPSULE: at 09:59

## 2017-01-01 RX ADMIN — KETOTIFEN FUMARATE 1 DROP: 0.35 SOLUTION/ DROPS OPHTHALMIC at 17:53

## 2017-01-01 RX ADMIN — MICONAZOLE NITRATE: 20 POWDER TOPICAL at 22:13

## 2017-01-01 RX ADMIN — TRAMADOL HYDROCHLORIDE 100 MG: 50 TABLET, FILM COATED ORAL at 02:28

## 2017-01-01 RX ADMIN — CONJUGATED ESTROGENS 0.75 APPLICATION: 0.62 CREAM VAGINAL at 17:19

## 2017-01-01 RX ADMIN — METOPROLOL SUCCINATE 100 MG: 50 TABLET, EXTENDED RELEASE ORAL at 08:12

## 2017-01-01 RX ADMIN — BUDESONIDE 0.5 MG: 0.5 SUSPENSION RESPIRATORY (INHALATION) at 07:53

## 2017-01-01 RX ADMIN — LEVETIRACETAM 500 MG: 500 TABLET, FILM COATED ORAL at 08:53

## 2017-01-01 RX ADMIN — Medication 1 CAPSULE: at 08:21

## 2017-01-01 RX ADMIN — KETOTIFEN FUMARATE 1 DROP: 0.25 SOLUTION/ DROPS OPHTHALMIC at 10:04

## 2017-01-01 RX ADMIN — METOPROLOL SUCCINATE 100 MG: 50 TABLET, EXTENDED RELEASE ORAL at 20:36

## 2017-01-01 RX ADMIN — LEVETIRACETAM 500 MG: 500 TABLET, FILM COATED ORAL at 20:26

## 2017-01-01 RX ADMIN — LINAGLIPTIN 5 MG: 5 TABLET, FILM COATED ORAL at 08:29

## 2017-01-01 RX ADMIN — DOCUSATE SODIUM AND SENNOSIDES 1 TABLET: 8.6; 5 TABLET, FILM COATED ORAL at 21:45

## 2017-01-01 RX ADMIN — SENNOSIDES AND DOCUSATE SODIUM 1 TABLET: 8.6; 5 TABLET ORAL at 08:20

## 2017-01-01 RX ADMIN — ISOSORBIDE DINITRATE 10 MG: 10 TABLET ORAL at 22:19

## 2017-01-01 RX ADMIN — POTASSIUM CHLORIDE 20 MEQ: 1500 TABLET, EXTENDED RELEASE ORAL at 10:23

## 2017-01-01 RX ADMIN — AMIODARONE HYDROCHLORIDE 200 MG: 200 TABLET ORAL at 20:54

## 2017-01-01 RX ADMIN — LEVETIRACETAM 500 MG: 500 TABLET, FILM COATED ORAL at 11:42

## 2017-01-01 RX ADMIN — IPRATROPIUM BROMIDE AND ALBUTEROL SULFATE 3 ML: .5; 3 SOLUTION RESPIRATORY (INHALATION) at 07:30

## 2017-01-01 RX ADMIN — KETOTIFEN FUMARATE 1 DROP: 0.25 SOLUTION/ DROPS OPHTHALMIC at 11:03

## 2017-01-01 RX ADMIN — POTASSIUM CHLORIDE 20 MEQ: 1500 TABLET, EXTENDED RELEASE ORAL at 07:27

## 2017-01-01 RX ADMIN — GUAIFENESIN 1200 MG: 600 TABLET, EXTENDED RELEASE ORAL at 11:20

## 2017-01-01 RX ADMIN — MICONAZOLE NITRATE 1 APPLICATION: 20 POWDER TOPICAL at 08:39

## 2017-01-01 RX ADMIN — SENNOSIDES AND DOCUSATE SODIUM 1 TABLET: 8.6; 5 TABLET ORAL at 09:33

## 2017-01-01 RX ADMIN — Medication 1 CAPSULE: at 10:43

## 2017-01-01 RX ADMIN — POTASSIUM CHLORIDE 20 MEQ: 1500 TABLET, EXTENDED RELEASE ORAL at 09:24

## 2017-01-01 RX ADMIN — FUROSEMIDE 40 MG: 40 TABLET ORAL at 10:31

## 2017-01-01 RX ADMIN — FUROSEMIDE 40 MG: 40 TABLET ORAL at 10:32

## 2017-01-01 RX ADMIN — KETOTIFEN FUMARATE 1 DROP: 0.25 SOLUTION/ DROPS OPHTHALMIC at 09:01

## 2017-01-01 RX ADMIN — APIXABAN 5 MG: 2.5 TABLET, FILM COATED ORAL at 12:10

## 2017-01-01 RX ADMIN — BUDESONIDE 0.5 MG: 0.5 SUSPENSION RESPIRATORY (INHALATION) at 21:01

## 2017-01-01 RX ADMIN — Medication 10 ML: at 08:47

## 2017-01-01 RX ADMIN — STANDARDIZED SENNA CONCENTRATE 1 TABLET: 8.6 TABLET ORAL at 10:34

## 2017-01-01 RX ADMIN — METOPROLOL SUCCINATE 100 MG: 50 TABLET, EXTENDED RELEASE ORAL at 09:38

## 2017-01-01 RX ADMIN — SENNOSIDES AND DOCUSATE SODIUM 1 TABLET: 8.6; 5 TABLET ORAL at 09:35

## 2017-01-01 RX ADMIN — FAMOTIDINE 40 MG: 20 TABLET ORAL at 21:38

## 2017-01-01 RX ADMIN — MIRTAZAPINE 15 MG: 15 TABLET, FILM COATED ORAL at 20:25

## 2017-01-01 RX ADMIN — AMIODARONE HYDROCHLORIDE 200 MG: 200 TABLET ORAL at 08:21

## 2017-01-01 RX ADMIN — BUDESONIDE 0.5 MG: 0.5 SUSPENSION RESPIRATORY (INHALATION) at 08:36

## 2017-01-01 RX ADMIN — APIXABAN 5 MG: 2.5 TABLET, FILM COATED ORAL at 09:12

## 2017-01-01 RX ADMIN — APIXABAN 5 MG: 2.5 TABLET, FILM COATED ORAL at 20:49

## 2017-01-01 RX ADMIN — MICONAZOLE NITRATE: 20 POWDER TOPICAL at 07:46

## 2017-01-01 RX ADMIN — APIXABAN 5 MG: 2.5 TABLET, FILM COATED ORAL at 20:02

## 2017-01-01 RX ADMIN — DILTIAZEM HYDROCHLORIDE 120 MG: 60 TABLET, FILM COATED ORAL at 17:22

## 2017-01-01 RX ADMIN — ISOSORBIDE DINITRATE 10 MG: 10 TABLET ORAL at 20:42

## 2017-01-01 RX ADMIN — ALLOPURINOL 100 MG: 100 TABLET ORAL at 20:42

## 2017-01-01 RX ADMIN — LEVETIRACETAM 500 MG: 500 TABLET, FILM COATED ORAL at 21:50

## 2017-01-01 RX ADMIN — DIPHENHYDRAMINE HYDROCHLORIDE 25 MG: 25 CAPSULE ORAL at 14:15

## 2017-01-01 RX ADMIN — PIPERACILLIN SODIUM AND TAZOBACTAM SODIUM 3.38 G: 3; .375 INJECTION, POWDER, LYOPHILIZED, FOR SOLUTION INTRAVENOUS at 05:50

## 2017-01-01 RX ADMIN — MICONAZOLE NITRATE: 20 POWDER TOPICAL at 22:22

## 2017-01-01 RX ADMIN — METOPROLOL TARTRATE 75 MG: 25 TABLET, FILM COATED ORAL at 09:28

## 2017-01-01 RX ADMIN — STANDARDIZED SENNA CONCENTRATE 1 TABLET: 8.6 TABLET ORAL at 08:34

## 2017-01-01 RX ADMIN — AMIODARONE HYDROCHLORIDE 200 MG: 200 TABLET ORAL at 09:33

## 2017-01-01 RX ADMIN — SERTRALINE HYDROCHLORIDE 100 MG: 50 TABLET ORAL at 21:26

## 2017-01-01 RX ADMIN — LEVETIRACETAM 500 MG: 500 TABLET, FILM COATED ORAL at 21:30

## 2017-01-01 RX ADMIN — Medication 1 CAPSULE: at 09:13

## 2017-01-01 RX ADMIN — BUDESONIDE 0.5 MG: 0.5 SUSPENSION RESPIRATORY (INHALATION) at 11:45

## 2017-01-01 RX ADMIN — FUROSEMIDE 40 MG: 40 TABLET ORAL at 09:49

## 2017-01-01 RX ADMIN — FAMOTIDINE 40 MG: 20 TABLET ORAL at 21:34

## 2017-01-01 RX ADMIN — APIXABAN 5 MG: 2.5 TABLET, FILM COATED ORAL at 09:47

## 2017-01-01 RX ADMIN — SERTRALINE HYDROCHLORIDE 100 MG: 50 TABLET ORAL at 20:04

## 2017-01-01 RX ADMIN — LEVETIRACETAM 500 MG: 500 TABLET, FILM COATED ORAL at 09:49

## 2017-01-01 RX ADMIN — Medication 1 TABLET: at 07:55

## 2017-01-01 RX ADMIN — APIXABAN 5 MG: 2.5 TABLET, FILM COATED ORAL at 21:04

## 2017-01-01 RX ADMIN — MICONAZOLE NITRATE: 20 POWDER TOPICAL at 11:02

## 2017-01-01 RX ADMIN — AMIODARONE HYDROCHLORIDE 200 MG: 200 TABLET ORAL at 08:41

## 2017-01-01 RX ADMIN — Medication 1 CAPSULE: at 09:49

## 2017-01-01 RX ADMIN — METOPROLOL SUCCINATE 100 MG: 50 TABLET, EXTENDED RELEASE ORAL at 08:30

## 2017-01-01 RX ADMIN — Medication 150 MG: at 19:05

## 2017-01-01 RX ADMIN — TRAMADOL HYDROCHLORIDE 50 MG: 50 TABLET, FILM COATED ORAL at 19:50

## 2017-01-01 RX ADMIN — APIXABAN 5 MG: 2.5 TABLET, FILM COATED ORAL at 10:46

## 2017-01-01 RX ADMIN — DILTIAZEM HYDROCHLORIDE 120 MG: 60 TABLET, FILM COATED ORAL at 17:01

## 2017-01-01 RX ADMIN — DONEPEZIL HYDROCHLORIDE 10 MG: 5 TABLET ORAL at 21:41

## 2017-01-01 RX ADMIN — CLINDAMYCIN HYDROCHLORIDE 450 MG: 150 CAPSULE ORAL at 21:35

## 2017-01-01 RX ADMIN — FAMOTIDINE 20 MG: 20 TABLET, FILM COATED ORAL at 08:53

## 2017-01-01 RX ADMIN — IPRATROPIUM BROMIDE AND ALBUTEROL SULFATE 3 ML: .5; 3 SOLUTION RESPIRATORY (INHALATION) at 08:39

## 2017-01-01 RX ADMIN — CALCIUM CARBONATE 500 MG (1,250 MG)-VITAMIN D3 200 UNIT TABLET 500 MG: at 20:39

## 2017-01-01 RX ADMIN — DILTIAZEM HYDROCHLORIDE 300 MG: 180 CAPSULE, EXTENDED RELEASE ORAL at 15:04

## 2017-01-01 RX ADMIN — INSULIN ASPART 2 UNITS: 100 INJECTION, SOLUTION INTRAVENOUS; SUBCUTANEOUS at 08:36

## 2017-01-01 RX ADMIN — Medication 150 MG: at 18:04

## 2017-01-01 RX ADMIN — KETOTIFEN FUMARATE 1 DROP: 0.25 SOLUTION/ DROPS OPHTHALMIC at 09:07

## 2017-01-01 RX ADMIN — METOPROLOL TARTRATE 75 MG: 25 TABLET, FILM COATED ORAL at 17:46

## 2017-01-01 RX ADMIN — TRAMADOL HYDROCHLORIDE 50 MG: 50 TABLET, FILM COATED ORAL at 06:11

## 2017-01-01 RX ADMIN — GUAIFENESIN 1200 MG: 600 TABLET, EXTENDED RELEASE ORAL at 17:28

## 2017-01-01 RX ADMIN — LEVETIRACETAM 500 MG: 500 TABLET, FILM COATED ORAL at 22:13

## 2017-01-01 RX ADMIN — MICONAZOLE NITRATE: 20 POWDER TOPICAL at 08:41

## 2017-01-01 RX ADMIN — LINAGLIPTIN 5 MG: 5 TABLET, FILM COATED ORAL at 11:37

## 2017-01-01 RX ADMIN — METOPROLOL SUCCINATE 100 MG: 50 TABLET, EXTENDED RELEASE ORAL at 08:23

## 2017-01-01 RX ADMIN — Medication 150 MG: at 09:34

## 2017-01-01 RX ADMIN — APIXABAN 5 MG: 2.5 TABLET, FILM COATED ORAL at 08:33

## 2017-01-01 RX ADMIN — MICONAZOLE NITRATE: 20 POWDER TOPICAL at 09:20

## 2017-01-01 RX ADMIN — METOPROLOL TARTRATE 2.5 MG: 1 INJECTION, SOLUTION INTRAVENOUS at 00:08

## 2017-01-01 RX ADMIN — KETOTIFEN FUMARATE 1 DROP: 0.25 SOLUTION/ DROPS OPHTHALMIC at 17:43

## 2017-01-01 RX ADMIN — FAMOTIDINE 40 MG: 20 TABLET ORAL at 21:33

## 2017-01-01 RX ADMIN — MULTIPLE VITAMINS W/ MINERALS TAB 1 TABLET: TAB at 09:58

## 2017-01-01 RX ADMIN — LEVETIRACETAM 500 MG: 500 TABLET, FILM COATED ORAL at 17:35

## 2017-01-01 RX ADMIN — MICONAZOLE NITRATE: 20 POWDER TOPICAL at 09:29

## 2017-01-01 RX ADMIN — ALLOPURINOL 100 MG: 100 TABLET ORAL at 08:56

## 2017-01-01 RX ADMIN — FAMOTIDINE 40 MG: 20 TABLET ORAL at 20:28

## 2017-01-01 RX ADMIN — LORAZEPAM 0.5 MG: 0.5 TABLET ORAL at 20:34

## 2017-01-01 RX ADMIN — BUDESONIDE 0.5 MG: 0.5 SUSPENSION RESPIRATORY (INHALATION) at 08:26

## 2017-01-01 RX ADMIN — Medication 1 TABLET: at 08:33

## 2017-01-01 RX ADMIN — ALLOPURINOL 100 MG: 100 TABLET ORAL at 20:59

## 2017-01-01 RX ADMIN — SODIUM POLYSTYRENE SULFONATE 30 G: 15 SUSPENSION ORAL; RECTAL at 13:15

## 2017-01-01 RX ADMIN — DONEPEZIL HYDROCHLORIDE 10 MG: 5 TABLET ORAL at 23:32

## 2017-01-01 RX ADMIN — IPRATROPIUM BROMIDE AND ALBUTEROL SULFATE 3 ML: .5; 3 SOLUTION RESPIRATORY (INHALATION) at 21:07

## 2017-01-01 RX ADMIN — AMIODARONE HYDROCHLORIDE 200 MG: 200 TABLET ORAL at 20:35

## 2017-01-01 RX ADMIN — GLIPIZIDE 2.5 MG: 2.5 TABLET, FILM COATED, EXTENDED RELEASE ORAL at 09:12

## 2017-01-01 RX ADMIN — DILTIAZEM HYDROCHLORIDE 300 MG: 180 CAPSULE, COATED, EXTENDED RELEASE ORAL at 13:17

## 2017-01-01 RX ADMIN — DONEPEZIL HYDROCHLORIDE 10 MG: 5 TABLET ORAL at 20:46

## 2017-01-01 RX ADMIN — ACETAMINOPHEN 650 MG: 325 TABLET, FILM COATED ORAL at 21:27

## 2017-01-01 RX ADMIN — SERTRALINE 100 MG: 50 TABLET, FILM COATED ORAL at 22:19

## 2017-01-01 RX ADMIN — SERTRALINE 100 MG: 50 TABLET, FILM COATED ORAL at 21:12

## 2017-01-01 RX ADMIN — ALLOPURINOL 100 MG: 100 TABLET ORAL at 09:52

## 2017-01-01 RX ADMIN — INSULIN ASPART 2 UNITS: 100 INJECTION, SOLUTION INTRAVENOUS; SUBCUTANEOUS at 20:43

## 2017-01-01 RX ADMIN — FAMOTIDINE 40 MG: 20 TABLET, FILM COATED ORAL at 21:58

## 2017-01-01 RX ADMIN — FUROSEMIDE 40 MG: 40 TABLET ORAL at 07:51

## 2017-01-01 RX ADMIN — MICONAZOLE NITRATE: 20 POWDER TOPICAL at 21:46

## 2017-01-01 RX ADMIN — FAMOTIDINE 20 MG: 20 TABLET, FILM COATED ORAL at 09:59

## 2017-01-01 RX ADMIN — METOPROLOL SUCCINATE 100 MG: 50 TABLET, EXTENDED RELEASE ORAL at 21:03

## 2017-01-01 RX ADMIN — AMIODARONE HYDROCHLORIDE 200 MG: 200 TABLET ORAL at 20:22

## 2017-01-01 RX ADMIN — ALLOPURINOL 100 MG: 100 TABLET ORAL at 20:31

## 2017-01-01 RX ADMIN — IPRATROPIUM BROMIDE AND ALBUTEROL SULFATE 3 ML: .5; 3 SOLUTION RESPIRATORY (INHALATION) at 23:10

## 2017-01-01 RX ADMIN — FAMOTIDINE 20 MG: 20 TABLET, FILM COATED ORAL at 09:19

## 2017-01-01 RX ADMIN — MICONAZOLE NITRATE: 20 POWDER TOPICAL at 08:11

## 2017-01-01 RX ADMIN — DIPHENHYDRAMINE HYDROCHLORIDE 25 MG: 25 CAPSULE ORAL at 08:06

## 2017-01-01 RX ADMIN — DONEPEZIL HYDROCHLORIDE 10 MG: 5 TABLET ORAL at 20:25

## 2017-01-01 RX ADMIN — METOPROLOL SUCCINATE 100 MG: 50 TABLET, EXTENDED RELEASE ORAL at 08:13

## 2017-01-01 RX ADMIN — METOPROLOL TARTRATE 25 MG: 25 TABLET, FILM COATED ORAL at 17:11

## 2017-01-01 RX ADMIN — TRAMADOL HYDROCHLORIDE 50 MG: 50 TABLET, FILM COATED ORAL at 04:56

## 2017-01-01 RX ADMIN — LEVETIRACETAM 500 MG: 500 TABLET, FILM COATED ORAL at 22:18

## 2017-01-01 RX ADMIN — METOPROLOL TARTRATE 75 MG: 25 TABLET, FILM COATED ORAL at 19:23

## 2017-01-01 RX ADMIN — DILTIAZEM HYDROCHLORIDE 240 MG: 120 CAPSULE, COATED, EXTENDED RELEASE ORAL at 10:34

## 2017-01-01 RX ADMIN — SODIUM CHLORIDE 75 ML/HR: 9 INJECTION, SOLUTION INTRAVENOUS at 05:16

## 2017-01-01 RX ADMIN — SERTRALINE 100 MG: 50 TABLET, FILM COATED ORAL at 21:54

## 2017-01-01 RX ADMIN — AMPICILLIN SODIUM AND SULBACTAM SODIUM 3 G: 2; 1 INJECTION, POWDER, FOR SOLUTION INTRAMUSCULAR; INTRAVENOUS at 13:38

## 2017-01-01 RX ADMIN — MEGESTROL ACETATE 200 MG: 40 SUSPENSION ORAL at 08:21

## 2017-01-01 RX ADMIN — Medication 150 MG: at 18:44

## 2017-01-01 RX ADMIN — SENNOSIDES AND DOCUSATE SODIUM 1 TABLET: 8.6; 5 TABLET ORAL at 20:29

## 2017-01-01 RX ADMIN — GUAIFENESIN 1200 MG: 600 TABLET, EXTENDED RELEASE ORAL at 12:08

## 2017-01-01 RX ADMIN — LEVETIRACETAM 500 MG: 500 TABLET, FILM COATED ORAL at 22:33

## 2017-01-01 RX ADMIN — CLINDAMYCIN PHOSPHATE 600 MG: 12 INJECTION, SOLUTION INTRAVENOUS at 23:28

## 2017-01-01 RX ADMIN — MICONAZOLE NITRATE: 20 POWDER TOPICAL at 23:22

## 2017-01-01 RX ADMIN — ISOSORBIDE DINITRATE 10 MG: 10 TABLET ORAL at 21:43

## 2017-01-01 RX ADMIN — METOPROLOL TARTRATE 75 MG: 25 TABLET, FILM COATED ORAL at 18:14

## 2017-01-01 RX ADMIN — SENNOSIDES AND DOCUSATE SODIUM 1 TABLET: 8.6; 5 TABLET ORAL at 09:08

## 2017-01-01 RX ADMIN — POTASSIUM CHLORIDE 10 MEQ: 750 TABLET, FILM COATED, EXTENDED RELEASE ORAL at 08:54

## 2017-01-01 RX ADMIN — Medication 150 MG: at 09:01

## 2017-01-01 RX ADMIN — APIXABAN 5 MG: 2.5 TABLET, FILM COATED ORAL at 20:05

## 2017-01-01 RX ADMIN — BUDESONIDE 0.5 MG: 0.5 SUSPENSION RESPIRATORY (INHALATION) at 21:30

## 2017-01-01 RX ADMIN — FAMOTIDINE 40 MG: 20 TABLET ORAL at 20:02

## 2017-01-01 RX ADMIN — MEGESTROL ACETATE 200 MG: 40 SUSPENSION ORAL at 09:03

## 2017-01-01 RX ADMIN — GLIPIZIDE 5 MG: 5 TABLET ORAL at 17:35

## 2017-01-01 RX ADMIN — LINAGLIPTIN 5 MG: 5 TABLET, FILM COATED ORAL at 08:32

## 2017-01-01 RX ADMIN — KETOTIFEN FUMARATE 1 DROP: 0.35 SOLUTION/ DROPS OPHTHALMIC at 08:31

## 2017-01-01 RX ADMIN — FAMOTIDINE 20 MG: 20 TABLET ORAL at 10:12

## 2017-01-01 RX ADMIN — ISOSORBIDE DINITRATE 10 MG: 10 TABLET ORAL at 20:45

## 2017-01-01 RX ADMIN — SERTRALINE 100 MG: 50 TABLET, FILM COATED ORAL at 20:30

## 2017-01-01 RX ADMIN — Medication 1 CAPSULE: at 09:40

## 2017-01-01 RX ADMIN — INSULIN ASPART 3 UNITS: 100 INJECTION, SOLUTION INTRAVENOUS; SUBCUTANEOUS at 12:02

## 2017-01-01 RX ADMIN — METOPROLOL SUCCINATE 100 MG: 50 TABLET, EXTENDED RELEASE ORAL at 20:30

## 2017-01-01 RX ADMIN — TRAMADOL HYDROCHLORIDE 100 MG: 50 TABLET, FILM COATED ORAL at 16:44

## 2017-01-01 RX ADMIN — CALCIUM CARBONATE 500 MG (1,250 MG)-VITAMIN D3 200 UNIT TABLET 500 MG: at 10:51

## 2017-01-01 RX ADMIN — Medication 10 ML: at 20:16

## 2017-01-01 RX ADMIN — DILTIAZEM HYDROCHLORIDE 240 MG: 120 CAPSULE, COATED, EXTENDED RELEASE ORAL at 08:43

## 2017-01-01 RX ADMIN — IPRATROPIUM BROMIDE AND ALBUTEROL SULFATE 3 ML: .5; 3 SOLUTION RESPIRATORY (INHALATION) at 12:47

## 2017-01-01 RX ADMIN — POTASSIUM CHLORIDE 20 MEQ: 1500 TABLET, EXTENDED RELEASE ORAL at 08:12

## 2017-01-01 RX ADMIN — FUROSEMIDE 40 MG: 40 TABLET ORAL at 09:47

## 2017-01-01 RX ADMIN — FAMOTIDINE 20 MG: 20 TABLET ORAL at 17:00

## 2017-01-01 RX ADMIN — ALLOPURINOL 100 MG: 100 TABLET ORAL at 17:40

## 2017-01-01 RX ADMIN — LEVETIRACETAM 500 MG: 500 TABLET, FILM COATED ORAL at 09:48

## 2017-01-01 RX ADMIN — FAMOTIDINE 40 MG: 20 TABLET ORAL at 20:36

## 2017-01-01 RX ADMIN — INSULIN ASPART 2 UNITS: 100 INJECTION, SOLUTION INTRAVENOUS; SUBCUTANEOUS at 20:51

## 2017-01-01 RX ADMIN — METOPROLOL SUCCINATE 100 MG: 50 TABLET, EXTENDED RELEASE ORAL at 09:59

## 2017-01-01 RX ADMIN — MICONAZOLE NITRATE 1 APPLICATION: 20 POWDER TOPICAL at 08:34

## 2017-01-01 RX ADMIN — METOPROLOL TARTRATE 100 MG: 25 TABLET, FILM COATED ORAL at 10:11

## 2017-01-01 RX ADMIN — ISOSORBIDE DINITRATE 10 MG: 10 TABLET ORAL at 08:36

## 2017-01-01 RX ADMIN — METOPROLOL TARTRATE 75 MG: 25 TABLET, FILM COATED ORAL at 08:54

## 2017-01-01 RX ADMIN — BUDESONIDE 0.5 MG: 0.5 SUSPENSION RESPIRATORY (INHALATION) at 09:48

## 2017-01-01 RX ADMIN — LINAGLIPTIN 5 MG: 5 TABLET, FILM COATED ORAL at 10:10

## 2017-01-01 RX ADMIN — GLIPIZIDE 5 MG: 5 TABLET ORAL at 08:59

## 2017-01-01 RX ADMIN — Medication 150 MG: at 09:54

## 2017-01-01 RX ADMIN — KETOTIFEN FUMARATE 1 DROP: 0.25 SOLUTION/ DROPS OPHTHALMIC at 17:39

## 2017-01-01 RX ADMIN — AMIODARONE HYDROCHLORIDE 200 MG: 200 TABLET ORAL at 20:00

## 2017-01-01 RX ADMIN — DILTIAZEM HYDROCHLORIDE 300 MG: 180 CAPSULE, COATED, EXTENDED RELEASE ORAL at 12:24

## 2017-01-01 RX ADMIN — KETOTIFEN FUMARATE 1 DROP: 0.25 SOLUTION/ DROPS OPHTHALMIC at 08:02

## 2017-01-01 RX ADMIN — IPRATROPIUM BROMIDE AND ALBUTEROL SULFATE 3 ML: .5; 3 SOLUTION RESPIRATORY (INHALATION) at 08:04

## 2017-01-01 RX ADMIN — ISOSORBIDE DINITRATE 10 MG: 10 TABLET ORAL at 20:26

## 2017-01-01 RX ADMIN — SENNOSIDES AND DOCUSATE SODIUM 1 TABLET: 8.6; 5 TABLET ORAL at 23:04

## 2017-01-01 RX ADMIN — METOPROLOL SUCCINATE 100 MG: 50 TABLET, EXTENDED RELEASE ORAL at 07:43

## 2017-01-01 RX ADMIN — LEVETIRACETAM 500 MG: 500 TABLET, FILM COATED ORAL at 08:55

## 2017-01-01 RX ADMIN — GUAIFENESIN 1200 MG: 600 TABLET, EXTENDED RELEASE ORAL at 17:38

## 2017-01-01 RX ADMIN — Medication 150 MG: at 09:20

## 2017-01-01 RX ADMIN — DONEPEZIL HYDROCHLORIDE 10 MG: 5 TABLET, FILM COATED ORAL at 20:12

## 2017-01-01 RX ADMIN — LEVOTHYROXINE SODIUM 150 MCG: 150 TABLET ORAL at 06:36

## 2017-01-01 RX ADMIN — MICONAZOLE NITRATE: 20 POWDER TOPICAL at 21:21

## 2017-01-01 RX ADMIN — APIXABAN 5 MG: 2.5 TABLET, FILM COATED ORAL at 09:33

## 2017-01-01 RX ADMIN — METOPROLOL TARTRATE 100 MG: 25 TABLET, FILM COATED ORAL at 20:45

## 2017-01-01 RX ADMIN — GLIPIZIDE 2.5 MG: 2.5 TABLET, FILM COATED, EXTENDED RELEASE ORAL at 09:27

## 2017-01-01 RX ADMIN — ALLOPURINOL 100 MG: 100 TABLET ORAL at 09:22

## 2017-01-01 RX ADMIN — Medication 1 TABLET: at 18:47

## 2017-01-01 RX ADMIN — METOPROLOL SUCCINATE 100 MG: 50 TABLET, EXTENDED RELEASE ORAL at 20:59

## 2017-01-01 RX ADMIN — SERTRALINE HYDROCHLORIDE 100 MG: 50 TABLET ORAL at 20:02

## 2017-01-01 RX ADMIN — KETOTIFEN FUMARATE 1 DROP: 0.25 SOLUTION/ DROPS OPHTHALMIC at 18:47

## 2017-01-01 RX ADMIN — BUDESONIDE 0.5 MG: 0.5 SUSPENSION RESPIRATORY (INHALATION) at 18:20

## 2017-01-01 RX ADMIN — Medication 1 TABLET: at 08:27

## 2017-01-01 RX ADMIN — LEVETIRACETAM 500 MG: 500 TABLET, FILM COATED ORAL at 18:27

## 2017-01-01 RX ADMIN — POTASSIUM CHLORIDE 20 MEQ: 1500 TABLET, EXTENDED RELEASE ORAL at 08:17

## 2017-01-01 RX ADMIN — BUDESONIDE 0.5 MG: 0.5 SUSPENSION RESPIRATORY (INHALATION) at 09:09

## 2017-01-01 RX ADMIN — FAMOTIDINE 20 MG: 20 TABLET ORAL at 18:04

## 2017-01-01 RX ADMIN — MICONAZOLE NITRATE 1 APPLICATION: 20 POWDER TOPICAL at 21:01

## 2017-01-01 RX ADMIN — AMIODARONE HYDROCHLORIDE 200 MG: 200 TABLET ORAL at 09:31

## 2017-01-01 RX ADMIN — STANDARDIZED SENNA CONCENTRATE 1 TABLET: 8.6 TABLET ORAL at 18:04

## 2017-01-01 RX ADMIN — Medication 1 TABLET: at 17:39

## 2017-01-01 RX ADMIN — IPRATROPIUM BROMIDE AND ALBUTEROL SULFATE 3 ML: .5; 3 SOLUTION RESPIRATORY (INHALATION) at 07:54

## 2017-01-01 RX ADMIN — ISOSORBIDE DINITRATE 10 MG: 10 TABLET ORAL at 20:48

## 2017-01-01 RX ADMIN — INSULIN ASPART 3 UNITS: 100 INJECTION, SOLUTION INTRAVENOUS; SUBCUTANEOUS at 13:02

## 2017-01-01 RX ADMIN — ACETAMINOPHEN 650 MG: 325 TABLET, FILM COATED ORAL at 20:18

## 2017-01-01 RX ADMIN — LEVETIRACETAM 500 MG: 500 TABLET, FILM COATED ORAL at 08:44

## 2017-01-01 RX ADMIN — Medication 150 MG: at 23:05

## 2017-01-01 RX ADMIN — LEVOTHYROXINE SODIUM 150 MCG: 150 TABLET ORAL at 06:24

## 2017-01-01 RX ADMIN — Medication 10 ML: at 09:05

## 2017-01-01 RX ADMIN — STANDARDIZED SENNA CONCENTRATE 1 TABLET: 8.6 TABLET ORAL at 08:28

## 2017-01-01 RX ADMIN — METOPROLOL TARTRATE 50 MG: 50 TABLET, FILM COATED ORAL at 17:13

## 2017-01-01 RX ADMIN — FAMOTIDINE 40 MG: 20 TABLET ORAL at 21:12

## 2017-01-01 RX ADMIN — DONEPEZIL HYDROCHLORIDE 10 MG: 5 TABLET, FILM COATED ORAL at 20:25

## 2017-01-01 RX ADMIN — SENNOSIDES AND DOCUSATE SODIUM 1 TABLET: 8.6; 5 TABLET ORAL at 08:45

## 2017-01-01 RX ADMIN — IPRATROPIUM BROMIDE AND ALBUTEROL SULFATE 3 ML: .5; 3 SOLUTION RESPIRATORY (INHALATION) at 08:26

## 2017-01-01 RX ADMIN — IPRATROPIUM BROMIDE AND ALBUTEROL SULFATE 3 ML: .5; 3 SOLUTION RESPIRATORY (INHALATION) at 18:03

## 2017-01-01 RX ADMIN — IPRATROPIUM BROMIDE AND ALBUTEROL SULFATE 3 ML: .5; 3 SOLUTION RESPIRATORY (INHALATION) at 12:56

## 2017-01-01 RX ADMIN — LEVETIRACETAM 500 MG: 500 TABLET, FILM COATED ORAL at 08:40

## 2017-01-01 RX ADMIN — TRAMADOL HYDROCHLORIDE 100 MG: 50 TABLET, FILM COATED ORAL at 21:07

## 2017-01-01 RX ADMIN — CALCIUM CARBONATE 500 MG (1,250 MG)-VITAMIN D3 200 UNIT TABLET 500 MG: at 21:03

## 2017-01-01 RX ADMIN — SENNOSIDES AND DOCUSATE SODIUM 1 TABLET: 8.6; 5 TABLET ORAL at 10:16

## 2017-01-01 RX ADMIN — GUAIFENESIN 1200 MG: 600 TABLET, EXTENDED RELEASE ORAL at 09:34

## 2017-01-01 RX ADMIN — Medication 1 TABLET: at 09:16

## 2017-01-01 RX ADMIN — APIXABAN 5 MG: 2.5 TABLET, FILM COATED ORAL at 21:50

## 2017-01-01 RX ADMIN — KETOTIFEN FUMARATE 1 DROP: 0.25 SOLUTION/ DROPS OPHTHALMIC at 10:13

## 2017-01-01 RX ADMIN — ALLOPURINOL 100 MG: 100 TABLET ORAL at 07:55

## 2017-01-01 RX ADMIN — DILTIAZEM HYDROCHLORIDE 120 MG: 60 TABLET, FILM COATED ORAL at 18:04

## 2017-01-01 RX ADMIN — Medication 1 TABLET: at 09:23

## 2017-01-01 RX ADMIN — TRAMADOL HYDROCHLORIDE 100 MG: 50 TABLET, FILM COATED ORAL at 11:45

## 2017-01-01 RX ADMIN — MICONAZOLE NITRATE: 20 POWDER TOPICAL at 10:26

## 2017-01-01 RX ADMIN — Medication 1 CAPSULE: at 08:18

## 2017-01-01 RX ADMIN — DICLOFENAC SODIUM 4 G: 10 GEL TOPICAL at 20:40

## 2017-01-01 RX ADMIN — IPRATROPIUM BROMIDE AND ALBUTEROL SULFATE 3 ML: .5; 3 SOLUTION RESPIRATORY (INHALATION) at 21:54

## 2017-01-01 RX ADMIN — GUAIFENESIN 1200 MG: 600 TABLET, EXTENDED RELEASE ORAL at 20:16

## 2017-01-01 RX ADMIN — APIXABAN 5 MG: 2.5 TABLET, FILM COATED ORAL at 09:21

## 2017-01-01 RX ADMIN — CONJUGATED ESTROGENS 0.75 APPLICATOR: 0.62 CREAM VAGINAL at 21:42

## 2017-01-01 RX ADMIN — Medication 150 MG: at 10:00

## 2017-01-01 RX ADMIN — FAMOTIDINE 40 MG: 20 TABLET ORAL at 22:59

## 2017-01-01 RX ADMIN — Medication 1 TABLET: at 08:01

## 2017-01-01 RX ADMIN — IPRATROPIUM BROMIDE AND ALBUTEROL SULFATE 3 ML: .5; 3 SOLUTION RESPIRATORY (INHALATION) at 13:11

## 2017-01-01 RX ADMIN — FUROSEMIDE 40 MG: 40 TABLET ORAL at 08:20

## 2017-01-01 RX ADMIN — ALLOPURINOL 100 MG: 100 TABLET ORAL at 07:42

## 2017-01-01 RX ADMIN — CALCIUM CARBONATE 500 MG (1,250 MG)-VITAMIN D3 200 UNIT TABLET 500 MG: at 09:18

## 2017-01-01 RX ADMIN — METOPROLOL TARTRATE 75 MG: 25 TABLET, FILM COATED ORAL at 17:05

## 2017-01-01 RX ADMIN — ALLOPURINOL 100 MG: 100 TABLET ORAL at 09:46

## 2017-01-01 RX ADMIN — BUDESONIDE 0.5 MG: 0.5 SUSPENSION RESPIRATORY (INHALATION) at 09:28

## 2017-01-01 RX ADMIN — SERTRALINE 100 MG: 50 TABLET, FILM COATED ORAL at 20:48

## 2017-01-01 RX ADMIN — METOPROLOL SUCCINATE 100 MG: 50 TABLET, EXTENDED RELEASE ORAL at 20:41

## 2017-01-01 RX ADMIN — STANDARDIZED SENNA CONCENTRATE 1 TABLET: 8.6 TABLET ORAL at 17:37

## 2017-01-01 RX ADMIN — DILTIAZEM HYDROCHLORIDE 240 MG: 120 CAPSULE, COATED, EXTENDED RELEASE ORAL at 09:22

## 2017-01-01 RX ADMIN — MICONAZOLE NITRATE: 20 POWDER TOPICAL at 21:00

## 2017-01-01 RX ADMIN — ISOSORBIDE DINITRATE 10 MG: 10 TABLET ORAL at 09:04

## 2017-01-01 RX ADMIN — ALLOPURINOL 100 MG: 100 TABLET ORAL at 22:26

## 2017-01-01 RX ADMIN — GUAIFENESIN 1200 MG: 600 TABLET, EXTENDED RELEASE ORAL at 09:07

## 2017-01-01 RX ADMIN — DOXAZOSIN MESYLATE 2 MG: 2 TABLET ORAL at 21:58

## 2017-01-01 RX ADMIN — SERTRALINE 100 MG: 50 TABLET, FILM COATED ORAL at 21:20

## 2017-01-01 RX ADMIN — APIXABAN 5 MG: 2.5 TABLET, FILM COATED ORAL at 20:43

## 2017-01-01 RX ADMIN — ALLOPURINOL 100 MG: 100 TABLET ORAL at 21:08

## 2017-01-01 RX ADMIN — Medication 1 CAPSULE: at 08:33

## 2017-01-01 RX ADMIN — FAMOTIDINE 40 MG: 20 TABLET ORAL at 22:34

## 2017-01-01 RX ADMIN — KETOTIFEN FUMARATE 1 DROP: 0.25 SOLUTION/ DROPS OPHTHALMIC at 18:02

## 2017-01-01 RX ADMIN — FAMOTIDINE 40 MG: 20 TABLET ORAL at 21:02

## 2017-01-01 RX ADMIN — GUAIFENESIN 1200 MG: 600 TABLET, EXTENDED RELEASE ORAL at 17:32

## 2017-01-01 RX ADMIN — SENNOSIDES AND DOCUSATE SODIUM 1 TABLET: 8.6; 5 TABLET ORAL at 09:13

## 2017-01-01 RX ADMIN — ISOSORBIDE DINITRATE 10 MG: 10 TABLET ORAL at 20:43

## 2017-01-01 RX ADMIN — METOPROLOL SUCCINATE 100 MG: 50 TABLET, EXTENDED RELEASE ORAL at 17:21

## 2017-01-01 RX ADMIN — FUROSEMIDE 40 MG: 40 TABLET ORAL at 08:27

## 2017-01-01 RX ADMIN — LEVOTHYROXINE SODIUM 150 MCG: 150 TABLET ORAL at 05:08

## 2017-01-01 RX ADMIN — DOCUSATE SODIUM AND SENNOSIDES 1 TABLET: 8.6; 5 TABLET, FILM COATED ORAL at 08:41

## 2017-01-01 RX ADMIN — FAMOTIDINE 20 MG: 20 TABLET, FILM COATED ORAL at 17:46

## 2017-01-01 RX ADMIN — STANDARDIZED SENNA CONCENTRATE 1 TABLET: 8.6 TABLET ORAL at 17:21

## 2017-01-01 RX ADMIN — SERTRALINE 100 MG: 50 TABLET, FILM COATED ORAL at 20:41

## 2017-01-01 RX ADMIN — INSULIN ASPART 2 UNITS: 100 INJECTION, SOLUTION INTRAVENOUS; SUBCUTANEOUS at 20:36

## 2017-01-01 RX ADMIN — TERAZOSIN HYDROCHLORIDE 2 MG: 1 CAPSULE ORAL at 20:23

## 2017-01-01 RX ADMIN — SENNOSIDES AND DOCUSATE SODIUM 1 TABLET: 8.6; 5 TABLET ORAL at 20:59

## 2017-01-01 RX ADMIN — IPRATROPIUM BROMIDE AND ALBUTEROL SULFATE 3 ML: .5; 3 SOLUTION RESPIRATORY (INHALATION) at 11:05

## 2017-01-01 RX ADMIN — FUROSEMIDE 40 MG: 40 TABLET ORAL at 08:51

## 2017-01-01 RX ADMIN — SENNOSIDES AND DOCUSATE SODIUM 1 TABLET: 8.6; 5 TABLET ORAL at 22:53

## 2017-01-01 RX ADMIN — LEVETIRACETAM 500 MG: 500 TABLET, FILM COATED ORAL at 08:52

## 2017-01-01 RX ADMIN — ISOSORBIDE DINITRATE 10 MG: 10 TABLET ORAL at 21:25

## 2017-01-01 RX ADMIN — KETOTIFEN FUMARATE 1 DROP: 0.25 SOLUTION/ DROPS OPHTHALMIC at 18:10

## 2017-01-01 RX ADMIN — Medication 1 TABLET: at 07:39

## 2017-01-01 RX ADMIN — METOPROLOL TARTRATE 100 MG: 25 TABLET, FILM COATED ORAL at 20:35

## 2017-01-01 RX ADMIN — KETOTIFEN FUMARATE 1 DROP: 0.25 SOLUTION/ DROPS OPHTHALMIC at 17:25

## 2017-01-01 RX ADMIN — SERTRALINE 100 MG: 50 TABLET, FILM COATED ORAL at 20:09

## 2017-01-01 RX ADMIN — FAMOTIDINE 40 MG: 20 TABLET ORAL at 22:49

## 2017-01-01 RX ADMIN — POTASSIUM CHLORIDE 20 MEQ: 1500 TABLET, EXTENDED RELEASE ORAL at 09:12

## 2017-01-01 RX ADMIN — IPRATROPIUM BROMIDE AND ALBUTEROL SULFATE 3 ML: .5; 3 SOLUTION RESPIRATORY (INHALATION) at 21:04

## 2017-01-01 RX ADMIN — FAMOTIDINE 40 MG: 20 TABLET ORAL at 21:24

## 2017-01-01 RX ADMIN — Medication 150 MG: at 09:13

## 2017-01-01 RX ADMIN — APIXABAN 5 MG: 2.5 TABLET, FILM COATED ORAL at 09:45

## 2017-01-01 RX ADMIN — ISOSORBIDE DINITRATE 10 MG: 10 TABLET ORAL at 08:04

## 2017-01-01 RX ADMIN — LEVETIRACETAM 500 MG: 500 TABLET, FILM COATED ORAL at 12:34

## 2017-01-01 RX ADMIN — ALLOPURINOL 100 MG: 100 TABLET ORAL at 10:34

## 2017-01-01 RX ADMIN — DILTIAZEM HYDROCHLORIDE 120 MG: 60 TABLET, FILM COATED ORAL at 09:19

## 2017-01-01 RX ADMIN — DILTIAZEM HYDROCHLORIDE 240 MG: 120 CAPSULE, COATED, EXTENDED RELEASE ORAL at 08:21

## 2017-01-01 RX ADMIN — Medication 10 ML: at 20:40

## 2017-01-01 RX ADMIN — ALLOPURINOL 100 MG: 100 TABLET ORAL at 09:23

## 2017-01-01 RX ADMIN — APIXABAN 5 MG: 2.5 TABLET, FILM COATED ORAL at 08:14

## 2017-01-01 RX ADMIN — ALLOPURINOL 100 MG: 100 TABLET ORAL at 16:51

## 2017-01-01 RX ADMIN — SENNOSIDES AND DOCUSATE SODIUM 1 TABLET: 8.6; 5 TABLET ORAL at 21:58

## 2017-01-01 RX ADMIN — TRAMADOL HYDROCHLORIDE 50 MG: 50 TABLET, FILM COATED ORAL at 20:41

## 2017-01-01 RX ADMIN — IPRATROPIUM BROMIDE AND ALBUTEROL SULFATE 3 ML: .5; 3 SOLUTION RESPIRATORY (INHALATION) at 07:19

## 2017-01-01 RX ADMIN — FAMOTIDINE 20 MG: 20 TABLET, FILM COATED ORAL at 08:51

## 2017-01-01 RX ADMIN — AMIODARONE HYDROCHLORIDE 200 MG: 200 TABLET ORAL at 09:37

## 2017-01-01 RX ADMIN — METOPROLOL TARTRATE 75 MG: 25 TABLET, FILM COATED ORAL at 08:40

## 2017-01-01 RX ADMIN — Medication 1 TABLET: at 18:04

## 2017-01-01 RX ADMIN — TUBERCULIN PURIFIED PROTEIN DERIVATIVE 5 UNITS: 5 INJECTION INTRADERMAL at 18:17

## 2017-01-01 RX ADMIN — POTASSIUM CHLORIDE 20 MEQ: 1500 TABLET, EXTENDED RELEASE ORAL at 08:44

## 2017-01-01 RX ADMIN — ALLOPURINOL 100 MG: 100 TABLET ORAL at 22:34

## 2017-01-01 RX ADMIN — FUROSEMIDE 40 MG: 40 TABLET ORAL at 10:39

## 2017-01-01 RX ADMIN — DONEPEZIL HYDROCHLORIDE 10 MG: 5 TABLET ORAL at 23:04

## 2017-01-01 RX ADMIN — MICONAZOLE NITRATE: 20 POWDER TOPICAL at 20:16

## 2017-01-01 RX ADMIN — LEVETIRACETAM 500 MG: 500 TABLET, FILM COATED ORAL at 21:13

## 2017-01-01 RX ADMIN — DILTIAZEM HYDROCHLORIDE 120 MG: 60 TABLET, FILM COATED ORAL at 16:29

## 2017-01-01 RX ADMIN — ACETAMINOPHEN 650 MG: 325 TABLET, FILM COATED ORAL at 05:59

## 2017-01-01 RX ADMIN — ISOSORBIDE DINITRATE 10 MG: 10 TABLET ORAL at 17:22

## 2017-01-01 RX ADMIN — Medication 1 CAPSULE: at 09:21

## 2017-01-01 RX ADMIN — GLIPIZIDE 5 MG: 5 TABLET, FILM COATED, EXTENDED RELEASE ORAL at 08:40

## 2017-01-01 RX ADMIN — METOPROLOL SUCCINATE 100 MG: 50 TABLET, EXTENDED RELEASE ORAL at 21:37

## 2017-01-01 RX ADMIN — FAMOTIDINE 40 MG: 20 TABLET ORAL at 20:29

## 2017-01-01 RX ADMIN — MICONAZOLE NITRATE: 20 POWDER TOPICAL at 09:31

## 2017-01-01 RX ADMIN — METOPROLOL SUCCINATE 100 MG: 50 TABLET, EXTENDED RELEASE ORAL at 08:38

## 2017-01-01 RX ADMIN — IPRATROPIUM BROMIDE AND ALBUTEROL SULFATE 3 ML: .5; 3 SOLUTION RESPIRATORY (INHALATION) at 11:44

## 2017-01-01 RX ADMIN — INSULIN ASPART 2 UNITS: 100 INJECTION, SOLUTION INTRAVENOUS; SUBCUTANEOUS at 08:39

## 2017-01-01 RX ADMIN — CALCIUM CARBONATE 500 MG (1,250 MG)-VITAMIN D3 200 UNIT TABLET 500 MG: at 08:13

## 2017-01-01 RX ADMIN — CALCIUM CARBONATE 500 MG (1,250 MG)-VITAMIN D3 200 UNIT TABLET 500 MG: at 22:12

## 2017-01-01 RX ADMIN — DILTIAZEM HYDROCHLORIDE 300 MG: 180 CAPSULE, COATED, EXTENDED RELEASE ORAL at 13:35

## 2017-01-01 RX ADMIN — ALLOPURINOL 100 MG: 100 TABLET ORAL at 21:20

## 2017-01-01 RX ADMIN — LEVOFLOXACIN 250 MG: 250 TABLET, FILM COATED ORAL at 16:31

## 2017-01-01 RX ADMIN — Medication 1 CAPSULE: at 07:54

## 2017-01-01 RX ADMIN — CONJUGATED ESTROGENS 0.75 APPLICATOR: 0.62 CREAM VAGINAL at 20:26

## 2017-01-01 RX ADMIN — ALLOPURINOL 100 MG: 100 TABLET ORAL at 17:51

## 2017-01-01 RX ADMIN — APIXABAN 5 MG: 2.5 TABLET, FILM COATED ORAL at 22:32

## 2017-01-01 RX ADMIN — KETOTIFEN FUMARATE 1 DROP: 0.25 SOLUTION/ DROPS OPHTHALMIC at 21:57

## 2017-01-01 RX ADMIN — MEGESTROL ACETATE 200 MG: 40 SUSPENSION ORAL at 08:18

## 2017-01-01 RX ADMIN — GUAIFENESIN 1200 MG: 600 TABLET, EXTENDED RELEASE ORAL at 08:16

## 2017-01-01 RX ADMIN — KETOTIFEN FUMARATE 1 DROP: 0.25 SOLUTION/ DROPS OPHTHALMIC at 17:11

## 2017-01-01 RX ADMIN — CALCIUM CARBONATE 500 MG (1,250 MG)-VITAMIN D3 200 UNIT TABLET 500 MG: at 22:26

## 2017-01-01 RX ADMIN — FAMOTIDINE 20 MG: 20 TABLET, FILM COATED ORAL at 09:02

## 2017-01-01 RX ADMIN — ACETAMINOPHEN 650 MG: 325 TABLET, FILM COATED ORAL at 20:47

## 2017-01-01 RX ADMIN — Medication 1 TABLET: at 10:24

## 2017-01-01 RX ADMIN — AMIODARONE HYDROCHLORIDE 200 MG: 200 TABLET ORAL at 17:02

## 2017-01-01 RX ADMIN — FUROSEMIDE 40 MG: 40 TABLET ORAL at 08:49

## 2017-01-01 RX ADMIN — IPRATROPIUM BROMIDE AND ALBUTEROL SULFATE 3 ML: .5; 3 SOLUTION RESPIRATORY (INHALATION) at 14:23

## 2017-01-01 RX ADMIN — SERTRALINE HYDROCHLORIDE 100 MG: 50 TABLET ORAL at 21:00

## 2017-01-01 RX ADMIN — DONEPEZIL HYDROCHLORIDE 10 MG: 5 TABLET ORAL at 21:15

## 2017-01-01 RX ADMIN — METOPROLOL SUCCINATE 100 MG: 50 TABLET, EXTENDED RELEASE ORAL at 10:17

## 2017-01-01 RX ADMIN — DILTIAZEM HYDROCHLORIDE 120 MG: 60 TABLET, FILM COATED ORAL at 08:24

## 2017-01-01 RX ADMIN — APIXABAN 5 MG: 2.5 TABLET, FILM COATED ORAL at 09:40

## 2017-01-01 RX ADMIN — AMIODARONE HYDROCHLORIDE 200 MG: 200 TABLET ORAL at 08:45

## 2017-01-01 RX ADMIN — Medication 1 TABLET: at 18:12

## 2017-01-01 RX ADMIN — SERTRALINE 100 MG: 50 TABLET, FILM COATED ORAL at 19:57

## 2017-01-01 RX ADMIN — LEVETIRACETAM 500 MG: 500 TABLET, FILM COATED ORAL at 11:14

## 2017-01-01 RX ADMIN — MEGESTROL ACETATE 200 MG: 40 SUSPENSION ORAL at 09:51

## 2017-01-01 RX ADMIN — FUROSEMIDE 40 MG: 40 TABLET ORAL at 09:25

## 2017-01-01 RX ADMIN — Medication 1 TABLET: at 08:35

## 2017-01-01 RX ADMIN — IPRATROPIUM BROMIDE AND ALBUTEROL SULFATE 3 ML: .5; 3 SOLUTION RESPIRATORY (INHALATION) at 18:01

## 2017-01-01 RX ADMIN — DILTIAZEM HYDROCHLORIDE 300 MG: 180 CAPSULE, COATED, EXTENDED RELEASE ORAL at 12:58

## 2017-01-01 RX ADMIN — Medication 1 TABLET: at 09:35

## 2017-01-01 RX ADMIN — LEVETIRACETAM 500 MG: 500 TABLET, FILM COATED ORAL at 08:58

## 2017-01-01 RX ADMIN — INSULIN ASPART 5 UNITS: 100 INJECTION, SOLUTION INTRAVENOUS; SUBCUTANEOUS at 18:20

## 2017-01-01 RX ADMIN — TRAMADOL HYDROCHLORIDE 50 MG: 50 TABLET, FILM COATED ORAL at 20:53

## 2017-01-01 RX ADMIN — ALLOPURINOL 100 MG: 100 TABLET ORAL at 18:49

## 2017-01-01 RX ADMIN — MICONAZOLE NITRATE: 20 POWDER TOPICAL at 08:58

## 2017-01-01 RX ADMIN — IPRATROPIUM BROMIDE AND ALBUTEROL SULFATE 3 ML: .5; 3 SOLUTION RESPIRATORY (INHALATION) at 08:32

## 2017-01-01 RX ADMIN — LEVETIRACETAM 500 MG: 500 TABLET, FILM COATED ORAL at 21:11

## 2017-01-01 RX ADMIN — LEVETIRACETAM 500 MG: 500 TABLET, FILM COATED ORAL at 16:52

## 2017-01-01 RX ADMIN — CONJUGATED ESTROGENS 0.75 APPLICATION: 0.62 CREAM VAGINAL at 20:17

## 2017-01-01 RX ADMIN — BUDESONIDE 0.5 MG: 0.5 SUSPENSION RESPIRATORY (INHALATION) at 22:13

## 2017-01-01 RX ADMIN — APIXABAN 5 MG: 2.5 TABLET, FILM COATED ORAL at 21:47

## 2017-01-01 RX ADMIN — AMIODARONE HYDROCHLORIDE 200 MG: 200 TABLET ORAL at 18:00

## 2017-01-01 RX ADMIN — Medication 150 MG: at 10:15

## 2017-01-01 RX ADMIN — ALLOPURINOL 100 MG: 100 TABLET ORAL at 20:26

## 2017-01-01 RX ADMIN — POTASSIUM CHLORIDE 10 MEQ: 750 TABLET, FILM COATED, EXTENDED RELEASE ORAL at 11:01

## 2017-01-01 RX ADMIN — METOPROLOL SUCCINATE 100 MG: 50 TABLET, EXTENDED RELEASE ORAL at 09:24

## 2017-01-01 RX ADMIN — MICONAZOLE NITRATE: 20 POWDER TOPICAL at 21:09

## 2017-01-01 RX ADMIN — APIXABAN 5 MG: 2.5 TABLET, FILM COATED ORAL at 08:56

## 2017-01-01 RX ADMIN — NIFEDIPINE 240 MG: 10 CAPSULE ORAL at 08:20

## 2017-01-01 RX ADMIN — Medication 150 MG: at 17:06

## 2017-01-01 RX ADMIN — APIXABAN 5 MG: 2.5 TABLET, FILM COATED ORAL at 08:19

## 2017-01-01 RX ADMIN — ALLOPURINOL 100 MG: 100 TABLET ORAL at 07:54

## 2017-01-01 RX ADMIN — FAMOTIDINE 40 MG: 20 TABLET ORAL at 21:21

## 2017-01-01 RX ADMIN — METOPROLOL TARTRATE 75 MG: 25 TABLET, FILM COATED ORAL at 09:25

## 2017-01-01 RX ADMIN — PIPERACILLIN SODIUM AND TAZOBACTAM SODIUM 3.38 G: 3; .375 INJECTION, POWDER, LYOPHILIZED, FOR SOLUTION INTRAVENOUS at 06:32

## 2017-01-01 RX ADMIN — AMIODARONE HYDROCHLORIDE 200 MG: 200 TABLET ORAL at 17:05

## 2017-01-01 RX ADMIN — SERTRALINE HYDROCHLORIDE 100 MG: 50 TABLET ORAL at 20:52

## 2017-01-01 RX ADMIN — METOPROLOL TARTRATE 100 MG: 25 TABLET, FILM COATED ORAL at 09:04

## 2017-01-01 RX ADMIN — APIXABAN 5 MG: 2.5 TABLET, FILM COATED ORAL at 08:17

## 2017-01-01 RX ADMIN — LEVETIRACETAM 500 MG: 500 TABLET, FILM COATED ORAL at 21:56

## 2017-01-01 RX ADMIN — SENNOSIDES AND DOCUSATE SODIUM 1 TABLET: 8.6; 5 TABLET ORAL at 23:33

## 2017-01-01 RX ADMIN — SERTRALINE 100 MG: 50 TABLET, FILM COATED ORAL at 21:27

## 2017-01-01 RX ADMIN — ALLOPURINOL 100 MG: 100 TABLET ORAL at 21:09

## 2017-01-01 RX ADMIN — AMIODARONE HYDROCHLORIDE 200 MG: 200 TABLET ORAL at 08:17

## 2017-01-01 RX ADMIN — LEVETIRACETAM 500 MG: 500 TABLET, FILM COATED ORAL at 21:03

## 2017-01-01 RX ADMIN — GUAIFENESIN 1200 MG: 600 TABLET, EXTENDED RELEASE ORAL at 08:57

## 2017-01-01 RX ADMIN — TRAMADOL HYDROCHLORIDE 50 MG: 50 TABLET, FILM COATED ORAL at 20:17

## 2017-01-01 RX ADMIN — DILTIAZEM HYDROCHLORIDE 240 MG: 120 CAPSULE, COATED, EXTENDED RELEASE ORAL at 14:37

## 2017-01-01 RX ADMIN — FAMOTIDINE 20 MG: 20 TABLET ORAL at 17:26

## 2017-01-01 RX ADMIN — KETOTIFEN FUMARATE 1 DROP: 0.35 SOLUTION/ DROPS OPHTHALMIC at 17:31

## 2017-01-01 RX ADMIN — CALCIUM CARBONATE 500 MG (1,250 MG)-VITAMIN D3 200 UNIT TABLET 500 MG: at 08:23

## 2017-01-01 RX ADMIN — MICONAZOLE NITRATE: 20 POWDER TOPICAL at 10:15

## 2017-01-01 RX ADMIN — ASPIRIN 81 MG: 81 TABLET, COATED ORAL at 09:48

## 2017-01-01 RX ADMIN — FUROSEMIDE 40 MG: 10 INJECTION, SOLUTION INTRAMUSCULAR; INTRAVENOUS at 17:44

## 2017-01-01 RX ADMIN — LEVETIRACETAM 500 MG: 500 TABLET, FILM COATED ORAL at 17:42

## 2017-01-01 RX ADMIN — DONEPEZIL HYDROCHLORIDE 10 MG: 5 TABLET, FILM COATED ORAL at 20:40

## 2017-01-01 RX ADMIN — Medication 150 MG: at 08:09

## 2017-01-01 RX ADMIN — ASPIRIN 81 MG: 81 TABLET, COATED ORAL at 08:01

## 2017-01-01 RX ADMIN — DONEPEZIL HYDROCHLORIDE 10 MG: 5 TABLET ORAL at 23:08

## 2017-01-01 RX ADMIN — METOPROLOL TARTRATE 75 MG: 25 TABLET, FILM COATED ORAL at 09:51

## 2017-01-01 RX ADMIN — ISOSORBIDE DINITRATE 10 MG: 10 TABLET ORAL at 08:38

## 2017-01-01 RX ADMIN — GUAIFENESIN 1200 MG: 600 TABLET, EXTENDED RELEASE ORAL at 09:31

## 2017-01-01 RX ADMIN — DOCUSATE SODIUM AND SENNOSIDES 1 TABLET: 8.6; 5 TABLET, FILM COATED ORAL at 20:46

## 2017-01-01 RX ADMIN — SERTRALINE HYDROCHLORIDE 100 MG: 50 TABLET ORAL at 20:33

## 2017-01-01 RX ADMIN — APIXABAN 5 MG: 2.5 TABLET, FILM COATED ORAL at 17:20

## 2017-01-01 RX ADMIN — ASPIRIN 81 MG: 81 TABLET, COATED ORAL at 08:12

## 2017-01-01 RX ADMIN — DILTIAZEM HYDROCHLORIDE 300 MG: 180 CAPSULE, COATED, EXTENDED RELEASE ORAL at 12:20

## 2017-01-01 RX ADMIN — LINAGLIPTIN 5 MG: 5 TABLET, FILM COATED ORAL at 09:24

## 2017-01-01 RX ADMIN — IPRATROPIUM BROMIDE AND ALBUTEROL SULFATE 3 ML: .5; 3 SOLUTION RESPIRATORY (INHALATION) at 19:13

## 2017-01-01 RX ADMIN — SENNOSIDES AND DOCUSATE SODIUM 1 TABLET: 8.6; 5 TABLET ORAL at 21:57

## 2017-01-01 RX ADMIN — AMIODARONE HYDROCHLORIDE 200 MG: 200 TABLET ORAL at 20:13

## 2017-01-01 RX ADMIN — CONJUGATED ESTROGENS 0.75 APPLICATION: 0.62 CREAM VAGINAL at 21:58

## 2017-01-01 RX ADMIN — KETOTIFEN FUMARATE 1 DROP: 0.25 SOLUTION/ DROPS OPHTHALMIC at 18:27

## 2017-01-01 RX ADMIN — IPRATROPIUM BROMIDE AND ALBUTEROL SULFATE 3 ML: .5; 3 SOLUTION RESPIRATORY (INHALATION) at 08:40

## 2017-01-01 RX ADMIN — Medication 150 MG: at 16:47

## 2017-01-01 RX ADMIN — APIXABAN 5 MG: 2.5 TABLET, FILM COATED ORAL at 10:57

## 2017-01-01 RX ADMIN — KETOTIFEN FUMARATE 1 DROP: 0.25 SOLUTION/ DROPS OPHTHALMIC at 09:58

## 2017-01-01 RX ADMIN — STANDARDIZED SENNA CONCENTRATE 1 TABLET: 8.6 TABLET ORAL at 17:14

## 2017-01-01 RX ADMIN — GUAIFENESIN 1200 MG: 600 TABLET, EXTENDED RELEASE ORAL at 08:21

## 2017-01-01 RX ADMIN — MEGESTROL ACETATE 200 MG: 40 SUSPENSION ORAL at 08:38

## 2017-01-01 RX ADMIN — POTASSIUM CHLORIDE 20 MEQ: 1500 TABLET, EXTENDED RELEASE ORAL at 08:49

## 2017-01-01 RX ADMIN — KETOTIFEN FUMARATE 1 DROP: 0.25 SOLUTION/ DROPS OPHTHALMIC at 17:40

## 2017-01-01 RX ADMIN — MICONAZOLE NITRATE: 20 POWDER TOPICAL at 20:08

## 2017-01-01 RX ADMIN — INSULIN ASPART 3 UNITS: 100 INJECTION, SOLUTION INTRAVENOUS; SUBCUTANEOUS at 21:08

## 2017-01-01 RX ADMIN — MEGESTROL ACETATE 200 MG: 40 SUSPENSION ORAL at 17:32

## 2017-01-01 RX ADMIN — POTASSIUM CHLORIDE 20 MEQ: 1500 TABLET, EXTENDED RELEASE ORAL at 09:14

## 2017-01-01 RX ADMIN — KETOTIFEN FUMARATE 1 DROP: 0.25 SOLUTION/ DROPS OPHTHALMIC at 09:54

## 2017-01-01 RX ADMIN — MEGESTROL ACETATE 200 MG: 40 SUSPENSION ORAL at 10:20

## 2017-01-01 RX ADMIN — FAMOTIDINE 40 MG: 20 TABLET ORAL at 20:21

## 2017-01-01 RX ADMIN — METOPROLOL SUCCINATE 100 MG: 50 TABLET, EXTENDED RELEASE ORAL at 20:51

## 2017-01-01 RX ADMIN — STANDARDIZED SENNA CONCENTRATE 1 TABLET: 8.6 TABLET ORAL at 17:29

## 2017-01-01 RX ADMIN — METOPROLOL SUCCINATE 100 MG: 50 TABLET, EXTENDED RELEASE ORAL at 09:48

## 2017-01-01 RX ADMIN — BUDESONIDE 0.5 MG: 0.5 SUSPENSION RESPIRATORY (INHALATION) at 08:16

## 2017-01-01 RX ADMIN — KETOTIFEN FUMARATE 1 DROP: 0.25 SOLUTION/ DROPS OPHTHALMIC at 07:38

## 2017-01-01 RX ADMIN — BUDESONIDE 0.5 MG: 0.5 SUSPENSION RESPIRATORY (INHALATION) at 09:43

## 2017-01-01 RX ADMIN — METOPROLOL TARTRATE 100 MG: 25 TABLET, FILM COATED ORAL at 20:41

## 2017-01-01 RX ADMIN — CALCIUM CARBONATE 500 MG (1,250 MG)-VITAMIN D3 200 UNIT TABLET 500 MG: at 09:26

## 2017-01-01 RX ADMIN — BUDESONIDE 0.5 MG: 0.5 SUSPENSION RESPIRATORY (INHALATION) at 07:59

## 2017-01-01 RX ADMIN — ALLOPURINOL 100 MG: 100 TABLET ORAL at 21:12

## 2017-01-01 RX ADMIN — Medication 1 TABLET: at 18:09

## 2017-01-01 RX ADMIN — IPRATROPIUM BROMIDE AND ALBUTEROL SULFATE 3 ML: .5; 3 SOLUTION RESPIRATORY (INHALATION) at 17:21

## 2017-01-01 RX ADMIN — FAMOTIDINE 40 MG: 20 TABLET ORAL at 20:16

## 2017-01-01 RX ADMIN — ISOSORBIDE DINITRATE 10 MG: 10 TABLET ORAL at 10:07

## 2017-01-01 RX ADMIN — STANDARDIZED SENNA CONCENTRATE 1 TABLET: 8.6 TABLET ORAL at 18:47

## 2017-01-01 RX ADMIN — ACETAMINOPHEN 650 MG: 325 TABLET, FILM COATED ORAL at 21:42

## 2017-01-01 RX ADMIN — SENNOSIDES AND DOCUSATE SODIUM 1 TABLET: 8.6; 5 TABLET ORAL at 08:28

## 2017-01-01 RX ADMIN — KETOTIFEN FUMARATE 1 DROP: 0.35 SOLUTION/ DROPS OPHTHALMIC at 17:56

## 2017-01-01 RX ADMIN — LEVETIRACETAM 500 MG: 500 TABLET, FILM COATED ORAL at 17:52

## 2017-01-01 RX ADMIN — MICONAZOLE NITRATE: 20 POWDER TOPICAL at 08:56

## 2017-01-01 RX ADMIN — METOPROLOL TARTRATE 100 MG: 50 TABLET, FILM COATED ORAL at 08:51

## 2017-01-01 RX ADMIN — POTASSIUM CHLORIDE 20 MEQ: 1500 TABLET, EXTENDED RELEASE ORAL at 09:35

## 2017-01-01 RX ADMIN — LEVETIRACETAM 500 MG: 500 TABLET, FILM COATED ORAL at 17:41

## 2017-01-01 RX ADMIN — Medication 150 MG: at 17:50

## 2017-01-01 RX ADMIN — INSULIN ASPART 2 UNITS: 100 INJECTION, SOLUTION INTRAVENOUS; SUBCUTANEOUS at 21:18

## 2017-01-01 RX ADMIN — KETOTIFEN FUMARATE 1 DROP: 0.25 SOLUTION/ DROPS OPHTHALMIC at 17:45

## 2017-01-01 RX ADMIN — SENNOSIDES AND DOCUSATE SODIUM 1 TABLET: 8.6; 5 TABLET ORAL at 23:15

## 2017-01-01 RX ADMIN — APIXABAN 5 MG: 2.5 TABLET, FILM COATED ORAL at 23:08

## 2017-01-01 RX ADMIN — SODIUM CHLORIDE 100 ML/HR: 4.5 INJECTION, SOLUTION INTRAVENOUS at 06:21

## 2017-01-01 RX ADMIN — Medication 150 MG: at 08:48

## 2017-01-01 RX ADMIN — SERTRALINE 100 MG: 50 TABLET, FILM COATED ORAL at 22:48

## 2017-01-01 RX ADMIN — KETOTIFEN FUMARATE 1 DROP: 0.25 SOLUTION/ DROPS OPHTHALMIC at 18:42

## 2017-01-01 RX ADMIN — LEVETIRACETAM 500 MG: 500 TABLET, FILM COATED ORAL at 08:00

## 2017-01-01 RX ADMIN — FUROSEMIDE 40 MG: 40 TABLET ORAL at 09:44

## 2017-01-01 RX ADMIN — METOPROLOL TARTRATE 100 MG: 25 TABLET, FILM COATED ORAL at 11:17

## 2017-01-01 RX ADMIN — LINAGLIPTIN 5 MG: 5 TABLET, FILM COATED ORAL at 08:21

## 2017-01-01 RX ADMIN — DILTIAZEM HYDROCHLORIDE 240 MG: 120 CAPSULE, COATED, EXTENDED RELEASE ORAL at 08:36

## 2017-01-01 RX ADMIN — ASPIRIN 81 MG: 81 TABLET, COATED ORAL at 08:28

## 2017-01-01 RX ADMIN — ISOSORBIDE DINITRATE 10 MG: 10 TABLET ORAL at 22:07

## 2017-01-01 RX ADMIN — CONJUGATED ESTROGENS 1 APPLICATION: 0.62 CREAM VAGINAL at 22:09

## 2017-01-01 RX ADMIN — AMIODARONE HYDROCHLORIDE 200 MG: 200 TABLET ORAL at 19:57

## 2017-01-01 RX ADMIN — MICONAZOLE NITRATE: 20 POWDER TOPICAL at 20:20

## 2017-01-01 RX ADMIN — DONEPEZIL HYDROCHLORIDE 10 MG: 5 TABLET ORAL at 21:34

## 2017-01-01 RX ADMIN — Medication 150 MG: at 10:23

## 2017-01-01 RX ADMIN — APIXABAN 5 MG: 2.5 TABLET, FILM COATED ORAL at 10:05

## 2017-01-01 RX ADMIN — FAMOTIDINE 20 MG: 20 TABLET, FILM COATED ORAL at 17:16

## 2017-01-01 RX ADMIN — LEVETIRACETAM 500 MG: 500 TABLET, FILM COATED ORAL at 20:43

## 2017-01-01 RX ADMIN — ISOSORBIDE DINITRATE 10 MG: 10 TABLET ORAL at 12:56

## 2017-01-01 RX ADMIN — APIXABAN 5 MG: 2.5 TABLET, FILM COATED ORAL at 21:44

## 2017-01-01 RX ADMIN — DILTIAZEM HYDROCHLORIDE 300 MG: 180 CAPSULE, COATED, EXTENDED RELEASE ORAL at 12:55

## 2017-01-01 RX ADMIN — AMIODARONE HYDROCHLORIDE 200 MG: 200 TABLET ORAL at 10:35

## 2017-01-01 RX ADMIN — MEROPENEM 1 G: 1 INJECTION, POWDER, FOR SOLUTION INTRAVENOUS at 18:15

## 2017-01-01 RX ADMIN — METOPROLOL TARTRATE 100 MG: 25 TABLET, FILM COATED ORAL at 20:03

## 2017-01-01 RX ADMIN — ISOSORBIDE DINITRATE 10 MG: 10 TABLET ORAL at 20:39

## 2017-01-01 RX ADMIN — SERTRALINE HYDROCHLORIDE 100 MG: 50 TABLET ORAL at 20:25

## 2017-01-01 RX ADMIN — SODIUM CHLORIDE, POTASSIUM CHLORIDE, SODIUM LACTATE AND CALCIUM CHLORIDE 75 ML/HR: 600; 310; 30; 20 INJECTION, SOLUTION INTRAVENOUS at 11:53

## 2017-01-01 RX ADMIN — KETOTIFEN FUMARATE 1 DROP: 0.25 SOLUTION/ DROPS OPHTHALMIC at 10:17

## 2017-01-01 RX ADMIN — LINAGLIPTIN 5 MG: 5 TABLET, FILM COATED ORAL at 09:08

## 2017-01-01 RX ADMIN — GUAIFENESIN 1200 MG: 600 TABLET, EXTENDED RELEASE ORAL at 16:44

## 2017-01-01 RX ADMIN — INSULIN ASPART 2 UNITS: 100 INJECTION, SOLUTION INTRAVENOUS; SUBCUTANEOUS at 17:15

## 2017-01-01 RX ADMIN — FAMOTIDINE 20 MG: 20 TABLET, FILM COATED ORAL at 08:45

## 2017-01-01 RX ADMIN — ALLOPURINOL 100 MG: 100 TABLET ORAL at 21:04

## 2017-01-01 RX ADMIN — LINAGLIPTIN 5 MG: 5 TABLET, FILM COATED ORAL at 08:41

## 2017-01-01 RX ADMIN — FAMOTIDINE 20 MG: 20 TABLET ORAL at 17:12

## 2017-01-01 RX ADMIN — FUROSEMIDE 40 MG: 40 TABLET ORAL at 08:14

## 2017-01-01 RX ADMIN — LEVETIRACETAM 500 MG: 500 TABLET, FILM COATED ORAL at 09:14

## 2017-01-01 RX ADMIN — KETOTIFEN FUMARATE 1 DROP: 0.25 SOLUTION/ DROPS OPHTHALMIC at 17:35

## 2017-01-01 RX ADMIN — METOPROLOL TARTRATE 2.5 MG: 1 INJECTION, SOLUTION INTRAVENOUS at 06:36

## 2017-01-01 RX ADMIN — DONEPEZIL HYDROCHLORIDE 10 MG: 5 TABLET, FILM COATED ORAL at 20:01

## 2017-01-01 RX ADMIN — DONEPEZIL HYDROCHLORIDE 10 MG: 5 TABLET, FILM COATED ORAL at 19:51

## 2017-01-01 RX ADMIN — APIXABAN 5 MG: 2.5 TABLET, FILM COATED ORAL at 08:01

## 2017-01-01 RX ADMIN — METOPROLOL TARTRATE 75 MG: 25 TABLET, FILM COATED ORAL at 09:54

## 2017-01-01 RX ADMIN — BUDESONIDE 0.5 MG: 0.5 SUSPENSION RESPIRATORY (INHALATION) at 09:22

## 2017-01-01 RX ADMIN — BUDESONIDE 0.5 MG: 0.5 SUSPENSION RESPIRATORY (INHALATION) at 20:20

## 2017-01-01 RX ADMIN — KETOTIFEN FUMARATE 1 DROP: 0.25 SOLUTION/ DROPS OPHTHALMIC at 09:40

## 2017-01-01 RX ADMIN — MICONAZOLE NITRATE: 20 POWDER TOPICAL at 10:08

## 2017-01-01 RX ADMIN — STANDARDIZED SENNA CONCENTRATE 1 TABLET: 8.6 TABLET ORAL at 08:20

## 2017-01-01 RX ADMIN — FAMOTIDINE 40 MG: 20 TABLET ORAL at 20:42

## 2017-01-01 RX ADMIN — MICONAZOLE NITRATE: 20 POWDER TOPICAL at 10:16

## 2017-01-01 RX ADMIN — Medication 1 TABLET: at 08:02

## 2017-01-01 RX ADMIN — KETOTIFEN FUMARATE 1 DROP: 0.25 SOLUTION/ DROPS OPHTHALMIC at 09:28

## 2017-01-01 RX ADMIN — METOPROLOL TARTRATE 75 MG: 25 TABLET, FILM COATED ORAL at 09:52

## 2017-01-01 RX ADMIN — IPRATROPIUM BROMIDE AND ALBUTEROL SULFATE 3 ML: .5; 3 SOLUTION RESPIRATORY (INHALATION) at 11:32

## 2017-01-01 RX ADMIN — GUAIFENESIN 1200 MG: 600 TABLET, EXTENDED RELEASE ORAL at 11:10

## 2017-01-01 RX ADMIN — LEVETIRACETAM 500 MG: 500 TABLET, FILM COATED ORAL at 17:05

## 2017-01-01 RX ADMIN — DOCUSATE SODIUM AND SENNOSIDES 1 TABLET: 8.6; 5 TABLET, FILM COATED ORAL at 21:22

## 2017-01-01 RX ADMIN — SERTRALINE 100 MG: 50 TABLET, FILM COATED ORAL at 21:55

## 2017-01-01 RX ADMIN — DICLOFENAC SODIUM 4 G: 10 GEL TOPICAL at 20:41

## 2017-01-01 RX ADMIN — LEVETIRACETAM 500 MG: 500 TABLET, FILM COATED ORAL at 18:49

## 2017-01-01 RX ADMIN — SENNOSIDES AND DOCUSATE SODIUM 1 TABLET: 8.6; 5 TABLET ORAL at 21:54

## 2017-01-01 RX ADMIN — STANDARDIZED SENNA CONCENTRATE 1 TABLET: 8.6 TABLET ORAL at 08:54

## 2017-01-01 RX ADMIN — KETOTIFEN FUMARATE 1 DROP: 0.25 SOLUTION/ DROPS OPHTHALMIC at 09:37

## 2017-01-01 RX ADMIN — LEVETIRACETAM 500 MG: 500 TABLET, FILM COATED ORAL at 09:25

## 2017-01-01 RX ADMIN — SERTRALINE HYDROCHLORIDE 100 MG: 50 TABLET ORAL at 21:03

## 2017-01-01 RX ADMIN — OFLOXACIN 50000 UNITS: 300 TABLET, COATED ORAL at 13:09

## 2017-01-01 RX ADMIN — FAMOTIDINE 20 MG: 20 TABLET ORAL at 20:26

## 2017-01-01 RX ADMIN — KETOTIFEN FUMARATE 1 DROP: 0.35 SOLUTION/ DROPS OPHTHALMIC at 18:37

## 2017-01-01 RX ADMIN — MEROPENEM 1 G: 1 INJECTION, POWDER, FOR SOLUTION INTRAVENOUS at 10:09

## 2017-01-01 RX ADMIN — KETOTIFEN FUMARATE 1 DROP: 0.35 SOLUTION/ DROPS OPHTHALMIC at 09:27

## 2017-01-01 RX ADMIN — LEVETIRACETAM 500 MG: 500 TABLET, FILM COATED ORAL at 17:38

## 2017-01-01 RX ADMIN — SENNOSIDES AND DOCUSATE SODIUM 1 TABLET: 8.6; 5 TABLET ORAL at 10:45

## 2017-01-01 RX ADMIN — Medication 1 TABLET: at 17:53

## 2017-01-01 RX ADMIN — MICONAZOLE NITRATE: 20 POWDER TOPICAL at 08:22

## 2017-01-01 RX ADMIN — APIXABAN 5 MG: 2.5 TABLET, FILM COATED ORAL at 21:27

## 2017-01-01 RX ADMIN — LEVETIRACETAM 500 MG: 500 TABLET, FILM COATED ORAL at 10:30

## 2017-01-01 RX ADMIN — MICONAZOLE NITRATE: 20 POWDER TOPICAL at 19:46

## 2017-01-01 RX ADMIN — DILTIAZEM HYDROCHLORIDE 300 MG: 180 CAPSULE, COATED, EXTENDED RELEASE ORAL at 11:53

## 2017-01-01 RX ADMIN — POTASSIUM CHLORIDE 20 MEQ: 1500 TABLET, EXTENDED RELEASE ORAL at 08:29

## 2017-01-01 RX ADMIN — LINAGLIPTIN 5 MG: 5 TABLET, FILM COATED ORAL at 08:08

## 2017-01-01 RX ADMIN — DONEPEZIL HYDROCHLORIDE 10 MG: 5 TABLET, FILM COATED ORAL at 22:43

## 2017-01-01 RX ADMIN — METOPROLOL SUCCINATE 100 MG: 50 TABLET, EXTENDED RELEASE ORAL at 21:35

## 2017-01-01 RX ADMIN — TRAMADOL HYDROCHLORIDE 100 MG: 50 TABLET, FILM COATED ORAL at 21:24

## 2017-01-01 RX ADMIN — SENNOSIDES AND DOCUSATE SODIUM 1 TABLET: 8.6; 5 TABLET ORAL at 21:30

## 2017-01-01 RX ADMIN — Medication 1 TABLET: at 21:49

## 2017-01-01 RX ADMIN — MEGESTROL ACETATE 200 MG: 40 SUSPENSION ORAL at 18:01

## 2017-01-01 RX ADMIN — SERTRALINE 100 MG: 50 TABLET, FILM COATED ORAL at 21:34

## 2017-01-01 RX ADMIN — FAMOTIDINE 40 MG: 20 TABLET, FILM COATED ORAL at 20:27

## 2017-01-01 RX ADMIN — LEVETIRACETAM 500 MG: 500 TABLET, FILM COATED ORAL at 17:21

## 2017-01-01 RX ADMIN — GUAIFENESIN 1200 MG: 600 TABLET, EXTENDED RELEASE ORAL at 08:35

## 2017-01-01 RX ADMIN — ALLOPURINOL 100 MG: 100 TABLET ORAL at 17:49

## 2017-01-01 RX ADMIN — TRAMADOL HYDROCHLORIDE 50 MG: 50 TABLET, FILM COATED ORAL at 05:00

## 2017-01-01 RX ADMIN — METOPROLOL TARTRATE 100 MG: 25 TABLET, FILM COATED ORAL at 20:43

## 2017-01-01 RX ADMIN — LEVETIRACETAM 500 MG: 500 TABLET, FILM COATED ORAL at 20:41

## 2017-01-01 RX ADMIN — GUAIFENESIN 1200 MG: 600 TABLET, EXTENDED RELEASE ORAL at 09:44

## 2017-01-01 RX ADMIN — IPRATROPIUM BROMIDE AND ALBUTEROL SULFATE 3 ML: .5; 3 SOLUTION RESPIRATORY (INHALATION) at 21:42

## 2017-01-01 RX ADMIN — LEVETIRACETAM 500 MG: 500 TABLET, FILM COATED ORAL at 21:53

## 2017-01-01 RX ADMIN — ALLOPURINOL 100 MG: 100 TABLET ORAL at 09:24

## 2017-01-01 RX ADMIN — STANDARDIZED SENNA CONCENTRATE 1 TABLET: 8.6 TABLET ORAL at 17:59

## 2017-01-01 RX ADMIN — SERTRALINE 100 MG: 50 TABLET, FILM COATED ORAL at 20:07

## 2017-01-01 RX ADMIN — APIXABAN 5 MG: 2.5 TABLET, FILM COATED ORAL at 20:46

## 2017-01-01 RX ADMIN — METOPROLOL TARTRATE 75 MG: 25 TABLET, FILM COATED ORAL at 18:46

## 2017-01-01 RX ADMIN — METOPROLOL TARTRATE 100 MG: 25 TABLET, FILM COATED ORAL at 22:29

## 2017-01-01 RX ADMIN — FUROSEMIDE 40 MG: 40 TABLET ORAL at 08:10

## 2017-01-01 RX ADMIN — Medication 1 TABLET: at 09:43

## 2017-01-01 RX ADMIN — OFLOXACIN 50000 UNITS: 300 TABLET, COATED ORAL at 13:11

## 2017-01-01 RX ADMIN — KETOTIFEN FUMARATE 1 DROP: 0.35 SOLUTION/ DROPS OPHTHALMIC at 09:00

## 2017-01-01 RX ADMIN — FAMOTIDINE 20 MG: 20 TABLET, FILM COATED ORAL at 08:34

## 2017-01-01 RX ADMIN — MEGESTROL ACETATE 200 MG: 40 SUSPENSION ORAL at 16:56

## 2017-01-01 RX ADMIN — KETOTIFEN FUMARATE 1 DROP: 0.25 SOLUTION/ DROPS OPHTHALMIC at 09:33

## 2017-01-01 RX ADMIN — MICONAZOLE NITRATE: 20 POWDER TOPICAL at 21:39

## 2017-01-01 RX ADMIN — ACETAMINOPHEN 650 MG: 325 TABLET, FILM COATED ORAL at 05:39

## 2017-01-01 RX ADMIN — DILTIAZEM HYDROCHLORIDE 240 MG: 120 CAPSULE, COATED, EXTENDED RELEASE ORAL at 09:48

## 2017-01-01 RX ADMIN — DILTIAZEM HYDROCHLORIDE 300 MG: 180 CAPSULE, COATED, EXTENDED RELEASE ORAL at 13:18

## 2017-01-01 RX ADMIN — APIXABAN 5 MG: 2.5 TABLET, FILM COATED ORAL at 21:01

## 2017-01-01 RX ADMIN — GUAIFENESIN 1200 MG: 600 TABLET, EXTENDED RELEASE ORAL at 17:36

## 2017-01-01 RX ADMIN — DONEPEZIL HYDROCHLORIDE 10 MG: 5 TABLET, FILM COATED ORAL at 20:42

## 2017-01-01 RX ADMIN — MICONAZOLE NITRATE: 20 POWDER TOPICAL at 10:36

## 2017-01-01 RX ADMIN — BUDESONIDE 0.5 MG: 0.5 SUSPENSION RESPIRATORY (INHALATION) at 10:31

## 2017-01-01 RX ADMIN — MIRTAZAPINE 15 MG: 15 TABLET, FILM COATED ORAL at 20:27

## 2017-01-01 RX ADMIN — FAMOTIDINE 40 MG: 20 TABLET ORAL at 22:03

## 2017-01-01 RX ADMIN — CLINDAMYCIN HYDROCHLORIDE 450 MG: 150 CAPSULE ORAL at 20:54

## 2017-01-01 RX ADMIN — APIXABAN 5 MG: 2.5 TABLET, FILM COATED ORAL at 21:54

## 2017-01-01 RX ADMIN — INSULIN ASPART 3 UNITS: 100 INJECTION, SOLUTION INTRAVENOUS; SUBCUTANEOUS at 17:21

## 2017-01-01 RX ADMIN — INSULIN ASPART 3 UNITS: 100 INJECTION, SOLUTION INTRAVENOUS; SUBCUTANEOUS at 12:05

## 2017-01-01 RX ADMIN — Medication 1 TABLET: at 16:55

## 2017-01-01 RX ADMIN — MEGESTROL ACETATE 200 MG: 40 SUSPENSION ORAL at 08:08

## 2017-01-01 RX ADMIN — APIXABAN 2.5 MG: 2.5 TABLET, FILM COATED ORAL at 09:29

## 2017-01-01 RX ADMIN — CALCIUM CARBONATE 500 MG (1,250 MG)-VITAMIN D3 200 UNIT TABLET 500 MG: at 12:11

## 2017-01-01 RX ADMIN — KETOTIFEN FUMARATE 1 DROP: 0.25 SOLUTION/ DROPS OPHTHALMIC at 09:17

## 2017-01-01 RX ADMIN — SERTRALINE 100 MG: 50 TABLET, FILM COATED ORAL at 20:36

## 2017-01-01 RX ADMIN — Medication 1 TABLET: at 07:44

## 2017-01-01 RX ADMIN — IPRATROPIUM BROMIDE AND ALBUTEROL SULFATE 3 ML: .5; 3 SOLUTION RESPIRATORY (INHALATION) at 17:11

## 2017-01-01 RX ADMIN — Medication 1 TABLET: at 08:31

## 2017-01-01 RX ADMIN — MEGESTROL ACETATE 200 MG: 40 SUSPENSION ORAL at 11:00

## 2017-01-01 RX ADMIN — ALLOPURINOL 100 MG: 100 TABLET ORAL at 17:18

## 2017-01-01 RX ADMIN — BUDESONIDE 0.5 MG: 0.5 SUSPENSION RESPIRATORY (INHALATION) at 22:58

## 2017-01-01 RX ADMIN — IPRATROPIUM BROMIDE AND ALBUTEROL SULFATE 3 ML: .5; 3 SOLUTION RESPIRATORY (INHALATION) at 09:19

## 2017-01-01 RX ADMIN — SERTRALINE 100 MG: 50 TABLET, FILM COATED ORAL at 22:45

## 2017-01-01 RX ADMIN — Medication 150 MG: at 17:45

## 2017-01-01 RX ADMIN — ISOSORBIDE DINITRATE 10 MG: 10 TABLET ORAL at 20:30

## 2017-01-01 RX ADMIN — DILTIAZEM HYDROCHLORIDE 300 MG: 180 CAPSULE, COATED, EXTENDED RELEASE ORAL at 12:38

## 2017-01-01 RX ADMIN — AMIODARONE HYDROCHLORIDE 200 MG: 200 TABLET ORAL at 20:34

## 2017-01-01 RX ADMIN — SERTRALINE 100 MG: 50 TABLET, FILM COATED ORAL at 20:21

## 2017-01-01 RX ADMIN — Medication 150 MG: at 08:45

## 2017-01-01 RX ADMIN — IPRATROPIUM BROMIDE AND ALBUTEROL SULFATE 3 ML: .5; 3 SOLUTION RESPIRATORY (INHALATION) at 10:24

## 2017-01-01 RX ADMIN — MICONAZOLE NITRATE 1 APPLICATION: 20 POWDER TOPICAL at 09:57

## 2017-01-01 RX ADMIN — METOPROLOL SUCCINATE 100 MG: 50 TABLET, EXTENDED RELEASE ORAL at 20:28

## 2017-01-01 RX ADMIN — SENNOSIDES AND DOCUSATE SODIUM 1 TABLET: 8.6; 5 TABLET ORAL at 10:21

## 2017-01-01 RX ADMIN — MEGESTROL ACETATE 200 MG: 40 SUSPENSION ORAL at 08:14

## 2017-01-01 RX ADMIN — Medication 1 TABLET: at 18:21

## 2017-01-01 RX ADMIN — AMIODARONE HYDROCHLORIDE 200 MG: 200 TABLET ORAL at 18:04

## 2017-01-01 RX ADMIN — Medication 150 MG: at 16:54

## 2017-01-01 RX ADMIN — FUROSEMIDE 40 MG: 40 TABLET ORAL at 07:43

## 2017-01-01 RX ADMIN — LINAGLIPTIN 5 MG: 5 TABLET, FILM COATED ORAL at 10:36

## 2017-01-01 RX ADMIN — LEVETIRACETAM 500 MG: 500 TABLET, FILM COATED ORAL at 19:05

## 2017-01-01 RX ADMIN — FAMOTIDINE 40 MG: 20 TABLET ORAL at 22:46

## 2017-01-01 RX ADMIN — Medication 1 TABLET: at 17:47

## 2017-01-01 RX ADMIN — ALLOPURINOL 100 MG: 100 TABLET ORAL at 09:02

## 2017-01-01 RX ADMIN — TRAMADOL HYDROCHLORIDE 100 MG: 50 TABLET, FILM COATED ORAL at 22:16

## 2017-01-01 RX ADMIN — MICONAZOLE NITRATE: 20 POWDER TOPICAL at 20:49

## 2017-01-01 RX ADMIN — GLIPIZIDE 5 MG: 2.5 TABLET, FILM COATED, EXTENDED RELEASE ORAL at 08:38

## 2017-01-01 RX ADMIN — MEGESTROL ACETATE 200 MG: 40 SUSPENSION ORAL at 17:22

## 2017-01-01 RX ADMIN — MICONAZOLE NITRATE: 20 POWDER TOPICAL at 11:22

## 2017-01-01 RX ADMIN — KETOTIFEN FUMARATE 1 DROP: 0.35 SOLUTION/ DROPS OPHTHALMIC at 17:58

## 2017-01-01 RX ADMIN — CLINDAMYCIN HYDROCHLORIDE 450 MG: 150 CAPSULE ORAL at 06:47

## 2017-01-01 RX ADMIN — CONJUGATED ESTROGENS 0.75 APPLICATOR: 0.62 CREAM VAGINAL at 21:17

## 2017-01-01 RX ADMIN — APIXABAN 5 MG: 2.5 TABLET, FILM COATED ORAL at 08:37

## 2017-01-01 RX ADMIN — MICONAZOLE NITRATE: 20 POWDER TOPICAL at 10:28

## 2017-01-01 RX ADMIN — SENNOSIDES AND DOCUSATE SODIUM 1 TABLET: 8.6; 5 TABLET ORAL at 20:51

## 2017-01-01 RX ADMIN — AMIODARONE HYDROCHLORIDE 200 MG: 200 TABLET ORAL at 08:43

## 2017-01-01 RX ADMIN — INSULIN ASPART 3 UNITS: 100 INJECTION, SOLUTION INTRAVENOUS; SUBCUTANEOUS at 17:31

## 2017-01-01 RX ADMIN — ALLOPURINOL 100 MG: 100 TABLET ORAL at 21:38

## 2017-01-01 RX ADMIN — Medication 150 MG: at 09:29

## 2017-01-01 RX ADMIN — GUAIFENESIN 1200 MG: 600 TABLET, EXTENDED RELEASE ORAL at 18:48

## 2017-01-01 RX ADMIN — KETOTIFEN FUMARATE 1 DROP: 0.25 SOLUTION/ DROPS OPHTHALMIC at 18:26

## 2017-01-01 RX ADMIN — LINAGLIPTIN 5 MG: 5 TABLET, FILM COATED ORAL at 09:12

## 2017-01-01 RX ADMIN — Medication 10 ML: at 20:13

## 2017-01-01 RX ADMIN — GUAIFENESIN 1200 MG: 600 TABLET, EXTENDED RELEASE ORAL at 17:47

## 2017-01-01 RX ADMIN — METOPROLOL TARTRATE 75 MG: 25 TABLET, FILM COATED ORAL at 17:23

## 2017-01-01 RX ADMIN — LEVETIRACETAM 500 MG: 500 TABLET, FILM COATED ORAL at 09:32

## 2017-01-01 RX ADMIN — AMIODARONE HYDROCHLORIDE 200 MG: 200 TABLET ORAL at 08:01

## 2017-01-01 RX ADMIN — ACETAMINOPHEN 650 MG: 325 TABLET, FILM COATED ORAL at 18:07

## 2017-01-01 RX ADMIN — FUROSEMIDE 40 MG: 40 TABLET ORAL at 12:10

## 2017-01-01 RX ADMIN — DILTIAZEM HYDROCHLORIDE 300 MG: 180 CAPSULE, COATED, EXTENDED RELEASE ORAL at 13:20

## 2017-01-01 RX ADMIN — ASPIRIN 81 MG: 81 TABLET, COATED ORAL at 09:34

## 2017-01-01 RX ADMIN — ASPIRIN 81 MG: 81 TABLET, COATED ORAL at 08:46

## 2017-01-01 RX ADMIN — ISOSORBIDE DINITRATE 10 MG: 10 TABLET ORAL at 20:49

## 2017-01-01 RX ADMIN — MICONAZOLE NITRATE: 20 POWDER TOPICAL at 13:27

## 2017-01-01 RX ADMIN — Medication 1 CAPSULE: at 08:37

## 2017-01-01 RX ADMIN — DILTIAZEM HYDROCHLORIDE 300 MG: 180 CAPSULE, COATED, EXTENDED RELEASE ORAL at 12:49

## 2017-01-01 RX ADMIN — STANDARDIZED SENNA CONCENTRATE 1 TABLET: 8.6 TABLET ORAL at 09:47

## 2017-01-01 RX ADMIN — SENNOSIDES AND DOCUSATE SODIUM 1 TABLET: 8.6; 5 TABLET ORAL at 20:00

## 2017-01-01 RX ADMIN — ALLOPURINOL 100 MG: 100 TABLET ORAL at 08:59

## 2017-01-01 RX ADMIN — ALLOPURINOL 100 MG: 100 TABLET ORAL at 21:14

## 2017-01-01 RX ADMIN — SENNOSIDES AND DOCUSATE SODIUM 1 TABLET: 8.6; 5 TABLET ORAL at 09:16

## 2017-01-01 RX ADMIN — DOCUSATE SODIUM AND SENNOSIDES 1 TABLET: 8.6; 5 TABLET, FILM COATED ORAL at 09:18

## 2017-01-01 RX ADMIN — DOXAZOSIN MESYLATE 2 MG: 2 TABLET ORAL at 09:27

## 2017-01-01 RX ADMIN — Medication 1 TABLET: at 10:14

## 2017-01-01 RX ADMIN — BUDESONIDE 0.5 MG: 0.5 SUSPENSION RESPIRATORY (INHALATION) at 08:48

## 2017-01-01 RX ADMIN — KETOTIFEN FUMARATE 1 DROP: 0.35 SOLUTION/ DROPS OPHTHALMIC at 08:28

## 2017-01-01 RX ADMIN — ALLOPURINOL 100 MG: 100 TABLET ORAL at 21:34

## 2017-01-01 RX ADMIN — POTASSIUM CHLORIDE 10 MEQ: 750 TABLET, FILM COATED, EXTENDED RELEASE ORAL at 08:29

## 2017-01-01 RX ADMIN — MEGESTROL ACETATE 200 MG: 40 SUSPENSION ORAL at 08:13

## 2017-01-01 RX ADMIN — TRAMADOL HYDROCHLORIDE 100 MG: 50 TABLET, FILM COATED ORAL at 05:39

## 2017-01-01 RX ADMIN — METOPROLOL TARTRATE 25 MG: 25 TABLET, FILM COATED ORAL at 17:54

## 2017-01-01 RX ADMIN — IPRATROPIUM BROMIDE AND ALBUTEROL SULFATE 3 ML: .5; 3 SOLUTION RESPIRATORY (INHALATION) at 10:48

## 2017-01-01 RX ADMIN — Medication 10 ML: at 09:54

## 2017-01-01 RX ADMIN — LEVETIRACETAM 500 MG: 500 TABLET, FILM COATED ORAL at 07:45

## 2017-01-01 RX ADMIN — POTASSIUM CHLORIDE 20 MEQ: 1500 TABLET, EXTENDED RELEASE ORAL at 10:06

## 2017-01-01 RX ADMIN — KETOTIFEN FUMARATE 1 DROP: 0.25 SOLUTION/ DROPS OPHTHALMIC at 09:43

## 2017-01-01 RX ADMIN — DONEPEZIL HYDROCHLORIDE 10 MG: 5 TABLET, FILM COATED ORAL at 20:06

## 2017-01-01 RX ADMIN — FUROSEMIDE 40 MG: 40 TABLET ORAL at 09:06

## 2017-01-01 RX ADMIN — DILTIAZEM HYDROCHLORIDE 300 MG: 180 CAPSULE, COATED, EXTENDED RELEASE ORAL at 11:54

## 2017-01-01 RX ADMIN — ALLOPURINOL 100 MG: 100 TABLET ORAL at 16:59

## 2017-01-01 RX ADMIN — METOPROLOL SUCCINATE 100 MG: 50 TABLET, EXTENDED RELEASE ORAL at 20:22

## 2017-01-01 RX ADMIN — CLINDAMYCIN HYDROCHLORIDE 450 MG: 150 CAPSULE ORAL at 13:32

## 2017-01-01 RX ADMIN — INSULIN ASPART 3 UNITS: 100 INJECTION, SOLUTION INTRAVENOUS; SUBCUTANEOUS at 21:07

## 2017-01-01 RX ADMIN — POTASSIUM CHLORIDE 40 MEQ: 1500 TABLET, EXTENDED RELEASE ORAL at 09:37

## 2017-01-01 RX ADMIN — LEVOFLOXACIN 250 MG: 250 TABLET, FILM COATED ORAL at 14:37

## 2017-01-01 RX ADMIN — DILTIAZEM HYDROCHLORIDE 120 MG: 60 TABLET, FILM COATED ORAL at 17:47

## 2017-01-01 RX ADMIN — ALLOPURINOL 100 MG: 100 TABLET ORAL at 17:35

## 2017-01-01 RX ADMIN — ALLOPURINOL 100 MG: 100 TABLET ORAL at 21:11

## 2017-01-01 RX ADMIN — MICONAZOLE NITRATE 1 APPLICATION: 20 POWDER TOPICAL at 21:21

## 2017-01-01 RX ADMIN — Medication 1 TABLET: at 07:28

## 2017-01-01 RX ADMIN — ALLOPURINOL 100 MG: 100 TABLET ORAL at 21:56

## 2017-01-01 RX ADMIN — FAMOTIDINE 20 MG: 20 TABLET ORAL at 08:24

## 2017-01-01 RX ADMIN — GUAIFENESIN 1200 MG: 600 TABLET, EXTENDED RELEASE ORAL at 18:14

## 2017-01-01 RX ADMIN — GUAIFENESIN 1200 MG: 600 TABLET, EXTENDED RELEASE ORAL at 08:29

## 2017-01-01 RX ADMIN — MEGESTROL ACETATE 200 MG: 40 SUSPENSION ORAL at 08:43

## 2017-01-01 RX ADMIN — OFLOXACIN 50000 UNITS: 300 TABLET, COATED ORAL at 12:25

## 2017-01-01 RX ADMIN — ALLOPURINOL 100 MG: 100 TABLET ORAL at 09:06

## 2017-01-01 RX ADMIN — LEVETIRACETAM 500 MG: 500 TABLET, FILM COATED ORAL at 17:28

## 2017-01-01 RX ADMIN — APIXABAN 5 MG: 2.5 TABLET, FILM COATED ORAL at 09:50

## 2017-01-01 RX ADMIN — KETOTIFEN FUMARATE 1 DROP: 0.25 SOLUTION/ DROPS OPHTHALMIC at 08:55

## 2017-01-01 RX ADMIN — DONEPEZIL HYDROCHLORIDE 10 MG: 5 TABLET, FILM COATED ORAL at 20:23

## 2017-01-01 RX ADMIN — DILTIAZEM HYDROCHLORIDE 240 MG: 120 CAPSULE, COATED, EXTENDED RELEASE ORAL at 09:25

## 2017-01-01 RX ADMIN — INSULIN ASPART 2 UNITS: 100 INJECTION, SOLUTION INTRAVENOUS; SUBCUTANEOUS at 20:46

## 2017-01-01 RX ADMIN — Medication 1 TABLET: at 17:04

## 2017-01-01 RX ADMIN — KETOTIFEN FUMARATE 1 DROP: 0.35 SOLUTION/ DROPS OPHTHALMIC at 16:54

## 2017-01-01 RX ADMIN — Medication 1 TABLET: at 17:46

## 2017-01-01 RX ADMIN — Medication 1 TABLET: at 08:19

## 2017-01-01 RX ADMIN — PANTOPRAZOLE SODIUM 40 MG: 40 INJECTION, POWDER, FOR SOLUTION INTRAVENOUS at 06:41

## 2017-01-01 RX ADMIN — LINAGLIPTIN 5 MG: 5 TABLET, FILM COATED ORAL at 08:45

## 2017-01-01 RX ADMIN — STANDARDIZED SENNA CONCENTRATE 1 TABLET: 8.6 TABLET ORAL at 09:34

## 2017-01-01 RX ADMIN — Medication 1 TABLET: at 20:17

## 2017-01-01 RX ADMIN — BUDESONIDE 0.5 MG: 0.5 SUSPENSION RESPIRATORY (INHALATION) at 20:59

## 2017-01-01 RX ADMIN — METOPROLOL TARTRATE 100 MG: 25 TABLET, FILM COATED ORAL at 21:01

## 2017-01-01 RX ADMIN — IPRATROPIUM BROMIDE AND ALBUTEROL SULFATE 3 ML: .5; 3 SOLUTION RESPIRATORY (INHALATION) at 03:26

## 2017-01-01 RX ADMIN — GLIPIZIDE 5 MG: 2.5 TABLET, FILM COATED, EXTENDED RELEASE ORAL at 07:59

## 2017-01-01 RX ADMIN — Medication 1 TABLET: at 10:38

## 2017-01-01 RX ADMIN — CLINDAMYCIN HYDROCHLORIDE 450 MG: 150 CAPSULE ORAL at 17:11

## 2017-01-01 RX ADMIN — FAMOTIDINE 20 MG: 20 TABLET ORAL at 17:52

## 2017-01-01 RX ADMIN — POTASSIUM CHLORIDE 20 MEQ: 1500 TABLET, EXTENDED RELEASE ORAL at 08:13

## 2017-01-01 RX ADMIN — DICLOFENAC SODIUM 4 G: 10 GEL TOPICAL at 07:58

## 2017-01-01 RX ADMIN — SERTRALINE 100 MG: 50 TABLET, FILM COATED ORAL at 20:15

## 2017-01-01 RX ADMIN — Medication 1 TABLET: at 08:46

## 2017-01-01 RX ADMIN — INSULIN ASPART 2 UNITS: 100 INJECTION, SOLUTION INTRAVENOUS; SUBCUTANEOUS at 17:44

## 2017-01-01 RX ADMIN — SENNOSIDES AND DOCUSATE SODIUM 1 TABLET: 8.6; 5 TABLET ORAL at 22:20

## 2017-01-01 RX ADMIN — MICONAZOLE NITRATE: 20 POWDER TOPICAL at 21:18

## 2017-01-01 RX ADMIN — KETOTIFEN FUMARATE 1 DROP: 0.25 SOLUTION/ DROPS OPHTHALMIC at 17:55

## 2017-01-01 RX ADMIN — IPRATROPIUM BROMIDE AND ALBUTEROL SULFATE 3 ML: .5; 3 SOLUTION RESPIRATORY (INHALATION) at 13:30

## 2017-01-01 RX ADMIN — CONJUGATED ESTROGENS 1 APPLICATION: 0.62 CREAM VAGINAL at 20:56

## 2017-01-01 RX ADMIN — MICONAZOLE NITRATE 1 APPLICATION: 20 POWDER TOPICAL at 08:29

## 2017-01-01 RX ADMIN — METOPROLOL TARTRATE 75 MG: 25 TABLET, FILM COATED ORAL at 16:43

## 2017-01-01 RX ADMIN — APIXABAN 5 MG: 2.5 TABLET, FILM COATED ORAL at 09:19

## 2017-01-01 RX ADMIN — METOPROLOL TARTRATE 100 MG: 25 TABLET, FILM COATED ORAL at 21:20

## 2017-01-01 RX ADMIN — BUDESONIDE 0.5 MG: 0.5 SUSPENSION RESPIRATORY (INHALATION) at 08:39

## 2017-01-01 RX ADMIN — IPRATROPIUM BROMIDE AND ALBUTEROL SULFATE 3 ML: .5; 3 SOLUTION RESPIRATORY (INHALATION) at 17:44

## 2017-01-01 RX ADMIN — SERTRALINE 100 MG: 50 TABLET, FILM COATED ORAL at 20:31

## 2017-01-01 RX ADMIN — Medication 1 TABLET: at 12:34

## 2017-01-01 RX ADMIN — METOPROLOL TARTRATE 75 MG: 25 TABLET, FILM COATED ORAL at 16:57

## 2017-01-01 RX ADMIN — GUAIFENESIN 1200 MG: 600 TABLET, EXTENDED RELEASE ORAL at 10:36

## 2017-01-01 RX ADMIN — DONEPEZIL HYDROCHLORIDE 10 MG: 5 TABLET ORAL at 22:05

## 2017-01-01 RX ADMIN — MICONAZOLE NITRATE: 20 POWDER TOPICAL at 20:26

## 2017-01-01 RX ADMIN — CALCIUM CARBONATE 500 MG (1,250 MG)-VITAMIN D3 200 UNIT TABLET 500 MG: at 23:08

## 2017-01-01 RX ADMIN — SERTRALINE HYDROCHLORIDE 100 MG: 50 TABLET ORAL at 22:08

## 2017-01-01 RX ADMIN — Medication 1 TABLET: at 08:10

## 2017-01-01 RX ADMIN — IPRATROPIUM BROMIDE AND ALBUTEROL SULFATE 3 ML: .5; 3 SOLUTION RESPIRATORY (INHALATION) at 13:39

## 2017-01-01 RX ADMIN — KETOTIFEN FUMARATE 1 DROP: 0.25 SOLUTION/ DROPS OPHTHALMIC at 07:58

## 2017-01-01 RX ADMIN — APIXABAN 5 MG: 2.5 TABLET, FILM COATED ORAL at 10:11

## 2017-01-01 RX ADMIN — KETOTIFEN FUMARATE 1 DROP: 0.25 SOLUTION/ DROPS OPHTHALMIC at 08:18

## 2017-01-01 RX ADMIN — APIXABAN 5 MG: 2.5 TABLET, FILM COATED ORAL at 07:44

## 2017-01-01 RX ADMIN — ACETAMINOPHEN 650 MG: 325 TABLET, FILM COATED ORAL at 20:04

## 2017-01-01 RX ADMIN — LEVETIRACETAM 500 MG: 500 TABLET, FILM COATED ORAL at 10:45

## 2017-01-01 RX ADMIN — CLINDAMYCIN HYDROCHLORIDE 450 MG: 150 CAPSULE ORAL at 06:18

## 2017-01-01 RX ADMIN — CLINDAMYCIN HYDROCHLORIDE 450 MG: 150 CAPSULE ORAL at 05:46

## 2017-01-01 RX ADMIN — Medication 1 CAPSULE: at 08:25

## 2017-01-01 RX ADMIN — FAMOTIDINE 20 MG: 20 TABLET, FILM COATED ORAL at 09:30

## 2017-01-01 RX ADMIN — DOXAZOSIN MESYLATE 2 MG: 2 TABLET ORAL at 12:34

## 2017-01-01 RX ADMIN — METOPROLOL TARTRATE 100 MG: 25 TABLET, FILM COATED ORAL at 09:13

## 2017-01-01 RX ADMIN — ALLOPURINOL 100 MG: 100 TABLET ORAL at 09:32

## 2017-01-01 RX ADMIN — POTASSIUM CHLORIDE 40 MEQ: 1500 TABLET, EXTENDED RELEASE ORAL at 17:55

## 2017-01-01 RX ADMIN — LEVETIRACETAM 500 MG: 500 TABLET, FILM COATED ORAL at 08:10

## 2017-01-01 RX ADMIN — POTASSIUM CHLORIDE 20 MEQ: 1500 TABLET, EXTENDED RELEASE ORAL at 08:25

## 2017-01-01 RX ADMIN — IPRATROPIUM BROMIDE AND ALBUTEROL SULFATE 3 ML: .5; 3 SOLUTION RESPIRATORY (INHALATION) at 13:13

## 2017-01-01 RX ADMIN — FUROSEMIDE 40 MG: 40 TABLET ORAL at 08:22

## 2017-01-01 RX ADMIN — KETOTIFEN FUMARATE 1 DROP: 0.25 SOLUTION/ DROPS OPHTHALMIC at 08:36

## 2017-01-01 RX ADMIN — FAMOTIDINE 40 MG: 20 TABLET, FILM COATED ORAL at 21:45

## 2017-01-01 RX ADMIN — CALCIUM CARBONATE 500 MG (1,250 MG)-VITAMIN D3 200 UNIT TABLET 500 MG: at 08:17

## 2017-01-01 RX ADMIN — Medication 1 TABLET: at 17:45

## 2017-01-01 RX ADMIN — INSULIN ASPART 2 UNITS: 100 INJECTION, SOLUTION INTRAVENOUS; SUBCUTANEOUS at 08:43

## 2017-01-01 RX ADMIN — CONJUGATED ESTROGENS 1 APPLICATION: 0.62 CREAM VAGINAL at 20:06

## 2017-01-01 RX ADMIN — LEVOTHYROXINE SODIUM 150 MCG: 150 TABLET ORAL at 05:14

## 2017-01-01 RX ADMIN — FUROSEMIDE 40 MG: 40 TABLET ORAL at 10:11

## 2017-01-01 RX ADMIN — SENNOSIDES AND DOCUSATE SODIUM 1 TABLET: 8.6; 5 TABLET ORAL at 21:40

## 2017-01-01 RX ADMIN — POTASSIUM CHLORIDE 20 MEQ: 1500 TABLET, EXTENDED RELEASE ORAL at 10:21

## 2017-01-01 RX ADMIN — LEVETIRACETAM 500 MG: 500 TABLET, FILM COATED ORAL at 08:22

## 2017-01-01 RX ADMIN — SERTRALINE HYDROCHLORIDE 100 MG: 50 TABLET ORAL at 20:30

## 2017-01-01 RX ADMIN — ALLOPURINOL 100 MG: 100 TABLET ORAL at 17:27

## 2017-01-01 RX ADMIN — LEVOFLOXACIN 750 MG: 5 INJECTION, SOLUTION INTRAVENOUS at 11:50

## 2017-01-01 RX ADMIN — IPRATROPIUM BROMIDE AND ALBUTEROL SULFATE 3 ML: .5; 3 SOLUTION RESPIRATORY (INHALATION) at 11:48

## 2017-01-01 RX ADMIN — A/SINGAPORE/GP1908/2015 IVR-180 (H1N1) (AN A/MICHIGAN/45/2015-LIKE VIRUS), A/SINGAPORE/GP2050/2015 (H3N2) (AN A/HONG KONG/4801/2014 - LIKE VIRUS), B/UTAH/9/2014 (A B/PHUKET/3073/2013-LIKE VIRUS), B/HONG KONG/259/2010 (A B/BRISBANE/60/08-LIKE VIRUS) 0.5 ML: 15; 15; 15; 15 INJECTION, SUSPENSION INTRAMUSCULAR at 17:50

## 2017-01-01 RX ADMIN — Medication 1 TABLET: at 09:07

## 2017-01-01 RX ADMIN — KETOTIFEN FUMARATE 1 DROP: 0.25 SOLUTION/ DROPS OPHTHALMIC at 07:43

## 2017-01-01 RX ADMIN — AMIODARONE HYDROCHLORIDE 0.5 MG/MIN: 1.8 INJECTION, SOLUTION INTRAVENOUS at 20:44

## 2017-01-01 RX ADMIN — SENNOSIDES AND DOCUSATE SODIUM 1 TABLET: 8.6; 5 TABLET ORAL at 10:51

## 2017-01-01 RX ADMIN — Medication 10 ML: at 08:52

## 2017-01-01 RX ADMIN — TRAMADOL HYDROCHLORIDE 50 MG: 50 TABLET, FILM COATED ORAL at 16:10

## 2017-01-01 RX ADMIN — ALLOPURINOL 100 MG: 100 TABLET ORAL at 20:11

## 2017-01-01 RX ADMIN — MICONAZOLE NITRATE: 20 POWDER TOPICAL at 09:02

## 2017-01-01 RX ADMIN — INSULIN ASPART 3 UNITS: 100 INJECTION, SOLUTION INTRAVENOUS; SUBCUTANEOUS at 21:26

## 2017-01-01 RX ADMIN — INSULIN ASPART 2 UNITS: 100 INJECTION, SOLUTION INTRAVENOUS; SUBCUTANEOUS at 18:08

## 2017-01-01 RX ADMIN — Medication 10 ML: at 20:55

## 2017-01-01 RX ADMIN — FAMOTIDINE 20 MG: 20 TABLET ORAL at 17:42

## 2017-01-01 RX ADMIN — FAMOTIDINE 40 MG: 20 TABLET ORAL at 22:28

## 2017-01-01 RX ADMIN — METOPROLOL TARTRATE 100 MG: 25 TABLET, FILM COATED ORAL at 10:50

## 2017-01-01 RX ADMIN — KETOTIFEN FUMARATE 1 DROP: 0.35 SOLUTION/ DROPS OPHTHALMIC at 17:52

## 2017-01-01 RX ADMIN — MEGESTROL ACETATE 200 MG: 40 SUSPENSION ORAL at 08:25

## 2017-01-01 RX ADMIN — BUDESONIDE 0.5 MG: 0.5 SUSPENSION RESPIRATORY (INHALATION) at 21:33

## 2017-01-01 RX ADMIN — BUDESONIDE 0.5 MG: 0.5 SUSPENSION RESPIRATORY (INHALATION) at 22:50

## 2017-01-01 RX ADMIN — SERTRALINE HYDROCHLORIDE 100 MG: 50 TABLET ORAL at 19:49

## 2017-01-01 RX ADMIN — LEVOTHYROXINE SODIUM 150 MCG: 150 TABLET ORAL at 05:36

## 2017-01-01 RX ADMIN — APIXABAN 5 MG: 2.5 TABLET, FILM COATED ORAL at 22:33

## 2017-01-01 RX ADMIN — SERTRALINE HYDROCHLORIDE 100 MG: 50 TABLET, FILM COATED ORAL at 21:08

## 2017-01-01 RX ADMIN — Medication 1 CAPSULE: at 08:08

## 2017-01-01 RX ADMIN — Medication 1 CAPSULE: at 08:46

## 2017-01-01 RX ADMIN — Medication 150 MG: at 07:54

## 2017-01-01 RX ADMIN — SERTRALINE HYDROCHLORIDE 100 MG: 50 TABLET ORAL at 20:39

## 2017-01-01 RX ADMIN — DILTIAZEM HYDROCHLORIDE 300 MG: 180 CAPSULE, COATED, EXTENDED RELEASE ORAL at 13:08

## 2017-01-01 RX ADMIN — DOXAZOSIN MESYLATE 2 MG: 2 TABLET ORAL at 08:36

## 2017-01-01 RX ADMIN — DOXAZOSIN MESYLATE 2 MG: 2 TABLET ORAL at 22:46

## 2017-01-01 RX ADMIN — KETOTIFEN FUMARATE 1 DROP: 0.35 SOLUTION/ DROPS OPHTHALMIC at 17:03

## 2017-01-01 RX ADMIN — KETOTIFEN FUMARATE 1 DROP: 0.25 SOLUTION/ DROPS OPHTHALMIC at 08:59

## 2017-01-01 RX ADMIN — APIXABAN 5 MG: 2.5 TABLET, FILM COATED ORAL at 08:24

## 2017-01-01 RX ADMIN — LEVETIRACETAM 500 MG: 500 TABLET, FILM COATED ORAL at 09:17

## 2017-01-01 RX ADMIN — AMIODARONE HYDROCHLORIDE 200 MG: 200 TABLET ORAL at 14:47

## 2017-01-01 RX ADMIN — LEVETIRACETAM 500 MG: 500 TABLET, FILM COATED ORAL at 09:53

## 2017-01-01 RX ADMIN — METOPROLOL SUCCINATE 100 MG: 50 TABLET, EXTENDED RELEASE ORAL at 20:38

## 2017-01-01 RX ADMIN — INSULIN ASPART 3 UNITS: 100 INJECTION, SOLUTION INTRAVENOUS; SUBCUTANEOUS at 17:48

## 2017-01-01 RX ADMIN — MEGESTROL ACETATE 200 MG: 40 SUSPENSION ORAL at 17:28

## 2017-01-01 RX ADMIN — SENNOSIDES AND DOCUSATE SODIUM 1 TABLET: 8.6; 5 TABLET ORAL at 10:32

## 2017-01-01 RX ADMIN — ALLOPURINOL 100 MG: 100 TABLET ORAL at 23:29

## 2017-01-01 RX ADMIN — APIXABAN 5 MG: 2.5 TABLET, FILM COATED ORAL at 10:07

## 2017-01-01 RX ADMIN — ALLOPURINOL 100 MG: 100 TABLET ORAL at 22:16

## 2017-01-01 RX ADMIN — MEGESTROL ACETATE 200 MG: 40 SUSPENSION ORAL at 17:37

## 2017-01-01 RX ADMIN — LEVETIRACETAM 500 MG: 500 TABLET, FILM COATED ORAL at 22:26

## 2017-01-01 RX ADMIN — TUBERCULIN PURIFIED PROTEIN DERIVATIVE 5 UNITS: 5 INJECTION INTRADERMAL at 15:13

## 2017-01-01 RX ADMIN — KETOTIFEN FUMARATE 1 DROP: 0.25 SOLUTION/ DROPS OPHTHALMIC at 18:18

## 2017-01-01 RX ADMIN — FAMOTIDINE 40 MG: 20 TABLET, FILM COATED ORAL at 21:00

## 2017-01-01 RX ADMIN — DILTIAZEM HYDROCHLORIDE 240 MG: 120 CAPSULE, COATED, EXTENDED RELEASE ORAL at 08:45

## 2017-01-01 RX ADMIN — KETOTIFEN FUMARATE 1 DROP: 0.25 SOLUTION/ DROPS OPHTHALMIC at 08:28

## 2017-01-01 RX ADMIN — Medication 150 MG: at 08:55

## 2017-01-01 RX ADMIN — FAMOTIDINE 40 MG: 20 TABLET, FILM COATED ORAL at 21:24

## 2017-01-01 RX ADMIN — KETOTIFEN FUMARATE 1 DROP: 0.25 SOLUTION/ DROPS OPHTHALMIC at 18:44

## 2017-01-01 RX ADMIN — IPRATROPIUM BROMIDE AND ALBUTEROL SULFATE 3 ML: .5; 3 SOLUTION RESPIRATORY (INHALATION) at 12:55

## 2017-01-01 RX ADMIN — MICONAZOLE NITRATE: 20 POWDER TOPICAL at 12:12

## 2017-01-01 RX ADMIN — Medication 1 TABLET: at 18:44

## 2017-01-01 RX ADMIN — TRAMADOL HYDROCHLORIDE 50 MG: 50 TABLET, FILM COATED ORAL at 00:04

## 2017-01-01 RX ADMIN — SERTRALINE 100 MG: 50 TABLET, FILM COATED ORAL at 17:21

## 2017-01-01 RX ADMIN — AMIODARONE HYDROCHLORIDE 200 MG: 200 TABLET ORAL at 08:25

## 2017-01-01 RX ADMIN — IPRATROPIUM BROMIDE AND ALBUTEROL SULFATE 3 ML: .5; 3 SOLUTION RESPIRATORY (INHALATION) at 22:12

## 2017-01-01 RX ADMIN — BUDESONIDE 0.5 MG: 0.5 SUSPENSION RESPIRATORY (INHALATION) at 08:00

## 2017-01-01 RX ADMIN — ALLOPURINOL 100 MG: 100 TABLET ORAL at 17:56

## 2017-01-01 RX ADMIN — AMIODARONE HYDROCHLORIDE 200 MG: 200 TABLET ORAL at 09:23

## 2017-01-01 RX ADMIN — Medication 150 MG: at 09:07

## 2017-01-01 RX ADMIN — STANDARDIZED SENNA CONCENTRATE 1 TABLET: 8.6 TABLET ORAL at 09:22

## 2017-01-01 RX ADMIN — ACETAMINOPHEN 650 MG: 325 TABLET, FILM COATED ORAL at 06:12

## 2017-01-01 RX ADMIN — INSULIN ASPART 3 UNITS: 100 INJECTION, SOLUTION INTRAVENOUS; SUBCUTANEOUS at 17:04

## 2017-01-01 RX ADMIN — TRAMADOL HYDROCHLORIDE 100 MG: 50 TABLET, FILM COATED ORAL at 17:17

## 2017-01-01 RX ADMIN — OFLOXACIN 50000 UNITS: 300 TABLET, COATED ORAL at 11:38

## 2017-01-01 RX ADMIN — DONEPEZIL HYDROCHLORIDE 10 MG: 5 TABLET ORAL at 21:12

## 2017-01-01 RX ADMIN — POTASSIUM CHLORIDE 20 MEQ: 1500 TABLET, EXTENDED RELEASE ORAL at 08:33

## 2017-01-01 RX ADMIN — IPRATROPIUM BROMIDE AND ALBUTEROL SULFATE 3 ML: .5; 3 SOLUTION RESPIRATORY (INHALATION) at 17:47

## 2017-01-01 RX ADMIN — APIXABAN 5 MG: 2.5 TABLET, FILM COATED ORAL at 08:41

## 2017-01-01 RX ADMIN — FAMOTIDINE 20 MG: 20 TABLET ORAL at 08:28

## 2017-01-01 RX ADMIN — INSULIN ASPART 2 UNITS: 100 INJECTION, SOLUTION INTRAVENOUS; SUBCUTANEOUS at 20:53

## 2017-01-01 RX ADMIN — IPRATROPIUM BROMIDE AND ALBUTEROL SULFATE 3 ML: .5; 3 SOLUTION RESPIRATORY (INHALATION) at 07:49

## 2017-01-01 RX ADMIN — DONEPEZIL HYDROCHLORIDE 10 MG: 5 TABLET ORAL at 20:27

## 2017-01-01 RX ADMIN — METOPROLOL SUCCINATE 100 MG: 50 TABLET, EXTENDED RELEASE ORAL at 07:55

## 2017-01-01 RX ADMIN — FUROSEMIDE 40 MG: 40 TABLET ORAL at 10:13

## 2017-01-01 RX ADMIN — ISOSORBIDE DINITRATE 10 MG: 10 TABLET ORAL at 09:44

## 2017-01-01 RX ADMIN — AMIODARONE HYDROCHLORIDE 200 MG: 200 TABLET ORAL at 20:32

## 2017-01-01 RX ADMIN — SERTRALINE HYDROCHLORIDE 100 MG: 50 TABLET ORAL at 20:54

## 2017-01-01 RX ADMIN — APIXABAN 5 MG: 2.5 TABLET, FILM COATED ORAL at 19:51

## 2017-01-01 RX ADMIN — SERTRALINE 100 MG: 50 TABLET, FILM COATED ORAL at 20:46

## 2017-01-01 RX ADMIN — MICONAZOLE NITRATE: 20 POWDER TOPICAL at 09:12

## 2017-01-01 RX ADMIN — FAMOTIDINE 20 MG: 20 TABLET ORAL at 17:34

## 2017-01-01 RX ADMIN — STANDARDIZED SENNA CONCENTRATE 1 TABLET: 8.6 TABLET ORAL at 17:41

## 2017-01-01 RX ADMIN — ALLOPURINOL 100 MG: 100 TABLET ORAL at 09:53

## 2017-01-01 RX ADMIN — GLIPIZIDE 2.5 MG: 2.5 TABLET, FILM COATED, EXTENDED RELEASE ORAL at 09:24

## 2017-01-01 RX ADMIN — AMIODARONE HYDROCHLORIDE 200 MG: 200 TABLET ORAL at 08:29

## 2017-01-01 RX ADMIN — Medication 1 TABLET: at 07:52

## 2017-01-01 RX ADMIN — MICONAZOLE NITRATE: 20 POWDER TOPICAL at 10:10

## 2017-01-01 RX ADMIN — ISOSORBIDE DINITRATE 10 MG: 10 TABLET ORAL at 08:51

## 2017-01-01 RX ADMIN — BUDESONIDE 0.5 MG: 0.5 SUSPENSION RESPIRATORY (INHALATION) at 20:39

## 2017-01-01 RX ADMIN — IPRATROPIUM BROMIDE AND ALBUTEROL SULFATE 3 ML: .5; 3 SOLUTION RESPIRATORY (INHALATION) at 21:39

## 2017-01-01 RX ADMIN — SERTRALINE HYDROCHLORIDE 100 MG: 50 TABLET, FILM COATED ORAL at 20:56

## 2017-01-01 RX ADMIN — APIXABAN 5 MG: 2.5 TABLET, FILM COATED ORAL at 09:29

## 2017-01-01 RX ADMIN — LINAGLIPTIN 5 MG: 5 TABLET, FILM COATED ORAL at 08:22

## 2017-01-01 RX ADMIN — CONJUGATED ESTROGENS 1 APPLICATION: 0.62 CREAM VAGINAL at 21:04

## 2017-01-01 RX ADMIN — METOPROLOL SUCCINATE 100 MG: 50 TABLET, EXTENDED RELEASE ORAL at 21:09

## 2017-01-01 RX ADMIN — FUROSEMIDE 40 MG: 40 TABLET ORAL at 08:40

## 2017-01-01 RX ADMIN — ALLOPURINOL 100 MG: 100 TABLET ORAL at 08:54

## 2017-01-01 RX ADMIN — MICONAZOLE NITRATE: 20 POWDER TOPICAL at 09:57

## 2017-01-01 RX ADMIN — MICONAZOLE NITRATE: 20 POWDER TOPICAL at 20:03

## 2017-01-01 RX ADMIN — POTASSIUM CHLORIDE 20 MEQ: 1500 TABLET, EXTENDED RELEASE ORAL at 09:03

## 2017-01-01 RX ADMIN — LEVETIRACETAM 500 MG: 500 TABLET, FILM COATED ORAL at 08:14

## 2017-01-01 RX ADMIN — KETOTIFEN FUMARATE 1 DROP: 0.25 SOLUTION/ DROPS OPHTHALMIC at 11:17

## 2017-01-01 RX ADMIN — GUAIFENESIN 1200 MG: 600 TABLET, EXTENDED RELEASE ORAL at 09:13

## 2017-01-01 RX ADMIN — DONEPEZIL HYDROCHLORIDE 10 MG: 5 TABLET ORAL at 21:21

## 2017-01-01 RX ADMIN — ALLOPURINOL 100 MG: 100 TABLET ORAL at 08:09

## 2017-01-01 RX ADMIN — DILTIAZEM HYDROCHLORIDE 300 MG: 180 CAPSULE, COATED, EXTENDED RELEASE ORAL at 13:57

## 2017-01-01 RX ADMIN — LEVETIRACETAM 500 MG: 500 TABLET, FILM COATED ORAL at 08:27

## 2017-01-01 RX ADMIN — FUROSEMIDE 40 MG: 40 TABLET ORAL at 09:04

## 2017-01-01 RX ADMIN — FUROSEMIDE 40 MG: 40 TABLET ORAL at 07:38

## 2017-01-01 RX ADMIN — GUAIFENESIN 1200 MG: 600 TABLET, EXTENDED RELEASE ORAL at 19:16

## 2017-01-01 RX ADMIN — TRAMADOL HYDROCHLORIDE 50 MG: 50 TABLET, FILM COATED ORAL at 20:57

## 2017-01-01 RX ADMIN — BUDESONIDE 0.5 MG: 0.5 SUSPENSION RESPIRATORY (INHALATION) at 08:37

## 2017-01-01 RX ADMIN — SERTRALINE 100 MG: 50 TABLET, FILM COATED ORAL at 20:51

## 2017-01-01 RX ADMIN — DIPHENHYDRAMINE HYDROCHLORIDE 25 MG: 25 CAPSULE ORAL at 20:18

## 2017-01-01 RX ADMIN — STANDARDIZED SENNA CONCENTRATE 1 TABLET: 8.6 TABLET ORAL at 09:19

## 2017-01-01 RX ADMIN — ALLOPURINOL 100 MG: 100 TABLET ORAL at 09:40

## 2017-01-01 RX ADMIN — DONEPEZIL HYDROCHLORIDE 10 MG: 5 TABLET, FILM COATED ORAL at 20:45

## 2017-01-01 RX ADMIN — MICONAZOLE NITRATE 1 APPLICATION: 20 POWDER TOPICAL at 08:15

## 2017-01-01 RX ADMIN — APIXABAN 5 MG: 2.5 TABLET, FILM COATED ORAL at 08:10

## 2017-01-01 RX ADMIN — MULTIPLE VITAMINS W/ MINERALS TAB 1 TABLET: TAB at 08:35

## 2017-01-01 RX ADMIN — DONEPEZIL HYDROCHLORIDE 10 MG: 5 TABLET ORAL at 20:13

## 2017-01-01 RX ADMIN — CALCIUM CARBONATE 500 MG (1,250 MG)-VITAMIN D3 200 UNIT TABLET 500 MG: at 21:11

## 2017-01-01 RX ADMIN — SERTRALINE 100 MG: 50 TABLET, FILM COATED ORAL at 22:17

## 2017-01-01 RX ADMIN — DOXAZOSIN MESYLATE 2 MG: 2 TABLET ORAL at 21:01

## 2017-01-01 RX ADMIN — IPRATROPIUM BROMIDE AND ALBUTEROL SULFATE 3 ML: .5; 3 SOLUTION RESPIRATORY (INHALATION) at 10:54

## 2017-01-01 RX ADMIN — Medication 10 ML: at 20:27

## 2017-01-01 RX ADMIN — IPRATROPIUM BROMIDE AND ALBUTEROL SULFATE 3 ML: .5; 3 SOLUTION RESPIRATORY (INHALATION) at 09:07

## 2017-01-01 RX ADMIN — Medication 150 MG: at 10:14

## 2017-01-01 RX ADMIN — ASPIRIN 81 MG: 81 TABLET, COATED ORAL at 10:35

## 2017-01-01 RX ADMIN — IPRATROPIUM BROMIDE AND ALBUTEROL SULFATE 3 ML: .5; 3 SOLUTION RESPIRATORY (INHALATION) at 20:30

## 2017-01-01 RX ADMIN — MICONAZOLE NITRATE: 20 POWDER TOPICAL at 09:54

## 2017-01-01 RX ADMIN — CONJUGATED ESTROGENS 0.75 APPLICATOR: 0.62 CREAM VAGINAL at 19:47

## 2017-01-01 RX ADMIN — Medication 150 MG: at 09:45

## 2017-01-01 RX ADMIN — INSULIN ASPART 2 UNITS: 100 INJECTION, SOLUTION INTRAVENOUS; SUBCUTANEOUS at 21:14

## 2017-01-01 RX ADMIN — METOPROLOL TARTRATE 75 MG: 25 TABLET, FILM COATED ORAL at 17:40

## 2017-01-01 RX ADMIN — CALCIUM CARBONATE 500 MG (1,250 MG)-VITAMIN D3 200 UNIT TABLET 500 MG: at 23:03

## 2017-01-01 RX ADMIN — SERTRALINE HYDROCHLORIDE 100 MG: 50 TABLET, FILM COATED ORAL at 20:33

## 2017-01-01 RX ADMIN — SERTRALINE HYDROCHLORIDE 100 MG: 50 TABLET ORAL at 22:10

## 2017-01-01 RX ADMIN — KETOTIFEN FUMARATE 1 DROP: 0.25 SOLUTION/ DROPS OPHTHALMIC at 09:20

## 2017-01-01 RX ADMIN — KETOTIFEN FUMARATE 1 DROP: 0.25 SOLUTION/ DROPS OPHTHALMIC at 18:01

## 2017-01-01 RX ADMIN — KETOTIFEN FUMARATE 1 DROP: 0.25 SOLUTION/ DROPS OPHTHALMIC at 09:36

## 2017-01-01 RX ADMIN — IPRATROPIUM BROMIDE AND ALBUTEROL SULFATE 3 ML: .5; 3 SOLUTION RESPIRATORY (INHALATION) at 16:43

## 2017-01-01 RX ADMIN — FAMOTIDINE 40 MG: 20 TABLET ORAL at 21:28

## 2017-01-01 RX ADMIN — CONJUGATED ESTROGENS 0.75 APPLICATION: 0.62 CREAM VAGINAL at 20:22

## 2017-01-01 RX ADMIN — Medication 150 MG: at 09:58

## 2017-01-01 RX ADMIN — Medication 1 CAPSULE: at 10:48

## 2017-01-01 RX ADMIN — CONJUGATED ESTROGENS 1 APPLICATION: 0.62 CREAM VAGINAL at 20:22

## 2017-01-01 RX ADMIN — INSULIN ASPART 3 UNITS: 100 INJECTION, SOLUTION INTRAVENOUS; SUBCUTANEOUS at 20:06

## 2017-01-01 RX ADMIN — FAMOTIDINE 20 MG: 20 TABLET, FILM COATED ORAL at 09:53

## 2017-01-01 RX ADMIN — LEVOTHYROXINE SODIUM 150 MCG: 150 TABLET ORAL at 05:30

## 2017-01-01 RX ADMIN — DOCUSATE SODIUM AND SENNOSIDES 1 TABLET: 8.6; 5 TABLET, FILM COATED ORAL at 08:20

## 2017-01-01 RX ADMIN — LINAGLIPTIN 5 MG: 5 TABLET, FILM COATED ORAL at 08:19

## 2017-01-01 RX ADMIN — SERTRALINE 100 MG: 50 TABLET, FILM COATED ORAL at 21:22

## 2017-01-01 RX ADMIN — Medication 10 ML: at 08:40

## 2017-01-01 RX ADMIN — MICONAZOLE NITRATE 1 APPLICATION: 20 POWDER TOPICAL at 08:44

## 2017-01-01 RX ADMIN — DONEPEZIL HYDROCHLORIDE 10 MG: 5 TABLET, FILM COATED ORAL at 21:00

## 2017-01-01 RX ADMIN — ALLOPURINOL 100 MG: 100 TABLET ORAL at 20:51

## 2017-01-01 RX ADMIN — LINAGLIPTIN 5 MG: 5 TABLET, FILM COATED ORAL at 10:13

## 2017-01-01 RX ADMIN — IPRATROPIUM BROMIDE AND ALBUTEROL SULFATE 3 ML: .5; 3 SOLUTION RESPIRATORY (INHALATION) at 17:33

## 2017-01-01 RX ADMIN — BUDESONIDE 0.5 MG: 0.5 SUSPENSION RESPIRATORY (INHALATION) at 09:30

## 2017-01-01 RX ADMIN — SENNOSIDES AND DOCUSATE SODIUM 1 TABLET: 8.6; 5 TABLET ORAL at 22:10

## 2017-01-01 RX ADMIN — ASPIRIN 81 MG: 81 TABLET, COATED ORAL at 09:11

## 2017-01-01 RX ADMIN — KETOTIFEN FUMARATE 1 DROP: 0.25 SOLUTION/ DROPS OPHTHALMIC at 18:09

## 2017-01-01 RX ADMIN — DONEPEZIL HYDROCHLORIDE 10 MG: 5 TABLET ORAL at 20:12

## 2017-01-01 RX ADMIN — FAMOTIDINE 40 MG: 20 TABLET ORAL at 17:20

## 2017-01-01 RX ADMIN — MICONAZOLE NITRATE: 20 POWDER TOPICAL at 22:19

## 2017-01-01 RX ADMIN — KETOTIFEN FUMARATE 1 DROP: 0.25 SOLUTION/ DROPS OPHTHALMIC at 17:19

## 2017-01-01 RX ADMIN — DILTIAZEM HYDROCHLORIDE 240 MG: 120 CAPSULE, COATED, EXTENDED RELEASE ORAL at 08:33

## 2017-01-01 RX ADMIN — AMIODARONE HYDROCHLORIDE 200 MG: 200 TABLET ORAL at 09:47

## 2017-01-01 RX ADMIN — GUAIFENESIN 1200 MG: 600 TABLET, EXTENDED RELEASE ORAL at 10:25

## 2017-01-01 RX ADMIN — Medication 1 CAPSULE: at 12:26

## 2017-01-01 RX ADMIN — LEVOTHYROXINE SODIUM 150 MCG: 150 TABLET ORAL at 06:30

## 2017-01-01 RX ADMIN — POTASSIUM CHLORIDE 20 MEQ: 1500 TABLET, EXTENDED RELEASE ORAL at 08:31

## 2017-01-01 RX ADMIN — PIPERACILLIN SODIUM AND TAZOBACTAM SODIUM 3.38 G: 3; .375 INJECTION, POWDER, LYOPHILIZED, FOR SOLUTION INTRAVENOUS at 16:26

## 2017-01-01 RX ADMIN — DONEPEZIL HYDROCHLORIDE 10 MG: 5 TABLET, FILM COATED ORAL at 21:05

## 2017-01-01 RX ADMIN — PIPERACILLIN SODIUM AND TAZOBACTAM SODIUM 3.38 G: 3; .375 INJECTION, POWDER, LYOPHILIZED, FOR SOLUTION INTRAVENOUS at 21:30

## 2017-01-01 RX ADMIN — FUROSEMIDE 40 MG: 40 TABLET ORAL at 08:19

## 2017-01-01 RX ADMIN — ASPIRIN 81 MG: 81 TABLET, COATED ORAL at 07:59

## 2017-01-01 RX ADMIN — LEVOTHYROXINE SODIUM 150 MCG: 150 TABLET ORAL at 09:29

## 2017-01-01 RX ADMIN — SENNOSIDES AND DOCUSATE SODIUM 1 TABLET: 8.6; 5 TABLET ORAL at 23:09

## 2017-01-01 RX ADMIN — FAMOTIDINE 40 MG: 20 TABLET ORAL at 23:05

## 2017-01-01 RX ADMIN — Medication 150 MG: at 10:09

## 2017-01-01 RX ADMIN — APIXABAN 5 MG: 2.5 TABLET, FILM COATED ORAL at 09:51

## 2017-01-01 RX ADMIN — METOPROLOL TARTRATE 100 MG: 25 TABLET, FILM COATED ORAL at 09:22

## 2017-01-01 RX ADMIN — DILTIAZEM HYDROCHLORIDE 300 MG: 180 CAPSULE, COATED, EXTENDED RELEASE ORAL at 13:16

## 2017-01-01 RX ADMIN — CONJUGATED ESTROGENS 0.75 APPLICATOR: 0.62 CREAM VAGINAL at 20:20

## 2017-01-01 RX ADMIN — INSULIN ASPART 2 UNITS: 100 INJECTION, SOLUTION INTRAVENOUS; SUBCUTANEOUS at 20:29

## 2017-01-01 RX ADMIN — MEGESTROL ACETATE 200 MG: 40 SUSPENSION ORAL at 09:52

## 2017-01-01 RX ADMIN — ENOXAPARIN SODIUM 110 MG: 120 INJECTION SUBCUTANEOUS at 21:19

## 2017-01-01 RX ADMIN — MICONAZOLE NITRATE: 20 POWDER TOPICAL at 20:07

## 2017-01-01 RX ADMIN — BUDESONIDE 0.5 MG: 0.5 SUSPENSION RESPIRATORY (INHALATION) at 09:34

## 2017-01-01 RX ADMIN — Medication 1 TABLET: at 09:33

## 2017-01-01 RX ADMIN — IPRATROPIUM BROMIDE AND ALBUTEROL SULFATE 3 ML: .5; 3 SOLUTION RESPIRATORY (INHALATION) at 21:05

## 2017-01-01 RX ADMIN — TERAZOSIN HYDROCHLORIDE 2 MG: 1 CAPSULE ORAL at 09:19

## 2017-01-01 RX ADMIN — METOPROLOL TARTRATE 100 MG: 25 TABLET, FILM COATED ORAL at 09:19

## 2017-01-01 RX ADMIN — MULTIPLE VITAMINS W/ MINERALS TAB 1 TABLET: TAB at 08:44

## 2017-01-01 RX ADMIN — DICLOFENAC SODIUM 4 G: 10 GEL TOPICAL at 20:46

## 2017-01-01 RX ADMIN — Medication 1 TABLET: at 17:52

## 2017-01-01 RX ADMIN — SENNOSIDES AND DOCUSATE SODIUM 1 TABLET: 8.6; 5 TABLET ORAL at 08:22

## 2017-01-01 RX ADMIN — ISOSORBIDE DINITRATE 10 MG: 10 TABLET ORAL at 23:06

## 2017-01-01 RX ADMIN — MICONAZOLE NITRATE: 20 POWDER TOPICAL at 21:14

## 2017-01-01 RX ADMIN — AMIODARONE HYDROCHLORIDE 200 MG: 200 TABLET ORAL at 20:42

## 2017-01-01 RX ADMIN — GLIPIZIDE 5 MG: 5 TABLET ORAL at 06:39

## 2017-01-01 RX ADMIN — IPRATROPIUM BROMIDE AND ALBUTEROL SULFATE 3 ML: .5; 3 SOLUTION RESPIRATORY (INHALATION) at 22:44

## 2017-01-01 RX ADMIN — ALLOPURINOL 100 MG: 100 TABLET ORAL at 17:21

## 2017-01-01 RX ADMIN — ISOSORBIDE DINITRATE 10 MG: 10 TABLET ORAL at 08:08

## 2017-01-01 RX ADMIN — ACETAMINOPHEN 650 MG: 325 TABLET, FILM COATED ORAL at 21:32

## 2017-01-01 RX ADMIN — FAMOTIDINE 40 MG: 20 TABLET ORAL at 19:58

## 2017-01-01 RX ADMIN — METOPROLOL TARTRATE 100 MG: 25 TABLET, FILM COATED ORAL at 09:49

## 2017-01-01 RX ADMIN — CONJUGATED ESTROGENS 0.75 APPLICATOR: 0.62 CREAM VAGINAL at 20:30

## 2017-01-01 RX ADMIN — LINAGLIPTIN 5 MG: 5 TABLET, FILM COATED ORAL at 08:25

## 2017-01-01 RX ADMIN — POTASSIUM CHLORIDE 20 MEQ: 1500 TABLET, EXTENDED RELEASE ORAL at 07:41

## 2017-01-01 RX ADMIN — INSULIN ASPART 2 UNITS: 100 INJECTION, SOLUTION INTRAVENOUS; SUBCUTANEOUS at 12:02

## 2017-01-01 RX ADMIN — FAMOTIDINE 20 MG: 20 TABLET ORAL at 17:15

## 2017-01-01 RX ADMIN — DOXAZOSIN MESYLATE 2 MG: 2 TABLET ORAL at 22:26

## 2017-01-01 RX ADMIN — PIPERACILLIN SODIUM AND TAZOBACTAM SODIUM 3.38 G: 3; .375 INJECTION, POWDER, LYOPHILIZED, FOR SOLUTION INTRAVENOUS at 21:59

## 2017-01-01 RX ADMIN — IPRATROPIUM BROMIDE AND ALBUTEROL SULFATE 3 ML: .5; 3 SOLUTION RESPIRATORY (INHALATION) at 09:25

## 2017-01-01 RX ADMIN — SENNOSIDES AND DOCUSATE SODIUM 1 TABLET: 8.6; 5 TABLET ORAL at 22:03

## 2017-01-01 RX ADMIN — ISOSORBIDE DINITRATE 10 MG: 10 TABLET ORAL at 09:12

## 2017-01-01 RX ADMIN — Medication 1 TABLET: at 10:11

## 2017-01-01 RX ADMIN — IPRATROPIUM BROMIDE AND ALBUTEROL SULFATE 3 ML: .5; 3 SOLUTION RESPIRATORY (INHALATION) at 20:11

## 2017-01-01 RX ADMIN — LINAGLIPTIN 5 MG: 5 TABLET, FILM COATED ORAL at 09:01

## 2017-01-01 RX ADMIN — Medication 1 TABLET: at 17:00

## 2017-01-01 RX ADMIN — APIXABAN 5 MG: 2.5 TABLET, FILM COATED ORAL at 21:56

## 2017-01-01 RX ADMIN — ISOSORBIDE DINITRATE 10 MG: 10 TABLET ORAL at 09:41

## 2017-01-01 RX ADMIN — LEVOTHYROXINE SODIUM 150 MCG: 150 TABLET ORAL at 06:21

## 2017-01-01 RX ADMIN — DONEPEZIL HYDROCHLORIDE 10 MG: 5 TABLET, FILM COATED ORAL at 20:22

## 2017-01-01 RX ADMIN — METOPROLOL SUCCINATE 100 MG: 50 TABLET, EXTENDED RELEASE ORAL at 08:19

## 2017-01-01 RX ADMIN — KETOTIFEN FUMARATE 1 DROP: 0.35 SOLUTION/ DROPS OPHTHALMIC at 17:14

## 2017-01-01 RX ADMIN — SERTRALINE HYDROCHLORIDE 100 MG: 50 TABLET ORAL at 20:44

## 2017-01-01 RX ADMIN — TRAMADOL HYDROCHLORIDE 100 MG: 50 TABLET, FILM COATED ORAL at 21:01

## 2017-01-01 RX ADMIN — DILTIAZEM HYDROCHLORIDE 240 MG: 120 CAPSULE, COATED, EXTENDED RELEASE ORAL at 08:42

## 2017-01-01 RX ADMIN — KETOTIFEN FUMARATE 1 DROP: 0.25 SOLUTION/ DROPS OPHTHALMIC at 18:32

## 2017-01-01 RX ADMIN — LEVOTHYROXINE SODIUM 150 MCG: 150 TABLET ORAL at 05:43

## 2017-01-01 RX ADMIN — IPRATROPIUM BROMIDE AND ALBUTEROL SULFATE 3 ML: .5; 3 SOLUTION RESPIRATORY (INHALATION) at 20:36

## 2017-01-01 RX ADMIN — DILTIAZEM HYDROCHLORIDE 300 MG: 180 CAPSULE, COATED, EXTENDED RELEASE ORAL at 12:34

## 2017-01-01 RX ADMIN — STANDARDIZED SENNA CONCENTRATE 1 TABLET: 8.6 TABLET ORAL at 10:03

## 2017-01-01 RX ADMIN — SENNOSIDES AND DOCUSATE SODIUM 1 TABLET: 8.6; 5 TABLET ORAL at 08:35

## 2017-01-01 RX ADMIN — Medication 150 MG: at 09:47

## 2017-01-01 RX ADMIN — CLINDAMYCIN HYDROCHLORIDE 450 MG: 150 CAPSULE ORAL at 05:26

## 2017-01-01 RX ADMIN — INSULIN ASPART 3 UNITS: 100 INJECTION, SOLUTION INTRAVENOUS; SUBCUTANEOUS at 20:12

## 2017-01-01 RX ADMIN — DILTIAZEM HYDROCHLORIDE 300 MG: 180 CAPSULE, COATED, EXTENDED RELEASE ORAL at 11:50

## 2017-01-01 RX ADMIN — SERTRALINE HYDROCHLORIDE 75 MG: 50 TABLET ORAL at 20:16

## 2017-01-01 RX ADMIN — Medication 10 ML: at 08:31

## 2017-01-01 RX ADMIN — FAMOTIDINE 40 MG: 20 TABLET, FILM COATED ORAL at 20:40

## 2017-01-01 RX ADMIN — MICONAZOLE NITRATE: 20 POWDER TOPICAL at 08:46

## 2017-01-01 RX ADMIN — DONEPEZIL HYDROCHLORIDE 10 MG: 5 TABLET ORAL at 19:59

## 2017-01-01 RX ADMIN — DILTIAZEM HYDROCHLORIDE 240 MG: 120 CAPSULE, COATED, EXTENDED RELEASE ORAL at 10:36

## 2017-01-01 RX ADMIN — MEGESTROL ACETATE 200 MG: 40 SUSPENSION ORAL at 09:26

## 2017-01-01 RX ADMIN — LEVETIRACETAM 500 MG: 500 TABLET, FILM COATED ORAL at 21:51

## 2017-01-01 RX ADMIN — LINAGLIPTIN 5 MG: 5 TABLET, FILM COATED ORAL at 10:00

## 2017-01-01 RX ADMIN — DONEPEZIL HYDROCHLORIDE 10 MG: 5 TABLET, FILM COATED ORAL at 21:14

## 2017-01-01 RX ADMIN — METOPROLOL TARTRATE 25 MG: 25 TABLET, FILM COATED ORAL at 09:29

## 2017-01-01 RX ADMIN — METOPROLOL TARTRATE 100 MG: 25 TABLET, FILM COATED ORAL at 08:16

## 2017-01-01 RX ADMIN — SERTRALINE 100 MG: 50 TABLET, FILM COATED ORAL at 20:56

## 2017-01-01 RX ADMIN — POTASSIUM CHLORIDE 20 MEQ: 1500 TABLET, EXTENDED RELEASE ORAL at 08:11

## 2017-01-01 RX ADMIN — Medication 1 TABLET: at 10:47

## 2017-01-01 RX ADMIN — Medication 1 TABLET: at 18:24

## 2017-01-01 RX ADMIN — METOPROLOL SUCCINATE 100 MG: 50 TABLET, EXTENDED RELEASE ORAL at 21:30

## 2017-01-01 RX ADMIN — Medication 1 TABLET: at 17:19

## 2017-01-01 RX ADMIN — FAMOTIDINE 20 MG: 20 TABLET ORAL at 08:21

## 2017-01-01 RX ADMIN — METOPROLOL TARTRATE 2.5 MG: 5 INJECTION INTRAVENOUS at 00:46

## 2017-01-01 RX ADMIN — METOPROLOL TARTRATE 75 MG: 25 TABLET, FILM COATED ORAL at 08:08

## 2017-01-01 RX ADMIN — ACETAMINOPHEN 650 MG: 325 TABLET, FILM COATED ORAL at 21:34

## 2017-01-01 RX ADMIN — CONJUGATED ESTROGENS 0.75 APPLICATOR: 0.62 CREAM VAGINAL at 20:10

## 2017-01-01 RX ADMIN — CALCIUM CARBONATE 500 MG (1,250 MG)-VITAMIN D3 200 UNIT TABLET 500 MG: at 22:16

## 2017-01-01 RX ADMIN — BUDESONIDE 0.5 MG: 0.5 SUSPENSION RESPIRATORY (INHALATION) at 21:26

## 2017-01-01 RX ADMIN — APIXABAN 5 MG: 2.5 TABLET, FILM COATED ORAL at 22:34

## 2017-01-01 RX ADMIN — POTASSIUM CHLORIDE 20 MEQ: 1500 TABLET, EXTENDED RELEASE ORAL at 09:31

## 2017-01-01 RX ADMIN — ISOSORBIDE DINITRATE 10 MG: 10 TABLET ORAL at 08:45

## 2017-01-01 RX ADMIN — MEGESTROL ACETATE 200 MG: 40 SUSPENSION ORAL at 18:33

## 2017-01-01 RX ADMIN — CONJUGATED ESTROGENS 1 APPLICATION: 0.62 CREAM VAGINAL at 20:11

## 2017-01-01 RX ADMIN — FUROSEMIDE 40 MG: 40 TABLET ORAL at 10:15

## 2017-01-01 RX ADMIN — ISOSORBIDE DINITRATE 10 MG: 10 TABLET ORAL at 20:01

## 2017-01-01 RX ADMIN — SENNOSIDES AND DOCUSATE SODIUM 1 TABLET: 8.6; 5 TABLET ORAL at 21:07

## 2017-01-01 RX ADMIN — OFLOXACIN 50000 UNITS: 300 TABLET, COATED ORAL at 12:14

## 2017-01-01 RX ADMIN — POTASSIUM CHLORIDE 10 MEQ: 750 TABLET, FILM COATED, EXTENDED RELEASE ORAL at 08:39

## 2017-01-01 RX ADMIN — DONEPEZIL HYDROCHLORIDE 10 MG: 5 TABLET ORAL at 22:45

## 2017-01-01 RX ADMIN — METOPROLOL SUCCINATE 100 MG: 50 TABLET, EXTENDED RELEASE ORAL at 20:08

## 2017-01-01 RX ADMIN — BUDESONIDE 0.5 MG: 0.5 SUSPENSION RESPIRATORY (INHALATION) at 08:47

## 2017-01-01 RX ADMIN — PIPERACILLIN SODIUM AND TAZOBACTAM SODIUM 3.38 G: 3; .375 INJECTION, POWDER, LYOPHILIZED, FOR SOLUTION INTRAVENOUS at 05:44

## 2017-01-01 RX ADMIN — SERTRALINE HYDROCHLORIDE 100 MG: 50 TABLET ORAL at 21:07

## 2017-01-01 RX ADMIN — BUDESONIDE 0.5 MG: 0.5 SUSPENSION RESPIRATORY (INHALATION) at 20:30

## 2017-01-01 RX ADMIN — ISOSORBIDE DINITRATE 10 MG: 10 TABLET ORAL at 20:44

## 2017-01-01 RX ADMIN — METOPROLOL SUCCINATE 100 MG: 50 TABLET, EXTENDED RELEASE ORAL at 21:21

## 2017-01-01 RX ADMIN — LEVETIRACETAM 500 MG: 500 TABLET, FILM COATED ORAL at 17:18

## 2017-01-01 RX ADMIN — DILTIAZEM HYDROCHLORIDE 300 MG: 180 CAPSULE, COATED, EXTENDED RELEASE ORAL at 21:37

## 2017-01-01 RX ADMIN — METOPROLOL SUCCINATE 100 MG: 50 TABLET, EXTENDED RELEASE ORAL at 20:09

## 2017-01-01 RX ADMIN — KETOTIFEN FUMARATE 1 DROP: 0.35 SOLUTION/ DROPS OPHTHALMIC at 08:33

## 2017-01-01 RX ADMIN — CONJUGATED ESTROGENS 1 APPLICATION: 0.62 CREAM VAGINAL at 20:00

## 2017-01-01 RX ADMIN — MICONAZOLE NITRATE: 20 POWDER TOPICAL at 21:27

## 2017-01-01 RX ADMIN — FAMOTIDINE 40 MG: 20 TABLET ORAL at 22:17

## 2017-01-01 RX ADMIN — LEVETIRACETAM 500 MG: 500 TABLET, FILM COATED ORAL at 08:48

## 2017-01-01 RX ADMIN — FAMOTIDINE 40 MG: 20 TABLET ORAL at 20:06

## 2017-01-01 RX ADMIN — ISOSORBIDE DINITRATE 10 MG: 10 TABLET ORAL at 20:02

## 2017-01-01 RX ADMIN — APIXABAN 5 MG: 2.5 TABLET, FILM COATED ORAL at 09:42

## 2017-01-01 RX ADMIN — DILTIAZEM HYDROCHLORIDE 240 MG: 120 CAPSULE, COATED, EXTENDED RELEASE ORAL at 08:08

## 2017-01-01 RX ADMIN — METOPROLOL SUCCINATE 100 MG: 50 TABLET, EXTENDED RELEASE ORAL at 08:51

## 2017-01-01 RX ADMIN — LEVETIRACETAM 500 MG: 500 TABLET, FILM COATED ORAL at 17:33

## 2017-01-01 RX ADMIN — INSULIN ASPART 3 UNITS: 100 INJECTION, SOLUTION INTRAVENOUS; SUBCUTANEOUS at 17:32

## 2017-01-01 RX ADMIN — METOPROLOL SUCCINATE 100 MG: 50 TABLET, EXTENDED RELEASE ORAL at 20:19

## 2017-01-01 RX ADMIN — DIGOXIN 125 MCG: 0.25 INJECTION INTRAMUSCULAR; INTRAVENOUS at 10:00

## 2017-01-01 RX ADMIN — MICONAZOLE NITRATE: 20 POWDER TOPICAL at 12:28

## 2017-01-01 RX ADMIN — IPRATROPIUM BROMIDE AND ALBUTEROL SULFATE 3 ML: .5; 3 SOLUTION RESPIRATORY (INHALATION) at 16:25

## 2017-01-01 RX ADMIN — MICONAZOLE NITRATE: 20 POWDER TOPICAL at 11:15

## 2017-01-01 RX ADMIN — MEGESTROL ACETATE 200 MG: 40 SUSPENSION ORAL at 16:25

## 2017-01-01 RX ADMIN — IPRATROPIUM BROMIDE AND ALBUTEROL SULFATE 3 ML: .5; 3 SOLUTION RESPIRATORY (INHALATION) at 17:08

## 2017-01-01 RX ADMIN — ALLOPURINOL 100 MG: 100 TABLET ORAL at 20:55

## 2017-01-01 RX ADMIN — KETOTIFEN FUMARATE 1 DROP: 0.35 SOLUTION/ DROPS OPHTHALMIC at 08:24

## 2017-01-01 RX ADMIN — LEVETIRACETAM 500 MG: 500 TABLET, FILM COATED ORAL at 21:58

## 2017-01-01 RX ADMIN — FAMOTIDINE 20 MG: 20 TABLET, FILM COATED ORAL at 17:37

## 2017-01-01 RX ADMIN — DILTIAZEM HYDROCHLORIDE 300 MG: 180 CAPSULE, COATED, EXTENDED RELEASE ORAL at 11:52

## 2017-01-01 RX ADMIN — FUROSEMIDE 40 MG: 40 TABLET ORAL at 09:13

## 2017-01-01 RX ADMIN — POTASSIUM CHLORIDE 20 MEQ: 1500 TABLET, EXTENDED RELEASE ORAL at 09:40

## 2017-01-01 RX ADMIN — IPRATROPIUM BROMIDE AND ALBUTEROL SULFATE 3 ML: .5; 3 SOLUTION RESPIRATORY (INHALATION) at 20:09

## 2017-01-01 RX ADMIN — FUROSEMIDE 40 MG: 40 TABLET ORAL at 08:52

## 2017-01-01 RX ADMIN — KETOTIFEN FUMARATE 1 DROP: 0.25 SOLUTION/ DROPS OPHTHALMIC at 10:00

## 2017-01-01 RX ADMIN — KETOTIFEN FUMARATE 1 DROP: 0.25 SOLUTION/ DROPS OPHTHALMIC at 18:28

## 2017-01-01 RX ADMIN — DONEPEZIL HYDROCHLORIDE 10 MG: 5 TABLET ORAL at 20:20

## 2017-01-01 RX ADMIN — DONEPEZIL HYDROCHLORIDE 10 MG: 5 TABLET, FILM COATED ORAL at 21:01

## 2017-01-01 RX ADMIN — SERTRALINE HYDROCHLORIDE 100 MG: 50 TABLET ORAL at 20:10

## 2017-01-01 RX ADMIN — LEVETIRACETAM 500 MG: 500 TABLET, FILM COATED ORAL at 16:28

## 2017-01-01 RX ADMIN — DILTIAZEM HYDROCHLORIDE 240 MG: 120 CAPSULE, COATED, EXTENDED RELEASE ORAL at 09:02

## 2017-01-01 RX ADMIN — MICONAZOLE NITRATE: 20 POWDER TOPICAL at 21:04

## 2017-01-01 RX ADMIN — DILTIAZEM HYDROCHLORIDE 5 MG: 5 INJECTION INTRAVENOUS at 16:21

## 2017-01-01 RX ADMIN — STANDARDIZED SENNA CONCENTRATE 1 TABLET: 8.6 TABLET ORAL at 08:08

## 2017-01-01 RX ADMIN — METOPROLOL SUCCINATE 100 MG: 50 TABLET, EXTENDED RELEASE ORAL at 08:37

## 2017-01-01 RX ADMIN — CONJUGATED ESTROGENS 0.75 APPLICATOR: 0.62 CREAM VAGINAL at 20:12

## 2017-01-01 RX ADMIN — ACETAMINOPHEN 500 MG: 500 TABLET, FILM COATED ORAL at 09:33

## 2017-01-01 RX ADMIN — FUROSEMIDE 40 MG: 40 TABLET ORAL at 10:02

## 2017-01-01 RX ADMIN — KETOTIFEN FUMARATE 1 DROP: 0.35 SOLUTION/ DROPS OPHTHALMIC at 09:11

## 2017-01-01 RX ADMIN — KETOTIFEN FUMARATE 1 DROP: 0.35 SOLUTION/ DROPS OPHTHALMIC at 09:29

## 2017-01-01 RX ADMIN — SERTRALINE HYDROCHLORIDE 100 MG: 50 TABLET ORAL at 19:46

## 2017-01-01 RX ADMIN — APIXABAN 5 MG: 2.5 TABLET, FILM COATED ORAL at 21:34

## 2017-01-01 RX ADMIN — Medication 1 TABLET: at 09:45

## 2017-01-01 RX ADMIN — FUROSEMIDE 40 MG: 40 TABLET ORAL at 09:22

## 2017-01-01 RX ADMIN — ASPIRIN 81 MG: 81 TABLET, COATED ORAL at 09:53

## 2017-01-01 RX ADMIN — MEROPENEM 1 G: 1 INJECTION, POWDER, FOR SOLUTION INTRAVENOUS at 13:14

## 2017-01-01 RX ADMIN — PANTOPRAZOLE SODIUM 40 MG: 40 INJECTION, POWDER, FOR SOLUTION INTRAVENOUS at 05:20

## 2017-01-01 RX ADMIN — ISOSORBIDE DINITRATE 10 MG: 10 TABLET ORAL at 08:58

## 2017-01-01 RX ADMIN — BUDESONIDE 0.5 MG: 0.5 SUSPENSION RESPIRATORY (INHALATION) at 20:19

## 2017-01-01 RX ADMIN — ENOXAPARIN SODIUM 110 MG: 120 INJECTION SUBCUTANEOUS at 09:59

## 2017-01-01 RX ADMIN — MICONAZOLE NITRATE: 20 POWDER TOPICAL at 21:20

## 2017-01-01 RX ADMIN — FAMOTIDINE 40 MG: 20 TABLET, FILM COATED ORAL at 17:28

## 2017-01-01 RX ADMIN — POTASSIUM CHLORIDE 20 MEQ: 1500 TABLET, EXTENDED RELEASE ORAL at 10:00

## 2017-01-01 RX ADMIN — KETOTIFEN FUMARATE 1 DROP: 0.25 SOLUTION/ DROPS OPHTHALMIC at 21:44

## 2017-01-01 RX ADMIN — IPRATROPIUM BROMIDE AND ALBUTEROL SULFATE 3 ML: .5; 3 SOLUTION RESPIRATORY (INHALATION) at 17:34

## 2017-01-01 RX ADMIN — ALLOPURINOL 100 MG: 100 TABLET ORAL at 11:41

## 2017-01-01 RX ADMIN — TRAMADOL HYDROCHLORIDE 100 MG: 50 TABLET, FILM COATED ORAL at 19:22

## 2017-01-01 RX ADMIN — IPRATROPIUM BROMIDE AND ALBUTEROL SULFATE 3 ML: .5; 3 SOLUTION RESPIRATORY (INHALATION) at 08:25

## 2017-01-01 RX ADMIN — ISOSORBIDE DINITRATE 10 MG: 10 TABLET ORAL at 09:09

## 2017-01-01 RX ADMIN — IPRATROPIUM BROMIDE AND ALBUTEROL SULFATE 3 ML: .5; 3 SOLUTION RESPIRATORY (INHALATION) at 12:57

## 2017-01-01 RX ADMIN — FUROSEMIDE 40 MG: 40 TABLET ORAL at 07:28

## 2017-01-01 RX ADMIN — ACETAMINOPHEN 650 MG: 325 TABLET, FILM COATED ORAL at 20:09

## 2017-01-01 RX ADMIN — POTASSIUM CHLORIDE 20 MEQ: 1500 TABLET, EXTENDED RELEASE ORAL at 08:01

## 2017-01-01 RX ADMIN — BUDESONIDE 0.5 MG: 0.5 SUSPENSION RESPIRATORY (INHALATION) at 09:47

## 2017-01-01 RX ADMIN — DIPHENHYDRAMINE HYDROCHLORIDE 25 MG: 25 CAPSULE ORAL at 20:33

## 2017-01-01 RX ADMIN — Medication 1 CAPSULE: at 10:17

## 2017-01-01 RX ADMIN — Medication 150 MG: at 18:28

## 2017-01-01 RX ADMIN — SERTRALINE 100 MG: 50 TABLET, FILM COATED ORAL at 23:07

## 2017-01-01 RX ADMIN — MEROPENEM 1 G: 1 INJECTION, POWDER, FOR SOLUTION INTRAVENOUS at 17:47

## 2017-01-01 RX ADMIN — GUAIFENESIN 1200 MG: 600 TABLET, EXTENDED RELEASE ORAL at 10:22

## 2017-01-01 RX ADMIN — DILTIAZEM HYDROCHLORIDE 300 MG: 180 CAPSULE, COATED, EXTENDED RELEASE ORAL at 12:51

## 2017-01-01 RX ADMIN — Medication 1 TABLET: at 12:10

## 2017-01-01 RX ADMIN — METOPROLOL TARTRATE 75 MG: 25 TABLET, FILM COATED ORAL at 17:16

## 2017-01-01 RX ADMIN — DILTIAZEM HYDROCHLORIDE 240 MG: 120 CAPSULE, COATED, EXTENDED RELEASE ORAL at 08:52

## 2017-01-01 RX ADMIN — LEVOTHYROXINE SODIUM 150 MCG: 150 TABLET ORAL at 05:04

## 2017-01-01 RX ADMIN — STANDARDIZED SENNA CONCENTRATE 1 TABLET: 8.6 TABLET ORAL at 18:11

## 2017-01-01 RX ADMIN — FUROSEMIDE 40 MG: 40 TABLET ORAL at 09:45

## 2017-01-01 RX ADMIN — METOPROLOL TARTRATE 100 MG: 25 TABLET, FILM COATED ORAL at 08:09

## 2017-01-01 RX ADMIN — LEVETIRACETAM 500 MG: 500 TABLET, FILM COATED ORAL at 18:00

## 2017-01-01 RX ADMIN — SENNOSIDES AND DOCUSATE SODIUM 1 TABLET: 8.6; 5 TABLET ORAL at 09:28

## 2017-01-01 RX ADMIN — SENNOSIDES AND DOCUSATE SODIUM 1 TABLET: 8.6; 5 TABLET ORAL at 22:15

## 2017-01-01 RX ADMIN — LEVETIRACETAM 500 MG: 500 TABLET, FILM COATED ORAL at 22:12

## 2017-01-01 RX ADMIN — BUMETANIDE 2 MG: 0.25 INJECTION INTRAMUSCULAR; INTRAVENOUS at 12:24

## 2017-01-01 RX ADMIN — MEGESTROL ACETATE 200 MG: 40 SUSPENSION ORAL at 17:21

## 2017-01-01 RX ADMIN — ISOSORBIDE DINITRATE 10 MG: 10 TABLET ORAL at 20:10

## 2017-01-01 RX ADMIN — SERTRALINE HYDROCHLORIDE 100 MG: 50 TABLET ORAL at 20:43

## 2017-01-01 RX ADMIN — MEGESTROL ACETATE 200 MG: 40 SUSPENSION ORAL at 08:29

## 2017-01-01 RX ADMIN — MICONAZOLE NITRATE: 20 POWDER TOPICAL at 08:25

## 2017-01-01 RX ADMIN — ACETAMINOPHEN 500 MG: 500 TABLET, FILM COATED ORAL at 00:02

## 2017-01-01 RX ADMIN — POTASSIUM CHLORIDE 10 MEQ: 750 TABLET, FILM COATED, EXTENDED RELEASE ORAL at 08:31

## 2017-01-01 RX ADMIN — Medication 1 TABLET: at 10:17

## 2017-01-01 RX ADMIN — METOPROLOL TARTRATE 100 MG: 50 TABLET, FILM COATED ORAL at 09:16

## 2017-01-01 RX ADMIN — APIXABAN 5 MG: 2.5 TABLET, FILM COATED ORAL at 20:26

## 2017-01-01 RX ADMIN — LINAGLIPTIN 5 MG: 5 TABLET, FILM COATED ORAL at 09:17

## 2017-01-01 RX ADMIN — CLINDAMYCIN HYDROCHLORIDE 450 MG: 150 CAPSULE ORAL at 19:57

## 2017-01-01 RX ADMIN — Medication 1 CAPSULE: at 09:00

## 2017-01-01 RX ADMIN — AMIODARONE HYDROCHLORIDE 200 MG: 200 TABLET ORAL at 17:52

## 2017-01-01 RX ADMIN — METOPROLOL SUCCINATE 100 MG: 50 TABLET, EXTENDED RELEASE ORAL at 10:23

## 2017-01-01 RX ADMIN — LEVETIRACETAM 500 MG: 500 TABLET, FILM COATED ORAL at 22:14

## 2017-01-01 RX ADMIN — Medication 150 MG: at 08:58

## 2017-01-01 RX ADMIN — SERTRALINE 100 MG: 50 TABLET, FILM COATED ORAL at 20:40

## 2017-01-01 RX ADMIN — IPRATROPIUM BROMIDE AND ALBUTEROL SULFATE 3 ML: .5; 3 SOLUTION RESPIRATORY (INHALATION) at 09:49

## 2017-01-01 RX ADMIN — DILTIAZEM HYDROCHLORIDE 300 MG: 180 CAPSULE, COATED, EXTENDED RELEASE ORAL at 13:24

## 2017-01-01 RX ADMIN — Medication 1 CAPSULE: at 07:28

## 2017-01-01 RX ADMIN — SODIUM CHLORIDE, POTASSIUM CHLORIDE, SODIUM LACTATE AND CALCIUM CHLORIDE 75 ML/HR: 600; 310; 30; 20 INJECTION, SOLUTION INTRAVENOUS at 22:23

## 2017-01-01 RX ADMIN — APIXABAN 5 MG: 2.5 TABLET, FILM COATED ORAL at 09:10

## 2017-01-01 RX ADMIN — IPRATROPIUM BROMIDE AND ALBUTEROL SULFATE 3 ML: .5; 3 SOLUTION RESPIRATORY (INHALATION) at 16:48

## 2017-01-01 RX ADMIN — IPRATROPIUM BROMIDE AND ALBUTEROL SULFATE 3 ML: .5; 3 SOLUTION RESPIRATORY (INHALATION) at 20:47

## 2017-01-01 RX ADMIN — MICONAZOLE NITRATE 1 APPLICATION: 20 POWDER TOPICAL at 08:00

## 2017-01-01 RX ADMIN — FUROSEMIDE 40 MG: 40 TABLET ORAL at 09:27

## 2017-01-01 RX ADMIN — SERTRALINE 100 MG: 50 TABLET, FILM COATED ORAL at 20:58

## 2017-01-01 RX ADMIN — OFLOXACIN 50000 UNITS: 300 TABLET, COATED ORAL at 11:33

## 2017-01-01 RX ADMIN — FAMOTIDINE 40 MG: 20 TABLET, FILM COATED ORAL at 18:39

## 2017-01-01 RX ADMIN — MEGESTROL ACETATE 200 MG: 40 SUSPENSION ORAL at 17:00

## 2017-01-01 RX ADMIN — METOPROLOL SUCCINATE 100 MG: 50 TABLET, EXTENDED RELEASE ORAL at 22:23

## 2017-01-01 RX ADMIN — LEVETIRACETAM 500 MG: 500 TABLET, FILM COATED ORAL at 20:04

## 2017-01-01 RX ADMIN — LEVETIRACETAM 500 MG: 500 TABLET, FILM COATED ORAL at 21:59

## 2017-01-01 RX ADMIN — PIPERACILLIN SODIUM AND TAZOBACTAM SODIUM 3.38 G: 3; .375 INJECTION, POWDER, LYOPHILIZED, FOR SOLUTION INTRAVENOUS at 13:56

## 2017-01-01 RX ADMIN — POTASSIUM CHLORIDE 20 MEQ: 1500 TABLET, EXTENDED RELEASE ORAL at 07:53

## 2017-01-01 RX ADMIN — LEVETIRACETAM 500 MG: 500 TABLET, FILM COATED ORAL at 08:16

## 2017-01-01 RX ADMIN — KETOTIFEN FUMARATE 1 DROP: 0.25 SOLUTION/ DROPS OPHTHALMIC at 17:01

## 2017-01-01 RX ADMIN — APIXABAN 5 MG: 2.5 TABLET, FILM COATED ORAL at 10:23

## 2017-01-01 RX ADMIN — LINAGLIPTIN 5 MG: 5 TABLET, FILM COATED ORAL at 08:23

## 2017-01-01 RX ADMIN — MICONAZOLE NITRATE: 20 POWDER TOPICAL at 08:12

## 2017-01-01 RX ADMIN — MEGESTROL ACETATE 200 MG: 40 SUSPENSION ORAL at 09:04

## 2017-01-01 RX ADMIN — METOPROLOL SUCCINATE 100 MG: 50 TABLET, EXTENDED RELEASE ORAL at 08:10

## 2017-01-01 RX ADMIN — LINAGLIPTIN 5 MG: 5 TABLET, FILM COATED ORAL at 08:46

## 2017-01-01 RX ADMIN — CONJUGATED ESTROGENS 0.75 APPLICATOR: 0.62 CREAM VAGINAL at 22:23

## 2017-01-01 RX ADMIN — LEVOTHYROXINE SODIUM 150 MCG: 150 TABLET ORAL at 05:58

## 2017-01-01 RX ADMIN — KETOTIFEN FUMARATE 1 DROP: 0.35 SOLUTION/ DROPS OPHTHALMIC at 08:29

## 2017-01-01 RX ADMIN — ALLOPURINOL 100 MG: 100 TABLET ORAL at 21:36

## 2017-01-01 RX ADMIN — Medication 1 TABLET: at 09:47

## 2017-01-01 RX ADMIN — MICONAZOLE NITRATE: 20 POWDER TOPICAL at 20:23

## 2017-01-01 RX ADMIN — MICONAZOLE NITRATE: 20 POWDER TOPICAL at 21:08

## 2017-01-01 RX ADMIN — Medication 150 MG: at 17:03

## 2017-01-01 RX ADMIN — GUAIFENESIN 1200 MG: 600 TABLET, EXTENDED RELEASE ORAL at 09:58

## 2017-01-01 RX ADMIN — KETOTIFEN FUMARATE 1 DROP: 0.35 SOLUTION/ DROPS OPHTHALMIC at 19:03

## 2017-01-01 RX ADMIN — GUAIFENESIN 1200 MG: 600 TABLET, EXTENDED RELEASE ORAL at 10:01

## 2017-01-01 RX ADMIN — SERTRALINE 100 MG: 50 TABLET, FILM COATED ORAL at 21:14

## 2017-01-01 RX ADMIN — MICONAZOLE NITRATE: 20 POWDER TOPICAL at 10:24

## 2017-01-01 RX ADMIN — DILTIAZEM HYDROCHLORIDE 120 MG: 60 TABLET, FILM COATED ORAL at 17:07

## 2017-01-01 RX ADMIN — STANDARDIZED SENNA CONCENTRATE 1 TABLET: 8.6 TABLET ORAL at 17:50

## 2017-01-01 RX ADMIN — ASPIRIN 81 MG: 81 TABLET, COATED ORAL at 08:50

## 2017-01-01 RX ADMIN — MIRTAZAPINE 15 MG: 15 TABLET, FILM COATED ORAL at 20:29

## 2017-01-01 RX ADMIN — Medication 1 TABLET: at 10:30

## 2017-01-01 RX ADMIN — ALLOPURINOL 100 MG: 100 TABLET ORAL at 08:55

## 2017-01-01 RX ADMIN — LEVOTHYROXINE SODIUM 150 MCG: 150 TABLET ORAL at 05:17

## 2017-01-01 RX ADMIN — APIXABAN 5 MG: 2.5 TABLET, FILM COATED ORAL at 09:31

## 2017-01-01 RX ADMIN — MICONAZOLE NITRATE: 20 POWDER TOPICAL at 21:45

## 2017-01-01 RX ADMIN — Medication 1 CAPSULE: at 09:02

## 2017-01-01 RX ADMIN — METOPROLOL SUCCINATE 100 MG: 50 TABLET, EXTENDED RELEASE ORAL at 21:45

## 2017-01-01 RX ADMIN — DONEPEZIL HYDROCHLORIDE 10 MG: 5 TABLET ORAL at 20:22

## 2017-01-01 RX ADMIN — KETOTIFEN FUMARATE 1 DROP: 0.35 SOLUTION/ DROPS OPHTHALMIC at 17:12

## 2017-01-01 RX ADMIN — CONJUGATED ESTROGENS 0.75 APPLICATION: 0.62 CREAM VAGINAL at 20:06

## 2017-01-01 RX ADMIN — SODIUM CHLORIDE 75 ML/HR: 9 INJECTION, SOLUTION INTRAVENOUS at 11:08

## 2017-01-01 RX ADMIN — IPRATROPIUM BROMIDE AND ALBUTEROL SULFATE 3 ML: .5; 3 SOLUTION RESPIRATORY (INHALATION) at 07:33

## 2017-01-01 RX ADMIN — DOXAZOSIN MESYLATE 2 MG: 2 TABLET ORAL at 08:45

## 2017-01-01 RX ADMIN — FLUCONAZOLE 100 MG: 2 INJECTION INTRAVENOUS at 09:39

## 2017-01-01 RX ADMIN — Medication 150 MG: at 08:21

## 2017-01-01 RX ADMIN — IPRATROPIUM BROMIDE AND ALBUTEROL SULFATE 3 ML: .5; 3 SOLUTION RESPIRATORY (INHALATION) at 17:15

## 2017-01-01 RX ADMIN — INSULIN ASPART 5 UNITS: 100 INJECTION, SOLUTION INTRAVENOUS; SUBCUTANEOUS at 19:56

## 2017-01-01 RX ADMIN — KETOTIFEN FUMARATE 1 DROP: 0.25 SOLUTION/ DROPS OPHTHALMIC at 08:21

## 2017-01-01 RX ADMIN — LEVOTHYROXINE SODIUM 150 MCG: 150 TABLET ORAL at 06:31

## 2017-01-01 RX ADMIN — METOPROLOL SUCCINATE 100 MG: 50 TABLET, EXTENDED RELEASE ORAL at 09:18

## 2017-01-01 RX ADMIN — METOPROLOL TARTRATE 75 MG: 25 TABLET, FILM COATED ORAL at 08:34

## 2017-01-01 RX ADMIN — KETOTIFEN FUMARATE 1 DROP: 0.25 SOLUTION/ DROPS OPHTHALMIC at 10:51

## 2017-01-01 RX ADMIN — MICONAZOLE NITRATE: 20 POWDER TOPICAL at 21:07

## 2017-01-01 RX ADMIN — DOCUSATE SODIUM AND SENNOSIDES 1 TABLET: 8.6; 5 TABLET, FILM COATED ORAL at 08:14

## 2017-01-01 RX ADMIN — CALCIUM CARBONATE 500 MG (1,250 MG)-VITAMIN D3 200 UNIT TABLET 500 MG: at 08:53

## 2017-01-01 RX ADMIN — LEVOTHYROXINE SODIUM 150 MCG: 150 TABLET ORAL at 05:28

## 2017-01-01 RX ADMIN — POTASSIUM CHLORIDE 20 MEQ: 1500 TABLET, EXTENDED RELEASE ORAL at 07:43

## 2017-01-01 RX ADMIN — BUDESONIDE 0.5 MG: 0.5 SUSPENSION RESPIRATORY (INHALATION) at 23:10

## 2017-01-01 RX ADMIN — SENNOSIDES AND DOCUSATE SODIUM 1 TABLET: 8.6; 5 TABLET ORAL at 08:53

## 2017-01-01 RX ADMIN — SERTRALINE HYDROCHLORIDE 100 MG: 50 TABLET ORAL at 22:02

## 2017-01-01 RX ADMIN — ISOSORBIDE DINITRATE 10 MG: 10 TABLET ORAL at 07:51

## 2017-01-01 RX ADMIN — SERTRALINE 100 MG: 50 TABLET, FILM COATED ORAL at 23:00

## 2017-01-01 RX ADMIN — SODIUM CHLORIDE 150 ML/HR: 9 INJECTION, SOLUTION INTRAVENOUS at 00:12

## 2017-01-01 RX ADMIN — DOXAZOSIN MESYLATE 2 MG: 2 TABLET ORAL at 10:44

## 2017-01-01 RX ADMIN — LEVETIRACETAM 500 MG: 500 TABLET, FILM COATED ORAL at 17:12

## 2017-01-01 RX ADMIN — ALLOPURINOL 100 MG: 100 TABLET ORAL at 17:04

## 2017-01-01 RX ADMIN — AMIODARONE HYDROCHLORIDE 0.5 MG/MIN: 1.8 INJECTION, SOLUTION INTRAVENOUS at 10:03

## 2017-01-01 RX ADMIN — METOPROLOL TARTRATE 25 MG: 25 TABLET, FILM COATED ORAL at 09:19

## 2017-01-01 RX ADMIN — TRAMADOL HYDROCHLORIDE 100 MG: 50 TABLET, FILM COATED ORAL at 03:55

## 2017-01-01 RX ADMIN — APIXABAN 5 MG: 2.5 TABLET, FILM COATED ORAL at 08:46

## 2017-01-01 RX ADMIN — LEVETIRACETAM 500 MG: 500 TABLET, FILM COATED ORAL at 11:18

## 2017-01-01 RX ADMIN — APIXABAN 5 MG: 2.5 TABLET, FILM COATED ORAL at 22:10

## 2017-01-01 RX ADMIN — APIXABAN 5 MG: 2.5 TABLET, FILM COATED ORAL at 20:40

## 2017-01-01 RX ADMIN — ALLOPURINOL 100 MG: 100 TABLET ORAL at 22:13

## 2017-01-01 RX ADMIN — TRAMADOL HYDROCHLORIDE 50 MG: 50 TABLET, FILM COATED ORAL at 20:20

## 2017-01-01 RX ADMIN — BUDESONIDE 0.5 MG: 0.5 SUSPENSION RESPIRATORY (INHALATION) at 20:09

## 2017-01-01 RX ADMIN — Medication 1 CAPSULE: at 08:59

## 2017-01-01 RX ADMIN — Medication 150 MG: at 18:50

## 2017-01-01 RX ADMIN — INSULIN ASPART 3 UNITS: 100 INJECTION, SOLUTION INTRAVENOUS; SUBCUTANEOUS at 20:20

## 2017-01-01 RX ADMIN — FAMOTIDINE 40 MG: 20 TABLET ORAL at 20:20

## 2017-01-01 RX ADMIN — LEVOTHYROXINE SODIUM 150 MCG: 150 TABLET ORAL at 05:19

## 2017-01-01 RX ADMIN — KETOTIFEN FUMARATE 1 DROP: 0.25 SOLUTION/ DROPS OPHTHALMIC at 16:48

## 2017-01-01 RX ADMIN — GUAIFENESIN 1200 MG: 600 TABLET, EXTENDED RELEASE ORAL at 07:41

## 2017-01-01 RX ADMIN — INSULIN ASPART 2 UNITS: 100 INJECTION, SOLUTION INTRAVENOUS; SUBCUTANEOUS at 16:49

## 2017-01-01 RX ADMIN — SENNOSIDES AND DOCUSATE SODIUM 1 TABLET: 8.6; 5 TABLET ORAL at 21:34

## 2017-01-01 RX ADMIN — MICONAZOLE NITRATE: 20 POWDER TOPICAL at 09:07

## 2017-01-01 RX ADMIN — ISOSORBIDE DINITRATE 10 MG: 10 TABLET ORAL at 21:35

## 2017-01-01 RX ADMIN — Medication 1 TABLET: at 09:59

## 2017-01-01 RX ADMIN — INSULIN ASPART 2 UNITS: 100 INJECTION, SOLUTION INTRAVENOUS; SUBCUTANEOUS at 08:31

## 2017-01-01 RX ADMIN — METOPROLOL SUCCINATE 100 MG: 50 TABLET, EXTENDED RELEASE ORAL at 10:00

## 2017-01-01 RX ADMIN — LINAGLIPTIN 5 MG: 5 TABLET, FILM COATED ORAL at 07:43

## 2017-01-01 RX ADMIN — DILTIAZEM HYDROCHLORIDE 120 MG: 60 TABLET, FILM COATED ORAL at 10:11

## 2017-01-01 RX ADMIN — MICONAZOLE NITRATE 1 APPLICATION: 20 POWDER TOPICAL at 09:27

## 2017-01-01 RX ADMIN — FAMOTIDINE 20 MG: 20 TABLET, FILM COATED ORAL at 10:10

## 2017-01-01 RX ADMIN — Medication 1 CAPSULE: at 11:19

## 2017-01-01 RX ADMIN — METOPROLOL TARTRATE 75 MG: 25 TABLET, FILM COATED ORAL at 16:59

## 2017-01-01 RX ADMIN — SERTRALINE HYDROCHLORIDE 100 MG: 50 TABLET ORAL at 21:46

## 2017-01-01 RX ADMIN — METOPROLOL TARTRATE 75 MG: 25 TABLET, FILM COATED ORAL at 07:58

## 2017-01-01 RX ADMIN — SENNOSIDES AND DOCUSATE SODIUM 1 TABLET: 8.6; 5 TABLET ORAL at 21:00

## 2017-01-01 RX ADMIN — METOPROLOL SUCCINATE 100 MG: 50 TABLET, EXTENDED RELEASE ORAL at 08:41

## 2017-01-01 RX ADMIN — ALLOPURINOL 100 MG: 100 TABLET ORAL at 08:52

## 2017-01-01 RX ADMIN — INSULIN ASPART 3 UNITS: 100 INJECTION, SOLUTION INTRAVENOUS; SUBCUTANEOUS at 11:58

## 2017-01-01 RX ADMIN — INSULIN ASPART 2 UNITS: 100 INJECTION, SOLUTION INTRAVENOUS; SUBCUTANEOUS at 20:21

## 2017-01-01 RX ADMIN — DILTIAZEM HYDROCHLORIDE 240 MG: 120 CAPSULE, COATED, EXTENDED RELEASE ORAL at 09:12

## 2017-01-01 RX ADMIN — SERTRALINE HYDROCHLORIDE 100 MG: 50 TABLET ORAL at 21:15

## 2017-01-01 RX ADMIN — POTASSIUM CHLORIDE 20 MEQ: 1500 TABLET, EXTENDED RELEASE ORAL at 09:18

## 2017-01-01 RX ADMIN — IBANDRONATE SODIUM 150 MG: 150 TABLET, FILM COATED ORAL at 09:08

## 2017-01-01 RX ADMIN — ISOSORBIDE DINITRATE 10 MG: 10 TABLET ORAL at 23:33

## 2017-01-01 RX ADMIN — Medication 1 CAPSULE: at 10:26

## 2017-01-01 RX ADMIN — INSULIN ASPART 3 UNITS: 100 INJECTION, SOLUTION INTRAVENOUS; SUBCUTANEOUS at 20:46

## 2017-01-01 RX ADMIN — MICONAZOLE NITRATE: 20 POWDER TOPICAL at 10:11

## 2017-01-01 RX ADMIN — SERTRALINE HYDROCHLORIDE 100 MG: 50 TABLET ORAL at 20:26

## 2017-01-01 RX ADMIN — SODIUM CHLORIDE 500 ML: 9 INJECTION, SOLUTION INTRAVENOUS at 11:20

## 2017-01-01 RX ADMIN — IPRATROPIUM BROMIDE AND ALBUTEROL SULFATE 3 ML: .5; 3 SOLUTION RESPIRATORY (INHALATION) at 09:09

## 2017-01-01 RX ADMIN — FUROSEMIDE 40 MG: 40 TABLET ORAL at 09:30

## 2017-01-01 RX ADMIN — KETOTIFEN FUMARATE 1 DROP: 0.25 SOLUTION/ DROPS OPHTHALMIC at 08:04

## 2017-01-01 RX ADMIN — DIPHENHYDRAMINE HYDROCHLORIDE 25 MG: 25 CAPSULE ORAL at 16:13

## 2017-01-01 RX ADMIN — LEVETIRACETAM 500 MG: 500 TABLET, FILM COATED ORAL at 09:28

## 2017-01-01 RX ADMIN — INSULIN ASPART 2 UNITS: 100 INJECTION, SOLUTION INTRAVENOUS; SUBCUTANEOUS at 17:13

## 2017-01-01 RX ADMIN — STANDARDIZED SENNA CONCENTRATE 1 TABLET: 8.6 TABLET ORAL at 17:25

## 2017-01-01 RX ADMIN — TRAMADOL HYDROCHLORIDE 100 MG: 50 TABLET, FILM COATED ORAL at 18:32

## 2017-01-01 RX ADMIN — Medication 150 MG: at 08:53

## 2017-01-01 RX ADMIN — SERTRALINE HYDROCHLORIDE 100 MG: 50 TABLET ORAL at 20:29

## 2017-01-01 RX ADMIN — Medication 150 MG: at 07:55

## 2017-01-01 RX ADMIN — DONEPEZIL HYDROCHLORIDE 10 MG: 5 TABLET ORAL at 20:15

## 2017-01-01 RX ADMIN — SERTRALINE HYDROCHLORIDE 100 MG: 50 TABLET ORAL at 20:37

## 2017-01-01 RX ADMIN — DILTIAZEM HYDROCHLORIDE 240 MG: 120 CAPSULE, COATED, EXTENDED RELEASE ORAL at 09:52

## 2017-01-01 RX ADMIN — IPRATROPIUM BROMIDE AND ALBUTEROL SULFATE 3 ML: .5; 3 SOLUTION RESPIRATORY (INHALATION) at 18:05

## 2017-01-01 RX ADMIN — CONJUGATED ESTROGENS 0.75 APPLICATOR: 0.62 CREAM VAGINAL at 20:31

## 2017-01-01 RX ADMIN — IPRATROPIUM BROMIDE AND ALBUTEROL SULFATE 3 ML: .5; 3 SOLUTION RESPIRATORY (INHALATION) at 08:17

## 2017-01-01 RX ADMIN — KETOTIFEN FUMARATE 1 DROP: 0.25 SOLUTION/ DROPS OPHTHALMIC at 10:21

## 2017-01-01 RX ADMIN — LEVETIRACETAM 500 MG: 500 TABLET, FILM COATED ORAL at 21:15

## 2017-01-01 RX ADMIN — CONJUGATED ESTROGENS 1 APPLICATION: 0.62 CREAM VAGINAL at 20:50

## 2017-01-01 RX ADMIN — DILTIAZEM HYDROCHLORIDE 300 MG: 180 CAPSULE, COATED, EXTENDED RELEASE ORAL at 14:20

## 2017-01-01 RX ADMIN — APIXABAN 5 MG: 2.5 TABLET, FILM COATED ORAL at 08:12

## 2017-01-01 RX ADMIN — ISOSORBIDE DINITRATE 10 MG: 10 TABLET ORAL at 07:55

## 2017-01-01 RX ADMIN — LEVOTHYROXINE SODIUM 150 MCG: 150 TABLET ORAL at 05:35

## 2017-01-01 RX ADMIN — METOPROLOL SUCCINATE 100 MG: 50 TABLET, EXTENDED RELEASE ORAL at 20:32

## 2017-01-01 RX ADMIN — KETOTIFEN FUMARATE 1 DROP: 0.25 SOLUTION/ DROPS OPHTHALMIC at 17:41

## 2017-01-01 RX ADMIN — IPRATROPIUM BROMIDE AND ALBUTEROL SULFATE 3 ML: .5; 3 SOLUTION RESPIRATORY (INHALATION) at 12:33

## 2017-01-01 RX ADMIN — BUMETANIDE 2 MG: 0.25 INJECTION INTRAMUSCULAR; INTRAVENOUS at 09:21

## 2017-01-01 RX ADMIN — FAMOTIDINE 20 MG: 20 TABLET, FILM COATED ORAL at 18:26

## 2017-01-01 RX ADMIN — TERAZOSIN HYDROCHLORIDE 2 MG: 1 CAPSULE ORAL at 22:28

## 2017-01-01 RX ADMIN — APIXABAN 5 MG: 2.5 TABLET, FILM COATED ORAL at 22:08

## 2017-01-01 RX ADMIN — SERTRALINE 100 MG: 50 TABLET, FILM COATED ORAL at 21:40

## 2017-01-01 RX ADMIN — LEVETIRACETAM 500 MG: 500 TABLET, FILM COATED ORAL at 20:53

## 2017-01-01 RX ADMIN — CONJUGATED ESTROGENS 1 APPLICATION: 0.62 CREAM VAGINAL at 19:51

## 2017-01-01 RX ADMIN — IPRATROPIUM BROMIDE AND ALBUTEROL SULFATE 3 ML: .5; 3 SOLUTION RESPIRATORY (INHALATION) at 20:13

## 2017-01-01 RX ADMIN — ALLOPURINOL 100 MG: 100 TABLET ORAL at 10:21

## 2017-01-01 RX ADMIN — IPRATROPIUM BROMIDE AND ALBUTEROL SULFATE 3 ML: .5; 3 SOLUTION RESPIRATORY (INHALATION) at 13:29

## 2017-01-01 RX ADMIN — MEGESTROL ACETATE 200 MG: 40 SUSPENSION ORAL at 08:45

## 2017-01-01 RX ADMIN — DILTIAZEM HYDROCHLORIDE 300 MG: 180 CAPSULE, COATED, EXTENDED RELEASE ORAL at 14:05

## 2017-01-01 RX ADMIN — TRAMADOL HYDROCHLORIDE 100 MG: 50 TABLET, FILM COATED ORAL at 10:15

## 2017-01-01 RX ADMIN — CONJUGATED ESTROGENS 0.75 APPLICATOR: 0.62 CREAM VAGINAL at 20:33

## 2017-01-01 RX ADMIN — IPRATROPIUM BROMIDE AND ALBUTEROL SULFATE 3 ML: .5; 3 SOLUTION RESPIRATORY (INHALATION) at 11:52

## 2017-01-01 RX ADMIN — KETOTIFEN FUMARATE 1 DROP: 0.25 SOLUTION/ DROPS OPHTHALMIC at 17:08

## 2017-01-01 RX ADMIN — INSULIN ASPART 2 UNITS: 100 INJECTION, SOLUTION INTRAVENOUS; SUBCUTANEOUS at 13:19

## 2017-01-01 RX ADMIN — Medication 150 MG: at 09:51

## 2017-01-01 RX ADMIN — FUROSEMIDE 40 MG: 40 TABLET ORAL at 09:55

## 2017-01-01 RX ADMIN — DILTIAZEM HYDROCHLORIDE 300 MG: 180 CAPSULE, COATED, EXTENDED RELEASE ORAL at 11:56

## 2017-01-01 RX ADMIN — MEGESTROL ACETATE 200 MG: 40 SUSPENSION ORAL at 17:43

## 2017-01-01 RX ADMIN — DOCUSATE SODIUM AND SENNOSIDES 1 TABLET: 8.6; 5 TABLET, FILM COATED ORAL at 20:28

## 2017-01-01 RX ADMIN — ACETAMINOPHEN 650 MG: 325 TABLET, FILM COATED ORAL at 20:26

## 2017-01-01 RX ADMIN — MICONAZOLE NITRATE: 20 POWDER TOPICAL at 22:54

## 2017-01-01 RX ADMIN — ALLOPURINOL 100 MG: 100 TABLET ORAL at 22:29

## 2017-01-01 RX ADMIN — METOPROLOL SUCCINATE 100 MG: 50 TABLET, EXTENDED RELEASE ORAL at 22:47

## 2017-01-01 RX ADMIN — FAMOTIDINE 20 MG: 20 TABLET, FILM COATED ORAL at 18:49

## 2017-01-01 RX ADMIN — SENNOSIDES AND DOCUSATE SODIUM 1 TABLET: 8.6; 5 TABLET ORAL at 11:15

## 2017-01-01 RX ADMIN — DONEPEZIL HYDROCHLORIDE 10 MG: 5 TABLET, FILM COATED ORAL at 20:15

## 2017-01-01 RX ADMIN — TRAMADOL HYDROCHLORIDE 100 MG: 50 TABLET, FILM COATED ORAL at 06:08

## 2017-01-01 RX ADMIN — POTASSIUM CHLORIDE 10 MEQ: 750 TABLET, FILM COATED, EXTENDED RELEASE ORAL at 10:09

## 2017-01-01 RX ADMIN — PIPERACILLIN SODIUM AND TAZOBACTAM SODIUM 3.38 G: 3; .375 INJECTION, POWDER, LYOPHILIZED, FOR SOLUTION INTRAVENOUS at 06:37

## 2017-01-01 RX ADMIN — APIXABAN 5 MG: 2.5 TABLET, FILM COATED ORAL at 09:03

## 2017-01-01 RX ADMIN — FAMOTIDINE 40 MG: 20 TABLET ORAL at 21:06

## 2017-01-01 RX ADMIN — APIXABAN 5 MG: 2.5 TABLET, FILM COATED ORAL at 20:09

## 2017-01-01 RX ADMIN — DOCUSATE SODIUM AND SENNOSIDES 1 TABLET: 8.6; 5 TABLET, FILM COATED ORAL at 21:19

## 2017-01-01 RX ADMIN — FAMOTIDINE 20 MG: 20 TABLET, FILM COATED ORAL at 17:52

## 2017-01-01 RX ADMIN — SERTRALINE 100 MG: 50 TABLET, FILM COATED ORAL at 22:03

## 2017-01-01 RX ADMIN — AMIODARONE HYDROCHLORIDE 200 MG: 200 TABLET ORAL at 20:50

## 2017-01-01 RX ADMIN — AMIODARONE HYDROCHLORIDE 200 MG: 200 TABLET ORAL at 20:11

## 2017-01-01 RX ADMIN — ISOSORBIDE DINITRATE 10 MG: 10 TABLET ORAL at 09:32

## 2017-01-01 RX ADMIN — Medication 1 CAPSULE: at 08:43

## 2017-01-01 RX ADMIN — IPRATROPIUM BROMIDE AND ALBUTEROL SULFATE 3 ML: .5; 3 SOLUTION RESPIRATORY (INHALATION) at 11:41

## 2017-01-01 RX ADMIN — LEVETIRACETAM 500 MG: 500 TABLET, FILM COATED ORAL at 09:44

## 2017-01-01 RX ADMIN — LEVETIRACETAM 500 MG: 500 TABLET, FILM COATED ORAL at 21:40

## 2017-01-01 RX ADMIN — Medication 1 TABLET: at 07:45

## 2017-01-01 RX ADMIN — DONEPEZIL HYDROCHLORIDE 10 MG: 5 TABLET ORAL at 21:20

## 2017-01-01 RX ADMIN — TRAMADOL HYDROCHLORIDE 50 MG: 50 TABLET, FILM COATED ORAL at 16:51

## 2017-01-01 RX ADMIN — Medication 1 CAPSULE: at 10:20

## 2017-01-01 RX ADMIN — MEROPENEM 1 G: 1 INJECTION, POWDER, FOR SOLUTION INTRAVENOUS at 09:51

## 2017-01-01 RX ADMIN — LEVETIRACETAM 500 MG: 500 TABLET, FILM COATED ORAL at 10:51

## 2017-01-01 RX ADMIN — KETOTIFEN FUMARATE 1 DROP: 0.25 SOLUTION/ DROPS OPHTHALMIC at 08:17

## 2017-01-01 RX ADMIN — ISOSORBIDE DINITRATE 10 MG: 10 TABLET ORAL at 21:03

## 2017-01-01 RX ADMIN — SENNOSIDES AND DOCUSATE SODIUM 1 TABLET: 8.6; 5 TABLET ORAL at 21:03

## 2017-01-01 RX ADMIN — AMLODIPINE BESYLATE 5 MG: 5 TABLET ORAL at 13:02

## 2017-01-01 RX ADMIN — ISOSORBIDE DINITRATE 10 MG: 10 TABLET ORAL at 22:16

## 2017-01-01 RX ADMIN — DILTIAZEM HYDROCHLORIDE 120 MG: 60 TABLET, FILM COATED ORAL at 18:11

## 2017-01-01 RX ADMIN — LEVETIRACETAM 500 MG: 500 TABLET, FILM COATED ORAL at 17:17

## 2017-01-01 RX ADMIN — METOPROLOL TARTRATE 75 MG: 25 TABLET, FILM COATED ORAL at 18:11

## 2017-01-01 RX ADMIN — INSULIN ASPART 2 UNITS: 100 INJECTION, SOLUTION INTRAVENOUS; SUBCUTANEOUS at 20:30

## 2017-01-01 RX ADMIN — PIPERACILLIN SODIUM AND TAZOBACTAM SODIUM 3.38 G: 3; .375 INJECTION, POWDER, LYOPHILIZED, FOR SOLUTION INTRAVENOUS at 13:43

## 2017-01-01 RX ADMIN — DILTIAZEM HYDROCHLORIDE 120 MG: 60 TABLET, FILM COATED ORAL at 19:04

## 2017-01-01 RX ADMIN — POTASSIUM CHLORIDE 20 MEQ: 1500 TABLET, EXTENDED RELEASE ORAL at 10:36

## 2017-01-01 RX ADMIN — SODIUM CHLORIDE 100 ML/HR: 4.5 INJECTION, SOLUTION INTRAVENOUS at 20:46

## 2017-01-01 RX ADMIN — MICONAZOLE NITRATE: 20 POWDER TOPICAL at 07:41

## 2017-01-01 RX ADMIN — ALLOPURINOL 100 MG: 100 TABLET ORAL at 19:19

## 2017-01-01 RX ADMIN — METOPROLOL SUCCINATE 100 MG: 50 TABLET, EXTENDED RELEASE ORAL at 21:34

## 2017-01-01 RX ADMIN — DONEPEZIL HYDROCHLORIDE 10 MG: 5 TABLET ORAL at 21:36

## 2017-01-01 RX ADMIN — ALLOPURINOL 100 MG: 100 TABLET ORAL at 08:48

## 2017-01-01 RX ADMIN — ACETAMINOPHEN 650 MG: 325 TABLET, FILM COATED ORAL at 23:15

## 2017-01-01 RX ADMIN — SENNOSIDES AND DOCUSATE SODIUM 1 TABLET: 8.6; 5 TABLET ORAL at 08:44

## 2017-01-01 RX ADMIN — Medication 150 MG: at 12:35

## 2017-01-01 RX ADMIN — STANDARDIZED SENNA CONCENTRATE 1 TABLET: 8.6 TABLET ORAL at 17:12

## 2017-01-01 RX ADMIN — GUAIFENESIN 1200 MG: 600 TABLET, EXTENDED RELEASE ORAL at 18:20

## 2017-01-01 RX ADMIN — DIPHENHYDRAMINE HYDROCHLORIDE 25 MG: 25 CAPSULE ORAL at 23:08

## 2017-01-01 RX ADMIN — DOCUSATE SODIUM AND SENNOSIDES 1 TABLET: 8.6; 5 TABLET, FILM COATED ORAL at 20:40

## 2017-01-01 RX ADMIN — TRAMADOL HYDROCHLORIDE 100 MG: 50 TABLET, FILM COATED ORAL at 17:33

## 2017-01-01 RX ADMIN — Medication 1 TABLET: at 07:59

## 2017-01-01 RX ADMIN — LORAZEPAM 0.5 MG: 0.5 TABLET ORAL at 00:00

## 2017-01-01 RX ADMIN — IPRATROPIUM BROMIDE AND ALBUTEROL SULFATE 3 ML: .5; 3 SOLUTION RESPIRATORY (INHALATION) at 20:23

## 2017-01-01 RX ADMIN — INSULIN ASPART 2 UNITS: 100 INJECTION, SOLUTION INTRAVENOUS; SUBCUTANEOUS at 07:58

## 2017-01-01 RX ADMIN — KETOTIFEN FUMARATE 1 DROP: 0.25 SOLUTION/ DROPS OPHTHALMIC at 16:53

## 2017-01-01 RX ADMIN — METOPROLOL TARTRATE 75 MG: 25 TABLET, FILM COATED ORAL at 17:00

## 2017-01-01 RX ADMIN — METOPROLOL SUCCINATE 100 MG: 50 TABLET, EXTENDED RELEASE ORAL at 21:10

## 2017-01-01 RX ADMIN — LEVOTHYROXINE SODIUM 150 MCG: 150 TABLET ORAL at 05:31

## 2017-01-01 RX ADMIN — POTASSIUM CHLORIDE 40 MEQ: 1500 TABLET, EXTENDED RELEASE ORAL at 16:47

## 2017-01-01 RX ADMIN — SENNOSIDES AND DOCUSATE SODIUM 1 TABLET: 8.6; 5 TABLET ORAL at 09:55

## 2017-01-01 RX ADMIN — OFLOXACIN 50000 UNITS: 300 TABLET, COATED ORAL at 12:07

## 2017-01-01 RX ADMIN — BUDESONIDE 0.5 MG: 0.5 SUSPENSION RESPIRATORY (INHALATION) at 08:49

## 2017-01-01 RX ADMIN — METOPROLOL TARTRATE 75 MG: 25 TABLET, FILM COATED ORAL at 17:44

## 2017-01-01 RX ADMIN — MICONAZOLE NITRATE: 20 POWDER TOPICAL at 08:17

## 2017-01-01 RX ADMIN — SENNOSIDES AND DOCUSATE SODIUM 1 TABLET: 8.6; 5 TABLET ORAL at 21:01

## 2017-01-01 RX ADMIN — FAMOTIDINE 40 MG: 20 TABLET, FILM COATED ORAL at 08:42

## 2017-01-01 RX ADMIN — LEVOTHYROXINE SODIUM 150 MCG: 150 TABLET ORAL at 07:56

## 2017-01-01 RX ADMIN — ISOSORBIDE DINITRATE 10 MG: 10 TABLET ORAL at 10:24

## 2017-01-01 RX ADMIN — BUDESONIDE 0.5 MG: 0.5 SUSPENSION RESPIRATORY (INHALATION) at 22:39

## 2017-01-01 RX ADMIN — SENNOSIDES AND DOCUSATE SODIUM 1 TABLET: 8.6; 5 TABLET ORAL at 10:17

## 2017-01-01 RX ADMIN — ISOSORBIDE DINITRATE 10 MG: 10 TABLET ORAL at 23:04

## 2017-01-01 RX ADMIN — KETOTIFEN FUMARATE 1 DROP: 0.25 SOLUTION/ DROPS OPHTHALMIC at 08:29

## 2017-01-01 RX ADMIN — ALLOPURINOL 100 MG: 100 TABLET ORAL at 10:27

## 2017-01-01 RX ADMIN — FUROSEMIDE 40 MG: 40 TABLET ORAL at 07:54

## 2017-01-01 RX ADMIN — CONJUGATED ESTROGENS 1 APPLICATION: 0.62 CREAM VAGINAL at 09:44

## 2017-01-01 RX ADMIN — MICONAZOLE NITRATE: 20 POWDER TOPICAL at 09:14

## 2017-01-01 RX ADMIN — LEVETIRACETAM 500 MG: 500 TABLET, FILM COATED ORAL at 21:08

## 2017-01-01 RX ADMIN — SENNOSIDES AND DOCUSATE SODIUM 1 TABLET: 8.6; 5 TABLET ORAL at 20:44

## 2017-01-01 RX ADMIN — APIXABAN 5 MG: 2.5 TABLET, FILM COATED ORAL at 21:29

## 2017-01-01 RX ADMIN — AMIODARONE HYDROCHLORIDE 200 MG: 200 TABLET ORAL at 08:39

## 2017-01-01 RX ADMIN — APIXABAN 5 MG: 2.5 TABLET, FILM COATED ORAL at 08:38

## 2017-01-01 RX ADMIN — LEVETIRACETAM 500 MG: 500 TABLET, FILM COATED ORAL at 09:21

## 2017-01-01 RX ADMIN — ISOSORBIDE DINITRATE 10 MG: 10 TABLET ORAL at 08:33

## 2017-01-01 RX ADMIN — AMIODARONE HYDROCHLORIDE 200 MG: 200 TABLET ORAL at 17:28

## 2017-01-01 RX ADMIN — FAMOTIDINE 40 MG: 20 TABLET ORAL at 20:54

## 2017-01-01 RX ADMIN — MICONAZOLE NITRATE: 20 POWDER TOPICAL at 20:34

## 2017-01-01 RX ADMIN — FUROSEMIDE 40 MG: 40 TABLET ORAL at 10:40

## 2017-01-01 RX ADMIN — SERTRALINE 100 MG: 50 TABLET, FILM COATED ORAL at 21:10

## 2017-01-01 RX ADMIN — METOPROLOL SUCCINATE 100 MG: 50 TABLET, EXTENDED RELEASE ORAL at 21:46

## 2017-01-01 RX ADMIN — Medication 1 CAPSULE: at 10:02

## 2017-01-01 RX ADMIN — LEVOTHYROXINE SODIUM 150 MCG: 150 TABLET ORAL at 05:25

## 2017-01-01 RX ADMIN — METOPROLOL TARTRATE 25 MG: 25 TABLET, FILM COATED ORAL at 09:13

## 2017-01-01 RX ADMIN — ERGOCALCIFEROL 50000 UNITS: 1.25 CAPSULE ORAL at 14:52

## 2017-01-01 RX ADMIN — METOPROLOL SUCCINATE 100 MG: 50 TABLET, EXTENDED RELEASE ORAL at 09:53

## 2017-01-01 RX ADMIN — MICONAZOLE NITRATE: 20 POWDER TOPICAL at 20:42

## 2017-01-01 RX ADMIN — METOPROLOL TARTRATE 100 MG: 25 TABLET, FILM COATED ORAL at 20:46

## 2017-01-01 RX ADMIN — DONEPEZIL HYDROCHLORIDE 10 MG: 5 TABLET, FILM COATED ORAL at 20:49

## 2017-01-01 RX ADMIN — MEGESTROL ACETATE 200 MG: 40 SUSPENSION ORAL at 08:26

## 2017-01-01 RX ADMIN — DONEPEZIL HYDROCHLORIDE 10 MG: 5 TABLET, FILM COATED ORAL at 20:39

## 2017-01-01 RX ADMIN — Medication 1 TABLET: at 17:34

## 2017-01-01 RX ADMIN — MICONAZOLE NITRATE: 20 POWDER TOPICAL at 10:38

## 2017-01-01 RX ADMIN — KETOTIFEN FUMARATE 1 DROP: 0.35 SOLUTION/ DROPS OPHTHALMIC at 08:38

## 2017-01-01 RX ADMIN — CALCIUM CARBONATE 500 MG (1,250 MG)-VITAMIN D3 200 UNIT TABLET 500 MG: at 09:58

## 2017-01-01 RX ADMIN — NALOXONE HYDROCHLORIDE 2 MG: 1 INJECTION PARENTERAL at 09:54

## 2017-01-01 RX ADMIN — KETOTIFEN FUMARATE 1 DROP: 0.25 SOLUTION/ DROPS OPHTHALMIC at 09:34

## 2017-01-01 RX ADMIN — FAMOTIDINE 20 MG: 20 TABLET, FILM COATED ORAL at 08:40

## 2017-01-01 RX ADMIN — SODIUM CHLORIDE 500 ML: 9 INJECTION, SOLUTION INTRAVENOUS at 13:45

## 2017-01-01 RX ADMIN — TRAMADOL HYDROCHLORIDE 100 MG: 50 TABLET, FILM COATED ORAL at 23:25

## 2017-01-01 RX ADMIN — METOPROLOL TARTRATE 100 MG: 25 TABLET, FILM COATED ORAL at 21:02

## 2017-01-01 RX ADMIN — LEVETIRACETAM 500 MG: 500 TABLET, FILM COATED ORAL at 17:30

## 2017-01-01 RX ADMIN — AMIODARONE HYDROCHLORIDE 200 MG: 200 TABLET ORAL at 20:21

## 2017-01-01 RX ADMIN — METOPROLOL SUCCINATE 100 MG: 50 TABLET, EXTENDED RELEASE ORAL at 09:19

## 2017-01-01 RX ADMIN — MEGESTROL ACETATE 200 MG: 40 SUSPENSION ORAL at 08:32

## 2017-01-01 RX ADMIN — APIXABAN 5 MG: 2.5 TABLET, FILM COATED ORAL at 09:08

## 2017-01-01 RX ADMIN — BUDESONIDE 0.5 MG: 0.5 SUSPENSION RESPIRATORY (INHALATION) at 21:10

## 2017-01-01 RX ADMIN — IPRATROPIUM BROMIDE AND ALBUTEROL SULFATE 3 ML: .5; 3 SOLUTION RESPIRATORY (INHALATION) at 10:22

## 2017-01-01 RX ADMIN — Medication 150 MG: at 08:36

## 2017-01-01 RX ADMIN — DILTIAZEM HYDROCHLORIDE 100 MG: 100 INJECTION, POWDER, LYOPHILIZED, FOR SOLUTION INTRAVENOUS at 00:42

## 2017-01-01 RX ADMIN — ISOSORBIDE DINITRATE 10 MG: 10 TABLET ORAL at 10:35

## 2017-01-01 RX ADMIN — SENNOSIDES AND DOCUSATE SODIUM 1 TABLET: 8.6; 5 TABLET ORAL at 08:41

## 2017-01-01 RX ADMIN — DONEPEZIL HYDROCHLORIDE 10 MG: 5 TABLET, FILM COATED ORAL at 21:20

## 2017-01-01 RX ADMIN — DILTIAZEM HYDROCHLORIDE 240 MG: 120 CAPSULE, COATED, EXTENDED RELEASE ORAL at 09:31

## 2017-01-01 RX ADMIN — LEVETIRACETAM 500 MG: 500 TABLET, FILM COATED ORAL at 18:51

## 2017-01-01 RX ADMIN — DONEPEZIL HYDROCHLORIDE 10 MG: 5 TABLET, FILM COATED ORAL at 20:47

## 2017-01-01 RX ADMIN — Medication 150 MG: at 18:03

## 2017-01-01 RX ADMIN — AMIODARONE HYDROCHLORIDE 200 MG: 200 TABLET ORAL at 17:07

## 2017-01-01 RX ADMIN — KETOTIFEN FUMARATE 1 DROP: 0.25 SOLUTION/ DROPS OPHTHALMIC at 09:12

## 2017-01-01 RX ADMIN — SENNOSIDES AND DOCUSATE SODIUM 1 TABLET: 8.6; 5 TABLET ORAL at 22:09

## 2017-01-01 RX ADMIN — BUDESONIDE 0.5 MG: 0.5 SUSPENSION RESPIRATORY (INHALATION) at 21:57

## 2017-01-01 RX ADMIN — ALLOPURINOL 100 MG: 100 TABLET ORAL at 09:45

## 2017-01-01 RX ADMIN — ISOSORBIDE DINITRATE 10 MG: 10 TABLET ORAL at 20:57

## 2017-01-01 RX ADMIN — LEVETIRACETAM 500 MG: 500 TABLET, FILM COATED ORAL at 10:02

## 2017-01-01 RX ADMIN — FUROSEMIDE 40 MG: 40 TABLET ORAL at 08:38

## 2017-01-01 RX ADMIN — MEGESTROL ACETATE 200 MG: 40 SUSPENSION ORAL at 17:25

## 2017-01-01 RX ADMIN — Medication 150 MG: at 07:29

## 2017-01-01 RX ADMIN — POTASSIUM CHLORIDE 20 MEQ: 1500 TABLET, EXTENDED RELEASE ORAL at 07:59

## 2017-01-01 RX ADMIN — POTASSIUM CHLORIDE 20 MEQ: 1500 TABLET, EXTENDED RELEASE ORAL at 09:19

## 2017-01-01 RX ADMIN — SENNOSIDES AND DOCUSATE SODIUM 1 TABLET: 8.6; 5 TABLET ORAL at 10:38

## 2017-01-01 RX ADMIN — METOPROLOL TARTRATE 25 MG: 25 TABLET ORAL at 21:19

## 2017-01-01 RX ADMIN — ISOSORBIDE DINITRATE 10 MG: 10 TABLET ORAL at 08:10

## 2017-01-01 RX ADMIN — GUAIFENESIN 1200 MG: 600 TABLET, EXTENDED RELEASE ORAL at 17:44

## 2017-01-01 RX ADMIN — ISOSORBIDE DINITRATE 10 MG: 10 TABLET ORAL at 10:41

## 2017-01-01 RX ADMIN — Medication 1 TABLET: at 09:22

## 2017-01-01 RX ADMIN — METOPROLOL SUCCINATE 100 MG: 50 TABLET, EXTENDED RELEASE ORAL at 20:37

## 2017-01-01 RX ADMIN — Medication 1 CAPSULE: at 10:00

## 2017-01-01 RX ADMIN — ENOXAPARIN SODIUM 100 MG: 100 INJECTION SUBCUTANEOUS at 13:53

## 2017-01-01 RX ADMIN — LEVETIRACETAM 500 MG: 500 TABLET, FILM COATED ORAL at 07:41

## 2017-01-01 RX ADMIN — GUAIFENESIN 1200 MG: 600 TABLET, EXTENDED RELEASE ORAL at 17:46

## 2017-01-01 RX ADMIN — LEVETIRACETAM 500 MG: 500 TABLET, FILM COATED ORAL at 09:34

## 2017-01-01 RX ADMIN — SENNOSIDES AND DOCUSATE SODIUM 1 TABLET: 8.6; 5 TABLET ORAL at 22:46

## 2017-01-01 RX ADMIN — APIXABAN 5 MG: 2.5 TABLET, FILM COATED ORAL at 22:29

## 2017-01-01 RX ADMIN — STANDARDIZED SENNA CONCENTRATE 1 TABLET: 8.6 TABLET ORAL at 08:50

## 2017-01-01 RX ADMIN — ACETAMINOPHEN 650 MG: 325 TABLET, FILM COATED ORAL at 21:21

## 2017-01-01 RX ADMIN — DONEPEZIL HYDROCHLORIDE 10 MG: 5 TABLET ORAL at 20:01

## 2017-01-01 RX ADMIN — LEVOTHYROXINE SODIUM 150 MCG: 150 TABLET ORAL at 06:40

## 2017-01-01 RX ADMIN — MICONAZOLE NITRATE: 20 POWDER TOPICAL at 20:32

## 2017-01-01 RX ADMIN — CEFTRIAXONE 1 G: 1 INJECTION, SOLUTION INTRAVENOUS at 16:55

## 2017-01-01 RX ADMIN — SERTRALINE 100 MG: 50 TABLET, FILM COATED ORAL at 20:55

## 2017-01-01 RX ADMIN — POTASSIUM CHLORIDE 20 MEQ: 1500 TABLET, EXTENDED RELEASE ORAL at 08:37

## 2017-01-01 RX ADMIN — DILTIAZEM HYDROCHLORIDE 240 MG: 120 CAPSULE, COATED, EXTENDED RELEASE ORAL at 09:18

## 2017-01-01 RX ADMIN — MEGESTROL ACETATE 200 MG: 40 SUSPENSION ORAL at 18:28

## 2017-01-01 RX ADMIN — POTASSIUM CHLORIDE 10 MEQ: 750 TABLET, FILM COATED, EXTENDED RELEASE ORAL at 08:07

## 2017-01-01 RX ADMIN — IPRATROPIUM BROMIDE AND ALBUTEROL SULFATE 3 ML: .5; 3 SOLUTION RESPIRATORY (INHALATION) at 08:50

## 2017-01-01 RX ADMIN — IPRATROPIUM BROMIDE AND ALBUTEROL SULFATE 3 ML: .5; 3 SOLUTION RESPIRATORY (INHALATION) at 08:38

## 2017-01-01 RX ADMIN — DOCUSATE SODIUM AND SENNOSIDES 1 TABLET: 8.6; 5 TABLET, FILM COATED ORAL at 21:58

## 2017-01-01 RX ADMIN — LEVETIRACETAM 500 MG: 500 TABLET, FILM COATED ORAL at 09:26

## 2017-01-01 RX ADMIN — TERAZOSIN HYDROCHLORIDE 2 MG: 1 CAPSULE ORAL at 20:45

## 2017-01-01 RX ADMIN — POTASSIUM CHLORIDE 10 MEQ: 750 TABLET, FILM COATED, EXTENDED RELEASE ORAL at 08:16

## 2017-01-01 RX ADMIN — FAMOTIDINE 20 MG: 20 TABLET ORAL at 17:33

## 2017-01-01 RX ADMIN — DILTIAZEM HYDROCHLORIDE 240 MG: 120 CAPSULE, COATED, EXTENDED RELEASE ORAL at 08:59

## 2017-01-01 RX ADMIN — METOPROLOL TARTRATE 25 MG: 25 TABLET, FILM COATED ORAL at 17:31

## 2017-01-01 RX ADMIN — SENNOSIDES AND DOCUSATE SODIUM 1 TABLET: 8.6; 5 TABLET ORAL at 22:28

## 2017-01-01 RX ADMIN — FUROSEMIDE 40 MG: 40 TABLET ORAL at 08:15

## 2017-01-01 RX ADMIN — ASPIRIN 81 MG: 81 TABLET, COATED ORAL at 10:17

## 2017-01-01 RX ADMIN — TRAMADOL HYDROCHLORIDE 100 MG: 50 TABLET, FILM COATED ORAL at 21:45

## 2017-01-01 RX ADMIN — AMIODARONE HYDROCHLORIDE 200 MG: 200 TABLET ORAL at 21:26

## 2017-01-01 RX ADMIN — MICONAZOLE NITRATE: 20 POWDER TOPICAL at 09:16

## 2017-01-01 RX ADMIN — APIXABAN 5 MG: 2.5 TABLET, FILM COATED ORAL at 08:55

## 2017-01-01 RX ADMIN — MICONAZOLE NITRATE: 20 POWDER TOPICAL at 09:35

## 2017-01-01 RX ADMIN — KETOTIFEN FUMARATE 1 DROP: 0.35 SOLUTION/ DROPS OPHTHALMIC at 17:33

## 2017-01-01 RX ADMIN — BUDESONIDE 0.5 MG: 0.5 SUSPENSION RESPIRATORY (INHALATION) at 21:28

## 2017-01-01 RX ADMIN — ACETAMINOPHEN 650 MG: 325 TABLET, FILM COATED ORAL at 14:05

## 2017-01-01 RX ADMIN — KETOTIFEN FUMARATE 1 DROP: 0.25 SOLUTION/ DROPS OPHTHALMIC at 08:12

## 2017-01-01 RX ADMIN — MEGESTROL ACETATE 200 MG: 40 SUSPENSION ORAL at 18:04

## 2017-01-01 RX ADMIN — LEVOTHYROXINE SODIUM 150 MCG: 150 TABLET ORAL at 08:44

## 2017-01-01 RX ADMIN — Medication 1 CAPSULE: at 10:59

## 2017-01-01 RX ADMIN — LEVETIRACETAM 500 MG: 500 TABLET, FILM COATED ORAL at 23:03

## 2017-01-01 RX ADMIN — TERAZOSIN HYDROCHLORIDE 2 MG: 1 CAPSULE ORAL at 09:03

## 2017-01-01 RX ADMIN — TERAZOSIN HYDROCHLORIDE 2 MG: 1 CAPSULE ORAL at 20:47

## 2017-01-01 RX ADMIN — LEVETIRACETAM 500 MG: 500 TABLET, FILM COATED ORAL at 17:20

## 2017-01-01 RX ADMIN — ACETAMINOPHEN 650 MG: 325 TABLET, FILM COATED ORAL at 16:23

## 2017-01-01 RX ADMIN — AMIODARONE HYDROCHLORIDE 200 MG: 200 TABLET ORAL at 19:04

## 2017-01-01 RX ADMIN — ALTEPLASE 2 MG: 2.2 INJECTION, POWDER, LYOPHILIZED, FOR SOLUTION INTRAVENOUS at 08:14

## 2017-01-01 RX ADMIN — STANDARDIZED SENNA CONCENTRATE 1 TABLET: 8.6 TABLET ORAL at 09:03

## 2017-01-01 RX ADMIN — LEVOTHYROXINE SODIUM 150 MCG: 150 TABLET ORAL at 05:00

## 2017-01-01 RX ADMIN — DILTIAZEM HYDROCHLORIDE 120 MG: 60 TABLET, FILM COATED ORAL at 08:18

## 2017-01-01 RX ADMIN — MICONAZOLE NITRATE: 20 POWDER TOPICAL at 20:55

## 2017-01-01 RX ADMIN — DOXAZOSIN MESYLATE 2 MG: 2 TABLET ORAL at 12:10

## 2017-01-01 RX ADMIN — MEGESTROL ACETATE 200 MG: 40 SUSPENSION ORAL at 17:35

## 2017-01-01 RX ADMIN — INSULIN ASPART 2 UNITS: 100 INJECTION, SOLUTION INTRAVENOUS; SUBCUTANEOUS at 09:09

## 2017-01-01 RX ADMIN — ASPIRIN 81 MG: 81 TABLET, COATED ORAL at 08:05

## 2017-01-01 RX ADMIN — CONJUGATED ESTROGENS 0.75 APPLICATION: 0.62 CREAM VAGINAL at 20:28

## 2017-01-01 RX ADMIN — LEVETIRACETAM 500 MG: 500 TABLET, FILM COATED ORAL at 21:29

## 2017-01-01 RX ADMIN — KETOTIFEN FUMARATE 1 DROP: 0.25 SOLUTION/ DROPS OPHTHALMIC at 09:06

## 2017-01-01 RX ADMIN — APIXABAN 5 MG: 2.5 TABLET, FILM COATED ORAL at 07:39

## 2017-01-01 RX ADMIN — DONEPEZIL HYDROCHLORIDE 10 MG: 5 TABLET ORAL at 22:47

## 2017-01-01 RX ADMIN — TRAMADOL HYDROCHLORIDE 100 MG: 50 TABLET, FILM COATED ORAL at 22:30

## 2017-01-01 RX ADMIN — APIXABAN 5 MG: 2.5 TABLET, FILM COATED ORAL at 10:08

## 2017-01-01 RX ADMIN — ALLOPURINOL 100 MG: 100 TABLET ORAL at 11:19

## 2017-01-01 RX ADMIN — STANDARDIZED SENNA CONCENTRATE 1 TABLET: 8.6 TABLET ORAL at 16:51

## 2017-01-01 RX ADMIN — IPRATROPIUM BROMIDE AND ALBUTEROL SULFATE 3 ML: .5; 3 SOLUTION RESPIRATORY (INHALATION) at 20:12

## 2017-01-01 RX ADMIN — METOPROLOL TARTRATE 25 MG: 25 TABLET, FILM COATED ORAL at 09:42

## 2017-01-01 RX ADMIN — CLINDAMYCIN HYDROCHLORIDE 450 MG: 150 CAPSULE ORAL at 22:22

## 2017-01-01 RX ADMIN — POTASSIUM CHLORIDE 20 MEQ: 1500 TABLET, EXTENDED RELEASE ORAL at 08:52

## 2017-01-01 RX ADMIN — METOPROLOL SUCCINATE 100 MG: 50 TABLET, EXTENDED RELEASE ORAL at 09:45

## 2017-01-01 RX ADMIN — METOPROLOL TARTRATE 75 MG: 25 TABLET, FILM COATED ORAL at 18:13

## 2017-01-01 RX ADMIN — Medication 1 CAPSULE: at 09:44

## 2017-01-01 RX ADMIN — BUDESONIDE 0.5 MG: 0.5 SUSPENSION RESPIRATORY (INHALATION) at 20:55

## 2017-01-01 RX ADMIN — IPRATROPIUM BROMIDE AND ALBUTEROL SULFATE 3 ML: .5; 3 SOLUTION RESPIRATORY (INHALATION) at 13:18

## 2017-01-01 RX ADMIN — ALLOPURINOL 100 MG: 100 TABLET ORAL at 10:52

## 2017-01-01 RX ADMIN — LEVOTHYROXINE SODIUM 150 MCG: 150 TABLET ORAL at 06:14

## 2017-01-01 RX ADMIN — TRAMADOL HYDROCHLORIDE 100 MG: 50 TABLET, FILM COATED ORAL at 10:51

## 2017-01-01 RX ADMIN — Medication 1 CAPSULE: at 09:41

## 2017-01-01 RX ADMIN — Medication 150 MG: at 10:50

## 2017-01-01 RX ADMIN — GUAIFENESIN 1200 MG: 600 TABLET, EXTENDED RELEASE ORAL at 09:29

## 2017-01-01 RX ADMIN — ALLOPURINOL 100 MG: 100 TABLET ORAL at 18:15

## 2017-01-01 RX ADMIN — DICLOFENAC SODIUM 4 G: 10 GEL TOPICAL at 10:45

## 2017-01-01 RX ADMIN — LEVETIRACETAM 500 MG: 500 TABLET, FILM COATED ORAL at 17:27

## 2017-01-01 RX ADMIN — BUDESONIDE 0.5 MG: 0.5 SUSPENSION RESPIRATORY (INHALATION) at 21:29

## 2017-01-01 RX ADMIN — ISOSORBIDE DINITRATE 10 MG: 10 TABLET ORAL at 09:17

## 2017-01-01 RX ADMIN — LEVETIRACETAM 500 MG: 500 TABLET, FILM COATED ORAL at 10:50

## 2017-01-01 RX ADMIN — CONJUGATED ESTROGENS 0.75 APPLICATOR: 0.62 CREAM VAGINAL at 20:19

## 2017-01-01 RX ADMIN — METOPROLOL TARTRATE 75 MG: 25 TABLET, FILM COATED ORAL at 16:51

## 2017-01-01 RX ADMIN — LEVOTHYROXINE SODIUM 150 MCG: 150 TABLET ORAL at 08:43

## 2017-01-01 RX ADMIN — MICONAZOLE NITRATE: 20 POWDER TOPICAL at 10:23

## 2017-01-01 RX ADMIN — LEVETIRACETAM 500 MG: 500 TABLET, FILM COATED ORAL at 21:06

## 2017-01-01 RX ADMIN — IPRATROPIUM BROMIDE AND ALBUTEROL SULFATE 3 ML: .5; 3 SOLUTION RESPIRATORY (INHALATION) at 08:54

## 2017-01-01 RX ADMIN — CONJUGATED ESTROGENS 0.75 APPLICATOR: 0.62 CREAM VAGINAL at 20:27

## 2017-01-01 RX ADMIN — LEVETIRACETAM 500 MG: 500 TABLET, FILM COATED ORAL at 21:36

## 2017-01-01 RX ADMIN — Medication 150 MG: at 10:12

## 2017-01-01 RX ADMIN — FAMOTIDINE 20 MG: 20 TABLET ORAL at 09:19

## 2017-01-01 RX ADMIN — METOPROLOL SUCCINATE 100 MG: 50 TABLET, EXTENDED RELEASE ORAL at 08:52

## 2017-01-01 RX ADMIN — LEVETIRACETAM 500 MG: 500 TABLET, FILM COATED ORAL at 09:06

## 2017-01-01 RX ADMIN — DONEPEZIL HYDROCHLORIDE 10 MG: 5 TABLET ORAL at 21:31

## 2017-01-01 RX ADMIN — LEVETIRACETAM 500 MG: 500 TABLET, FILM COATED ORAL at 09:05

## 2017-01-01 RX ADMIN — METOPROLOL SUCCINATE 100 MG: 50 TABLET, EXTENDED RELEASE ORAL at 20:58

## 2017-01-01 RX ADMIN — APIXABAN 5 MG: 2.5 TABLET, FILM COATED ORAL at 09:07

## 2017-01-01 RX ADMIN — METOPROLOL TARTRATE 75 MG: 25 TABLET, FILM COATED ORAL at 17:25

## 2017-01-01 RX ADMIN — Medication 1 TABLET: at 09:48

## 2017-01-01 RX ADMIN — FAMOTIDINE 20 MG: 20 TABLET ORAL at 07:58

## 2017-01-01 RX ADMIN — DONEPEZIL HYDROCHLORIDE 10 MG: 5 TABLET ORAL at 20:37

## 2017-01-01 RX ADMIN — CONJUGATED ESTROGENS 1 APPLICATION: 0.62 CREAM VAGINAL at 21:22

## 2017-01-01 RX ADMIN — FAMOTIDINE 20 MG: 20 TABLET, FILM COATED ORAL at 08:30

## 2017-01-01 RX ADMIN — MICONAZOLE NITRATE: 20 POWDER TOPICAL at 08:42

## 2017-01-01 RX ADMIN — LEVETIRACETAM 500 MG: 500 TABLET, FILM COATED ORAL at 17:36

## 2017-01-01 RX ADMIN — LEVETIRACETAM 500 MG: 500 TABLET, FILM COATED ORAL at 23:16

## 2017-01-01 RX ADMIN — Medication 1 CAPSULE: at 09:56

## 2017-01-01 RX ADMIN — METOPROLOL TARTRATE 75 MG: 25 TABLET, FILM COATED ORAL at 08:23

## 2017-01-01 RX ADMIN — SENNOSIDES AND DOCUSATE SODIUM 1 TABLET: 8.6; 5 TABLET ORAL at 07:53

## 2017-01-01 RX ADMIN — IPRATROPIUM BROMIDE AND ALBUTEROL SULFATE 3 ML: .5; 3 SOLUTION RESPIRATORY (INHALATION) at 10:29

## 2017-01-01 RX ADMIN — BUDESONIDE 0.5 MG: 0.5 SUSPENSION RESPIRATORY (INHALATION) at 08:29

## 2017-01-01 RX ADMIN — KETOTIFEN FUMARATE 1 DROP: 0.25 SOLUTION/ DROPS OPHTHALMIC at 18:16

## 2017-01-01 RX ADMIN — IPRATROPIUM BROMIDE AND ALBUTEROL SULFATE 3 ML: .5; 3 SOLUTION RESPIRATORY (INHALATION) at 15:53

## 2017-01-01 RX ADMIN — Medication 150 MG: at 10:30

## 2017-01-01 RX ADMIN — METOPROLOL SUCCINATE 100 MG: 50 TABLET, EXTENDED RELEASE ORAL at 21:02

## 2017-01-01 RX ADMIN — LEVOFLOXACIN 750 MG: 5 INJECTION, SOLUTION INTRAVENOUS at 11:55

## 2017-01-01 RX ADMIN — CONJUGATED ESTROGENS 1 APPLICATION: 0.62 CREAM VAGINAL at 20:48

## 2017-01-01 RX ADMIN — GUAIFENESIN 1200 MG: 600 TABLET, EXTENDED RELEASE ORAL at 16:53

## 2017-01-01 RX ADMIN — DILTIAZEM HYDROCHLORIDE 240 MG: 120 CAPSULE, COATED, EXTENDED RELEASE ORAL at 17:36

## 2017-01-01 RX ADMIN — METOPROLOL TARTRATE 75 MG: 25 TABLET, FILM COATED ORAL at 17:17

## 2017-01-01 RX ADMIN — IRON SUCROSE 200 MG: 20 INJECTION, SOLUTION INTRAVENOUS at 08:23

## 2017-01-01 RX ADMIN — LEVETIRACETAM 500 MG: 500 TABLET, FILM COATED ORAL at 10:11

## 2017-01-01 RX ADMIN — TRAMADOL HYDROCHLORIDE 50 MG: 50 TABLET, FILM COATED ORAL at 05:53

## 2017-01-01 RX ADMIN — METOPROLOL TARTRATE 75 MG: 25 TABLET, FILM COATED ORAL at 08:07

## 2017-01-01 RX ADMIN — Medication 150 MG: at 19:17

## 2017-01-01 RX ADMIN — MICONAZOLE NITRATE: 20 POWDER TOPICAL at 21:10

## 2017-01-01 RX ADMIN — ACETAMINOPHEN 650 MG: 325 TABLET, FILM COATED ORAL at 18:02

## 2017-01-01 RX ADMIN — SENNOSIDES AND DOCUSATE SODIUM 1 TABLET: 8.6; 5 TABLET ORAL at 08:56

## 2017-01-01 RX ADMIN — ALLOPURINOL 100 MG: 100 TABLET ORAL at 09:01

## 2017-01-01 RX ADMIN — NIFEDIPINE 240 MG: 10 CAPSULE ORAL at 09:58

## 2017-01-01 RX ADMIN — DONEPEZIL HYDROCHLORIDE 10 MG: 5 TABLET, FILM COATED ORAL at 21:03

## 2017-01-01 RX ADMIN — ALLOPURINOL 100 MG: 100 TABLET ORAL at 20:54

## 2017-01-01 RX ADMIN — IPRATROPIUM BROMIDE AND ALBUTEROL SULFATE 3 ML: .5; 3 SOLUTION RESPIRATORY (INHALATION) at 12:19

## 2017-01-01 RX ADMIN — GUAIFENESIN 1200 MG: 600 TABLET, EXTENDED RELEASE ORAL at 17:04

## 2017-01-01 RX ADMIN — FAMOTIDINE 40 MG: 20 TABLET ORAL at 20:19

## 2017-01-01 RX ADMIN — SENNOSIDES AND DOCUSATE SODIUM 1 TABLET: 8.6; 5 TABLET ORAL at 10:07

## 2017-01-01 RX ADMIN — SERTRALINE HYDROCHLORIDE 100 MG: 50 TABLET ORAL at 21:44

## 2017-01-01 RX ADMIN — AMIODARONE HYDROCHLORIDE 200 MG: 200 TABLET ORAL at 17:47

## 2017-01-01 RX ADMIN — DONEPEZIL HYDROCHLORIDE 10 MG: 5 TABLET ORAL at 21:07

## 2017-01-01 RX ADMIN — Medication 1 CAPSULE: at 08:42

## 2017-01-01 RX ADMIN — ALLOPURINOL 100 MG: 100 TABLET ORAL at 10:17

## 2017-01-01 RX ADMIN — AMIODARONE HYDROCHLORIDE 200 MG: 200 TABLET ORAL at 10:15

## 2017-01-01 RX ADMIN — METOPROLOL TARTRATE 100 MG: 25 TABLET, FILM COATED ORAL at 09:57

## 2017-01-01 RX ADMIN — LEVETIRACETAM 500 MG: 500 TABLET, FILM COATED ORAL at 08:37

## 2017-01-01 RX ADMIN — SODIUM CHLORIDE, POTASSIUM CHLORIDE, SODIUM LACTATE AND CALCIUM CHLORIDE 75 ML/HR: 600; 310; 30; 20 INJECTION, SOLUTION INTRAVENOUS at 14:35

## 2017-01-01 RX ADMIN — Medication 1 CAPSULE: at 08:19

## 2017-01-01 RX ADMIN — ISOSORBIDE DINITRATE 10 MG: 10 TABLET ORAL at 22:13

## 2017-01-01 RX ADMIN — DOCUSATE SODIUM AND SENNOSIDES 1 TABLET: 8.6; 5 TABLET, FILM COATED ORAL at 08:08

## 2017-01-01 RX ADMIN — FAMOTIDINE 40 MG: 20 TABLET, FILM COATED ORAL at 21:07

## 2017-01-01 RX ADMIN — Medication 1 TABLET: at 10:06

## 2017-01-01 RX ADMIN — FUROSEMIDE 40 MG: 40 TABLET ORAL at 08:50

## 2017-01-01 RX ADMIN — LEVOFLOXACIN 750 MG: 750 TABLET, FILM COATED ORAL at 20:38

## 2017-01-01 RX ADMIN — POTASSIUM CHLORIDE 40 MEQ: 1500 TABLET, EXTENDED RELEASE ORAL at 13:29

## 2017-01-01 RX ADMIN — ALLOPURINOL 100 MG: 100 TABLET ORAL at 17:54

## 2017-01-01 RX ADMIN — FUROSEMIDE 40 MG: 40 TABLET ORAL at 08:23

## 2017-01-01 RX ADMIN — STANDARDIZED SENNA CONCENTRATE 1 TABLET: 8.6 TABLET ORAL at 11:41

## 2017-01-01 RX ADMIN — METOPROLOL SUCCINATE 100 MG: 50 TABLET, EXTENDED RELEASE ORAL at 08:18

## 2017-01-01 RX ADMIN — GUAIFENESIN 1200 MG: 600 TABLET, EXTENDED RELEASE ORAL at 17:03

## 2017-01-01 RX ADMIN — FUROSEMIDE 40 MG: 40 TABLET ORAL at 10:21

## 2017-01-01 RX ADMIN — FAMOTIDINE 20 MG: 20 TABLET, FILM COATED ORAL at 17:31

## 2017-01-01 RX ADMIN — ASPIRIN 81 MG: 81 TABLET, COATED ORAL at 08:17

## 2017-01-01 RX ADMIN — MICONAZOLE NITRATE: 20 POWDER TOPICAL at 21:22

## 2017-01-01 RX ADMIN — SERTRALINE HYDROCHLORIDE 75 MG: 50 TABLET ORAL at 21:18

## 2017-01-01 RX ADMIN — FAMOTIDINE 40 MG: 20 TABLET ORAL at 20:05

## 2017-01-01 RX ADMIN — SENNOSIDES AND DOCUSATE SODIUM 1 TABLET: 8.6; 5 TABLET ORAL at 07:55

## 2017-01-01 RX ADMIN — ALLOPURINOL 100 MG: 100 TABLET ORAL at 22:47

## 2017-01-01 RX ADMIN — LEVETIRACETAM 500 MG: 500 TABLET, FILM COATED ORAL at 09:36

## 2017-01-01 RX ADMIN — DILTIAZEM HYDROCHLORIDE 300 MG: 180 CAPSULE, EXTENDED RELEASE ORAL at 13:08

## 2017-01-01 RX ADMIN — INSULIN ASPART 3 UNITS: 100 INJECTION, SOLUTION INTRAVENOUS; SUBCUTANEOUS at 20:22

## 2017-01-01 RX ADMIN — METOPROLOL SUCCINATE 100 MG: 50 TABLET, EXTENDED RELEASE ORAL at 08:00

## 2017-01-01 RX ADMIN — MICONAZOLE NITRATE: 20 POWDER TOPICAL at 08:00

## 2017-01-01 RX ADMIN — LEVOTHYROXINE SODIUM 150 MCG: 150 TABLET ORAL at 04:59

## 2017-01-01 RX ADMIN — ISOSORBIDE DINITRATE 10 MG: 10 TABLET ORAL at 22:32

## 2017-01-01 RX ADMIN — IPRATROPIUM BROMIDE AND ALBUTEROL SULFATE 3 ML: .5; 3 SOLUTION RESPIRATORY (INHALATION) at 16:47

## 2017-01-01 RX ADMIN — METOPROLOL SUCCINATE 100 MG: 50 TABLET, EXTENDED RELEASE ORAL at 19:57

## 2017-01-01 RX ADMIN — Medication 10 ML: at 20:33

## 2017-01-01 RX ADMIN — BUMETANIDE 2 MG: 1 TABLET ORAL at 18:52

## 2017-01-01 RX ADMIN — DILTIAZEM HYDROCHLORIDE 240 MG: 120 CAPSULE, COATED, EXTENDED RELEASE ORAL at 09:26

## 2017-01-01 RX ADMIN — ALLOPURINOL 100 MG: 100 TABLET ORAL at 18:01

## 2017-01-01 RX ADMIN — METOPROLOL TARTRATE 25 MG: 25 TABLET, FILM COATED ORAL at 08:41

## 2017-01-01 RX ADMIN — Medication 1 TABLET: at 08:53

## 2017-01-01 RX ADMIN — KETOTIFEN FUMARATE 1 DROP: 0.25 SOLUTION/ DROPS OPHTHALMIC at 18:24

## 2017-01-01 RX ADMIN — APIXABAN 5 MG: 2.5 TABLET, FILM COATED ORAL at 09:11

## 2017-01-01 RX ADMIN — Medication 10 ML: at 08:25

## 2017-01-01 RX ADMIN — SENNOSIDES AND DOCUSATE SODIUM 1 TABLET: 8.6; 5 TABLET ORAL at 09:24

## 2017-01-01 RX ADMIN — TRAMADOL HYDROCHLORIDE 100 MG: 50 TABLET, FILM COATED ORAL at 20:57

## 2017-01-01 RX ADMIN — BUDESONIDE 0.5 MG: 0.5 SUSPENSION RESPIRATORY (INHALATION) at 07:30

## 2017-01-01 RX ADMIN — AMIODARONE HYDROCHLORIDE 200 MG: 200 TABLET ORAL at 20:46

## 2017-01-01 RX ADMIN — SULFAMETHOXAZOLE AND TRIMETHOPRIM 160 MG: 800; 160 TABLET ORAL at 16:51

## 2017-01-01 RX ADMIN — MICONAZOLE NITRATE: 20 POWDER TOPICAL at 09:03

## 2017-01-01 RX ADMIN — DILTIAZEM HYDROCHLORIDE 120 MG: 60 TABLET, FILM COATED ORAL at 08:33

## 2017-01-01 RX ADMIN — FAMOTIDINE 20 MG: 20 TABLET, FILM COATED ORAL at 17:47

## 2017-01-01 RX ADMIN — GLIPIZIDE 5 MG: 5 TABLET, FILM COATED, EXTENDED RELEASE ORAL at 07:43

## 2017-01-01 RX ADMIN — BUDESONIDE 0.5 MG: 0.5 SUSPENSION RESPIRATORY (INHALATION) at 21:56

## 2017-01-01 RX ADMIN — MICONAZOLE NITRATE: 20 POWDER TOPICAL at 21:38

## 2017-01-01 RX ADMIN — MICONAZOLE NITRATE 1 APPLICATION: 20 POWDER TOPICAL at 20:36

## 2017-01-01 RX ADMIN — APIXABAN 5 MG: 2.5 TABLET, FILM COATED ORAL at 19:46

## 2017-01-01 RX ADMIN — POTASSIUM CHLORIDE 10 MEQ: 750 TABLET, FILM COATED, EXTENDED RELEASE ORAL at 08:33

## 2017-01-01 RX ADMIN — STANDARDIZED SENNA CONCENTRATE 1 TABLET: 8.6 TABLET ORAL at 08:01

## 2017-01-01 RX ADMIN — HYDROCODONE BITARTRATE AND ACETAMINOPHEN 1 TABLET: 5; 325 TABLET ORAL at 11:49

## 2017-01-01 RX ADMIN — FAMOTIDINE 20 MG: 20 TABLET, FILM COATED ORAL at 09:52

## 2017-01-01 RX ADMIN — AMIODARONE HYDROCHLORIDE 200 MG: 200 TABLET ORAL at 09:26

## 2017-01-01 RX ADMIN — FAMOTIDINE 20 MG: 20 TABLET, FILM COATED ORAL at 08:35

## 2017-01-01 RX ADMIN — MICONAZOLE NITRATE: 20 POWDER TOPICAL at 09:40

## 2017-01-01 RX ADMIN — CONJUGATED ESTROGENS 0.75 APPLICATOR: 0.62 CREAM VAGINAL at 20:07

## 2017-01-01 RX ADMIN — ALLOPURINOL 100 MG: 100 TABLET ORAL at 20:18

## 2017-01-01 RX ADMIN — ISOSORBIDE DINITRATE 10 MG: 10 TABLET ORAL at 22:30

## 2017-01-01 RX ADMIN — INSULIN ASPART 3 UNITS: 100 INJECTION, SOLUTION INTRAVENOUS; SUBCUTANEOUS at 20:08

## 2017-01-01 RX ADMIN — AMIODARONE HYDROCHLORIDE 200 MG: 200 TABLET ORAL at 08:40

## 2017-01-01 RX ADMIN — IPRATROPIUM BROMIDE AND ALBUTEROL SULFATE 3 ML: .5; 3 SOLUTION RESPIRATORY (INHALATION) at 13:19

## 2017-01-01 RX ADMIN — POTASSIUM CHLORIDE 10 MEQ: 750 TABLET, FILM COATED, EXTENDED RELEASE ORAL at 09:03

## 2017-01-01 RX ADMIN — Medication 1 TABLET: at 08:21

## 2017-01-01 RX ADMIN — FAMOTIDINE 20 MG: 20 TABLET ORAL at 09:33

## 2017-01-01 RX ADMIN — Medication 150 MG: at 18:43

## 2017-01-01 RX ADMIN — ALLOPURINOL 100 MG: 100 TABLET ORAL at 07:39

## 2017-01-01 RX ADMIN — PANTOPRAZOLE SODIUM 40 MG: 40 INJECTION, POWDER, FOR SOLUTION INTRAVENOUS at 06:09

## 2017-01-01 RX ADMIN — TRAMADOL HYDROCHLORIDE 50 MG: 50 TABLET, FILM COATED ORAL at 20:45

## 2017-01-01 RX ADMIN — Medication 10 ML: at 08:56

## 2017-01-01 RX ADMIN — LEVETIRACETAM 500 MG: 500 TABLET, FILM COATED ORAL at 08:46

## 2017-01-01 RX ADMIN — POTASSIUM CHLORIDE 10 MEQ: 750 TABLET, FILM COATED, EXTENDED RELEASE ORAL at 09:29

## 2017-01-01 RX ADMIN — KETOTIFEN FUMARATE 1 DROP: 0.25 SOLUTION/ DROPS OPHTHALMIC at 09:59

## 2017-01-01 RX ADMIN — KETOTIFEN FUMARATE 1 DROP: 0.25 SOLUTION/ DROPS OPHTHALMIC at 10:38

## 2017-01-01 RX ADMIN — BUMETANIDE 2 MG: 0.25 INJECTION, SOLUTION INTRAMUSCULAR; INTRAVENOUS at 16:47

## 2017-01-01 RX ADMIN — METOPROLOL SUCCINATE 100 MG: 50 TABLET, EXTENDED RELEASE ORAL at 20:03

## 2017-01-01 RX ADMIN — STANDARDIZED SENNA CONCENTRATE 1 TABLET: 8.6 TABLET ORAL at 08:41

## 2017-01-01 RX ADMIN — LEVETIRACETAM 500 MG: 500 TABLET, FILM COATED ORAL at 10:07

## 2017-01-01 RX ADMIN — DOCUSATE SODIUM AND SENNOSIDES 1 TABLET: 8.6; 5 TABLET, FILM COATED ORAL at 09:58

## 2017-01-01 RX ADMIN — STANDARDIZED SENNA CONCENTRATE 1 TABLET: 8.6 TABLET ORAL at 17:54

## 2017-01-01 RX ADMIN — DILTIAZEM HYDROCHLORIDE 300 MG: 180 CAPSULE, COATED, EXTENDED RELEASE ORAL at 12:36

## 2017-01-01 RX ADMIN — Medication 1 TABLET: at 07:40

## 2017-01-01 RX ADMIN — KETOTIFEN FUMARATE 1 DROP: 0.25 SOLUTION/ DROPS OPHTHALMIC at 08:47

## 2017-01-01 RX ADMIN — MICONAZOLE NITRATE: 20 POWDER TOPICAL at 10:27

## 2017-01-01 RX ADMIN — CALCIUM CARBONATE 500 MG (1,250 MG)-VITAMIN D3 200 UNIT TABLET 500 MG: at 20:40

## 2017-01-01 RX ADMIN — DILTIAZEM HYDROCHLORIDE 240 MG: 120 CAPSULE, COATED, EXTENDED RELEASE ORAL at 08:16

## 2017-01-01 RX ADMIN — ACETAMINOPHEN 650 MG: 325 TABLET, FILM COATED ORAL at 20:01

## 2017-01-01 RX ADMIN — IPRATROPIUM BROMIDE AND ALBUTEROL SULFATE 3 ML: .5; 3 SOLUTION RESPIRATORY (INHALATION) at 14:15

## 2017-01-01 RX ADMIN — FAMOTIDINE 20 MG: 20 TABLET, FILM COATED ORAL at 17:11

## 2017-01-01 RX ADMIN — FAMOTIDINE 20 MG: 20 TABLET, FILM COATED ORAL at 08:08

## 2017-01-01 RX ADMIN — METOPROLOL SUCCINATE 100 MG: 50 TABLET, EXTENDED RELEASE ORAL at 10:16

## 2017-01-01 RX ADMIN — SERTRALINE HYDROCHLORIDE 75 MG: 50 TABLET ORAL at 21:10

## 2017-01-01 RX ADMIN — CONJUGATED ESTROGENS 0.75 APPLICATOR: 0.62 CREAM VAGINAL at 20:21

## 2017-01-01 RX ADMIN — OFLOXACIN 50000 UNITS: 300 TABLET, COATED ORAL at 13:20

## 2017-01-01 RX ADMIN — SENNOSIDES AND DOCUSATE SODIUM 1 TABLET: 8.6; 5 TABLET ORAL at 20:18

## 2017-01-01 RX ADMIN — FAMOTIDINE 20 MG: 20 TABLET, FILM COATED ORAL at 08:16

## 2017-01-01 RX ADMIN — DONEPEZIL HYDROCHLORIDE 10 MG: 5 TABLET ORAL at 20:24

## 2017-01-01 RX ADMIN — ALLOPURINOL 100 MG: 100 TABLET ORAL at 20:24

## 2017-01-01 RX ADMIN — LINAGLIPTIN 5 MG: 5 TABLET, FILM COATED ORAL at 08:51

## 2017-01-01 RX ADMIN — METOPROLOL SUCCINATE 100 MG: 50 TABLET, EXTENDED RELEASE ORAL at 09:16

## 2017-01-01 RX ADMIN — AMIODARONE HYDROCHLORIDE 200 MG: 200 TABLET ORAL at 09:02

## 2017-01-01 RX ADMIN — FAMOTIDINE 40 MG: 20 TABLET ORAL at 21:14

## 2017-01-01 RX ADMIN — BUDESONIDE 0.5 MG: 0.5 SUSPENSION RESPIRATORY (INHALATION) at 23:31

## 2017-01-01 RX ADMIN — METOPROLOL TARTRATE 25 MG: 25 TABLET ORAL at 10:26

## 2017-01-01 RX ADMIN — LEVOFLOXACIN 750 MG: 5 INJECTION, SOLUTION INTRAVENOUS at 10:01

## 2017-01-01 RX ADMIN — STANDARDIZED SENNA CONCENTRATE 1 TABLET: 8.6 TABLET ORAL at 09:12

## 2017-01-01 RX ADMIN — MEGESTROL ACETATE 200 MG: 40 SUSPENSION ORAL at 08:36

## 2017-01-01 RX ADMIN — TRAMADOL HYDROCHLORIDE 50 MG: 50 TABLET, FILM COATED ORAL at 20:11

## 2017-01-01 RX ADMIN — ISOSORBIDE DINITRATE 10 MG: 10 TABLET ORAL at 21:12

## 2017-01-01 RX ADMIN — KETOTIFEN FUMARATE 1 DROP: 0.35 SOLUTION/ DROPS OPHTHALMIC at 18:48

## 2017-01-01 RX ADMIN — IRON SUCROSE 200 MG: 20 INJECTION, SOLUTION INTRAVENOUS at 11:28

## 2017-01-01 RX ADMIN — APIXABAN 5 MG: 2.5 TABLET, FILM COATED ORAL at 07:59

## 2017-01-01 RX ADMIN — MICONAZOLE NITRATE: 20 POWDER TOPICAL at 09:15

## 2017-01-01 RX ADMIN — MICONAZOLE NITRATE: 20 POWDER TOPICAL at 09:46

## 2017-01-01 RX ADMIN — FUROSEMIDE 40 MG: 40 TABLET ORAL at 09:18

## 2017-01-01 RX ADMIN — KETOTIFEN FUMARATE 1 DROP: 0.35 SOLUTION/ DROPS OPHTHALMIC at 08:53

## 2017-01-01 RX ADMIN — FAMOTIDINE 20 MG: 20 TABLET, FILM COATED ORAL at 08:31

## 2017-01-01 RX ADMIN — LEVOFLOXACIN 750 MG: 750 TABLET, FILM COATED ORAL at 20:43

## 2017-01-01 RX ADMIN — MICONAZOLE NITRATE: 20 POWDER TOPICAL at 08:57

## 2017-01-01 RX ADMIN — DONEPEZIL HYDROCHLORIDE 10 MG: 5 TABLET, FILM COATED ORAL at 20:27

## 2017-01-01 RX ADMIN — FAMOTIDINE 20 MG: 20 TABLET, FILM COATED ORAL at 17:20

## 2017-01-01 RX ADMIN — MEGESTROL ACETATE 200 MG: 40 SUSPENSION ORAL at 17:14

## 2017-01-01 RX ADMIN — BUDESONIDE 0.5 MG: 0.5 SUSPENSION RESPIRATORY (INHALATION) at 21:04

## 2017-01-01 RX ADMIN — SENNOSIDES AND DOCUSATE SODIUM 1 TABLET: 8.6; 5 TABLET ORAL at 21:32

## 2017-01-01 RX ADMIN — LEVETIRACETAM 500 MG: 500 TABLET, FILM COATED ORAL at 21:00

## 2017-01-01 RX ADMIN — MIRTAZAPINE 15 MG: 15 TABLET, FILM COATED ORAL at 20:44

## 2017-01-01 RX ADMIN — FAMOTIDINE 40 MG: 20 TABLET ORAL at 20:52

## 2017-01-01 RX ADMIN — DILTIAZEM HYDROCHLORIDE 240 MG: 120 CAPSULE, COATED, EXTENDED RELEASE ORAL at 09:47

## 2017-01-01 RX ADMIN — APIXABAN 5 MG: 2.5 TABLET, FILM COATED ORAL at 20:22

## 2017-01-01 RX ADMIN — AMIODARONE HYDROCHLORIDE 200 MG: 200 TABLET ORAL at 09:27

## 2017-01-01 RX ADMIN — SENNOSIDES AND DOCUSATE SODIUM 1 TABLET: 8.6; 5 TABLET ORAL at 22:30

## 2017-01-01 RX ADMIN — Medication 1 CAPSULE: at 09:37

## 2017-01-01 RX ADMIN — MICONAZOLE NITRATE: 20 POWDER TOPICAL at 20:09

## 2017-01-01 RX ADMIN — Medication 150 MG: at 18:47

## 2017-01-01 RX ADMIN — SENNOSIDES AND DOCUSATE SODIUM 1 TABLET: 8.6; 5 TABLET ORAL at 08:51

## 2017-01-01 RX ADMIN — AMIODARONE HYDROCHLORIDE 200 MG: 200 TABLET ORAL at 21:44

## 2017-01-01 RX ADMIN — IPRATROPIUM BROMIDE AND ALBUTEROL SULFATE 3 ML: .5; 3 SOLUTION RESPIRATORY (INHALATION) at 08:01

## 2017-01-01 RX ADMIN — BUDESONIDE 0.5 MG: 0.5 SUSPENSION RESPIRATORY (INHALATION) at 09:36

## 2017-01-01 RX ADMIN — CLINDAMYCIN HYDROCHLORIDE 450 MG: 150 CAPSULE ORAL at 08:31

## 2017-01-01 RX ADMIN — LEVOTHYROXINE SODIUM 150 MCG: 150 TABLET ORAL at 05:38

## 2017-01-01 RX ADMIN — Medication 10 ML: at 20:28

## 2017-01-01 RX ADMIN — LEVOTHYROXINE SODIUM 150 MCG: 150 TABLET ORAL at 04:40

## 2017-01-01 RX ADMIN — LEVETIRACETAM 500 MG: 500 TABLET, FILM COATED ORAL at 21:04

## 2017-01-01 RX ADMIN — GUAIFENESIN 1200 MG: 600 TABLET, EXTENDED RELEASE ORAL at 08:10

## 2017-01-01 RX ADMIN — DILTIAZEM HYDROCHLORIDE 240 MG: 120 CAPSULE, COATED, EXTENDED RELEASE ORAL at 09:42

## 2017-01-01 RX ADMIN — APIXABAN 5 MG: 2.5 TABLET, FILM COATED ORAL at 09:04

## 2017-01-01 RX ADMIN — STANDARDIZED SENNA CONCENTRATE 1 TABLET: 8.6 TABLET ORAL at 07:59

## 2017-01-01 RX ADMIN — INSULIN ASPART 2 UNITS: 100 INJECTION, SOLUTION INTRAVENOUS; SUBCUTANEOUS at 17:26

## 2017-01-01 RX ADMIN — INSULIN ASPART 3 UNITS: 100 INJECTION, SOLUTION INTRAVENOUS; SUBCUTANEOUS at 20:32

## 2017-01-01 RX ADMIN — APIXABAN 5 MG: 2.5 TABLET, FILM COATED ORAL at 08:51

## 2017-01-01 RX ADMIN — DONEPEZIL HYDROCHLORIDE 10 MG: 5 TABLET ORAL at 20:16

## 2017-01-01 RX ADMIN — IPRATROPIUM BROMIDE AND ALBUTEROL SULFATE 3 ML: .5; 3 SOLUTION RESPIRATORY (INHALATION) at 13:38

## 2017-01-01 RX ADMIN — IPRATROPIUM BROMIDE AND ALBUTEROL SULFATE 3 ML: .5; 3 SOLUTION RESPIRATORY (INHALATION) at 07:42

## 2017-01-01 RX ADMIN — AMIODARONE HYDROCHLORIDE 200 MG: 200 TABLET ORAL at 17:34

## 2017-01-01 RX ADMIN — FAMOTIDINE 40 MG: 20 TABLET ORAL at 20:41

## 2017-01-01 RX ADMIN — LEVETIRACETAM 500 MG: 500 TABLET, FILM COATED ORAL at 09:58

## 2017-01-01 RX ADMIN — Medication 1 TABLET: at 17:01

## 2017-01-01 RX ADMIN — MEGESTROL ACETATE 200 MG: 40 SUSPENSION ORAL at 08:19

## 2017-01-01 RX ADMIN — IRON SUCROSE 200 MG: 20 INJECTION, SOLUTION INTRAVENOUS at 10:28

## 2017-01-01 RX ADMIN — SENNOSIDES AND DOCUSATE SODIUM 1 TABLET: 8.6; 5 TABLET ORAL at 22:34

## 2017-01-01 RX ADMIN — SERTRALINE 100 MG: 50 TABLET, FILM COATED ORAL at 20:26

## 2017-01-01 RX ADMIN — LINAGLIPTIN 5 MG: 5 TABLET, FILM COATED ORAL at 08:31

## 2017-01-01 RX ADMIN — LEVETIRACETAM 500 MG: 500 TABLET, FILM COATED ORAL at 20:42

## 2017-01-01 RX ADMIN — DILTIAZEM HYDROCHLORIDE 300 MG: 180 CAPSULE, COATED, EXTENDED RELEASE ORAL at 11:38

## 2017-01-01 RX ADMIN — POTASSIUM CHLORIDE 20 MEQ: 1500 TABLET, EXTENDED RELEASE ORAL at 10:42

## 2017-01-01 RX ADMIN — INSULIN ASPART 3 UNITS: 100 INJECTION, SOLUTION INTRAVENOUS; SUBCUTANEOUS at 20:27

## 2017-01-01 RX ADMIN — IPRATROPIUM BROMIDE AND ALBUTEROL SULFATE 3 ML: .5; 3 SOLUTION RESPIRATORY (INHALATION) at 13:00

## 2017-01-01 RX ADMIN — POTASSIUM CHLORIDE 40 MEQ: 1500 TABLET, EXTENDED RELEASE ORAL at 08:33

## 2017-01-01 RX ADMIN — MICONAZOLE NITRATE: 20 POWDER TOPICAL at 20:05

## 2017-01-01 RX ADMIN — IPRATROPIUM BROMIDE AND ALBUTEROL SULFATE 3 ML: .5; 3 SOLUTION RESPIRATORY (INHALATION) at 08:58

## 2017-01-01 RX ADMIN — LEVETIRACETAM 500 MG: 500 TABLET, FILM COATED ORAL at 07:59

## 2017-01-01 RX ADMIN — MICONAZOLE NITRATE: 20 POWDER TOPICAL at 21:51

## 2017-01-01 RX ADMIN — Medication 1 TABLET: at 09:01

## 2017-01-01 RX ADMIN — METOPROLOL SUCCINATE 100 MG: 50 TABLET, EXTENDED RELEASE ORAL at 10:18

## 2017-01-01 RX ADMIN — LEVETIRACETAM 500 MG: 500 TABLET, FILM COATED ORAL at 19:58

## 2017-01-01 RX ADMIN — KETOTIFEN FUMARATE 1 DROP: 0.25 SOLUTION/ DROPS OPHTHALMIC at 10:31

## 2017-01-01 RX ADMIN — POTASSIUM CHLORIDE 20 MEQ: 1500 TABLET, EXTENDED RELEASE ORAL at 08:45

## 2017-01-01 RX ADMIN — SENNOSIDES AND DOCUSATE SODIUM 1 TABLET: 8.6; 5 TABLET ORAL at 21:09

## 2017-01-01 RX ADMIN — KETOTIFEN FUMARATE 1 DROP: 0.35 SOLUTION/ DROPS OPHTHALMIC at 08:44

## 2017-01-01 RX ADMIN — APIXABAN 5 MG: 2.5 TABLET, FILM COATED ORAL at 11:41

## 2017-01-01 RX ADMIN — DILTIAZEM HYDROCHLORIDE 120 MG: 60 TABLET, FILM COATED ORAL at 19:23

## 2017-01-01 RX ADMIN — STANDARDIZED SENNA CONCENTRATE 1 TABLET: 8.6 TABLET ORAL at 08:21

## 2017-01-01 RX ADMIN — Medication 10 ML: at 10:00

## 2017-01-01 RX ADMIN — SENNOSIDES AND DOCUSATE SODIUM 1 TABLET: 8.6; 5 TABLET ORAL at 08:17

## 2017-01-01 RX ADMIN — CONJUGATED ESTROGENS 0.75 APPLICATOR: 0.62 CREAM VAGINAL at 19:56

## 2017-01-01 RX ADMIN — BUDESONIDE 0.5 MG: 0.5 SUSPENSION RESPIRATORY (INHALATION) at 20:04

## 2017-01-01 RX ADMIN — APIXABAN 5 MG: 2.5 TABLET, FILM COATED ORAL at 21:36

## 2017-01-01 RX ADMIN — MEGESTROL ACETATE 200 MG: 40 SUSPENSION ORAL at 07:43

## 2017-01-01 RX ADMIN — METOPROLOL TARTRATE 100 MG: 25 TABLET, FILM COATED ORAL at 12:36

## 2017-01-01 RX ADMIN — LEVETIRACETAM 500 MG: 500 TABLET, FILM COATED ORAL at 21:16

## 2017-01-01 RX ADMIN — LEVETIRACETAM 500 MG: 500 TABLET, FILM COATED ORAL at 20:07

## 2017-01-01 RX ADMIN — GUAIFENESIN 1200 MG: 600 TABLET, EXTENDED RELEASE ORAL at 21:57

## 2017-01-01 RX ADMIN — SERTRALINE 100 MG: 50 TABLET, FILM COATED ORAL at 21:03

## 2017-01-01 RX ADMIN — GLIPIZIDE 5 MG: 5 TABLET ORAL at 16:52

## 2017-01-01 RX ADMIN — Medication 150 MG: at 09:11

## 2017-01-01 RX ADMIN — Medication 1 CAPSULE: at 08:57

## 2017-01-01 RX ADMIN — SENNOSIDES AND DOCUSATE SODIUM 1 TABLET: 8.6; 5 TABLET ORAL at 10:06

## 2017-01-01 RX ADMIN — STANDARDIZED SENNA CONCENTRATE 1 TABLET: 8.6 TABLET ORAL at 08:12

## 2017-01-01 RX ADMIN — LINAGLIPTIN 5 MG: 5 TABLET, FILM COATED ORAL at 08:35

## 2017-01-01 RX ADMIN — DILTIAZEM HYDROCHLORIDE 300 MG: 180 CAPSULE, COATED, EXTENDED RELEASE ORAL at 11:51

## 2017-01-01 RX ADMIN — FAMOTIDINE 20 MG: 20 TABLET, FILM COATED ORAL at 18:01

## 2017-01-01 RX ADMIN — DILTIAZEM HYDROCHLORIDE 120 MG: 60 TABLET, FILM COATED ORAL at 17:05

## 2017-01-01 RX ADMIN — METOPROLOL TARTRATE 100 MG: 25 TABLET, FILM COATED ORAL at 21:34

## 2017-01-01 RX ADMIN — FUROSEMIDE 40 MG: 40 TABLET ORAL at 12:49

## 2017-01-01 RX ADMIN — MICONAZOLE NITRATE: 20 POWDER TOPICAL at 23:34

## 2017-01-01 RX ADMIN — METOPROLOL SUCCINATE 100 MG: 50 TABLET, EXTENDED RELEASE ORAL at 23:16

## 2017-01-01 RX ADMIN — POTASSIUM CHLORIDE 10 MEQ: 750 TABLET, FILM COATED, EXTENDED RELEASE ORAL at 09:45

## 2017-01-01 RX ADMIN — SENNOSIDES AND DOCUSATE SODIUM 1 TABLET: 8.6; 5 TABLET ORAL at 21:53

## 2017-01-01 RX ADMIN — INSULIN ASPART 3 UNITS: 100 INJECTION, SOLUTION INTRAVENOUS; SUBCUTANEOUS at 20:28

## 2017-01-01 RX ADMIN — METOPROLOL TARTRATE 100 MG: 50 TABLET, FILM COATED ORAL at 09:58

## 2017-01-01 RX ADMIN — SENNOSIDES AND DOCUSATE SODIUM 1 TABLET: 8.6; 5 TABLET ORAL at 08:08

## 2017-01-01 RX ADMIN — SERTRALINE 100 MG: 50 TABLET, FILM COATED ORAL at 22:09

## 2017-01-01 RX ADMIN — SERTRALINE 100 MG: 50 TABLET, FILM COATED ORAL at 20:57

## 2017-01-01 RX ADMIN — LEVETIRACETAM 500 MG: 500 TABLET, FILM COATED ORAL at 22:09

## 2017-01-01 RX ADMIN — ALLOPURINOL 100 MG: 100 TABLET ORAL at 21:50

## 2017-01-01 RX ADMIN — SENNOSIDES AND DOCUSATE SODIUM 1 TABLET: 8.6; 5 TABLET ORAL at 08:01

## 2017-01-01 RX ADMIN — FUROSEMIDE 40 MG: 40 TABLET ORAL at 07:41

## 2017-01-01 RX ADMIN — IPRATROPIUM BROMIDE AND ALBUTEROL SULFATE 3 ML: .5; 3 SOLUTION RESPIRATORY (INHALATION) at 19:15

## 2017-01-01 RX ADMIN — METOPROLOL TARTRATE 75 MG: 25 TABLET, FILM COATED ORAL at 09:36

## 2017-01-01 RX ADMIN — AMIODARONE HYDROCHLORIDE 200 MG: 200 TABLET ORAL at 09:12

## 2017-01-01 RX ADMIN — METOPROLOL TARTRATE 50 MG: 50 TABLET, FILM COATED ORAL at 17:20

## 2017-01-01 RX ADMIN — MEGESTROL ACETATE 200 MG: 40 SUSPENSION ORAL at 19:04

## 2017-01-01 RX ADMIN — IPRATROPIUM BROMIDE AND ALBUTEROL SULFATE 3 ML: .5; 3 SOLUTION RESPIRATORY (INHALATION) at 12:38

## 2017-01-01 RX ADMIN — APIXABAN 5 MG: 2.5 TABLET, FILM COATED ORAL at 10:45

## 2017-01-01 RX ADMIN — IPRATROPIUM BROMIDE AND ALBUTEROL SULFATE 3 ML: .5; 3 SOLUTION RESPIRATORY (INHALATION) at 09:15

## 2017-01-01 RX ADMIN — IPRATROPIUM BROMIDE AND ALBUTEROL SULFATE 3 ML: .5; 3 SOLUTION RESPIRATORY (INHALATION) at 12:08

## 2017-01-01 RX ADMIN — KETOTIFEN FUMARATE 1 DROP: 0.25 SOLUTION/ DROPS OPHTHALMIC at 18:08

## 2017-01-01 RX ADMIN — FAMOTIDINE 40 MG: 20 TABLET, FILM COATED ORAL at 17:51

## 2017-01-01 RX ADMIN — POTASSIUM CHLORIDE 10 MEQ: 750 TABLET, FILM COATED, EXTENDED RELEASE ORAL at 08:38

## 2017-01-01 RX ADMIN — ISOSORBIDE DINITRATE 10 MG: 10 TABLET ORAL at 08:43

## 2017-01-01 RX ADMIN — CONJUGATED ESTROGENS 0.75 APPLICATION: 0.62 CREAM VAGINAL at 20:09

## 2017-01-01 RX ADMIN — DIPHENHYDRAMINE HYDROCHLORIDE 25 MG: 25 CAPSULE ORAL at 21:18

## 2017-01-01 RX ADMIN — DIGOXIN 250 MCG: 0.25 INJECTION INTRAMUSCULAR; INTRAVENOUS at 13:46

## 2017-01-01 RX ADMIN — TRAMADOL HYDROCHLORIDE 100 MG: 50 TABLET, FILM COATED ORAL at 00:59

## 2017-01-01 RX ADMIN — IPRATROPIUM BROMIDE AND ALBUTEROL SULFATE 3 ML: .5; 3 SOLUTION RESPIRATORY (INHALATION) at 21:38

## 2017-01-01 RX ADMIN — ISOSORBIDE DINITRATE 10 MG: 10 TABLET ORAL at 21:39

## 2017-01-01 RX ADMIN — AMIODARONE HYDROCHLORIDE 200 MG: 200 TABLET ORAL at 09:54

## 2017-01-01 RX ADMIN — TRAMADOL HYDROCHLORIDE 100 MG: 50 TABLET, FILM COATED ORAL at 21:17

## 2017-01-01 RX ADMIN — FAMOTIDINE 40 MG: 20 TABLET, FILM COATED ORAL at 21:16

## 2017-01-01 RX ADMIN — IPRATROPIUM BROMIDE AND ALBUTEROL SULFATE 3 ML: .5; 3 SOLUTION RESPIRATORY (INHALATION) at 21:02

## 2017-01-01 RX ADMIN — IPRATROPIUM BROMIDE AND ALBUTEROL SULFATE 3 ML: .5; 3 SOLUTION RESPIRATORY (INHALATION) at 10:08

## 2017-01-01 RX ADMIN — Medication 1 TABLET: at 10:54

## 2017-01-01 RX ADMIN — FAMOTIDINE 40 MG: 20 TABLET, FILM COATED ORAL at 09:11

## 2017-01-01 RX ADMIN — TRAMADOL HYDROCHLORIDE 100 MG: 50 TABLET, FILM COATED ORAL at 22:48

## 2017-01-01 RX ADMIN — CONJUGATED ESTROGENS 0.75 APPLICATION: 0.62 CREAM VAGINAL at 21:40

## 2017-01-01 RX ADMIN — FUROSEMIDE 40 MG: 40 TABLET ORAL at 09:05

## 2017-01-01 RX ADMIN — Medication 1 TABLET: at 17:44

## 2017-01-01 RX ADMIN — Medication 10 ML: at 08:55

## 2017-01-01 RX ADMIN — METOPROLOL TARTRATE 75 MG: 25 TABLET, FILM COATED ORAL at 17:15

## 2017-01-01 RX ADMIN — METOPROLOL TARTRATE 100 MG: 25 TABLET, FILM COATED ORAL at 20:18

## 2017-01-01 RX ADMIN — APIXABAN 5 MG: 2.5 TABLET, FILM COATED ORAL at 08:57

## 2017-01-01 RX ADMIN — LINAGLIPTIN 5 MG: 5 TABLET, FILM COATED ORAL at 09:53

## 2017-01-01 RX ADMIN — LEVOFLOXACIN 750 MG: 750 TABLET, FILM COATED ORAL at 22:11

## 2017-01-01 RX ADMIN — APIXABAN 5 MG: 2.5 TABLET, FILM COATED ORAL at 10:15

## 2017-01-01 RX ADMIN — FAMOTIDINE 20 MG: 20 TABLET, FILM COATED ORAL at 08:59

## 2017-01-01 RX ADMIN — LINAGLIPTIN 5 MG: 5 TABLET, FILM COATED ORAL at 10:09

## 2017-01-01 RX ADMIN — DOXAZOSIN MESYLATE 2 MG: 2 TABLET ORAL at 22:16

## 2017-01-01 RX ADMIN — LEVOTHYROXINE SODIUM 150 MCG: 150 TABLET ORAL at 05:05

## 2017-01-01 RX ADMIN — Medication 1 TABLET: at 17:48

## 2017-01-01 RX ADMIN — GUAIFENESIN 1200 MG: 600 TABLET, EXTENDED RELEASE ORAL at 18:07

## 2017-01-01 RX ADMIN — APIXABAN 5 MG: 2.5 TABLET, FILM COATED ORAL at 09:48

## 2017-01-01 RX ADMIN — INSULIN ASPART 2 UNITS: 100 INJECTION, SOLUTION INTRAVENOUS; SUBCUTANEOUS at 08:33

## 2017-01-01 RX ADMIN — LEVETIRACETAM 500 MG: 500 TABLET, FILM COATED ORAL at 16:59

## 2017-01-01 RX ADMIN — LINAGLIPTIN 5 MG: 5 TABLET, FILM COATED ORAL at 08:37

## 2017-01-01 RX ADMIN — LEVETIRACETAM 500 MG: 500 TABLET, FILM COATED ORAL at 17:40

## 2017-01-01 RX ADMIN — GUAIFENESIN 1200 MG: 600 TABLET, EXTENDED RELEASE ORAL at 17:01

## 2017-01-01 RX ADMIN — KETOTIFEN FUMARATE 1 DROP: 0.35 SOLUTION/ DROPS OPHTHALMIC at 17:07

## 2017-01-01 RX ADMIN — SENNOSIDES AND DOCUSATE SODIUM 1 TABLET: 8.6; 5 TABLET ORAL at 09:39

## 2017-01-01 RX ADMIN — POTASSIUM CHLORIDE 20 MEQ: 1500 TABLET, EXTENDED RELEASE ORAL at 07:47

## 2017-01-01 RX ADMIN — DILTIAZEM HYDROCHLORIDE 120 MG: 60 TABLET, FILM COATED ORAL at 09:31

## 2017-01-01 RX ADMIN — GUAIFENESIN 1200 MG: 600 TABLET, EXTENDED RELEASE ORAL at 18:09

## 2017-01-01 RX ADMIN — FAMOTIDINE 20 MG: 20 TABLET ORAL at 08:39

## 2017-01-01 RX ADMIN — ISOSORBIDE DINITRATE 10 MG: 10 TABLET ORAL at 21:10

## 2017-01-01 RX ADMIN — FAMOTIDINE 20 MG: 20 TABLET ORAL at 17:30

## 2017-01-01 RX ADMIN — Medication 150 MG: at 17:20

## 2017-01-01 RX ADMIN — GUAIFENESIN 1200 MG: 600 TABLET, EXTENDED RELEASE ORAL at 09:21

## 2017-01-01 RX ADMIN — POTASSIUM CHLORIDE 20 MEQ: 1500 TABLET, EXTENDED RELEASE ORAL at 07:51

## 2017-01-01 RX ADMIN — ISOSORBIDE DINITRATE 10 MG: 10 TABLET ORAL at 09:34

## 2017-01-01 RX ADMIN — IPRATROPIUM BROMIDE AND ALBUTEROL SULFATE 3 ML: .5; 3 SOLUTION RESPIRATORY (INHALATION) at 20:31

## 2017-01-01 RX ADMIN — LEVOTHYROXINE SODIUM 150 MCG: 150 TABLET ORAL at 05:10

## 2017-01-01 RX ADMIN — FUROSEMIDE 40 MG: 40 TABLET ORAL at 09:34

## 2017-01-01 RX ADMIN — GUAIFENESIN 1200 MG: 600 TABLET, EXTENDED RELEASE ORAL at 16:34

## 2017-01-01 RX ADMIN — POTASSIUM CHLORIDE 20 MEQ: 1500 TABLET, EXTENDED RELEASE ORAL at 07:54

## 2017-01-01 RX ADMIN — Medication 1 CAPSULE: at 08:24

## 2017-01-01 RX ADMIN — KETOTIFEN FUMARATE 1 DROP: 0.25 SOLUTION/ DROPS OPHTHALMIC at 08:39

## 2017-01-01 RX ADMIN — Medication 1 TABLET: at 07:54

## 2017-01-01 RX ADMIN — FAMOTIDINE 20 MG: 20 TABLET, FILM COATED ORAL at 18:32

## 2017-01-01 RX ADMIN — DILTIAZEM HYDROCHLORIDE 120 MG: 60 TABLET, FILM COATED ORAL at 17:31

## 2017-01-01 RX ADMIN — BUDESONIDE 0.5 MG: 0.5 SUSPENSION RESPIRATORY (INHALATION) at 09:03

## 2017-01-01 RX ADMIN — APIXABAN 5 MG: 2.5 TABLET, FILM COATED ORAL at 10:20

## 2017-01-01 RX ADMIN — Medication 1 TABLET: at 18:26

## 2017-01-01 RX ADMIN — APIXABAN 5 MG: 2.5 TABLET, FILM COATED ORAL at 23:00

## 2017-01-01 RX ADMIN — LEVETIRACETAM 500 MG: 500 TABLET, FILM COATED ORAL at 10:17

## 2017-01-01 RX ADMIN — KETOTIFEN FUMARATE 1 DROP: 0.25 SOLUTION/ DROPS OPHTHALMIC at 17:15

## 2017-01-01 RX ADMIN — Medication 150 MG: at 07:28

## 2017-01-01 RX ADMIN — MEGESTROL ACETATE 200 MG: 40 SUSPENSION ORAL at 17:04

## 2017-01-01 RX ADMIN — CONJUGATED ESTROGENS 0.75 APPLICATOR: 0.62 CREAM VAGINAL at 20:18

## 2017-01-01 RX ADMIN — IPRATROPIUM BROMIDE AND ALBUTEROL SULFATE 3 ML: .5; 3 SOLUTION RESPIRATORY (INHALATION) at 03:09

## 2017-01-01 RX ADMIN — STANDARDIZED SENNA CONCENTRATE 1 TABLET: 8.6 TABLET ORAL at 08:51

## 2017-01-01 RX ADMIN — APIXABAN 5 MG: 2.5 TABLET, FILM COATED ORAL at 20:10

## 2017-01-01 RX ADMIN — INSULIN ASPART 2 UNITS: 100 INJECTION, SOLUTION INTRAVENOUS; SUBCUTANEOUS at 18:26

## 2017-01-01 RX ADMIN — DONEPEZIL HYDROCHLORIDE 10 MG: 5 TABLET, FILM COATED ORAL at 20:33

## 2017-01-01 RX ADMIN — ALLOPURINOL 100 MG: 100 TABLET ORAL at 20:22

## 2017-01-01 RX ADMIN — AMIODARONE HYDROCHLORIDE 200 MG: 200 TABLET ORAL at 20:55

## 2017-01-01 RX ADMIN — LINAGLIPTIN 5 MG: 5 TABLET, FILM COATED ORAL at 08:10

## 2017-01-01 RX ADMIN — Medication 1 CAPSULE: at 08:44

## 2017-01-01 RX ADMIN — IPRATROPIUM BROMIDE AND ALBUTEROL SULFATE 3 ML: .5; 3 SOLUTION RESPIRATORY (INHALATION) at 23:21

## 2017-01-01 RX ADMIN — Medication 1 TABLET: at 10:09

## 2017-01-01 RX ADMIN — Medication 150 MG: at 17:00

## 2017-01-01 RX ADMIN — DILTIAZEM HYDROCHLORIDE 240 MG: 120 CAPSULE, COATED, EXTENDED RELEASE ORAL at 08:05

## 2017-01-01 RX ADMIN — LORAZEPAM 0.5 MG: 0.5 TABLET ORAL at 20:45

## 2017-01-01 RX ADMIN — Medication 10 ML: at 22:10

## 2017-01-01 RX ADMIN — FAMOTIDINE 20 MG: 20 TABLET, FILM COATED ORAL at 17:26

## 2017-01-01 RX ADMIN — METOPROLOL TARTRATE 25 MG: 25 TABLET, FILM COATED ORAL at 09:32

## 2017-01-01 RX ADMIN — KETOTIFEN FUMARATE 1 DROP: 0.25 SOLUTION/ DROPS OPHTHALMIC at 08:27

## 2017-01-01 RX ADMIN — LINAGLIPTIN 5 MG: 5 TABLET, FILM COATED ORAL at 08:56

## 2017-01-01 RX ADMIN — OFLOXACIN 50000 UNITS: 300 TABLET, COATED ORAL at 07:42

## 2017-01-01 RX ADMIN — FAMOTIDINE 40 MG: 20 TABLET ORAL at 22:19

## 2017-01-01 RX ADMIN — BUDESONIDE 0.5 MG: 0.5 SUSPENSION RESPIRATORY (INHALATION) at 23:04

## 2017-01-01 RX ADMIN — INSULIN ASPART 2 UNITS: 100 INJECTION, SOLUTION INTRAVENOUS; SUBCUTANEOUS at 08:29

## 2017-01-01 RX ADMIN — Medication 1 TABLET: at 17:36

## 2017-01-01 RX ADMIN — TRAMADOL HYDROCHLORIDE 100 MG: 50 TABLET, FILM COATED ORAL at 22:22

## 2017-01-01 RX ADMIN — GUAIFENESIN 1200 MG: 600 TABLET, EXTENDED RELEASE ORAL at 18:37

## 2017-01-01 RX ADMIN — LEVETIRACETAM 500 MG: 500 TABLET, FILM COATED ORAL at 07:42

## 2017-01-01 RX ADMIN — APIXABAN 5 MG: 2.5 TABLET, FILM COATED ORAL at 20:17

## 2017-01-01 RX ADMIN — METOPROLOL SUCCINATE 100 MG: 50 TABLET, EXTENDED RELEASE ORAL at 21:40

## 2017-01-01 RX ADMIN — FUROSEMIDE 40 MG: 40 TABLET ORAL at 08:01

## 2017-01-01 RX ADMIN — METOPROLOL TARTRATE 75 MG: 25 TABLET, FILM COATED ORAL at 08:46

## 2017-01-01 RX ADMIN — BUDESONIDE 0.5 MG: 0.5 SUSPENSION RESPIRATORY (INHALATION) at 08:51

## 2017-01-01 RX ADMIN — BUDESONIDE 0.5 MG: 0.5 SUSPENSION RESPIRATORY (INHALATION) at 09:10

## 2017-01-01 RX ADMIN — FUROSEMIDE 40 MG: 40 TABLET ORAL at 09:23

## 2017-01-01 RX ADMIN — DONEPEZIL HYDROCHLORIDE 10 MG: 5 TABLET, FILM COATED ORAL at 19:45

## 2017-01-01 RX ADMIN — KETOTIFEN FUMARATE 1 DROP: 0.25 SOLUTION/ DROPS OPHTHALMIC at 18:03

## 2017-01-01 RX ADMIN — POTASSIUM CHLORIDE 20 MEQ: 10 TABLET, EXTENDED RELEASE ORAL at 08:26

## 2017-01-01 RX ADMIN — STANDARDIZED SENNA CONCENTRATE 1 TABLET: 8.6 TABLET ORAL at 17:11

## 2017-01-01 RX ADMIN — Medication 150 MG: at 16:48

## 2017-01-01 RX ADMIN — DONEPEZIL HYDROCHLORIDE 10 MG: 5 TABLET ORAL at 20:51

## 2017-01-01 RX ADMIN — METOPROLOL TARTRATE 25 MG: 25 TABLET, FILM COATED ORAL at 10:15

## 2017-01-01 RX ADMIN — Medication 150 MG: at 17:04

## 2017-01-01 RX ADMIN — MEGESTROL ACETATE 200 MG: 40 SUSPENSION ORAL at 17:26

## 2017-01-01 RX ADMIN — DILTIAZEM HYDROCHLORIDE 120 MG: 60 TABLET, FILM COATED ORAL at 17:32

## 2017-01-01 RX ADMIN — METOPROLOL SUCCINATE 100 MG: 50 TABLET, EXTENDED RELEASE ORAL at 21:29

## 2017-01-01 RX ADMIN — ISOSORBIDE DINITRATE 10 MG: 10 TABLET ORAL at 08:56

## 2017-01-01 RX ADMIN — SERTRALINE 100 MG: 50 TABLET, FILM COATED ORAL at 22:27

## 2017-01-01 RX ADMIN — GUAIFENESIN 1200 MG: 600 TABLET, EXTENDED RELEASE ORAL at 18:19

## 2017-01-01 RX ADMIN — IPRATROPIUM BROMIDE AND ALBUTEROL SULFATE 3 ML: .5; 3 SOLUTION RESPIRATORY (INHALATION) at 20:21

## 2017-01-01 RX ADMIN — KETOTIFEN FUMARATE 1 DROP: 0.25 SOLUTION/ DROPS OPHTHALMIC at 18:17

## 2017-01-01 RX ADMIN — LEVETIRACETAM 500 MG: 500 TABLET, FILM COATED ORAL at 21:54

## 2017-01-01 RX ADMIN — STANDARDIZED SENNA CONCENTRATE 1 TABLET: 8.6 TABLET ORAL at 08:22

## 2017-01-01 RX ADMIN — KETOTIFEN FUMARATE 1 DROP: 0.25 SOLUTION/ DROPS OPHTHALMIC at 09:39

## 2017-01-01 RX ADMIN — ISOSORBIDE DINITRATE 10 MG: 10 TABLET ORAL at 21:56

## 2017-01-01 RX ADMIN — FAMOTIDINE 40 MG: 20 TABLET ORAL at 20:56

## 2017-01-01 RX ADMIN — Medication 10 ML: at 20:24

## 2017-01-01 RX ADMIN — GUAIFENESIN 1200 MG: 600 TABLET, EXTENDED RELEASE ORAL at 09:09

## 2017-01-01 RX ADMIN — METOPROLOL TARTRATE 25 MG: 25 TABLET, FILM COATED ORAL at 09:21

## 2017-01-01 RX ADMIN — Medication 1 CAPSULE: at 10:39

## 2017-01-01 RX ADMIN — Medication 150 MG: at 18:21

## 2017-01-01 RX ADMIN — SERTRALINE 100 MG: 50 TABLET, FILM COATED ORAL at 20:59

## 2017-01-01 RX ADMIN — AMIODARONE HYDROCHLORIDE 200 MG: 200 TABLET ORAL at 17:16

## 2017-01-01 RX ADMIN — GUAIFENESIN 1200 MG: 600 TABLET, EXTENDED RELEASE ORAL at 10:53

## 2017-01-01 RX ADMIN — TRAMADOL HYDROCHLORIDE 100 MG: 50 TABLET, FILM COATED ORAL at 00:46

## 2017-01-01 RX ADMIN — SENNOSIDES AND DOCUSATE SODIUM 1 TABLET: 8.6; 5 TABLET ORAL at 21:25

## 2017-01-01 RX ADMIN — TERAZOSIN HYDROCHLORIDE 2 MG: 1 CAPSULE ORAL at 09:34

## 2017-01-01 RX ADMIN — GUAIFENESIN 1200 MG: 600 TABLET, EXTENDED RELEASE ORAL at 09:59

## 2017-01-01 RX ADMIN — IPRATROPIUM BROMIDE AND ALBUTEROL SULFATE 3 ML: .5; 3 SOLUTION RESPIRATORY (INHALATION) at 15:31

## 2017-01-01 RX ADMIN — METOPROLOL TARTRATE 50 MG: 50 TABLET, FILM COATED ORAL at 08:28

## 2017-01-01 RX ADMIN — SENNOSIDES AND DOCUSATE SODIUM 1 TABLET: 8.6; 5 TABLET ORAL at 08:47

## 2017-01-01 RX ADMIN — STANDARDIZED SENNA CONCENTRATE 1 TABLET: 8.6 TABLET ORAL at 09:46

## 2017-01-01 RX ADMIN — NIFEDIPINE 240 MG: 10 CAPSULE ORAL at 08:37

## 2017-01-01 RX ADMIN — ISOSORBIDE DINITRATE 10 MG: 10 TABLET ORAL at 22:10

## 2017-01-01 RX ADMIN — SERTRALINE HYDROCHLORIDE 75 MG: 50 TABLET ORAL at 20:36

## 2017-01-01 RX ADMIN — INSULIN ASPART 2 UNITS: 100 INJECTION, SOLUTION INTRAVENOUS; SUBCUTANEOUS at 13:07

## 2017-01-01 RX ADMIN — ALLOPURINOL 100 MG: 100 TABLET ORAL at 09:05

## 2017-01-01 RX ADMIN — TRAMADOL HYDROCHLORIDE 50 MG: 50 TABLET, FILM COATED ORAL at 17:12

## 2017-01-01 RX ADMIN — TRAMADOL HYDROCHLORIDE 100 MG: 50 TABLET, FILM COATED ORAL at 00:08

## 2017-01-01 RX ADMIN — SENNOSIDES AND DOCUSATE SODIUM 1 TABLET: 8.6; 5 TABLET ORAL at 20:11

## 2017-01-01 RX ADMIN — GUAIFENESIN 1200 MG: 600 TABLET, EXTENDED RELEASE ORAL at 08:56

## 2017-01-01 RX ADMIN — MEGESTROL ACETATE 200 MG: 40 SUSPENSION ORAL at 17:34

## 2017-01-01 RX ADMIN — METOPROLOL SUCCINATE 100 MG: 50 TABLET, EXTENDED RELEASE ORAL at 09:52

## 2017-01-01 RX ADMIN — MEGESTROL ACETATE 200 MG: 40 SUSPENSION ORAL at 17:18

## 2017-01-01 RX ADMIN — DOXAZOSIN MESYLATE 2 MG: 2 TABLET ORAL at 08:20

## 2017-01-01 RX ADMIN — GLIPIZIDE 5 MG: 5 TABLET ORAL at 10:06

## 2017-01-01 RX ADMIN — Medication 150 MG: at 18:18

## 2017-01-01 RX ADMIN — MICONAZOLE NITRATE: 20 POWDER TOPICAL at 18:35

## 2017-01-01 RX ADMIN — APIXABAN 5 MG: 2.5 TABLET, FILM COATED ORAL at 20:12

## 2017-01-01 RX ADMIN — METOPROLOL SUCCINATE 100 MG: 50 TABLET, EXTENDED RELEASE ORAL at 08:50

## 2017-01-01 RX ADMIN — CALCIUM CARBONATE 500 MG (1,250 MG)-VITAMIN D3 200 UNIT TABLET 500 MG: at 10:20

## 2017-01-01 RX ADMIN — KETOTIFEN FUMARATE 1 DROP: 0.25 SOLUTION/ DROPS OPHTHALMIC at 18:06

## 2017-01-01 RX ADMIN — BUDESONIDE 0.5 MG: 0.5 SUSPENSION RESPIRATORY (INHALATION) at 09:31

## 2017-01-01 RX ADMIN — METOPROLOL TARTRATE 75 MG: 25 TABLET, FILM COATED ORAL at 08:41

## 2017-01-01 RX ADMIN — ISOSORBIDE DINITRATE 10 MG: 10 TABLET ORAL at 08:09

## 2017-01-01 RX ADMIN — METOPROLOL SUCCINATE 100 MG: 50 TABLET, EXTENDED RELEASE ORAL at 09:46

## 2017-01-01 RX ADMIN — GUAIFENESIN 1200 MG: 600 TABLET, EXTENDED RELEASE ORAL at 08:43

## 2017-01-01 RX ADMIN — SENNOSIDES AND DOCUSATE SODIUM 1 TABLET: 8.6; 5 TABLET ORAL at 22:14

## 2017-01-01 RX ADMIN — SODIUM CHLORIDE 75 ML/HR: 9 INJECTION, SOLUTION INTRAVENOUS at 14:43

## 2017-01-01 RX ADMIN — ASPIRIN 81 MG: 81 TABLET, COATED ORAL at 08:23

## 2017-01-01 RX ADMIN — LEVETIRACETAM 500 MG: 500 TABLET, FILM COATED ORAL at 19:19

## 2017-01-01 RX ADMIN — KETOTIFEN FUMARATE 1 DROP: 0.25 SOLUTION/ DROPS OPHTHALMIC at 09:45

## 2017-01-01 RX ADMIN — SENNOSIDES AND DOCUSATE SODIUM 1 TABLET: 8.6; 5 TABLET ORAL at 22:45

## 2017-01-01 RX ADMIN — SERTRALINE HYDROCHLORIDE 100 MG: 50 TABLET ORAL at 20:35

## 2017-01-01 RX ADMIN — LEVETIRACETAM 500 MG: 500 TABLET, FILM COATED ORAL at 09:52

## 2017-01-01 RX ADMIN — APIXABAN 2.5 MG: 2.5 TABLET, FILM COATED ORAL at 21:44

## 2017-01-01 RX ADMIN — Medication 150 MG: at 18:34

## 2017-01-01 RX ADMIN — DILTIAZEM HYDROCHLORIDE 300 MG: 180 CAPSULE, COATED, EXTENDED RELEASE ORAL at 14:18

## 2017-01-01 RX ADMIN — CONJUGATED ESTROGENS 1 APPLICATION: 0.62 CREAM VAGINAL at 20:45

## 2017-01-01 RX ADMIN — ALLOPURINOL 100 MG: 100 TABLET ORAL at 18:07

## 2017-01-01 RX ADMIN — DOCUSATE SODIUM AND SENNOSIDES 1 TABLET: 8.6; 5 TABLET, FILM COATED ORAL at 21:09

## 2017-01-01 RX ADMIN — KETOTIFEN FUMARATE 1 DROP: 0.25 SOLUTION/ DROPS OPHTHALMIC at 09:48

## 2017-01-01 RX ADMIN — ISOSORBIDE DINITRATE 10 MG: 10 TABLET ORAL at 20:00

## 2017-01-01 RX ADMIN — LINAGLIPTIN 5 MG: 5 TABLET, FILM COATED ORAL at 08:24

## 2017-01-01 RX ADMIN — LEVETIRACETAM 500 MG: 500 TABLET, FILM COATED ORAL at 08:18

## 2017-01-01 RX ADMIN — CONJUGATED ESTROGENS 0.75 APPLICATION: 0.62 CREAM VAGINAL at 22:34

## 2017-01-01 RX ADMIN — INSULIN ASPART 2 UNITS: 100 INJECTION, SOLUTION INTRAVENOUS; SUBCUTANEOUS at 09:12

## 2017-01-01 RX ADMIN — Medication 1 TABLET: at 10:48

## 2017-01-01 RX ADMIN — METOPROLOL SUCCINATE 100 MG: 50 TABLET, EXTENDED RELEASE ORAL at 10:22

## 2017-01-01 RX ADMIN — Medication 1 TABLET: at 07:27

## 2017-01-01 RX ADMIN — LEVOTHYROXINE SODIUM 150 MCG: 150 TABLET ORAL at 05:32

## 2017-01-01 RX ADMIN — KETOTIFEN FUMARATE 1 DROP: 0.35 SOLUTION/ DROPS OPHTHALMIC at 17:00

## 2017-01-01 RX ADMIN — APIXABAN 5 MG: 2.5 TABLET, FILM COATED ORAL at 09:25

## 2017-01-01 RX ADMIN — MICONAZOLE NITRATE: 20 POWDER TOPICAL at 23:11

## 2017-01-01 RX ADMIN — AMIODARONE HYDROCHLORIDE 200 MG: 200 TABLET ORAL at 20:39

## 2017-01-01 RX ADMIN — DONEPEZIL HYDROCHLORIDE 10 MG: 5 TABLET ORAL at 21:47

## 2017-01-01 RX ADMIN — KETOTIFEN FUMARATE 1 DROP: 0.35 SOLUTION/ DROPS OPHTHALMIC at 09:50

## 2017-01-01 RX ADMIN — TRAMADOL HYDROCHLORIDE 100 MG: 50 TABLET, FILM COATED ORAL at 13:52

## 2017-01-01 RX ADMIN — IPRATROPIUM BROMIDE AND ALBUTEROL SULFATE 3 ML: .5; 3 SOLUTION RESPIRATORY (INHALATION) at 18:11

## 2017-01-01 RX ADMIN — SERTRALINE HYDROCHLORIDE 100 MG: 50 TABLET ORAL at 19:55

## 2017-01-01 RX ADMIN — METOPROLOL TARTRATE 25 MG: 25 TABLET, FILM COATED ORAL at 18:07

## 2017-01-01 RX ADMIN — ISOSORBIDE DINITRATE 10 MG: 10 TABLET ORAL at 23:08

## 2017-01-01 RX ADMIN — FAMOTIDINE 20 MG: 20 TABLET, FILM COATED ORAL at 08:27

## 2017-01-01 RX ADMIN — METOPROLOL SUCCINATE 100 MG: 50 TABLET, EXTENDED RELEASE ORAL at 22:13

## 2017-01-01 RX ADMIN — LEVOTHYROXINE SODIUM 150 MCG: 150 TABLET ORAL at 05:34

## 2017-01-01 RX ADMIN — SENNOSIDES AND DOCUSATE SODIUM 1 TABLET: 8.6; 5 TABLET ORAL at 09:54

## 2017-01-01 RX ADMIN — ALLOPURINOL 100 MG: 100 TABLET ORAL at 19:58

## 2017-01-01 RX ADMIN — GUAIFENESIN 1200 MG: 600 TABLET, EXTENDED RELEASE ORAL at 10:46

## 2017-01-01 RX ADMIN — MEROPENEM 1 G: 1 INJECTION, POWDER, FOR SOLUTION INTRAVENOUS at 11:00

## 2017-01-01 RX ADMIN — INSULIN ASPART 2 UNITS: 100 INJECTION, SOLUTION INTRAVENOUS; SUBCUTANEOUS at 12:20

## 2017-01-01 RX ADMIN — METOPROLOL SUCCINATE 100 MG: 50 TABLET, EXTENDED RELEASE ORAL at 20:14

## 2017-01-01 RX ADMIN — AMIODARONE HYDROCHLORIDE 200 MG: 200 TABLET ORAL at 21:03

## 2017-01-01 RX ADMIN — MICONAZOLE NITRATE: 20 POWDER TOPICAL at 10:25

## 2017-01-01 RX ADMIN — DONEPEZIL HYDROCHLORIDE 10 MG: 5 TABLET ORAL at 22:49

## 2017-01-01 RX ADMIN — POTASSIUM CHLORIDE 20 MEQ: 1500 TABLET, EXTENDED RELEASE ORAL at 09:34

## 2017-01-01 RX ADMIN — SERTRALINE 100 MG: 50 TABLET, FILM COATED ORAL at 19:58

## 2017-01-01 RX ADMIN — TRAMADOL HYDROCHLORIDE 100 MG: 50 TABLET, FILM COATED ORAL at 20:24

## 2017-01-01 RX ADMIN — MEGESTROL ACETATE 200 MG: 40 SUSPENSION ORAL at 17:49

## 2017-01-01 RX ADMIN — LINAGLIPTIN 5 MG: 5 TABLET, FILM COATED ORAL at 08:26

## 2017-01-01 RX ADMIN — STANDARDIZED SENNA CONCENTRATE 1 TABLET: 8.6 TABLET ORAL at 08:11

## 2017-01-01 RX ADMIN — ISOSORBIDE DINITRATE 10 MG: 10 TABLET ORAL at 08:18

## 2017-01-01 RX ADMIN — METOPROLOL SUCCINATE 100 MG: 50 TABLET, EXTENDED RELEASE ORAL at 10:21

## 2017-01-01 RX ADMIN — MICONAZOLE NITRATE: 20 POWDER TOPICAL at 21:57

## 2017-01-01 RX ADMIN — FUROSEMIDE 40 MG: 40 TABLET ORAL at 07:55

## 2017-01-01 RX ADMIN — GUAIFENESIN 1200 MG: 600 TABLET, EXTENDED RELEASE ORAL at 08:07

## 2017-01-01 RX ADMIN — POTASSIUM CHLORIDE 20 MEQ: 1500 TABLET, EXTENDED RELEASE ORAL at 10:29

## 2017-01-01 RX ADMIN — MICONAZOLE NITRATE: 20 POWDER TOPICAL at 21:31

## 2017-01-01 RX ADMIN — SENNOSIDES AND DOCUSATE SODIUM 1 TABLET: 8.6; 5 TABLET ORAL at 20:57

## 2017-01-01 RX ADMIN — FAMOTIDINE 20 MG: 20 TABLET, FILM COATED ORAL at 16:46

## 2017-01-01 RX ADMIN — BUDESONIDE 0.5 MG: 0.5 SUSPENSION RESPIRATORY (INHALATION) at 09:53

## 2017-01-01 RX ADMIN — POTASSIUM CHLORIDE 20 MEQ: 1500 TABLET, EXTENDED RELEASE ORAL at 09:01

## 2017-01-01 RX ADMIN — CONJUGATED ESTROGENS 0.75 APPLICATOR: 0.62 CREAM VAGINAL at 20:36

## 2017-01-01 RX ADMIN — CALCIUM CARBONATE 500 MG (1,250 MG)-VITAMIN D3 200 UNIT TABLET 500 MG: at 08:26

## 2017-01-01 RX ADMIN — METOPROLOL SUCCINATE 100 MG: 50 TABLET, EXTENDED RELEASE ORAL at 10:10

## 2017-01-01 RX ADMIN — DILTIAZEM HYDROCHLORIDE 300 MG: 180 CAPSULE, COATED, EXTENDED RELEASE ORAL at 13:49

## 2017-01-01 RX ADMIN — LEVOTHYROXINE SODIUM 150 MCG: 150 TABLET ORAL at 05:45

## 2017-01-01 RX ADMIN — MICONAZOLE NITRATE: 20 POWDER TOPICAL at 07:27

## 2017-01-01 RX ADMIN — IPRATROPIUM BROMIDE AND ALBUTEROL SULFATE 3 ML: .5; 3 SOLUTION RESPIRATORY (INHALATION) at 20:20

## 2017-01-01 RX ADMIN — METOPROLOL TARTRATE 25 MG: 25 TABLET, FILM COATED ORAL at 18:18

## 2017-01-01 RX ADMIN — Medication 1 CAPSULE: at 09:43

## 2017-01-01 RX ADMIN — DOXAZOSIN MESYLATE 2 MG: 2 TABLET ORAL at 09:49

## 2017-01-01 RX ADMIN — METOPROLOL SUCCINATE 100 MG: 50 TABLET, EXTENDED RELEASE ORAL at 21:05

## 2017-01-01 RX ADMIN — IPRATROPIUM BROMIDE AND ALBUTEROL SULFATE 3 ML: .5; 3 SOLUTION RESPIRATORY (INHALATION) at 11:51

## 2017-01-01 RX ADMIN — ASPIRIN 81 MG: 81 TABLET, COATED ORAL at 09:42

## 2017-01-01 RX ADMIN — Medication 1 CAPSULE: at 09:22

## 2017-01-01 RX ADMIN — MICONAZOLE NITRATE 1 APPLICATION: 20 POWDER TOPICAL at 09:29

## 2017-01-01 RX ADMIN — METOPROLOL SUCCINATE 100 MG: 50 TABLET, EXTENDED RELEASE ORAL at 21:24

## 2017-01-01 RX ADMIN — Medication 10 ML: at 20:26

## 2017-01-01 RX ADMIN — Medication 150 MG: at 08:18

## 2017-01-01 RX ADMIN — ISOSORBIDE DINITRATE 10 MG: 10 TABLET ORAL at 20:59

## 2017-01-01 RX ADMIN — SERTRALINE 100 MG: 50 TABLET, FILM COATED ORAL at 23:26

## 2017-01-01 RX ADMIN — Medication 1 TABLET: at 17:23

## 2017-01-01 RX ADMIN — MULTIPLE VITAMINS W/ MINERALS TAB 1 TABLET: TAB at 08:20

## 2017-01-01 RX ADMIN — BUDESONIDE 0.5 MG: 0.5 SUSPENSION RESPIRATORY (INHALATION) at 09:00

## 2017-01-01 RX ADMIN — APIXABAN 5 MG: 2.5 TABLET, FILM COATED ORAL at 22:42

## 2017-01-01 RX ADMIN — IPRATROPIUM BROMIDE AND ALBUTEROL SULFATE 3 ML: .5; 3 SOLUTION RESPIRATORY (INHALATION) at 09:14

## 2017-01-01 RX ADMIN — APIXABAN 5 MG: 2.5 TABLET, FILM COATED ORAL at 07:46

## 2017-01-01 RX ADMIN — INSULIN ASPART 8 UNITS: 100 INJECTION, SOLUTION INTRAVENOUS; SUBCUTANEOUS at 20:04

## 2017-01-01 RX ADMIN — LEVETIRACETAM 500 MG: 500 TABLET, FILM COATED ORAL at 17:11

## 2017-01-01 RX ADMIN — DICLOFENAC SODIUM 4 G: 10 GEL TOPICAL at 10:58

## 2017-01-01 RX ADMIN — KETOTIFEN FUMARATE 1 DROP: 0.25 SOLUTION/ DROPS OPHTHALMIC at 10:33

## 2017-01-01 RX ADMIN — SERTRALINE HYDROCHLORIDE 100 MG: 50 TABLET ORAL at 21:04

## 2017-01-01 RX ADMIN — DILTIAZEM HYDROCHLORIDE 300 MG: 180 CAPSULE, COATED, EXTENDED RELEASE ORAL at 13:13

## 2017-01-01 RX ADMIN — DONEPEZIL HYDROCHLORIDE 10 MG: 5 TABLET, FILM COATED ORAL at 19:56

## 2017-01-01 RX ADMIN — METOPROLOL TARTRATE 100 MG: 25 TABLET, FILM COATED ORAL at 10:55

## 2017-01-01 RX ADMIN — DIPHENHYDRAMINE HYDROCHLORIDE 25 MG: 25 CAPSULE ORAL at 22:45

## 2017-01-01 RX ADMIN — FAMOTIDINE 40 MG: 20 TABLET ORAL at 21:47

## 2017-01-01 RX ADMIN — KETOTIFEN FUMARATE 1 DROP: 0.35 SOLUTION/ DROPS OPHTHALMIC at 16:25

## 2017-01-01 RX ADMIN — MEGESTROL ACETATE 200 MG: 40 SUSPENSION ORAL at 09:56

## 2017-01-01 RX ADMIN — IPRATROPIUM BROMIDE AND ALBUTEROL SULFATE 3 ML: .5; 3 SOLUTION RESPIRATORY (INHALATION) at 12:02

## 2017-01-01 RX ADMIN — FAMOTIDINE 40 MG: 20 TABLET ORAL at 20:51

## 2017-01-01 RX ADMIN — MEGESTROL ACETATE 200 MG: 40 SUSPENSION ORAL at 08:16

## 2017-01-01 RX ADMIN — LEVOFLOXACIN 250 MG: 250 TABLET, FILM COATED ORAL at 14:19

## 2017-01-01 RX ADMIN — MICONAZOLE NITRATE 1 APPLICATION: 20 POWDER TOPICAL at 09:11

## 2017-01-01 RX ADMIN — AMIODARONE HYDROCHLORIDE 200 MG: 200 TABLET ORAL at 20:43

## 2017-01-01 RX ADMIN — TERAZOSIN HYDROCHLORIDE 2 MG: 1 CAPSULE ORAL at 21:36

## 2017-01-01 RX ADMIN — CALCIUM CARBONATE 500 MG (1,250 MG)-VITAMIN D3 200 UNIT TABLET 500 MG: at 08:24

## 2017-01-01 RX ADMIN — ASPIRIN 81 MG: 81 TABLET, COATED ORAL at 08:45

## 2017-01-01 RX ADMIN — ISOSORBIDE DINITRATE 10 MG: 10 TABLET ORAL at 09:05

## 2017-01-01 RX ADMIN — GUAIFENESIN 1200 MG: 600 TABLET, EXTENDED RELEASE ORAL at 08:17

## 2017-01-01 RX ADMIN — DONEPEZIL HYDROCHLORIDE 10 MG: 5 TABLET ORAL at 21:02

## 2017-01-01 RX ADMIN — KETOTIFEN FUMARATE 1 DROP: 0.35 SOLUTION/ DROPS OPHTHALMIC at 17:32

## 2017-01-01 RX ADMIN — ACETAMINOPHEN 650 MG: 325 TABLET, FILM COATED ORAL at 05:46

## 2017-01-01 RX ADMIN — DOCUSATE SODIUM AND SENNOSIDES 1 TABLET: 8.6; 5 TABLET, FILM COATED ORAL at 08:55

## 2017-01-01 RX ADMIN — TRAMADOL HYDROCHLORIDE 50 MG: 50 TABLET, FILM COATED ORAL at 20:19

## 2017-01-01 RX ADMIN — METOPROLOL TARTRATE 75 MG: 25 TABLET, FILM COATED ORAL at 19:05

## 2017-01-01 RX ADMIN — MICONAZOLE NITRATE: 20 POWDER TOPICAL at 10:20

## 2017-01-01 RX ADMIN — Medication 10 ML: at 20:48

## 2017-01-01 RX ADMIN — ASPIRIN 81 MG: 81 TABLET, COATED ORAL at 08:39

## 2017-01-01 RX ADMIN — IPRATROPIUM BROMIDE AND ALBUTEROL SULFATE 3 ML: .5; 3 SOLUTION RESPIRATORY (INHALATION) at 20:57

## 2017-01-01 RX ADMIN — KETOTIFEN FUMARATE 1 DROP: 0.25 SOLUTION/ DROPS OPHTHALMIC at 16:49

## 2017-01-01 RX ADMIN — SENNOSIDES AND DOCUSATE SODIUM 1 TABLET: 8.6; 5 TABLET ORAL at 17:21

## 2017-01-01 RX ADMIN — DONEPEZIL HYDROCHLORIDE 10 MG: 5 TABLET ORAL at 20:04

## 2017-01-01 RX ADMIN — Medication 1 TABLET: at 10:13

## 2017-01-01 RX ADMIN — BUMETANIDE 2 MG: 0.25 INJECTION INTRAMUSCULAR; INTRAVENOUS at 09:26

## 2017-01-01 RX ADMIN — MULTIPLE VITAMINS W/ MINERALS TAB 1 TABLET: TAB at 08:41

## 2017-01-01 RX ADMIN — DILTIAZEM HYDROCHLORIDE 240 MG: 120 CAPSULE, COATED, EXTENDED RELEASE ORAL at 09:01

## 2017-01-01 RX ADMIN — Medication 1 TABLET: at 10:33

## 2017-01-01 RX ADMIN — FAMOTIDINE 20 MG: 20 TABLET ORAL at 17:58

## 2017-01-01 RX ADMIN — IPRATROPIUM BROMIDE AND ALBUTEROL SULFATE 3 ML: .5; 3 SOLUTION RESPIRATORY (INHALATION) at 09:24

## 2017-01-01 RX ADMIN — SERTRALINE 100 MG: 50 TABLET, FILM COATED ORAL at 23:05

## 2017-01-01 RX ADMIN — CONJUGATED ESTROGENS 1 APPLICATION: 0.62 CREAM VAGINAL at 20:12

## 2017-01-01 RX ADMIN — KETOTIFEN FUMARATE 1 DROP: 0.35 SOLUTION/ DROPS OPHTHALMIC at 19:20

## 2017-01-01 RX ADMIN — ALLOPURINOL 100 MG: 100 TABLET ORAL at 23:15

## 2017-01-01 RX ADMIN — PIPERACILLIN SODIUM AND TAZOBACTAM SODIUM 3.38 G: 3; .375 INJECTION, POWDER, LYOPHILIZED, FOR SOLUTION INTRAVENOUS at 21:45

## 2017-01-01 RX ADMIN — LEVETIRACETAM 500 MG: 500 TABLET, FILM COATED ORAL at 08:47

## 2017-01-01 RX ADMIN — METOPROLOL SUCCINATE 100 MG: 50 TABLET, EXTENDED RELEASE ORAL at 22:26

## 2017-01-01 RX ADMIN — CONJUGATED ESTROGENS 0.75 APPLICATION: 0.62 CREAM VAGINAL at 20:32

## 2017-01-01 RX ADMIN — DILTIAZEM HYDROCHLORIDE 120 MG: 60 TABLET, FILM COATED ORAL at 18:00

## 2017-01-01 RX ADMIN — IPRATROPIUM BROMIDE AND ALBUTEROL SULFATE 3 ML: .5; 3 SOLUTION RESPIRATORY (INHALATION) at 12:04

## 2017-01-01 RX ADMIN — CLINDAMYCIN HYDROCHLORIDE 450 MG: 150 CAPSULE ORAL at 15:12

## 2017-01-01 RX ADMIN — MICONAZOLE NITRATE: 20 POWDER TOPICAL at 08:53

## 2017-01-01 RX ADMIN — Medication 1 TABLET: at 17:25

## 2017-01-01 RX ADMIN — KETOTIFEN FUMARATE 1 DROP: 0.25 SOLUTION/ DROPS OPHTHALMIC at 10:25

## 2017-01-01 RX ADMIN — ENOXAPARIN SODIUM 100 MG: 100 INJECTION SUBCUTANEOUS at 00:08

## 2017-01-01 RX ADMIN — IPRATROPIUM BROMIDE AND ALBUTEROL SULFATE 3 ML: .5; 3 SOLUTION RESPIRATORY (INHALATION) at 09:23

## 2017-01-01 RX ADMIN — SODIUM CHLORIDE 75 ML/HR: 9 INJECTION, SOLUTION INTRAVENOUS at 17:27

## 2017-01-01 RX ADMIN — STANDARDIZED SENNA CONCENTRATE 1 TABLET: 8.6 TABLET ORAL at 08:27

## 2017-01-01 RX ADMIN — DILTIAZEM HYDROCHLORIDE 300 MG: 180 CAPSULE, EXTENDED RELEASE ORAL at 13:10

## 2017-01-01 RX ADMIN — MEROPENEM 1 G: 1 INJECTION, POWDER, FOR SOLUTION INTRAVENOUS at 02:31

## 2017-01-01 RX ADMIN — MICONAZOLE NITRATE: 20 POWDER TOPICAL at 19:51

## 2017-01-01 RX ADMIN — DONEPEZIL HYDROCHLORIDE 10 MG: 5 TABLET ORAL at 21:53

## 2017-01-01 RX ADMIN — SERTRALINE 100 MG: 50 TABLET, FILM COATED ORAL at 21:53

## 2017-01-01 RX ADMIN — AMIODARONE HYDROCHLORIDE 200 MG: 200 TABLET ORAL at 07:45

## 2017-01-01 RX ADMIN — MICONAZOLE NITRATE: 20 POWDER TOPICAL at 21:05

## 2017-01-01 RX ADMIN — Medication 1 TABLET: at 18:07

## 2017-01-01 RX ADMIN — ACETAMINOPHEN 650 MG: 325 TABLET, FILM COATED ORAL at 20:19

## 2017-01-01 RX ADMIN — METOPROLOL TARTRATE 100 MG: 25 TABLET, FILM COATED ORAL at 11:18

## 2017-01-01 RX ADMIN — CONJUGATED ESTROGENS 1 APPLICATION: 0.62 CREAM VAGINAL at 21:16

## 2017-01-01 RX ADMIN — FLUCONAZOLE 100 MG: 100 TABLET ORAL at 12:27

## 2017-01-01 RX ADMIN — Medication 150 MG: at 17:09

## 2017-01-01 RX ADMIN — FAMOTIDINE 20 MG: 20 TABLET, FILM COATED ORAL at 09:04

## 2017-01-01 RX ADMIN — POTASSIUM CHLORIDE 40 MEQ: 1500 TABLET, EXTENDED RELEASE ORAL at 12:31

## 2017-01-01 RX ADMIN — CONJUGATED ESTROGENS 0.75 APPLICATION: 0.62 CREAM VAGINAL at 20:59

## 2017-01-01 RX ADMIN — Medication 1 TABLET: at 07:50

## 2017-01-01 RX ADMIN — IPRATROPIUM BROMIDE AND ALBUTEROL SULFATE 3 ML: .5; 3 SOLUTION RESPIRATORY (INHALATION) at 11:20

## 2017-01-01 RX ADMIN — METOPROLOL SUCCINATE 100 MG: 50 TABLET, EXTENDED RELEASE ORAL at 08:03

## 2017-01-01 RX ADMIN — LEVETIRACETAM 500 MG: 500 TABLET, FILM COATED ORAL at 09:51

## 2017-01-01 RX ADMIN — LEVETIRACETAM 500 MG: 500 TABLET, FILM COATED ORAL at 21:31

## 2017-01-01 RX ADMIN — ALLOPURINOL 100 MG: 100 TABLET ORAL at 20:48

## 2017-01-01 RX ADMIN — GUAIFENESIN 1200 MG: 600 TABLET, EXTENDED RELEASE ORAL at 17:45

## 2017-01-01 RX ADMIN — MICONAZOLE NITRATE: 20 POWDER TOPICAL at 23:09

## 2017-01-01 RX ADMIN — ENOXAPARIN SODIUM 110 MG: 120 INJECTION SUBCUTANEOUS at 20:59

## 2017-01-01 RX ADMIN — ALLOPURINOL 100 MG: 100 TABLET ORAL at 21:55

## 2017-01-01 RX ADMIN — APIXABAN 5 MG: 2.5 TABLET, FILM COATED ORAL at 20:57

## 2017-01-01 RX ADMIN — ALLOPURINOL 100 MG: 100 TABLET ORAL at 21:47

## 2017-01-01 RX ADMIN — POTASSIUM CHLORIDE 20 MEQ: 1500 TABLET, EXTENDED RELEASE ORAL at 08:07

## 2017-01-01 RX ADMIN — DILTIAZEM HYDROCHLORIDE 240 MG: 120 CAPSULE, COATED, EXTENDED RELEASE ORAL at 08:50

## 2017-01-01 RX ADMIN — KETOTIFEN FUMARATE 1 DROP: 0.25 SOLUTION/ DROPS OPHTHALMIC at 16:34

## 2017-01-01 RX ADMIN — METOPROLOL TARTRATE 100 MG: 25 TABLET, FILM COATED ORAL at 20:23

## 2017-01-01 RX ADMIN — KETOTIFEN FUMARATE 1 DROP: 0.35 SOLUTION/ DROPS OPHTHALMIC at 08:22

## 2017-01-01 RX ADMIN — INSULIN ASPART 2 UNITS: 100 INJECTION, SOLUTION INTRAVENOUS; SUBCUTANEOUS at 11:55

## 2017-01-01 RX ADMIN — MEGESTROL ACETATE 200 MG: 40 SUSPENSION ORAL at 17:39

## 2017-01-01 RX ADMIN — CALCIUM CARBONATE 500 MG (1,250 MG)-VITAMIN D3 200 UNIT TABLET 500 MG: at 08:20

## 2017-01-01 RX ADMIN — MIRTAZAPINE 15 MG: 15 TABLET, FILM COATED ORAL at 21:23

## 2017-01-01 RX ADMIN — Medication 150 MG: at 10:49

## 2017-01-01 RX ADMIN — IPRATROPIUM BROMIDE AND ALBUTEROL SULFATE 3 ML: .5; 3 SOLUTION RESPIRATORY (INHALATION) at 21:08

## 2017-01-01 RX ADMIN — IPRATROPIUM BROMIDE AND ALBUTEROL SULFATE 3 ML: .5; 3 SOLUTION RESPIRATORY (INHALATION) at 08:21

## 2017-01-01 RX ADMIN — AMIODARONE HYDROCHLORIDE 200 MG: 200 TABLET ORAL at 08:31

## 2017-01-01 RX ADMIN — GUAIFENESIN 1200 MG: 600 TABLET, EXTENDED RELEASE ORAL at 18:08

## 2017-01-01 RX ADMIN — SENNOSIDES AND DOCUSATE SODIUM 1 TABLET: 8.6; 5 TABLET ORAL at 22:07

## 2017-01-01 RX ADMIN — METOPROLOL TARTRATE 75 MG: 25 TABLET, FILM COATED ORAL at 16:52

## 2017-01-01 RX ADMIN — LEVETIRACETAM 500 MG: 500 TABLET, FILM COATED ORAL at 22:07

## 2017-01-01 RX ADMIN — GUAIFENESIN 1200 MG: 600 TABLET, EXTENDED RELEASE ORAL at 18:49

## 2017-01-01 RX ADMIN — MICONAZOLE NITRATE: 20 POWDER TOPICAL at 08:24

## 2017-01-01 RX ADMIN — FAMOTIDINE 40 MG: 20 TABLET, FILM COATED ORAL at 20:09

## 2017-01-01 RX ADMIN — SODIUM CHLORIDE 250 ML: 9 INJECTION, SOLUTION INTRAVENOUS at 16:09

## 2017-01-01 RX ADMIN — ENOXAPARIN SODIUM 110 MG: 120 INJECTION SUBCUTANEOUS at 08:44

## 2017-01-01 RX ADMIN — GUAIFENESIN 1200 MG: 600 TABLET, EXTENDED RELEASE ORAL at 20:26

## 2017-01-01 RX ADMIN — TRAMADOL HYDROCHLORIDE 100 MG: 50 TABLET, FILM COATED ORAL at 21:03

## 2017-01-01 RX ADMIN — KETOTIFEN FUMARATE 1 DROP: 0.25 SOLUTION/ DROPS OPHTHALMIC at 08:46

## 2017-01-01 RX ADMIN — SENNOSIDES AND DOCUSATE SODIUM 1 TABLET: 8.6; 5 TABLET ORAL at 10:09

## 2017-01-01 RX ADMIN — KETOTIFEN FUMARATE 1 DROP: 0.25 SOLUTION/ DROPS OPHTHALMIC at 10:10

## 2017-01-01 RX ADMIN — METOPROLOL SUCCINATE 100 MG: 50 TABLET, EXTENDED RELEASE ORAL at 10:39

## 2017-01-01 RX ADMIN — DILTIAZEM HYDROCHLORIDE 300 MG: 180 CAPSULE, COATED, EXTENDED RELEASE ORAL at 13:52

## 2017-01-01 RX ADMIN — LEVETIRACETAM 500 MG: 500 TABLET, FILM COATED ORAL at 18:39

## 2017-01-01 RX ADMIN — FAMOTIDINE 40 MG: 20 TABLET ORAL at 22:45

## 2017-01-01 RX ADMIN — TRAMADOL HYDROCHLORIDE 100 MG: 50 TABLET, FILM COATED ORAL at 19:58

## 2017-01-01 RX ADMIN — NIFEDIPINE 240 MG: 10 CAPSULE ORAL at 08:53

## 2017-01-01 RX ADMIN — TRAMADOL HYDROCHLORIDE 100 MG: 50 TABLET, FILM COATED ORAL at 20:52

## 2017-01-01 RX ADMIN — ISOSORBIDE DINITRATE 10 MG: 10 TABLET ORAL at 20:19

## 2017-01-01 RX ADMIN — ACETAMINOPHEN 650 MG: 325 TABLET, FILM COATED ORAL at 20:08

## 2017-01-01 RX ADMIN — LEVETIRACETAM 500 MG: 500 TABLET, FILM COATED ORAL at 18:32

## 2017-01-01 RX ADMIN — FAMOTIDINE 20 MG: 20 TABLET ORAL at 09:28

## 2017-01-01 RX ADMIN — IPRATROPIUM BROMIDE AND ALBUTEROL SULFATE 3 ML: .5; 3 SOLUTION RESPIRATORY (INHALATION) at 10:07

## 2017-01-01 RX ADMIN — LEVETIRACETAM 500 MG: 500 TABLET, FILM COATED ORAL at 18:05

## 2017-01-01 RX ADMIN — Medication 1 TABLET: at 10:07

## 2017-01-01 RX ADMIN — BUDESONIDE 0.5 MG: 0.5 SUSPENSION RESPIRATORY (INHALATION) at 20:10

## 2017-01-01 RX ADMIN — METOPROLOL SUCCINATE 100 MG: 50 TABLET, EXTENDED RELEASE ORAL at 09:49

## 2017-01-01 RX ADMIN — FAMOTIDINE 20 MG: 20 TABLET ORAL at 08:42

## 2017-01-01 RX ADMIN — BUDESONIDE 0.5 MG: 0.5 SUSPENSION RESPIRATORY (INHALATION) at 19:21

## 2017-01-01 RX ADMIN — DONEPEZIL HYDROCHLORIDE 10 MG: 5 TABLET ORAL at 21:33

## 2017-01-01 RX ADMIN — LEVETIRACETAM 500 MG: 500 TABLET, FILM COATED ORAL at 08:13

## 2017-01-01 RX ADMIN — GUAIFENESIN 1200 MG: 600 TABLET, EXTENDED RELEASE ORAL at 09:04

## 2017-01-01 RX ADMIN — SENNOSIDES AND DOCUSATE SODIUM 1 TABLET: 8.6; 5 TABLET ORAL at 20:14

## 2017-01-01 RX ADMIN — FAMOTIDINE 40 MG: 20 TABLET ORAL at 21:53

## 2017-01-01 RX ADMIN — IPRATROPIUM BROMIDE AND ALBUTEROL SULFATE 3 ML: .5; 3 SOLUTION RESPIRATORY (INHALATION) at 08:37

## 2017-01-01 RX ADMIN — AMIODARONE HYDROCHLORIDE 200 MG: 200 TABLET ORAL at 09:19

## 2017-01-01 RX ADMIN — MICONAZOLE NITRATE: 20 POWDER TOPICAL at 22:38

## 2017-01-01 RX ADMIN — DILTIAZEM HYDROCHLORIDE 300 MG: 180 CAPSULE, COATED, EXTENDED RELEASE ORAL at 13:26

## 2017-01-01 RX ADMIN — BUDESONIDE 0.5 MG: 0.5 SUSPENSION RESPIRATORY (INHALATION) at 07:33

## 2017-01-01 RX ADMIN — GUAIFENESIN 1200 MG: 600 TABLET, EXTENDED RELEASE ORAL at 09:15

## 2017-01-01 RX ADMIN — DONEPEZIL HYDROCHLORIDE 10 MG: 5 TABLET ORAL at 20:49

## 2017-01-01 RX ADMIN — ISOSORBIDE DINITRATE 10 MG: 10 TABLET ORAL at 08:16

## 2017-01-01 RX ADMIN — APIXABAN 5 MG: 2.5 TABLET, FILM COATED ORAL at 19:57

## 2017-01-01 RX ADMIN — ACETAMINOPHEN 650 MG: 325 TABLET, FILM COATED ORAL at 20:33

## 2017-01-01 RX ADMIN — ALLOPURINOL 100 MG: 100 TABLET ORAL at 20:21

## 2017-01-01 RX ADMIN — FAMOTIDINE 40 MG: 20 TABLET, FILM COATED ORAL at 22:45

## 2017-01-01 RX ADMIN — ISOSORBIDE DINITRATE 10 MG: 10 TABLET ORAL at 20:22

## 2017-01-01 RX ADMIN — APIXABAN 5 MG: 2.5 TABLET, FILM COATED ORAL at 20:45

## 2017-01-01 RX ADMIN — FAMOTIDINE 40 MG: 20 TABLET ORAL at 21:55

## 2017-01-01 RX ADMIN — KETOTIFEN FUMARATE 1 DROP: 0.35 SOLUTION/ DROPS OPHTHALMIC at 08:08

## 2017-01-01 RX ADMIN — BUDESONIDE 0.5 MG: 0.5 SUSPENSION RESPIRATORY (INHALATION) at 08:57

## 2017-01-01 RX ADMIN — DOXAZOSIN MESYLATE 2 MG: 2 TABLET ORAL at 09:19

## 2017-01-01 RX ADMIN — MEGESTROL ACETATE 200 MG: 40 SUSPENSION ORAL at 18:10

## 2017-01-01 RX ADMIN — Medication 10 ML: at 21:27

## 2017-01-01 RX ADMIN — Medication 1 CAPSULE: at 09:09

## 2017-01-01 RX ADMIN — IPRATROPIUM BROMIDE AND ALBUTEROL SULFATE 3 ML: .5; 3 SOLUTION RESPIRATORY (INHALATION) at 13:26

## 2017-01-01 RX ADMIN — MICONAZOLE NITRATE 1 APPLICATION: 20 POWDER TOPICAL at 20:35

## 2017-01-01 RX ADMIN — FUROSEMIDE 40 MG: 40 TABLET ORAL at 10:59

## 2017-01-01 RX ADMIN — METOPROLOL TARTRATE 25 MG: 25 TABLET, FILM COATED ORAL at 21:44

## 2017-01-01 RX ADMIN — KETOTIFEN FUMARATE 1 DROP: 0.25 SOLUTION/ DROPS OPHTHALMIC at 10:22

## 2017-01-01 RX ADMIN — STANDARDIZED SENNA CONCENTRATE 1 TABLET: 8.6 TABLET ORAL at 17:38

## 2017-01-01 RX ADMIN — LINAGLIPTIN 5 MG: 5 TABLET, FILM COATED ORAL at 08:27

## 2017-01-01 RX ADMIN — IPRATROPIUM BROMIDE AND ALBUTEROL SULFATE 3 ML: .5; 3 SOLUTION RESPIRATORY (INHALATION) at 11:42

## 2017-01-01 RX ADMIN — SENNOSIDES AND DOCUSATE SODIUM 1 TABLET: 8.6; 5 TABLET ORAL at 20:52

## 2017-01-01 RX ADMIN — FAMOTIDINE 20 MG: 20 TABLET, FILM COATED ORAL at 08:54

## 2017-01-01 RX ADMIN — CONJUGATED ESTROGENS 1 APPLICATION: 0.62 CREAM VAGINAL at 20:34

## 2017-01-01 RX ADMIN — GLIPIZIDE 2.5 MG: 2.5 TABLET, FILM COATED, EXTENDED RELEASE ORAL at 09:50

## 2017-01-01 RX ADMIN — LEVOTHYROXINE SODIUM 150 MCG: 150 TABLET ORAL at 05:44

## 2017-01-01 RX ADMIN — DILTIAZEM HYDROCHLORIDE 300 MG: 180 CAPSULE, COATED, EXTENDED RELEASE ORAL at 13:36

## 2017-01-01 RX ADMIN — GUAIFENESIN 1200 MG: 600 TABLET, EXTENDED RELEASE ORAL at 16:56

## 2017-01-01 RX ADMIN — SENNOSIDES AND DOCUSATE SODIUM 1 TABLET: 8.6; 5 TABLET ORAL at 21:20

## 2017-01-01 RX ADMIN — FAMOTIDINE 40 MG: 20 TABLET ORAL at 21:09

## 2017-01-01 RX ADMIN — LEVETIRACETAM 500 MG: 500 TABLET, FILM COATED ORAL at 20:19

## 2017-01-01 RX ADMIN — BUDESONIDE 0.5 MG: 0.5 SUSPENSION RESPIRATORY (INHALATION) at 09:25

## 2017-01-01 RX ADMIN — DONEPEZIL HYDROCHLORIDE 10 MG: 5 TABLET ORAL at 22:34

## 2017-01-01 RX ADMIN — DILTIAZEM HYDROCHLORIDE 120 MG: 60 TABLET, FILM COATED ORAL at 08:23

## 2017-01-01 RX ADMIN — GLIPIZIDE 2.5 MG: 2.5 TABLET, FILM COATED, EXTENDED RELEASE ORAL at 09:08

## 2017-01-01 RX ADMIN — INSULIN ASPART 2 UNITS: 100 INJECTION, SOLUTION INTRAVENOUS; SUBCUTANEOUS at 17:22

## 2017-01-01 RX ADMIN — IPRATROPIUM BROMIDE AND ALBUTEROL SULFATE 3 ML: .5; 3 SOLUTION RESPIRATORY (INHALATION) at 09:01

## 2017-01-01 RX ADMIN — DONEPEZIL HYDROCHLORIDE 10 MG: 5 TABLET ORAL at 22:28

## 2017-01-01 RX ADMIN — METOPROLOL SUCCINATE 100 MG: 50 TABLET, EXTENDED RELEASE ORAL at 23:29

## 2017-01-01 RX ADMIN — POTASSIUM CHLORIDE 40 MEQ: 1500 TABLET, EXTENDED RELEASE ORAL at 09:28

## 2017-01-01 RX ADMIN — LEVETIRACETAM 500 MG: 500 TABLET, FILM COATED ORAL at 07:47

## 2017-01-01 RX ADMIN — SERTRALINE HYDROCHLORIDE 100 MG: 50 TABLET ORAL at 23:01

## 2017-01-01 RX ADMIN — KETOTIFEN FUMARATE 1 DROP: 0.25 SOLUTION/ DROPS OPHTHALMIC at 07:40

## 2017-01-01 RX ADMIN — DONEPEZIL HYDROCHLORIDE 10 MG: 5 TABLET ORAL at 22:42

## 2017-01-01 RX ADMIN — DONEPEZIL HYDROCHLORIDE 10 MG: 5 TABLET, FILM COATED ORAL at 22:03

## 2017-01-01 RX ADMIN — CEFTRIAXONE 1 G: 1 INJECTION, SOLUTION INTRAVENOUS at 15:44

## 2017-01-01 RX ADMIN — DOXAZOSIN MESYLATE 2 MG: 2 TABLET ORAL at 21:34

## 2017-01-01 RX ADMIN — Medication 150 MG: at 09:00

## 2017-01-01 RX ADMIN — POTASSIUM CHLORIDE 10 MEQ: 750 TABLET, FILM COATED, EXTENDED RELEASE ORAL at 08:51

## 2017-01-01 RX ADMIN — POTASSIUM CHLORIDE 20 MEQ: 1500 TABLET, EXTENDED RELEASE ORAL at 08:26

## 2017-01-01 RX ADMIN — SENNOSIDES AND DOCUSATE SODIUM 1 TABLET: 8.6; 5 TABLET ORAL at 08:33

## 2017-01-01 RX ADMIN — POTASSIUM CHLORIDE 20 MEQ: 1500 TABLET, EXTENDED RELEASE ORAL at 08:20

## 2017-01-01 RX ADMIN — FAMOTIDINE 40 MG: 20 TABLET ORAL at 20:58

## 2017-01-01 RX ADMIN — ASPIRIN 81 MG: 81 TABLET, COATED ORAL at 10:34

## 2017-01-01 RX ADMIN — SENNOSIDES AND DOCUSATE SODIUM 1 TABLET: 8.6; 5 TABLET ORAL at 21:13

## 2017-01-01 RX ADMIN — POTASSIUM CHLORIDE 10 MEQ: 750 TABLET, FILM COATED, EXTENDED RELEASE ORAL at 10:01

## 2017-01-01 RX ADMIN — FUROSEMIDE 40 MG: 40 TABLET ORAL at 09:33

## 2017-01-01 RX ADMIN — FUROSEMIDE 40 MG: 40 TABLET ORAL at 09:16

## 2017-01-01 RX ADMIN — GLIPIZIDE 2.5 MG: 2.5 TABLET, FILM COATED, EXTENDED RELEASE ORAL at 08:10

## 2017-01-01 RX ADMIN — GUAIFENESIN 1200 MG: 600 TABLET, EXTENDED RELEASE ORAL at 08:53

## 2017-01-01 RX ADMIN — MICONAZOLE NITRATE: 20 POWDER TOPICAL at 08:26

## 2017-01-01 RX ADMIN — Medication 10 ML: at 08:49

## 2017-01-01 RX ADMIN — BUDESONIDE 0.5 MG: 0.5 SUSPENSION RESPIRATORY (INHALATION) at 08:10

## 2017-01-01 RX ADMIN — SENNOSIDES AND DOCUSATE SODIUM 1 TABLET: 8.6; 5 TABLET ORAL at 23:31

## 2017-01-01 RX ADMIN — MICONAZOLE NITRATE: 20 POWDER TOPICAL at 08:23

## 2017-01-01 RX ADMIN — ALLOPURINOL 100 MG: 100 TABLET ORAL at 23:05

## 2017-01-01 RX ADMIN — AMIODARONE HYDROCHLORIDE 200 MG: 200 TABLET ORAL at 11:45

## 2017-01-01 RX ADMIN — INSULIN ASPART 2 UNITS: 100 INJECTION, SOLUTION INTRAVENOUS; SUBCUTANEOUS at 17:40

## 2017-01-01 RX ADMIN — STANDARDIZED SENNA CONCENTRATE 1 TABLET: 8.6 TABLET ORAL at 09:14

## 2017-01-01 RX ADMIN — FAMOTIDINE 20 MG: 20 TABLET, FILM COATED ORAL at 08:42

## 2017-01-01 RX ADMIN — IPRATROPIUM BROMIDE AND ALBUTEROL SULFATE 3 ML: .5; 3 SOLUTION RESPIRATORY (INHALATION) at 11:49

## 2017-01-01 RX ADMIN — KETOTIFEN FUMARATE 1 DROP: 0.35 SOLUTION/ DROPS OPHTHALMIC at 09:20

## 2017-01-01 RX ADMIN — KETOTIFEN FUMARATE 1 DROP: 0.25 SOLUTION/ DROPS OPHTHALMIC at 09:49

## 2017-01-01 RX ADMIN — LEVETIRACETAM 500 MG: 500 TABLET, FILM COATED ORAL at 10:31

## 2017-01-01 RX ADMIN — BUDESONIDE 0.5 MG: 0.5 SUSPENSION RESPIRATORY (INHALATION) at 11:21

## 2017-01-01 RX ADMIN — POTASSIUM CHLORIDE 10 MEQ: 750 TABLET, FILM COATED, EXTENDED RELEASE ORAL at 09:52

## 2017-01-01 RX ADMIN — FAMOTIDINE 20 MG: 20 TABLET ORAL at 08:23

## 2017-01-01 RX ADMIN — ASPIRIN 81 MG: 81 TABLET, COATED ORAL at 09:19

## 2017-01-01 RX ADMIN — Medication 150 MG: at 17:19

## 2017-01-01 RX ADMIN — ALLOPURINOL 100 MG: 100 TABLET ORAL at 17:32

## 2017-01-01 RX ADMIN — Medication 1 CAPSULE: at 08:04

## 2017-01-01 RX ADMIN — GUAIFENESIN 1200 MG: 600 TABLET, EXTENDED RELEASE ORAL at 18:47

## 2017-01-01 RX ADMIN — IPRATROPIUM BROMIDE AND ALBUTEROL SULFATE 3 ML: .5; 3 SOLUTION RESPIRATORY (INHALATION) at 20:42

## 2017-01-01 RX ADMIN — TRAMADOL HYDROCHLORIDE 100 MG: 50 TABLET, FILM COATED ORAL at 11:40

## 2017-01-01 RX ADMIN — DILTIAZEM HYDROCHLORIDE 120 MG: 60 TABLET, FILM COATED ORAL at 08:43

## 2017-01-01 RX ADMIN — MEGESTROL ACETATE 200 MG: 40 SUSPENSION ORAL at 17:29

## 2017-01-01 RX ADMIN — Medication 1 TABLET: at 10:01

## 2017-01-01 RX ADMIN — SERTRALINE HYDROCHLORIDE 100 MG: 50 TABLET ORAL at 21:45

## 2017-01-01 RX ADMIN — POTASSIUM CHLORIDE 20 MEQ: 1500 TABLET, EXTENDED RELEASE ORAL at 08:15

## 2017-01-01 RX ADMIN — Medication 150 MG: at 07:51

## 2017-01-01 RX ADMIN — FAMOTIDINE 20 MG: 20 TABLET, FILM COATED ORAL at 09:46

## 2017-01-01 RX ADMIN — DIPHENHYDRAMINE HYDROCHLORIDE 25 MG: 25 CAPSULE ORAL at 21:32

## 2017-01-01 RX ADMIN — IPRATROPIUM BROMIDE AND ALBUTEROL SULFATE 3 ML: .5; 3 SOLUTION RESPIRATORY (INHALATION) at 12:35

## 2017-01-01 RX ADMIN — METOPROLOL TARTRATE 100 MG: 25 TABLET, FILM COATED ORAL at 10:13

## 2017-01-01 RX ADMIN — DILTIAZEM HYDROCHLORIDE 240 MG: 120 CAPSULE, COATED, EXTENDED RELEASE ORAL at 08:37

## 2017-01-01 RX ADMIN — SERTRALINE 100 MG: 50 TABLET, FILM COATED ORAL at 21:45

## 2017-01-01 RX ADMIN — DILTIAZEM HYDROCHLORIDE 300 MG: 180 CAPSULE, COATED, EXTENDED RELEASE ORAL at 13:31

## 2017-01-01 RX ADMIN — DILTIAZEM HYDROCHLORIDE 240 MG: 120 CAPSULE, COATED, EXTENDED RELEASE ORAL at 10:09

## 2017-01-01 RX ADMIN — CONJUGATED ESTROGENS 1 APPLICATION: 0.62 CREAM VAGINAL at 20:09

## 2017-01-01 RX ADMIN — ASPIRIN 81 MG: 81 TABLET, COATED ORAL at 08:19

## 2017-01-01 RX ADMIN — METOPROLOL TARTRATE 2.5 MG: 1 INJECTION, SOLUTION INTRAVENOUS at 18:00

## 2017-01-01 RX ADMIN — METOPROLOL TARTRATE 75 MG: 25 TABLET, FILM COATED ORAL at 18:50

## 2017-01-01 RX ADMIN — LEVETIRACETAM 500 MG: 500 TABLET, FILM COATED ORAL at 20:03

## 2017-01-01 RX ADMIN — DIPHENHYDRAMINE HYDROCHLORIDE 25 MG: 25 CAPSULE ORAL at 22:40

## 2017-01-01 RX ADMIN — BUDESONIDE 0.5 MG: 0.5 SUSPENSION RESPIRATORY (INHALATION) at 20:11

## 2017-01-01 RX ADMIN — DIPHENHYDRAMINE HYDROCHLORIDE 25 MG: 25 CAPSULE ORAL at 23:45

## 2017-01-01 RX ADMIN — METOPROLOL TARTRATE 25 MG: 25 TABLET, FILM COATED ORAL at 09:11

## 2017-01-01 RX ADMIN — Medication 10 ML: at 09:02

## 2017-01-01 RX ADMIN — METOPROLOL TARTRATE 100 MG: 25 TABLET, FILM COATED ORAL at 07:59

## 2017-01-01 RX ADMIN — POTASSIUM CHLORIDE 20 MEQ: 1500 TABLET, EXTENDED RELEASE ORAL at 11:21

## 2017-01-01 RX ADMIN — CONJUGATED ESTROGENS 1 APPLICATION: 0.62 CREAM VAGINAL at 20:38

## 2017-01-01 RX ADMIN — TRAMADOL HYDROCHLORIDE 50 MG: 50 TABLET, FILM COATED ORAL at 05:45

## 2017-01-01 RX ADMIN — DILTIAZEM HYDROCHLORIDE 300 MG: 180 CAPSULE, COATED, EXTENDED RELEASE ORAL at 11:43

## 2017-01-01 RX ADMIN — SENNOSIDES AND DOCUSATE SODIUM 1 TABLET: 8.6; 5 TABLET ORAL at 20:01

## 2017-01-01 RX ADMIN — OFLOXACIN 50000 UNITS: 300 TABLET, COATED ORAL at 10:24

## 2017-01-01 RX ADMIN — DICLOFENAC SODIUM 4 G: 10 GEL TOPICAL at 21:46

## 2017-01-01 RX ADMIN — Medication 1 TABLET: at 17:08

## 2017-01-01 RX ADMIN — ISOSORBIDE DINITRATE 10 MG: 10 TABLET ORAL at 21:20

## 2017-01-01 RX ADMIN — DONEPEZIL HYDROCHLORIDE 10 MG: 5 TABLET ORAL at 20:19

## 2017-01-01 RX ADMIN — ALLOPURINOL 100 MG: 100 TABLET ORAL at 17:28

## 2017-01-01 RX ADMIN — Medication 150 MG: at 07:38

## 2017-01-01 RX ADMIN — METOPROLOL TARTRATE 25 MG: 25 TABLET, FILM COATED ORAL at 09:00

## 2017-01-01 RX ADMIN — Medication 150 MG: at 09:16

## 2017-01-01 RX ADMIN — LEVETIRACETAM 500 MG: 500 TABLET, FILM COATED ORAL at 09:01

## 2017-01-01 RX ADMIN — KETOTIFEN FUMARATE 1 DROP: 0.25 SOLUTION/ DROPS OPHTHALMIC at 20:20

## 2017-01-01 RX ADMIN — FAMOTIDINE 40 MG: 20 TABLET ORAL at 20:13

## 2017-01-01 RX ADMIN — Medication 1 CAPSULE: at 08:15

## 2017-01-01 RX ADMIN — LEVETIRACETAM 500 MG: 500 TABLET, FILM COATED ORAL at 10:38

## 2017-01-01 RX ADMIN — DILTIAZEM HYDROCHLORIDE 300 MG: 180 CAPSULE, COATED, EXTENDED RELEASE ORAL at 14:40

## 2017-01-01 RX ADMIN — Medication 150 MG: at 09:24

## 2017-01-01 RX ADMIN — BUDESONIDE 0.5 MG: 0.5 SUSPENSION RESPIRATORY (INHALATION) at 12:37

## 2017-01-01 RX ADMIN — STANDARDIZED SENNA CONCENTRATE 1 TABLET: 8.6 TABLET ORAL at 12:26

## 2017-01-01 RX ADMIN — LEVETIRACETAM 500 MG: 500 TABLET, FILM COATED ORAL at 23:10

## 2017-01-01 RX ADMIN — IPRATROPIUM BROMIDE AND ALBUTEROL SULFATE 3 ML: .5; 3 SOLUTION RESPIRATORY (INHALATION) at 20:52

## 2017-01-01 RX ADMIN — LORAZEPAM 0.5 MG: 0.5 TABLET ORAL at 00:02

## 2017-01-01 RX ADMIN — DONEPEZIL HYDROCHLORIDE 10 MG: 5 TABLET, FILM COATED ORAL at 20:11

## 2017-01-01 RX ADMIN — Medication 150 MG: at 17:47

## 2017-01-01 RX ADMIN — Medication 150 MG: at 09:12

## 2017-01-01 RX ADMIN — LEVOTHYROXINE SODIUM 150 MCG: 150 TABLET ORAL at 09:11

## 2017-01-01 RX ADMIN — ALLOPURINOL 100 MG: 100 TABLET ORAL at 21:13

## 2017-01-01 RX ADMIN — LEVOTHYROXINE SODIUM ANHYDROUS 100 MCG: 100 INJECTION, POWDER, LYOPHILIZED, FOR SOLUTION INTRAVENOUS at 06:32

## 2017-01-01 RX ADMIN — FUROSEMIDE 40 MG: 40 TABLET ORAL at 09:10

## 2017-01-01 RX ADMIN — LINAGLIPTIN 5 MG: 5 TABLET, FILM COATED ORAL at 09:02

## 2017-01-01 RX ADMIN — ENOXAPARIN SODIUM 110 MG: 120 INJECTION SUBCUTANEOUS at 08:48

## 2017-01-01 RX ADMIN — DOCUSATE SODIUM AND SENNOSIDES 1 TABLET: 8.6; 5 TABLET, FILM COATED ORAL at 20:56

## 2017-01-01 RX ADMIN — POTASSIUM CHLORIDE 40 MEQ: 1500 TABLET, EXTENDED RELEASE ORAL at 09:12

## 2017-01-01 RX ADMIN — ALLOPURINOL 100 MG: 100 TABLET ORAL at 21:24

## 2017-01-01 RX ADMIN — APIXABAN 5 MG: 2.5 TABLET, FILM COATED ORAL at 08:47

## 2017-01-01 RX ADMIN — ALLOPURINOL 100 MG: 100 TABLET ORAL at 22:11

## 2017-01-01 RX ADMIN — LEVETIRACETAM 500 MG: 500 TABLET, FILM COATED ORAL at 08:09

## 2017-01-01 RX ADMIN — ACETAMINOPHEN 650 MG: 325 TABLET, FILM COATED ORAL at 06:27

## 2017-01-01 RX ADMIN — DOCUSATE SODIUM AND SENNOSIDES 1 TABLET: 8.6; 5 TABLET, FILM COATED ORAL at 10:26

## 2017-01-01 RX ADMIN — DILTIAZEM HYDROCHLORIDE 240 MG: 120 CAPSULE, COATED, EXTENDED RELEASE ORAL at 10:15

## 2017-01-01 RX ADMIN — TERAZOSIN HYDROCHLORIDE 2 MG: 1 CAPSULE ORAL at 20:08

## 2017-01-01 RX ADMIN — MEGESTROL ACETATE 200 MG: 40 SUSPENSION ORAL at 10:00

## 2017-01-01 RX ADMIN — STANDARDIZED SENNA CONCENTRATE 1 TABLET: 8.6 TABLET ORAL at 17:33

## 2017-01-01 RX ADMIN — FAMOTIDINE 40 MG: 20 TABLET ORAL at 22:15

## 2017-01-01 RX ADMIN — POTASSIUM CHLORIDE 20 MEQ: 1500 TABLET, EXTENDED RELEASE ORAL at 09:50

## 2017-01-01 RX ADMIN — BUDESONIDE 0.5 MG: 0.5 SUSPENSION RESPIRATORY (INHALATION) at 20:15

## 2017-01-01 RX ADMIN — MEGESTROL ACETATE 200 MG: 40 SUSPENSION ORAL at 17:47

## 2017-01-01 RX ADMIN — PIPERACILLIN SODIUM,TAZOBACTAM SODIUM 3.38 G: 3; .375 INJECTION, POWDER, FOR SOLUTION INTRAVENOUS at 22:05

## 2017-01-01 RX ADMIN — ASPIRIN 81 MG: 81 TABLET, COATED ORAL at 08:43

## 2017-01-01 RX ADMIN — POTASSIUM CHLORIDE 10 MEQ: 750 TABLET, FILM COATED, EXTENDED RELEASE ORAL at 09:05

## 2017-01-01 RX ADMIN — Medication 150 MG: at 21:59

## 2017-01-01 RX ADMIN — METOPROLOL TARTRATE 75 MG: 25 TABLET, FILM COATED ORAL at 08:27

## 2017-01-01 RX ADMIN — CALCIUM CARBONATE 500 MG (1,250 MG)-VITAMIN D3 200 UNIT TABLET 500 MG: at 09:24

## 2017-01-01 RX ADMIN — APIXABAN 5 MG: 2.5 TABLET, FILM COATED ORAL at 10:37

## 2017-01-01 RX ADMIN — MICONAZOLE NITRATE: 20 POWDER TOPICAL at 21:11

## 2017-01-01 RX ADMIN — SENNOSIDES AND DOCUSATE SODIUM 1 TABLET: 8.6; 5 TABLET ORAL at 12:10

## 2017-01-01 RX ADMIN — METOPROLOL TARTRATE 100 MG: 25 TABLET, FILM COATED ORAL at 08:12

## 2017-01-01 RX ADMIN — KETOTIFEN FUMARATE 1 DROP: 0.25 SOLUTION/ DROPS OPHTHALMIC at 17:14

## 2017-01-01 RX ADMIN — Medication 1 TABLET: at 16:34

## 2017-01-01 RX ADMIN — DILTIAZEM HYDROCHLORIDE 120 MG: 60 TABLET, FILM COATED ORAL at 08:28

## 2017-01-01 RX ADMIN — ACETAMINOPHEN 650 MG: 325 TABLET, FILM COATED ORAL at 20:06

## 2017-01-01 RX ADMIN — ISOSORBIDE DINITRATE 10 MG: 10 TABLET ORAL at 09:03

## 2017-01-01 RX ADMIN — FAMOTIDINE 20 MG: 20 TABLET ORAL at 08:27

## 2017-01-01 RX ADMIN — LEVOTHYROXINE SODIUM 150 MCG: 150 TABLET ORAL at 06:34

## 2017-01-01 RX ADMIN — FAMOTIDINE 40 MG: 20 TABLET ORAL at 23:32

## 2017-01-01 RX ADMIN — KETOTIFEN FUMARATE 1 DROP: 0.25 SOLUTION/ DROPS OPHTHALMIC at 09:38

## 2017-01-01 RX ADMIN — TRAMADOL HYDROCHLORIDE 50 MG: 50 TABLET, FILM COATED ORAL at 20:56

## 2017-01-01 RX ADMIN — Medication 1 TABLET: at 18:28

## 2017-01-01 RX ADMIN — MICONAZOLE NITRATE 1 APPLICATION: 20 POWDER TOPICAL at 08:32

## 2017-01-01 RX ADMIN — APIXABAN 5 MG: 2.5 TABLET, FILM COATED ORAL at 09:28

## 2017-01-01 RX ADMIN — OFLOXACIN 50000 UNITS: 300 TABLET, COATED ORAL at 09:08

## 2017-01-01 RX ADMIN — TRAMADOL HYDROCHLORIDE 100 MG: 50 TABLET, FILM COATED ORAL at 21:32

## 2017-01-01 RX ADMIN — KETOTIFEN FUMARATE 1 DROP: 0.35 SOLUTION/ DROPS OPHTHALMIC at 08:43

## 2017-01-01 RX ADMIN — LEVETIRACETAM 500 MG: 500 TABLET, FILM COATED ORAL at 16:43

## 2017-01-01 RX ADMIN — APIXABAN 5 MG: 2.5 TABLET, FILM COATED ORAL at 20:50

## 2017-01-01 RX ADMIN — OFLOXACIN 50000 UNITS: 300 TABLET, COATED ORAL at 12:12

## 2017-01-01 RX ADMIN — CONJUGATED ESTROGENS 1 APPLICATION: 0.62 CREAM VAGINAL at 20:26

## 2017-01-01 RX ADMIN — FAMOTIDINE 20 MG: 20 TABLET ORAL at 08:43

## 2017-01-01 RX ADMIN — MICONAZOLE NITRATE: 20 POWDER TOPICAL at 07:56

## 2017-01-01 RX ADMIN — ALLOPURINOL 100 MG: 100 TABLET ORAL at 22:23

## 2017-01-01 RX ADMIN — Medication 1 CAPSULE: at 09:28

## 2017-01-01 RX ADMIN — INSULIN ASPART 2 UNITS: 100 INJECTION, SOLUTION INTRAVENOUS; SUBCUTANEOUS at 11:43

## 2017-01-01 RX ADMIN — METOPROLOL TARTRATE 100 MG: 25 TABLET, FILM COATED ORAL at 08:26

## 2017-01-01 RX ADMIN — DILTIAZEM HYDROCHLORIDE 120 MG: 60 TABLET, FILM COATED ORAL at 08:32

## 2017-01-01 RX ADMIN — MULTIPLE VITAMINS W/ MINERALS TAB 1 TABLET: TAB at 10:24

## 2017-01-01 RX ADMIN — LEVETIRACETAM 500 MG: 500 TABLET, FILM COATED ORAL at 10:08

## 2017-01-01 RX ADMIN — Medication 10 ML: at 11:06

## 2017-01-01 RX ADMIN — ALLOPURINOL 100 MG: 100 TABLET ORAL at 07:47

## 2017-01-01 RX ADMIN — LEVETIRACETAM 500 MG: 500 TABLET, FILM COATED ORAL at 17:24

## 2017-01-01 RX ADMIN — AMIODARONE HYDROCHLORIDE 200 MG: 200 TABLET ORAL at 08:15

## 2017-01-01 RX ADMIN — SODIUM CHLORIDE 1000 ML: 900 INJECTION, SOLUTION INTRAVENOUS at 21:40

## 2017-01-01 RX ADMIN — KETOTIFEN FUMARATE 1 DROP: 0.35 SOLUTION/ DROPS OPHTHALMIC at 09:19

## 2017-01-01 RX ADMIN — IPRATROPIUM BROMIDE AND ALBUTEROL SULFATE 3 ML: .5; 3 SOLUTION RESPIRATORY (INHALATION) at 11:36

## 2017-01-01 RX ADMIN — INSULIN ASPART 5 UNITS: 100 INJECTION, SOLUTION INTRAVENOUS; SUBCUTANEOUS at 19:50

## 2017-01-01 RX ADMIN — BUDESONIDE 0.5 MG: 0.5 SUSPENSION RESPIRATORY (INHALATION) at 20:45

## 2017-01-01 RX ADMIN — POTASSIUM CHLORIDE 40 MEQ: 1500 TABLET, EXTENDED RELEASE ORAL at 14:51

## 2017-01-01 RX ADMIN — Medication 10 ML: at 17:44

## 2017-01-01 RX ADMIN — DONEPEZIL HYDROCHLORIDE 10 MG: 5 TABLET ORAL at 22:29

## 2017-01-01 RX ADMIN — SENNOSIDES AND DOCUSATE SODIUM 1 TABLET: 8.6; 5 TABLET ORAL at 09:23

## 2017-01-01 RX ADMIN — INSULIN ASPART 3 UNITS: 100 INJECTION, SOLUTION INTRAVENOUS; SUBCUTANEOUS at 20:10

## 2017-01-01 RX ADMIN — INSULIN ASPART 2 UNITS: 100 INJECTION, SOLUTION INTRAVENOUS; SUBCUTANEOUS at 22:13

## 2017-01-01 RX ADMIN — LEVETIRACETAM 500 MG: 500 TABLET, FILM COATED ORAL at 10:26

## 2017-01-01 RX ADMIN — KETOTIFEN FUMARATE 1 DROP: 0.35 SOLUTION/ DROPS OPHTHALMIC at 09:35

## 2017-01-01 RX ADMIN — LEVOTHYROXINE SODIUM 150 MCG: 150 TABLET ORAL at 05:01

## 2017-01-01 RX ADMIN — GUAIFENESIN 1200 MG: 600 TABLET, EXTENDED RELEASE ORAL at 09:01

## 2017-01-01 RX ADMIN — STANDARDIZED SENNA CONCENTRATE 1 TABLET: 8.6 TABLET ORAL at 09:02

## 2017-01-01 RX ADMIN — GUAIFENESIN 1200 MG: 600 TABLET, EXTENDED RELEASE ORAL at 08:52

## 2017-01-01 RX ADMIN — ALUMINUM HYDROXIDE, MAGNESIUM HYDROXIDE, AND DIMETHICONE 15 ML: 400; 400; 40 SUSPENSION ORAL at 13:24

## 2017-01-01 RX ADMIN — KETOTIFEN FUMARATE 1 DROP: 0.25 SOLUTION/ DROPS OPHTHALMIC at 08:57

## 2017-01-01 RX ADMIN — OFLOXACIN 50000 UNITS: 300 TABLET, COATED ORAL at 17:13

## 2017-01-01 RX ADMIN — IPRATROPIUM BROMIDE AND ALBUTEROL SULFATE 3 ML: .5; 3 SOLUTION RESPIRATORY (INHALATION) at 15:25

## 2017-01-01 RX ADMIN — STANDARDIZED SENNA CONCENTRATE 1 TABLET: 8.6 TABLET ORAL at 18:17

## 2017-01-01 RX ADMIN — INSULIN ASPART 2 UNITS: 100 INJECTION, SOLUTION INTRAVENOUS; SUBCUTANEOUS at 08:51

## 2017-01-01 RX ADMIN — METOPROLOL TARTRATE 100 MG: 25 TABLET, FILM COATED ORAL at 22:26

## 2017-01-01 RX ADMIN — DILTIAZEM HYDROCHLORIDE 300 MG: 180 CAPSULE, COATED, EXTENDED RELEASE ORAL at 14:14

## 2017-01-01 RX ADMIN — IPRATROPIUM BROMIDE AND ALBUTEROL SULFATE 3 ML: .5; 3 SOLUTION RESPIRATORY (INHALATION) at 13:17

## 2017-01-01 RX ADMIN — MICONAZOLE NITRATE: 20 POWDER TOPICAL at 20:45

## 2017-01-01 RX ADMIN — INSULIN ASPART 3 UNITS: 100 INJECTION, SOLUTION INTRAVENOUS; SUBCUTANEOUS at 09:27

## 2017-01-01 RX ADMIN — ASPIRIN 81 MG: 81 TABLET, COATED ORAL at 08:54

## 2017-01-01 RX ADMIN — FAMOTIDINE 40 MG: 20 TABLET ORAL at 23:15

## 2017-01-01 RX ADMIN — DIPHENHYDRAMINE HYDROCHLORIDE 25 MG: 25 CAPSULE ORAL at 20:01

## 2017-01-01 RX ADMIN — KETOTIFEN FUMARATE 1 DROP: 0.25 SOLUTION/ DROPS OPHTHALMIC at 07:46

## 2017-01-01 RX ADMIN — SERTRALINE HYDROCHLORIDE 100 MG: 50 TABLET ORAL at 20:12

## 2017-01-01 RX ADMIN — GUAIFENESIN 1200 MG: 600 TABLET, EXTENDED RELEASE ORAL at 07:29

## 2017-01-01 RX ADMIN — ISOSORBIDE DINITRATE 10 MG: 10 TABLET ORAL at 07:39

## 2017-01-01 RX ADMIN — APIXABAN 5 MG: 2.5 TABLET, FILM COATED ORAL at 22:12

## 2017-01-01 RX ADMIN — FAMOTIDINE 40 MG: 20 TABLET ORAL at 22:14

## 2017-01-01 RX ADMIN — LINAGLIPTIN 5 MG: 5 TABLET, FILM COATED ORAL at 10:59

## 2017-01-01 RX ADMIN — FAMOTIDINE 20 MG: 20 TABLET, FILM COATED ORAL at 09:37

## 2017-01-01 RX ADMIN — DOXAZOSIN MESYLATE 2 MG: 2 TABLET ORAL at 20:42

## 2017-01-01 RX ADMIN — AMIODARONE HYDROCHLORIDE 200 MG: 200 TABLET ORAL at 19:45

## 2017-01-01 RX ADMIN — IPRATROPIUM BROMIDE AND ALBUTEROL SULFATE 3 ML: .5; 3 SOLUTION RESPIRATORY (INHALATION) at 22:39

## 2017-01-01 RX ADMIN — TERAZOSIN HYDROCHLORIDE 2 MG: 1 CAPSULE ORAL at 21:04

## 2017-01-01 RX ADMIN — TRAMADOL HYDROCHLORIDE 100 MG: 50 TABLET, FILM COATED ORAL at 20:33

## 2017-01-01 RX ADMIN — FUROSEMIDE 40 MG: 40 TABLET ORAL at 12:37

## 2017-01-01 RX ADMIN — METOPROLOL SUCCINATE 100 MG: 50 TABLET, EXTENDED RELEASE ORAL at 21:08

## 2017-01-01 RX ADMIN — DOXAZOSIN MESYLATE 2 MG: 2 TABLET ORAL at 22:33

## 2017-01-01 RX ADMIN — GLIPIZIDE 5 MG: 2.5 TABLET, FILM COATED, EXTENDED RELEASE ORAL at 09:01

## 2017-01-01 RX ADMIN — LEVETIRACETAM 500 MG: 500 TABLET, FILM COATED ORAL at 22:28

## 2017-01-01 RX ADMIN — IPRATROPIUM BROMIDE AND ALBUTEROL SULFATE 3 ML: .5; 3 SOLUTION RESPIRATORY (INHALATION) at 08:56

## 2017-01-01 RX ADMIN — ALLOPURINOL 100 MG: 100 TABLET ORAL at 19:04

## 2017-01-01 RX ADMIN — DOCUSATE SODIUM AND SENNOSIDES 1 TABLET: 8.6; 5 TABLET, FILM COATED ORAL at 20:10

## 2017-01-01 RX ADMIN — KETOTIFEN FUMARATE 1 DROP: 0.25 SOLUTION/ DROPS OPHTHALMIC at 08:01

## 2017-01-01 RX ADMIN — LEVETIRACETAM 500 MG: 500 TABLET, FILM COATED ORAL at 09:23

## 2017-01-01 RX ADMIN — LEVETIRACETAM 500 MG: 500 TABLET, FILM COATED ORAL at 20:25

## 2017-01-01 RX ADMIN — ALLOPURINOL 100 MG: 100 TABLET ORAL at 17:20

## 2017-01-01 RX ADMIN — POTASSIUM CHLORIDE 20 MEQ: 1500 TABLET, EXTENDED RELEASE ORAL at 09:09

## 2017-01-01 RX ADMIN — METOPROLOL TARTRATE 75 MG: 25 TABLET, FILM COATED ORAL at 10:59

## 2017-01-01 RX ADMIN — LEVETIRACETAM 500 MG: 500 TABLET, FILM COATED ORAL at 09:31

## 2017-01-01 RX ADMIN — Medication 150 MG: at 17:11

## 2017-01-01 RX ADMIN — Medication 1 CAPSULE: at 10:12

## 2017-01-01 RX ADMIN — GUAIFENESIN 1200 MG: 600 TABLET, EXTENDED RELEASE ORAL at 08:03

## 2017-01-01 RX ADMIN — IBANDRONATE SODIUM 150 MG: 150 TABLET, FILM COATED ORAL at 08:12

## 2017-01-01 RX ADMIN — Medication 10 ML: at 19:52

## 2017-01-01 RX ADMIN — LEVOTHYROXINE SODIUM 150 MCG: 150 TABLET ORAL at 05:06

## 2017-01-01 RX ADMIN — STANDARDIZED SENNA CONCENTRATE 1 TABLET: 8.6 TABLET ORAL at 18:34

## 2017-01-01 RX ADMIN — AMIODARONE HYDROCHLORIDE 200 MG: 200 TABLET ORAL at 09:16

## 2017-01-01 RX ADMIN — Medication 150 MG: at 09:04

## 2017-01-01 RX ADMIN — FAMOTIDINE 40 MG: 20 TABLET ORAL at 20:44

## 2017-01-01 RX ADMIN — CALCIUM CARBONATE 500 MG (1,250 MG)-VITAMIN D3 200 UNIT TABLET 500 MG: at 10:18

## 2017-01-01 RX ADMIN — SENNOSIDES AND DOCUSATE SODIUM 1 TABLET: 8.6; 5 TABLET ORAL at 22:24

## 2017-01-01 RX ADMIN — IPRATROPIUM BROMIDE AND ALBUTEROL SULFATE 3 ML: .5; 3 SOLUTION RESPIRATORY (INHALATION) at 17:51

## 2017-01-01 RX ADMIN — LEVOTHYROXINE SODIUM 150 MCG: 150 TABLET ORAL at 09:41

## 2017-01-01 RX ADMIN — Medication 150 MG: at 16:55

## 2017-01-01 RX ADMIN — CONJUGATED ESTROGENS 1 APPLICATION: 0.62 CREAM VAGINAL at 20:21

## 2017-01-01 RX ADMIN — MICONAZOLE NITRATE: 20 POWDER TOPICAL at 08:05

## 2017-01-01 RX ADMIN — STANDARDIZED SENNA CONCENTRATE 1 TABLET: 8.6 TABLET ORAL at 19:20

## 2017-01-01 RX ADMIN — STANDARDIZED SENNA CONCENTRATE 1 TABLET: 8.6 TABLET ORAL at 08:52

## 2017-01-01 RX ADMIN — ISOSORBIDE DINITRATE 10 MG: 10 TABLET ORAL at 21:15

## 2017-01-01 RX ADMIN — DILTIAZEM HYDROCHLORIDE 300 MG: 180 CAPSULE, COATED, EXTENDED RELEASE ORAL at 12:18

## 2017-01-01 RX ADMIN — AMIODARONE HYDROCHLORIDE 200 MG: 200 TABLET ORAL at 20:02

## 2017-01-01 RX ADMIN — AMIODARONE HYDROCHLORIDE 200 MG: 200 TABLET ORAL at 20:37

## 2017-01-01 RX ADMIN — METOPROLOL TARTRATE 75 MG: 25 TABLET, FILM COATED ORAL at 09:48

## 2017-01-01 RX ADMIN — LEVOFLOXACIN 750 MG: 750 TABLET, FILM COATED ORAL at 20:42

## 2017-01-01 RX ADMIN — GUAIFENESIN 1200 MG: 600 TABLET, EXTENDED RELEASE ORAL at 17:08

## 2017-01-01 RX ADMIN — AMIODARONE HYDROCHLORIDE 200 MG: 200 TABLET ORAL at 18:14

## 2017-01-01 RX ADMIN — Medication 150 MG: at 18:16

## 2017-01-01 RX ADMIN — SENNOSIDES AND DOCUSATE SODIUM 1 TABLET: 8.6; 5 TABLET ORAL at 20:41

## 2017-01-01 RX ADMIN — TRAMADOL HYDROCHLORIDE 50 MG: 50 TABLET, FILM COATED ORAL at 23:42

## 2017-01-01 RX ADMIN — ISOSORBIDE DINITRATE 10 MG: 10 TABLET ORAL at 10:47

## 2017-01-01 RX ADMIN — IPRATROPIUM BROMIDE AND ALBUTEROL SULFATE 3 ML: .5; 3 SOLUTION RESPIRATORY (INHALATION) at 17:02

## 2017-01-01 RX ADMIN — KETOTIFEN FUMARATE 1 DROP: 0.35 SOLUTION/ DROPS OPHTHALMIC at 09:28

## 2017-01-01 RX ADMIN — LEVETIRACETAM 500 MG: 500 TABLET, FILM COATED ORAL at 08:50

## 2017-01-01 RX ADMIN — IPRATROPIUM BROMIDE AND ALBUTEROL SULFATE 3 ML: .5; 3 SOLUTION RESPIRATORY (INHALATION) at 08:18

## 2017-01-01 RX ADMIN — DIPHENHYDRAMINE HYDROCHLORIDE 25 MG: 25 CAPSULE ORAL at 15:06

## 2017-01-01 RX ADMIN — DILTIAZEM HYDROCHLORIDE 300 MG: 180 CAPSULE, COATED, EXTENDED RELEASE ORAL at 12:32

## 2017-01-01 RX ADMIN — LEVETIRACETAM 500 MG: 500 TABLET, FILM COATED ORAL at 09:54

## 2017-01-01 RX ADMIN — SENNOSIDES AND DOCUSATE SODIUM 1 TABLET: 8.6; 5 TABLET ORAL at 11:20

## 2017-01-01 RX ADMIN — CONJUGATED ESTROGENS 0.75 APPLICATOR: 0.62 CREAM VAGINAL at 20:11

## 2017-01-01 RX ADMIN — IPRATROPIUM BROMIDE AND ALBUTEROL SULFATE 3 ML: .5; 3 SOLUTION RESPIRATORY (INHALATION) at 08:09

## 2017-01-01 RX ADMIN — POTASSIUM CHLORIDE 20 MEQ: 1500 TABLET, EXTENDED RELEASE ORAL at 09:39

## 2017-01-01 RX ADMIN — LEVOTHYROXINE SODIUM 150 MCG: 150 TABLET ORAL at 05:27

## 2017-01-01 RX ADMIN — SERTRALINE 100 MG: 50 TABLET, FILM COATED ORAL at 20:49

## 2017-01-01 RX ADMIN — METOPROLOL TARTRATE 50 MG: 50 TABLET, FILM COATED ORAL at 08:20

## 2017-01-01 RX ADMIN — METOPROLOL TARTRATE 75 MG: 25 TABLET, FILM COATED ORAL at 18:07

## 2017-01-01 RX ADMIN — IPRATROPIUM BROMIDE AND ALBUTEROL SULFATE 3 ML: .5; 3 SOLUTION RESPIRATORY (INHALATION) at 09:22

## 2017-01-01 RX ADMIN — TRAMADOL HYDROCHLORIDE 100 MG: 50 TABLET, FILM COATED ORAL at 17:37

## 2017-01-01 RX ADMIN — ISOSORBIDE DINITRATE 10 MG: 10 TABLET ORAL at 20:36

## 2017-01-01 RX ADMIN — METOPROLOL SUCCINATE 100 MG: 50 TABLET, EXTENDED RELEASE ORAL at 10:09

## 2017-01-01 RX ADMIN — POTASSIUM CHLORIDE 40 MEQ: 1500 TABLET, EXTENDED RELEASE ORAL at 11:55

## 2017-01-01 RX ADMIN — LEVOTHYROXINE SODIUM 150 MCG: 150 TABLET ORAL at 06:39

## 2017-01-01 RX ADMIN — BUDESONIDE 0.5 MG: 0.5 SUSPENSION RESPIRATORY (INHALATION) at 08:40

## 2017-01-01 RX ADMIN — MICONAZOLE NITRATE: 20 POWDER TOPICAL at 21:29

## 2017-01-01 RX ADMIN — ISOSORBIDE DINITRATE 10 MG: 10 TABLET ORAL at 10:30

## 2017-01-01 RX ADMIN — POTASSIUM CHLORIDE 10 MEQ: 750 TABLET, FILM COATED, EXTENDED RELEASE ORAL at 10:36

## 2017-01-01 RX ADMIN — BUDESONIDE 0.5 MG: 0.5 SUSPENSION RESPIRATORY (INHALATION) at 22:15

## 2017-01-01 RX ADMIN — Medication 1 TABLET: at 09:40

## 2017-01-01 RX ADMIN — Medication 1 TABLET: at 07:53

## 2017-01-01 RX ADMIN — ISOSORBIDE DINITRATE 10 MG: 10 TABLET ORAL at 10:02

## 2017-01-01 RX ADMIN — MICONAZOLE NITRATE: 20 POWDER TOPICAL at 10:17

## 2017-01-01 RX ADMIN — BUDESONIDE 0.5 MG: 0.5 SUSPENSION RESPIRATORY (INHALATION) at 07:20

## 2017-01-01 RX ADMIN — LEVETIRACETAM 500 MG: 500 TABLET, FILM COATED ORAL at 08:03

## 2017-01-01 RX ADMIN — TERAZOSIN HYDROCHLORIDE 2 MG: 1 CAPSULE ORAL at 09:49

## 2017-01-01 RX ADMIN — METOPROLOL SUCCINATE 100 MG: 50 TABLET, EXTENDED RELEASE ORAL at 22:15

## 2017-01-01 RX ADMIN — STANDARDIZED SENNA CONCENTRATE 1 TABLET: 8.6 TABLET ORAL at 08:33

## 2017-01-01 RX ADMIN — IPRATROPIUM BROMIDE AND ALBUTEROL SULFATE 3 ML: .5; 3 SOLUTION RESPIRATORY (INHALATION) at 20:29

## 2017-01-01 RX ADMIN — Medication 1 CAPSULE: at 07:39

## 2017-01-01 RX ADMIN — SENNOSIDES AND DOCUSATE SODIUM 1 TABLET: 8.6; 5 TABLET ORAL at 09:57

## 2017-01-01 RX ADMIN — Medication 150 MG: at 16:28

## 2017-01-01 RX ADMIN — FAMOTIDINE 20 MG: 20 TABLET ORAL at 17:49

## 2017-01-01 RX ADMIN — GUAIFENESIN 1200 MG: 600 TABLET, EXTENDED RELEASE ORAL at 17:25

## 2017-01-01 RX ADMIN — LEVETIRACETAM 500 MG: 500 TABLET, FILM COATED ORAL at 22:15

## 2017-01-01 RX ADMIN — Medication 10 ML: at 08:38

## 2017-01-01 RX ADMIN — Medication 150 MG: at 09:48

## 2017-01-01 RX ADMIN — FAMOTIDINE 40 MG: 20 TABLET ORAL at 21:58

## 2017-01-01 RX ADMIN — SERTRALINE HYDROCHLORIDE 100 MG: 50 TABLET ORAL at 20:56

## 2017-01-01 RX ADMIN — TRAMADOL HYDROCHLORIDE 100 MG: 50 TABLET, FILM COATED ORAL at 20:23

## 2017-01-01 RX ADMIN — GLIPIZIDE 5 MG: 2.5 TABLET, FILM COATED, EXTENDED RELEASE ORAL at 08:45

## 2017-01-01 RX ADMIN — ALLOPURINOL 100 MG: 100 TABLET ORAL at 20:16

## 2017-01-01 RX ADMIN — Medication 150 MG: at 17:25

## 2017-01-01 RX ADMIN — AMIODARONE HYDROCHLORIDE 200 MG: 200 TABLET ORAL at 17:26

## 2017-01-01 RX ADMIN — ALLOPURINOL 100 MG: 100 TABLET ORAL at 09:36

## 2017-01-01 RX ADMIN — LEVETIRACETAM 500 MG: 500 TABLET, FILM COATED ORAL at 07:39

## 2017-01-01 RX ADMIN — SERTRALINE 100 MG: 50 TABLET, FILM COATED ORAL at 21:47

## 2017-01-01 RX ADMIN — KETOTIFEN FUMARATE 1 DROP: 0.25 SOLUTION/ DROPS OPHTHALMIC at 09:23

## 2017-01-01 RX ADMIN — TRAMADOL HYDROCHLORIDE 50 MG: 50 TABLET, FILM COATED ORAL at 00:11

## 2017-01-01 RX ADMIN — GUAIFENESIN 1200 MG: 600 TABLET, EXTENDED RELEASE ORAL at 07:55

## 2017-01-01 RX ADMIN — DIPHENHYDRAMINE HYDROCHLORIDE 25 MG: 25 CAPSULE ORAL at 22:08

## 2017-01-01 RX ADMIN — GLIPIZIDE 5 MG: 5 TABLET ORAL at 17:18

## 2017-01-01 RX ADMIN — DOCUSATE SODIUM AND SENNOSIDES 1 TABLET: 8.6; 5 TABLET, FILM COATED ORAL at 07:43

## 2017-01-01 RX ADMIN — LINAGLIPTIN 5 MG: 5 TABLET, FILM COATED ORAL at 09:55

## 2017-01-01 RX ADMIN — MEGESTROL ACETATE 200 MG: 40 SUSPENSION ORAL at 18:13

## 2017-01-01 RX ADMIN — POTASSIUM CHLORIDE 10 MEQ: 750 TABLET, FILM COATED, EXTENDED RELEASE ORAL at 08:41

## 2017-01-01 RX ADMIN — LINAGLIPTIN 5 MG: 5 TABLET, FILM COATED ORAL at 09:11

## 2017-01-01 RX ADMIN — Medication 150 MG: at 08:22

## 2017-01-01 RX ADMIN — ENOXAPARIN SODIUM 100 MG: 100 INJECTION SUBCUTANEOUS at 01:20

## 2017-01-01 RX ADMIN — SENNOSIDES AND DOCUSATE SODIUM 1 TABLET: 8.6; 5 TABLET ORAL at 21:47

## 2017-01-01 RX ADMIN — APIXABAN 5 MG: 2.5 TABLET, FILM COATED ORAL at 10:35

## 2017-01-01 RX ADMIN — KETOTIFEN FUMARATE 1 DROP: 0.35 SOLUTION/ DROPS OPHTHALMIC at 09:17

## 2017-01-01 RX ADMIN — DONEPEZIL HYDROCHLORIDE 10 MG: 5 TABLET ORAL at 22:20

## 2017-01-01 RX ADMIN — IPRATROPIUM BROMIDE AND ALBUTEROL SULFATE 3 ML: .5; 3 SOLUTION RESPIRATORY (INHALATION) at 15:04

## 2017-01-01 RX ADMIN — APIXABAN 5 MG: 2.5 TABLET, FILM COATED ORAL at 10:32

## 2017-01-01 RX ADMIN — BUDESONIDE 0.5 MG: 0.5 SUSPENSION RESPIRATORY (INHALATION) at 08:04

## 2017-01-01 RX ADMIN — Medication 10 ML: at 21:17

## 2017-01-01 RX ADMIN — MEGESTROL ACETATE 200 MG: 40 SUSPENSION ORAL at 17:01

## 2017-01-01 RX ADMIN — MICONAZOLE NITRATE: 20 POWDER TOPICAL at 07:55

## 2017-01-01 RX ADMIN — METOPROLOL TARTRATE 75 MG: 25 TABLET, FILM COATED ORAL at 18:32

## 2017-01-01 RX ADMIN — FAMOTIDINE 40 MG: 20 TABLET, FILM COATED ORAL at 17:07

## 2017-01-01 RX ADMIN — DICLOFENAC SODIUM 4 G: 10 GEL TOPICAL at 20:43

## 2017-01-01 RX ADMIN — METOPROLOL SUCCINATE 100 MG: 50 TABLET, EXTENDED RELEASE ORAL at 09:36

## 2017-01-01 RX ADMIN — IPRATROPIUM BROMIDE AND ALBUTEROL SULFATE 3 ML: .5; 3 SOLUTION RESPIRATORY (INHALATION) at 10:53

## 2017-01-01 RX ADMIN — BUDESONIDE 0.5 MG: 0.5 SUSPENSION RESPIRATORY (INHALATION) at 22:37

## 2017-01-01 RX ADMIN — ISOSORBIDE DINITRATE 10 MG: 10 TABLET ORAL at 22:14

## 2017-01-01 RX ADMIN — LEVETIRACETAM 500 MG: 500 TABLET, FILM COATED ORAL at 09:13

## 2017-01-01 RX ADMIN — SERTRALINE 100 MG: 50 TABLET, FILM COATED ORAL at 20:20

## 2017-01-01 RX ADMIN — INSULIN ASPART 2 UNITS: 100 INJECTION, SOLUTION INTRAVENOUS; SUBCUTANEOUS at 08:32

## 2017-01-01 RX ADMIN — APIXABAN 5 MG: 2.5 TABLET, FILM COATED ORAL at 21:49

## 2017-01-01 RX ADMIN — METOPROLOL SUCCINATE 100 MG: 50 TABLET, EXTENDED RELEASE ORAL at 10:33

## 2017-01-01 RX ADMIN — Medication 1 TABLET: at 08:26

## 2017-01-01 RX ADMIN — SERTRALINE 100 MG: 50 TABLET, FILM COATED ORAL at 22:29

## 2017-01-01 RX ADMIN — METOPROLOL SUCCINATE 100 MG: 50 TABLET, EXTENDED RELEASE ORAL at 08:53

## 2017-01-01 RX ADMIN — OFLOXACIN 50000 UNITS: 300 TABLET, COATED ORAL at 09:50

## 2017-01-01 RX ADMIN — CLINDAMYCIN HYDROCHLORIDE 450 MG: 150 CAPSULE ORAL at 19:48

## 2017-01-01 RX ADMIN — AMIODARONE HYDROCHLORIDE 200 MG: 200 TABLET ORAL at 12:23

## 2017-01-01 RX ADMIN — GLIPIZIDE 5 MG: 2.5 TABLET, FILM COATED, EXTENDED RELEASE ORAL at 08:13

## 2017-01-01 RX ADMIN — SENNOSIDES AND DOCUSATE SODIUM 1 TABLET: 8.6; 5 TABLET ORAL at 20:40

## 2017-01-01 RX ADMIN — CONJUGATED ESTROGENS 0.75 APPLICATOR: 0.62 CREAM VAGINAL at 20:16

## 2017-01-01 RX ADMIN — GLIPIZIDE 5 MG: 5 TABLET ORAL at 17:47

## 2017-01-01 RX ADMIN — IPRATROPIUM BROMIDE AND ALBUTEROL SULFATE 3 ML: .5; 3 SOLUTION RESPIRATORY (INHALATION) at 20:14

## 2017-01-01 RX ADMIN — IPRATROPIUM BROMIDE AND ALBUTEROL SULFATE 3 ML: .5; 3 SOLUTION RESPIRATORY (INHALATION) at 16:56

## 2017-01-01 RX ADMIN — MICONAZOLE NITRATE: 20 POWDER TOPICAL at 23:07

## 2017-01-01 RX ADMIN — MEGESTROL ACETATE 200 MG: 40 SUSPENSION ORAL at 09:34

## 2017-01-01 RX ADMIN — INSULIN ASPART 2 UNITS: 100 INJECTION, SOLUTION INTRAVENOUS; SUBCUTANEOUS at 08:42

## 2017-01-01 RX ADMIN — TRAMADOL HYDROCHLORIDE 100 MG: 50 TABLET, FILM COATED ORAL at 05:51

## 2017-01-01 RX ADMIN — PIPERACILLIN SODIUM AND TAZOBACTAM SODIUM 3.38 G: 3; .375 INJECTION, POWDER, LYOPHILIZED, FOR SOLUTION INTRAVENOUS at 22:58

## 2017-01-01 RX ADMIN — GUAIFENESIN 1200 MG: 600 TABLET, EXTENDED RELEASE ORAL at 09:16

## 2017-01-01 RX ADMIN — ISOSORBIDE DINITRATE 10 MG: 10 TABLET ORAL at 20:25

## 2017-01-01 RX ADMIN — SERTRALINE HYDROCHLORIDE 100 MG: 50 TABLET ORAL at 20:41

## 2017-01-01 RX ADMIN — ALLOPURINOL 100 MG: 100 TABLET ORAL at 22:30

## 2017-01-01 RX ADMIN — IPRATROPIUM BROMIDE AND ALBUTEROL SULFATE 3 ML: .5; 3 SOLUTION RESPIRATORY (INHALATION) at 13:04

## 2017-01-01 RX ADMIN — INSULIN ASPART 2 UNITS: 100 INJECTION, SOLUTION INTRAVENOUS; SUBCUTANEOUS at 09:30

## 2017-01-01 RX ADMIN — APIXABAN 5 MG: 2.5 TABLET, FILM COATED ORAL at 10:04

## 2017-01-01 RX ADMIN — METOPROLOL SUCCINATE 100 MG: 50 TABLET, EXTENDED RELEASE ORAL at 21:12

## 2017-01-01 RX ADMIN — MICONAZOLE NITRATE 1 APPLICATION: 20 POWDER TOPICAL at 20:48

## 2017-01-01 RX ADMIN — SENNOSIDES AND DOCUSATE SODIUM 1 TABLET: 8.6; 5 TABLET ORAL at 10:30

## 2017-01-01 RX ADMIN — ISOSORBIDE DINITRATE 10 MG: 10 TABLET ORAL at 10:09

## 2017-01-01 RX ADMIN — LEVETIRACETAM 500 MG: 500 TABLET, FILM COATED ORAL at 16:51

## 2017-01-01 RX ADMIN — KETOTIFEN FUMARATE 1 DROP: 0.35 SOLUTION/ DROPS OPHTHALMIC at 20:27

## 2017-01-01 RX ADMIN — STANDARDIZED SENNA CONCENTRATE 1 TABLET: 8.6 TABLET ORAL at 09:04

## 2017-01-01 RX ADMIN — MEGESTROL ACETATE 200 MG: 40 SUSPENSION ORAL at 08:50

## 2017-01-01 RX ADMIN — BUDESONIDE 0.5 MG: 0.5 SUSPENSION RESPIRATORY (INHALATION) at 20:54

## 2017-01-01 RX ADMIN — CLINDAMYCIN HYDROCHLORIDE 450 MG: 150 CAPSULE ORAL at 20:18

## 2017-01-01 RX ADMIN — BUDESONIDE 0.5 MG: 0.5 SUSPENSION RESPIRATORY (INHALATION) at 19:42

## 2017-01-01 RX ADMIN — GUAIFENESIN 1200 MG: 600 TABLET, EXTENDED RELEASE ORAL at 23:03

## 2017-01-01 RX ADMIN — CONJUGATED ESTROGENS 0.75 APPLICATOR: 0.62 CREAM VAGINAL at 20:29

## 2017-01-01 RX ADMIN — APIXABAN 5 MG: 2.5 TABLET, FILM COATED ORAL at 22:09

## 2017-01-01 RX ADMIN — DONEPEZIL HYDROCHLORIDE 10 MG: 5 TABLET ORAL at 22:07

## 2017-01-01 RX ADMIN — MEROPENEM 1 G: 1 INJECTION, POWDER, FOR SOLUTION INTRAVENOUS at 10:53

## 2017-01-01 RX ADMIN — Medication 1 TABLET: at 10:36

## 2017-01-01 RX ADMIN — KETOTIFEN FUMARATE 1 DROP: 0.25 SOLUTION/ DROPS OPHTHALMIC at 08:14

## 2017-01-01 RX ADMIN — SENNOSIDES AND DOCUSATE SODIUM 1 TABLET: 8.6; 5 TABLET ORAL at 08:04

## 2017-01-01 RX ADMIN — TRAMADOL HYDROCHLORIDE 50 MG: 50 TABLET, FILM COATED ORAL at 23:24

## 2017-01-01 RX ADMIN — CONJUGATED ESTROGENS 0.75 APPLICATOR: 0.62 CREAM VAGINAL at 19:58

## 2017-01-01 RX ADMIN — SENNOSIDES AND DOCUSATE SODIUM 1 TABLET: 8.6; 5 TABLET ORAL at 09:01

## 2017-01-01 RX ADMIN — ACETAMINOPHEN 650 MG: 325 TABLET, FILM COATED ORAL at 19:56

## 2017-01-01 RX ADMIN — ALLOPURINOL 100 MG: 100 TABLET ORAL at 09:38

## 2017-01-01 RX ADMIN — SERTRALINE 100 MG: 50 TABLET, FILM COATED ORAL at 21:43

## 2017-01-01 RX ADMIN — LEVETIRACETAM 500 MG: 500 TABLET, FILM COATED ORAL at 08:15

## 2017-01-01 RX ADMIN — SERTRALINE 100 MG: 50 TABLET, FILM COATED ORAL at 22:07

## 2017-01-01 RX ADMIN — POTASSIUM CHLORIDE 20 MEQ: 1500 TABLET, EXTENDED RELEASE ORAL at 08:56

## 2017-01-01 RX ADMIN — LEVETIRACETAM 500 MG: 500 TABLET, FILM COATED ORAL at 17:08

## 2017-01-01 RX ADMIN — METOPROLOL TARTRATE 75 MG: 25 TABLET, FILM COATED ORAL at 18:04

## 2017-01-01 RX ADMIN — METOPROLOL TARTRATE 75 MG: 25 TABLET, FILM COATED ORAL at 08:42

## 2017-01-01 RX ADMIN — MEGESTROL ACETATE 200 MG: 40 SUSPENSION ORAL at 18:00

## 2017-01-01 RX ADMIN — OFLOXACIN 50000 UNITS: 300 TABLET, COATED ORAL at 11:56

## 2017-01-01 RX ADMIN — TRAMADOL HYDROCHLORIDE 100 MG: 50 TABLET, FILM COATED ORAL at 22:02

## 2017-01-01 RX ADMIN — FAMOTIDINE 20 MG: 20 TABLET ORAL at 08:38

## 2017-01-01 RX ADMIN — SERTRALINE HYDROCHLORIDE 100 MG: 50 TABLET, FILM COATED ORAL at 21:26

## 2017-01-01 RX ADMIN — FAMOTIDINE 40 MG: 20 TABLET ORAL at 21:45

## 2017-01-01 RX ADMIN — IPRATROPIUM BROMIDE AND ALBUTEROL SULFATE 3 ML: .5; 3 SOLUTION RESPIRATORY (INHALATION) at 20:28

## 2017-01-01 RX ADMIN — METOPROLOL SUCCINATE 100 MG: 50 TABLET, EXTENDED RELEASE ORAL at 22:21

## 2017-01-01 RX ADMIN — TRAMADOL HYDROCHLORIDE 100 MG: 50 TABLET, FILM COATED ORAL at 22:06

## 2017-01-01 RX ADMIN — AMIODARONE HYDROCHLORIDE 200 MG: 200 TABLET ORAL at 20:23

## 2017-01-01 RX ADMIN — BUDESONIDE 0.5 MG: 0.5 SUSPENSION RESPIRATORY (INHALATION) at 08:56

## 2017-01-01 RX ADMIN — LEVOTHYROXINE SODIUM 150 MCG: 150 TABLET ORAL at 06:38

## 2017-01-01 RX ADMIN — GUAIFENESIN 1200 MG: 600 TABLET, EXTENDED RELEASE ORAL at 10:32

## 2017-01-01 RX ADMIN — LEVETIRACETAM 500 MG: 500 TABLET, FILM COATED ORAL at 18:18

## 2017-01-01 RX ADMIN — Medication 150 MG: at 18:08

## 2017-01-01 RX ADMIN — ISOSORBIDE DINITRATE 10 MG: 10 TABLET ORAL at 09:08

## 2017-01-01 RX ADMIN — METOPROLOL TARTRATE 75 MG: 25 TABLET, FILM COATED ORAL at 10:14

## 2017-01-01 RX ADMIN — LINAGLIPTIN 5 MG: 5 TABLET, FILM COATED ORAL at 09:28

## 2017-01-01 RX ADMIN — DILTIAZEM HYDROCHLORIDE 120 MG: 60 TABLET, FILM COATED ORAL at 17:28

## 2017-01-01 RX ADMIN — APIXABAN 5 MG: 2.5 TABLET, FILM COATED ORAL at 09:56

## 2017-01-01 RX ADMIN — FAMOTIDINE 20 MG: 20 TABLET, FILM COATED ORAL at 16:58

## 2017-01-01 RX ADMIN — CALCIUM CARBONATE 500 MG (1,250 MG)-VITAMIN D3 200 UNIT TABLET 500 MG: at 20:58

## 2017-01-01 RX ADMIN — Medication 1 CAPSULE: at 10:22

## 2017-01-01 RX ADMIN — DONEPEZIL HYDROCHLORIDE 10 MG: 5 TABLET ORAL at 21:32

## 2017-01-01 RX ADMIN — LEVOTHYROXINE SODIUM 150 MCG: 150 TABLET ORAL at 04:50

## 2017-01-01 RX ADMIN — Medication 150 MG: at 08:12

## 2017-01-01 RX ADMIN — IPRATROPIUM BROMIDE AND ALBUTEROL SULFATE 3 ML: .5; 3 SOLUTION RESPIRATORY (INHALATION) at 17:52

## 2017-01-01 RX ADMIN — FAMOTIDINE 20 MG: 20 TABLET, FILM COATED ORAL at 11:41

## 2017-01-01 RX ADMIN — ALLOPURINOL 100 MG: 100 TABLET ORAL at 22:07

## 2017-01-01 RX ADMIN — SENNOSIDES AND DOCUSATE SODIUM 1 TABLET: 8.6; 5 TABLET ORAL at 20:12

## 2017-01-01 RX ADMIN — IPRATROPIUM BROMIDE AND ALBUTEROL SULFATE 3 ML: .5; 3 SOLUTION RESPIRATORY (INHALATION) at 09:36

## 2017-01-01 RX ADMIN — ISOSORBIDE DINITRATE 10 MG: 10 TABLET ORAL at 23:16

## 2017-01-01 RX ADMIN — Medication 1 TABLET: at 17:17

## 2017-01-01 RX ADMIN — MEROPENEM 1 G: 1 INJECTION, POWDER, FOR SOLUTION INTRAVENOUS at 01:31

## 2017-01-01 RX ADMIN — DIPHENHYDRAMINE HYDROCHLORIDE 25 MG: 25 CAPSULE ORAL at 04:27

## 2017-01-01 RX ADMIN — ISOSORBIDE DINITRATE 10 MG: 10 TABLET ORAL at 09:22

## 2017-01-01 RX ADMIN — METOPROLOL SUCCINATE 100 MG: 50 TABLET, EXTENDED RELEASE ORAL at 21:32

## 2017-01-01 RX ADMIN — ALLOPURINOL 100 MG: 100 TABLET ORAL at 23:07

## 2017-01-01 RX ADMIN — IPRATROPIUM BROMIDE AND ALBUTEROL SULFATE 3 ML: .5; 3 SOLUTION RESPIRATORY (INHALATION) at 16:02

## 2017-01-01 RX ADMIN — Medication 1 TABLET: at 10:49

## 2017-01-01 RX ADMIN — DIPHENHYDRAMINE HYDROCHLORIDE 25 MG: 25 CAPSULE ORAL at 20:34

## 2017-01-01 RX ADMIN — Medication 1 TABLET: at 18:32

## 2017-01-01 RX ADMIN — AMIODARONE HYDROCHLORIDE 0.5 MG/MIN: 1.8 INJECTION, SOLUTION INTRAVENOUS at 19:53

## 2017-01-01 RX ADMIN — METOPROLOL TARTRATE 75 MG: 25 TABLET, FILM COATED ORAL at 08:45

## 2017-01-01 RX ADMIN — APIXABAN 5 MG: 2.5 TABLET, FILM COATED ORAL at 22:51

## 2017-01-01 RX ADMIN — ISOSORBIDE DINITRATE 10 MG: 10 TABLET ORAL at 10:17

## 2017-01-01 RX ADMIN — Medication 1 CAPSULE: at 07:41

## 2017-01-01 RX ADMIN — BUDESONIDE 0.5 MG: 0.5 SUSPENSION RESPIRATORY (INHALATION) at 23:09

## 2017-01-01 RX ADMIN — FAMOTIDINE 40 MG: 20 TABLET ORAL at 21:07

## 2017-01-01 RX ADMIN — ASPIRIN 81 MG: 81 TABLET, COATED ORAL at 08:36

## 2017-01-01 RX ADMIN — DILTIAZEM HYDROCHLORIDE 120 MG: 60 TABLET, FILM COATED ORAL at 17:11

## 2017-01-01 RX ADMIN — MEGESTROL ACETATE 200 MG: 40 SUSPENSION ORAL at 18:47

## 2017-01-01 RX ADMIN — FUROSEMIDE 40 MG: 40 TABLET ORAL at 07:53

## 2017-01-01 RX ADMIN — APIXABAN 5 MG: 2.5 TABLET, FILM COATED ORAL at 22:30

## 2017-01-01 RX ADMIN — INSULIN ASPART 3 UNITS: 100 INJECTION, SOLUTION INTRAVENOUS; SUBCUTANEOUS at 11:45

## 2017-01-01 RX ADMIN — METOPROLOL SUCCINATE 100 MG: 50 TABLET, EXTENDED RELEASE ORAL at 22:48

## 2017-01-01 RX ADMIN — TRAMADOL HYDROCHLORIDE 100 MG: 50 TABLET, FILM COATED ORAL at 04:40

## 2017-01-01 RX ADMIN — ACETAMINOPHEN 650 MG: 325 TABLET, FILM COATED ORAL at 20:42

## 2017-01-01 RX ADMIN — FAMOTIDINE 40 MG: 20 TABLET ORAL at 22:23

## 2017-01-01 RX ADMIN — LINAGLIPTIN 5 MG: 5 TABLET, FILM COATED ORAL at 09:06

## 2017-01-01 RX ADMIN — IBANDRONATE SODIUM 150 MG: 150 TABLET, FILM COATED ORAL at 08:42

## 2017-01-01 RX ADMIN — MEGESTROL ACETATE 200 MG: 40 SUSPENSION ORAL at 09:31

## 2017-01-01 RX ADMIN — FUROSEMIDE 40 MG: 40 TABLET ORAL at 07:27

## 2017-01-01 RX ADMIN — INSULIN ASPART 2 UNITS: 100 INJECTION, SOLUTION INTRAVENOUS; SUBCUTANEOUS at 07:42

## 2017-01-01 RX ADMIN — INSULIN ASPART 2 UNITS: 100 INJECTION, SOLUTION INTRAVENOUS; SUBCUTANEOUS at 20:40

## 2017-01-01 RX ADMIN — DONEPEZIL HYDROCHLORIDE 10 MG: 5 TABLET ORAL at 23:10

## 2017-01-01 RX ADMIN — SENNOSIDES AND DOCUSATE SODIUM 1 TABLET: 8.6; 5 TABLET ORAL at 10:48

## 2017-01-01 RX ADMIN — DILTIAZEM HYDROCHLORIDE 300 MG: 180 CAPSULE, COATED, EXTENDED RELEASE ORAL at 12:30

## 2017-01-01 RX ADMIN — CALCIUM CARBONATE 500 MG (1,250 MG)-VITAMIN D3 200 UNIT TABLET 500 MG: at 21:33

## 2017-01-01 RX ADMIN — FAMOTIDINE 40 MG: 20 TABLET ORAL at 20:17

## 2017-01-01 RX ADMIN — ALLOPURINOL 100 MG: 100 TABLET ORAL at 09:15

## 2017-01-01 RX ADMIN — INSULIN ASPART 2 UNITS: 100 INJECTION, SOLUTION INTRAVENOUS; SUBCUTANEOUS at 11:30

## 2017-01-01 RX ADMIN — INSULIN ASPART 2 UNITS: 100 INJECTION, SOLUTION INTRAVENOUS; SUBCUTANEOUS at 11:32

## 2017-01-01 RX ADMIN — ACETAMINOPHEN 650 MG: 325 TABLET, FILM COATED ORAL at 21:10

## 2017-01-01 RX ADMIN — LEVETIRACETAM 500 MG: 500 TABLET, FILM COATED ORAL at 20:21

## 2017-01-01 RX ADMIN — INSULIN ASPART 3 UNITS: 100 INJECTION, SOLUTION INTRAVENOUS; SUBCUTANEOUS at 18:52

## 2017-01-01 RX ADMIN — OFLOXACIN 50000 UNITS: 300 TABLET, COATED ORAL at 11:15

## 2017-01-01 RX ADMIN — METOPROLOL TARTRATE 75 MG: 25 TABLET, FILM COATED ORAL at 09:22

## 2017-01-01 RX ADMIN — TRAMADOL HYDROCHLORIDE 50 MG: 50 TABLET, FILM COATED ORAL at 06:17

## 2017-01-01 RX ADMIN — MICONAZOLE NITRATE: 20 POWDER TOPICAL at 23:19

## 2017-01-01 RX ADMIN — METOPROLOL SUCCINATE 100 MG: 50 TABLET, EXTENDED RELEASE ORAL at 09:23

## 2017-01-01 RX ADMIN — FAMOTIDINE 20 MG: 20 TABLET, FILM COATED ORAL at 09:31

## 2017-01-01 RX ADMIN — METOPROLOL SUCCINATE 100 MG: 50 TABLET, EXTENDED RELEASE ORAL at 20:11

## 2017-01-01 RX ADMIN — LEVOTHYROXINE SODIUM 150 MCG: 150 TABLET ORAL at 07:12

## 2017-01-01 RX ADMIN — TRAMADOL HYDROCHLORIDE 100 MG: 50 TABLET, FILM COATED ORAL at 23:07

## 2017-01-01 RX ADMIN — SODIUM CHLORIDE 100 ML/HR: 9 INJECTION, SOLUTION INTRAVENOUS at 10:00

## 2017-01-01 RX ADMIN — Medication 10 ML: at 07:46

## 2017-01-01 RX ADMIN — SERTRALINE HYDROCHLORIDE 100 MG: 50 TABLET ORAL at 20:00

## 2017-01-01 RX ADMIN — MICONAZOLE NITRATE: 20 POWDER TOPICAL at 09:08

## 2017-01-01 RX ADMIN — Medication 10 ML: at 10:03

## 2017-01-01 RX ADMIN — SENNOSIDES AND DOCUSATE SODIUM 1 TABLET: 8.6; 5 TABLET ORAL at 22:17

## 2017-01-01 RX ADMIN — IPRATROPIUM BROMIDE AND ALBUTEROL SULFATE 3 ML: .5; 3 SOLUTION RESPIRATORY (INHALATION) at 13:43

## 2017-01-01 RX ADMIN — INSULIN ASPART 5 UNITS: 100 INJECTION, SOLUTION INTRAVENOUS; SUBCUTANEOUS at 20:26

## 2017-01-01 RX ADMIN — GLIPIZIDE 5 MG: 5 TABLET, FILM COATED, EXTENDED RELEASE ORAL at 08:58

## 2017-01-01 RX ADMIN — LEVOTHYROXINE SODIUM ANHYDROUS 100 MCG: 100 INJECTION, POWDER, LYOPHILIZED, FOR SOLUTION INTRAVENOUS at 06:37

## 2017-01-01 RX ADMIN — GUAIFENESIN 1200 MG: 600 TABLET, EXTENDED RELEASE ORAL at 07:51

## 2017-01-01 RX ADMIN — FAMOTIDINE 40 MG: 20 TABLET ORAL at 22:12

## 2017-01-01 RX ADMIN — MICONAZOLE NITRATE: 20 POWDER TOPICAL at 08:29

## 2017-01-01 RX ADMIN — ALLOPURINOL 100 MG: 100 TABLET ORAL at 08:47

## 2017-01-01 RX ADMIN — IPRATROPIUM BROMIDE AND ALBUTEROL SULFATE 3 ML: .5; 3 SOLUTION RESPIRATORY (INHALATION) at 16:20

## 2017-01-01 RX ADMIN — POTASSIUM CHLORIDE 20 MEQ: 1500 TABLET, EXTENDED RELEASE ORAL at 10:58

## 2017-01-01 RX ADMIN — DONEPEZIL HYDROCHLORIDE 10 MG: 5 TABLET, FILM COATED ORAL at 21:27

## 2017-01-01 RX ADMIN — MICONAZOLE NITRATE: 20 POWDER TOPICAL at 22:26

## 2017-01-01 RX ADMIN — CALCIUM CARBONATE 500 MG (1,250 MG)-VITAMIN D3 200 UNIT TABLET 500 MG: at 23:32

## 2017-01-01 RX ADMIN — IPRATROPIUM BROMIDE AND ALBUTEROL SULFATE 3 ML: .5; 3 SOLUTION RESPIRATORY (INHALATION) at 10:38

## 2017-01-01 RX ADMIN — Medication 1 TABLET: at 09:15

## 2017-01-01 RX ADMIN — MIRTAZAPINE 15 MG: 15 TABLET, FILM COATED ORAL at 20:31

## 2017-01-01 RX ADMIN — INSULIN ASPART 2 UNITS: 100 INJECTION, SOLUTION INTRAVENOUS; SUBCUTANEOUS at 08:44

## 2017-01-01 RX ADMIN — SENNOSIDES AND DOCUSATE SODIUM 1 TABLET: 8.6; 5 TABLET ORAL at 07:54

## 2017-01-01 RX ADMIN — DOXAZOSIN MESYLATE 2 MG: 2 TABLET ORAL at 21:59

## 2017-01-01 RX ADMIN — METOPROLOL SUCCINATE 100 MG: 50 TABLET, EXTENDED RELEASE ORAL at 09:04

## 2017-01-01 RX ADMIN — Medication 150 MG: at 09:50

## 2017-01-01 RX ADMIN — DILTIAZEM HYDROCHLORIDE 300 MG: 180 CAPSULE, COATED, EXTENDED RELEASE ORAL at 12:10

## 2017-01-01 RX ADMIN — INSULIN ASPART 3 UNITS: 100 INJECTION, SOLUTION INTRAVENOUS; SUBCUTANEOUS at 21:42

## 2017-01-01 RX ADMIN — METOPROLOL TARTRATE 75 MG: 25 TABLET, FILM COATED ORAL at 17:56

## 2017-01-01 RX ADMIN — AMIODARONE HYDROCHLORIDE 200 MG: 200 TABLET ORAL at 21:27

## 2017-01-01 RX ADMIN — METOPROLOL SUCCINATE 100 MG: 50 TABLET, EXTENDED RELEASE ORAL at 22:07

## 2017-01-01 RX ADMIN — IPRATROPIUM BROMIDE AND ALBUTEROL SULFATE 3 ML: .5; 3 SOLUTION RESPIRATORY (INHALATION) at 17:39

## 2017-01-01 RX ADMIN — AMIODARONE HYDROCHLORIDE 200 MG: 200 TABLET ORAL at 18:10

## 2017-01-01 RX ADMIN — ISOSORBIDE DINITRATE 10 MG: 10 TABLET ORAL at 10:14

## 2017-01-01 RX ADMIN — DILTIAZEM HYDROCHLORIDE 240 MG: 120 CAPSULE, COATED, EXTENDED RELEASE ORAL at 09:04

## 2017-01-01 RX ADMIN — IPRATROPIUM BROMIDE AND ALBUTEROL SULFATE 3 ML: .5; 3 SOLUTION RESPIRATORY (INHALATION) at 10:17

## 2017-01-01 RX ADMIN — GLIPIZIDE 2.5 MG: 2.5 TABLET, FILM COATED, EXTENDED RELEASE ORAL at 08:13

## 2017-01-01 RX ADMIN — Medication: at 08:52

## 2017-01-01 RX ADMIN — DOXAZOSIN MESYLATE 2 MG: 2 TABLET ORAL at 08:09

## 2017-01-01 RX ADMIN — BUDESONIDE 0.5 MG: 0.5 SUSPENSION RESPIRATORY (INHALATION) at 20:24

## 2017-01-01 RX ADMIN — LEVOFLOXACIN 750 MG: 5 INJECTION, SOLUTION INTRAVENOUS at 09:00

## 2017-01-01 RX ADMIN — SENNOSIDES AND DOCUSATE SODIUM 1 TABLET: 8.6; 5 TABLET ORAL at 09:47

## 2017-01-01 RX ADMIN — STANDARDIZED SENNA CONCENTRATE 1 TABLET: 8.6 TABLET ORAL at 17:43

## 2017-01-01 RX ADMIN — DOXAZOSIN MESYLATE 2 MG: 2 TABLET ORAL at 08:17

## 2017-01-01 RX ADMIN — POTASSIUM CHLORIDE 10 MEQ: 750 TABLET, FILM COATED, EXTENDED RELEASE ORAL at 08:59

## 2017-01-01 RX ADMIN — ISOSORBIDE DINITRATE 10 MG: 10 TABLET ORAL at 08:34

## 2017-01-01 RX ADMIN — ISOSORBIDE DINITRATE 10 MG: 10 TABLET ORAL at 10:06

## 2017-01-01 RX ADMIN — DOCUSATE SODIUM AND SENNOSIDES 1 TABLET: 8.6; 5 TABLET, FILM COATED ORAL at 08:46

## 2017-01-01 RX ADMIN — METOPROLOL TARTRATE 100 MG: 50 TABLET, FILM COATED ORAL at 08:42

## 2017-01-01 RX ADMIN — IPRATROPIUM BROMIDE AND ALBUTEROL SULFATE 3 ML: .5; 3 SOLUTION RESPIRATORY (INHALATION) at 09:21

## 2017-01-01 RX ADMIN — APIXABAN 5 MG: 2.5 TABLET, FILM COATED ORAL at 23:32

## 2017-01-01 RX ADMIN — GLIPIZIDE 5 MG: 2.5 TABLET, FILM COATED, EXTENDED RELEASE ORAL at 08:51

## 2017-01-01 RX ADMIN — METOPROLOL TARTRATE 25 MG: 25 TABLET, FILM COATED ORAL at 17:55

## 2017-01-01 RX ADMIN — BUDESONIDE 0.5 MG: 0.5 SUSPENSION RESPIRATORY (INHALATION) at 10:51

## 2017-01-01 RX ADMIN — LEVETIRACETAM 500 MG: 500 TABLET, FILM COATED ORAL at 07:44

## 2017-01-01 RX ADMIN — METOPROLOL TARTRATE 25 MG: 25 TABLET, FILM COATED ORAL at 10:35

## 2017-01-01 RX ADMIN — ISOSORBIDE DINITRATE 10 MG: 10 TABLET ORAL at 21:16

## 2017-01-01 RX ADMIN — DILTIAZEM HYDROCHLORIDE 120 MG: 60 TABLET, FILM COATED ORAL at 17:17

## 2017-01-01 RX ADMIN — LEVOTHYROXINE SODIUM 150 MCG: 150 TABLET ORAL at 06:19

## 2017-01-01 RX ADMIN — ALLOPURINOL 100 MG: 100 TABLET ORAL at 12:34

## 2017-01-01 RX ADMIN — DILTIAZEM HYDROCHLORIDE 120 MG: 60 TABLET, FILM COATED ORAL at 08:31

## 2017-01-01 RX ADMIN — STANDARDIZED SENNA CONCENTRATE 1 TABLET: 8.6 TABLET ORAL at 17:00

## 2017-01-01 RX ADMIN — IPRATROPIUM BROMIDE AND ALBUTEROL SULFATE 3 ML: .5; 3 SOLUTION RESPIRATORY (INHALATION) at 21:57

## 2017-01-01 RX ADMIN — SERTRALINE HYDROCHLORIDE 100 MG: 50 TABLET ORAL at 21:05

## 2017-01-01 RX ADMIN — CALCIUM CARBONATE 500 MG (1,250 MG)-VITAMIN D3 200 UNIT TABLET 500 MG: at 21:01

## 2017-01-01 RX ADMIN — METOPROLOL TARTRATE 75 MG: 25 TABLET, FILM COATED ORAL at 09:02

## 2017-01-01 RX ADMIN — SERTRALINE 100 MG: 50 TABLET, FILM COATED ORAL at 20:52

## 2017-01-01 RX ADMIN — DONEPEZIL HYDROCHLORIDE 10 MG: 5 TABLET ORAL at 22:08

## 2017-01-01 RX ADMIN — LINAGLIPTIN 5 MG: 5 TABLET, FILM COATED ORAL at 09:52

## 2017-01-01 RX ADMIN — IPRATROPIUM BROMIDE AND ALBUTEROL SULFATE 3 ML: .5; 3 SOLUTION RESPIRATORY (INHALATION) at 21:26

## 2017-01-01 RX ADMIN — MICONAZOLE NITRATE: 20 POWDER TOPICAL at 09:18

## 2017-01-01 RX ADMIN — GLIPIZIDE 5 MG: 2.5 TABLET, FILM COATED, EXTENDED RELEASE ORAL at 07:51

## 2017-01-01 RX ADMIN — MEROPENEM 1 G: 1 INJECTION, POWDER, FOR SOLUTION INTRAVENOUS at 10:33

## 2017-01-01 RX ADMIN — CLINDAMYCIN HYDROCHLORIDE 450 MG: 150 CAPSULE ORAL at 13:44

## 2017-01-01 RX ADMIN — STANDARDIZED SENNA CONCENTRATE 1 TABLET: 8.6 TABLET ORAL at 10:10

## 2017-01-01 RX ADMIN — APIXABAN 5 MG: 2.5 TABLET, FILM COATED ORAL at 08:04

## 2017-01-01 RX ADMIN — SENNOSIDES AND DOCUSATE SODIUM 1 TABLET: 8.6; 5 TABLET ORAL at 22:26

## 2017-01-01 RX ADMIN — ENOXAPARIN SODIUM 110 MG: 120 INJECTION SUBCUTANEOUS at 21:23

## 2017-01-01 RX ADMIN — KETOTIFEN FUMARATE 1 DROP: 0.35 SOLUTION/ DROPS OPHTHALMIC at 17:16

## 2017-01-01 RX ADMIN — DOXAZOSIN MESYLATE 2 MG: 2 TABLET ORAL at 09:25

## 2017-01-01 RX ADMIN — ALLOPURINOL 100 MG: 100 TABLET ORAL at 18:04

## 2017-01-01 RX ADMIN — GUAIFENESIN 1200 MG: 600 TABLET, EXTENDED RELEASE ORAL at 21:47

## 2017-01-01 RX ADMIN — KETOTIFEN FUMARATE 1 DROP: 0.25 SOLUTION/ DROPS OPHTHALMIC at 10:08

## 2017-01-01 RX ADMIN — ASPIRIN 81 MG: 81 TABLET, COATED ORAL at 01:00

## 2017-01-01 RX ADMIN — MEGESTROL ACETATE 200 MG: 40 SUSPENSION ORAL at 09:02

## 2017-01-01 RX ADMIN — Medication 150 MG: at 09:37

## 2017-01-01 RX ADMIN — FUROSEMIDE 40 MG: 40 TABLET ORAL at 08:56

## 2017-01-01 RX ADMIN — METOPROLOL SUCCINATE 100 MG: 50 TABLET, EXTENDED RELEASE ORAL at 20:13

## 2017-01-01 RX ADMIN — BUDESONIDE 0.5 MG: 0.5 SUSPENSION RESPIRATORY (INHALATION) at 20:06

## 2017-01-01 RX ADMIN — AMIODARONE HYDROCHLORIDE 200 MG: 200 TABLET ORAL at 08:09

## 2017-01-01 RX ADMIN — Medication 1 TABLET: at 08:04

## 2017-01-01 RX ADMIN — DOCUSATE SODIUM AND SENNOSIDES 1 TABLET: 8.6; 5 TABLET, FILM COATED ORAL at 09:20

## 2017-01-01 RX ADMIN — GUAIFENESIN 1200 MG: 600 TABLET, EXTENDED RELEASE ORAL at 09:06

## 2017-01-01 RX ADMIN — DIPHENHYDRAMINE HYDROCHLORIDE 25 MG: 25 CAPSULE ORAL at 20:27

## 2017-01-01 RX ADMIN — ISOSORBIDE DINITRATE 10 MG: 10 TABLET ORAL at 09:31

## 2017-01-01 RX ADMIN — KETOTIFEN FUMARATE 1 DROP: 0.35 SOLUTION/ DROPS OPHTHALMIC at 17:15

## 2017-01-01 RX ADMIN — FUROSEMIDE 40 MG: 40 TABLET ORAL at 10:24

## 2017-01-01 RX ADMIN — Medication 10 ML: at 10:13

## 2017-01-01 RX ADMIN — ISOSORBIDE DINITRATE 10 MG: 10 TABLET ORAL at 10:49

## 2017-01-01 RX ADMIN — ISOSORBIDE DINITRATE 10 MG: 10 TABLET ORAL at 10:20

## 2017-01-01 RX ADMIN — STANDARDIZED SENNA CONCENTRATE 1 TABLET: 8.6 TABLET ORAL at 18:08

## 2017-01-01 RX ADMIN — MICONAZOLE NITRATE: 20 POWDER TOPICAL at 08:10

## 2017-01-01 RX ADMIN — APIXABAN 5 MG: 2.5 TABLET, FILM COATED ORAL at 21:40

## 2017-01-01 RX ADMIN — SERTRALINE HYDROCHLORIDE 100 MG: 50 TABLET ORAL at 20:48

## 2017-01-01 RX ADMIN — ISOSORBIDE DINITRATE 10 MG: 10 TABLET ORAL at 10:21

## 2017-01-01 RX ADMIN — DONEPEZIL HYDROCHLORIDE 10 MG: 5 TABLET ORAL at 21:39

## 2017-01-01 RX ADMIN — GUAIFENESIN 1200 MG: 600 TABLET, EXTENDED RELEASE ORAL at 09:33

## 2017-01-01 RX ADMIN — METOPROLOL TARTRATE 100 MG: 25 TABLET, FILM COATED ORAL at 23:33

## 2017-01-01 RX ADMIN — METOPROLOL SUCCINATE 100 MG: 50 TABLET, EXTENDED RELEASE ORAL at 09:30

## 2017-01-01 RX ADMIN — APIXABAN 5 MG: 2.5 TABLET, FILM COATED ORAL at 08:39

## 2017-01-01 RX ADMIN — METOPROLOL SUCCINATE 100 MG: 50 TABLET, EXTENDED RELEASE ORAL at 21:44

## 2017-01-01 RX ADMIN — ALLOPURINOL 100 MG: 100 TABLET ORAL at 21:51

## 2017-01-01 RX ADMIN — ACETAMINOPHEN 500 MG: 500 TABLET, FILM COATED ORAL at 02:25

## 2017-01-01 RX ADMIN — FAMOTIDINE 20 MG: 20 TABLET, FILM COATED ORAL at 08:33

## 2017-01-01 RX ADMIN — DILTIAZEM HYDROCHLORIDE 300 MG: 180 CAPSULE, COATED, EXTENDED RELEASE ORAL at 12:50

## 2017-01-01 RX ADMIN — METOPROLOL SUCCINATE 100 MG: 50 TABLET, EXTENDED RELEASE ORAL at 08:55

## 2017-01-01 RX ADMIN — GUAIFENESIN 1200 MG: 600 TABLET, EXTENDED RELEASE ORAL at 10:39

## 2017-01-01 RX ADMIN — METOPROLOL TARTRATE 100 MG: 25 TABLET, FILM COATED ORAL at 08:34

## 2017-01-01 RX ADMIN — TRAMADOL HYDROCHLORIDE 100 MG: 50 TABLET, FILM COATED ORAL at 00:01

## 2017-01-01 RX ADMIN — ASPIRIN 81 MG: 81 TABLET, COATED ORAL at 09:09

## 2017-01-01 RX ADMIN — DILTIAZEM HYDROCHLORIDE 240 MG: 120 CAPSULE, COATED, EXTENDED RELEASE ORAL at 08:32

## 2017-01-01 RX ADMIN — METOPROLOL SUCCINATE 100 MG: 50 TABLET, EXTENDED RELEASE ORAL at 20:57

## 2017-01-01 RX ADMIN — Medication 150 MG: at 09:55

## 2017-01-01 RX ADMIN — CONJUGATED ESTROGENS 1 APPLICATION: 0.62 CREAM VAGINAL at 11:50

## 2017-01-01 RX ADMIN — METOPROLOL TARTRATE 75 MG: 25 TABLET, FILM COATED ORAL at 10:02

## 2017-01-01 RX ADMIN — LEVETIRACETAM 500 MG: 500 TABLET, FILM COATED ORAL at 17:43

## 2017-01-01 RX ADMIN — Medication 150 MG: at 17:15

## 2017-01-01 RX ADMIN — IPRATROPIUM BROMIDE AND ALBUTEROL SULFATE 3 ML: .5; 3 SOLUTION RESPIRATORY (INHALATION) at 20:03

## 2017-01-01 RX ADMIN — DILTIAZEM HYDROCHLORIDE 120 MG: 60 TABLET, FILM COATED ORAL at 08:19

## 2017-01-01 RX ADMIN — ALLOPURINOL 100 MG: 100 TABLET ORAL at 21:16

## 2017-01-01 RX ADMIN — MICONAZOLE NITRATE: 20 POWDER TOPICAL at 09:10

## 2017-01-01 RX ADMIN — KETOTIFEN FUMARATE 1 DROP: 0.25 SOLUTION/ DROPS OPHTHALMIC at 18:51

## 2017-01-01 RX ADMIN — IPRATROPIUM BROMIDE AND ALBUTEROL SULFATE 3 ML: .5; 3 SOLUTION RESPIRATORY (INHALATION) at 12:54

## 2017-01-01 RX ADMIN — CONJUGATED ESTROGENS 1 APPLICATION: 0.62 CREAM VAGINAL at 20:18

## 2017-01-01 RX ADMIN — METOPROLOL TARTRATE 100 MG: 25 TABLET, FILM COATED ORAL at 08:30

## 2017-01-01 RX ADMIN — METOPROLOL SUCCINATE 100 MG: 50 TABLET, EXTENDED RELEASE ORAL at 21:54

## 2017-01-01 RX ADMIN — LEVETIRACETAM 500 MG: 500 TABLET, FILM COATED ORAL at 23:07

## 2017-01-01 RX ADMIN — LEVETIRACETAM 500 MG: 500 TABLET, FILM COATED ORAL at 20:49

## 2017-01-01 RX ADMIN — METOPROLOL TARTRATE 100 MG: 50 TABLET, FILM COATED ORAL at 21:04

## 2017-01-01 RX ADMIN — KETOTIFEN FUMARATE 1 DROP: 0.35 SOLUTION/ DROPS OPHTHALMIC at 17:38

## 2017-01-01 RX ADMIN — DOCUSATE SODIUM AND SENNOSIDES 1 TABLET: 8.6; 5 TABLET, FILM COATED ORAL at 09:05

## 2017-01-01 RX ADMIN — MEROPENEM 1 G: 1 INJECTION, POWDER, FOR SOLUTION INTRAVENOUS at 17:30

## 2017-01-01 RX ADMIN — POTASSIUM CHLORIDE 10 MEQ: 750 TABLET, FILM COATED, EXTENDED RELEASE ORAL at 08:19

## 2017-01-01 RX ADMIN — SERTRALINE HYDROCHLORIDE 100 MG: 50 TABLET, FILM COATED ORAL at 22:43

## 2017-01-01 RX ADMIN — DONEPEZIL HYDROCHLORIDE 10 MG: 5 TABLET ORAL at 21:24

## 2017-01-01 RX ADMIN — ISOSORBIDE DINITRATE 10 MG: 10 TABLET ORAL at 20:24

## 2017-01-01 RX ADMIN — STANDARDIZED SENNA CONCENTRATE 1 TABLET: 8.6 TABLET ORAL at 09:30

## 2017-01-01 RX ADMIN — POTASSIUM CHLORIDE 20 MEQ: 1500 TABLET, EXTENDED RELEASE ORAL at 09:47

## 2017-01-01 RX ADMIN — METOPROLOL TARTRATE 100 MG: 25 TABLET, FILM COATED ORAL at 22:11

## 2017-01-01 RX ADMIN — ISOSORBIDE DINITRATE 10 MG: 10 TABLET ORAL at 09:26

## 2017-01-01 RX ADMIN — MICONAZOLE NITRATE: 20 POWDER TOPICAL at 07:45

## 2017-01-01 RX ADMIN — PIPERACILLIN SODIUM AND TAZOBACTAM SODIUM 3.38 G: 3; .375 INJECTION, POWDER, LYOPHILIZED, FOR SOLUTION INTRAVENOUS at 06:49

## 2017-01-01 RX ADMIN — DIPHENHYDRAMINE HYDROCHLORIDE 25 MG: 25 CAPSULE ORAL at 21:14

## 2017-01-01 RX ADMIN — FAMOTIDINE 20 MG: 20 TABLET, FILM COATED ORAL at 16:59

## 2017-01-01 RX ADMIN — POTASSIUM CHLORIDE 20 MEQ: 1500 TABLET, EXTENDED RELEASE ORAL at 17:44

## 2017-01-01 RX ADMIN — POTASSIUM CHLORIDE 10 MEQ: 750 TABLET, FILM COATED, EXTENDED RELEASE ORAL at 09:49

## 2017-01-01 RX ADMIN — TERAZOSIN HYDROCHLORIDE 2 MG: 1 CAPSULE ORAL at 08:39

## 2017-01-01 RX ADMIN — ISOSORBIDE DINITRATE 10 MG: 10 TABLET ORAL at 08:48

## 2017-01-01 RX ADMIN — SENNOSIDES AND DOCUSATE SODIUM 1 TABLET: 8.6; 5 TABLET ORAL at 08:48

## 2017-01-01 RX ADMIN — Medication 1 TABLET: at 16:29

## 2017-01-01 RX ADMIN — Medication 150 MG: at 11:20

## 2017-01-01 RX ADMIN — BUMETANIDE 2 MG: 1 TABLET ORAL at 09:52

## 2017-01-01 RX ADMIN — SERTRALINE 100 MG: 50 TABLET, FILM COATED ORAL at 21:06

## 2017-01-01 RX ADMIN — ISOSORBIDE DINITRATE 10 MG: 10 TABLET ORAL at 22:48

## 2017-01-01 RX ADMIN — METOPROLOL SUCCINATE 100 MG: 50 TABLET, EXTENDED RELEASE ORAL at 22:18

## 2017-01-01 RX ADMIN — MIRTAZAPINE 15 MG: 15 TABLET, FILM COATED ORAL at 20:15

## 2017-01-01 RX ADMIN — MICONAZOLE NITRATE 1 APPLICATION: 20 POWDER TOPICAL at 10:11

## 2017-01-01 RX ADMIN — MICONAZOLE NITRATE: 20 POWDER TOPICAL at 21:34

## 2017-01-01 RX ADMIN — Medication 150 MG: at 18:36

## 2017-01-01 RX ADMIN — POTASSIUM CHLORIDE 20 MEQ: 1500 TABLET, EXTENDED RELEASE ORAL at 10:07

## 2017-01-01 RX ADMIN — ACETAMINOPHEN 650 MG: 325 TABLET, FILM COATED ORAL at 17:18

## 2017-01-01 RX ADMIN — TRAMADOL HYDROCHLORIDE 100 MG: 50 TABLET, FILM COATED ORAL at 04:45

## 2017-01-01 RX ADMIN — SERTRALINE HYDROCHLORIDE 75 MG: 50 TABLET ORAL at 20:26

## 2017-01-01 RX ADMIN — APIXABAN 5 MG: 2.5 TABLET, FILM COATED ORAL at 08:36

## 2017-01-01 RX ADMIN — DONEPEZIL HYDROCHLORIDE 10 MG: 5 TABLET, FILM COATED ORAL at 20:14

## 2017-01-01 RX ADMIN — LEVETIRACETAM 500 MG: 500 TABLET, FILM COATED ORAL at 09:50

## 2017-01-01 RX ADMIN — STANDARDIZED SENNA CONCENTRATE 1 TABLET: 8.6 TABLET ORAL at 16:25

## 2017-01-01 RX ADMIN — ASPIRIN 81 MG: 81 TABLET, COATED ORAL at 09:30

## 2017-01-01 RX ADMIN — CLINDAMYCIN HYDROCHLORIDE 450 MG: 150 CAPSULE ORAL at 18:52

## 2017-01-01 RX ADMIN — DOXAZOSIN MESYLATE 2 MG: 2 TABLET ORAL at 20:39

## 2017-01-01 RX ADMIN — ALLOPURINOL 100 MG: 100 TABLET ORAL at 18:32

## 2017-01-01 RX ADMIN — SENNOSIDES AND DOCUSATE SODIUM 1 TABLET: 8.6; 5 TABLET ORAL at 08:52

## 2017-01-01 RX ADMIN — METOPROLOL SUCCINATE 100 MG: 50 TABLET, EXTENDED RELEASE ORAL at 09:50

## 2017-01-01 RX ADMIN — ISOSORBIDE DINITRATE 10 MG: 10 TABLET ORAL at 09:11

## 2017-01-01 RX ADMIN — MEGESTROL ACETATE 200 MG: 40 SUSPENSION ORAL at 07:58

## 2017-01-01 RX ADMIN — Medication 10 ML: at 08:27

## 2017-01-01 RX ADMIN — LEVETIRACETAM 500 MG: 500 TABLET, FILM COATED ORAL at 17:32

## 2017-01-01 RX ADMIN — ACETAMINOPHEN 650 MG: 325 TABLET, FILM COATED ORAL at 22:42

## 2017-01-01 RX ADMIN — ISOSORBIDE DINITRATE 10 MG: 10 TABLET ORAL at 10:22

## 2017-01-01 RX ADMIN — Medication 150 MG: at 10:45

## 2017-01-01 RX ADMIN — Medication 1 CAPSULE: at 09:42

## 2017-01-01 RX ADMIN — Medication 10 ML: at 21:59

## 2017-01-01 RX ADMIN — FAMOTIDINE 20 MG: 20 TABLET, FILM COATED ORAL at 09:23

## 2017-01-01 RX ADMIN — IBANDRONATE SODIUM 150 MG: 150 TABLET, FILM COATED ORAL at 11:33

## 2017-01-01 RX ADMIN — DOXAZOSIN MESYLATE 2 MG: 2 TABLET ORAL at 11:07

## 2017-01-01 RX ADMIN — ALLOPURINOL 100 MG: 100 TABLET ORAL at 09:42

## 2017-01-01 RX ADMIN — GLIPIZIDE 2.5 MG: 2.5 TABLET, FILM COATED, EXTENDED RELEASE ORAL at 08:28

## 2017-01-01 RX ADMIN — GUAIFENESIN 1200 MG: 600 TABLET, EXTENDED RELEASE ORAL at 18:28

## 2017-01-01 RX ADMIN — DILTIAZEM HYDROCHLORIDE 300 MG: 180 CAPSULE, COATED, EXTENDED RELEASE ORAL at 14:11

## 2017-01-01 RX ADMIN — Medication 10 ML: at 08:29

## 2017-01-01 RX ADMIN — ALLOPURINOL 100 MG: 100 TABLET ORAL at 21:18

## 2017-01-01 RX ADMIN — PANTOPRAZOLE SODIUM 40 MG: 40 INJECTION, POWDER, FOR SOLUTION INTRAVENOUS at 06:53

## 2017-01-01 RX ADMIN — ISOSORBIDE DINITRATE 10 MG: 10 TABLET ORAL at 22:17

## 2017-01-01 RX ADMIN — DONEPEZIL HYDROCHLORIDE 10 MG: 5 TABLET, FILM COATED ORAL at 22:46

## 2017-01-01 RX ADMIN — FAMOTIDINE 20 MG: 20 TABLET ORAL at 09:54

## 2017-01-01 RX ADMIN — TERAZOSIN HYDROCHLORIDE 2 MG: 1 CAPSULE ORAL at 21:33

## 2017-01-01 RX ADMIN — GUAIFENESIN 1200 MG: 600 TABLET, EXTENDED RELEASE ORAL at 10:08

## 2017-01-01 RX ADMIN — FUROSEMIDE 40 MG: 40 TABLET ORAL at 10:37

## 2017-01-01 RX ADMIN — MICONAZOLE NITRATE: 20 POWDER TOPICAL at 20:46

## 2017-01-01 RX ADMIN — STANDARDIZED SENNA CONCENTRATE 1 TABLET: 8.6 TABLET ORAL at 17:20

## 2017-01-01 RX ADMIN — DONEPEZIL HYDROCHLORIDE 10 MG: 5 TABLET, FILM COATED ORAL at 20:00

## 2017-01-01 RX ADMIN — OFLOXACIN 50000 UNITS: 300 TABLET, COATED ORAL at 13:52

## 2017-01-01 RX ADMIN — SENNOSIDES AND DOCUSATE SODIUM 1 TABLET: 8.6; 5 TABLET ORAL at 08:55

## 2017-01-01 RX ADMIN — TRAMADOL HYDROCHLORIDE 50 MG: 50 TABLET, FILM COATED ORAL at 04:26

## 2017-01-01 RX ADMIN — FAMOTIDINE 20 MG: 20 TABLET, FILM COATED ORAL at 12:26

## 2017-01-01 RX ADMIN — CONJUGATED ESTROGENS 1 APPLICATION: 0.62 CREAM VAGINAL at 20:42

## 2017-01-01 RX ADMIN — SERTRALINE HYDROCHLORIDE 100 MG: 50 TABLET ORAL at 20:55

## 2017-01-01 RX ADMIN — IPRATROPIUM BROMIDE AND ALBUTEROL SULFATE 3 ML: .5; 3 SOLUTION RESPIRATORY (INHALATION) at 08:46

## 2017-01-01 RX ADMIN — LEVOTHYROXINE SODIUM 150 MCG: 150 TABLET ORAL at 11:41

## 2017-01-01 RX ADMIN — LEVOTHYROXINE SODIUM 150 MCG: 50 TABLET ORAL at 06:40

## 2017-01-01 RX ADMIN — METOPROLOL TARTRATE 75 MG: 25 TABLET, FILM COATED ORAL at 08:51

## 2017-01-01 RX ADMIN — DICLOFENAC SODIUM 4 G: 10 GEL TOPICAL at 06:25

## 2017-01-01 RX ADMIN — DILTIAZEM HYDROCHLORIDE 120 MG: 60 TABLET, FILM COATED ORAL at 17:34

## 2017-01-01 RX ADMIN — MEGESTROL ACETATE 200 MG: 40 SUSPENSION ORAL at 08:28

## 2017-01-01 RX ADMIN — INSULIN ASPART 3 UNITS: 100 INJECTION, SOLUTION INTRAVENOUS; SUBCUTANEOUS at 17:40

## 2017-01-01 RX ADMIN — Medication 1 CAPSULE: at 12:34

## 2017-01-01 RX ADMIN — DOCUSATE SODIUM AND SENNOSIDES 1 TABLET: 8.6; 5 TABLET, FILM COATED ORAL at 08:40

## 2017-01-01 RX ADMIN — FUROSEMIDE 40 MG: 40 TABLET ORAL at 09:12

## 2017-01-01 RX ADMIN — INSULIN ASPART 2 UNITS: 100 INJECTION, SOLUTION INTRAVENOUS; SUBCUTANEOUS at 11:57

## 2017-01-01 RX ADMIN — SERTRALINE HYDROCHLORIDE 75 MG: 50 TABLET ORAL at 20:15

## 2017-01-01 RX ADMIN — MICONAZOLE NITRATE: 20 POWDER TOPICAL at 21:52

## 2017-01-01 RX ADMIN — LEVETIRACETAM 500 MG: 500 TABLET, FILM COATED ORAL at 09:02

## 2017-01-01 RX ADMIN — ISOSORBIDE DINITRATE 10 MG: 10 TABLET ORAL at 22:29

## 2017-01-01 RX ADMIN — SENNOSIDES AND DOCUSATE SODIUM 1 TABLET: 8.6; 5 TABLET ORAL at 22:12

## 2017-01-01 RX ADMIN — METOPROLOL SUCCINATE 100 MG: 50 TABLET, EXTENDED RELEASE ORAL at 07:27

## 2017-01-01 RX ADMIN — ISOSORBIDE DINITRATE 10 MG: 10 TABLET ORAL at 08:57

## 2017-01-01 RX ADMIN — KETOTIFEN FUMARATE 1 DROP: 0.25 SOLUTION/ DROPS OPHTHALMIC at 17:59

## 2017-01-01 RX ADMIN — INSULIN ASPART 2 UNITS: 100 INJECTION, SOLUTION INTRAVENOUS; SUBCUTANEOUS at 20:41

## 2017-01-01 RX ADMIN — DONEPEZIL HYDROCHLORIDE 10 MG: 5 TABLET ORAL at 21:09

## 2017-01-01 RX ADMIN — MICONAZOLE NITRATE: 20 POWDER TOPICAL at 09:39

## 2017-01-01 RX ADMIN — STANDARDIZED SENNA CONCENTRATE 1 TABLET: 8.6 TABLET ORAL at 17:08

## 2017-01-01 RX ADMIN — DILTIAZEM HYDROCHLORIDE 300 MG: 180 CAPSULE, COATED, EXTENDED RELEASE ORAL at 11:41

## 2017-01-01 RX ADMIN — BUDESONIDE 0.5 MG: 0.5 SUSPENSION RESPIRATORY (INHALATION) at 09:05

## 2017-01-01 RX ADMIN — LEVETIRACETAM 500 MG: 500 TABLET, FILM COATED ORAL at 20:13

## 2017-01-01 RX ADMIN — DOCUSATE SODIUM AND SENNOSIDES 1 TABLET: 8.6; 5 TABLET, FILM COATED ORAL at 21:04

## 2017-01-01 RX ADMIN — LINAGLIPTIN 5 MG: 5 TABLET, FILM COATED ORAL at 08:15

## 2017-01-01 RX ADMIN — APIXABAN 5 MG: 2.5 TABLET, FILM COATED ORAL at 20:16

## 2017-01-01 RX ADMIN — METOPROLOL TARTRATE 100 MG: 25 TABLET, FILM COATED ORAL at 10:20

## 2017-01-01 RX ADMIN — APIXABAN 5 MG: 2.5 TABLET, FILM COATED ORAL at 08:59

## 2017-01-01 RX ADMIN — LEVOTHYROXINE SODIUM 150 MCG: 150 TABLET ORAL at 05:21

## 2017-01-01 RX ADMIN — METOPROLOL TARTRATE 75 MG: 25 TABLET, FILM COATED ORAL at 17:26

## 2017-01-01 RX ADMIN — METOPROLOL TARTRATE 75 MG: 25 TABLET, FILM COATED ORAL at 18:25

## 2017-01-01 RX ADMIN — INSULIN ASPART 3 UNITS: 100 INJECTION, SOLUTION INTRAVENOUS; SUBCUTANEOUS at 21:11

## 2017-01-01 RX ADMIN — ISOSORBIDE DINITRATE 10 MG: 10 TABLET ORAL at 09:38

## 2017-02-03 PROBLEM — A41.9 SEPSIS (HCC): Status: ACTIVE | Noted: 2017-01-01

## 2017-02-03 NOTE — PLAN OF CARE
Problem: Patient Care Overview (Adult)  Goal: Discharge Needs Assessment  Outcome: Ongoing (interventions implemented as appropriate)    02/03/17 1248 02/03/17 1329   Discharge Needs Assessment   Concerns To Be Addressed --  no discharge needs identified   Readmission Within The Last 30 Days no previous admission in last 30 days --    Equipment Needed After Discharge none --    Discharge Planning Comments spoke with patient at bedside. she is from [facility] rehab and skilled care unit. spoke with Jessi Hadley and patient is private pay and they will accept patient when medically stable. patient may need a precert from [facility] prior to transfer. will continue to follow.  --    Current Health   Anticipated Changes Related to Illness none --    Living Environment   Transportation Available ambulance --    Self-Care   Equipment Currently Used at Home none --

## 2017-02-03 NOTE — PLAN OF CARE
Problem: Patient Care Overview (Adult)  Goal: Plan of Care Review  Outcome: Ongoing (interventions implemented as appropriate)    02/03/17 1329   Coping/Psychosocial Response Interventions   Plan Of Care Reviewed With patient   Patient Care Overview   Progress progress toward functional goals as expected   Outcome Evaluation   Outcome Summary/Follow up Plan IV fluid bolus given, asytomatic tachycardia, lethargy, aspiration precautions maintained       Goal: Adult Individualization and Mutuality  Outcome: Ongoing (interventions implemented as appropriate)  Goal: Discharge Needs Assessment  Outcome: Ongoing (interventions implemented as appropriate)    Problem: Sepsis (Adult)  Goal: Signs and Symptoms of Listed Potential Problems Will be Absent or Manageable (Sepsis)  Outcome: Ongoing (interventions implemented as appropriate)    Problem: Colostomy (Adult)  Goal: Signs and Symptoms of Listed Potential Problems Will be Absent or Manageable (Colostomy)  Outcome: Ongoing (interventions implemented as appropriate)    Problem: Pneumonia (Adult)  Goal: Signs and Symptoms of Listed Potential Problems Will be Absent or Manageable (Pneumonia)  Outcome: Ongoing (interventions implemented as appropriate)

## 2017-02-03 NOTE — H&P
[facility] HOSPITALIST     Shira Galaviz MD    CHIEF COMPLAINT: SOA/Tachycardia    HISTORY OF PRESENT ILLNESS:    The patient is a 76 YOF resident of rehab and skilled care facility who developed fever/chills/cough/soa on 1/31/17. Cultures/labs/x-rays were done that resulted with infiltrate LLL. She was started on IVFs and IV Levaquin overnight 2/1/17 and was changed to oral doxycycline for the day yesterday when sputum grew H flu. On 2/2/17 she developed tachycardia, her EKG showed changes, her proBNP was mildly elevated and cardiology was consulted. She has remained tachycardic and has low oral intake since onset. She was transferred to telemetry unit secondary to sepsis from [facility] for higher level of monitoring available. SLP evaluated her yesterday and recommended diet thickness changes secondary to possible aspiration which she has experienced in the past.      She has moderate exposure to illness in the rehab setting but denied known sick contacts. She has had no change in weight/urination/bowel habits/medications or other known changes.     Past Medical History   Diagnosis Date   • Alzheimer disease    • Anxiety    • Chest pain    • Constipation    • Depression    • Hypertension    • Hypothyroid    • Myocardial infarction    • Osteoporosis    • Seizure      Past Surgical History   Procedure Laterality Date   • Colonoscopy     • Colostomy Left    • Cardiac surgery       No family history on file.  Social History   Substance Use Topics   • Smoking status: Never Smoker   • Smokeless tobacco: Not on file   • Alcohol use No     Prescriptions Prior to Admission   Medication Sig Dispense Refill Last Dose   • acetaminophen (TYLENOL) 500 MG tablet Take 500 mg by mouth every 4 (four) hours as needed for mild pain (1-3) or fever.   12/1/2016 at Unknown time   • allopurinol (ZYLOPRIM) 100 MG tablet Take 100 mg by mouth 2 (two) times a day.   11/30/2016 at Unknown time   • aspirin 81 MG tablet  Take 81 mg by mouth daily.   12/1/2016 at Unknown time   • benztropine (COGENTIN) 0.5 MG tablet Take 0.25 mg by mouth 2 (two) times a day.   12/1/2016 at Unknown time   • calcium carbonate (TUMS) 500 MG chewable tablet Chew 1 tablet every 4 (four) hours as needed for indigestion or heartburn.   12/1/2016 at Unknown time   • calcium-vitamin D (OSCAL-500) 500-200 MG-UNIT per tablet Take 1 tablet by mouth 2 (two) times a day.   12/1/2016 at Unknown time   • Cholecalciferol 70233 UNITS tablet Take 50,000 Units by mouth 1 (one) time per week. On Tuesday   Past Week at Unknown time   • conjugated estrogens (PREMARIN) vaginal cream Insert 0.625 g into the vagina daily.   12/1/2016 at Unknown time   • diclofenac (VOLTAREN) 1 % gel gel Apply 4 g topically 3 (three) times a day as needed (for left knee or foot pain).   12/1/2016 at Unknown time   • diltiazem CD (CARDIZEM CD) 240 MG 24 hr capsule Take 240 mg by mouth every other day.   12/1/2016 at Unknown time   • donepezil (ARICEPT) 10 MG tablet Take 10 mg by mouth every night.   11/30/2016 at Unknown time   • doxazosin (CARDURA) 2 MG tablet Take 2 mg by mouth 2 (two) times a day.   12/1/2016 at Unknown time   • guaifenesin (ROBITUSSIN) 100 MG/5ML liquid Take 200 mg by mouth 6 (six) times a day.   12/1/2016 at Unknown time   • HYDROcodone-acetaminophen (NORCO) 5-325 MG per tablet Take 1-2 tablets by mouth every 6 (six) hours as needed for moderate pain (4-6).   12/1/2016 at Unknown time   • ibandronate (BONIVA) 150 MG tablet Take 150 mg by mouth every 30 (thirty) days.   Past Month at Unknown time   • lactobacillus acidophilus (RISAQUAD) capsule capsule Take 1 capsule by mouth daily.   12/1/2016 at Unknown time   • levETIRAcetam (KEPPRA) 500 MG tablet Take 500 mg by mouth 2 (two) times a day.   12/1/2016 at Unknown time   • levothyroxine (SYNTHROID, LEVOTHROID) 150 MCG tablet Take 150 mcg by mouth daily.   12/1/2016 at Unknown time   • lisinopril (PRINIVIL,ZESTRIL) 20 MG  tablet Take 20 mg by mouth 2 (two) times a day.   12/1/2016 at Unknown time   • LORazepam (ATIVAN) 0.5 MG tablet Take 0.5 mg by mouth every 8 (eight) hours as needed for anxiety.   11/30/2016 at Unknown time   • magnesium hydroxide (MILK OF MAGNESIA) 400 MG/5ML suspension Take 30 mL by mouth at night as needed for constipation.   Past Week at Unknown time   • metoprolol tartrate (LOPRESSOR) 25 MG tablet Take 25 mg by mouth 2 (two) times a day.   12/1/2016 at Unknown time   • miconazole (MICOTIN) 2 % powder Apply 1 application topically as needed for itching.   12/1/2016 at Unknown time   • Multiple Vitamin (MULTIVITAMIN) tablet Take 1 tablet by mouth daily.   12/1/2016 at Unknown time   • nitroglycerin (NITROSTAT) 0.4 MG SL tablet Place 0.4 mg under the tongue every 5 (five) minutes as needed for chest pain. Take no more than 3 doses in 15 minutes.   12/1/2016 at 1600   • olopatadine (PATANOL) 0.1 % ophthalmic solution Administer 1 drop to both eyes 4 (four) times a day as needed for allergies.   12/1/2016 at Unknown time   • ranitidine (ZANTAC) 300 MG tablet Take 300 mg by mouth every night.   11/30/2016 at Unknown time   • senna (SENOKOT) 8.6 MG tablet Take 1 tablet by mouth 2 (two) times a day.   12/1/2016 at Unknown time   • sertraline (ZOLOFT) 100 MG tablet Take 100 mg by mouth every night.   11/30/2016 at Unknown time     Allergies:  Cefazolin; Cefprozil; and Cephalosporins    REVIEW OF SYSTEMS:  Please see the above history of present illness for pertinent positives and negatives.  The remainder of the patient's systems have been reviewed and are negative.     Vital Signs  Temp:  [97.8 °F (36.6 °C)-98.9 °F (37.2 °C)] 97.8 °F (36.6 °C)  Heart Rate:  [129-139] 139  Resp:  [17-20] 17  BP: (109-130)/(56-79) 127/79  Oxygen Therapy  SpO2: 93 %  Pulse Oximetry Type: Intermittent  O2 Device: nasal cannula  Flow (L/min): 2}  Body mass index is 40.44 kg/(m^2).  Flowsheet Rows         First Filed Value    Admission  "Height  65\" (165.1 cm) Documented at 02/03/2017 1044    Admission Weight  243 lb (110 kg) Documented at 02/03/2017 1044             Physical Exam:  Physical Exam   Constitutional: Patient appears well-developed and well-nourished and in no acute distress, obese   HEENT:   Head: Normocephalic and atraumatic.   Eyes: Pupils are equal, round, and reactive to light. EOM are intact. Sclera are anicteric and non-injected.  Mouth and Throat: Patient has moist mucous membranes. Oropharynx is clear of any erythema or exudate.   Neck: Neck supple. No JVD present. No thyromegaly present. No lymphadenopathy present.  Cardiovascular: Tachycardic, regular rhythm, S1 normal and S2 normal. Exam reveals no gallop and no friction rub. Grade 2/6 systolic murmur heard.  Pulmonary/Chest: Lungs are with coarse rhonchi/expiratory wheezes.  Abdominal: Obese, soft. Bowel sounds are normal. No distension and no mass. There is no hepatosplenomegaly. There is no tenderness.   Musculoskeletal: Normal Muscle tone  Extremities: No edema. Pulses are palpable in all 4 extremities.  Neurological: Patient is alert and oriented to person, place, and time. Cranial nerves II-XII are grossly intact with no focal deficits.  Skin: Skin is warm. No rash noted. Nails show no clubbing.  No cyanosis or erythema.     Results Review:    I reviewed the patient's new clinical results.  Lab Results (most recent)     Procedure Component Value Units Date/Time    Respiratory Panel, PCR [[id number]] Collected:  02/03/17 1119    Specimen:  Swab from Nasopharynx Updated:  02/03/17 1119    T4, Free [[id number]]  (Normal) Collected:  02/03/17 0622    Specimen:  Blood Updated:  02/03/17 1242     Free T4 1.40 ng/dL           Imaging Results (most recent)     None        LLL pneumonia    ECG/EMG Results (most recent)     None      Reviewed report, discussed with Dr. Godfrey  Assessment/Plan   1. Acute hypoxic respiratory failure with 2. Sepsis secondary to LLL H. Influenza PNA " with h/o aspiration  Transfer to telemetry now  Received IV Levaquin and 1 dose of azithromycin 2/1/17  Changed to Doxycycline 2/2/17  Resume Levaquin 750 mg IV q day  Continue duonebs q 4 hours/IS/guaifenesin  Add budesonide nebs bid/acapella  Re-check viral panel  Sputum +H flu  Lactic acid normal  Procal remains in recommended range for abx  WBCC normal  .9 NS bolus 500 ml x 2 given, LR continued at 75 ml/hr  Diet changed to soft/ground/nectar with SLP following for aspiration possibility  Consider need for chest CT/echo  Monitor closely      3. Sinus Tachycardia: Cardiology following  Rate 140, give digoxin 250 mcg IV now  TSH low, FT4 normal  secondary to number 1,2  Last echo 10/2016 with near normal EF  +severe PH/moderate AS/LVH/mild MS/severe bi-atrial enlargement  Troponins negative  Hydralazine d/c'd per cardiology today  Monitor closely on telemetry      4. Acute kidney injury: Creatinine baseline normal  Continue LR at 75 ml/hr   Add 500 ml normal saline bolus x 2 thus far  Creatinine 1.11 today, improving  Monitor      5. Hypertension 6. CAD 7. Hyperlipidemia:  Stable on diltiazem  mg daily/cardura 2 mg q 12 hours/metoprolol tart 25 mg q 12 hours/Isordil dinitrate 10 mg q 12 hour      8. DM2: A1C 5.9%  Continue to monitor, add accuchecks ac/hs      9. H/O frequent UTIs with ESBL e-coli in past: ua negative for infection       10. OA/osteoporosis, possible gout?: non-ambulatory secondary to this  No acute issues on boniva 150 mg q 30 days  Continues on allopurinol daily/tramadol 100 mg hs prn      11. Hypothyroidism: no acute issues on levothyroxine 150 mcg q am  Last TSH normal at 2.390 11/21/16  No change for now, recheck 6 weeks      12. Alzheimer's dz: no acute issues on aricept 10 mg nightly/zoloft 100 mg nightly      13. Depression/anxiety: no acute issues      14. GERD with umbilical hernia and colostomy:   Added Protonix q am  Continue famotidine 40 mg hs      15. Anemia: chronic, stable  without signs of acute blood loss  Hgb 9.3 today  Monitor at intervals      I discussed the patients findings and my recommendations with patient.      ÁLVARO Hutchins  02/03/17  9:43 AM  Addended 1515 pm    I discussed the patients findings and my recommendations with patient, daughter and granddaughter with patient's permission.     ÁLVARO Hutchins  02/03/17  3:09 PM

## 2017-02-04 PROBLEM — N17.9 AKI (ACUTE KIDNEY INJURY) (HCC): Status: ACTIVE | Noted: 2017-01-01

## 2017-02-04 PROBLEM — E66.01 OBESITY, CLASS III, BMI 40-49.9 (MORBID OBESITY) (HCC): Status: ACTIVE | Noted: 2017-01-01

## 2017-02-04 PROBLEM — I10 ESSENTIAL HYPERTENSION: Status: ACTIVE | Noted: 2017-01-01

## 2017-02-04 PROBLEM — M81.0 OSTEOPOROSIS: Status: ACTIVE | Noted: 2017-01-01

## 2017-02-04 PROBLEM — R13.12 OROPHARYNGEAL DYSPHAGIA: Status: ACTIVE | Noted: 2017-01-01

## 2017-02-04 PROBLEM — F41.8 DEPRESSION WITH ANXIETY: Status: ACTIVE | Noted: 2017-01-01

## 2017-02-04 PROBLEM — M19.90 OSTEOARTHRITIS: Status: ACTIVE | Noted: 2017-01-01

## 2017-02-04 PROBLEM — J96.01 ACUTE RESPIRATORY FAILURE WITH HYPOXIA (HCC): Status: ACTIVE | Noted: 2017-01-01

## 2017-02-04 PROBLEM — E78.5 DYSLIPIDEMIA: Status: ACTIVE | Noted: 2017-01-01

## 2017-02-04 PROBLEM — I25.10 CORONARY ARTERY DISEASE INVOLVING NATIVE CORONARY ARTERY OF NATIVE HEART WITHOUT ANGINA PECTORIS: Status: ACTIVE | Noted: 2017-01-01

## 2017-02-04 PROBLEM — K21.9 GERD (GASTROESOPHAGEAL REFLUX DISEASE): Status: ACTIVE | Noted: 2017-01-01

## 2017-02-04 PROBLEM — E03.9 ACQUIRED HYPOTHYROIDISM: Status: ACTIVE | Noted: 2017-01-01

## 2017-02-04 PROBLEM — R00.0 TACHYCARDIA: Status: ACTIVE | Noted: 2017-01-01

## 2017-02-04 PROBLEM — J14 PNEUMONIA DUE TO HAEMOPHILUS INFLUENZAE (HCC): Status: ACTIVE | Noted: 2017-01-01

## 2017-02-04 PROBLEM — D64.9 ANEMIA: Status: ACTIVE | Noted: 2017-01-01

## 2017-02-04 PROBLEM — G30.0 EARLY ONSET ALZHEIMER'S DEMENTIA WITHOUT BEHAVIORAL DISTURBANCE (HCC): Status: ACTIVE | Noted: 2017-01-01

## 2017-02-04 PROBLEM — F02.80 EARLY ONSET ALZHEIMER'S DEMENTIA WITHOUT BEHAVIORAL DISTURBANCE (HCC): Status: ACTIVE | Noted: 2017-01-01

## 2017-02-04 NOTE — PROGRESS NOTES
Cardiac Electrophysiology Follow-up Note     LOS: 1 day   Patient Care Team:  Shira Galaviz MD as PCP - General  Shira Galaviz MD as PCP - Family Medicine    Chief Complaint: cough/hypoxemia     Interval History: c/o dyspnea this AM. HR remains elevated in 120s-140s.    Objective   Vital Signs  Temp:  [97.8 °F (36.6 °C)-99.6 °F (37.6 °C)] 99.2 °F (37.3 °C)  Heart Rate:  [129-142] 138  Resp:  [17-24] 20  BP: (122-138)/() 138/100    Intake/Output Summary (Last 24 hours) at 02/04/17 0715  Last data filed at 02/03/17 1700   Gross per 24 hour   Intake 2086.25 ml   Output      0 ml   Net 2086.25 ml     Obese, chronically ill  Uncomfortable NAD  PERRL, conjunctiva clear  Neck supple, no JVD or thyromegaly appreciated  S1/S2 rapid, (+) systolic murmur  Lungs CTA B, normal effort  Abdomen S/NT/ND (+) BS, no HSM appreciated  Extremities warm, no clubbing, cyanosis, or edema  No visible or palpable skin lesions  A/Ox4, mood and affect appropriate    Results Review:        Results from last 7 days  Lab Units 02/03/17 0622 02/02/17 0056 01/30/17  0637   SODIUM mmol/L 139 138 140   POTASSIUM mmol/L 3.8 4.1 4.8   CHLORIDE mmol/L 100 97* 101   TOTAL CO2 mmol/L 25.9 25.0 23.6   BUN mg/dL 40* 34* 19   CREATININE mg/dL 1.11* 1.20* 0.91   GLUCOSE mg/dL 178* 174* 140*   CALCIUM mg/dL 9.4 9.0 8.9       Results from last 7 days  Lab Units 02/03/17  0622 02/02/17 0056   TROPONIN T ng/mL 0.013 0.017       Results from last 7 days  Lab Units 02/03/17  0622 02/02/17 0056 01/31/17  1201   WBC 10*3/mm3 5.38 6.41 8.64   HEMOGLOBIN g/dL 9.3* 9.4* 10.1*   HEMATOCRIT % 29.9* 30.0* 32.5*   PLATELETS 10*3/mm3 168 169 175     Tele: HRs 120s-140s    I reviewed the patient's new clinical results.  I personally viewed and interpreted the patient's EKG/Telemetry data        Medication Review:     acetaminophen 650 mg Oral Nightly   adenosine 12 mg Intravenous Once   allopurinol 100 mg Oral Q12H   budesonide 0.5 mg Nebulization BID  - RT   calcium-vitamin D 500 mg Oral Q12H   [START ON 2/7/2017] conjugated estrogens 0.75 application Vaginal Once per day on Tue   diltiaZEM  mg Oral Daily   donepezil 10 mg Oral Nightly   doxazosin 2 mg Oral Q12H   enoxaparin 40 mg Subcutaneous Q24H   famotidine 40 mg Oral Nightly   guaiFENesin 1,200 mg Oral BID   [START ON 2/12/2017] ibandronate 150 mg Oral Q30 Days   ipratropium-albuterol 3 mL Nebulization Q4H - RT   isosorbide dinitrate 10 mg Oral Q12H   ketotifen 1 drop Both Eyes BID   lactobacillus acidophilus 1 capsule Oral Daily   levETIRAcetam 500 mg Oral Q12H   levoFLOXacin 750 mg Intravenous Q24H   levothyroxine 150 mcg Oral Q AM   metoprolol tartrate 25 mg Oral Q12H   miconazole  Topical Q12H   multivitamin with minerals 1 tablet Oral Daily   pantoprazole 40 mg Intravenous Q AM   sennosides-docusate sodium 1 tablet Oral Q12H   sertraline 100 mg Oral Nightly       amiodarone 0.5 mg/min    lactated ringers 75 mL/hr Last Rate: 75 mL/hr (02/03/17 8884)       Assessment/Plan     Active Problems:    Sepsis  Atrial tachycardia/flutter    I have reviewed her ECG and telemetry. I am not convinced that this is sinus tachycardia and not AT or flutter. I tried to give adenosine to see underlying rhythm but was told by RN that cannot give that here. I will start her on an amiodarone gtt to see if this responds.    Charles Dietrich MD  02/04/17  7:15 AM

## 2017-02-04 NOTE — PROGRESS NOTES
Pharmacy was consulted to review Ms. Cronin's medications to see if she is taking anything that could possibly cause urinary retention.  I did not see that she is taking any of the suspected pharmacologic agents associated with urinary retention; however, there are a couple of possible agents.    Although the incidence is probably not high and urinary retention is not listed as a known adverse reaction in the package inserts, I did find an article that suggested the following as possible drug causes of acute urinary retention:    1)Diltiazem may induce urinary retention through a similar mechanism as anticholinergics- smooth muscle relaxation, which decreases the force of detrusor contraciton.    2)Metoprolol.  The incidence is very low.  Metoprolol is also a cardio-selective beta-blocker, so likelihood would be even lower.    3) Zoloft may be associated with urinary retention, but the tricyclic antidepressants are typically what we associate with urinary retention.    Thank you for the consult.  Please let us know if you have any questions.    Chanda Burrows, PharmSEAN  2/4/2017  12:16 PM

## 2017-02-04 NOTE — PLAN OF CARE
Problem: Pneumonia (Adult)  Goal: Signs and Symptoms of Listed Potential Problems Will be Absent or Manageable (Pneumonia)  Outcome: Ongoing (interventions implemented as appropriate)

## 2017-02-04 NOTE — PAYOR COMM NOTE
"Jurgen Cronin (76 y.o. Female)   Audrey Velasquez RN  Case Management  [phone] fax      Date of Birth Social Security Number Address Home Phone MRN    1940  [address] [place] [address] [phone] [id number]    [employer] Marital Status          Congregational        Admission Date Admission Type Admitting Provider Attending Provider Department, Room/Bed    2/3/17 Elective Shira Galaviz MD Kemparajurs, Keerthi, MD [facility] ICU, ICU5/1    Discharge Date Discharge Disposition Discharge Destination                      Attending Provider: Shira Galaviz MD     Allergies:  Cefazolin, Cefprozil, Cephalosporins    Isolation:  Contact Drop   Infection:  ESBL E coli (09/11/16), ESBL (02/10/16)   Code Status:  FULL    Ht:  65\" (165.1 cm)   Wt:  249 lb 1.9 oz (113 kg)    Admission Cmt:  None   Principal Problem:  None                Active Insurance as of 2/3/2017     Primary Coverage     Payor Plan Insurance Group Employer/Plan Group    HUMANA MEDICARE REPL HUMANA MEDICARE REPL [id number]     Payor Plan Address Payor Plan Phone Number Effective From Effective To    [address] [address] [phone] 1/1/2013     [place] [address]       Subscriber Name Subscriber Birth Date Member ID       JURGEN CRONIN 1940 [id number]                 Emergency Contacts      (Rel.) Home Phone Work Phone Mobile Phone    Evon Cronine (Power of ) -- [phone]    Ashley Cronin (Grandchild) -- -- [phone]            Insurance Information                HUMANA MEDICARE REPL/HUMANA MEDICARE REPL Phone: [phone]    Subscriber: Jurgen Cronin Subscriber#: [id number]    Group#: [id number] Precert#:              History & Physical      ÁLVARO Luke at 2/3/2017  3:07 PM     Attestation signed by Pato Jackson MD at 2/3/2017  5:07 PM        I have seen and examined Ms. Cronin and verified practitioner's findings.  I agree with the " diagnoses and plan as documented.  Repeated blood cultures.  Watch HR as she is on metoprolol and diltiazem.  Digoxin added times one.                                 [facility] HOSPITALIST     Shira Galaviz MD    CHIEF COMPLAINT: SOA/Tachycardia    HISTORY OF PRESENT ILLNESS:    The patient is a 76 YOF resident of rehab and skilled care facility who developed fever/chills/cough/soa on 1/31/17. Cultures/labs/x-rays were done that resulted with infiltrate LLL. She was started on IVFs and IV Levaquin overnight 2/1/17 and was changed to oral doxycycline for the day yesterday when sputum grew H flu. On 2/2/17 she developed tachycardia, her EKG showed changes, her proBNP was mildly elevated and cardiology was consulted. She has remained tachycardic and has low oral intake since onset. She was transferred to telemetry unit secondary to sepsis from [facility] for higher level of monitoring available. SLP evaluated her yesterday and recommended diet thickness changes secondary to possible aspiration which she has experienced in the past.      She has moderate exposure to illness in the rehab setting but denied known sick contacts. She has had no change in weight/urination/bowel habits/medications or other known changes.     Past Medical History   Diagnosis Date   • Alzheimer disease    • Anxiety    • Chest pain    • Constipation    • Depression    • Hypertension    • Hypothyroid    • Myocardial infarction    • Osteoporosis    • Seizure      Past Surgical History   Procedure Laterality Date   • Colonoscopy     • Colostomy Left    • Cardiac surgery       No family history on file.  Social History   Substance Use Topics   • Smoking status: Never Smoker   • Smokeless tobacco: Not on file   • Alcohol use No     Prescriptions Prior to Admission   Medication Sig Dispense Refill Last Dose   • acetaminophen (TYLENOL) 500 MG tablet Take 500 mg by mouth every 4 (four) hours as needed for mild pain (1-3) or fever.    12/1/2016 at Unknown time   • allopurinol (ZYLOPRIM) 100 MG tablet Take 100 mg by mouth 2 (two) times a day.   11/30/2016 at Unknown time   • aspirin 81 MG tablet Take 81 mg by mouth daily.   12/1/2016 at Unknown time   • benztropine (COGENTIN) 0.5 MG tablet Take 0.25 mg by mouth 2 (two) times a day.   12/1/2016 at Unknown time   • calcium carbonate (TUMS) 500 MG chewable tablet Chew 1 tablet every 4 (four) hours as needed for indigestion or heartburn.   12/1/2016 at Unknown time   • calcium-vitamin D (OSCAL-500) 500-200 MG-UNIT per tablet Take 1 tablet by mouth 2 (two) times a day.   12/1/2016 at Unknown time   • Cholecalciferol 92517 UNITS tablet Take 50,000 Units by mouth 1 (one) time per week. On Tuesday   Past Week at Unknown time   • conjugated estrogens (PREMARIN) vaginal cream Insert 0.625 g into the vagina daily.   12/1/2016 at Unknown time   • diclofenac (VOLTAREN) 1 % gel gel Apply 4 g topically 3 (three) times a day as needed (for left knee or foot pain).   12/1/2016 at Unknown time   • diltiazem CD (CARDIZEM CD) 240 MG 24 hr capsule Take 240 mg by mouth every other day.   12/1/2016 at Unknown time   • donepezil (ARICEPT) 10 MG tablet Take 10 mg by mouth every night.   11/30/2016 at Unknown time   • doxazosin (CARDURA) 2 MG tablet Take 2 mg by mouth 2 (two) times a day.   12/1/2016 at Unknown time   • guaifenesin (ROBITUSSIN) 100 MG/5ML liquid Take 200 mg by mouth 6 (six) times a day.   12/1/2016 at Unknown time   • HYDROcodone-acetaminophen (NORCO) 5-325 MG per tablet Take 1-2 tablets by mouth every 6 (six) hours as needed for moderate pain (4-6).   12/1/2016 at Unknown time   • ibandronate (BONIVA) 150 MG tablet Take 150 mg by mouth every 30 (thirty) days.   Past Month at Unknown time   • lactobacillus acidophilus (RISAQUAD) capsule capsule Take 1 capsule by mouth daily.   12/1/2016 at Unknown time   • levETIRAcetam (KEPPRA) 500 MG tablet Take 500 mg by mouth 2 (two) times a day.   12/1/2016 at  Unknown time   • levothyroxine (SYNTHROID, LEVOTHROID) 150 MCG tablet Take 150 mcg by mouth daily.   12/1/2016 at Unknown time   • lisinopril (PRINIVIL,ZESTRIL) 20 MG tablet Take 20 mg by mouth 2 (two) times a day.   12/1/2016 at Unknown time   • LORazepam (ATIVAN) 0.5 MG tablet Take 0.5 mg by mouth every 8 (eight) hours as needed for anxiety.   11/30/2016 at Unknown time   • magnesium hydroxide (MILK OF MAGNESIA) 400 MG/5ML suspension Take 30 mL by mouth at night as needed for constipation.   Past Week at Unknown time   • metoprolol tartrate (LOPRESSOR) 25 MG tablet Take 25 mg by mouth 2 (two) times a day.   12/1/2016 at Unknown time   • miconazole (MICOTIN) 2 % powder Apply 1 application topically as needed for itching.   12/1/2016 at Unknown time   • Multiple Vitamin (MULTIVITAMIN) tablet Take 1 tablet by mouth daily.   12/1/2016 at Unknown time   • nitroglycerin (NITROSTAT) 0.4 MG SL tablet Place 0.4 mg under the tongue every 5 (five) minutes as needed for chest pain. Take no more than 3 doses in 15 minutes.   12/1/2016 at 1600   • olopatadine (PATANOL) 0.1 % ophthalmic solution Administer 1 drop to both eyes 4 (four) times a day as needed for allergies.   12/1/2016 at Unknown time   • ranitidine (ZANTAC) 300 MG tablet Take 300 mg by mouth every night.   11/30/2016 at Unknown time   • senna (SENOKOT) 8.6 MG tablet Take 1 tablet by mouth 2 (two) times a day.   12/1/2016 at Unknown time   • sertraline (ZOLOFT) 100 MG tablet Take 100 mg by mouth every night.   11/30/2016 at Unknown time     Allergies:  Cefazolin; Cefprozil; and Cephalosporins    REVIEW OF SYSTEMS:  Please see the above history of present illness for pertinent positives and negatives.  The remainder of the patient's systems have been reviewed and are negative.     Vital Signs  Temp:  [97.8 °F (36.6 °C)-98.9 °F (37.2 °C)] 97.8 °F (36.6 °C)  Heart Rate:  [129-139] 139  Resp:  [17-20] 17  BP: (109-130)/(56-79) 127/79  Oxygen Therapy  SpO2: 93 %  Pulse  "Oximetry Type: Intermittent  O2 Device: nasal cannula  Flow (L/min): 2}  Body mass index is 40.44 kg/(m^2).  Flowsheet Rows         First Filed Value    Admission Height  65\" (165.1 cm) Documented at 02/03/2017 1044    Admission Weight  243 lb (110 kg) Documented at 02/03/2017 1044             Physical Exam:  Physical Exam   Constitutional: Patient appears well-developed and well-nourished and in no acute distress, obese   HEENT:   Head: Normocephalic and atraumatic.   Eyes: Pupils are equal, round, and reactive to light. EOM are intact. Sclera are anicteric and non-injected.  Mouth and Throat: Patient has moist mucous membranes. Oropharynx is clear of any erythema or exudate.   Neck: Neck supple. No JVD present. No thyromegaly present. No lymphadenopathy present.  Cardiovascular: Tachycardic, regular rhythm, S1 normal and S2 normal. Exam reveals no gallop and no friction rub. Grade 2/6 systolic murmur heard.  Pulmonary/Chest: Lungs are with coarse rhonchi/expiratory wheezes.  Abdominal: Obese, soft. Bowel sounds are normal. No distension and no mass. There is no hepatosplenomegaly. There is no tenderness.   Musculoskeletal: Normal Muscle tone  Extremities: No edema. Pulses are palpable in all 4 extremities.  Neurological: Patient is alert and oriented to person, place, and time. Cranial nerves II-XII are grossly intact with no focal deficits.  Skin: Skin is warm. No rash noted. Nails show no clubbing.  No cyanosis or erythema.     Results Review:    I reviewed the patient's new clinical results.  Lab Results (most recent)     Procedure Component Value Units Date/Time    Respiratory Panel, PCR [[id number]] Collected:  02/03/17 1119    Specimen:  Swab from Nasopharynx Updated:  02/03/17 1119    T4, Free [[id number]]  (Normal) Collected:  02/03/17 0622    Specimen:  Blood Updated:  02/03/17 1242     Free T4 1.40 ng/dL           Imaging Results (most recent)     None        LLL pneumonia    ECG/EMG Results (most recent)  "    None      Reviewed report, discussed with Dr. Godfrey  Assessment/Plan   1. Acute hypoxic respiratory failure with 2. Sepsis secondary to LLL H. Influenza PNA with h/o aspiration  Transfer to telemetry now  Received IV Levaquin and 1 dose of azithromycin 2/1/17  Changed to Doxycycline 2/2/17  Resume Levaquin 750 mg IV q day  Continue duonebs q 4 hours/IS/guaifenesin  Add budesonide nebs bid/acapella  Re-check viral panel  Sputum +H flu  Lactic acid normal  Procal remains in recommended range for abx  WBCC normal  .9 NS bolus 500 ml x 2 given, LR continued at 75 ml/hr  Diet changed to soft/ground/nectar with SLP following for aspiration possibility  Consider need for chest CT/echo  Monitor closely      3. Sinus Tachycardia: Cardiology following  Rate 140, give digoxin 250 mcg IV now  TSH low, FT4 normal  secondary to number 1,2  Last echo 10/2016 with near normal EF  +severe PH/moderate AS/LVH/mild MS/severe bi-atrial enlargement  Troponins negative  Hydralazine d/c'd per cardiology today  Monitor closely on telemetry      4. Acute kidney injury: Creatinine baseline normal  Continue LR at 75 ml/hr   Add 500 ml normal saline bolus x 2 thus far  Creatinine 1.11 today, improving  Monitor      5. Hypertension 6. CAD 7. Hyperlipidemia:  Stable on diltiazem  mg daily/cardura 2 mg q 12 hours/metoprolol tart 25 mg q 12 hours/Isordil dinitrate 10 mg q 12 hour      8. DM2: A1C 5.9%  Continue to monitor, add accuchecks ac/hs      9. H/O frequent UTIs with ESBL e-coli in past: ua negative for infection       10. OA/osteoporosis, possible gout?: non-ambulatory secondary to this  No acute issues on boniva 150 mg q 30 days  Continues on allopurinol daily/tramadol 100 mg hs prn      11. Hypothyroidism: no acute issues on levothyroxine 150 mcg q am  Last TSH normal at 2.390 11/21/16  No change for now, recheck 6 weeks      12. Alzheimer's dz: no acute issues on aricept 10 mg nightly/zoloft 100 mg nightly      13.  Depression/anxiety: no acute issues      14. GERD with umbilical hernia and colostomy:   Added Protonix q am  Continue famotidine 40 mg hs      15. Anemia: chronic, stable without signs of acute blood loss  Hgb 9.3 today  Monitor at intervals      I discussed the patients findings and my recommendations with patient.      Ines ROSEJessy Figueredo, APRN  02/03/17  9:43 AM  Addended 1515 pm    I discussed the patients findings and my recommendations with patient, daughter and granddaughter with patient's permission.     Ines Figueredo, APRYESICA  02/03/17  3:09 PM           Electronically signed by Pato Jackson MD at 2/3/2017  5:07 PM        Vital Signs (last 24 hours)       02/03 0700  -  02/04 0659 02/04 0700  -  02/04 1136   Most Recent    Temp (°F) 97.8 -  99.6    99 -  99.2     99 (37.2)    Heart Rate (!)129 -  (!)142    (!)140 -  (!)142     (!) 142    Resp 17 -  24    20 -  24     20    /64 -  138/100    105/79 -  137/85     128/78    SpO2 (%) 90 -  96    92 -  97     95          Intake & Output (last day)       02/03 0701 - 02/04 0700 02/04 0701 - 02/05 0700    P.O. 480 240    I.V. (mL/kg) 1606.3 (14.2)     Total Intake(mL/kg) 2086.3 (18.5) 240 (2.1)    Net +2086.3 +240          Unmeasured Urine Occurrence 4 x         Lines, Drains & Airways    Active LDAs     Name:   Placement date:   Placement time:   Site:   Days:    PICC Line - Double Lumen 02/03/17 1830 basilic vein (medial side of arm), right 5 Fr;length (specify);other (see comments)  02/03/17 1830      less than 1    Colostomy descending/sigmoid colostomy                            Hospital Medications (active)       Dose Frequency Start End    acetaminophen (TYLENOL) tablet 650 mg 650 mg Every 4 Hours PRN 2/3/2017     Sig - Route: Take 2 tablets by mouth Every 4 (Four) Hours As Needed for mild pain (1-3) or fever. - Oral    acetaminophen (TYLENOL) tablet 650 mg 650 mg Nightly 2/3/2017     Sig - Route: Take 2 tablets by mouth Every Night. - Oral     albuterol (PROVENTIL) nebulizer solution 0.083% 2.5 mg/3mL 2.5 mg Every 4 Hours PRN 2/3/2017     Sig - Route: Take 2.5 mg by nebulization Every 4 (Four) Hours As Needed for wheezing. - Nebulization    allopurinol (ZYLOPRIM) tablet 100 mg 100 mg Every 12 Hours 2/3/2017     Sig - Route: Take 1 tablet by mouth Every 12 (Twelve) Hours. - Oral    aluminum-magnesium hydroxide-simethicone (MAALOX/MYLANTA) suspension 30 mL 30 mL Every 6 Hours PRN 2/3/2017     Sig - Route: Take 30 mL by mouth Every 6 (Six) Hours As Needed for indigestion or heartburn. - Oral    amiodarone in dextrose 5% (NEXTERONE) 360 MG/200ML infusion 0.5 mg/min Continuous 2/4/2017 2/6/2017    Sig - Route: Infuse 0.5 mg/min into a venous catheter Continuous. - Intravenous    bisacodyl (DULCOLAX) EC tablet 10 mg 10 mg Daily PRN 2/3/2017     Sig - Route: Take 2 tablets by mouth Daily As Needed for constipation. - Oral    budesonide (PULMICORT) nebulizer solution 0.5 mg 0.5 mg 2 Times Daily - RT 2/3/2017     Sig - Route: Take 2 mL by nebulization 2 (Two) Times a Day. - Nebulization    calcium carbonate (TUMS) chewable tablet 500 mg (200 mg elemental) 1 tablet Every 4 Hours PRN 2/3/2017     Sig - Route: Chew 500 mg Every 4 (Four) Hours As Needed for indigestion or heartburn. - Oral    calcium-vitamin D 500-200 MG-UNIT tablet 500 mg 500 mg Every 12 Hours Scheduled 2/3/2017     Sig - Route: Take 1 tablet by mouth Every 12 (Twelve) Hours. - Oral    conjugated estrogens (PREMARIN) vaginal cream 0.75 application 0.75 application User Specified 2/7/2017     Sig - Route: Insert 0.75 application into the vagina Once per day on Tue. - Vaginal    diclofenac (VOLTAREN) 1 % gel 4 g 4 g 3 Times Daily PRN 2/3/2017     Sig - Route: Apply 4 g topically 3 (Three) Times a Day As Needed (left knee or foot pain). - Topical    diltiaZEM CD (CARDIZEM CD) 24 hr capsule 240 mg 240 mg Daily 2/4/2017     Sig - Route: Take 2 capsules by mouth Daily. - Oral    diphenhydrAMINE (BENADRYL)  capsule 25 mg 25 mg Every 6 Hours PRN 2/3/2017     Sig - Route: Take 1 capsule by mouth Every 6 (Six) Hours As Needed for itching. - Oral    donepezil (ARICEPT) tablet 10 mg 10 mg Nightly 2/3/2017     Sig - Route: Take 2 tablets by mouth Every Night. - Oral    doxazosin (CARDURA) tablet 2 mg 2 mg Every 12 Hours Scheduled 2/3/2017     Sig - Route: Take 1 tablet by mouth Every 12 (Twelve) Hours. - Oral    enoxaparin (LOVENOX) syringe 40 mg 40 mg Every 24 Hours 2/3/2017     Sig - Route: Inject 0.4 mL under the skin Daily. - Subcutaneous    famotidine (PEPCID) tablet 40 mg 40 mg Nightly 2/3/2017     Sig - Route: Take 2 tablets by mouth Every Night. - Oral    guaiFENesin (MUCINEX) 12 hr tablet 1,200 mg 1,200 mg 2 Times Daily 2/3/2017     Sig - Route: Take 2 tablets by mouth 2 (Two) Times a Day. - Oral    ibandronate (BONIVA) tablet 150 mg 150 mg Every 30 Days 2/12/2017     Sig - Route: Take 1 tablet by mouth Every 30 (Thirty) Days. - Oral    ipratropium-albuterol (DUO-NEB) nebulizer solution 3 mL 3 mL Every 4 Hours - RT 2/3/2017     Sig - Route: Take 3 mL by nebulization Every 4 (Four) Hours. - Nebulization    isosorbide dinitrate (ISORDIL) tablet 10 mg 10 mg Every 12 Hours Scheduled 2/3/2017     Sig - Route: Take 1 tablet by mouth Every 12 (Twelve) Hours. - Oral    ketotifen (ZADITOR) 0.025 % ophthalmic solution 1 drop 1 drop 2 Times Daily 2/3/2017     Sig - Route: Administer 1 drop to both eyes 2 (Two) Times a Day. - Both Eyes    lactated ringers infusion 75 mL/hr Continuous 2/3/2017     Sig - Route: Infuse 75 mL/hr into a venous catheter Continuous. - Intravenous    lactobacillus acidophilus (RISAQUAD) capsule 1 capsule 1 capsule Daily 2/4/2017     Sig - Route: Take 1 capsule by mouth Daily. - Oral    levETIRAcetam (KEPPRA) tablet 500 mg 500 mg Every 12 Hours Scheduled 2/3/2017     Sig - Route: Take 1 tablet by mouth Every 12 (Twelve) Hours. - Oral    levoFLOXacin (LEVAQUIN) 750 mg/150 mL D5W (premix) (LEVAQUIN) 750  mg 750 mg Every 24 Hours 2/4/2017     Sig - Route: Infuse 150 mL into a venous catheter Daily. - Intravenous    levothyroxine (SYNTHROID, LEVOTHROID) 50 MCG tablet  - ADS Override Pull   2/4/2017 2/4/2017    Notes to Pharmacy: Created by cabinet override    levothyroxine (SYNTHROID, LEVOTHROID) tablet 150 mcg 150 mcg Every Early Morning 2/4/2017     Sig - Route: Take 1 tablet by mouth Every Morning. - Oral    magnesium hydroxide (MILK OF MAGNESIA) 400 MG/5ML suspension 30 mL 30 mL Nightly PRN 2/3/2017     Sig - Route: Take 30 mL by mouth At Night As Needed for constipation. - Oral    magnesium hydroxide (MILK OF MAGNESIA) 400 MG/5ML suspension 30 mL 30 mL Daily PRN 2/3/2017     Sig - Route: Take 30 mL by mouth Daily As Needed for constipation. - Oral    metoprolol tartrate (LOPRESSOR) tablet 25 mg 25 mg Every 12 Hours Scheduled 2/3/2017     Sig - Route: Take 1 tablet by mouth Every 12 (Twelve) Hours. - Oral    miconazole (MICOTIN) 2 % powder  Every 12 Hours Scheduled 2/3/2017     Sig - Route: Apply  topically Every 12 (Twelve) Hours. - Topical    multivitamin with minerals 1 tablet 1 tablet Daily 2/4/2017     Sig - Route: Take 1 tablet by mouth Daily. - Oral    nitroglycerin (NITROSTAT) SL tablet 0.4 mg 0.4 mg Every 5 Minutes PRN 2/3/2017     Sig - Route: Place 1 tablet under the tongue Every 5 (Five) Minutes As Needed for chest pain. - Sublingual    pantoprazole (PROTONIX) injection 40 mg 40 mg Every Early Morning 2/4/2017     Sig - Route: Infuse 10 mL into a venous catheter Every Morning. - Intravenous    sennosides-docusate sodium (SENOKOT-S) 8.6-50 MG tablet 1 tablet 1 tablet Every 12 Hours Scheduled 2/3/2017     Sig - Route: Take 1 tablet by mouth Every 12 (Twelve) Hours. - Oral    sertraline (ZOLOFT) tablet 100 mg 100 mg Nightly 2/3/2017     Sig - Route: Take 2 tablets by mouth Every Night. - Oral    sodium chloride 0.9 % infusion  - ADS Override Pull   2/3/2017 2/3/2017    Notes to Pharmacy: Created by  cabinet override    sodium chloride 0.9 % infusion  - ADS Override Pull   2/3/2017 2/4/2017    Notes to Pharmacy: Created by cabinet override    sodium chloride 0.9 % infusion 500 mL 500 mL Once 2/3/2017 2/3/2017    Sig - Route: Infuse 500 mL into a venous catheter 1 (One) Time. - Intravenous    sodium chloride 0.9 % infusion 500 mL 500 mL Once 2/3/2017 2/3/2017    Sig - Route: Infuse 500 mL into a venous catheter 1 (One) Time. - Intravenous    traMADol (ULTRAM) tablet 100 mg 100 mg Nightly PRN 2/3/2017 5/26/2017    Sig - Route: Take 2 tablets by mouth At Night As Needed for moderate pain (4-6) (for leg pains at night). - Oral    adenosine (ADENOCARD) injection 12 mg (Discontinued) 12 mg Once 2/4/2017 2/4/2017    Sig - Route: Infuse 4 mL into a venous catheter 1 (One) Time. - Intravenous    digoxin (LANOXIN) injection 250 mcg (Discontinued) 250 mcg Daily Digoxin 2/3/2017 2/3/2017    Sig - Route: Infuse 1 mL into a venous catheter Daily. - Intravenous            Lab Results (last 24 hours)     Procedure Component Value Units Date/Time    T4, Free [[id number]]  (Normal) Collected:  02/03/17 0622    Specimen:  Blood Updated:  02/03/17 1242     Free T4 1.40 ng/dL     Respiratory Panel, PCR [[id number]]  (Normal) Collected:  02/03/17 1119    Specimen:  Swab from Nasopharynx Updated:  02/03/17 1648     ADENOVIRUS, PCR Not Detected      Coronavirus 229E Not Detected      Coronavirus HKU1 Not Detected      Coronavirus NL63 Not Detected      Coronavirus OC43 Not Detected      Human Metapneumovirus Not Detected      Human Rhinovirus/Enterovirus Not Detected      Influenza B PCR Not Detected      Parainfluenza Virus 1 Not Detected      Parainfluenza Virus 2 Not Detected      Parainfluenza Virus 3 Not Detected      Parainfluenza Virus 4 Not Detected      Bordetella pertussis pcr Not Detected      Influenza 2009 H1N1 by PCR Not Detected      Chlamydophila pneumoniae PCR Not Detected      Mycoplasma pneumo by PCR Not Detected       Influenza A PCR Not Detected      Influenza A H3 Not Detected      Influenza A H1 Not Detected      RSV, PCR Not Detected     Blood Culture [[id number]]  (Normal) Collected:  02/03/17 1731    Specimen:  Blood from Arm, Left Updated:  02/04/17 0601     Blood Culture No growth at less than 24 hours     Blood Culture [[id number]]  (Normal) Collected:  02/03/17 1731    Specimen:  Blood from Arm, Right Updated:  02/04/17 0601     Blood Culture No growth at less than 24 hours     Procalcitonin [[id number]] Collected:  02/04/17 0818    Specimen:  Blood Updated:  02/04/17 0821    Lactic Acid, Plasma [[id number]]  (Normal) Collected:  02/04/17 0819    Specimen:  Blood Updated:  02/04/17 0836     Lactate 0.6 mmol/L     Basic Metabolic Panel [[id number]]  (Abnormal) Collected:  02/04/17 0819    Specimen:  Blood Updated:  02/04/17 0840     Glucose 144 (H) mg/dL      BUN 35 (H) mg/dL      Creatinine 0.91 mg/dL      Sodium 139 mmol/L      Potassium 3.9 mmol/L      Chloride 103 mmol/L      CO2 24.3 mmol/L      Calcium 8.6 (L) mg/dL      eGFR Non African Amer 60 (L) mL/min/1.73      BUN/Creatinine Ratio 38.5 (H)      Anion Gap 11.7 mmol/L     Narrative:       The MDRD GFR formula is only valid for adults with stable renal function between ages 18 and 70.    CBC Auto Differential [[id number]]  (Abnormal) Collected:  02/04/17 0818    Specimen:  Blood Updated:  02/04/17 0844     WBC 6.04 10*3/mm3      RBC 2.94 (L) 10*6/mm3      Hemoglobin 8.8 (L) g/dL      Hematocrit 28.1 (L) %      MCV 95.6 fL      MCH 29.9 pg      MCHC 31.3 g/dL      RDW 14.6 (H) %      RDW-SD 50.7 fl      MPV 10.3 fL      Platelets 177 10*3/mm3      Neutrophil % 68.1 %      Lymphocyte % 9.4 (L) %      Monocyte % 13.2 (H) %      Eosinophil % 2.0 %      Basophil % 0.7 %      Immature Grans % 6.6 (H) %      Neutrophils, Absolute 4.11 10*3/mm3      Lymphocytes, Absolute 0.57 (L) 10*3/mm3      Monocytes, Absolute 0.80 10*3/mm3      Eosinophils, Absolute 0.12 10*3/mm3       Basophils, Absolute 0.04 10*3/mm3      Immature Grans, Absolute 0.40 (H) 10*3/mm3      nRBC 0.0 /100 WBC     CBC & Differential [[id number]] Collected:  02/04/17 0818    Specimen:  Blood Updated:  02/04/17 0845    Narrative:       The following orders were created for panel order CBC & Differential.  Procedure                               Abnormality         Status                     ---------                               -----------         ------                     Scan Slide[[id number]]                                                                   CBC Auto Differential[[id number]]         Abnormal            Final result                 Please view results for these tests on the individual orders.        Imaging Results (last 24 hours)     Procedure Component Value Units Date/Time    XR Chest 1 View [[id number]] Collected:  02/04/17 0633     Updated:  02/04/17 0636    Narrative:       INDICATION: PICC line placement evaluate positioning     FINDINGS:  Single portable AP view of the chest compared to 02/02/2017     Stable cardiomegaly. Stable central pulmonary vascular congestion and  patchy interstitial and alveolar opacity in both lungs.. Right approach  PICC line terminates mid SVC.. No pneumothorax or pleural effusion.       Impression:       Right approach PICC line in mid SVC. No pneumothorax. Otherwise stable.     This report was finalized on 2/4/2017 6:34 AM by Dr. Paulino Bradford MD.             Orders (last 24 hrs)     Start     Ordered    02/12/17 0900  ibandronate (BONIVA) tablet 150 mg  Every 30 Days      02/03/17 1056    02/07/17 2100  conjugated estrogens (PREMARIN) vaginal cream 0.75 application  Once per day on Tue 02/03/17 1056 02/06/17 0806  Basic Metabolic Panel  Every 3 Weeks     Comments:  Call MD with results    02/03/17 1056    02/04/17 0945  levoFLOXacin (LEVAQUIN) 750 mg/150 mL D5W (premix) (LEVAQUIN) 750 mg  Every 24 Hours      02/03/17 1056    02/04/17 0900  diltiaZEM CD (CARDIZEM  CD) 24 hr capsule 240 mg  Daily      02/03/17 1056    02/04/17 0900  lactobacillus acidophilus (RISAQUAD) capsule 1 capsule  Daily      02/03/17 1056    02/04/17 0900  multivitamin with minerals 1 tablet  Daily      02/03/17 1056    02/04/17 0849  Transfer Patient  Once      02/04/17 0849    02/04/17 0831  Scan Slide  Once,   Status:  Canceled      02/04/17 0830    02/04/17 0745  adenosine (ADENOCARD) injection 12 mg  Once,   Status:  Discontinued      02/04/17 0711    02/04/17 0745  amiodarone in dextrose 5% (NEXTERONE) 360 MG/200ML infusion  Continuous      02/04/17 0715    02/04/17 0636  levothyroxine (SYNTHROID, LEVOTHROID) 50 MCG tablet  - ADS Override Pull     Comments:  Created by cabinet override    02/04/17 0636    02/04/17 0600  Basic Metabolic Panel  Morning Draw      02/03/17 1056    02/04/17 0600  CBC & Differential  Morning Draw      02/03/17 1056    02/04/17 0600  Lactic Acid, Plasma  Morning Draw      02/03/17 1056    02/04/17 0600  Procalcitonin  Morning Draw      02/03/17 1056    02/04/17 0600  Daily Weights  Daily      02/03/17 1056    02/04/17 0600  levothyroxine (SYNTHROID, LEVOTHROID) tablet 150 mcg  Every Early Morning      02/03/17 1056    02/04/17 0600  pantoprazole (PROTONIX) injection 40 mg  Every Early Morning      02/03/17 1056    02/04/17 0600  CBC Auto Differential  PROCEDURE ONCE      02/04/17 0001    02/03/17 2115  allopurinol (ZYLOPRIM) tablet 100 mg  Every 12 Hours      02/03/17 1056    02/03/17 2100  acetaminophen (TYLENOL) tablet 650 mg  Nightly      02/03/17 1056    02/03/17 2100  calcium-vitamin D 500-200 MG-UNIT tablet 500 mg  Every 12 Hours Scheduled      02/03/17 1056    02/03/17 2100  donepezil (ARICEPT) tablet 10 mg  Nightly      02/03/17 1056    02/03/17 2100  doxazosin (CARDURA) tablet 2 mg  Every 12 Hours Scheduled      02/03/17 1056 02/03/17 2100  famotidine (PEPCID) tablet 40 mg  Nightly      02/03/17 1056 02/03/17 2100  isosorbide dinitrate (ISORDIL) tablet 10  mg  Every 12 Hours Scheduled      02/03/17 1056    02/03/17 2100  levETIRAcetam (KEPPRA) tablet 500 mg  Every 12 Hours Scheduled      02/03/17 1056    02/03/17 2100  metoprolol tartrate (LOPRESSOR) tablet 25 mg  Every 12 Hours Scheduled      02/03/17 1056    02/03/17 2100  miconazole (MICOTIN) 2 % powder  Every 12 Hours Scheduled      02/03/17 1056    02/03/17 2100  sennosides-docusate sodium (SENOKOT-S) 8.6-50 MG tablet 1 tablet  Every 12 Hours Scheduled      02/03/17 1056    02/03/17 2100  sertraline (ZOLOFT) tablet 100 mg  Nightly      02/03/17 1056    02/03/17 2000  Skin Care  Every 12 Hours      02/03/17 1056    02/03/17 1930  budesonide (PULMICORT) nebulizer solution 0.5 mg  2 Times Daily - RT      02/03/17 1056    02/03/17 1811  XR Chest 1 View  1 Time Imaging      02/03/17 1811    02/03/17 1800  guaiFENesin (MUCINEX) 12 hr tablet 1,200 mg  2 Times Daily      02/03/17 1056    02/03/17 1800  ketotifen (ZADITOR) 0.025 % ophthalmic solution 1 drop  2 Times Daily      02/03/17 1056    02/03/17 1800  enoxaparin (LOVENOX) syringe 40 mg  Every 24 Hours      02/03/17 1704    02/03/17 1706  Blood Culture  Once      02/03/17 1705    02/03/17 1706  Blood Culture  Once     Comments:  From a different site than #1.    02/03/17 1705    02/03/17 1705  Place Sequential Compression Device  Once      02/03/17 1704    02/03/17 1705  Maintain Sequential Compression Device  Continuous      02/03/17 1704    02/03/17 1704  VTE Risk Assessment - Moderate Risk  Once      02/03/17 1704    02/03/17 1400  Incentive Spirometry  Every 4 Hours While Awake      02/03/17 1056    02/03/17 1400  digoxin (LANOXIN) injection 250 mcg  Daily Digoxin,   Status:  Discontinued      02/03/17 1326    02/03/17 1400  sodium chloride 0.9 % infusion 500 mL  Once      02/03/17 1327    02/03/17 1338  sodium chloride 0.9 % infusion  - ADS Override Pull     Comments:  Created by cabinet override    02/03/17 1338    02/03/17 1235  Inpatient Admission  Once       02/03/17 1235    02/03/17 1200  Dietary Nutrition Supplement: Ensure Plus  Daily With Breakfast, Lunch & Dinner      02/03/17 1056    02/03/17 1147  T4, Free  Once      02/03/17 1147    02/03/17 1130  sodium chloride 0.9 % infusion 500 mL  Once      02/03/17 1056    02/03/17 1130  Flutter / Acappella  Every 4 Hours - RT     Comments:  With nebs    02/03/17 1056    02/03/17 1130  ipratropium-albuterol (DUO-NEB) nebulizer solution 3 mL  Every 4 Hours - RT      02/03/17 1056    02/03/17 1130  lactated ringers infusion  Continuous      02/03/17 1056    02/03/17 1056  traMADol (ULTRAM) tablet 100 mg  Nightly PRN      02/03/17 1056    02/03/17 1056  magnesium hydroxide (MILK OF MAGNESIA) 400 MG/5ML suspension 30 mL  Nightly PRN      02/03/17 1056    02/03/17 1056  magnesium hydroxide (MILK OF MAGNESIA) 400 MG/5ML suspension 30 mL  Daily PRN      02/03/17 1056    02/03/17 1056  nitroglycerin (NITROSTAT) SL tablet 0.4 mg  Every 5 Minutes PRN      02/03/17 1056    02/03/17 1056  acetaminophen (TYLENOL) tablet 650 mg  Every 4 Hours PRN      02/03/17 1056    02/03/17 1056  albuterol (PROVENTIL) nebulizer solution 0.083% 2.5 mg/3mL  Every 4 Hours PRN      02/03/17 1056    02/03/17 1056  aluminum-magnesium hydroxide-simethicone (MAALOX/MYLANTA) suspension 30 mL  Every 6 Hours PRN      02/03/17 1056    02/03/17 1056  bisacodyl (DULCOLAX) EC tablet 10 mg  Daily PRN      02/03/17 1056    02/03/17 1056  calcium carbonate (TUMS) chewable tablet 500 mg (200 mg elemental)  Every 4 Hours PRN      02/03/17 1056    02/03/17 1056  diclofenac (VOLTAREN) 1 % gel 4 g  3 Times Daily PRN      02/03/17 1056    02/03/17 1056  diphenhydrAMINE (BENADRYL) capsule 25 mg  Every 6 Hours PRN      02/03/17 1056    Unscheduled  Change pouch immediately if leaking  As Needed      02/03/17 1056    Unscheduled  Empty pouch  As Needed     Comments:  When 1/3 full    02/03/17 1056    Unscheduled  Bladder scan  As Needed     Comments:  If patient is unable to  void after four hours    02/03/17 1056    Unscheduled  If patient is unable to void after four hours, monitor every 1 to 2 hours for spontaneous void  As Needed     Comments:  if the volume was less than 350 mL and is without symptoms of bladder discomfort/distention.    02/03/17 1056    Unscheduled  Schedule timed/prompt voiding for patients with urinary incontinence  As Needed      02/03/17 1056    Unscheduled  Skin Care  As Needed      02/03/17 1056    Unscheduled  Skin Care  As Needed      02/03/17 1056    Unscheduled  Straight cath every 4 to 6 hours  As Needed     Comments:  If patient is unable to void after hours, and if bladder scan volume was greater than 350mL or symptoms of bladder discomfort / distention.    02/03/17 1056    Unscheduled  Up in Chair  As Needed      02/03/17 1056             Physician Progress Notes (last 24 hours) (Notes from 2/3/2017 11:37 AM through 2/4/2017 11:37 AM)      Charles Dietrich MD at 2/4/2017  7:15 AM  Version 1 of 1         Cardiac Electrophysiology Follow-up Note     LOS: 1 day   Patient Care Team:  Shira Galaviz MD as PCP - General  Shira Galaviz MD as PCP - Family Medicine    Chief Complaint: cough/hypoxemia     Interval History: c/o dyspnea this AM. HR remains elevated in 120s-140s.    Objective   Vital Signs  Temp:  [97.8 °F (36.6 °C)-99.6 °F (37.6 °C)] 99.2 °F (37.3 °C)  Heart Rate:  [129-142] 138  Resp:  [17-24] 20  BP: (122-138)/() 138/100    Intake/Output Summary (Last 24 hours) at 02/04/17 0715  Last data filed at 02/03/17 1700   Gross per 24 hour   Intake 2086.25 ml   Output      0 ml   Net 2086.25 ml   , chronically ill  Uncomfortable NAD  PERRL, conjunctiva clear  Neck supple, no JVD or thyromegaly appreciated  S1/S2 rapid, (+) systolic murmur  Lungs CTA B, normal effort  Abdomen S/NT/ND (+) BS, no HSM appreciated  Extremities warm, no clubbing, cyanosis, or edema  No visible or palpable skin lesions  A/Ox4, mood and affect  appropriate    Results Review:        Results from last 7 days  Lab Units 02/03/17  0622 02/02/17  0056 01/30/17  0637   SODIUM mmol/L 139 138 140   POTASSIUM mmol/L 3.8 4.1 4.8   CHLORIDE mmol/L 100 97* 101   TOTAL CO2 mmol/L 25.9 25.0 23.6   BUN mg/dL 40* 34* 19   CREATININE mg/dL 1.11* 1.20* 0.91   GLUCOSE mg/dL 178* 174* 140*   CALCIUM mg/dL 9.4 9.0 8.9       Results from last 7 days  Lab Units 02/03/17  0622 02/02/17  0056   TROPONIN T ng/mL 0.013 0.017       Results from last 7 days  Lab Units 02/03/17  0622 02/02/17  0056 01/31/17  1201   WBC 10*3/mm3 5.38 6.41 8.64   HEMOGLOBIN g/dL 9.3* 9.4* 10.1*   HEMATOCRIT % 29.9* 30.0* 32.5*   PLATELETS 10*3/mm3 168 169 175     Tele: HRs 120s-140s    I reviewed the patient's new clinical results.  I personally viewed and interpreted the patient's EKG/Telemetry data        Medication Review:     acetaminophen 650 mg Oral Nightly   adenosine 12 mg Intravenous Once   allopurinol 100 mg Oral Q12H   budesonide 0.5 mg Nebulization BID - RT   calcium-vitamin D 500 mg Oral Q12H   [START ON 2/7/2017] conjugated estrogens 0.75 application Vaginal Once per day on Tue   diltiaZEM  mg Oral Daily   donepezil 10 mg Oral Nightly   doxazosin 2 mg Oral Q12H   enoxaparin 40 mg Subcutaneous Q24H   famotidine 40 mg Oral Nightly   guaiFENesin 1,200 mg Oral BID   [START ON 2/12/2017] ibandronate 150 mg Oral Q30 Days   ipratropium-albuterol 3 mL Nebulization Q4H - RT   isosorbide dinitrate 10 mg Oral Q12H   ketotifen 1 drop Both Eyes BID   lactobacillus acidophilus 1 capsule Oral Daily   levETIRAcetam 500 mg Oral Q12H   levoFLOXacin 750 mg Intravenous Q24H   levothyroxine 150 mcg Oral Q AM   metoprolol tartrate 25 mg Oral Q12H   miconazole  Topical Q12H   multivitamin with minerals 1 tablet Oral Daily   pantoprazole 40 mg Intravenous Q AM   sennosides-docusate sodium 1 tablet Oral Q12H   sertraline 100 mg Oral Nightly       amiodarone 0.5 mg/min    lactated ringers 75 mL/hr Last Rate:  75 mL/hr (02/03/17 3302)       Assessment&#47;Plan     Active Problems:    Sepsis  Atrial tachycardia/flutter    I have reviewed her ECG and telemetry. I am not convinced that this is sinus tachycardia and not AT or flutter. I tried to give adenosine to see underlying rhythm but was told by RN that cannot give that here. I will start her on an amiodarone gtt to see if this responds.    Charles Dietrich MD  02/04/17  7:15 AM       Electronically signed by Charles Dietrcih MD at 2/4/2017  7:20 AM        Consult Notes (last 24 hours) (Notes from 2/3/2017 11:37 AM through 2/4/2017 11:37 AM)     No notes of this type exist for this encounter.

## 2017-02-05 NOTE — PLAN OF CARE
Problem: Patient Care Overview (Adult)  Goal: Plan of Care Review  Outcome: Ongoing (interventions implemented as appropriate)    02/05/17 0343   Coping/Psychosocial Response Interventions   Plan Of Care Reviewed With patient         Problem: Pneumonia (Adult)  Intervention: Maximize Oxygenation/Ventilation/Perfusion    02/05/17 0343   Promote Aggressive Pulmonary Hygiene/Secretion Management   Cough And Deep Breathing done independently per patient   Respiratory Interventions   Airway/Ventilation Management pulmonary hygiene promoted       Intervention: Prevent/Manage Infection Progression    02/05/17 0343   Safety Interventions   Isolation Precautions contact precautions maintained         Goal: Signs and Symptoms of Listed Potential Problems Will be Absent or Manageable (Pneumonia)  Outcome: Ongoing (interventions implemented as appropriate)    02/05/17 0343   Pneumonia   Problems Assessed (Pneumonia) respiratory compromise;progression of infection   Problems Present (Pneumonia) none

## 2017-02-05 NOTE — PLAN OF CARE
"Problem: Patient Care Overview (Adult)  Goal: Plan of Care Review  Outcome: Ongoing (interventions implemented as appropriate)    02/05/17 9277   Coping/Psychosocial Response Interventions   Plan Of Care Reviewed With patient   Patient Care Overview   Progress declining   Outcome Evaluation   Outcome Summary/Follow up Plan pt has not felt very well today. afebrile but coughing more, some productive thick yellow-green, congested and rhonchi noted upon auscultation. not much appetite. pt states \" I am full not hungry\". Tele cont and a flutter and rate in the 70's. Amiodorone drip cont . IVF stopped encouraged liquids. remains incontient of urine changed per staff. colostomy bag was emptied mod amt of semi formed stool noted. Potassium 40meq given x1 today. resting at this time in bed, cont to be on Levaquin IV, and contact isolation          Problem: Sepsis (Adult)  Goal: Signs and Symptoms of Listed Potential Problems Will be Absent or Manageable (Sepsis)  Outcome: Ongoing (interventions implemented as appropriate)    Problem: Colostomy (Adult)  Goal: Signs and Symptoms of Listed Potential Problems Will be Absent or Manageable (Colostomy)  Outcome: Ongoing (interventions implemented as appropriate)    Problem: Pneumonia (Adult)  Goal: Signs and Symptoms of Listed Potential Problems Will be Absent or Manageable (Pneumonia)  Outcome: Ongoing (interventions implemented as appropriate)      "

## 2017-02-05 NOTE — PLAN OF CARE
Problem: Patient Care Overview (Adult)  Goal: Plan of Care Review  Outcome: Ongoing (interventions implemented as appropriate)    02/05/17 1743   Patient Care Overview   Progress no change   Outcome Evaluation   Outcome Summary/Follow up Plan Pt on 2L O2, no productive cough         Problem: Pneumonia (Adult)  Intervention: Maximize Oxygenation/Ventilation/Perfusion    02/05/17 1743   Promote Aggressive Pulmonary Hygiene/Secretion Management   Cough And Deep Breathing done with encouragement   Respiratory Interventions   Airway/Ventilation Management pulmonary hygiene promoted;calming measures promoted   Incentive Spirometer   Administration (Incentive Spirometer) done with encouragement   Positioning   Head Of Bed (HOB) Position HOB elevated         Goal: Signs and Symptoms of Listed Potential Problems Will be Absent or Manageable (Pneumonia)  Outcome: Ongoing (interventions implemented as appropriate)    02/05/17 1743   Pneumonia   Problems Assessed (Pneumonia) respiratory compromise   Problems Present (Pneumonia) respiratory compromise

## 2017-02-05 NOTE — PROGRESS NOTES
"Hospitalist Team      Patient Care Team:  Shira Galaviz MD as PCP - General  Shira Galaviz MD as PCP - Family Medicine        Chief Complaint:  SOA and tachycardia    Subjective    Interval History and ROS:       Patient was not responding to treatment on med surg and tachycardia continued.  The cardiology service was consulted and they ordered amiodarone.  The heart rate is much improved.        Objective    Vital Signs  Temp:  [97.9 °F (36.6 °C)-99.6 °F (37.6 °C)] 97.9 °F (36.6 °C)  Heart Rate:  [102-142] 106  Resp:  [20-24] 20  BP: (105-138)/() 109/59  Oxygen Therapy  SpO2: 96 %  Pulse Oximetry Type: Intermittent  O2 Device: nasal cannula  Flow (L/min): 2  Oxygen Concentration (%): 96}  Flowsheet Rows         First Filed Value    Admission Height  65\" (165.1 cm) Documented at 02/03/2017 1044    Admission Weight  243 lb (110 kg) Documented at 02/03/2017 1044      Body mass index is 41.46 kg/(m^2).      Physical Exam:    Physical Exam   Constitutional: Patient appears very tired and sleepy this evening   HEENT:   Head: Normocephalic and atraumatic.   Eyes:  Pupils are equal, round, and reactive to light. EOM are intact. Sclera are anicteric and non-injected.  Mouth and Throat: Patient has moist mucous membranes. Oropharynx is clear of any erythema or exudate.     Neck: Neck supple. No JVD present. No thyromegaly present. No lymphadenopathy present.  Cardiovascular: Regular rate, regular rhythm, S1 normal and S2 normal.  Exam reveals no gallop and no friction rub.  No murmur heard.  Pulmonary/Chest: Lungs are shallow to auscultation bilaterally. No respiratory distress. No wheezes. Scattered rhonchi are auscultated. No rales.   Abdominal: Soft. Bowel sounds are normal. No distension and no mass. There is no hepatosplenomegaly. There is no tenderness.   Musculoskeletal: Normal Muscle tone  Extremities: No edema. Pulses are palpable in all 4 extremities.  Neurological: Patient is alert and oriented " to person, place, and time. Cranial nerves II-XII are grossly intact with no focal deficits.  Skin: Skin is warm. No rash noted. Nails show no clubbing.  No cyanosis or erythema.         Results Review:     I reviewed the patient's new clinical results.    Lab Results (last 24 hours)     Procedure Component Value Units Date/Time    Lactic Acid, Plasma [[id number]]  (Normal) Collected:  02/04/17 0819    Specimen:  Blood Updated:  02/04/17 0836     Lactate 0.6 mmol/L     Basic Metabolic Panel [[id number]]  (Abnormal) Collected:  02/04/17 0819    Specimen:  Blood Updated:  02/04/17 0840     Glucose 144 (H) mg/dL      BUN 35 (H) mg/dL      Creatinine 0.91 mg/dL      Sodium 139 mmol/L      Potassium 3.9 mmol/L      Chloride 103 mmol/L      CO2 24.3 mmol/L      Calcium 8.6 (L) mg/dL      eGFR Non African Amer 60 (L) mL/min/1.73      BUN/Creatinine Ratio 38.5 (H)      Anion Gap 11.7 mmol/L     Narrative:       The MDRD GFR formula is only valid for adults with stable renal function between ages 18 and 70.    CBC Auto Differential [[id number]]  (Abnormal) Collected:  02/04/17 0818    Specimen:  Blood Updated:  02/04/17 0844     WBC 6.04 10*3/mm3      RBC 2.94 (L) 10*6/mm3      Hemoglobin 8.8 (L) g/dL      Hematocrit 28.1 (L) %      MCV 95.6 fL      MCH 29.9 pg      MCHC 31.3 g/dL      RDW 14.6 (H) %      RDW-SD 50.7 fl      MPV 10.3 fL      Platelets 177 10*3/mm3      Neutrophil % 68.1 %      Lymphocyte % 9.4 (L) %      Monocyte % 13.2 (H) %      Eosinophil % 2.0 %      Basophil % 0.7 %      Immature Grans % 6.6 (H) %      Neutrophils, Absolute 4.11 10*3/mm3      Lymphocytes, Absolute 0.57 (L) 10*3/mm3      Monocytes, Absolute 0.80 10*3/mm3      Eosinophils, Absolute 0.12 10*3/mm3      Basophils, Absolute 0.04 10*3/mm3      Immature Grans, Absolute 0.40 (H) 10*3/mm3      nRBC 0.0 /100 WBC     CBC & Differential [[id number]] Collected:  02/04/17 0818    Specimen:  Blood Updated:  02/04/17 0845    Narrative:       The following  "orders were created for panel order CBC & Differential.  Procedure                               Abnormality         Status                     ---------                               -----------         ------                     Scan Slide[[id number]]                                                                   CBC Auto Differential[[id number]]         Abnormal            Final result                 Please view results for these tests on the individual orders.    Procalcitonin [[id number]]  (Normal) Collected:  02/04/17 0818    Specimen:  Blood Updated:  02/04/17 1212     Procalcitonin 0.13 ng/mL     Narrative:       As a Marker for Sepsis (Non-Neonates):   1. <0.5 ng/mL represents a low risk of severe sepsis and/or septic shock.  1. >2 ng/mL represents a high risk of severe sepsis and/or septic shock.    As a Marker for Lower Respiratory Tract Infections that require antibiotic therapy:  PCT on Admission     Antibiotic Therapy             6-12 Hrs later  > 0.5                Strongly Recommended            >0.25 - <0.5         Recommended  0.1 - 0.25           Discouraged                   Remeasure/reassess PCT  <0.1                 Strongly Discouraged          Remeasure/reassess PCT      As 28 day mortality risk marker: \"Change in Procalcitonin Result\" (> 80 % or <=80 %) if Day 0 (or Day 1) and Day 4 values are available. Refer to http://www.[employer]-pct-calculator.com/   Change in PCT <=80 %   A decrease of PCT levels below or equal to 80 % defines a positive change in PCT test result representing a higher risk for 28-day all-cause mortality of patients diagnosed with severe sepsis or septic shock.  Change in PCT > 80 %   A decrease of PCT levels of more than 80 % defines a negative change in PCT result representing a lower risk for 28-day all-cause mortality of patients diagnosed with severe sepsis or septic shock.                Blood Culture [[id number]]  (Normal) Collected:  02/03/17 1731    Specimen:  Blood " from Arm, Left Updated:  02/04/17 1801     Blood Culture No growth at 24 hours     Blood Culture [[id number]]  (Normal) Collected:  02/03/17 1731    Specimen:  Blood from Arm, Right Updated:  02/04/17 1801     Blood Culture No growth at 24 hours           Imaging Results (last 24 hours)     Procedure Component Value Units Date/Time    XR Chest 1 View [[id number]] Collected:  02/04/17 0633     Updated:  02/04/17 0636    Narrative:       INDICATION: PICC line placement evaluate positioning     FINDINGS:  Single portable AP view of the chest compared to 02/02/2017     Stable cardiomegaly. Stable central pulmonary vascular congestion and  patchy interstitial and alveolar opacity in both lungs.. Right approach  PICC line terminates mid SVC.. No pneumothorax or pleural effusion.       Impression:       Right approach PICC line in mid SVC. No pneumothorax. Otherwise stable.     This report was finalized on 2/4/2017 6:34 AM by Dr. Paulino Bradford MD.             Xray not reviewed personally by physician.      ECG not reviewed personally by physician  ECG/EMG Results (most recent)     None          Medication Review:   I have reviewed the patient's current medication list    Current Facility-Administered Medications:   •  acetaminophen (TYLENOL) tablet 650 mg, 650 mg, Oral, Q4H PRN, Ines R Terell, APRN  •  acetaminophen (TYLENOL) tablet 650 mg, 650 mg, Oral, Nightly, Ines R [place], APRN, 650 mg at 02/03/17 2034  •  albuterol (PROVENTIL) nebulizer solution 0.083% 2.5 mg/3mL, 2.5 mg, Nebulization, Q4H PRN, Ines R [place], APRN  •  allopurinol (ZYLOPRIM) tablet 100 mg, 100 mg, Oral, Q12H, Ines R Terell, APRN, 100 mg at 02/04/17 1027  •  aluminum-magnesium hydroxide-simethicone (MAALOX/MYLANTA) suspension 30 mL, 30 mL, Oral, Q6H PRN, Ines R Terell, APRN  •  amiodarone in dextrose 5% (NEXTERONE) 360 MG/200ML infusion, 0.5 mg/min, Intravenous, Continuous, Charles Dietrich MD, Last Rate: 16.67 mL/hr at 02/04/17 1003, 0.5  mg/min at 02/04/17 1003  •  bisacodyl (DULCOLAX) EC tablet 10 mg, 10 mg, Oral, Daily PRN, Ines R Terell, APRN, 10 mg at 02/03/17 2034  •  budesonide (PULMICORT) nebulizer solution 0.5 mg, 0.5 mg, Nebulization, BID - RT, Ines R [place], APRN, 0.5 mg at 02/04/17 1913  •  calcium carbonate (TUMS) chewable tablet 500 mg (200 mg elemental), 1 tablet, Oral, Q4H PRN, Ines R Terell, APRN  •  calcium-vitamin D 500-200 MG-UNIT tablet 500 mg, 500 mg, Oral, Q12H, Ines R Terell, APRN, 500 mg at 02/04/17 1025  •  [START ON 2/7/2017] conjugated estrogens (PREMARIN) vaginal cream 0.75 application, 0.75 application, Vaginal, Once per day on Tue, Ines ROSE [place], APRN  •  diclofenac (VOLTAREN) 1 % gel 4 g, 4 g, Topical, TID PRN, Ines R [place], APRN  •  diltiaZEM CD (CARDIZEM CD) 24 hr capsule 240 mg, 240 mg, Oral, Daily, Ines R Chatsworth, APRN, 240 mg at 02/04/17 1027  •  diphenhydrAMINE (BENADRYL) capsule 25 mg, 25 mg, Oral, Q6H PRN, Ines R Terell, APRN  •  donepezil (ARICEPT) tablet 10 mg, 10 mg, Oral, Nightly, Ines R Terell, APRN, 10 mg at 02/03/17 2033  •  doxazosin (CARDURA) tablet 2 mg, 2 mg, Oral, Q12H, Ines R [place], APRN, 2 mg at 02/04/17 1024  •  enoxaparin (LOVENOX) syringe 40 mg, 40 mg, Subcutaneous, Q24H, Pato Jackson MD, 40 mg at 02/04/17 1830  •  famotidine (PEPCID) tablet 40 mg, 40 mg, Oral, Nightly, Ines R Terell, APRN, 40 mg at 02/03/17 2040  •  guaiFENesin (MUCINEX) 12 hr tablet 1,200 mg, 1,200 mg, Oral, BID, Ines Figueredo, APRN, 1,200 mg at 02/04/17 1829  •  [START ON 2/12/2017] ibandronate (BONIVA) tablet 150 mg, 150 mg, Oral, Q30 Days, Ines Figueredo, APRN  •  ipratropium-albuterol (DUO-NEB) nebulizer solution 3 mL, 3 mL, Nebulization, Q4H - RT, Ines Figueredo, APRN, 3 mL at 02/04/17 1913  •  isosorbide dinitrate (ISORDIL) tablet 10 mg, 10 mg, Oral, Q12H, Ines Figueredo, APRN, 10 mg at 02/04/17 1024  •  ketotifen (ZADITOR) 0.025 % ophthalmic solution 1 drop, 1  drop, Both Eyes, BID, Ines Figueredo, APRN, 1 drop at 02/04/17 1830  •  lactated ringers infusion, 75 mL/hr, Intravenous, Continuous, Ines Figueredo, APRN, Last Rate: 75 mL/hr at 02/04/17 1323, 75 mL/hr at 02/04/17 1323  •  lactobacillus acidophilus (RISAQUAD) capsule 1 capsule, 1 capsule, Oral, Daily, Ines Figueredo, APRN, 1 capsule at 02/04/17 1026  •  levETIRAcetam (KEPPRA) tablet 500 mg, 500 mg, Oral, Q12H, Ines Figueredo, APRN, 500 mg at 02/04/17 1026  •  levoFLOXacin (LEVAQUIN) 750 mg/150 mL D5W (premix) (LEVAQUIN) 750 mg, 750 mg, Intravenous, Q24H, Ines Figueredo, APRN, Stopped at 02/04/17 1322  •  levothyroxine (SYNTHROID, LEVOTHROID) tablet 150 mcg, 150 mcg, Oral, Q AM, Ines Bordenree, APRN  •  magnesium hydroxide (MILK OF MAGNESIA) 400 MG/5ML suspension 30 mL, 30 mL, Oral, Nightly PRN, Ines Bordenree, APRN  •  magnesium hydroxide (MILK OF MAGNESIA) 400 MG/5ML suspension 30 mL, 30 mL, Oral, Daily PRN, Ines ROSE Terell, APRN  •  metoprolol tartrate (LOPRESSOR) tablet 25 mg, 25 mg, Oral, Q12H, Ines Figueredo, APRN, 25 mg at 02/04/17 1026  •  miconazole (MICOTIN) 2 % powder, , Topical, Q12H, Ines ROSE [place], APRN  •  multivitamin with minerals 1 tablet, 1 tablet, Oral, Daily, Ines Figueredo, APRN, 1 tablet at 02/04/17 1024  •  nitroglycerin (NITROSTAT) SL tablet 0.4 mg, 0.4 mg, Sublingual, Q5 Min PRN, Ines ROSE Terell, APRN  •  pantoprazole (PROTONIX) injection 40 mg, 40 mg, Intravenous, Q AM, Ines Bordenree, APRN, 40 mg at 02/04/17 0609  •  sennosides-docusate sodium (SENOKOT-S) 8.6-50 MG tablet 1 tablet, 1 tablet, Oral, Q12H, Ines Figueredo, APRN, 1 tablet at 02/04/17 1026  •  sertraline (ZOLOFT) tablet 100 mg, 100 mg, Oral, Nightly, Ines Figueredo, APRN, 100 mg at 02/03/17 2033  •  traMADol (ULTRAM) tablet 100 mg, 100 mg, Oral, Nightly PRN, Ines Figueredo, APRN      Assessment/Plan      Hospital Problem List     * (Principal)Pneumonia due to Haemophilus influenzae:   On levaquin secondary to allergies       Sepsis:  On protocol      Acute respiratory failure with hypoxia:  On oxygen      Oropharyngeal dysphagia:  When improved may need swallow evaluation      Tachycardia:  Cardiology consulted and amiodarone ordered secondary to the tachycardia not improving.  Heart rate is much better now.      SOLANGE (acute kidney injury)  GFR was 48 and now 60    Renal condition is improving with treatment.  Continue current treatment regimen.  Renal condition will be reassessed daily while in hospital and that is needed.      Coronary artery disease involving native coronary artery of native heart without angina pectoris    Coronary artery disease is unchanged.  Continue current treatment regimen.  Cardiac status will be reassessed by PCP as long as there are on issues as an inpatient.      Obesity, Class III, BMI 40-49.9 (morbid obesity)    Obesity is unchanged.  Not stable for this counseling at this time or planning.      Anemia:  Chronic disease.  Will monitor      Essential hypertension:  controlled    Hypertension is stable.  Continue current treatment regimen.  Blood pressure will be reassessed and monitored while in hospital.      Dyslipidemia:  Not presently on medication for this      Osteoarthritis:  Taking voltaren gel      Osteoporosis:  On boniva etc.      Acquired hypothyroidism:  On levothyroxine      Early onset Alzheimer's dementia without behavioral disturbance    Psychological condition is stable.  Continue current treatment regimen.  Psychological condition  will be reassessed By her PCP.      Depression with anxiety    Psychological condition is unchanged.  Psychological condition  will be reassessed by her pcp.      GERD (gastroesophageal reflux disease):  On protonix            Plan for disposition  Secondary to the patient's continued tachycardia patient was placed on amiodarone drip.  Heart rate is still elevated at 121.  Cardiology is managing.        [place]  DO Dianne  02/04/17  7:28 PM      Time: 20 min

## 2017-02-05 NOTE — PROGRESS NOTES
Cardiac Electrophysiology Follow-up Note     LOS: 2 days   Patient Care Team:  Shira Galaviz MD as PCP - General  Shira Galaviz MD as PCP - Family Medicine    Chief Complaint: cough/dyspnea     Interval History: still coughing diffusely, HR better    Objective   Vital Signs  Temp:  [97.9 °F (36.6 °C)-99.4 °F (37.4 °C)] 98 °F (36.7 °C)  Heart Rate:  [] 68  Resp:  [18-24] 18  BP: (105-134)/(59-79) 128/67    Intake/Output Summary (Last 24 hours) at 02/05/17 0713  Last data filed at 02/05/17 0300   Gross per 24 hour   Intake 2548.31 ml   Output      0 ml   Net 2548.31 ml     Elderly, chronically ill  Comfortable NAD  PERRL, conjunctiva clear  Neck supple, no JVD or thyromegaly appreciated  S1/S2 RRR, no m/r/g  Lungs CTA B, normal effort  Abdomen S/NT/ND (+) BS, no HSM appreciated  Extremities warm, no clubbing, cyanosis, or edema  No visible or palpable skin lesions  A/Ox4, mood and affect appropriate    Results Review:        Results from last 7 days  Lab Units 02/05/17  0513 02/04/17  0819 02/03/17  0622   SODIUM mmol/L 138 139 139   POTASSIUM mmol/L 3.4* 3.9 3.8   CHLORIDE mmol/L 101 103 100   TOTAL CO2 mmol/L 25.7 24.3 25.9   BUN mg/dL 29* 35* 40*   CREATININE mg/dL 0.84 0.91 1.11*   GLUCOSE mg/dL 156* 144* 178*   CALCIUM mg/dL 8.2* 8.6* 9.4       Results from last 7 days  Lab Units 02/03/17  0622 02/02/17  0056   TROPONIN T ng/mL 0.013 0.017       Results from last 7 days  Lab Units 02/04/17  0818 02/03/17  0622 02/02/17  0056   WBC 10*3/mm3 6.04 5.38 6.41   HEMOGLOBIN g/dL 8.8* 9.3* 9.4*   HEMATOCRIT % 28.1* 29.9* 30.0*   PLATELETS 10*3/mm3 177 168 169     Tele: Atrial flutter, V rates now in 60s-70s    I reviewed the patient's new clinical results.  I personally viewed and interpreted the patient's EKG/Telemetry data        Medication Review:     acetaminophen 650 mg Oral Nightly   allopurinol 100 mg Oral Q12H   budesonide 0.5 mg Nebulization BID - RT   calcium-vitamin D 500 mg Oral Q12H    [START ON 2/7/2017] conjugated estrogens 0.75 application Vaginal Once per day on Tue   diltiaZEM  mg Oral Daily   donepezil 10 mg Oral Nightly   doxazosin 2 mg Oral Q12H   enoxaparin 40 mg Subcutaneous Q24H   famotidine 40 mg Oral Nightly   guaiFENesin 1,200 mg Oral BID   [START ON 2/12/2017] ibandronate 150 mg Oral Q30 Days   ipratropium-albuterol 3 mL Nebulization Q4H - RT   isosorbide dinitrate 10 mg Oral Q12H   ketotifen 1 drop Both Eyes BID   lactobacillus acidophilus 1 capsule Oral Daily   levETIRAcetam 500 mg Oral Q12H   levoFLOXacin 750 mg Intravenous Q24H   levothyroxine 150 mcg Oral Q AM   metoprolol tartrate 25 mg Oral Q12H   miconazole  Topical Q12H   multivitamin with minerals 1 tablet Oral Daily   pantoprazole 40 mg Intravenous Q AM   sennosides-docusate sodium 1 tablet Oral Q12H   sertraline 100 mg Oral Nightly       amiodarone 0.5 mg/min Last Rate: 0.5 mg/min (02/04/17 1953)   lactated ringers 75 mL/hr Last Rate: 75 mL/hr (02/05/17 0321)       Assessment/Plan     Principal Problem:    Pneumonia due to Haemophilus influenzae  Active Problems:    Sepsis    Acute respiratory failure with hypoxia    Oropharyngeal dysphagia    Tachycardia    SOLANGE (acute kidney injury)    Essential hypertension    Coronary artery disease involving native coronary artery of native heart without angina pectoris    Dyslipidemia    Obesity, Class III, BMI 40-49.9 (morbid obesity)    Osteoarthritis    Osteoporosis    Acquired hypothyroidism    Early onset Alzheimer's dementia without behavioral disturbance    Depression with anxiety    GERD (gastroesophageal reflux disease)    Anemia    This is not sinus tachy but rather atrial flutter. Rate controlled on IV amio, would continue for today then convert to PO. Long term she may be a good candidate for catheter ablation to restore sinus rhythm. Will increase her LMWH to 1 mg/kg for therapeutic AC for stroke PPX.    Charles Dietrich MD  02/05/17  7:13 AM

## 2017-02-06 NOTE — PAYOR COMM NOTE
"Jurgen Cronin (76 y.o. Female)  Pending ref# [id number]  Audrey Velasquez RN  Case Management  [phone] fax       Date of Birth Social Security Number Address Home Phone MRN    1940  [address] [address] [phone] [id number]    Catholic Marital Status          Taoism        Admission Date Admission Type Admitting Provider Attending Provider Department, Room/Bed    2/3/17 Elective Shira Galaviz MD Kemparajurs, Keerthi, MD [facility] ICU, ICU5/1    Discharge Date Discharge Disposition Discharge Destination                      Attending Provider: Shira Galaviz MD     Allergies:  Cefazolin, Cefprozil, Cephalosporins    Isolation:  Contact Drop   Infection:  ESBL E coli (09/11/16), ESBL (02/10/16)   Code Status:  FULL    Ht:  65\" (165.1 cm)   Wt:  249 lb 8 oz (113 kg)    Admission Cmt:  None   Principal Problem:  Pneumonia due to Haemophilus influenzae [J14]                 Active Insurance as of 2/3/2017     Primary Coverage     Payor Plan Insurance Group Employer/Plan Group    HUMANA MEDICARE REPL HUMANA MEDICARE REPL [id number]     Payor Plan Address Payor Plan Phone Number Effective From Effective To    [address] [address] [phone] 1/1/2013     [place] [address]       Subscriber Name Subscriber Birth Date Member ID       JURGEN CRONIN 1940 [id number]                 Emergency Contacts      (Rel.) Home Phone Work Phone Mobile Phone    Criss Cronin (Power of ) -- [phone]    Ashley Cronin (Grandchild) -- -- [phone]            Insurance Information                HUMANA MEDICARE REPL/[place] MEDICARE REPL Phone: [phone]    Subscriber: Jurgen Cronin Subscriber#: [id number]    Group#: [id number] Precert#:           Vital Signs (last 72 hrs)       02/03 0700  -  02/04 0659 02/04 0700  -  02/05 0659 02/05 0700  -  02/06 0659 02/06 0700  -  02/06 0724   Most Recent    Temp (°F) 97.8 -  " 99.6    97.9 -  99.4    97.2 -  98.3       97.2 (36.2)    Heart Rate (!)129 -  (!)142    68 -  (!)142    80 -  106       103    Resp 17 -  24    18 -  24    20 -  26       20    /64 -  138/100    105/79 -  137/85    116/62 -  162/89       116/62    SpO2 (%) 90 -  96    92 -  98    90 -  96       95          Hospital Medications (all)       Dose Frequency Start End    acetaminophen (TYLENOL) tablet 650 mg 650 mg Every 4 Hours PRN 2/3/2017     Sig - Route: Take 2 tablets by mouth Every 4 (Four) Hours As Needed for mild pain (1-3) or fever. - Oral    acetaminophen (TYLENOL) tablet 650 mg 650 mg Nightly 2/3/2017     Sig - Route: Take 2 tablets by mouth Every Night. - Oral    allopurinol (ZYLOPRIM) tablet 100 mg 100 mg Every 12 Hours 2/3/2017     Sig - Route: Take 1 tablet by mouth Every 12 (Twelve) Hours. - Oral    aluminum-magnesium hydroxide-simethicone (MAALOX/MYLANTA) suspension 30 mL 30 mL Every 6 Hours PRN 2/3/2017     Sig - Route: Take 30 mL by mouth Every 6 (Six) Hours As Needed for indigestion or heartburn. - Oral    amiodarone in dextrose 5% (NEXTERONE) 360 MG/200ML infusion 0.5 mg/min Continuous 2/5/2017 2/7/2017    Sig - Route: Infuse 0.5 mg/min into a venous catheter Continuous. - Intravenous    bisacodyl (DULCOLAX) EC tablet 10 mg 10 mg Daily PRN 2/3/2017     Sig - Route: Take 2 tablets by mouth Daily As Needed for constipation. - Oral    budesonide (PULMICORT) nebulizer solution 0.5 mg 0.5 mg 2 Times Daily - RT 2/3/2017     Sig - Route: Take 2 mL by nebulization 2 (Two) Times a Day. - Nebulization    calcium-vitamin D 500-200 MG-UNIT tablet 500 mg 500 mg Every 12 Hours Scheduled 2/3/2017     Sig - Route: Take 1 tablet by mouth Every 12 (Twelve) Hours. - Oral    conjugated estrogens (PREMARIN) vaginal cream 0.75 application 0.75 application User Specified 2/7/2017     Sig - Route: Insert 0.75 application into the vagina Once per day on Tue. - Vaginal    diclofenac (VOLTAREN) 1 % gel 4 g 4 g 3 Times  Daily PRN 2/3/2017     Sig - Route: Apply 4 g topically 3 (Three) Times a Day As Needed (left knee or foot pain). - Topical    diltiaZEM CD (CARDIZEM CD) 24 hr capsule 240 mg 240 mg Daily 2/4/2017     Sig - Route: Take 2 capsules by mouth Daily. - Oral    diphenhydrAMINE (BENADRYL) capsule 25 mg 25 mg Every 6 Hours PRN 2/3/2017     Sig - Route: Take 1 capsule by mouth Every 6 (Six) Hours As Needed for itching. - Oral    donepezil (ARICEPT) tablet 10 mg 10 mg Nightly 2/3/2017     Sig - Route: Take 2 tablets by mouth Every Night. - Oral    doxazosin (CARDURA) tablet 2 mg 2 mg Every 12 Hours Scheduled 2/3/2017     Sig - Route: Take 1 tablet by mouth Every 12 (Twelve) Hours. - Oral    enoxaparin (LOVENOX) syringe 110 mg 1 mg/kg × 113 kg Every 12 Hours Scheduled 2/5/2017     Sig - Route: Inject 0.73 mL under the skin Every 12 (Twelve) Hours. - Subcutaneous    famotidine (PEPCID) tablet 40 mg 40 mg Nightly 2/3/2017     Sig - Route: Take 2 tablets by mouth Every Night. - Oral    guaiFENesin (MUCINEX) 12 hr tablet 1,200 mg 1,200 mg 2 Times Daily 2/3/2017     Sig - Route: Take 2 tablets by mouth 2 (Two) Times a Day. - Oral    ibandronate (BONIVA) tablet 150 mg 150 mg Every 30 Days 2/12/2017     Sig - Route: Take 1 tablet by mouth Every 30 (Thirty) Days. - Oral    ipratropium-albuterol (DUO-NEB) nebulizer solution 3 mL 3 mL 4 Times Daily - RT 2/5/2017     Sig - Route: Take 3 mL by nebulization 4 (Four) Times a Day. - Nebulization    isosorbide dinitrate (ISORDIL) tablet 10 mg 10 mg Every 12 Hours Scheduled 2/3/2017     Sig - Route: Take 1 tablet by mouth Every 12 (Twelve) Hours. - Oral    ketotifen (ZADITOR) 0.025 % ophthalmic solution 1 drop 1 drop 2 Times Daily 2/3/2017     Sig - Route: Administer 1 drop to both eyes 2 (Two) Times a Day. - Both Eyes    lactobacillus acidophilus (RISAQUAD) capsule 1 capsule 1 capsule Daily 2/4/2017     Sig - Route: Take 1 capsule by mouth Daily. - Oral    levETIRAcetam (KEPPRA) tablet 500 mg  500 mg Every 12 Hours Scheduled 2/3/2017     Sig - Route: Take 1 tablet by mouth Every 12 (Twelve) Hours. - Oral    levoFLOXacin (LEVAQUIN) 750 mg/150 mL D5W (premix) (LEVAQUIN) 750 mg 750 mg Every 24 Hours 2/4/2017     Sig - Route: Infuse 150 mL into a venous catheter Daily. - Intravenous    levothyroxine (SYNTHROID, LEVOTHROID) 50 MCG tablet  - ADS Override Pull   2/4/2017 2/4/2017    Notes to Pharmacy: Created by cabinet override    levothyroxine (SYNTHROID, LEVOTHROID) tablet 150 mcg 150 mcg Every Early Morning 2/4/2017     Sig - Route: Take 1 tablet by mouth Every Morning. - Oral    magnesium hydroxide (MILK OF MAGNESIA) 400 MG/5ML suspension 30 mL 30 mL Nightly PRN 2/3/2017     Sig - Route: Take 30 mL by mouth At Night As Needed for constipation. - Oral    metoprolol tartrate (LOPRESSOR) tablet 50 mg 50 mg Every 12 Hours Scheduled 2/6/2017     Sig - Route: Take 1 tablet by mouth Every 12 (Twelve) Hours. - Oral    miconazole (MICOTIN) 2 % powder  Every 12 Hours Scheduled 2/3/2017     Sig - Route: Apply  topically Every 12 (Twelve) Hours. - Topical    multivitamin with minerals 1 tablet 1 tablet Daily 2/4/2017     Sig - Route: Take 1 tablet by mouth Daily. - Oral    nitroglycerin (NITROSTAT) SL tablet 0.4 mg 0.4 mg Every 5 Minutes PRN 2/3/2017     Sig - Route: Place 1 tablet under the tongue Every 5 (Five) Minutes As Needed for chest pain. - Sublingual    pantoprazole (PROTONIX) injection 40 mg 40 mg Every Early Morning 2/4/2017     Sig - Route: Infuse 10 mL into a venous catheter Every Morning. - Intravenous    potassium chloride (K-DUR,KLOR-CON) CR tablet 40 mEq 40 mEq Once 2/5/2017 2/5/2017    Sig - Route: Take 2 tablets by mouth 1 (One) Time. - Oral    sennosides-docusate sodium (SENOKOT-S) 8.6-50 MG tablet 1 tablet 1 tablet Every 12 Hours Scheduled 2/3/2017     Sig - Route: Take 1 tablet by mouth Every 12 (Twelve) Hours. - Oral    sertraline (ZOLOFT) tablet 100 mg 100 mg Nightly 2/3/2017     Sig - Route:  Take 2 tablets by mouth Every Night. - Oral    sodium chloride 0.9 % infusion  - ADS Override Pull   2/3/2017 2/3/2017    Notes to Pharmacy: Created by cabinet override    sodium chloride 0.9 % infusion  - ADS Override Pull   2/3/2017 2/4/2017    Notes to Pharmacy: Created by cabinet override    sodium chloride 0.9 % infusion 500 mL 500 mL Once 2/3/2017 2/3/2017    Sig - Route: Infuse 500 mL into a venous catheter 1 (One) Time. - Intravenous    sodium chloride 0.9 % infusion 500 mL 500 mL Once 2/3/2017 2/3/2017    Sig - Route: Infuse 500 mL into a venous catheter 1 (One) Time. - Intravenous    traMADol (ULTRAM) tablet 100 mg 100 mg Nightly PRN 2/3/2017 5/26/2017    Sig - Route: Take 2 tablets by mouth At Night As Needed for moderate pain (4-6) (for leg pains at night). - Oral    vitamin D (ERGOCALCIFEROL) capsule 50,000 Units 50,000 Units Every 7 Days 2/5/2017     Sig - Route: Take 1 capsule by mouth Every 7 (Seven) Days. - Oral    adenosine (ADENOCARD) injection 12 mg (Discontinued) 12 mg Once 2/4/2017 2/4/2017    Sig - Route: Infuse 4 mL into a venous catheter 1 (One) Time. - Intravenous    albuterol (PROVENTIL) nebulizer solution 0.083% 2.5 mg/3mL (Discontinued) 2.5 mg Every 4 Hours PRN 2/3/2017 2/6/2017    Sig - Route: Take 2.5 mg by nebulization Every 4 (Four) Hours As Needed for wheezing. - Nebulization    amiodarone in dextrose 5% (NEXTERONE) 360 MG/200ML infusion (Discontinued) 0.5 mg/min Continuous 2/4/2017 2/5/2017    Sig - Route: Infuse 0.5 mg/min into a venous catheter Continuous. - Intravenous    Reason for Discontinue: Duplicate order    calcium carbonate (TUMS) chewable tablet 500 mg (200 mg elemental) (Discontinued) 1 tablet Every 4 Hours PRN 2/3/2017 2/6/2017    Sig - Route: Chew 500 mg Every 4 (Four) Hours As Needed for indigestion or heartburn. - Oral    digoxin (LANOXIN) injection 250 mcg (Discontinued) 250 mcg Daily Digoxin 2/3/2017 2/3/2017    Sig - Route: Infuse 1 mL into a venous catheter  Daily. - Intravenous    enoxaparin (LOVENOX) syringe 40 mg (Discontinued) 40 mg Every 24 Hours 2/3/2017 2/5/2017    Sig - Route: Inject 0.4 mL under the skin Daily. - Subcutaneous    ipratropium-albuterol (DUO-NEB) nebulizer solution 3 mL (Discontinued) 3 mL Every 4 Hours - RT 2/3/2017 2/5/2017    Sig - Route: Take 3 mL by nebulization Every 4 (Four) Hours. - Nebulization    lactated ringers infusion (Discontinued) 75 mL/hr Continuous 2/3/2017 2/5/2017    Sig - Route: Infuse 75 mL/hr into a venous catheter Continuous. - Intravenous    magnesium hydroxide (MILK OF MAGNESIA) 400 MG/5ML suspension 30 mL (Discontinued) 30 mL Daily PRN 2/3/2017 2/6/2017    Sig - Route: Take 30 mL by mouth Daily As Needed for constipation. - Oral    metoprolol tartrate (LOPRESSOR) tablet 25 mg (Discontinued) 25 mg Every 12 Hours Scheduled 2/3/2017 2/6/2017    Sig - Route: Take 1 tablet by mouth Every 12 (Twelve) Hours. - Oral          Lab Results (last 72 hours)     Procedure Component Value Units Date/Time    T4, Free [[id number]]  (Normal) Collected:  02/03/17 0622    Specimen:  Blood Updated:  02/03/17 1242     Free T4 1.40 ng/dL     Respiratory Panel, PCR [[id number]]  (Normal) Collected:  02/03/17 1119    Specimen:  Swab from Nasopharynx Updated:  02/03/17 1648     ADENOVIRUS, PCR Not Detected      Coronavirus 229E Not Detected      Coronavirus HKU1 Not Detected      Coronavirus NL63 Not Detected      Coronavirus OC43 Not Detected      Human Metapneumovirus Not Detected      Human Rhinovirus/Enterovirus Not Detected      Influenza B PCR Not Detected      Parainfluenza Virus 1 Not Detected      Parainfluenza Virus 2 Not Detected      Parainfluenza Virus 3 Not Detected      Parainfluenza Virus 4 Not Detected      Bordetella pertussis pcr Not Detected      Influenza 2009 H1N1 by PCR Not Detected      Chlamydophila pneumoniae PCR Not Detected      Mycoplasma pneumo by PCR Not Detected      Influenza A PCR Not Detected      Influenza A H3 Not  Detected      Influenza A H1 Not Detected      RSV, PCR Not Detected     Lactic Acid, Plasma [[id number]]  (Normal) Collected:  02/04/17 0819    Specimen:  Blood Updated:  02/04/17 0836     Lactate 0.6 mmol/L     Basic Metabolic Panel [[id number]]  (Abnormal) Collected:  02/04/17 0819    Specimen:  Blood Updated:  02/04/17 0840     Glucose 144 (H) mg/dL      BUN 35 (H) mg/dL      Creatinine 0.91 mg/dL      Sodium 139 mmol/L      Potassium 3.9 mmol/L      Chloride 103 mmol/L      CO2 24.3 mmol/L      Calcium 8.6 (L) mg/dL      eGFR Non African Amer 60 (L) mL/min/1.73      BUN/Creatinine Ratio 38.5 (H)      Anion Gap 11.7 mmol/L     Narrative:       The MDRD GFR formula is only valid for adults with stable renal function between ages 18 and 70.    CBC Auto Differential [[id number]]  (Abnormal) Collected:  02/04/17 0818    Specimen:  Blood Updated:  02/04/17 0844     WBC 6.04 10*3/mm3      RBC 2.94 (L) 10*6/mm3      Hemoglobin 8.8 (L) g/dL      Hematocrit 28.1 (L) %      MCV 95.6 fL      MCH 29.9 pg      MCHC 31.3 g/dL      RDW 14.6 (H) %      RDW-SD 50.7 fl      MPV 10.3 fL      Platelets 177 10*3/mm3      Neutrophil % 68.1 %      Lymphocyte % 9.4 (L) %      Monocyte % 13.2 (H) %      Eosinophil % 2.0 %      Basophil % 0.7 %      Immature Grans % 6.6 (H) %      Neutrophils, Absolute 4.11 10*3/mm3      Lymphocytes, Absolute 0.57 (L) 10*3/mm3      Monocytes, Absolute 0.80 10*3/mm3      Eosinophils, Absolute 0.12 10*3/mm3      Basophils, Absolute 0.04 10*3/mm3      Immature Grans, Absolute 0.40 (H) 10*3/mm3      nRBC 0.0 /100 WBC     CBC & Differential [[id number]] Collected:  02/04/17 0818    Specimen:  Blood Updated:  02/04/17 0845    Narrative:       The following orders were created for panel order CBC & Differential.  Procedure                               Abnormality         Status                     ---------                               -----------         ------                     Scan Slide[[id number]]                 "                                                   CBC Auto Differential[[id number]]         Abnormal            Final result                 Please view results for these tests on the individual orders.    Procalcitonin [[id number]]  (Normal) Collected:  02/04/17 0818    Specimen:  Blood Updated:  02/04/17 1212     Procalcitonin 0.13 ng/mL     Narrative:       As a Marker for Sepsis (Non-Neonates):   1. <0.5 ng/mL represents a low risk of severe sepsis and/or septic shock.  1. >2 ng/mL represents a high risk of severe sepsis and/or septic shock.    As a Marker for Lower Respiratory Tract Infections that require antibiotic therapy:  PCT on Admission     Antibiotic Therapy             6-12 Hrs later  > 0.5                Strongly Recommended            >0.25 - <0.5         Recommended  0.1 - 0.25           Discouraged                   Remeasure/reassess PCT  <0.1                 Strongly Discouraged          Remeasure/reassess PCT      As 28 day mortality risk marker: \"Change in Procalcitonin Result\" (> 80 % or <=80 %) if Day 0 (or Day 1) and Day 4 values are available. Refer to http://www.[employer]s-pct-calculator.com/   Change in PCT <=80 %   A decrease of PCT levels below or equal to 80 % defines a positive change in PCT test result representing a higher risk for 28-day all-cause mortality of patients diagnosed with severe sepsis or septic shock.  Change in PCT > 80 %   A decrease of PCT levels of more than 80 % defines a negative change in PCT result representing a lower risk for 28-day all-cause mortality of patients diagnosed with severe sepsis or septic shock.                Basic Metabolic Panel [[id number]]  (Abnormal) Collected:  02/05/17 0513    Specimen:  Blood Updated:  02/05/17 0558     Glucose 156 (H) mg/dL      BUN 29 (H) mg/dL      Creatinine 0.84 mg/dL      Sodium 138 mmol/L      Potassium 3.4 (L) mmol/L      Chloride 101 mmol/L      CO2 25.7 mmol/L      Calcium 8.2 (L) mg/dL      eGFR Non  Amer 66 " mL/min/1.73      BUN/Creatinine Ratio 34.5 (H)      Anion Gap 11.3 mmol/L     Narrative:       The MDRD GFR formula is only valid for adults with stable renal function between ages 18 and 70.    Blood Culture [[id number]]  (Normal) Collected:  02/03/17 1731    Specimen:  Blood from Arm, Left Updated:  02/05/17 1801     Blood Culture No growth at 2 days     Blood Culture [[id number]]  (Normal) Collected:  02/03/17 1731    Specimen:  Blood from Arm, Right Updated:  02/05/17 1801     Blood Culture No growth at 2 days     CBC (No Diff) [[id number]]  (Abnormal) Collected:  02/06/17 0517    Specimen:  Blood Updated:  02/06/17 0536     WBC 7.62 10*3/mm3      RBC 2.95 (L) 10*6/mm3      Hemoglobin 8.6 (L) g/dL      Hematocrit 28.3 (L) %      MCV 95.9 fL      MCH 29.2 pg      MCHC 30.4 (L) g/dL      RDW 14.7 (H) %      RDW-SD 52.0 fl      MPV 10.2 fL      Platelets 186 10*3/mm3     Basic Metabolic Panel [[id number]]  (Abnormal) Collected:  02/06/17 0517    Specimen:  Blood Updated:  02/06/17 0559     Glucose 151 (H) mg/dL      BUN 22 mg/dL      Creatinine 0.90 mg/dL      Sodium 140 mmol/L      Potassium 4.0 mmol/L      Chloride 103 mmol/L      CO2 26.6 mmol/L      Calcium 8.3 (L) mg/dL      eGFR Non African Amer 61 mL/min/1.73      BUN/Creatinine Ratio 24.4      Anion Gap 10.4 mmol/L     Narrative:       The MDRD GFR formula is only valid for adults with stable renal function between ages 18 and 70.    Iron Profile [[id number]] Collected:  02/06/17 0517    Specimen:  Blood Updated:  02/06/17 0707    Prealbumin [[id number]] Collected:  02/06/17 0517    Specimen:  Blood Updated:  02/06/17 0723          Orders (last 72 hrs)     Start     Ordered    02/12/17 0900  ibandronate (BONIVA) tablet 150 mg  Every 30 Days      02/03/17 1056    02/07/17 2100  conjugated estrogens (PREMARIN) vaginal cream 0.75 application  Once per day on Tue 02/03/17 1056    02/07/17 0600  Comprehensive Metabolic Panel  Morning Draw      02/06/17 0656    02/07/17  0400  CBC (No Diff)  Morning Draw      02/06/17 0656    02/06/17 0900  metoprolol tartrate (LOPRESSOR) tablet 50 mg  Every 12 Hours Scheduled      02/06/17 0656    02/06/17 0806  Basic Metabolic Panel  Every 3 Weeks,   Status:  Canceled     Comments:  Call MD with results    02/03/17 1056    02/06/17 0800  Dietary Nutrition Supplement: Glucerna Shakes  Daily With Breakfast, Lunch & Dinner      02/06/17 0652    02/06/17 0657  Iron Profile  STAT      02/06/17 0656    02/06/17 0656  Oxygen Therapy- Nasal Cannula; Titrate for spO2: 90%  Continuous     Comments:  Wean off 02 to keep sats above 90    02/06/17 0656    02/06/17 0653  Prealbumin  Once      02/06/17 0652    02/06/17 0652  Inpatient consult to Nutrition  Once     Provider:  (Not yet assigned)    02/06/17 0652    02/06/17 0652  Calorie Count  Until Discontinued      02/06/17 0652    02/06/17 0400  CBC (No Diff)  Morning Draw      02/05/17 1158    02/06/17 0400  Basic Metabolic Panel  Morning Draw,   Status:  Canceled      02/05/17 1158    02/05/17 1630  ipratropium-albuterol (DUO-NEB) nebulizer solution 3 mL  4 Times Daily - RT      02/05/17 1158    02/05/17 1500  vitamin D (ERGOCALCIFEROL) capsule 50,000 Units  Every 7 Days      02/05/17 1212    02/05/17 1230  potassium chloride (K-DUR,KLOR-CON) CR tablet 40 mEq  Once      02/05/17 1158    02/05/17 0900  enoxaparin (LOVENOX) syringe 110 mg  Every 12 Hours Scheduled      02/05/17 0716    02/05/17 0800  amiodarone in dextrose 5% (NEXTERONE) 360 MG/200ML infusion  Continuous      02/05/17 0716    02/05/17 0708  ECG 12 Lead  Once      02/05/17 0707    02/05/17 0600  Basic Metabolic Panel  Morning Draw      02/04/17 2013    02/04/17 1307  ECG 12 Lead  STAT      02/04/17 1308    02/04/17 0945  levoFLOXacin (LEVAQUIN) 750 mg/150 mL D5W (premix) (LEVAQUIN) 750 mg  Every 24 Hours      02/03/17 1056    02/04/17 0900  diltiaZEM CD (CARDIZEM CD) 24 hr capsule 240 mg  Daily      02/03/17 1056    02/04/17 0900   lactobacillus acidophilus (RISAQUAD) capsule 1 capsule  Daily      02/03/17 1056    02/04/17 0900  multivitamin with minerals 1 tablet  Daily      02/03/17 1056    02/04/17 0849  Transfer Patient  Once      02/04/17 0849    02/04/17 0831  Scan Slide  Once,   Status:  Canceled      02/04/17 0830    02/04/17 0745  adenosine (ADENOCARD) injection 12 mg  Once,   Status:  Discontinued      02/04/17 0711    02/04/17 0745  amiodarone in dextrose 5% (NEXTERONE) 360 MG/200ML infusion  Continuous,   Status:  Discontinued      02/04/17 0715    02/04/17 0636  levothyroxine (SYNTHROID, LEVOTHROID) 50 MCG tablet  - ADS Override Pull     Comments:  Created by cabinet override    02/04/17 0636    02/04/17 0600  Basic Metabolic Panel  Morning Draw      02/03/17 1056    02/04/17 0600  CBC & Differential  Morning Draw      02/03/17 1056    02/04/17 0600  Lactic Acid, Plasma  Morning Draw      02/03/17 1056    02/04/17 0600  Procalcitonin  Morning Draw      02/03/17 1056    02/04/17 0600  Daily Weights  Daily      02/03/17 1056    02/04/17 0600  levothyroxine (SYNTHROID, LEVOTHROID) tablet 150 mcg  Every Early Morning      02/03/17 1056    02/04/17 0600  pantoprazole (PROTONIX) injection 40 mg  Every Early Morning      02/03/17 1056    02/04/17 0600  CBC Auto Differential  PROCEDURE ONCE      02/04/17 0001    02/03/17 2115  allopurinol (ZYLOPRIM) tablet 100 mg  Every 12 Hours      02/03/17 1056    02/03/17 2100  acetaminophen (TYLENOL) tablet 650 mg  Nightly      02/03/17 1056    02/03/17 2100  calcium-vitamin D 500-200 MG-UNIT tablet 500 mg  Every 12 Hours Scheduled      02/03/17 1056    02/03/17 2100  donepezil (ARICEPT) tablet 10 mg  Nightly      02/03/17 1056    02/03/17 2100  doxazosin (CARDURA) tablet 2 mg  Every 12 Hours Scheduled      02/03/17 1056    02/03/17 2100  famotidine (PEPCID) tablet 40 mg  Nightly      02/03/17 1056    02/03/17 2100  isosorbide dinitrate (ISORDIL) tablet 10 mg  Every 12 Hours Scheduled      02/03/17  1056    02/03/17 2100  levETIRAcetam (KEPPRA) tablet 500 mg  Every 12 Hours Scheduled      02/03/17 1056    02/03/17 2100  metoprolol tartrate (LOPRESSOR) tablet 25 mg  Every 12 Hours Scheduled,   Status:  Discontinued      02/03/17 1056    02/03/17 2100  miconazole (MICOTIN) 2 % powder  Every 12 Hours Scheduled      02/03/17 1056    02/03/17 2100  sennosides-docusate sodium (SENOKOT-S) 8.6-50 MG tablet 1 tablet  Every 12 Hours Scheduled      02/03/17 1056    02/03/17 2100  sertraline (ZOLOFT) tablet 100 mg  Nightly      02/03/17 1056    02/03/17 2000  Skin Care  Every 12 Hours      02/03/17 1056 02/03/17 1930  budesonide (PULMICORT) nebulizer solution 0.5 mg  2 Times Daily - RT      02/03/17 1056    02/03/17 1811  XR Chest 1 View  1 Time Imaging      02/03/17 1811    02/03/17 1800  guaiFENesin (MUCINEX) 12 hr tablet 1,200 mg  2 Times Daily      02/03/17 1056    02/03/17 1800  ketotifen (ZADITOR) 0.025 % ophthalmic solution 1 drop  2 Times Daily      02/03/17 1056    02/03/17 1800  enoxaparin (LOVENOX) syringe 40 mg  Every 24 Hours,   Status:  Discontinued      02/03/17 1704    02/03/17 1706  Blood Culture  Once      02/03/17 1705    02/03/17 1706  Blood Culture  Once     Comments:  From a different site than #1.    02/03/17 1705    02/03/17 1705  Place Sequential Compression Device  Once      02/03/17 1704    02/03/17 1705  Maintain Sequential Compression Device  Continuous      02/03/17 1704    02/03/17 1704  VTE Risk Assessment - Moderate Risk  Once      02/03/17 1704    02/03/17 1400  Incentive Spirometry  Every 4 Hours While Awake      02/03/17 1056    02/03/17 1400  digoxin (LANOXIN) injection 250 mcg  Daily Digoxin,   Status:  Discontinued      02/03/17 1326    02/03/17 1400  sodium chloride 0.9 % infusion 500 mL  Once      02/03/17 1327    02/03/17 1338  sodium chloride 0.9 % infusion  - ADS Override Pull     Comments:  Created by cabinet override    02/03/17 1338    02/03/17 1235  Inpatient Admission   Once      02/03/17 1235    02/03/17 1200  Dietary Nutrition Supplement: Ensure Plus  Daily With Breakfast, Lunch & Dinner,   Status:  Canceled      02/03/17 1056    02/03/17 1147  T4, Free  Once      02/03/17 1147    02/03/17 1130  sodium chloride 0.9 % infusion 500 mL  Once      02/03/17 1056    02/03/17 1130  Flutter / Acappella  Every 4 Hours - RT     Comments:  With nebs    02/03/17 1056    02/03/17 1130  ipratropium-albuterol (DUO-NEB) nebulizer solution 3 mL  Every 4 Hours - RT,   Status:  Discontinued      02/03/17 1056    02/03/17 1130  lactated ringers infusion  Continuous,   Status:  Discontinued      02/03/17 1056    02/03/17 1119  sodium chloride 0.9 % infusion  - ADS Override Pull     Comments:  Created by cabinet override    02/03/17 1119    02/03/17 1057  Diet Soft Texture; Ground; Nectar / Syrup Thick; Consistent Carbohydrate  Diet Effective Now      02/03/17 1056    02/03/17 1057  Flutter / Acappella  Once,   Status:  Canceled      02/03/17 1056    02/03/17 1057  Hemoglobin A1c  Once,   Status:  Canceled      02/03/17 1056    02/03/17 1057  TSH  Once,   Status:  Canceled      02/03/17 1056    02/03/17 1057  Insert PICC Line At Bedside  Once     Comments:  Confirmation of placement, care and maintenance per site specific policy.    02/03/17 1056    02/03/17 1057  Lactic Acid, Plasma  Once,   Status:  Canceled      02/03/17 1056    02/03/17 1057  Respiratory Panel, PCR  Once      02/03/17 1056    02/03/17 1057  Nursing Communication May participate in activities May have special diet when served Incentive spirometry every 2 hours WA Encourage PO fluids  Until Discontinued     Comments:  May participate in activities  May have special diet when served  Incentive spirometry every 2 hours WA  Encourage PO fluids    02/03/17 1056    02/03/17 1057  Assess stoma  Every Shift      02/03/17 1056    02/03/17 1057  Stoma care - change appliance  Weekly     Comments:  1 - 2 times per week    02/03/17 1056     02/03/17 1057  Aspiration Precautions  Continuous      02/03/17 1056    02/03/17 1057  Aspiration Precautions  Continuous,   Status:  Canceled      02/03/17 1056    02/03/17 1057  Consult pharmacist for review of any medications that may cause urinary retention  Until Discontinued     Comments:  Nurse to place consult when needed.    02/03/17 1056    02/03/17 1057  Insert Peripheral IV  Once      02/03/17 1056    02/03/17 1057  Insert PICC Line At Bedside  Once,   Status:  Canceled     Comments:  Confirmation of placement, care and maintenance per site specific policy.    02/03/17 1056    02/03/17 1057  Notify physician if bladder distention continues  Until Discontinued      02/03/17 1056    02/03/17 1057  Nursing Communication Please order bmp weekly for mondays and results to be called ot me  Until Discontinued     Comments:  Please order bmp weekly for mondays and results to be called ot me    02/03/17 1056    02/03/17 1057  Nursing Communication Push by mouth fluids  Until Discontinued     Comments:  Push by mouth fluids    02/03/17 1056    02/03/17 1057  Nursing Communication Stop daily bmp do  Until Discontinued     Comments:  Stop daily bmp do    02/03/17 1056    02/03/17 1057  Oxygen Therapy- Nasal Cannula  Continuous     Comments:  Wean o2 to keep sats over 90    02/03/17 1056    02/03/17 1057  Full Code  Continuous      02/03/17 1056    02/03/17 1057  Vital Signs (specify frequency)  Every Shift      02/03/17 1056    02/03/17 1056  traMADol (ULTRAM) tablet 100 mg  Nightly PRN      02/03/17 1056    02/03/17 1056  magnesium hydroxide (MILK OF MAGNESIA) 400 MG/5ML suspension 30 mL  Nightly PRN      02/03/17 1056    02/03/17 1056  magnesium hydroxide (MILK OF MAGNESIA) 400 MG/5ML suspension 30 mL  Daily PRN,   Status:  Discontinued      02/03/17 1056    02/03/17 1056  nitroglycerin (NITROSTAT) SL tablet 0.4 mg  Every 5 Minutes PRN      02/03/17 1056    02/03/17 1056  acetaminophen (TYLENOL) tablet 650 mg   Every 4 Hours PRN      02/03/17 1056    02/03/17 1056  albuterol (PROVENTIL) nebulizer solution 0.083% 2.5 mg/3mL  Every 4 Hours PRN,   Status:  Discontinued      02/03/17 1056    02/03/17 1056  aluminum-magnesium hydroxide-simethicone (MAALOX/MYLANTA) suspension 30 mL  Every 6 Hours PRN      02/03/17 1056    02/03/17 1056  bisacodyl (DULCOLAX) EC tablet 10 mg  Daily PRN      02/03/17 1056    02/03/17 1056  calcium carbonate (TUMS) chewable tablet 500 mg (200 mg elemental)  Every 4 Hours PRN,   Status:  Discontinued      02/03/17 1056    02/03/17 1056  diclofenac (VOLTAREN) 1 % gel 4 g  3 Times Daily PRN      02/03/17 1056    02/03/17 1056  diphenhydrAMINE (BENADRYL) capsule 25 mg  Every 6 Hours PRN      02/03/17 1056    Unscheduled  Change pouch immediately if leaking  As Needed      02/03/17 1056    Unscheduled  Empty pouch  As Needed     Comments:  When 1/3 full    02/03/17 1056    Unscheduled  Bladder scan  As Needed     Comments:  If patient is unable to void after four hours    02/03/17 1056    Unscheduled  If patient is unable to void after four hours, monitor every 1 to 2 hours for spontaneous void  As Needed     Comments:  if the volume was less than 350 mL and is without symptoms of bladder discomfort/distention.    02/03/17 1056    Unscheduled  Schedule timed/prompt voiding for patients with urinary incontinence  As Needed      02/03/17 1056    Unscheduled  Skin Care  As Needed      02/03/17 1056    Unscheduled  Skin Care  As Needed      02/03/17 1056    Unscheduled  Straight cath every 4 to 6 hours  As Needed     Comments:  If patient is unable to void after hours, and if bladder scan volume was greater than 350mL or symptoms of bladder discomfort / distention.    02/03/17 1056    Unscheduled  Up in Chair  As Needed      02/03/17 1056             Physician Progress Notes (last 72 hours) (Notes from 2/3/2017  7:24 AM through 2/6/2017  7:24 AM)      Charles Dietrich MD at 2/4/2017  7:15 AM  Version 1 of 1          Cardiac Electrophysiology Follow-up Note     LOS: 1 day   Patient Care Team:  Shira Galaviz MD as PCP - General  Shira Galaviz MD as PCP - Family Medicine    Chief Complaint: cough/hypoxemia     Interval History: c/o dyspnea this AM. HR remains elevated in 120s-140s.    Objective   Vital Signs  Temp:  [97.8 °F (36.6 °C)-99.6 °F (37.6 °C)] 99.2 °F (37.3 °C)  Heart Rate:  [129-142] 138  Resp:  [17-24] 20  BP: (122-138)/() 138/100    Intake/Output Summary (Last 24 hours) at 02/04/17 0715  Last data filed at 02/03/17 1700   Gross per 24 hour   Intake 2086.25 ml   Output      0 ml   Net 2086.25 ml   , chronically ill  Uncomfortable NAD  PERRL, conjunctiva clear  Neck supple, no JVD or thyromegaly appreciated  S1/S2 rapid, (+) systolic murmur  Lungs CTA B, normal effort  Abdomen S/NT/ND (+) BS, no HSM appreciated  Extremities warm, no clubbing, cyanosis, or edema  No visible or palpable skin lesions  A/Ox4, mood and affect appropriate    Results Review:        Results from last 7 days  Lab Units 02/03/17 0622 02/02/17 0056 01/30/17  0637   SODIUM mmol/L 139 138 140   POTASSIUM mmol/L 3.8 4.1 4.8   CHLORIDE mmol/L 100 97* 101   TOTAL CO2 mmol/L 25.9 25.0 23.6   BUN mg/dL 40* 34* 19   CREATININE mg/dL 1.11* 1.20* 0.91   GLUCOSE mg/dL 178* 174* 140*   CALCIUM mg/dL 9.4 9.0 8.9       Results from last 7 days  Lab Units 02/03/17  0622 02/02/17  0056   TROPONIN T ng/mL 0.013 0.017       Results from last 7 days  Lab Units 02/03/17  0622 02/02/17 0056 01/31/17  1201   WBC 10*3/mm3 5.38 6.41 8.64   HEMOGLOBIN g/dL 9.3* 9.4* 10.1*   HEMATOCRIT % 29.9* 30.0* 32.5*   PLATELETS 10*3/mm3 168 169 175     Tele: HRs 120s-140s    I reviewed the patient's new clinical results.  I personally viewed and interpreted the patient's EKG/Telemetry data        Medication Review:     acetaminophen 650 mg Oral Nightly   adenosine 12 mg Intravenous Once   allopurinol 100 mg Oral Q12H   budesonide 0.5 mg Nebulization BID  - RT   calcium-vitamin D 500 mg Oral Q12H   [START ON 2/7/2017] conjugated estrogens 0.75 application Vaginal Once per day on Tue   diltiaZEM  mg Oral Daily   donepezil 10 mg Oral Nightly   doxazosin 2 mg Oral Q12H   enoxaparin 40 mg Subcutaneous Q24H   famotidine 40 mg Oral Nightly   guaiFENesin 1,200 mg Oral BID   [START ON 2/12/2017] ibandronate 150 mg Oral Q30 Days   ipratropium-albuterol 3 mL Nebulization Q4H - RT   isosorbide dinitrate 10 mg Oral Q12H   ketotifen 1 drop Both Eyes BID   lactobacillus acidophilus 1 capsule Oral Daily   levETIRAcetam 500 mg Oral Q12H   levoFLOXacin 750 mg Intravenous Q24H   levothyroxine 150 mcg Oral Q AM   metoprolol tartrate 25 mg Oral Q12H   miconazole  Topical Q12H   multivitamin with minerals 1 tablet Oral Daily   pantoprazole 40 mg Intravenous Q AM   sennosides-docusate sodium 1 tablet Oral Q12H   sertraline 100 mg Oral Nightly       amiodarone 0.5 mg/min    lactated ringers 75 mL/hr Last Rate: 75 mL/hr (02/03/17 0345)       Assessment&#47;Plan     Active Problems:    Sepsis  Atrial tachycardia/flutter    I have reviewed her ECG and telemetry. I am not convinced that this is sinus tachycardia and not AT or flutter. I tried to give adenosine to see underlying rhythm but was told by RN that cannot give that here. I will start her on an amiodarone gtt to see if this responds.    Charles Dietrich MD  02/04/17  7:15 AM       Electronically signed by Charles Dietrich MD at 2/4/2017  7:20 AM      Marietta Dean DO at 2/4/2017  7:28 PM  Version 1 of 1         Hospitalist Team      Patient Care Team:  Shira Galaviz MD as PCP - General  Shira Galaviz MD as PCP - Family Medicine        Chief Complaint:  SOA and tachycardia    Subjective    Interval History and ROS:     was not responding to treatment on med surg and tachycardia continued.  The cardiology service was consulted and they ordered amiodarone.  The heart rate is much improved.   "      Objective    Vital Signs  Temp:  [97.9 °F (36.6 °C)-99.6 °F (37.6 °C)] 97.9 °F (36.6 °C)  Heart Rate:  [102-142] 106  Resp:  [20-24] 20  BP: (105-138)/() 109/59  Oxygen Therapy  SpO2: 96 %  Pulse Oximetry Type: Intermittent  O2 Device: nasal cannula  Flow (L/min): 2  Oxygen Concentration (%): 96}  Flowsheet Rows         First Filed Value    Admission Height  65\" (165.1 cm) Documented at 02/03/2017 1044    Admission Weight  243 lb (110 kg) Documented at 02/03/2017 1044      Body mass index is 41.46 kg/(m^2).      Physical Exam:    Physical Exam   Constitutional: Patient appears very tired and sleepy this evening   HEENT:   Head: Normocephalic and atraumatic.   Eyes:  Pupils are equal, round, and reactive to light. EOM are intact. Sclera are anicteric and non-injected.  Mouth and Throat: Patient has moist mucous membranes. Oropharynx is clear of any erythema or exudate.     Neck: Neck supple. No JVD present. No thyromegaly present. No lymphadenopathy present.  Cardiovascular: Regular rate, regular rhythm, S1 normal and S2 normal.  Exam reveals no gallop and no friction rub.  No murmur heard.  Pulmonary/Chest: Lungs are shallow to auscultation bilaterally. No respiratory distress. No wheezes. Scattered rhonchi are auscultated. No rales.   Abdominal: Soft. Bowel sounds are normal. No distension and no mass. There is no hepatosplenomegaly. There is no tenderness.   Musculoskeletal: Normal Muscle tone  Extremities: No edema. Pulses are palpable in all 4 extremities.  Neurological: Patient is alert and oriented to person, place, and time. Cranial nerves II-XII are grossly intact with no focal deficits.  Skin: Skin is warm. No rash noted. Nails show no clubbing.  No cyanosis or erythema.         Results Review:     I reviewed the patient's new clinical results.    Lab Results (last 24 hours)     Procedure Component Value Units Date/Time    Lactic Acid, Plasma [[id number]]  (Normal) Collected:  02/04/17 0819    " Specimen:  Blood Updated:  02/04/17 0836     Lactate 0.6 mmol/L     Basic Metabolic Panel [[id number]]  (Abnormal) Collected:  02/04/17 0819    Specimen:  Blood Updated:  02/04/17 0840     Glucose 144 (H) mg/dL      BUN 35 (H) mg/dL      Creatinine 0.91 mg/dL      Sodium 139 mmol/L      Potassium 3.9 mmol/L      Chloride 103 mmol/L      CO2 24.3 mmol/L      Calcium 8.6 (L) mg/dL      eGFR Non African Amer 60 (L) mL/min/1.73      BUN/Creatinine Ratio 38.5 (H)      Anion Gap 11.7 mmol/L     Narrative:       The MDRD GFR formula is only valid for adults with stable renal function between ages 18 and 70.    CBC Auto Differential [[id number]]  (Abnormal) Collected:  02/04/17 0818    Specimen:  Blood Updated:  02/04/17 0844     WBC 6.04 10*3/mm3      RBC 2.94 (L) 10*6/mm3      Hemoglobin 8.8 (L) g/dL      Hematocrit 28.1 (L) %      MCV 95.6 fL      MCH 29.9 pg      MCHC 31.3 g/dL      RDW 14.6 (H) %      RDW-SD 50.7 fl      MPV 10.3 fL      Platelets 177 10*3/mm3      Neutrophil % 68.1 %      Lymphocyte % 9.4 (L) %      Monocyte % 13.2 (H) %      Eosinophil % 2.0 %      Basophil % 0.7 %      Immature Grans % 6.6 (H) %      Neutrophils, Absolute 4.11 10*3/mm3      Lymphocytes, Absolute 0.57 (L) 10*3/mm3      Monocytes, Absolute 0.80 10*3/mm3      Eosinophils, Absolute 0.12 10*3/mm3      Basophils, Absolute 0.04 10*3/mm3      Immature Grans, Absolute 0.40 (H) 10*3/mm3      nRBC 0.0 /100 WBC     CBC & Differential [[id number]] Collected:  02/04/17 0818    Specimen:  Blood Updated:  02/04/17 0845    Narrative:       The following orders were created for panel order CBC & Differential.  Procedure                               Abnormality         Status                     ---------                               -----------         ------                     Scan Slide[[id number]]                                                                   CBC Auto Differential[[id number]]         Abnormal            Final result              "    Please view results for these tests on the individual orders.    Procalcitonin [[id number]]  (Normal) Collected:  02/04/17 0818    Specimen:  Blood Updated:  02/04/17 1212     Procalcitonin 0.13 ng/mL     Narrative:       As a Marker for Sepsis (Non-Neonates):   1. <0.5 ng/mL represents a low risk of severe sepsis and/or septic shock.  1. >2 ng/mL represents a high risk of severe sepsis and/or septic shock.    As a Marker for Lower Respiratory Tract Infections that require antibiotic therapy:  PCT on Admission     Antibiotic Therapy             6-12 Hrs later  > 0.5                Strongly Recommended            >0.25 - <0.5         Recommended  0.1 - 0.25           Discouraged                   Remeasure/reassess PCT  <0.1                 Strongly Discouraged          Remeasure/reassess PCT      As 28 day mortality risk marker: \"Change in Procalcitonin Result\" (> 80 % or <=80 %) if Day 0 (or Day 1) and Day 4 values are available. Refer to http://[email or web address] [facility] [email or web address][email or web address]/   Change in PCT <=80 %   A decrease of PCT levels below or equal to 80 % defines a positive change in PCT test result representing a higher risk for 28-day all-cause mortality of patients diagnosed with severe sepsis or septic shock.  Change in PCT > 80 %   A decrease of PCT levels of more than 80 % defines a negative change in PCT result representing a lower risk for 28-day all-cause mortality of patients diagnosed with severe sepsis or septic shock.                Blood Culture [[id number]]  (Normal) Collected:  02/03/17 1731    Specimen:  Blood from Arm, Left Updated:  02/04/17 1801     Blood Culture No growth at 24 hours     Blood Culture [[id number]]  (Normal) Collected:  02/03/17 1731    Specimen:  Blood from Arm, Right Updated:  02/04/17 1801     Blood Culture No growth at 24 hours           Imaging Results (last 24 hours)     Procedure Component Value Units Date/Time    XR Chest 1 View [[id number]] Collected:  02/04/17 0633     Updated:  " 02/04/17 0636    Narrative:       INDICATION: PICC line placement evaluate positioning     FINDINGS:  Single portable AP view of the chest compared to 02/02/2017     Stable cardiomegaly. Stable central pulmonary vascular congestion and  patchy interstitial and alveolar opacity in both lungs.. Right approach  PICC line terminates mid SVC.. No pneumothorax or pleural effusion.       Impression:       Right approach PICC line in mid SVC. No pneumothorax. Otherwise stable.     This report was finalized on 2/4/2017 6:34 AM by Dr. Paulino Bradford MD.             Xray not reviewed personally by physician.      ECG not reviewed personally by physician  ECG/EMG Results (most recent)     None          Medication Review:   I have reviewed the patient's current medication list    Current Facility-Administered Medications:   •  acetaminophen (TYLENOL) tablet 650 mg, 650 mg, Oral, Q4H PRN, Ines R Terell, APRN  •  acetaminophen (TYLENOL) tablet 650 mg, 650 mg, Oral, Nightly, Ines R Aquasco, APRN, 650 mg at 02/03/17 2034  •  albuterol (PROVENTIL) nebulizer solution 0.083% 2.5 mg/3mL, 2.5 mg, Nebulization, Q4H PRN, Ines R Aquasco, APRN  •  allopurinol (ZYLOPRIM) tablet 100 mg, 100 mg, Oral, Q12H, Ines R Terell, APRN, 100 mg at 02/04/17 1027  •  aluminum-magnesium hydroxide-simethicone (MAALOX/MYLANTA) suspension 30 mL, 30 mL, Oral, Q6H PRN, Ines R Terell, APRN  •  amiodarone in dextrose 5% (NEXTERONE) 360 MG/200ML infusion, 0.5 mg/min, Intravenous, Continuous, Charles Dietrich MD, Last Rate: 16.67 mL/hr at 02/04/17 1003, 0.5 mg/min at 02/04/17 1003  •  bisacodyl (DULCOLAX) EC tablet 10 mg, 10 mg, Oral, Daily PRN, Ines R Terell, APRN, 10 mg at 02/03/17 2034  •  budesonide (PULMICORT) nebulizer solution 0.5 mg, 0.5 mg, Nebulization, BID - RT, Ines R Terell, ÁLVARO, 0.5 mg at 02/04/17 1913  •  calcium carbonate (TUMS) chewable tablet 500 mg (200 mg elemental), 1 tablet, Oral, Q4H PRN, Ines Figueredo, APRN  •   calcium-vitamin D 500-200 MG-UNIT tablet 500 mg, 500 mg, Oral, Q12H, Ines R Terell, APRN, 500 mg at 02/04/17 1025  •  [START ON 2/7/2017] conjugated estrogens (PREMARIN) vaginal cream 0.75 application, 0.75 application, Vaginal, Once per day on Tue, Ines R Terell, APRN  •  diclofenac (VOLTAREN) 1 % gel 4 g, 4 g, Topical, TID PRN, Ines R Terell, APRN  •  diltiaZEM CD (CARDIZEM CD) 24 hr capsule 240 mg, 240 mg, Oral, Daily, Ines R Terell, APRN, 240 mg at 02/04/17 1027  •  diphenhydrAMINE (BENADRYL) capsule 25 mg, 25 mg, Oral, Q6H PRN, Ines R Terell, APRN  •  donepezil (ARICEPT) tablet 10 mg, 10 mg, Oral, Nightly, Ines R Terell, APRN, 10 mg at 02/03/17 2033  •  doxazosin (CARDURA) tablet 2 mg, 2 mg, Oral, Q12H, Ines R Middleburg, APRN, 2 mg at 02/04/17 1024  •  enoxaparin (LOVENOX) syringe 40 mg, 40 mg, Subcutaneous, Q24H, Pato Jackson MD, 40 mg at 02/04/17 1830  •  famotidine (PEPCID) tablet 40 mg, 40 mg, Oral, Nightly, Ines R [place], APRN, 40 mg at 02/03/17 2040  •  guaiFENesin (MUCINEX) 12 hr tablet 1,200 mg, 1,200 mg, Oral, BID, Ines R Terell, APRN, 1,200 mg at 02/04/17 1829  •  [START ON 2/12/2017] ibandronate (BONIVA) tablet 150 mg, 150 mg, Oral, Q30 Days, Ines R Terell, APRN  •  ipratropium-albuterol (DUO-NEB) nebulizer solution 3 mL, 3 mL, Nebulization, Q4H - RT, Ines R Terell, APRN, 3 mL at 02/04/17 1913  •  isosorbide dinitrate (ISORDIL) tablet 10 mg, 10 mg, Oral, Q12H, ÁLVARO Luke, 10 mg at 02/04/17 1024  •  ketotifen (ZADITOR) 0.025 % ophthalmic solution 1 drop, 1 drop, Both Eyes, BID, ÁLVARO Luke, 1 drop at 02/04/17 1830  •  lactated ringers infusion, 75 mL/hr, Intravenous, Continuous, ÁLVARO Luke, Last Rate: 75 mL/hr at 02/04/17 1323, 75 mL/hr at 02/04/17 1323  •  lactobacillus acidophilus (RISAQUAD) capsule 1 capsule, 1 capsule, Oral, Daily, ÁLVARO Luke, 1 capsule at 02/04/17 1026  •  levETIRAcetam (KEPPRA) tablet  500 mg, 500 mg, Oral, Q12H, Ines R Terell, APRN, 500 mg at 02/04/17 1026  •  levoFLOXacin (LEVAQUIN) 750 mg/150 mL D5W (premix) (LEVAQUIN) 750 mg, 750 mg, Intravenous, Q24H, Ines Bordenree, APRN, Stopped at 02/04/17 1322  •  levothyroxine (SYNTHROID, LEVOTHROID) tablet 150 mcg, 150 mcg, Oral, Q AM, Ines Figueredo, APRN  •  magnesium hydroxide (MILK OF MAGNESIA) 400 MG/5ML suspension 30 mL, 30 mL, Oral, Nightly PRN, Ines Bordenree, APRN  •  magnesium hydroxide (MILK OF MAGNESIA) 400 MG/5ML suspension 30 mL, 30 mL, Oral, Daily PRN, Ines Bordenree, APRN  •  metoprolol tartrate (LOPRESSOR) tablet 25 mg, 25 mg, Oral, Q12H, Ines Figueredo, APRN, 25 mg at 02/04/17 1026  •  miconazole (MICOTIN) 2 % powder, , Topical, Q12H, Ines Bordenree, APRN  •  multivitamin with minerals 1 tablet, 1 tablet, Oral, Daily, Ines Figueredo, APRN, 1 tablet at 02/04/17 1024  •  nitroglycerin (NITROSTAT) SL tablet 0.4 mg, 0.4 mg, Sublingual, Q5 Min PRN, Ines Bordenree, APRN  •  pantoprazole (PROTONIX) injection 40 mg, 40 mg, Intravenous, Q AM, Ines Figueredo, APRN, 40 mg at 02/04/17 0609  •  sennosides-docusate sodium (SENOKOT-S) 8.6-50 MG tablet 1 tablet, 1 tablet, Oral, Q12H, Ines Figueredo, APRN, 1 tablet at 02/04/17 1026  •  sertraline (ZOLOFT) tablet 100 mg, 100 mg, Oral, Nightly, Ines R Terell, APRN, 100 mg at 02/03/17 2033  •  traMADol (ULTRAM) tablet 100 mg, 100 mg, Oral, Nightly PRN, Ines Figueredo, APRN      Assessment&#47;Plan      Hospital Problem List     * (Principal)Pneumonia due to Haemophilus influenzae:  On levaquin secondary to allergies       Sepsis:  On protocol      Acute respiratory failure with hypoxia:  On oxygen      Oropharyngeal dysphagia:  When improved may need swallow evaluation      Tachycardia:  Cardiology consulted and amiodarone ordered secondary to the tachycardia not improving.  Heart rate is much better now.      SOLANGE (acute kidney injury)  GFR was 48 and now 60     Renal condition is improving with treatment.  Continue current treatment regimen.  Renal condition will be reassessed daily while in hospital and that is needed.      Coronary artery disease involving native coronary artery of native heart without angina pectoris    Coronary artery disease is unchanged.  Continue current treatment regimen.  Cardiac status will be reassessed by PCP as long as there are on issues as an inpatient.      Obesity, Class III, BMI 40-49.9 (morbid obesity)    Obesity is unchanged.  Not stable for this counseling at this time or planning.      Anemia:  Chronic disease.  Will monitor      Essential hypertension:  controlled    Hypertension is stable.  Continue current treatment regimen.  Blood pressure will be reassessed and monitored while in hospital.      Dyslipidemia:  Not presently on medication for this      Osteoarthritis:  Taking voltaren gel      Osteoporosis:  On boniva etc.      Acquired hypothyroidism:  On levothyroxine      Early onset Alzheimer's dementia without behavioral disturbance    Psychological condition is stable.  Continue current treatment regimen.  Psychological condition  will be reassessed By her PCP.      Depression with anxiety    Psychological condition is unchanged.  Psychological condition  will be reassessed by her pcp.      GERD (gastroesophageal reflux disease):  On protonix            Plan for disposition  Secondary to the patient's continued tachycardia patient was placed on amiodarone drip.  Heart rate is still elevated at 121.  Cardiology is managing.        Marietta Dean DO  02/04/17  7:28 PM      Time: 20 min     Electronically signed by Marietta Dean DO at 2/4/2017  9:30 PM      Charles Dietrich MD at 2/5/2017  7:13 AM  Version 1 of 1         Cardiac Electrophysiology Follow-up Note     LOS: 2 days   Patient Care Team:  Shira Galaviz MD as PCP - General  Shira Galaviz MD as PCP - Family Medicine    Chief Complaint:  cough/dyspnea     Interval History: still coughing diffusely, HR better    Objective   Vital Signs  Temp:  [97.9 °F (36.6 °C)-99.4 °F (37.4 °C)] 98 °F (36.7 °C)  Heart Rate:  [] 68  Resp:  [18-24] 18  BP: (105-134)/(59-79) 128/67    Intake/Output Summary (Last 24 hours) at 02/05/17 0713  Last data filed at 02/05/17 0300   Gross per 24 hour   Intake 2548.31 ml   Output      0 ml   Net 2548.31 ml   , chronically ill  Comfortable NAD  PERRL, conjunctiva clear  Neck supple, no JVD or thyromegaly appreciated  S1/S2 RRR, no m/r/g  Lungs CTA B, normal effort  Abdomen S/NT/ND (+) BS, no HSM appreciated  Extremities warm, no clubbing, cyanosis, or edema  No visible or palpable skin lesions  A/Ox4, mood and affect appropriate    Results Review:        Results from last 7 days  Lab Units 02/05/17  0513 02/04/17  0819 02/03/17  0622   SODIUM mmol/L 138 139 139   POTASSIUM mmol/L 3.4* 3.9 3.8   CHLORIDE mmol/L 101 103 100   TOTAL CO2 mmol/L 25.7 24.3 25.9   BUN mg/dL 29* 35* 40*   CREATININE mg/dL 0.84 0.91 1.11*   GLUCOSE mg/dL 156* 144* 178*   CALCIUM mg/dL 8.2* 8.6* 9.4       Results from last 7 days  Lab Units 02/03/17  0622 02/02/17  0056   TROPONIN T ng/mL 0.013 0.017       Results from last 7 days  Lab Units 02/04/17  0818 02/03/17  0622 02/02/17  0056   WBC 10*3/mm3 6.04 5.38 6.41   HEMOGLOBIN g/dL 8.8* 9.3* 9.4*   HEMATOCRIT % 28.1* 29.9* 30.0*   PLATELETS 10*3/mm3 177 168 169   : Atrial flutter, V rates now in 60s-70s    I reviewed the patient's new clinical results.  I personally viewed and interpreted the patient's EKG/Telemetry data        Medication Review:     acetaminophen 650 mg Oral Nightly   allopurinol 100 mg Oral Q12H   budesonide 0.5 mg Nebulization BID - RT   calcium-vitamin D 500 mg Oral Q12H   [START ON 2/7/2017] conjugated estrogens 0.75 application Vaginal Once per day on Tue   diltiaZEM  mg Oral Daily   donepezil 10 mg Oral Nightly   doxazosin 2 mg Oral Q12H   enoxaparin 40 mg Subcutaneous  Q24H   famotidine 40 mg Oral Nightly   guaiFENesin 1,200 mg Oral BID   [START ON 2/12/2017] ibandronate 150 mg Oral Q30 Days   ipratropium-albuterol 3 mL Nebulization Q4H - RT   isosorbide dinitrate 10 mg Oral Q12H   ketotifen 1 drop Both Eyes BID   lactobacillus acidophilus 1 capsule Oral Daily   levETIRAcetam 500 mg Oral Q12H   levoFLOXacin 750 mg Intravenous Q24H   levothyroxine 150 mcg Oral Q AM   metoprolol tartrate 25 mg Oral Q12H   miconazole  Topical Q12H   multivitamin with minerals 1 tablet Oral Daily   pantoprazole 40 mg Intravenous Q AM   sennosides-docusate sodium 1 tablet Oral Q12H   sertraline 100 mg Oral Nightly       amiodarone 0.5 mg/min Last Rate: 0.5 mg/min (02/04/17 1953)   lactated ringers 75 mL/hr Last Rate: 75 mL/hr (02/05/17 0321)       Assessment&#47;Plan     Principal Problem:    Pneumonia due to Haemophilus influenzae  Active Problems:    Sepsis    Acute respiratory failure with hypoxia    Oropharyngeal dysphagia    Tachycardia    SOLANGE (acute kidney injury)    Essential hypertension    Coronary artery disease involving native coronary artery of native heart without angina pectoris    Dyslipidemia    Obesity, Class III, BMI 40-49.9 (morbid obesity)    Osteoarthritis    Osteoporosis    Acquired hypothyroidism    Early onset Alzheimer's dementia without behavioral disturbance    Depression with anxiety    GERD (gastroesophageal reflux disease)    Anemia  is not sinus tachy but rather atrial flutter. Rate controlled on IV amio, would continue for today then convert to PO. Long term she may be a good candidate for catheter ablation to restore sinus rhythm. Will increase her LMWH to 1 mg/kg for therapeutic AC for stroke PPX.    Charles Dietrich MD  02/05/17  7:13 AM       Electronically signed by Charles Dietrich MD at 2/5/2017  7:16 AM      Shira Galaviz MD at 2/5/2017 11:45 AM  Version 1 of 1         Daily Progress Note:    Subjective: Feels better.  Still with some abdominal discomfort.  " Cough is improved shortness of breath is improved diarrhea has improved    Flowsheet Rows         First Filed Value    Admission Height  65\" (165.1 cm) Documented at 02/03/2017 1044    Admission Weight  243 lb (110 kg) Documented at 02/03/2017 1044          Patient Vitals for the past 24 hrs:   BP Temp Temp src Pulse Resp SpO2 Weight   02/05/17 1132 140/81 98.3 °F (36.8 °C) Oral 80 24 95 % -   02/05/17 0915 - - - 106 24 90 % -   02/05/17 0836 144/74 - - 102 - - -   02/05/17 0600 128/67 98 °F (36.7 °C) Oral 68 18 96 % 249 lb 8 oz (113 kg)   02/05/17 0312 134/73 98.1 °F (36.7 °C) Oral 107 20 98 % -   02/05/17 0309 - - - 110 20 97 % -   02/04/17 2326 128/79 99.4 °F (37.4 °C) Oral (!) 125 20 95 % -   02/04/17 2259 - - - (!) 125 20 96 % -   02/04/17 1913 - - - 106 20 96 % -   02/04/17 1830 109/59 - - 107 20 95 % -   02/04/17 1531 111/66 97.9 °F (36.6 °C) Oral - 20 - -   02/04/17 1504 - - - 102 24 97 % -   02/04/17 1311 120/78 - - 106 20 96 % -         Intake/Output Summary (Last 24 hours) at 02/05/17 1145  Last data filed at 02/05/17 0919   Gross per 24 hour   Intake 2818.31 ml   Output      0 ml   Net 2818.31 ml       Review of Systems   Constitutional: Positive for activity change and fatigue. Negative for appetite change.   Respiratory: Positive for cough. Negative for chest tightness, shortness of breath and wheezing.    Cardiovascular: Negative for chest pain.   Gastrointestinal: Positive for diarrhea. Negative for abdominal distention, abdominal pain, nausea and vomiting.   Genitourinary: Negative for frequency.   Skin: Negative for rash.   Psychiatric/Behavioral: Negative for agitation.       Physical Exam   Constitutional: She appears well-developed and well-nourished.   HENT:   Head: Normocephalic.   Mouth/Throat: Oropharynx is clear and moist.   Eyes: Conjunctivae are normal.   Neck: Normal range of motion. No JVD present. No thyromegaly present.   Cardiovascular: Normal rate.  An irregularly irregular rhythm " present.   Murmur heard.   Systolic murmur is present with a grade of 2/6   Pulmonary/Chest: Effort normal and breath sounds normal. No respiratory distress. She has no wheezes. She has no rales.   Abdominal: Soft. She exhibits no distension. Bowel sounds are decreased. There is no tenderness. There is no guarding.       Colostomy present   Neurological: She is alert.   Skin: Skin is warm and dry. No rash noted.   Nursing note and vitals reviewed.          Medication Review:   I have reviewed the patient's current medication list      acetaminophen 650 mg Oral Nightly   allopurinol 100 mg Oral Q12H   budesonide 0.5 mg Nebulization BID - RT   calcium-vitamin D 500 mg Oral Q12H   [START ON 2/7/2017] conjugated estrogens 0.75 application Vaginal Once per day on Tue   diltiaZEM  mg Oral Daily   donepezil 10 mg Oral Nightly   doxazosin 2 mg Oral Q12H   enoxaparin 1 mg/kg Subcutaneous Q12H   famotidine 40 mg Oral Nightly   guaiFENesin 1,200 mg Oral BID   [START ON 2/12/2017] ibandronate 150 mg Oral Q30 Days   ipratropium-albuterol 3 mL Nebulization Q4H - RT   isosorbide dinitrate 10 mg Oral Q12H   ketotifen 1 drop Both Eyes BID   lactobacillus acidophilus 1 capsule Oral Daily   levETIRAcetam 500 mg Oral Q12H   levoFLOXacin 750 mg Intravenous Q24H   levothyroxine 150 mcg Oral Q AM   metoprolol tartrate 25 mg Oral Q12H   miconazole  Topical Q12H   multivitamin with minerals 1 tablet Oral Daily   pantoprazole 40 mg Intravenous Q AM   sennosides-docusate sodium 1 tablet Oral Q12H   sertraline 100 mg Oral Nightly           Labs:    Results from last 7 days  Lab Units 02/04/17  0818 02/03/17  0622 02/02/17  0056   WBC 10*3/mm3 6.04 5.38 6.41   HEMOGLOBIN g/dL 8.8* 9.3* 9.4*   HEMATOCRIT % 28.1* 29.9* 30.0*   PLATELETS 10*3/mm3 177 168 169   MONOCYTES % %  --   --  9.0*       Results from last 7 days  Lab Units 02/05/17  0513 02/04/17  0819 02/03/17  0622   SODIUM mmol/L 138 139 139   POTASSIUM mmol/L 3.4* 3.9 3.8  "  CHLORIDE mmol/L 101 103 100   TOTAL CO2 mmol/L 25.7 24.3 25.9   BUN mg/dL 29* 35* 40*   CREATININE mg/dL 0.84 0.91 1.11*   CALCIUM mg/dL 8.2* 8.6* 9.4   GLUCOSE mg/dL 156* 144* 178*           Lab Results (last 24 hours)     Procedure Component Value Units Date/Time    Procalcitonin [[id number]]  (Normal) Collected:  02/04/17 0818    Specimen:  Blood Updated:  02/04/17 1212     Procalcitonin 0.13 ng/mL     Narrative:       As a Marker for Sepsis (Non-Neonates):   1. <0.5 ng/mL represents a low risk of severe sepsis and/or septic shock.  1. >2 ng/mL represents a high risk of severe sepsis and/or septic shock.    As a Marker for Lower Respiratory Tract Infections that require antibiotic therapy:  PCT on Admission     Antibiotic Therapy             6-12 Hrs later  > 0.5                Strongly Recommended            >0.25 - <0.5         Recommended  0.1 - 0.25           Discouraged                   Remeasure/reassess PCT  <0.1                 Strongly Discouraged          Remeasure/reassess PCT      As 28 day mortality risk marker: \"Change in Procalcitonin Result\" (> 80 % or <=80 %) if Day 0 (or Day 1) and Day 4 values are available. Refer to http://www.EMBIs-pct-calculator.com/   Change in PCT <=80 %   A decrease of PCT levels below or equal to 80 % defines a positive change in PCT test result representing a higher risk for 28-day all-cause mortality of patients diagnosed with severe sepsis or septic shock.  Change in PCT > 80 %   A decrease of PCT levels of more than 80 % defines a negative change in PCT result representing a lower risk for 28-day all-cause mortality of patients diagnosed with severe sepsis or septic shock.                Blood Culture [[id number]]  (Normal) Collected:  02/03/17 1731    Specimen:  Blood from Arm, Left Updated:  02/04/17 1801     Blood Culture No growth at 24 hours     Blood Culture [[id number]]  (Normal) Collected:  02/03/17 1731    Specimen:  Blood from Arm, Right Updated:  02/04/17 " 1801     Blood Culture No growth at 24 hours     Basic Metabolic Panel [[id number]]  (Abnormal) Collected:  02/05/17 0513    Specimen:  Blood Updated:  02/05/17 0558     Glucose 156 (H) mg/dL      BUN 29 (H) mg/dL      Creatinine 0.84 mg/dL      Sodium 138 mmol/L      Potassium 3.4 (L) mmol/L      Chloride 101 mmol/L      CO2 25.7 mmol/L      Calcium 8.2 (L) mg/dL      eGFR Non African Amer 66 mL/min/1.73      BUN/Creatinine Ratio 34.5 (H)      Anion Gap 11.3 mmol/L     Narrative:       The MDRD GFR formula is only valid for adults with stable renal function between ages 18 and 70.              Radiology:  Imaging Results (last 24 hours)     ** No results found for the last 24 hours. **          Cardiology:  ECG/EMG Results (last 24 hours)     ** No results found for the last 24 hours. **          I have reviewed consult notes.    Assessment and Plan:    1.  Sepsis secondary to Haemophilus influenza pneumonia has improved we'll continue IV Levaquin    2.  Urinary tract infection secondary to ESBL on appropriate antibiotic coverage as been ordered we'll continue monitor no changes    3.  C. difficile colitis on Flagyl continue IV antibiotics diarrhea is improving    4.  Paroxysmal atrial flutter on amiodarone drip rate better controlled on by mouth Cardizem and beta blocker likely discontinue amiodarone tomorrow. On Lovenox questionable change to oral anticoagulation 7 will discuss with cardiology    5.  Acute kidney injury on chronic kidney disease resolved continue maintenance IV fluids    6.  Anemia iron deficiency and chronic disease is at baseline continue monitor H&H is    7.  Hypertension well controlled continue present management no changes    8.  Diabetes mellitus well controlled Accu-Cheks and continue sliding scale insulin    9.  Acute hypoxic respiratory failure secondary to pneumonia still requiring O2 supplementation will recheck chest x-ray done absolute budesonide primary toilet will recheck chest  "x-ray           Electronically signed by Shira Galaviz MD at 2/5/2017 11:56 AM      Shira Galaviz MD at 2/6/2017  6:46 AM  Version 2 of 2         Daily Progress Note:    Subjective: Patient states she does not feel well.  Feels tired appetite poor    Flowsheet Rows         First Filed Value    Admission Height  65\" (165.1 cm) Documented at 02/03/2017 1044    Admission Weight  243 lb (110 kg) Documented at 02/03/2017 1044          Patient Vitals for the past 24 hrs:   BP Temp Temp src Pulse Resp SpO2   02/06/17 0400 136/81 97.7 °F (36.5 °C) Axillary 98 20 95 %   02/05/17 2302 162/89 97.8 °F (36.6 °C) Axillary 101 20 95 %   02/05/17 2016 132/61 98.1 °F (36.7 °C) Axillary 100 20 96 %   02/05/17 1933 - - - 99 22 94 %   02/05/17 1700 126/85 - - 102 - 95 %   02/05/17 1546 - - - 83 26 92 %   02/05/17 1539 132/60 98.2 °F (36.8 °C) Oral 93 24 95 %   02/05/17 1132 140/81 98.3 °F (36.8 °C) Oral 80 24 95 %   02/05/17 0915 - - - 106 24 90 %   02/05/17 0836 144/74 - - 102 - -         Intake/Output Summary (Last 24 hours) at 02/06/17 0646  Last data filed at 02/06/17 0500   Gross per 24 hour   Intake 1543.57 ml   Output    350 ml   Net 1193.57 ml       Review of Systems   Constitutional: Positive for activity change and fatigue. Negative for appetite change.   Respiratory: Positive for shortness of breath. Negative for chest tightness and wheezing.    Cardiovascular: Negative for chest pain.   Gastrointestinal: Positive for diarrhea. Negative for abdominal distention, abdominal pain, nausea and vomiting.   Genitourinary: Negative for frequency.   Skin: Negative for rash.   Psychiatric/Behavioral: Negative for agitation.       Physical Exam   Constitutional: She appears well-developed and well-nourished.   HENT:   Head: Normocephalic.   Mouth/Throat: Oropharynx is clear and moist.   Eyes: Conjunctivae are normal.   Neck: Normal range of motion. No JVD present. No thyromegaly present.   Cardiovascular: Normal rate.  An " irregularly irregular rhythm present.   Murmur heard.   Systolic murmur is present with a grade of 2/6   Pulmonary/Chest: Effort normal and breath sounds normal. No respiratory distress. She has no wheezes. She has no rales.   Abdominal: Soft. She exhibits no distension. There is no tenderness. There is no guarding.       Colostomy present   Neurological: She is alert.   Skin: Skin is warm and dry. No rash noted.   Nursing note and vitals reviewed.          Medication Review:   I have reviewed the patient's current medication list      acetaminophen 650 mg Oral Nightly   allopurinol 100 mg Oral Q12H   budesonide 0.5 mg Nebulization BID - RT   calcium-vitamin D 500 mg Oral Q12H   [START ON 2/7/2017] conjugated estrogens 0.75 application Vaginal Once per day on Tue   diltiaZEM  mg Oral Daily   donepezil 10 mg Oral Nightly   doxazosin 2 mg Oral Q12H   enoxaparin 1 mg/kg Subcutaneous Q12H   famotidine 40 mg Oral Nightly   guaiFENesin 1,200 mg Oral BID   [START ON 2/12/2017] ibandronate 150 mg Oral Q30 Days   ipratropium-albuterol 3 mL Nebulization 4x Daily - RT   isosorbide dinitrate 10 mg Oral Q12H   ketotifen 1 drop Both Eyes BID   lactobacillus acidophilus 1 capsule Oral Daily   levETIRAcetam 500 mg Oral Q12H   levoFLOXacin 750 mg Intravenous Q24H   levothyroxine 150 mcg Oral Q AM   metoprolol tartrate 25 mg Oral Q12H   miconazole  Topical Q12H   multivitamin with minerals 1 tablet Oral Daily   pantoprazole 40 mg Intravenous Q AM   sennosides-docusate sodium 1 tablet Oral Q12H   sertraline 100 mg Oral Nightly   vitamin D 50,000 Units Oral Q7 Days           Labs:    Results from last 7 days  Lab Units 02/06/17  0517 02/04/17  0818 02/03/17  0622 02/02/17  0056   WBC 10*3/mm3 7.62 6.04 5.38 6.41   HEMOGLOBIN g/dL 8.6* 8.8* 9.3* 9.4*   HEMATOCRIT % 28.3* 28.1* 29.9* 30.0*   PLATELETS 10*3/mm3 186 177 168 169   MONOCYTES % %  --   --   --  9.0*       Results from last 7 days  Lab Units 02/06/17  0517 02/05/17  0513  02/04/17  0819   SODIUM mmol/L 140 138 139   POTASSIUM mmol/L 4.0 3.4* 3.9   CHLORIDE mmol/L 103 101 103   TOTAL CO2 mmol/L 26.6 25.7 24.3   BUN mg/dL 22 29* 35*   CREATININE mg/dL 0.90 0.84 0.91   CALCIUM mg/dL 8.3* 8.2* 8.6*   GLUCOSE mg/dL 151* 156* 144*           Lab Results (last 24 hours)     Procedure Component Value Units Date/Time    Blood Culture [[id number]]  (Normal) Collected:  02/03/17 1731    Specimen:  Blood from Arm, Left Updated:  02/05/17 1801     Blood Culture No growth at 2 days     Blood Culture [[id number]]  (Normal) Collected:  02/03/17 1731    Specimen:  Blood from Arm, Right Updated:  02/05/17 1801     Blood Culture No growth at 2 days     CBC (No Diff) [[id number]]  (Abnormal) Collected:  02/06/17 0517    Specimen:  Blood Updated:  02/06/17 0536     WBC 7.62 10*3/mm3      RBC 2.95 (L) 10*6/mm3      Hemoglobin 8.6 (L) g/dL      Hematocrit 28.3 (L) %      MCV 95.9 fL      MCH 29.2 pg      MCHC 30.4 (L) g/dL      RDW 14.7 (H) %      RDW-SD 52.0 fl      MPV 10.2 fL      Platelets 186 10*3/mm3     Basic Metabolic Panel [[id number]]  (Abnormal) Collected:  02/06/17 0517    Specimen:  Blood Updated:  02/06/17 0559     Glucose 151 (H) mg/dL      BUN 22 mg/dL      Creatinine 0.90 mg/dL      Sodium 140 mmol/L      Potassium 4.0 mmol/L      Chloride 103 mmol/L      CO2 26.6 mmol/L      Calcium 8.3 (L) mg/dL      eGFR Non African Amer 61 mL/min/1.73      BUN/Creatinine Ratio 24.4      Anion Gap 10.4 mmol/L     Narrative:       The MDRD GFR formula is only valid for adults with stable renal function between ages 18 and 70.              Radiology:  Imaging Results (last 24 hours)     ** No results found for the last 24 hours. **          Cardiology:  ECG/EMG Results (last 24 hours)     Procedure Component Value Units Date/Time    ECG 12 Lead [[id number]] Collected:  02/05/17 0931     Updated:  02/05/17 1519    Narrative:       RR Interval= 556 ms  MI Interval= ms  QRSD Interval= 90 ms  QT Interval= 344 ms  QTc  "Interval= 461 ms  Heart Rate= 108 ms  P Axis= deg  QRS Axis= -6 deg  T Wave Axis= 253 deg  I: 40 Axis= 28 deg  T: 40 Axis= 252 deg  ST Axis= 251 deg  ATRIAL FLUTTER, A-RATE 283  REPOL ABNRM SUGGESTS ISCHEMIA, DIFFUSE LEADS  Electronically Signed by:  Date and Time of Study: 2017-02-05 09:31:04          I have reviewed consult notes.    Assessment and Plan:    1.  Sepsis secondary to Haemophilus influenza pneumonia has improved we'll continue IV Levaquin    2.  Urinary tract infection secondary to ESBL on appropriate antibiotic coverage as been ordered we'll continue monitor no changes    3.  C. difficile colitis on Flagyl continue IV antibiotics diarrhea is improving    4.  Paroxysmal atrial flutter on amiodarone drip rate better controlled on by mouth Cardizem and beta blocker and IV amiodarone.  Still remains slightly tachycardic in the high 90s and low 100s.  Will increase beta blocker.  Still on Lovenox will discuss with cardiology about changing her to by mouth anticoagulation    5.  Acute kidney injury on chronic kidney disease resolved.     6.  Anemia iron deficiency and chronic disease is at baseline continue monitor H&H is    7.  Hypertension well controlled continue present management no changes    8.  Diabetes mellitus well controlled Accu-Cheks and continue sliding scale insulin    9.  Acute hypoxic respiratory failure secondary to pneumonia still requiring O2 supplementation chest x-ray done yesterday remains unchanged    10.  Coronary disease without any evidence of angina continue present management           Electronically signed by Shira Galaviz MD at 2/6/2017  6:53 AM      Shira Galaviz MD at 2/6/2017  6:46 AM  Version 1 of 2         Daily Progress Note:    Subjective: Patient states she does not feel well.  Feels tired appetite poor    Flowsheet Rows         First Filed Value    Admission Height  65\" (165.1 cm) Documented at 02/03/2017 1044    Admission Weight  243 lb (110 kg) " Documented at 02/03/2017 1044          Patient Vitals for the past 24 hrs:   BP Temp Temp src Pulse Resp SpO2   02/06/17 0400 136/81 97.7 °F (36.5 °C) Axillary 98 20 95 %   02/05/17 2302 162/89 97.8 °F (36.6 °C) Axillary 101 20 95 %   02/05/17 2016 132/61 98.1 °F (36.7 °C) Axillary 100 20 96 %   02/05/17 1933 - - - 99 22 94 %   02/05/17 1700 126/85 - - 102 - 95 %   02/05/17 1546 - - - 83 26 92 %   02/05/17 1539 132/60 98.2 °F (36.8 °C) Oral 93 24 95 %   02/05/17 1132 140/81 98.3 °F (36.8 °C) Oral 80 24 95 %   02/05/17 0915 - - - 106 24 90 %   02/05/17 0836 144/74 - - 102 - -         Intake/Output Summary (Last 24 hours) at 02/06/17 0646  Last data filed at 02/06/17 0500   Gross per 24 hour   Intake 1543.57 ml   Output    350 ml   Net 1193.57 ml       Review of Systems   Constitutional: Positive for activity change and fatigue. Negative for appetite change.   Respiratory: Positive for shortness of breath. Negative for chest tightness and wheezing.    Cardiovascular: Negative for chest pain.   Gastrointestinal: Positive for diarrhea. Negative for abdominal distention, abdominal pain, nausea and vomiting.   Genitourinary: Negative for frequency.   Skin: Negative for rash.   Psychiatric/Behavioral: Negative for agitation.       Physical Exam   Constitutional: She appears well-developed and well-nourished.   HENT:   Head: Normocephalic.   Mouth/Throat: Oropharynx is clear and moist.   Eyes: Conjunctivae are normal.   Neck: Normal range of motion. No JVD present. No thyromegaly present.   Cardiovascular: Normal rate.  An irregularly irregular rhythm present.   Murmur heard.   Systolic murmur is present with a grade of 2/6   Pulmonary/Chest: Effort normal and breath sounds normal. No respiratory distress. She has no wheezes. She has no rales.   Abdominal: Soft. She exhibits no distension. There is no tenderness. There is no guarding.       Colostomy present   Neurological: She is alert.   Skin: Skin is warm and dry. No  rash noted.   Nursing note and vitals reviewed.          Medication Review:   I have reviewed the patient's current medication list      acetaminophen 650 mg Oral Nightly   allopurinol 100 mg Oral Q12H   budesonide 0.5 mg Nebulization BID - RT   calcium-vitamin D 500 mg Oral Q12H   [START ON 2/7/2017] conjugated estrogens 0.75 application Vaginal Once per day on Tue   diltiaZEM  mg Oral Daily   donepezil 10 mg Oral Nightly   doxazosin 2 mg Oral Q12H   enoxaparin 1 mg/kg Subcutaneous Q12H   famotidine 40 mg Oral Nightly   guaiFENesin 1,200 mg Oral BID   [START ON 2/12/2017] ibandronate 150 mg Oral Q30 Days   ipratropium-albuterol 3 mL Nebulization 4x Daily - RT   isosorbide dinitrate 10 mg Oral Q12H   ketotifen 1 drop Both Eyes BID   lactobacillus acidophilus 1 capsule Oral Daily   levETIRAcetam 500 mg Oral Q12H   levoFLOXacin 750 mg Intravenous Q24H   levothyroxine 150 mcg Oral Q AM   metoprolol tartrate 25 mg Oral Q12H   miconazole  Topical Q12H   multivitamin with minerals 1 tablet Oral Daily   pantoprazole 40 mg Intravenous Q AM   sennosides-docusate sodium 1 tablet Oral Q12H   sertraline 100 mg Oral Nightly   vitamin D 50,000 Units Oral Q7 Days           Labs:    Results from last 7 days  Lab Units 02/06/17  0517 02/04/17  0818 02/03/17  0622 02/02/17  0056   WBC 10*3/mm3 7.62 6.04 5.38 6.41   HEMOGLOBIN g/dL 8.6* 8.8* 9.3* 9.4*   HEMATOCRIT % 28.3* 28.1* 29.9* 30.0*   PLATELETS 10*3/mm3 186 177 168 169   MONOCYTES % %  --   --   --  9.0*       Results from last 7 days  Lab Units 02/06/17  0517 02/05/17  0513 02/04/17  0819   SODIUM mmol/L 140 138 139   POTASSIUM mmol/L 4.0 3.4* 3.9   CHLORIDE mmol/L 103 101 103   TOTAL CO2 mmol/L 26.6 25.7 24.3   BUN mg/dL 22 29* 35*   CREATININE mg/dL 0.90 0.84 0.91   CALCIUM mg/dL 8.3* 8.2* 8.6*   GLUCOSE mg/dL 151* 156* 144*           Lab Results (last 24 hours)     Procedure Component Value Units Date/Time    Blood Culture [[id number]]  (Normal) Collected:  02/03/17  1731    Specimen:  Blood from Arm, Left Updated:  02/05/17 1801     Blood Culture No growth at 2 days     Blood Culture [[id number]]  (Normal) Collected:  02/03/17 1731    Specimen:  Blood from Arm, Right Updated:  02/05/17 1801     Blood Culture No growth at 2 days     CBC (No Diff) [[id number]]  (Abnormal) Collected:  02/06/17 0517    Specimen:  Blood Updated:  02/06/17 0536     WBC 7.62 10*3/mm3      RBC 2.95 (L) 10*6/mm3      Hemoglobin 8.6 (L) g/dL      Hematocrit 28.3 (L) %      MCV 95.9 fL      MCH 29.2 pg      MCHC 30.4 (L) g/dL      RDW 14.7 (H) %      RDW-SD 52.0 fl      MPV 10.2 fL      Platelets 186 10*3/mm3     Basic Metabolic Panel [[id number]]  (Abnormal) Collected:  02/06/17 0517    Specimen:  Blood Updated:  02/06/17 0559     Glucose 151 (H) mg/dL      BUN 22 mg/dL      Creatinine 0.90 mg/dL      Sodium 140 mmol/L      Potassium 4.0 mmol/L      Chloride 103 mmol/L      CO2 26.6 mmol/L      Calcium 8.3 (L) mg/dL      eGFR Non African Amer 61 mL/min/1.73      BUN/Creatinine Ratio 24.4      Anion Gap 10.4 mmol/L     Narrative:       The MDRD GFR formula is only valid for adults with stable renal function between ages 18 and 70.              Radiology:  Imaging Results (last 24 hours)     ** No results found for the last 24 hours. **          Cardiology:  ECG/EMG Results (last 24 hours)     Procedure Component Value Units Date/Time    ECG 12 Lead [[id number]] Collected:  02/05/17 0931     Updated:  02/05/17 1519    Narrative:       RR Interval= 556 ms  GA Interval= ms  QRSD Interval= 90 ms  QT Interval= 344 ms  QTc Interval= 461 ms  Heart Rate= 108 ms  P Axis= deg  QRS Axis= -6 deg  T Wave Axis= 253 deg  I: 40 Axis= 28 deg  T: 40 Axis= 252 deg  ST Axis= 251 deg  ATRIAL FLUTTER, A-RATE 283  REPOL ABNRM SUGGESTS ISCHEMIA, DIFFUSE LEADS  Electronically Signed by:  Date and Time of Study: 2017-02-05 09:31:04          I have reviewed consult notes.    Assessment and Plan:    1.  Sepsis secondary to Haemophilus  influenza pneumonia has improved we'll continue IV Levaquin    2.  Urinary tract infection secondary to ESBL on appropriate antibiotic coverage as been ordered we'll continue monitor no changes    3.  C. difficile colitis on Flagyl continue IV antibiotics diarrhea is improving    4.  Paroxysmal atrial flutter on amiodarone drip rate better controlled on by mouth Cardizem and beta blocker and IV amiodarone.  Still remains slightly tachycardic in the high 90s and low 100s.  Will increase beta blocker.    5.  Acute kidney injury on chronic kidney disease resolved.     6.  Anemia iron deficiency and chronic disease is at baseline continue monitor H&H is    7.  Hypertension well controlled continue present management no changes    8.  Diabetes mellitus well controlled Accu-Cheks and continue sliding scale insulin    9.  Acute hypoxic respiratory failure secondary to pneumonia still requiring O2 supplementation chest x-ray done yesterday remains unchanged    10.  Coronary disease without any evidence of angina continue present management           Electronically signed by Shira Galaviz MD at 2/6/2017  6:51 AM        Consult Notes (last 72 hours) (Notes from 2/3/2017  7:24 AM through 2/6/2017  7:24 AM)     No notes of this type exist for this encounter.

## 2017-02-06 NOTE — PROGRESS NOTES
LOS: 3 days   Patient Care Team:  Shira Galaviz MD as PCP - General  Shira Galaviz MD as PCP - Family Medicine    Chief Complaint:  Sepsis, pna, atrial flutter     Interval History:     Fatigue with cough, no chest pain.  Poor appetite.  No nausea.     Objective   Vital Signs  Temp:  [97.2 °F (36.2 °C)-98.3 °F (36.8 °C)] 97.2 °F (36.2 °C)  Heart Rate:  [] 120  Resp:  [20-26] 20  BP: (116-162)/(60-89) 116/62    Intake/Output Summary (Last 24 hours) at 02/06/17 0828  Last data filed at 02/06/17 0500   Gross per 24 hour   Intake 1543.57 ml   Output    350 ml   Net 1193.57 ml       Comfortable NAD  PERRL, conjunctiva clear  Neck supple, no JVD or thyromegaly appreciated  S1/S2 RRR, no m/r/g  Lungs exp rales throughout  Abdomen S/NT/ND (+) BS, no HSM appreciated  Extremities warm, no clubbing, cyanosis, or edema  No visible or palpable skin lesions  A/Ox4, mood and affect appropriate    Results Review:        Results from last 7 days  Lab Units 02/06/17  0517 02/05/17  0513 02/04/17  0819   SODIUM mmol/L 140 138 139   POTASSIUM mmol/L 4.0 3.4* 3.9   CHLORIDE mmol/L 103 101 103   TOTAL CO2 mmol/L 26.6 25.7 24.3   BUN mg/dL 22 29* 35*   CREATININE mg/dL 0.90 0.84 0.91   GLUCOSE mg/dL 151* 156* 144*   CALCIUM mg/dL 8.3* 8.2* 8.6*       Results from last 7 days  Lab Units 02/03/17  0622 02/02/17  0056   TROPONIN T ng/mL 0.013 0.017       Results from last 7 days  Lab Units 02/06/17  0517 02/04/17  0818 02/03/17  0622   WBC 10*3/mm3 7.62 6.04 5.38   HEMOGLOBIN g/dL 8.6* 8.8* 9.3*   HEMATOCRIT % 28.3* 28.1* 29.9*   PLATELETS 10*3/mm3 186 177 168                       I reviewed the patient's new clinical results.  I personally viewed and interpreted the patient's EKG/Telemetry data - atrial flutter        Medication Review:     acetaminophen 650 mg Oral Nightly   allopurinol 100 mg Oral Q12H   budesonide 0.5 mg Nebulization BID - RT   calcium-vitamin D 500 mg Oral Q12H   [START ON 2/7/2017] conjugated  estrogens 0.75 application Vaginal Once per day on Tue   diltiaZEM  mg Oral Daily   donepezil 10 mg Oral Nightly   doxazosin 2 mg Oral Q12H   enoxaparin 1 mg/kg Subcutaneous Q12H   famotidine 40 mg Oral Nightly   guaiFENesin 1,200 mg Oral BID   [START ON 2/12/2017] ibandronate 150 mg Oral Q30 Days   ipratropium-albuterol 3 mL Nebulization 4x Daily - RT   isosorbide dinitrate 10 mg Oral Q12H   ketotifen 1 drop Both Eyes BID   lactobacillus acidophilus 1 capsule Oral Daily   levETIRAcetam 500 mg Oral Q12H   levoFLOXacin 750 mg Intravenous Q24H   levothyroxine 150 mcg Oral Q AM   metoprolol tartrate 50 mg Oral Q12H   miconazole  Topical Q12H   multivitamin with minerals 1 tablet Oral Daily   pantoprazole 40 mg Intravenous Q AM   sennosides-docusate sodium 1 tablet Oral Q12H   sertraline 100 mg Oral Nightly   vitamin D 50,000 Units Oral Q7 Days         amiodarone 0.5 mg/min Last Rate: 0.5 mg/min (02/05/17 2044)       Assessment/Plan     Principal Problem:    Pneumonia due to Haemophilus influenzae  Active Problems:    Sepsis    Acute respiratory failure with hypoxia    Oropharyngeal dysphagia    Tachycardia    SOLANGE (acute kidney injury)    Essential hypertension    Coronary artery disease involving native coronary artery of native heart without angina pectoris    Dyslipidemia    Obesity, Class III, BMI 40-49.9 (morbid obesity)    Osteoarthritis    Osteoporosis    Acquired hypothyroidism    Early onset Alzheimer's dementia without behavioral disturbance    Depression with anxiety    GERD (gastroesophageal reflux disease)    Anemia    1. Sepsis due to H flu on ABX  2. UTI  3. c diff colitis  4. Atrial flutter, typical.  Flutter waves easily noted now on ECG. Stop amiodarone gtt and increase lopressor to 100mg bid with diltiazem.  Watch HR with this change.    5. SOLANGE, resolved  6. DM  7. Anemia - has worsened each day.     Therapeutic lovenox, increase meds for rate control. Check cxr.  Check stool. Get weight today.      Virgie Godfrey MD  02/06/17  8:28 AM

## 2017-02-06 NOTE — PLAN OF CARE
Problem: Patient Care Overview (Adult)  Goal: Plan of Care Review  Outcome: Ongoing (interventions implemented as appropriate)    02/06/17 1100   Coping/Psychosocial Response Interventions   Plan Of Care Reviewed With patient   Patient Care Overview   Progress (patient well versed in offloading sacrum/skin care)   Outcome Evaluation   Outcome Summary/Follow up Plan WOCN note, follow patient weekly on Rehab unit here, was transferred to /S and now in ICU. Patient has history of a stage 3 sacral pressure ulcer that was present on admission to facility in (approx.) 2011, and had a diverting colostomy placed for wound healing. Ulcer healed in approximately one year, with occasional skin tear developing around the scar, in linear fashion; treatment orders ongoing for onset tear with each episode. Patient's colostomy remains, and is viable with soft brown/green effluent. Will add small soft Optifoam silicone border dressing to orders for additional cushioning/protection of vulnerable/fragile area & due to urinary incontinence, and continue to monitor skin weekly.         Problem: Colostomy (Adult)  Goal: Signs and Symptoms of Listed Potential Problems Will be Absent or Manageable (Colostomy)  Outcome: Ongoing (interventions implemented as appropriate)    02/06/17 1122   Colostomy   Problems Assessed (Colostomy) all   Problems Present (Colostomy) none;other (see comments)  (viable/moist/clean, no leakage; soft effluent)         Problem: Skin Integrity Impairment, Risk/Actual (Adult)  Goal: Identify Related Risk Factors and Signs and Symptoms  Outcome: Ongoing (interventions implemented as appropriate)    02/06/17 1100   Skin Integrity Impairment, Risk/Actual   Skin Integrity Impairment, Risk/Actual: Related Risk Factors age extremes;skin disorders;moisture;immobility   Signs and Symptoms (Skin Integrity Impairment) other (see comments)  (skin tear gluteal cleft)       Goal: Skin Integrity/Wound Healing  Outcome: Ongoing  (interventions implemented as appropriate)    02/06/17 1100   Skin Integrity Impairment, Risk/Actual (Adult)   Skin Integrity/Wound Healing other (see comments)  (initial exam skin tear/in ICU)

## 2017-02-06 NOTE — PROGRESS NOTES
"Daily Progress Note:    Subjective: Patient states she does not feel well.  Feels tired appetite poor    Flowsheet Rows         First Filed Value    Admission Height  65\" (165.1 cm) Documented at 02/03/2017 1044    Admission Weight  243 lb (110 kg) Documented at 02/03/2017 1044          Patient Vitals for the past 24 hrs:   BP Temp Temp src Pulse Resp SpO2   02/06/17 0400 136/81 97.7 °F (36.5 °C) Axillary 98 20 95 %   02/05/17 2302 162/89 97.8 °F (36.6 °C) Axillary 101 20 95 %   02/05/17 2016 132/61 98.1 °F (36.7 °C) Axillary 100 20 96 %   02/05/17 1933 - - - 99 22 94 %   02/05/17 1700 126/85 - - 102 - 95 %   02/05/17 1546 - - - 83 26 92 %   02/05/17 1539 132/60 98.2 °F (36.8 °C) Oral 93 24 95 %   02/05/17 1132 140/81 98.3 °F (36.8 °C) Oral 80 24 95 %   02/05/17 0915 - - - 106 24 90 %   02/05/17 0836 144/74 - - 102 - -         Intake/Output Summary (Last 24 hours) at 02/06/17 0646  Last data filed at 02/06/17 0500   Gross per 24 hour   Intake 1543.57 ml   Output    350 ml   Net 1193.57 ml       Review of Systems   Constitutional: Positive for activity change and fatigue. Negative for appetite change.   Respiratory: Positive for shortness of breath. Negative for chest tightness and wheezing.    Cardiovascular: Negative for chest pain.   Gastrointestinal: Positive for diarrhea. Negative for abdominal distention, abdominal pain, nausea and vomiting.   Genitourinary: Negative for frequency.   Skin: Negative for rash.   Psychiatric/Behavioral: Negative for agitation.       Physical Exam   Constitutional: She appears well-developed and well-nourished.   HENT:   Head: Normocephalic.   Mouth/Throat: Oropharynx is clear and moist.   Eyes: Conjunctivae are normal.   Neck: Normal range of motion. No JVD present. No thyromegaly present.   Cardiovascular: Normal rate.  An irregularly irregular rhythm present.   Murmur heard.   Systolic murmur is present with a grade of 2/6   Pulmonary/Chest: Effort normal and breath sounds " normal. No respiratory distress. She has no wheezes. She has no rales.   Abdominal: Soft. She exhibits no distension. There is no tenderness. There is no guarding.       Colostomy present   Neurological: She is alert.   Skin: Skin is warm and dry. No rash noted.   Nursing note and vitals reviewed.          Medication Review:   I have reviewed the patient's current medication list      acetaminophen 650 mg Oral Nightly   allopurinol 100 mg Oral Q12H   budesonide 0.5 mg Nebulization BID - RT   calcium-vitamin D 500 mg Oral Q12H   [START ON 2/7/2017] conjugated estrogens 0.75 application Vaginal Once per day on Tue   diltiaZEM  mg Oral Daily   donepezil 10 mg Oral Nightly   doxazosin 2 mg Oral Q12H   enoxaparin 1 mg/kg Subcutaneous Q12H   famotidine 40 mg Oral Nightly   guaiFENesin 1,200 mg Oral BID   [START ON 2/12/2017] ibandronate 150 mg Oral Q30 Days   ipratropium-albuterol 3 mL Nebulization 4x Daily - RT   isosorbide dinitrate 10 mg Oral Q12H   ketotifen 1 drop Both Eyes BID   lactobacillus acidophilus 1 capsule Oral Daily   levETIRAcetam 500 mg Oral Q12H   levoFLOXacin 750 mg Intravenous Q24H   levothyroxine 150 mcg Oral Q AM   metoprolol tartrate 25 mg Oral Q12H   miconazole  Topical Q12H   multivitamin with minerals 1 tablet Oral Daily   pantoprazole 40 mg Intravenous Q AM   sennosides-docusate sodium 1 tablet Oral Q12H   sertraline 100 mg Oral Nightly   vitamin D 50,000 Units Oral Q7 Days           Labs:    Results from last 7 days  Lab Units 02/06/17  0517 02/04/17  0818 02/03/17  0622 02/02/17  0056   WBC 10*3/mm3 7.62 6.04 5.38 6.41   HEMOGLOBIN g/dL 8.6* 8.8* 9.3* 9.4*   HEMATOCRIT % 28.3* 28.1* 29.9* 30.0*   PLATELETS 10*3/mm3 186 177 168 169   MONOCYTES % %  --   --   --  9.0*       Results from last 7 days  Lab Units 02/06/17  0517 02/05/17  0513 02/04/17  0819   SODIUM mmol/L 140 138 139   POTASSIUM mmol/L 4.0 3.4* 3.9   CHLORIDE mmol/L 103 101 103   TOTAL CO2 mmol/L 26.6 25.7 24.3   BUN mg/dL 22  29* 35*   CREATININE mg/dL 0.90 0.84 0.91   CALCIUM mg/dL 8.3* 8.2* 8.6*   GLUCOSE mg/dL 151* 156* 144*           Lab Results (last 24 hours)     Procedure Component Value Units Date/Time    Blood Culture [[id number]]  (Normal) Collected:  02/03/17 1731    Specimen:  Blood from Arm, Left Updated:  02/05/17 1801     Blood Culture No growth at 2 days     Blood Culture [[id number]]  (Normal) Collected:  02/03/17 1731    Specimen:  Blood from Arm, Right Updated:  02/05/17 1801     Blood Culture No growth at 2 days     CBC (No Diff) [[id number]]  (Abnormal) Collected:  02/06/17 0517    Specimen:  Blood Updated:  02/06/17 0536     WBC 7.62 10*3/mm3      RBC 2.95 (L) 10*6/mm3      Hemoglobin 8.6 (L) g/dL      Hematocrit 28.3 (L) %      MCV 95.9 fL      MCH 29.2 pg      MCHC 30.4 (L) g/dL      RDW 14.7 (H) %      RDW-SD 52.0 fl      MPV 10.2 fL      Platelets 186 10*3/mm3     Basic Metabolic Panel [[id number]]  (Abnormal) Collected:  02/06/17 0517    Specimen:  Blood Updated:  02/06/17 0559     Glucose 151 (H) mg/dL      BUN 22 mg/dL      Creatinine 0.90 mg/dL      Sodium 140 mmol/L      Potassium 4.0 mmol/L      Chloride 103 mmol/L      CO2 26.6 mmol/L      Calcium 8.3 (L) mg/dL      eGFR Non African Amer 61 mL/min/1.73      BUN/Creatinine Ratio 24.4      Anion Gap 10.4 mmol/L     Narrative:       The MDRD GFR formula is only valid for adults with stable renal function between ages 18 and 70.              Radiology:  Imaging Results (last 24 hours)     ** No results found for the last 24 hours. **          Cardiology:  ECG/EMG Results (last 24 hours)     Procedure Component Value Units Date/Time    ECG 12 Lead [[id number]] Collected:  02/05/17 0931     Updated:  02/05/17 1519    Narrative:       RR Interval= 556 ms  IN Interval= ms  QRSD Interval= 90 ms  QT Interval= 344 ms  QTc Interval= 461 ms  Heart Rate= 108 ms  P Axis= deg  QRS Axis= -6 deg  T Wave Axis= 253 deg  I: 40 Axis= 28 deg  T: 40 Axis= 252 deg  ST Axis= 251 deg  ATRIAL  FLUTTER, A-RATE 283  REPOL ABNRM SUGGESTS ISCHEMIA, DIFFUSE LEADS  Electronically Signed by:  Date and Time of Study: 2017-02-05 09:31:04          I have reviewed consult notes.    Assessment and Plan:    1.  Sepsis secondary to Haemophilus influenza pneumonia has improved we'll continue IV Levaquin    2.  Urinary tract infection secondary to ESBL on appropriate antibiotic coverage as been ordered we'll continue monitor no changes    3.  Hospital-acquired pneumonia we'll recheck chest x-ray continue IV antibiotics    4.  Paroxysmal atrial flutter on amiodarone drip rate better controlled on by mouth Cardizem and beta blocker and IV amiodarone.  Still remains slightly tachycardic in the high 90s and low 100s.  Will increase beta blocker.  Still on Lovenox will discuss with cardiology about changing her to by mouth anticoagulation    5.  Acute kidney injury on chronic kidney disease resolved.     6.  Anemia iron deficiency and chronic disease is at baseline continue monitor H&H is    7.  Hypertension well controlled continue present management no changes    8.  Diabetes mellitus well controlled Accu-Cheks and continue sliding scale insulin    9.  Acute hypoxic respiratory failure secondary to pneumonia still requiring O2 supplementation chest x-ray done yesterday remains unchanged    10.  Coronary disease without any evidence of angina continue present management

## 2017-02-06 NOTE — PROGRESS NOTES
"Adult Nutrition  Assessment/PES    Patient Name:  Yamile Cronin  YOB: 1940  MRN: [id number]  Admit Date:  2/3/2017    Assessment Date:  2/6/2017        Reason for Assessment       02/06/17 1255    Reason for Assessment    Reason For Assessment/Visit calorie count order    Diagnosis --   Haemophilus Influenza PNA, UTI, recent cdiff (abx done per RN), SOLANGE, aflutter, Htn, uti              Nutrition/Diet History       02/06/17 1256    Nutrition/Diet History    Typical Food/Fluid Intake Pt resting in ICU, awakens to reports she does like chocolate & will try to drink Glucerna, has had ENSURe before.             Anthropometrics       02/06/17 1257    Anthropometrics    RD Documented Current Weight  113 kg (249 lb 1.9 oz)    Anthropometrics (Special Considerations)    RD Calculated BMI (kg/m2) 41.5    Body Mass Index (BMI)    BMI Grade greater than 40 - obesity grade III            Labs/Tests/Procedures/Meds       02/06/17 1257    Labs/Tests/Procedures/Meds    Labs/Tests Review Reviewed;Glucose;Hgb A1C    Medication Review Reviewed, pertinent;Antibiotic;Multivitamin;Antacid;Anticoag   risaquad              Estimated/Assessed Needs       02/06/17 1258    Calculation Measurements    Weight Used For Calculations 113 kg (249 lb 1.9 oz)    Height Used for Calculations 1.651 m (5' 5\")    Estimated/Assessed Energy Needs    Energy Need Method [facility];Kcal/kg    kcal/kg 14    14 Kcal/Kg (kcal) 1582    Estimated Kcal Range  1582 kcal (14 kcal/kg actual wt for morbid obesity)    Estimated/Assessed Protein Needs    Weight Used for Protein Calculation 113 kg (249 lb 1.9 oz)    Protein (gm/kg) 0.8    0.8 Gm Protein (gm) 90.4    Estimated Protein Range 90 gm     Estimated/Assessed Fluid Needs    Fluid Need Method RDA method    RDA Method (mL)  1582            Nutrition Prescription Ordered       02/06/17 1300    Nutrition Prescription PO    Current PO Diet Soft Texture    Texture Ground    Fluid Consistency " "Nectar/syrup thick    Supplement Glucerna Shake    Supplement Frequency 3 times a day    Common Modifiers Consistent Carbohydrate            Evaluation of Received Nutrient/Fluid Intake       02/06/17 1301    Calculation Measurements    Weight Used For Calculations 113 kg (249 lb 1.9 oz)    Height Used for Calculations 1.651 m (5' 5\")    Fluid Intake Evaluation    Oral Fluid (mL) 600    Total Fluid Intake (mL) 600    Total Fluid Intake (mL/kg) 5.31 mL/kg    % Fluid Needs 38%    PO Evaluation    Number of Meals 8    % PO Intake 40              Problem/Interventions:        Problem 1       02/06/17 1302    Nutrition Diagnoses Problem 1    Problem 1 Inadequate Intake/Infusion    Inadequate Intake Type Oral    Macronutrient Kcal;Protein    Etiology (related to) Medical Diagnosis    Signs/Symptoms (evidenced by) Report of Mnimal PO Intake                    Intervention Goal       02/06/17 1303    Intervention Goal    General Meet nutritional needs for age/condition    PO Increase intake;PO intake (%)    PO Intake % 50 %            Nutrition Intervention       02/06/17 1303    Nutrition Intervention    RD/Tech Action Encourage intake;Interview for preference;Follow Tx progress            Nutrition Prescription       02/06/17 1303    Other Orders    Other continue current diet per SLP and Glucerna shake 3 times per day            Education/Evaluation       02/06/17 1303    Education    Education Other (comment)   Encouraged po and supplement. Will follow.    Monitor/Evaluation    Monitor Per protocol;I&O;PO intake;Pertinent labs;Supplement intake;Weight        Comments:    Will follow calorie count as ordered.    Electronically signed by:  Nhung Tolliver RD  02/06/17 1:04 PM  "

## 2017-02-06 NOTE — PLAN OF CARE
Problem: Pneumonia (Adult)  Intervention: Maximize Oxygenation/Ventilation/Perfusion    02/06/17 1607   Promote Aggressive Pulmonary Hygiene/Secretion Management   Cough And Deep Breathing done with encouragement   Respiratory Interventions   Airway/Ventilation Management pulmonary hygiene promoted   Incentive Spirometer   Administration (Incentive Spirometer) done with encouragement   Positioning   Head Of Bed (HOB) Position HOB at 30 degrees

## 2017-02-07 PROBLEM — I48.3 TYPICAL ATRIAL FLUTTER (HCC): Status: ACTIVE | Noted: 2017-01-01

## 2017-02-07 NOTE — PLAN OF CARE
Problem: Patient Care Overview (Adult)  Goal: Plan of Care Review  Outcome: Ongoing (interventions implemented as appropriate)    02/07/17 0102   Coping/Psychosocial Response Interventions   Plan Of Care Reviewed With patient   Patient Care Overview   Progress no change       Goal: Adult Individualization and Mutuality  Outcome: Ongoing (interventions implemented as appropriate)  Goal: Discharge Needs Assessment  Outcome: Ongoing (interventions implemented as appropriate)    Problem: Sepsis (Adult)  Goal: Signs and Symptoms of Listed Potential Problems Will be Absent or Manageable (Sepsis)  Outcome: Ongoing (interventions implemented as appropriate)    Problem: Colostomy (Adult)  Goal: Signs and Symptoms of Listed Potential Problems Will be Absent or Manageable (Colostomy)  Outcome: Ongoing (interventions implemented as appropriate)    Problem: Pneumonia (Adult)  Goal: Signs and Symptoms of Listed Potential Problems Will be Absent or Manageable (Pneumonia)  Outcome: Ongoing (interventions implemented as appropriate)    Problem: Skin Integrity Impairment, Risk/Actual (Adult)  Goal: Identify Related Risk Factors and Signs and Symptoms  Outcome: Outcome(s) achieved Date Met:  02/07/17

## 2017-02-07 NOTE — PROGRESS NOTES
"Daily Progress Note:    Subjective: Patient subjectively feels better.  Appetite improved.  Still short of breath.  Requiring O2 to keep sats above 90.    Flowsheet Rows         First Filed Value    Admission Height  65\" (165.1 cm) Documented at 02/03/2017 1044    Admission Weight  243 lb (110 kg) Documented at 02/03/2017 1044          Patient Vitals for the past 24 hrs:   BP Temp Temp src Pulse Resp SpO2   02/07/17 0603 111/68 97.4 °F (36.3 °C) Axillary 88 18 96 %   02/07/17 0337 120/73 97.6 °F (36.4 °C) Oral 81 18 94 %   02/06/17 2324 101/60 98.3 °F (36.8 °C) Oral 88 18 95 %   02/06/17 2117 128/87 - - 111 - -   02/06/17 2012 128/77 97.7 °F (36.5 °C) Oral (!) 128 18 96 %   02/06/17 1819 - - - (!) 127 18 96 %   02/06/17 1713 - - - (!) 123 - (!) 74 %   02/06/17 1712 - - - 114 - -   02/06/17 1536 - - - 84 - 95 %   02/06/17 1531 - - - 82 20 95 %   02/06/17 1525 122/70 - - 84 - 95 %   02/06/17 1500 122/70 97.1 °F (36.2 °C) Oral 78 20 95 %   02/06/17 1132 - - - 90 20 -   02/06/17 1102 - - - 95 - 95 %   02/06/17 1100 129/84 98.3 °F (36.8 °C) Oral 91 22 96 %   02/06/17 0902 - - - (!) 126 - 97 %   02/06/17 0802 118/80 - - 120 - -   02/06/17 0749 - - - 120 20 98 %         Intake/Output Summary (Last 24 hours) at 02/07/17 0657  Last data filed at 02/06/17 1300   Gross per 24 hour   Intake    510 ml   Output      0 ml   Net    510 ml       Review of Systems   Constitutional: Positive for activity change and fatigue. Negative for appetite change.   Respiratory: Positive for shortness of breath. Negative for chest tightness and wheezing.    Cardiovascular: Negative for chest pain.   Gastrointestinal: Negative for abdominal distention, abdominal pain, nausea and vomiting.   Genitourinary: Negative for frequency.   Skin: Negative for rash.   Psychiatric/Behavioral: Negative for agitation.       Physical Exam   Constitutional: She appears well-developed and well-nourished.   HENT:   Head: Normocephalic.   Mouth/Throat: Oropharynx " is clear and moist.   Eyes: Conjunctivae are normal.   Neck: Normal range of motion. No JVD present. No thyromegaly present.   Cardiovascular: Normal rate.  An irregularly irregular rhythm present.   Murmur heard.   Systolic murmur is present with a grade of 2/6   Pulmonary/Chest: Effort normal and breath sounds normal. No respiratory distress. She has no wheezes. She has no rales.   Abdominal: Soft. She exhibits no distension. There is no tenderness. There is no guarding.       Colostomy present   Neurological: She is alert.   Skin: Skin is warm and dry. No rash noted.   Nursing note and vitals reviewed.          Medication Review:   I have reviewed the patient's current medication list      acetaminophen 650 mg Oral Nightly   allopurinol 100 mg Oral Q12H   budesonide 0.5 mg Nebulization BID - RT   calcium-vitamin D 500 mg Oral Q12H   conjugated estrogens 0.75 application Vaginal Once per day on Tue   diltiaZEM  mg Oral Daily   donepezil 10 mg Oral Nightly   doxazosin 2 mg Oral Q12H   enoxaparin 1 mg/kg Subcutaneous Q12H   famotidine 40 mg Oral Nightly   guaiFENesin 1,200 mg Oral BID   [START ON 2/12/2017] ibandronate 150 mg Oral Q30 Days   ipratropium-albuterol 3 mL Nebulization 4x Daily - RT   isosorbide dinitrate 10 mg Oral Q12H   ketotifen 1 drop Both Eyes BID   lactobacillus acidophilus 1 capsule Oral Daily   levETIRAcetam 500 mg Oral Q12H   levoFLOXacin 750 mg Intravenous Q24H   levothyroxine 150 mcg Oral Q AM   metoprolol tartrate 100 mg Oral Q12H   miconazole  Topical Q12H   multivitamin with minerals 1 tablet Oral Daily   pantoprazole 40 mg Intravenous Q AM   sennosides-docusate sodium 1 tablet Oral Q12H   sertraline 100 mg Oral Nightly   vitamin D 50,000 Units Oral Q7 Days           Labs:    Results from last 7 days  Lab Units 02/07/17  0449 02/06/17  0517 02/04/17  0818  02/02/17  0056   WBC 10*3/mm3 9.27 7.62 6.04  < > 6.41   HEMOGLOBIN g/dL 8.5* 8.6* 8.8*  < > 9.4*   HEMATOCRIT % 27.5* 28.3*  28.1*  < > 30.0*   PLATELETS 10*3/mm3 202 186 177  < > 169   MONOCYTES % %  --   --   --   --  9.0*   < > = values in this interval not displayed.    Results from last 7 days  Lab Units 02/07/17 0449 02/06/17 0517 02/05/17 0513   SODIUM mmol/L 138 140 138   POTASSIUM mmol/L 4.0 4.0 3.4*   CHLORIDE mmol/L 103 103 101   TOTAL CO2 mmol/L 25.9 26.6 25.7   BUN mg/dL 20 22 29*   CREATININE mg/dL 0.89 0.90 0.84   CALCIUM mg/dL 8.3* 8.3* 8.2*   BILIRUBIN mg/dL 0.2  --   --    ALK PHOS U/L 173*  --   --    ALT (SGPT) U/L 46*  --   --    AST (SGOT) U/L 16  --   --    GLUCOSE mg/dL 142* 151* 156*           Lab Results (last 24 hours)     Procedure Component Value Units Date/Time    Iron Profile [[id number]]  (Abnormal) Collected:  02/06/17 0517    Specimen:  Blood Updated:  02/06/17 0740     Iron 25 (L) mcg/dL      Iron Saturation 15 %      UIBC 141 mcg/dL      TIBC 166 (L) mcg/dL     Prealbumin [[id number]]  (Abnormal) Collected:  02/06/17 0517    Specimen:  Blood Updated:  02/06/17 1358     Prealbumin 7.4 (L) mg/dL     Blood Culture [[id number]]  (Normal) Collected:  02/03/17 1731    Specimen:  Blood from Arm, Left Updated:  02/06/17 1801     Blood Culture No growth at 3 days     Blood Culture [[id number]]  (Normal) Collected:  02/03/17 1731    Specimen:  Blood from Arm, Right Updated:  02/06/17 1801     Blood Culture No growth at 3 days     Occult Blood X 1, Stool [[id number]]  (Normal) Collected:  02/07/17 0506    Specimen:  Stool Updated:  02/07/17 0524     Fecal Occult Blood Negative     CBC (No Diff) [[id number]]  (Abnormal) Collected:  02/07/17 0449    Specimen:  Blood Updated:  02/07/17 0528     WBC 9.27 10*3/mm3      RBC 2.87 (L) 10*6/mm3      Hemoglobin 8.5 (L) g/dL      Hematocrit 27.5 (L) %      MCV 95.8 fL      MCH 29.6 pg      MCHC 30.9 (L) g/dL      RDW 14.8 (H) %      RDW-SD 51.9 fl      MPV 10.6 (H) fL      Platelets 202 10*3/mm3     Comprehensive Metabolic Panel [[id number]]  (Abnormal) Collected:  02/07/17 0445     Specimen:  Blood Updated:  02/07/17 0636     Glucose 142 (H) mg/dL      BUN 20 mg/dL      Creatinine 0.89 mg/dL      Sodium 138 mmol/L      Potassium 4.0 mmol/L      Chloride 103 mmol/L      CO2 25.9 mmol/L      Calcium 8.3 (L) mg/dL      Total Protein 5.7 (L) g/dL      Albumin 2.20 (L) g/dL      ALT (SGPT) 46 (H) U/L      AST (SGOT) 16 U/L      Alkaline Phosphatase 173 (H) U/L      Total Bilirubin 0.2 mg/dL      eGFR Non African Amer 62 mL/min/1.73      Globulin 3.5 gm/dL      A/G Ratio 0.6 g/dL      BUN/Creatinine Ratio 22.5      Anion Gap 9.1 mmol/L     Narrative:       The MDRD GFR formula is only valid for adults with stable renal function between ages 18 and 70.              Radiology:  Imaging Results (last 24 hours)     Procedure Component Value Units Date/Time    XR Chest 1 View [[id number]] Collected:  02/06/17 0958     Updated:  02/06/17 1007    Narrative:       CHEST X-RAY, 02/06/2017:     HISTORY:   76-year-old female transferred from skilled nursing facility to ICU on  02/03/2017 with pneumonia, sepsis. Follow-up pulmonary status.     TECHNIQUE:  AP portable upright chest x-ray.     COMPARISON:  *  Chest x-rays 02/03/2017, 02/02/2017, 1/31/2017 and 12/1/2016.     FINDINGS:  Patchy infiltrates in both lung bases, greater on the left. Left basilar  infiltrate is increased slightly since 02/03/2017.     Low lung volumes. No visible pleural effusion. Stable mild cardiomegaly.  Median sternotomy. Right arm PICC in good position.       Impression:       1. Persistent patchy infiltrates in both lung bases, greater on the  left.  2. Left basilar infiltrate has worsened slightly since 02/03/2017.     This report was finalized on 2/6/2017 10:05 AM by Dr. Michael Brito MD.             Cardiology:  ECG/EMG Results (last 24 hours)     Procedure Component Value Units Date/Time    ECG 12 Lead [[id number]] Collected:  02/05/17 0931     Updated:  02/06/17 0939    Narrative:       RR Interval= 556 ms  GA Interval=  ms  QRSD Interval= 90 ms  QT Interval= 344 ms  QTc Interval= 461 ms  Heart Rate= 108 ms  P Axis= deg  QRS Axis= -6 deg  T Wave Axis= 253 deg  I: 40 Axis= 28 deg  T: 40 Axis= 252 deg  ST Axis= 251 deg  ATRIAL FLUTTER, A-RATE 283  REPOL ABNRM, DIFFUSE LEADS  NO SIGNIFICANT CHANGE FROM PREVIOUS ECG  Electronically Signed by:  Virgie Godfrey ([facility]) 06-Feb-2017 09:36:33  Date and Time of Study: 2017-02-05 09:31:04          I have reviewed consult notes.    Assessment and Plan:      1.  Sepsis secondary to Haemophilus influenza pneumonia has improved we'll continue IV Levaquin    2.  Urinary tract infection secondary to ESBL on appropriate antibiotic coverage as been ordered we'll continue monitor no changes    3.  Hospital-acquired pneumonia secondary to Haemophilus influenza chest x-ray done yesterday shows persistent infiltrates continue present antibiotics    4.  Atrial flutter on amiodarone drip rate better controlled on by mouth Cardizem and beta blocker increased yesterday and IV amiodarone discontinued yesterday.  Heart rate is better.  Will discuss with cardiology about changing to by mouth anticoagulation.  Continue Lovenox for now    5.  Acute kidney injury on chronic kidney disease resolved.     6.  Anemia iron deficiency hemoglobin gradually continues to decrease and iron levels low.  Hemoccults been ordered initial Hemoccult negative.  Start oral iron may requiring infusions    7.  Hypertension well controlled continue present management no changes    8.  Diabetes mellitus well controlled Accu-Cheks and continue sliding scale insulin    9.  Acute hypoxic respiratory failure secondary to pneumonia still requiring O2 supplementation     10.  Coronary disease without any evidence of angina continue present management

## 2017-02-07 NOTE — PROGRESS NOTES
LOS: 4 days   Patient Care Team:  Shira Galaviz MD as PCP - General  Shira Galaviz MD as PCP - Family Medicine    Chief Complaint: atrial flutter       Interval History: Remains in flutter with rate 100-120.  NO cardiac complaints but demented.      Objective   Vital Signs  Temp:  [97.1 °F (36.2 °C)-98.3 °F (36.8 °C)] 97.4 °F (36.3 °C)  Heart Rate:  [] 126  Resp:  [18-22] 20  BP: (101-129)/(60-87) 115/69    Intake/Output Summary (Last 24 hours) at 02/07/17 0903  Last data filed at 02/07/17 0851   Gross per 24 hour   Intake    510 ml   Output      0 ml   Net    510 ml           Physical Exam   Constitutional:   obese   HENT:   Head: Normocephalic.   Nose: Nose normal.   Mouth/Throat: Oropharynx is clear and moist.   Eyes: Conjunctivae and EOM are normal. Pupils are equal, round, and reactive to light.   Neck: Normal range of motion.   Cannot assess JVD due to body habitus   Cardiovascular: Regular rhythm, normal heart sounds and intact distal pulses.  Tachycardia present.    Pulmonary/Chest: Effort normal. She has decreased breath sounds in the right lower field and the left lower field.   Abdominal: Soft.   Cannot feel organs or aorta   Musculoskeletal: Normal range of motion. She exhibits no edema.   Neurological: She is alert. No cranial nerve deficit.   Skin: Skin is warm and dry. No erythema.   Psychiatric: Judgment normal. Cognition and memory are impaired.   Vitals reviewed.      Results Review:        Results from last 7 days  Lab Units 02/07/17  0449 02/06/17  0517 02/05/17  0513   SODIUM mmol/L 138 140 138   POTASSIUM mmol/L 4.0 4.0 3.4*   CHLORIDE mmol/L 103 103 101   TOTAL CO2 mmol/L 25.9 26.6 25.7   BUN mg/dL 20 22 29*   CREATININE mg/dL 0.89 0.90 0.84   GLUCOSE mg/dL 142* 151* 156*   CALCIUM mg/dL 8.3* 8.3* 8.2*       Results from last 7 days  Lab Units 02/03/17  0622 02/02/17  0056   TROPONIN T ng/mL 0.013 0.017       Results from last 7 days  Lab Units 02/07/17  0442  02/06/17  0517 02/04/17  0818   WBC 10*3/mm3 9.27 7.62 6.04   HEMOGLOBIN g/dL 8.5* 8.6* 8.8*   HEMATOCRIT % 27.5* 28.3* 28.1*   PLATELETS 10*3/mm3 202 186 177                       I reviewed the patient's new clinical results.  I personally viewed and interpreted the patient's EKG/Telemetry data        Medication Review:     acetaminophen 650 mg Oral Nightly   allopurinol 100 mg Oral Q12H   budesonide 0.5 mg Nebulization BID - RT   calcium-vitamin D 500 mg Oral Q12H   conjugated estrogens 0.75 application Vaginal Once per day on Tue   diltiaZEM  mg Oral Daily   donepezil 10 mg Oral Nightly   doxazosin 2 mg Oral Q12H   enoxaparin 1 mg/kg Subcutaneous Q12H   famotidine 40 mg Oral Nightly   guaiFENesin 1,200 mg Oral BID   [START ON 2/12/2017] ibandronate 150 mg Oral Q30 Days   ipratropium-albuterol 3 mL Nebulization 4x Daily - RT   iron polysaccharides 150 mg Oral BID   isosorbide dinitrate 10 mg Oral Q12H   ketotifen 1 drop Both Eyes BID   lactobacillus acidophilus 1 capsule Oral Daily   levETIRAcetam 500 mg Oral Q12H   levoFLOXacin 750 mg Intravenous Q24H   levothyroxine 150 mcg Oral Q AM   metoprolol tartrate 100 mg Oral Q12H   miconazole  Topical Q12H   multivitamin with minerals 1 tablet Oral Daily   pantoprazole 40 mg Intravenous Q AM   sennosides-docusate sodium 1 tablet Oral Q12H   sertraline 100 mg Oral Nightly   vitamin D 50,000 Units Oral Q7 Days         Pharmacy to Dose enoxaparin (LOVENOX)        Assessment/Plan   1.  Typical flutter -- amio gtt stopped yesterday, and oral meds adjusted.  Will need 4 days or so for them to reach steady state.  HR at rest is often ~ 100, and is 120 with eating/sitting up.  Continue this regimen for now.  Continue enoxaparin and eventually transition to NOAC if this is felt to be best long term.    2.  Pneumonia/sepsis  Pneumonia due to Haemophilus influenzae  3.  SOLANGE (acute kidney injury) -- resolved  4.  Essential hypertension -- good control.  5.  Coronary artery  disease involving native coronary artery of native heart without angina pectoris -- stable; no aspirin due to anemia and anticoagulation  6.  Dementia  7.  Anemia -- per primary    Will see again 2/9/17      Nikhil Mchugh MD  02/07/17  9:03 AM

## 2017-02-07 NOTE — PROGRESS NOTES
Adult Nutrition  Assessment/PES    Patient Name:  Yamile Cronin  YOB: 1940  MRN: [id number]  Admit Date:  2/3/2017    Assessment Date:  2/7/2017        Reason for Assessment       02/07/17 0935    Reason for Assessment    Reason For Assessment/Visit calorie count order          Estimated needs: 1582 kcal, 90 gm pro    Calorie count Day 1: data not available 1 menu however percent eaten not available.     Will speak with staff about recording intake and monitor results as available.                         Electronically signed by:  Nhung Tolliver RD  02/07/17 9:35 AM

## 2017-02-08 NOTE — PROGRESS NOTES
"Daily Progress Note:    Subjective: No new complaints.  Remains in a flutter rate is controlled    Flowsheet Rows         First Filed Value    Admission Height  65\" (165.1 cm) Documented at 02/03/2017 1044    Admission Weight  243 lb (110 kg) Documented at 02/03/2017 1044          Patient Vitals for the past 24 hrs:   BP Temp Temp src Pulse Resp SpO2   02/08/17 0638 111/78 - - 97 - (!) 85 %   02/08/17 0352 105/58 97.6 °F (36.4 °C) Axillary 77 20 97 %   02/08/17 0256 113/60 - - 81 - 94 %   02/07/17 2344 97/59 98 °F (36.7 °C) Axillary 86 20 95 %   02/07/17 2126 - - - 99 - -   02/07/17 2123 121/64 - - - - -   02/07/17 2033 - - - 105 20 96 %   02/07/17 1957 115/62 98.4 °F (36.9 °C) Axillary 100 20 94 %   02/07/17 1849 - - - 89 - 94 %   02/07/17 1603 127/68 - - 99 - 91 %   02/07/17 1518 - - - 89 20 94 %   02/07/17 1510 111/66 98.2 °F (36.8 °C) Oral 89 20 90 %   02/07/17 1218 - - - 96 - (!) 89 %   02/07/17 1119 115/69 97.8 °F (36.6 °C) Oral 99 20 95 %   02/07/17 0902 - - - (!) 126 - 92 %   02/07/17 0851 115/69 - - (!) 124 - -   02/07/17 0719 - - - 98 20 98 %         Intake/Output Summary (Last 24 hours) at 02/08/17 0644  Last data filed at 02/07/17 1833   Gross per 24 hour   Intake    750 ml   Output      0 ml   Net    750 ml       Review of Systems   Constitutional: Positive for activity change and fatigue. Negative for appetite change.   Respiratory: Negative for chest tightness and wheezing.    Cardiovascular: Negative for chest pain.   Gastrointestinal: Negative for abdominal distention, abdominal pain, nausea and vomiting.   Genitourinary: Negative for frequency.   Skin: Negative for rash.   Psychiatric/Behavioral: Negative for agitation.       Physical Exam   Constitutional: She appears well-developed and well-nourished.   HENT:   Head: Normocephalic.   Mouth/Throat: Oropharynx is clear and moist.   Eyes: Conjunctivae are normal.   Neck: Normal range of motion. No JVD present. No thyromegaly present. "   Cardiovascular: Normal rate.  An irregularly irregular rhythm present.   Murmur heard.   Systolic murmur is present with a grade of 2/6   Pulmonary/Chest: Effort normal. No respiratory distress. She has decreased breath sounds in the right lower field and the left lower field. She has no wheezes. She has no rales.   Abdominal: Soft. She exhibits no distension. There is no tenderness. There is no guarding.       Colostomy present   Neurological: She is alert.   Skin: Skin is warm and dry. No rash noted.   Nursing note and vitals reviewed.          Medication Review:   I have reviewed the patient's current medication list      acetaminophen 650 mg Oral Nightly   allopurinol 100 mg Oral Q12H   budesonide 0.5 mg Nebulization BID - RT   calcium-vitamin D 500 mg Oral Q12H   conjugated estrogens 0.75 application Vaginal Once per day on Tue   diltiaZEM  mg Oral Daily   donepezil 10 mg Oral Nightly   doxazosin 2 mg Oral Q12H   enoxaparin 1 mg/kg Subcutaneous Q12H   famotidine 40 mg Oral Nightly   guaiFENesin 1,200 mg Oral BID   [START ON 2/12/2017] ibandronate 150 mg Oral Q30 Days   ipratropium-albuterol 3 mL Nebulization 4x Daily - RT   iron polysaccharides 150 mg Oral BID   isosorbide dinitrate 10 mg Oral Q12H   ketotifen 1 drop Both Eyes BID   lactobacillus acidophilus 1 capsule Oral Daily   levETIRAcetam 500 mg Oral Q12H   levoFLOXacin 750 mg Intravenous Q24H   levothyroxine 150 mcg Oral Q AM   metoprolol tartrate 100 mg Oral Q12H   miconazole  Topical Q12H   multivitamin with minerals 1 tablet Oral Daily   pantoprazole 40 mg Intravenous Q AM   sennosides-docusate sodium 1 tablet Oral Q12H   sertraline 100 mg Oral Nightly   vitamin D 50,000 Units Oral Q7 Days           Labs:    Results from last 7 days  Lab Units 02/08/17  0456 02/07/17  0449 02/06/17  0517  02/02/17  0056   WBC 10*3/mm3 7.80 9.27 7.62  < > 6.41   HEMOGLOBIN g/dL 7.8* 8.5* 8.6*  < > 9.4*   HEMATOCRIT % 25.3* 27.5* 28.3*  < > 30.0*   PLATELETS  10*3/mm3 190 202 186  < > 169   MONOCYTES % %  --   --   --   --  9.0*   < > = values in this interval not displayed.    Results from last 7 days  Lab Units 02/08/17 0456 02/07/17 0449 02/06/17 0517   SODIUM mmol/L 138 138 140   POTASSIUM mmol/L 3.6 4.0 4.0   CHLORIDE mmol/L 102 103 103   TOTAL CO2 mmol/L 26.3 25.9 26.6   BUN mg/dL 20 20 22   CREATININE mg/dL 0.90 0.89 0.90   CALCIUM mg/dL 8.1* 8.3* 8.3*   BILIRUBIN mg/dL  --  0.2  --    ALK PHOS U/L  --  173*  --    ALT (SGPT) U/L  --  46*  --    AST (SGOT) U/L  --  16  --    GLUCOSE mg/dL 133* 142* 151*           Lab Results (last 24 hours)     Procedure Component Value Units Date/Time    Reticulocytes [[id number]]  (Normal) Collected:  02/07/17 0449    Specimen:  Blood Updated:  02/07/17 0723     Reticulocyte % 1.41 %     Blood Culture [[id number]]  (Normal) Collected:  02/03/17 1731    Specimen:  Blood from Arm, Left Updated:  02/07/17 1801     Blood Culture No growth at 4 days     Blood Culture [[id number]]  (Normal) Collected:  02/03/17 1731    Specimen:  Blood from Arm, Right Updated:  02/07/17 1801     Blood Culture No growth at 4 days     CBC (No Diff) [[id number]]  (Abnormal) Collected:  02/08/17 0456    Specimen:  Blood Updated:  02/08/17 0610     WBC 7.80 10*3/mm3      RBC 2.63 (L) 10*6/mm3      Hemoglobin 7.8 (L) g/dL      Hematocrit 25.3 (L) %      MCV 96.2 fL      MCH 29.7 pg      MCHC 30.8 (L) g/dL      RDW 14.7 (H) %      RDW-SD 51.9 fl      MPV 10.1 fL      Platelets 190 10*3/mm3     Basic Metabolic Panel [[id number]]  (Abnormal) Collected:  02/08/17 0456    Specimen:  Blood Updated:  02/08/17 0639     Glucose 133 (H) mg/dL      BUN 20 mg/dL      Creatinine 0.90 mg/dL      Sodium 138 mmol/L      Potassium 3.6 mmol/L      Chloride 102 mmol/L      CO2 26.3 mmol/L      Calcium 8.1 (L) mg/dL      eGFR Non African Amer 61 mL/min/1.73      BUN/Creatinine Ratio 22.2      Anion Gap 9.7 mmol/L     Narrative:       The MDRD GFR formula is only valid for adults with  stable renal function between ages 18 and 70.              Radiology:  Imaging Results (last 24 hours)     ** No results found for the last 24 hours. **          Cardiology:  ECG/EMG Results (last 24 hours)     ** No results found for the last 24 hours. **          I have reviewed consult notes.    Assessment and Plan:        1.  Sepsis secondary to Haemophilus influenza pneumonia has improved we'll continue IV Levaquin    2.  Urinary tract infection secondary to ESBL on appropriate antibiotic coverage as been ordered we'll continue monitor no changes    3.  Hospital-acquired pneumonia secondary to Haemophilus influenza chest x-ray done yesterday shows persistent infiltrates continue present antibiotics    4.  Atrial flutter on amiodarone drip rate better controlled on by mouth Cardizem and beta blocker increased yesterday and IV amiodarone discontinued.  Heart rate is better.  Continue Lovenox for now because of persistent anemia    5.  Acute kidney injury on chronic kidney disease resolved.     6.  Anemia iron deficiency hemoglobin gradually continues to decrease and iron levels low.  Hemoccults been ordered initial Hemoccult negative.  The lumen down to 7.8 will transfuse 1 unit.  Get hematology see patient consultation.    7.  Hypertension well controlled continue present management no changes    8.  Diabetes mellitus well controlled Accu-Cheks and continue sliding scale insulin    9.  Acute hypoxic respiratory failure secondary to pneumonia still requiring O2 supplementation     10.  Coronary disease without any evidence of angina continue present management

## 2017-02-08 NOTE — PROGRESS NOTES
Adult Nutrition  Assessment/PES    Patient Name:  Yamile Cronin  YOB: 1940  MRN: [id number]  Admit Date:  2/3/2017    Assessment Date:  2/8/2017        Reason for Assessment       02/08/17 0910    Reason for Assessment    Reason For Assessment/Visit calorie count order        Estimated needs: 1582 kcal, 90 gm pro    Day 2: 752 kcal, 22 gm pro (3 meals)     47% kcal, 24% pro    Pt not eating well, doesn't like modified diet/liquids.    Will cont to follow results as available.                           Electronically signed by:  Nhung Tolliver RD  02/08/17 9:11 AM

## 2017-02-08 NOTE — PLAN OF CARE
Problem: Patient Care Overview (Adult)  Goal: Plan of Care Review  Outcome: Ongoing (interventions implemented as appropriate)    02/08/17 0250   Coping/Psychosocial Response Interventions   Plan Of Care Reviewed With patient   Patient Care Overview   Progress no change   Outcome Evaluation   Outcome Summary/Follow up Plan vss-sats maintained mid 90's on 1 L/NC. HR controlled on po meds        Goal: Adult Individualization and Mutuality  Outcome: Ongoing (interventions implemented as appropriate)  Goal: Discharge Needs Assessment  Outcome: Ongoing (interventions implemented as appropriate)    Problem: Sepsis (Adult)  Goal: Signs and Symptoms of Listed Potential Problems Will be Absent or Manageable (Sepsis)  Outcome: Ongoing (interventions implemented as appropriate)    Problem: Colostomy (Adult)  Goal: Signs and Symptoms of Listed Potential Problems Will be Absent or Manageable (Colostomy)  Outcome: Ongoing (interventions implemented as appropriate)    Problem: Pneumonia (Adult)  Goal: Signs and Symptoms of Listed Potential Problems Will be Absent or Manageable (Pneumonia)  Outcome: Ongoing (interventions implemented as appropriate)    Problem: Skin Integrity Impairment, Risk/Actual (Adult)  Goal: Skin Integrity/Wound Healing  Outcome: Ongoing (interventions implemented as appropriate)

## 2017-02-08 NOTE — PLAN OF CARE
Problem: Patient Care Overview (Adult)  Goal: Plan of Care Review  Outcome: Ongoing (interventions implemented as appropriate)    02/08/17 1447   Coping/Psychosocial Response Interventions   Plan Of Care Reviewed With patient   Patient Care Overview   Progress improving   Outcome Evaluation   Outcome Summary/Follow up Plan 1 unit PRBC to be infused today,generalized weakness, telemetry in place       Goal: Adult Individualization and Mutuality  Outcome: Ongoing (interventions implemented as appropriate)  Goal: Discharge Needs Assessment  Outcome: Ongoing (interventions implemented as appropriate)    Problem: Colostomy (Adult)  Goal: Signs and Symptoms of Listed Potential Problems Will be Absent or Manageable (Colostomy)  Outcome: Ongoing (interventions implemented as appropriate)    Problem: Pneumonia (Adult)  Goal: Signs and Symptoms of Listed Potential Problems Will be Absent or Manageable (Pneumonia)  Outcome: Ongoing (interventions implemented as appropriate)    Problem: Skin Integrity Impairment, Risk/Actual (Adult)  Goal: Skin Integrity/Wound Healing  Outcome: Ongoing (interventions implemented as appropriate)

## 2017-02-08 NOTE — CONSULTS
Subjective .     REASON FOR CONSULTATION:   Anemia  Provide an opinion on any further workup or treatment                             REQUESTING PHYSICIAN: Pato Jackson MD  RECORDS OBTAINED:  Records of the patients history including those obtained from the referring provider were reviewed and summarized in detail.    HISTORY OF PRESENT ILLNESS:  The patient is a 76 y.o. year old female  who is here for follow-up with the above-mentioned history.    Hb low for years 8-11.  Mostly 9-10.  Admitted 2/3/17 for fever/soa/infiltrate.    Patient states intermittent chest discomfort x weeks, unchanged.  Denies soa, but is on nasal canula o2 (not on o2 at home).    Denies bleeding.    Past Medical History   Diagnosis Date   • Alzheimer disease    • Anxiety    • Chest pain    • Constipation    • Depression    • Hypertension    • Hypothyroid    • Myocardial infarction    • Osteoporosis    • Seizure      Past Surgical History   Procedure Laterality Date   • Colonoscopy     • Colostomy Left    • Cardiac surgery         HEMATOLOGIC/ONCOLOGIC HISTORY:  (History from previous dates can be found in the separate document.)    MEDICATIONS    Current Facility-Administered Medications:   •  acetaminophen (TYLENOL) tablet 650 mg, 650 mg, Oral, Q4H PRN, Ines R Terell, APRN, 650 mg at 02/07/17 1623  •  acetaminophen (TYLENOL) tablet 650 mg, 650 mg, Oral, Nightly, Ines R Terell, APRN, 650 mg at 02/07/17 2117  •  allopurinol (ZYLOPRIM) tablet 100 mg, 100 mg, Oral, Q12H, Ines R Terell, APRN, 100 mg at 02/08/17 0841  •  aluminum-magnesium hydroxide-simethicone (MAALOX/MYLANTA) suspension 30 mL, 30 mL, Oral, Q6H PRN, Ines R Nineveh, APRN  •  bisacodyl (DULCOLAX) EC tablet 10 mg, 10 mg, Oral, Daily PRN, Ines R Nineveh, APRN, 10 mg at 02/03/17 2034  •  budesonide (PULMICORT) nebulizer solution 0.5 mg, 0.5 mg, Nebulization, BID - RT, Ines R Terell, APRN, 0.5 mg at 02/08/17 0819  •  calcium-vitamin D 500-200 MG-UNIT tablet  500 mg, 500 mg, Oral, Q12H, Ines R Richmond, APRN, 500 mg at 02/08/17 0841  •  conjugated estrogens (PREMARIN) vaginal cream 0.75 application, 0.75 application, Vaginal, Once per day on Tue, Ines R Terell, APRN, 0.75 application at 02/07/17 2119  •  diclofenac (VOLTAREN) 1 % gel 4 g, 4 g, Topical, TID PRN, Ines R Richmond, APRN  •  diltiaZEM CD (CARDIZEM CD) 24 hr capsule 240 mg, 240 mg, Oral, Daily, Ines R Terell, APRN, 240 mg at 02/08/17 0841  •  diphenhydrAMINE (BENADRYL) capsule 25 mg, 25 mg, Oral, Q6H PRN, Ines R Richmond, APRN  •  donepezil (ARICEPT) tablet 10 mg, 10 mg, Oral, Nightly, Ines R Richmond, APRN, 10 mg at 02/07/17 2122  •  doxazosin (CARDURA) tablet 2 mg, 2 mg, Oral, Q12H, Ines R Terell, APRN, 2 mg at 02/08/17 0841  •  enoxaparin (LOVENOX) syringe 110 mg, 1 mg/kg, Subcutaneous, Q12H, Charles Dietrich MD, 110 mg at 02/08/17 0842  •  famotidine (PEPCID) tablet 40 mg, 40 mg, Oral, Nightly, Ines R Richmond, APRN, 40 mg at 02/07/17 2124  •  guaiFENesin (MUCINEX) 12 hr tablet 1,200 mg, 1,200 mg, Oral, BID, Ines R Richmond, APRN, 1,200 mg at 02/08/17 0840  •  [START ON 2/12/2017] ibandronate (BONIVA) tablet 150 mg, 150 mg, Oral, Q30 Days, Ines R Terell, APRN  •  ipratropium-albuterol (DUO-NEB) nebulizer solution 3 mL, 3 mL, Nebulization, 4x Daily - RT, Shira Galaviz MD, 3 mL at 02/08/17 1219  •  iron polysaccharides (NIFEREX) capsule 150 mg, 150 mg, Oral, BID, Shira Galaviz MD, 150 mg at 02/08/17 0842  •  iron sucrose (VENOFER) 200 mg in sodium chloride 0.9 % 100 mL IVPB, 200 mg, Intravenous, Q24H, Shira Galaviz MD, Stopped at 02/08/17 1015  •  isosorbide dinitrate (ISORDIL) tablet 10 mg, 10 mg, Oral, Q12H, ÁLVARO Luke, 10 mg at 02/08/17 0842  •  ketotifen (ZADITOR) 0.025 % ophthalmic solution 1 drop, 1 drop, Both Eyes, BID, ÁLVARO Luke, 1 drop at 02/08/17 0843  •  lactobacillus acidophilus (RISAQUAD) capsule 1 capsule, 1 capsule,  Oral, Daily, Ines Figueredo, APRN, 1 capsule at 02/08/17 0840  •  levETIRAcetam (KEPPRA) tablet 500 mg, 500 mg, Oral, Q12H, Ines Figueredo, APRN, 500 mg at 02/08/17 0841  •  levoFLOXacin (LEVAQUIN) 750 mg/150 mL D5W (premix) (LEVAQUIN) 750 mg, 750 mg, Intravenous, Q24H, Ines Figueredo, APRN, Stopped at 02/08/17 1135  •  levothyroxine (SYNTHROID, LEVOTHROID) tablet 150 mcg, 150 mcg, Oral, Q AM, Ines Figueredo, APRN, 150 mcg at 02/08/17 0641  •  magnesium hydroxide (MILK OF MAGNESIA) 400 MG/5ML suspension 30 mL, 30 mL, Oral, Nightly PRN, Ines Figueredo, APRN  •  metoprolol tartrate (LOPRESSOR) tablet 100 mg, 100 mg, Oral, Q12H, Virgie Godfrey MD, 100 mg at 02/08/17 0840  •  miconazole (MICOTIN) 2 % powder, , Topical, Q12H, Ines Figueredo, APRN  •  multivitamin with minerals 1 tablet, 1 tablet, Oral, Daily, Ines Figueredo, APRN, 1 tablet at 02/08/17 0841  •  nitroglycerin (NITROSTAT) SL tablet 0.4 mg, 0.4 mg, Sublingual, Q5 Min PRN, Ines Figueredo, APRN  •  sennosides-docusate sodium (SENOKOT-S) 8.6-50 MG tablet 1 tablet, 1 tablet, Oral, Q12H, Ines Figueredo, APRN, 1 tablet at 02/08/17 0841  •  sertraline (ZOLOFT) tablet 100 mg, 100 mg, Oral, Nightly, Ines Figueredo, APRN, 100 mg at 02/07/17 2126  •  sodium chloride 0.9 % infusion  - ADS Override Pull, , , ,   •  traMADol (ULTRAM) tablet 100 mg, 100 mg, Oral, Nightly PRN, Ines Figueredo, APRN  •  vitamin D (ERGOCALCIFEROL) capsule 50,000 Units, 50,000 Units, Oral, Q7 Days, Shira Galaviz MD, 50,000 Units at 02/05/17 0974    ALLERGIES:     Allergies   Allergen Reactions   • Cefazolin    • Cefprozil    • Cephalosporins        SOCIAL HISTORY:       Social History     Social History   • Marital status:      Spouse name: N/A   • Number of children: N/A   • Years of education: N/A     Occupational History   • Not on file.     Social History Main Topics   • Smoking status: Never Smoker   • Smokeless tobacco: Not on file   •  Alcohol use No   • Drug use: No   • Sexual activity: No     Other Topics Concern   • Not on file     Social History Narrative   • No narrative on file         FAMILY HISTORY:  No family history on file.    REVIEW OF SYSTEMS:  GENERAL: No change in appetite or weight;   No fevers, chills, sweats.    SKIN: No nonhealing lesions.   No rashes.  HEME/LYMPH: No easy bruising, bleeding.   No swollen nodes.   EYES: No vision changes or diplopia.   ENT: No tinnitus, hearing loss, gum bleeding, epistaxis, hoarseness or dysphagia.   RESPIRATORY: No cough, shortness of breath, hemoptysis or wheezing.   CVS: No chest pain, palpitations, orthopnea, dyspnea on exertion or PND.   GI: No melena or hematochezia.   No abdominal pain.  No nausea, vomiting, constipation, diarrhea  : No lower tract obstructive symptoms, dysuria or hematuria.   MUSCULOSKELETAL: No bone pain.  No joint stiffness.   NEUROLOGICAL: No global weakness, loss of consciousness or seizures.   PSYCHIATRIC: No increased nervousness, mood changes or depression.     Objective    Vitals:    02/08/17 0835 02/08/17 1149 02/08/17 1219 02/08/17 1544   BP:  144/75  138/73   BP Location:  Left arm  Left arm   Patient Position:  Lying  Lying   Pulse: Comment: medicated 61 61 75   Resp:  16 24 20   Temp:  97.6 °F (36.4 °C)  99 °F (37.2 °C)   TempSrc:  Oral  Oral   SpO2:  95% 97% 92%   Weight:       Height:         No flowsheet data found.   PHYSICAL EXAM:    GENERAL:  Well-developed, well-nourished in no acute distress.   SKIN:  Warm, dry without rashes, purpura or petechiae.  EYES:  Pupils equal, round and reactive to light.  EOMs intact.  Conjunctivae normal.  EARS:  Hearing intact.  NOSE:  Septum midline.  No excoriations or nasal discharge.  MOUTH:  Tongue is well-papillated; no stomatitis or ulcers.  Lips normal.  THROAT:  Oropharynx without lesions or exudates.  NECK:  Supple with good range of motion; no thyromegaly or masses, no JVD.  LYMPHATICS:  No cervical,  supraclavicular, axillary or inguinal adenopathy.  CHEST:  Lungs clear to auscultation. Good airflow.  CARDIAC:  Regular rate and rhythm without murmurs, rubs or gallops. Normal S1,S2.  ABDOMEN:  Soft, nontender with no hepatosplenomegaly or masses.  EXTREMITIES:  No clubbing, cyanosis or edema.  NEUROLOGICAL:  Cranial Nerves II-XII grossly intact.  No focal neurological deficits.  PSYCHIATRIC:  Normal affect and mood.    RECENT LABS:        WBC   Date Value Ref Range Status   02/08/2017 7.80 4.80 - 10.80 10*3/mm3 Final   02/07/2017 9.27 4.80 - 10.80 10*3/mm3 Final   02/06/2017 7.62 4.80 - 10.80 10*3/mm3 Final     HEMOGLOBIN   Date Value Ref Range Status   02/08/2017 7.8 (L) 12.0 - 16.0 g/dL Final   02/07/2017 8.5 (L) 12.0 - 16.0 g/dL Final   02/06/2017 8.6 (L) 12.0 - 16.0 g/dL Final     PLATELETS   Date Value Ref Range Status   02/08/2017 190 140 - 500 10*3/mm3 Final   02/07/2017 202 140 - 500 10*3/mm3 Final   02/06/2017 186 140 - 500 10*3/mm3 Final       Assessment/Plan   1.  Anemia.  On labs through 2014, Hb 8-11 (mostly 9-10).  WBC and PLT unremarkable.  On 2/6/17, 15% iron saturation.  Ferritin has not been checked.    2.  Sepsis/Haemophilus influenza pneumonia.    3.  Urinary tract infection    4.  Atrial flutter    Plan  See if supplemental iron, B12, or folate would be helpful.  If not, I would advise supportive care with transfusions as needed.

## 2017-02-08 NOTE — PLAN OF CARE
Problem: Pneumonia (Adult)  Intervention: Maximize Oxygenation/Ventilation/Perfusion    02/07/17 2214   Promote Aggressive Pulmonary Hygiene/Secretion Management   Cough And Deep Breathing done with encouragement   Respiratory Interventions   Airway/Ventilation Management airway patency maintained;pulmonary hygiene promoted

## 2017-02-09 NOTE — PROGRESS NOTES
REASON FOR FOLLOWUP: Anemia, multifactorial          HISTORY OF PRESENT ILLNESS: The patient is a 76 y.o. Year female who was admitted to the acute service for sepsis secondary to pneumonia 02/03/2017 (started from the rehabilitation side).  She also has significant anemia. Her Hb level for years 8-11. Mostly 9-10.       Patient feels better today. she was given 1 unit packed RBC transfusion yesterday on 02/08/2017.  Denies soa, but is on nasal canula o2 (not on o2 at home).      Denies bleeding.      Medical History         Past Medical History   Diagnosis Date   • Alzheimer disease     • Anxiety     • Chest pain     • Constipation     • Depression     • Hypertension     • Hypothyroid     • Myocardial infarction     • Osteoporosis     • Seizure            Surgical History          Past Surgical History   Procedure Laterality Date   • Colonoscopy       • Colostomy Left     • Cardiac surgery                HEMATOLOGIC/ONCOLOGIC HISTORY: (History from previous dates can be found in the separate document.)     MEDICATIONS     Current Facility-Administered Medications:   • acetaminophen (TYLENOL) tablet 650 mg, 650 mg, Oral, Q4H PRN, Ines R Terell, APRN, 650 mg at 02/07/17 1623  • acetaminophen (TYLENOL) tablet 650 mg, 650 mg, Oral, Nightly, Ines R Terell, APRN, 650 mg at 02/07/17 2117  • allopurinol (ZYLOPRIM) tablet 100 mg, 100 mg, Oral, Q12H, Ines R Salem, APRN, 100 mg at 02/08/17 0841  • aluminum-magnesium hydroxide-simethicone (MAALOX/MYLANTA) suspension 30 mL, 30 mL, Oral, Q6H PRN, Ines R Salem, APRN  • bisacodyl (DULCOLAX) EC tablet 10 mg, 10 mg, Oral, Daily PRN, Ines R Salem, APRN, 10 mg at 02/03/17 2034  • budesonide (PULMICORT) nebulizer solution 0.5 mg, 0.5 mg, Nebulization, BID - RT, Ines R Salem, APRN, 0.5 mg at 02/08/17 0819  • calcium-vitamin D 500-200 MG-UNIT tablet 500 mg, 500 mg, Oral, Q12H, Nies R Terell, APRN, 500 mg at 02/08/17 0841  • conjugated estrogens (PREMARIN)  vaginal cream 0.75 application, 0.75 application, Vaginal, Once per day on Tue, Ines R Galt, APRN, 0.75 application at 02/07/17 2119  • diclofenac (VOLTAREN) 1 % gel 4 g, 4 g, Topical, TID PRN, Ines R Terell, APRN  • diltiaZEM CD (CARDIZEM CD) 24 hr capsule 240 mg, 240 mg, Oral, Daily, Ines R Galt, APRN, 240 mg at 02/08/17 0841  • diphenhydrAMINE (BENADRYL) capsule 25 mg, 25 mg, Oral, Q6H PRN, Ines R Terell, APRN  • donepezil (ARICEPT) tablet 10 mg, 10 mg, Oral, Nightly, Ines R Galt, APRN, 10 mg at 02/07/17 2122  • doxazosin (CARDURA) tablet 2 mg, 2 mg, Oral, Q12H, Ines R Terell, APRN, 2 mg at 02/08/17 0841  • enoxaparin (LOVENOX) syringe 110 mg, 1 mg/kg, Subcutaneous, Q12H, Charles Dietrich MD, 110 mg at 02/08/17 0842  • famotidine (PEPCID) tablet 40 mg, 40 mg, Oral, Nightly, Ines R Terell, APRN, 40 mg at 02/07/17 2124  • guaiFENesin (MUCINEX) 12 hr tablet 1,200 mg, 1,200 mg, Oral, BID, Ines R Terell, APRN, 1,200 mg at 02/08/17 0840  • [START ON 2/12/2017] ibandronate (BONIVA) tablet 150 mg, 150 mg, Oral, Q30 Days, Ines R Terell, APRN  • ipratropium-albuterol (DUO-NEB) nebulizer solution 3 mL, 3 mL, Nebulization, 4x Daily - RT, Shira Galaviz MD, 3 mL at 02/08/17 1219  • iron polysaccharides (NIFEREX) capsule 150 mg, 150 mg, Oral, BID, Shira Galaviz MD, 150 mg at 02/08/17 0842  • iron sucrose (VENOFER) 200 mg in sodium chloride 0.9 % 100 mL IVPB, 200 mg, Intravenous, Q24H, Shira Galaviz MD, Stopped at 02/08/17 1015  • isosorbide dinitrate (ISORDIL) tablet 10 mg, 10 mg, Oral, Q12H, ÁLVARO Luke, 10 mg at 02/08/17 0842  • ketotifen (ZADITOR) 0.025 % ophthalmic solution 1 drop, 1 drop, Both Eyes, BID, ÁLVARO Luke, 1 drop at 02/08/17 0843  • lactobacillus acidophilus (RISAQUAD) capsule 1 capsule, 1 capsule, Oral, Daily, ÁLVARO Luke, 1 capsule at 02/08/17 0840  • levETIRAcetam (KEPPRA) tablet 500 mg, 500 mg, Oral, Q12H,  Ines Figueredo, APRN, 500 mg at 02/08/17 0841  • levoFLOXacin (LEVAQUIN) 750 mg/150 mL D5W (premix) (LEVAQUIN) 750 mg, 750 mg, Intravenous, Q24H, ÁLVARO Luke, Stopped at 02/08/17 1135  • levothyroxine (SYNTHROID, LEVOTHROID) tablet 150 mcg, 150 mcg, Oral, Q AM, Ines Figueredo APRN, 150 mcg at 02/08/17 0641  • magnesium hydroxide (MILK OF MAGNESIA) 400 MG/5ML suspension 30 mL, 30 mL, Oral, Nightly PRN, Ines Figueredo, APRN  • metoprolol tartrate (LOPRESSOR) tablet 100 mg, 100 mg, Oral, Q12H, Virgie Godfrey MD, 100 mg at 02/08/17 0840  • miconazole (MICOTIN) 2 % powder, , Topical, Q12H, ÁLVARO Luke  • multivitamin with minerals 1 tablet, 1 tablet, Oral, Daily, Ines Figueredo, APRN, 1 tablet at 02/08/17 0841  • nitroglycerin (NITROSTAT) SL tablet 0.4 mg, 0.4 mg, Sublingual, Q5 Min PRN, Ines Figueredo, APRN  • sennosides-docusate sodium (SENOKOT-S) 8.6-50 MG tablet 1 tablet, 1 tablet, Oral, Q12H, Ines Figueredo APRN, 1 tablet at 02/08/17 0841  • sertraline (ZOLOFT) tablet 100 mg, 100 mg, Oral, Nightly, Ines Figueredo, APRN, 100 mg at 02/07/17 2126  • sodium chloride 0.9 % infusion - ADS Override Pull, , , ,   • traMADol (ULTRAM) tablet 100 mg, 100 mg, Oral, Nightly PRN, Ines Figueredo, APRN  • vitamin D (ERGOCALCIFEROL) capsule 50,000 Units, 50,000 Units, Oral, Q7 Days, Shira Galaviz MD, 50,000 Units at 02/05/17 1624     ALLERGIES:         Allergies   Allergen Reactions   • Cefazolin     • Cefprozil     • Cephalosporins           SOCIAL HISTORY:    Social History    Social History            Social History   • Marital status:        Spouse name: N/A   • Number of children: N/A   • Years of education: N/A          Occupational History   • Not on file.           Social History Main Topics   • Smoking status: Never Smoker   • Smokeless tobacco: Not on file   • Alcohol use No   • Drug use: No   • Sexual activity: No                            FAMILY  HISTORY:  No family history on file.     REVIEW OF SYSTEMS:  GENERAL: No change in appetite or weight;   No fevers, chills, sweats.   SKIN: No nonhealing lesions.   No rashes.  HEME/LYMPH: No easy bruising, bleeding.   No swollen nodes.   EYES: No vision changes or diplopia.   ENT: No tinnitus, hearing loss, gum bleeding, epistaxis, hoarseness or dysphagia.   RESPIRATORY: See history of present illness   CVS: No chest pain, palpitations, orthopnea.   GI: No melena or hematochezia.   No abdominal pain.  No nausea, vomiting, constipation, diarrhea  : No lower tract obstructive symptoms, dysuria or hematuria.   MUSCULOSKELETAL: No bone pain.  No joint stiffness.   NEUROLOGICAL: No global weakness, loss of consciousness or seizures.   PSYCHIATRIC: No increased nervousness, mood changes or depression.      Objective       Vitals:    02/09/17 1120 02/09/17 1214 02/09/17 1218 02/09/17 1602   BP:  122/77 122/77    BP Location:  Left arm     Patient Position:  Sitting     Pulse: (!) 122 98  (!) 122   Resp: 20 22  20   Temp:  97.4 °F (36.3 °C)     TempSrc:  Oral     SpO2: 98% 99%  95%   Weight:       Height:              PHYSICAL EXAM:   GENERAL: Well-developed, well-nourished female in no acute distress.  O2 supplementation by nasal cannula in place.   SKIN: Warm, dry without rashes, purpura or petechiae.  EYES: Pupils equal. EOMs intact. Conjunctivae normal.  EARS: Hearing intact.  MOUTH: Tongue is well-papillated; Lips normal.  THROAT: Oropharynx without lesions or exudates...  CHEST:  decreased breathing sounds.  No wheezing.  CARDIAC: irregular rate and rhythm without murmurs, rubs or gallops. Normal S1,S2.  ABDOMEN: Soft, nontender with no hepatosplenomegaly or masses.  EXTREMITIES: No clubbing, cyanosis or edema.  NEUROLOGICAL: Cranial Nerves II-XII grossly intact. No focal neurological deficits.  PSYCHIATRIC: Normal affect and mood.     RECENT LABS:      Results from last 7 days  Lab Units 02/09/17  3269  02/08/17  0456 02/07/17  0449   WBC 10*3/mm3 7.32 7.80 9.27   HEMOGLOBIN g/dL 8.5* 7.8* 8.5*   HEMATOCRIT % 27.5* 25.3* 27.5*   PLATELETS 10*3/mm3 194 190 202       Results from last 7 days  Lab Units 02/09/17  0434 02/08/17  0456 02/07/17  0449   SODIUM mmol/L 137 138 138   POTASSIUM mmol/L 4.0 3.6 4.0   CHLORIDE mmol/L 103 102 103   TOTAL CO2 mmol/L 25.6 26.3 25.9   BUN mg/dL 17 20 20   CREATININE mg/dL 0.80 0.90 0.89   CALCIUM mg/dL 7.9* 8.1* 8.3*   BILIRUBIN mg/dL  --   --  0.2   ALK PHOS U/L  --   --  173*   ALT (SGPT) U/L  --   --  46*   AST (SGOT) U/L  --   --  16   GLUCOSE mg/dL 120* 133* 142*     Lab Results   Component Value Date    IRON 188 (H) 02/08/2017    TIBC  02/08/2017      Comment:      Unable to calculate    FERRITIN 147.50 02/08/2017     LAbs on 2/06/2017: Serum iron 25, TIBC 166,  iron saturation 15%    Lab Results   Component Value Date    FOLATE >20.00 02/08/2017     Lab Results   Component Value Date    [id number] 1023 (H) 02/08/2017      Assessment/Plan   Assessment/Plan      1. Anemia.  multifactorial with evidence of iron deficiency, as well as inflammatory condition.  Her ferritin level seems normal, however this is due to her pneumonia.  Her true ferritin level should be much lower.  Patient received 1 unit packed RBC transfusion yesterday and the hemoglobin today is slightly better.  She is on oral iron supplementation, but I do not think that will fix the problem quick enough.  She was given 200 mg Venofer yesterday.  I think she would need more IV iron therapy.  I agree with more iv venofer. Dr. De La Fuente already ordered 7-day treatment plan.   However I do not expect her hemoglobin will become normal anytime soon due to inflammatory condition.  She has proper folic acid and vitamin B12 level.      2. Sepsis/Haemophilus influenza pneumonia.  patient has improved on IV antibiotics.       3. Urinary tract infection     4. Atrial flutter.  Patient is on therapy Lovenox.       Plan:      1. Continue Venofer 200 mg daily for 7 days.  if needed give blood products when Hb <8.0.     Thank you for letting us participating in the care of this patient.  We will sign off.  Please call us if needed.

## 2017-02-09 NOTE — PROGRESS NOTES
LOS: 6 days   Patient Care Team:  Shira Galaviz MD as PCP - General  Shira Galaviz MD as PCP - Family Medicine    Chief Complaint: atrial flutter       Interval History: No chest pain overnight.  Sitting up in bed, comfortable. Denies SOB      Objective   Vital Signs  Temp:  [97.3 °F (36.3 °C)-99 °F (37.2 °C)] 97.5 °F (36.4 °C)  Heart Rate:  [] 99  Resp:  [16-24] 20  BP: ()/(52-88) 129/84    Intake/Output Summary (Last 24 hours) at 02/09/17 0751  Last data filed at 02/09/17 0643   Gross per 24 hour   Intake   2085 ml   Output      0 ml   Net   2085 ml           Physical Exam     General Appearance:    Alert, cooperative, in no acute distress   Head:    Normocephalic, without obvious abnormality, atraumatic   Eyes:            Lids and lashes normal, conjunctivae and sclerae normal, no   icterus, no pallor, corneas clear, PERRLA   Ears:    Ears appear intact with no abnormalities noted   Throat:   No oral lesions, no thrush, oral mucosa moist   Neck:   No adenopathy, supple, trachea midline, no thyromegaly, no   carotid bruit, no JVD   Back:     No kyphosis present, no scoliosis present, no skin lesions, erythema or scars, no tenderness to percussion or palpation, range of motion normal   Lungs:     Coarse BS,respirations regular, even and unlabored    Heart:    irregular rhythm and normal rate, normal S1 and S2, no murmur, no gallop, no rub, no click   Chest Wall:    No abnormalities observed   Abdomen:     Normal bowel sounds, no masses, no organomegaly, soft        non-tender, non-distended, no guarding, no rebound  tenderness   Extremities:   Moves all extremities well, no edema, no cyanosis, no redness   Pulses:   Pulses palpable and equal bilaterally. Normal radial, carotid, femoral, dorsalis pedis and posterior tibial pulses bilaterally. Normal abdominal aorta   Skin:  Psychiatric:   No bleeding, bruising or rash    Alert and oriented x 3, normal mood and affect         Results  Review:        Results from last 7 days  Lab Units 02/09/17  0434 02/08/17  0456 02/07/17  0449   SODIUM mmol/L 137 138 138   POTASSIUM mmol/L 4.0 3.6 4.0   CHLORIDE mmol/L 103 102 103   TOTAL CO2 mmol/L 25.6 26.3 25.9   BUN mg/dL 17 20 20   CREATININE mg/dL 0.80 0.90 0.89   GLUCOSE mg/dL 120* 133* 142*   CALCIUM mg/dL 7.9* 8.1* 8.3*       Results from last 7 days  Lab Units 02/03/17  0622   TROPONIN T ng/mL 0.013       Results from last 7 days  Lab Units 02/09/17  0434 02/08/17 0456 02/07/17  0449   WBC 10*3/mm3 7.32 7.80 9.27   HEMOGLOBIN g/dL 8.5* 7.8* 8.5*   HEMATOCRIT % 27.5* 25.3* 27.5*   PLATELETS 10*3/mm3 194 190 202                       I reviewed the patient's new clinical results.  I personally viewed and interpreted the patient's EKG/Telemetry data        Medication Review:     acetaminophen 650 mg Oral Nightly   allopurinol 100 mg Oral Q12H   budesonide 0.5 mg Nebulization BID - RT   calcium-vitamin D 500 mg Oral Q12H   conjugated estrogens 0.75 application Vaginal Once per day on Tue   diltiaZEM  mg Oral Daily   donepezil 10 mg Oral Nightly   doxazosin 2 mg Oral Q12H   enoxaparin 1 mg/kg Subcutaneous Q12H   famotidine 40 mg Oral Nightly   guaiFENesin 1,200 mg Oral BID   [START ON 2/12/2017] ibandronate 150 mg Oral Q30 Days   ipratropium-albuterol 3 mL Nebulization 4x Daily - RT   iron polysaccharides 150 mg Oral BID   iron sucrose (VENOFER) IVPB 200 mg Intravenous Q24H   isosorbide dinitrate 10 mg Oral Q12H   ketotifen 1 drop Both Eyes BID   lactobacillus acidophilus 1 capsule Oral Daily   levETIRAcetam 500 mg Oral Q12H   levoFLOXacin 750 mg Intravenous Q24H   levothyroxine 150 mcg Oral Q AM   metoprolol tartrate 100 mg Oral Q12H   miconazole  Topical Q12H   multivitamin with minerals 1 tablet Oral Daily   sennosides-docusate sodium 1 tablet Oral Q12H   sertraline 100 mg Oral Nightly   vitamin D 50,000 Units Oral Q7 Days            Assessment/Plan      Principal Problem:    Pneumonia due to  Haemophilus influenzae  Active Problems:    Sepsis    Acute respiratory failure with hypoxia    Oropharyngeal dysphagia    SOLANGE (acute kidney injury)    Essential hypertension    Coronary artery disease involving native coronary artery of native heart without angina pectoris    Dyslipidemia    Obesity, Class III, BMI 40-49.9 (morbid obesity)    Osteoarthritis    Osteoporosis    Acquired hypothyroidism    Early onset Alzheimer's dementia without behavioral disturbance    Depression with anxiety    GERD (gastroesophageal reflux disease)    Anemia    Typical atrial flutter      1.  Typical flutter -- rate control for now.  Reasonable control.  Continue enoxaparin and eventually transition to NOAC if this is felt to be best long term.    2.  Pneumonia/sepsis  Pneumonia due to Haemophilus influenzae  3.  SOLANGE (acute kidney injury) -- resolved  4.  Essential hypertension -- good control.  5.  Coronary artery disease involving native coronary artery of native heart without angina pectoris -- stable; no aspirin due to anemia and anticoagulation  6.  Dementia  7.  Anemia -- per primary      Kevan Blum III, MD  02/09/17  7:51 AM

## 2017-02-09 NOTE — PLAN OF CARE
Problem: Patient Care Overview (Adult)  Goal: Plan of Care Review  Outcome: Ongoing (interventions implemented as appropriate)    02/09/17 0212   Coping/Psychosocial Response Interventions   Plan Of Care Reviewed With patient   Patient Care Overview   Progress no change       Goal: Adult Individualization and Mutuality  Outcome: Ongoing (interventions implemented as appropriate)  Goal: Discharge Needs Assessment  Outcome: Ongoing (interventions implemented as appropriate)    Problem: Sepsis (Adult)  Goal: Signs and Symptoms of Listed Potential Problems Will be Absent or Manageable (Sepsis)  Outcome: Ongoing (interventions implemented as appropriate)    Problem: Colostomy (Adult)  Goal: Signs and Symptoms of Listed Potential Problems Will be Absent or Manageable (Colostomy)  Outcome: Ongoing (interventions implemented as appropriate)    Problem: Pneumonia (Adult)  Goal: Signs and Symptoms of Listed Potential Problems Will be Absent or Manageable (Pneumonia)  Outcome: Ongoing (interventions implemented as appropriate)    Problem: Skin Integrity Impairment, Risk/Actual (Adult)  Goal: Skin Integrity/Wound Healing  Outcome: Ongoing (interventions implemented as appropriate)

## 2017-02-09 NOTE — PLAN OF CARE
Problem: Pneumonia (Adult)  Intervention: Maximize Oxygenation/Ventilation/Perfusion    02/08/17 2024   Promote Aggressive Pulmonary Hygiene/Secretion Management   Cough And Deep Breathing done with encouragement   Respiratory Interventions   Airway/Ventilation Management pulmonary hygiene promoted   Positioning   Head Of Bed (HOB) Position HOB at 30-45 degrees

## 2017-02-09 NOTE — PROGRESS NOTES
Adult Nutrition  Assessment/PES    Patient Name:  Yamile Cronin  YOB: 1940  MRN: [id number]  Admit Date:  2/3/2017    Assessment Date:  2/9/2017        Reason for Assessment       02/09/17 1434    Reason for Assessment    Reason For Assessment/Visit calorie count order    Endocrine DM    Infectious Disease Sepsis;UTI    Pulmonary/Critical Care Pneumonia    Renal CKD;SOLANGE              Nutrition/Diet History       02/09/17 1516    Nutrition/Diet History    Factors Affecting Nutritional Intake --   feeling better per pt., appetite improving            Anthropometrics       02/09/17 1436    Anthropometrics    RD Documented Current Weight  113 kg (248 lb 3 oz)   on 2-9-17 ? if scale not calibrated to zero before weighing    RD Documented Weight on Admission 110 kg (243 lb)    Anthropometrics (Special Considerations)    RD Calculated BMI (kg/m2) 0.5            Labs/Tests/Procedures/Meds       02/09/17 1441    Labs/Tests/Procedures/Meds    Labs/Tests Review Reviewed    Medication Review Reviewed, pertinent;Multivitamin                Nutrition Prescription Ordered       02/09/17 1510    Nutrition Prescription PO    Current PO Diet Soft Texture    Texture Ground    Fluid Consistency Nectar/syrup thick    Supplement Glucerna Shake    Supplement Frequency 3 times a day    Common Modifiers Consistent Carbohydrate            Evaluation of Received Nutrient/Fluid Intake       02/09/17 1511    Fluid Intake Evaluation    Oral Fluid (mL) 1500   previous 24 hours    Total Fluid Intake (mL) 1500    % Fluid Needs 95%    PO Evaluation    Number of Meals 6    % PO Intake 42%              Problem/Interventions:        Problem 1       02/09/17 1514    Nutrition Diagnoses Problem 1    Problem 1 Inadequate Nutrient Intake    Signs/Symptoms (evidenced by) PO Intake    Percent (%) intake recorded 42 %    Over number of meals 6                    Intervention Goal       02/09/17 1515    Intervention Goal    General Meet  nutritional needs for age/condition    PO Intake % 50 %   or greater of meals and any supplements ordered            Nutrition Intervention       02/09/17 1517    Nutrition Intervention    RD/Tech Action Interview for preference;Follow Tx progress;Encourage intake              Education/Evaluation       02/09/17 1517    Education    Education Other (comment)   no education needs identified    Monitor/Evaluation    Monitor I&O;PO intake;Supplement intake;Pertinent labs;Weight;Skin status;Food/activity diary        Calorie Count:      Day 2: 752 kcal, 22 gm pro (3 meals)  Day 3  755 kcals, 26 g protein (3 meals)  Day 4  250 kcals, 14 g protein (1 meal)    Intake average at present is 251 kcals per meal with 9 grams protein.  This is estimated to meet 48% of estimated calorie needs and 30% of estimated protein needs.  Encouraged greater intake meals/Glucerna Shakes.  Will continue to monitor as pt. reports that appetite is starting to improve.      Electronically signed by:  Cuca Sarmiento RD  02/09/17 3:17 PM

## 2017-02-09 NOTE — PLAN OF CARE
Problem: Sepsis (Adult)  Goal: Signs and Symptoms of Listed Potential Problems Will be Absent or Manageable (Sepsis)  Outcome: Ongoing (interventions implemented as appropriate)    02/09/17 1651   Sepsis   Problems Assessed (Sepsis) all   Problems Present (Sepsis) none         Problem: Colostomy (Adult)  Goal: Signs and Symptoms of Listed Potential Problems Will be Absent or Manageable (Colostomy)  Outcome: Ongoing (interventions implemented as appropriate)    Problem: Pneumonia (Adult)  Goal: Signs and Symptoms of Listed Potential Problems Will be Absent or Manageable (Pneumonia)  Outcome: Ongoing (interventions implemented as appropriate)    Problem: Skin Integrity Impairment, Risk/Actual (Adult)  Goal: Skin Integrity/Wound Healing  Outcome: Ongoing (interventions implemented as appropriate)

## 2017-02-10 NOTE — CONSULTS
Adult Nutrition  Assessment/PES    Patient Name:  Yamile Cronin  YOB: 1940  MRN: [id number]  Admit Date:  2/3/2017    Assessment Date:  2/10/2017        Reason for Assessment       02/10/17 1309    Reason for Assessment    Reason For Assessment/Visit calorie count order          Day 1: no data  Day 2: 752 kcal 22 gm pro (3 meals recorded)   Day 3: 250 kcal, 14 gm pro (1 meal recorded)  Day 4: 420 kcal, 6 gm pro (1 meal recorded)     Calorie count complete.   Will try CIB Sugar Free to see if pt prefers over Glucerna to improve intake.        Electronically signed by:  Nhung Tolliver RD  02/10/17 1:09 PM

## 2017-02-10 NOTE — PROGRESS NOTES
LOS: 7 days   Patient Care Team:  Shira Galaviz MD as PCP - General  Shira Galaviz MD as PCP - Family Medicine    Chief Complaint:  A flutter     Interval History:   No chest pain or dyspnea. Weak but overall better.     Objective   Vital Signs  Temp:  [97.4 °F (36.3 °C)-98.3 °F (36.8 °C)] 97.7 °F (36.5 °C)  Heart Rate:  [] 120  Resp:  [18-22] 20  BP: (115-167)/() 116/88    Intake/Output Summary (Last 24 hours) at 02/10/17 0851  Last data filed at 02/10/17 0329   Gross per 24 hour   Intake   1060 ml   Output      0 ml   Net   1060 ml       Comfortable NAD  PERRL, conjunctiva clear  Neck supple, no JVD or thyromegaly appreciated  S1/S2 irregular, no m/r/g  Lungs CTA B, normal effort  Abdomen S/NT/ND (+) BS, no HSM appreciated  Extremities warm, no clubbing, cyanosis, or edema  No visible or palpable skin lesions  A/Ox4, mood and affect appropriate    Results Review:        Results from last 7 days  Lab Units 02/10/17  0420 02/09/17  0434 02/08/17  0456   SODIUM mmol/L 138 137 138   POTASSIUM mmol/L 3.9 4.0 3.6   CHLORIDE mmol/L 102 103 102   TOTAL CO2 mmol/L 26.2 25.6 26.3   BUN mg/dL 15 17 20   CREATININE mg/dL 0.84 0.80 0.90   GLUCOSE mg/dL 147* 120* 133*   CALCIUM mg/dL 7.7* 7.9* 8.1*           Results from last 7 days  Lab Units 02/10/17  0420 02/09/17  0434 02/08/17  0456   WBC 10*3/mm3 7.04 7.32 7.80   HEMOGLOBIN g/dL 8.5* 8.5* 7.8*   HEMATOCRIT % 26.7* 27.5* 25.3*   PLATELETS 10*3/mm3 210 194 190                       I reviewed the patient's new clinical results.  I personally viewed and interpreted the patient's EKG/Telemetry data        Medication Review:     acetaminophen 650 mg Oral Nightly   allopurinol 100 mg Oral Q12H   apixaban 5 mg Oral Q12H   budesonide 0.5 mg Nebulization BID - RT   calcium-vitamin D 500 mg Oral Q12H   conjugated estrogens 0.75 application Vaginal Once per day on Tue   diltiaZEM  mg Oral Daily   donepezil 10 mg Oral Nightly   doxazosin 2 mg Oral  Q12H   famotidine 40 mg Oral Nightly   guaiFENesin 1,200 mg Oral BID   [START ON 2/12/2017] ibandronate 150 mg Oral Q30 Days   ipratropium-albuterol 3 mL Nebulization 4x Daily - RT   iron polysaccharides 150 mg Oral BID   iron sucrose (VENOFER) IVPB 200 mg Intravenous Q24H   isosorbide dinitrate 10 mg Oral Q12H   ketotifen 1 drop Both Eyes BID   lactobacillus acidophilus 1 capsule Oral Daily   levETIRAcetam 500 mg Oral Q12H   levoFLOXacin 750 mg Intravenous Q24H   levothyroxine 150 mcg Oral Q AM   metoprolol tartrate 100 mg Oral Q12H   miconazole  Topical Q12H   multivitamin with minerals 1 tablet Oral Daily   sennosides-docusate sodium 1 tablet Oral Q12H   sertraline 100 mg Oral Nightly   vitamin D 50,000 Units Oral Q7 Days            Assessment/Plan     Principal Problem:    Pneumonia due to Haemophilus influenzae  Active Problems:    Sepsis    Acute respiratory failure with hypoxia    Oropharyngeal dysphagia    SOLANGE (acute kidney injury)    Essential hypertension    Coronary artery disease involving native coronary artery of native heart without angina pectoris    Dyslipidemia    Obesity, Class III, BMI 40-49.9 (morbid obesity)    Osteoarthritis    Osteoporosis    Acquired hypothyroidism    Early onset Alzheimer's dementia without behavioral disturbance    Depression with anxiety    GERD (gastroesophageal reflux disease)    Anemia    Typical atrial flutter    1. Typical flutter -- rate control for now.  Reasonable control.  2. Pneumonia/sepsis Pneumonia due to Haemophilus influenzae  3. SOLANGE (acute kidney injury) -- resolved  4. Essential hypertension -- good control.  5. Coronary artery disease involving native coronary artery of native heart without angina pectoris -- stable; no aspirin due to anemia and anticoagulation  6. Dementia  7. Anemia -- per primary    Will see Monday.  Increase dilt for better HR control.     Virgie Godfrey MD  02/10/17  8:51 AM

## 2017-02-10 NOTE — PLAN OF CARE
Problem: Patient Care Overview (Adult)  Goal: Plan of Care Review  Outcome: Ongoing (interventions implemented as appropriate)    02/10/17 0423   Coping/Psychosocial Response Interventions   Plan Of Care Reviewed With patient   Patient Care Overview   Progress improving       Goal: Adult Individualization and Mutuality  Outcome: Ongoing (interventions implemented as appropriate)    Problem: Sepsis (Adult)  Goal: Signs and Symptoms of Listed Potential Problems Will be Absent or Manageable (Sepsis)  Outcome: Ongoing (interventions implemented as appropriate)    Problem: Colostomy (Adult)  Goal: Signs and Symptoms of Listed Potential Problems Will be Absent or Manageable (Colostomy)  Outcome: Ongoing (interventions implemented as appropriate)    Problem: Pneumonia (Adult)  Goal: Signs and Symptoms of Listed Potential Problems Will be Absent or Manageable (Pneumonia)  Outcome: Ongoing (interventions implemented as appropriate)    Problem: Skin Integrity Impairment, Risk/Actual (Adult)  Goal: Skin Integrity/Wound Healing  Outcome: Ongoing (interventions implemented as appropriate)

## 2017-02-10 NOTE — PROGRESS NOTES
"Daily Progress Note:    Subjective: Overall about the same.  Patient with no new complaints.  Heart rate relatively well controlled but with some tachycardia last night    Flowsheet Rows         First Filed Value    Admission Height  65\" (165.1 cm) Documented at 02/03/2017 1044    Admission Weight  243 lb (110 kg) Documented at 02/03/2017 1044          Patient Vitals for the past 24 hrs:   BP Temp Temp src Pulse Resp SpO2 Weight   02/10/17 0634 122/75 - - - - 94 % -   02/10/17 0330 - - - - - 94 % -   02/10/17 0329 115/72 97.7 °F (36.5 °C) Oral 81 20 95 % 263 lb (119 kg)   02/10/17 0130 - - - 88 - 92 % -   02/10/17 0010 132/76 98 °F (36.7 °C) Oral 108 20 96 % -   02/09/17 2104 136/85 - - - - - -   02/09/17 2046 (!) 167/101 98.3 °F (36.8 °C) Oral (!) 124 20 99 % -   02/09/17 2022 - - - (!) 126 20 98 % -   02/09/17 1602 - - - (!) 122 20 95 % -   02/09/17 1218 122/77 - - - - - -   02/09/17 1214 122/77 97.4 °F (36.3 °C) Oral 98 22 99 % -   02/09/17 1120 - - - (!) 122 20 98 % -         Intake/Output Summary (Last 24 hours) at 02/10/17 0738  Last data filed at 02/10/17 0329   Gross per 24 hour   Intake   1060 ml   Output      0 ml   Net   1060 ml       Review of Systems   Constitutional: Positive for fatigue. Negative for appetite change.   Respiratory: Negative for chest tightness and wheezing.    Cardiovascular: Negative for chest pain.   Gastrointestinal: Negative for abdominal distention, abdominal pain, nausea and vomiting.   Genitourinary: Negative for frequency.   Skin: Negative for rash.   Psychiatric/Behavioral: Negative for agitation.       Physical Exam   Constitutional: She appears well-developed and well-nourished.   HENT:   Head: Normocephalic.   Mouth/Throat: Oropharynx is clear and moist.   Eyes: Conjunctivae are normal.   Neck: Normal range of motion. No JVD present. No thyromegaly present.   Cardiovascular: Normal rate.  An irregularly irregular rhythm present.   Murmur heard.   Systolic murmur is present " with a grade of 2/6   Pulmonary/Chest: Effort normal. No respiratory distress. She has decreased breath sounds in the right lower field and the left lower field. She has no wheezes. She has no rales.   Abdominal: Soft. She exhibits no distension. There is no tenderness. There is no guarding.       Colostomy present   Neurological: She is alert.   Skin: Skin is warm and dry. No rash noted.   Nursing note and vitals reviewed.          Medication Review:   I have reviewed the patient's current medication list      acetaminophen 650 mg Oral Nightly   allopurinol 100 mg Oral Q12H   budesonide 0.5 mg Nebulization BID - RT   calcium-vitamin D 500 mg Oral Q12H   conjugated estrogens 0.75 application Vaginal Once per day on Tue   diltiaZEM  mg Oral Daily   donepezil 10 mg Oral Nightly   doxazosin 2 mg Oral Q12H   enoxaparin 1 mg/kg Subcutaneous Q12H   famotidine 40 mg Oral Nightly   guaiFENesin 1,200 mg Oral BID   [START ON 2/12/2017] ibandronate 150 mg Oral Q30 Days   ipratropium-albuterol 3 mL Nebulization 4x Daily - RT   iron polysaccharides 150 mg Oral BID   iron sucrose (VENOFER) IVPB 200 mg Intravenous Q24H   isosorbide dinitrate 10 mg Oral Q12H   ketotifen 1 drop Both Eyes BID   lactobacillus acidophilus 1 capsule Oral Daily   levETIRAcetam 500 mg Oral Q12H   levoFLOXacin 750 mg Intravenous Q24H   levothyroxine 150 mcg Oral Q AM   metoprolol tartrate 100 mg Oral Q12H   miconazole  Topical Q12H   multivitamin with minerals 1 tablet Oral Daily   sennosides-docusate sodium 1 tablet Oral Q12H   sertraline 100 mg Oral Nightly   vitamin D 50,000 Units Oral Q7 Days           Labs:    Results from last 7 days  Lab Units 02/10/17  0420 02/09/17  0434 02/08/17  0456   WBC 10*3/mm3 7.04 7.32 7.80   HEMOGLOBIN g/dL 8.5* 8.5* 7.8*   HEMATOCRIT % 26.7* 27.5* 25.3*   PLATELETS 10*3/mm3 210 194 190       Results from last 7 days  Lab Units 02/10/17  0420 02/09/17  0434 02/08/17  0456 02/07/17  0449   SODIUM mmol/L 138 137 138  138   POTASSIUM mmol/L 3.9 4.0 3.6 4.0   CHLORIDE mmol/L 102 103 102 103   TOTAL CO2 mmol/L 26.2 25.6 26.3 25.9   BUN mg/dL 15 17 20 20   CREATININE mg/dL 0.84 0.80 0.90 0.89   CALCIUM mg/dL 7.7* 7.9* 8.1* 8.3*   BILIRUBIN mg/dL  --   --   --  0.2   ALK PHOS U/L  --   --   --  173*   ALT (SGPT) U/L  --   --   --  46*   AST (SGOT) U/L  --   --   --  16   GLUCOSE mg/dL 147* 120* 133* 142*           Lab Results (last 24 hours)     Procedure Component Value Units Date/Time    CBC (No Diff) [[id number]]  (Abnormal) Collected:  02/10/17 0420    Specimen:  Blood Updated:  02/10/17 0438     WBC 7.04 10*3/mm3      RBC 2.83 (L) 10*6/mm3      Hemoglobin 8.5 (L) g/dL      Hematocrit 26.7 (L) %      MCV 94.3 fL      MCH 30.0 pg      MCHC 31.8 g/dL      RDW 15.3 (H) %      RDW-SD 53.4 fl      MPV 10.4 fL      Platelets 210 10*3/mm3     Basic Metabolic Panel [[id number]]  (Abnormal) Collected:  02/10/17 0420    Specimen:  Blood Updated:  02/10/17 0519     Glucose 147 (H) mg/dL      BUN 15 mg/dL      Creatinine 0.84 mg/dL      Sodium 138 mmol/L      Potassium 3.9 mmol/L      Chloride 102 mmol/L      CO2 26.2 mmol/L      Calcium 7.7 (L) mg/dL      eGFR Non African Amer 66 mL/min/1.73      BUN/Creatinine Ratio 17.9      Anion Gap 9.8 mmol/L     Narrative:       The MDRD GFR formula is only valid for adults with stable renal function between ages 18 and 70.              Radiology:  Imaging Results (last 24 hours)     ** No results found for the last 24 hours. **          Cardiology:  ECG/EMG Results (last 24 hours)     ** No results found for the last 24 hours. **          I have reviewed consult notes.    Assessment and Plan:    1.  Sepsis secondary to Haemophilus influenza pneumonia resolved.     2.  Urinary tract infection secondary to ESBL on appropriate antibiotic coverage as been ordered we'll continue monitor no changes    3.  Hospital-acquired pneumonia secondary to Haemophilus influenza shows persistent infiltrates continue  present antibiotics    4.  Atrial flutter on amiodarone drip rate better controlled on by mouth Cardizem and beta blocker.  Had some episodes of tachycardia last night.  Continue Lovenox for now because of persistent anemia.  Hematology note noted.  Not sure about long-term anticoagulation will discuss with hematology today.  Once decision is made about long-term anticoagulation anticipate transfer to TCU next 48 hours    5.  Acute kidney injury on chronic kidney disease resolved.     6.  Anemia iron deficiency hemoglobin gradually continues to decrease and iron levels low.  Hemoglobin 8.5 after 1 unit transfusion yesterday.  Iron infusions as been ordered    7.  Hypertension well controlled continue present management no changes    8.  Diabetes mellitus well controlled Accu-Cheks and continue sliding scale insulin    9.  Acute hypoxic respiratory failure secondary to pneumonia still requiring O2 supplementation     10.  Coronary disease without any evidence of angina continue present management

## 2017-02-10 NOTE — PLAN OF CARE
Problem: Sepsis (Adult)  Goal: Signs and Symptoms of Listed Potential Problems Will be Absent or Manageable (Sepsis)  Outcome: Ongoing (interventions implemented as appropriate)

## 2017-02-11 PROBLEM — J18.9 PNEUMONIA: Status: ACTIVE | Noted: 2017-01-01

## 2017-02-11 NOTE — PLAN OF CARE
Problem: Patient Care Overview (Adult)  Goal: Plan of Care Review  Outcome: Outcome(s) achieved Date Met:  02/11/17  Goal: Discharge Needs Assessment  Outcome: Outcome(s) achieved Date Met:  02/11/17    Problem: Sepsis (Adult)  Goal: Signs and Symptoms of Listed Potential Problems Will be Absent or Manageable (Sepsis)  Outcome: Outcome(s) achieved Date Met:  02/11/17    Problem: Colostomy (Adult)  Goal: Signs and Symptoms of Listed Potential Problems Will be Absent or Manageable (Colostomy)  Outcome: Outcome(s) achieved Date Met:  02/11/17    Problem: Pneumonia (Adult)  Goal: Signs and Symptoms of Listed Potential Problems Will be Absent or Manageable (Pneumonia)  Outcome: Outcome(s) achieved Date Met:  02/11/17    Problem: Skin Integrity Impairment, Risk/Actual (Adult)  Goal: Skin Integrity/Wound Healing  Outcome: Outcome(s) achieved Date Met:  02/11/17

## 2017-02-11 NOTE — DISCHARGE SUMMARY
DATE OF ADMISSION: 02/03/2017  DATE OF DISCHARGE: 02/11/2017    DISCHARGE DIAGNOSES:  1. Sepsis secondary to pneumonia.   2. Haemophilus influenzae pneumonia.   3. Extended-spectrum beta-lactamase urinary tract infection.   4. Atrial flutter with rapid ventricular response.   5. Acute hypoxic respiratory failure.   6. Hypertension.   7. Hyperlipidemia.   8. Diabetes mellitus.   9. Chronic kidney disease stage 3.   10. Osteoporosis.   11. Osteoarthritis.   12. Depression/anxiety.   13. Hypothyroidism.   14. Alzheimer dementia.   15. _____  16. Anemia, acute on chronic, iron deficiency.     HISTORY OF PRESENT ILLNESS: The patient is a 76-year-old white female who was admitted from the TCU to acute care because of elevated heart rate. Patient was found to have bilateral pneumonia and urinary tract infection. She was started broad-spectrum antibiotics. Patients sputum culture came back Haemophilus influenzae, and urine cultures came back with ESBL. Antibiotics were accordingly adjusted, namely Levaquin. Patient also developed atrial flutter with rapid ventricular response. She was transferred to ICU, started on a Cardizem drip. Cardiology saw the patient in consultation. Medications were accordingly adjusted. She was initially started on amiodarone and Cardizem, and once her heart rate improved, amiodarone was discontinued, and she was changed over to p.o. medications. Heart rate improved _____ 80s to 100s. At times she did become tachycardic when she was changed or had to be repositioned but otherwise remained stable. Blood cultures remained negative. She was significantly anemic. Iron studies were done and showed markedly iron deficient. Since the patient would require long-term anticoagulation, Hematology saw the patient in consultation _____ combination of iron deficiency anemia on top of acute illness. There was no contraindication to anticoagulation. She was switched over from Lovenox to p.o. Eliquis which she  was tolerating well. Her hemoglobin dropped down to 7.8 on 02/08/2017. She was typed and crossed and transfused 1 unit. Posttransfusion, hemoglobins remained in the 8.5 range. She was started on some iron infusions, and Hematology agreed with the same. She was transferred back to TCU on p.o. Levaquin and mini-nebulizer, _____ pulmonary toilet, O2.         DAMIAN Russell  D:  02/11/2017 13:19:55   T:  02/11/2017 14:51:39   Job ID:  [id number]  Document ID:  [id number]  cc:

## 2017-02-11 NOTE — PLAN OF CARE
Problem: Patient Care Overview (Adult)  Goal: Plan of Care Review  Outcome: Ongoing (interventions implemented as appropriate)    02/11/17 0313   Coping/Psychosocial Response Interventions   Plan Of Care Reviewed With patient   Patient Care Overview   Progress improving         Problem: Sepsis (Adult)  Goal: Signs and Symptoms of Listed Potential Problems Will be Absent or Manageable (Sepsis)  Outcome: Ongoing (interventions implemented as appropriate)    02/11/17 0313   Sepsis   Problems Assessed (Sepsis) all   Problems Present (Sepsis) none         Problem: Colostomy (Adult)  Goal: Signs and Symptoms of Listed Potential Problems Will be Absent or Manageable (Colostomy)  Outcome: Ongoing (interventions implemented as appropriate)    02/11/17 0313   Colostomy   Problems Assessed (Colostomy) all   Problems Present (Colostomy) none         Problem: Pneumonia (Adult)  Goal: Signs and Symptoms of Listed Potential Problems Will be Absent or Manageable (Pneumonia)  Outcome: Ongoing (interventions implemented as appropriate)    02/11/17 0313   Pneumonia   Problems Assessed (Pneumonia) all   Problems Present (Pneumonia) none         Problem: Skin Integrity Impairment, Risk/Actual (Adult)  Goal: Skin Integrity/Wound Healing  Outcome: Ongoing (interventions implemented as appropriate)    02/11/17 0313   Skin Integrity Impairment, Risk/Actual (Adult)   Skin Integrity/Wound Healing making progress toward outcome

## 2017-09-02 NOTE — PLAN OF CARE
Problem: Skin Integrity Impairment, Risk/Actual (Adult)  Goal: Identify Related Risk Factors and Signs and Symptoms  Outcome: Ongoing (interventions implemented as appropriate)    09/02/17 1458   Skin Integrity Impairment, Risk/Actual   Skin Integrity Impairment, Risk/Actual: Related Risk Factors age extremes       Goal: Skin Integrity/Wound Healing  Outcome: Ongoing (interventions implemented as appropriate)    09/02/17 1458   Skin Integrity Impairment, Risk/Actual (Adult)   Skin Integrity/Wound Healing making progress toward outcome         Problem: Fall Risk (Adult)  Goal: Identify Related Risk Factors and Signs and Symptoms  Outcome: Ongoing (interventions implemented as appropriate)    09/02/17 1458   Fall Risk   Fall Risk: Related Risk Factors age-related changes   Fall Risk: Signs and Symptoms presence of risk factors       Goal: Absence of Falls  Outcome: Ongoing (interventions implemented as appropriate)    09/02/17 1458   Fall Risk (Adult)   Absence of Falls making progress toward outcome         Problem: Pain, Acute (Adult)  Goal: Identify Related Risk Factors and Signs and Symptoms  Outcome: Ongoing (interventions implemented as appropriate)    09/02/17 1458   Pain, Acute   Related Risk Factors (Acute Pain) disease process       Goal: Acceptable Pain Control/Comfort Level  Outcome: Ongoing (interventions implemented as appropriate)    09/02/17 1458   Pain, Acute (Adult)   Acceptable Pain Control/Comfort Level making progress toward outcome

## 2017-09-02 NOTE — PLAN OF CARE
Problem: Skin Integrity Impairment, Risk/Actual (Adult)  Goal: Identify Related Risk Factors and Signs and Symptoms  Outcome: Ongoing (interventions implemented as appropriate)  Goal: Skin Integrity/Wound Healing  Outcome: Ongoing (interventions implemented as appropriate)    Problem: Fall Risk (Adult)  Goal: Absence of Falls  Outcome: Ongoing (interventions implemented as appropriate)    09/02/17 0410   Fall Risk (Adult)   Absence of Falls making progress toward outcome         Problem: Pain, Acute (Adult)  Goal: Identify Related Risk Factors and Signs and Symptoms  Outcome: Ongoing (interventions implemented as appropriate)  Goal: Acceptable Pain Control/Comfort Level  Outcome: Ongoing (interventions implemented as appropriate)

## 2017-09-03 NOTE — PROGRESS NOTES
"Daily Progress Note:    Subjective: Overall remains the same.     Flowsheet Rows         First Filed Value    Admission Height  67.52\" (171.5 cm) Documented at 02/14/2017 0913    Admission Weight  260 lb (118 kg) Documented at 02/11/2017 1513          Patient Vitals for the past 24 hrs:   BP Temp Temp src Pulse Resp SpO2 Weight   09/03/17 0816 124/62 97.2 °F (36.2 °C) Oral 98 18 96 % -   09/03/17 0244 - - - - - - 238 lb 8 oz (108 kg)   09/02/17 2115 140/85 97.1 °F (36.2 °C) Oral 110 18 98 % -   09/02/17 1335 141/90 - - 112 - - -         Intake/Output Summary (Last 24 hours) at 09/03/17 1236  Last data filed at 09/03/17 0800   Gross per 24 hour   Intake             1080 ml   Output                0 ml   Net             1080 ml       Review of Systems   Respiratory: Negative for chest tightness and wheezing.    Cardiovascular: Negative for chest pain.   Gastrointestinal: Negative for abdominal distention, abdominal pain and vomiting.   Genitourinary: Negative for frequency.   Skin: Negative for rash.   Psychiatric/Behavioral: Negative for agitation.       Physical Exam   Constitutional: She appears well-developed and well-nourished.   HENT:   Head: Normocephalic.   Mouth/Throat: Oropharynx is clear and moist.   Eyes: Conjunctivae are normal.   Neck: Normal range of motion. No JVD present. No thyromegaly present.   Cardiovascular: Normal rate.  An irregularly irregular rhythm present.   Murmur heard.   Systolic murmur is present with a grade of 2/6   Pulmonary/Chest: Effort normal. No respiratory distress. She has no wheezes. She has no rales.   Abdominal: Soft. She exhibits no distension. There is no tenderness. There is no guarding.       Colostomy present   Neurological: She is alert.   Skin: Skin is warm and dry. No rash noted.   Nursing note and vitals reviewed.          Medication Review:   I have reviewed the patient's current medication list      allopurinol 100 mg Oral Q12H   apixaban 5 mg Oral Q12H   calcium " carb-cholecalciferol 1 tablet Oral BID With Meals   cholecalciferol 50,000 Units Oral Q7 Days   [START ON 9/5/2017] conjugated estrogens 0.75 application Vaginal Once per day on Tue   diltiaZEM  mg Oral Daily   donepezil 10 mg Oral Nightly   famotidine 40 mg Oral Nightly   furosemide 40 mg Oral Daily   glipiZIDE 5 mg Oral Daily With Breakfast   guaiFENesin 1,200 mg Oral BID   iron polysaccharides 150 mg Oral BID   isosorbide dinitrate 10 mg Oral Q12H   ketotifen 1 drop Both Eyes BID   levETIRAcetam 500 mg Oral Q12H   levothyroxine 150 mcg Oral Q AM   linagliptin 5 mg Oral Daily   metoprolol succinate  mg Oral Q12H   miconazole  Topical Q12H   multivitamin with minerals 1 tablet Oral Daily   potassium chloride 20 mEq Oral Daily   sennosides-docusate sodium 1 tablet Oral Q12H   sertraline 100 mg Oral Nightly           Labs:        Invalid input(s): NEUTOPHILPCT    Results from last 7 days  Lab Units 08/28/17  0609   SODIUM mmol/L 138   POTASSIUM mmol/L 4.2   CHLORIDE mmol/L 101   CO2 mmol/L 23.0   BUN mg/dL 27*   CREATININE mg/dL 0.97   CALCIUM mg/dL 9.3   GLUCOSE mg/dL 132*           Lab Results (last 24 hours)     Procedure Component Value Units Date/Time    POC Glucose Fingerstick [[id number]]  (Normal) Collected:  09/02/17 1735    Specimen:  Blood Updated:  09/02/17 1742     Glucose 123 mg/dL     Narrative:       Meter: [id number] : [id number] Wanda Bush CNA    POC Glucose Fingerstick [[id number]]  (Normal) Collected:  09/03/17 0615    Specimen:  Blood Updated:  09/03/17 0640     Glucose 111 mg/dL     Narrative:       Meter: [id number] : [id number] Anika Whatley LPN              Radiology:  Imaging Results (last 24 hours)     ** No results found for the last 24 hours. **          Cardiology:  ECG/EMG Results (last 24 hours)     ** No results found for the last 24 hours. **          I have reviewed consult notes.    Assessment and Plan:      1.  Atrial flutter. Stable continue present  medications    2.  Kidney disease stage III with intermittent increase in bun and cr. Will continue to monitor    3.  Hypertension is well controlled no changes in medications    4.  Anemia chronic disease is at baseline will monitor H&H is periodically    5.  Diabetes mellitus continue Accu-Cheks sliding insulin    6.  Chronic diastolic congestive heart failure is well compensated

## 2017-09-03 NOTE — PLAN OF CARE
Problem: Skin Integrity Impairment, Risk/Actual (Adult)  Goal: Identify Related Risk Factors and Signs and Symptoms  Outcome: Ongoing (interventions implemented as appropriate)    09/03/17 1144   Skin Integrity Impairment, Risk/Actual   Skin Integrity Impairment, Risk/Actual: Related Risk Factors age extremes       Goal: Skin Integrity/Wound Healing  Outcome: Ongoing (interventions implemented as appropriate)    09/03/17 1144   Skin Integrity Impairment, Risk/Actual (Adult)   Skin Integrity/Wound Healing achieves outcome         Problem: Fall Risk (Adult)  Goal: Identify Related Risk Factors and Signs and Symptoms  Outcome: Ongoing (interventions implemented as appropriate)    09/03/17 1144   Fall Risk   Fall Risk: Related Risk Factors age-related changes   Fall Risk: Signs and Symptoms presence of risk factors       Goal: Absence of Falls  Outcome: Ongoing (interventions implemented as appropriate)    09/03/17 1144   Fall Risk (Adult)   Absence of Falls achieves outcome

## 2017-09-04 NOTE — PLAN OF CARE
Problem: Skin Integrity Impairment, Risk/Actual (Adult)  Goal: Identify Related Risk Factors and Signs and Symptoms  Outcome: Ongoing (interventions implemented as appropriate)    09/04/17 1153   Skin Integrity Impairment, Risk/Actual   Skin Integrity Impairment, Risk/Actual: Related Risk Factors age extremes       Goal: Skin Integrity/Wound Healing  Outcome: Ongoing (interventions implemented as appropriate)    09/04/17 1153   Skin Integrity Impairment, Risk/Actual (Adult)   Skin Integrity/Wound Healing making progress toward outcome         Problem: Fall Risk (Adult)  Goal: Identify Related Risk Factors and Signs and Symptoms  Outcome: Ongoing (interventions implemented as appropriate)    09/04/17 1153   Fall Risk   Fall Risk: Related Risk Factors age-related changes   Fall Risk: Signs and Symptoms presence of risk factors       Goal: Absence of Falls  Outcome: Ongoing (interventions implemented as appropriate)    09/04/17 1153   Fall Risk (Adult)   Absence of Falls making progress toward outcome         Problem: Pain, Acute (Adult)  Goal: Identify Related Risk Factors and Signs and Symptoms  Outcome: Ongoing (interventions implemented as appropriate)    09/04/17 1153   Pain, Acute   Related Risk Factors (Acute Pain) disease process       Goal: Acceptable Pain Control/Comfort Level  Outcome: Ongoing (interventions implemented as appropriate)    09/04/17 1153   Pain, Acute (Adult)   Acceptable Pain Control/Comfort Level making progress toward outcome

## 2017-09-05 NOTE — PLAN OF CARE
Problem: Skin Integrity Impairment, Risk/Actual (Adult)  Goal: Identify Related Risk Factors and Signs and Symptoms  Outcome: Outcome(s) achieved Date Met:  09/05/17  Goal: Skin Integrity/Wound Healing  Outcome: Ongoing (interventions implemented as appropriate)    Problem: Fall Risk (Adult)  Goal: Identify Related Risk Factors and Signs and Symptoms  Outcome: Outcome(s) achieved Date Met:  09/05/17  Goal: Absence of Falls  Outcome: Ongoing (interventions implemented as appropriate)    Problem: Pain, Acute (Adult)  Goal: Identify Related Risk Factors and Signs and Symptoms  Outcome: Outcome(s) achieved Date Met:  09/05/17  Goal: Acceptable Pain Control/Comfort Level  Outcome: Ongoing (interventions implemented as appropriate)

## 2017-09-06 NOTE — PLAN OF CARE
Problem: Skin Integrity Impairment, Risk/Actual (Adult)  Goal: Skin Integrity/Wound Healing  Outcome: Ongoing (interventions implemented as appropriate)    09/06/17 1416   Skin Integrity Impairment, Risk/Actual (Adult)   Skin Integrity/Wound Healing making progress toward outcome         Problem: Fall Risk (Adult)  Goal: Absence of Falls  Outcome: Ongoing (interventions implemented as appropriate)    09/06/17 1416   Fall Risk (Adult)   Absence of Falls making progress toward outcome         Problem: Pain, Acute (Adult)  Goal: Acceptable Pain Control/Comfort Level  Outcome: Ongoing (interventions implemented as appropriate)    09/06/17 1416   Pain, Acute (Adult)   Acceptable Pain Control/Comfort Level making progress toward outcome

## 2017-09-06 NOTE — PLAN OF CARE
Problem: Skin Integrity Impairment, Risk/Actual (Adult)  Goal: Skin Integrity/Wound Healing  Outcome: Ongoing (interventions implemented as appropriate)    09/06/17 0049   Skin Integrity Impairment, Risk/Actual (Adult)   Skin Integrity/Wound Healing making progress toward outcome         Problem: Fall Risk (Adult)  Goal: Absence of Falls  Outcome: Ongoing (interventions implemented as appropriate)    Problem: Pain, Acute (Adult)  Goal: Acceptable Pain Control/Comfort Level  Outcome: Ongoing (interventions implemented as appropriate)

## 2017-09-07 PROBLEM — Z51.89 ENCOUNTER FOR REHABILITATION: Status: ACTIVE | Noted: 2017-01-01

## 2017-09-07 NOTE — PLAN OF CARE
Problem: Skin Integrity Impairment, Risk/Actual (Adult)  Goal: Skin Integrity/Wound Healing  Outcome: Ongoing (interventions implemented as appropriate)    09/07/17 0023   Skin Integrity Impairment, Risk/Actual (Adult)   Skin Integrity/Wound Healing making progress toward outcome         Problem: Fall Risk (Adult)  Goal: Absence of Falls  Outcome: Ongoing (interventions implemented as appropriate)    09/07/17 0023   Fall Risk (Adult)   Absence of Falls making progress toward outcome         Problem: Pain, Acute (Adult)  Goal: Acceptable Pain Control/Comfort Level  Outcome: Ongoing (interventions implemented as appropriate)    09/07/17 0023   Pain, Acute (Adult)   Acceptable Pain Control/Comfort Level making progress toward outcome

## 2017-09-07 NOTE — PLAN OF CARE
Problem: Skin Integrity Impairment, Risk/Actual (Adult)  Goal: Skin Integrity/Wound Healing  Outcome: Ongoing (interventions implemented as appropriate)    09/07/17 1013   Skin Integrity Impairment, Risk/Actual (Adult)   Skin Integrity/Wound Healing achieves outcome         Problem: Fall Risk (Adult)  Goal: Absence of Falls  Outcome: Ongoing (interventions implemented as appropriate)    09/07/17 1013   Fall Risk (Adult)   Absence of Falls achieves outcome

## 2017-09-08 NOTE — PROGRESS NOTES
"Adult Nutrition  Assessment/PES    Patient Name:  Yamile Cronin  YOB: 1940  MRN: [id number]  Admit Date:  9/1/2017    Assessment Date:  9/8/2017        Reason for Assessment       09/08/17 1306    Reason for Assessment    Reason For Assessment/Visit other (comment)   quarterly assessment    Cardiac HTN;CHF    Endocrine DM;Hypothyroid    Gastrointestinal Constipation    Neurological Alzheimer's;Dementia    Renal CKD    Other history of immobility, increased confusion, now has metabolic encephalopathy due to UTI              Nutrition/Diet History       09/08/17 1314    Nutrition/Diet History    Factors Affecting Nutritional Intake --   history of difficulty with chewing/swalllowing, missing many of natural teeth but does not want her food mechanically altered, tolerating diet as ordered, needs assistance with set up but can feed self            Anthropometrics       09/08/17 1405    Anthropometrics    Height Method Actual    Height 171.5 cm (67.52\")    RD Documented Current Weight  108 kg (238 lb 1 oz)   on 9-7-17    Anthropometrics (Special Considerations)    RD Calculated BMI (kg/m2) 36.72    Ideal Body Weight (IBW)    Ideal Body Weight (IBW), Female 63.45    Usual Body Weight (UBW)    Usual Body Weight --   wt at approximately 30 days ago was 238.1875# on 8-2-17 and was 236# 3.2 ounces on 3-12-17-no significant change noted    Body Mass Index (BMI)    BMI Grade 35 - 39.9 - obesity - grade II            Labs/Tests/Procedures/Meds       09/08/17 1409    Labs/Tests/Procedures/Meds    Labs/Tests Review Reviewed    Medication Review Diuretic;Multivitamin;Reviewed, pertinent            Physical Findings       09/08/17 1421    Physical Findings/Assessment    Additional Documentation --   no skin breakdown noted    Physical Appearance    Gastrointestinal colostomy            Estimated/Assessed Needs       09/08/17 1433    Calculation Measurements    Weight Used For Calculations 108 kg (238 lb 1 oz)    " "Height Used for Calculations 1.715 m (5' 7.52\")    Estimated/Assessed Energy Needs    Energy Need Method Kcal/kg    kcal/kg 14    14 Kcal/Kg (kcal) 1511.78    Estimated/Assessed Protein Needs    Weight Used for Protein Calculation 108 kg (238 lb 1 oz)    Protein (gm/kg) 0.8    0.8 Gm Protein (gm) 86.39    Estimated/Assessed Fluid Needs    Fluid Need Method Other (comment)   1,400-1,900 ml based upon evidenced based care for CHF            Nutrition Prescription Ordered       09/08/17 1440    Nutrition Prescription PO    Supplement Other (comment)   snack of choice once daily at bedtime    Common Modifiers Consistent Carbohydrate   provides approximately 180 grams of carbohdyrate per day which is 48% of estimated energy needs as carbohyrate            Evaluation of Received Nutrient/Fluid Intake       09/08/17 1442    Fluid Intake Evaluation    Oral Fluid (mL) 427   average per day past 3 days    Total Fluid Intake (mL) 427    % Fluid Needs 31%    PO Evaluation    Number of Meals 14    % PO Intake 46%              Problem/Interventions:              Problem 4       09/08/17 1448    Nutrition Diagnoses Problem 4    Problem 4 Predicted Suboptimal Intake    Etiology (related to) Factors Affecting Nutrition   UTI, increased confusion    Signs/Symptoms (evidenced by) PO Intake;Fluid    Percent (%) intake recorded 46 %    Over number of meals 14    Percent (%) of estimated fluid need 31 %              Intervention Goal       09/08/17 1501    Intervention Goal    PO Intake % 75 %   or greater of meals and any snacks and meet estimated fluid needs            Nutrition Intervention       09/08/17 1502    Nutrition Intervention    RD/Tech Action Encourage intake;Interview for preference;Follow Tx progress;Advise alternate selection            Nutrition Prescription       09/08/17 1503    Other Orders    Other --   added to Resident TAR to offer 240 ml between meals 3 times daily            Education/Evaluation       09/08/17 " 1502    Education    Education Other (comment)   no education needs identified    Monitor/Evaluation    Monitor I&O;PO intake;Supplement intake;Pertinent labs;Weight;Skin status            Electronically signed by:  Cuca Sarmiento RD  09/08/17 3:07 PM

## 2017-09-08 NOTE — PROGRESS NOTES
Daily Progress Note:    Subjective: Increasing confusion yesterday for the nurses.  This morning is drowsy but responsive    Flowsheet Rows         First Filed Value    Admission Height      Admission Weight  241 lb 14.4 oz (110 kg) Documented at 09/02/2017 0631          Patient Vitals for the past 24 hrs:   BP Temp Temp src Pulse Resp SpO2   09/1940 118/79 97.6 °F (36.4 °C) Oral 87 18 91 %   09/07/17 1306 121/76 - - 87 18 90 %   09/07/17 0758 124/76 97.8 °F (36.6 °C) Oral 98 18 96 %         Intake/Output Summary (Last 24 hours) at 09/08/17 0735  Last data filed at 09/07/17 1400   Gross per 24 hour   Intake              200 ml   Output                0 ml   Net              200 ml       Review of Systems   Respiratory: Negative for chest tightness and wheezing.    Cardiovascular: Negative for chest pain.   Gastrointestinal: Negative for abdominal distention, abdominal pain and vomiting.   Genitourinary: Negative for frequency.   Skin: Negative for rash.   Psychiatric/Behavioral: Positive for confusion. Negative for agitation.       Physical Exam   Constitutional: She appears well-developed and well-nourished.   HENT:   Head: Normocephalic.   Mouth/Throat: Oropharynx is clear and moist.   Eyes: Conjunctivae are normal.   Neck: Normal range of motion. No JVD present. No thyromegaly present.   Cardiovascular: Normal rate.  An irregularly irregular rhythm present.   Murmur heard.   Systolic murmur is present with a grade of 2/6   Pulmonary/Chest: Effort normal. No respiratory distress. She has no wheezes. She has no rales.   Abdominal: Soft. She exhibits no distension. There is no tenderness. There is no guarding.       Colostomy present   Neurological: She is alert.   Skin: Skin is warm and dry. No rash noted.   Nursing note and vitals reviewed.          Medication Review:   I have reviewed the patient's current medication list      allopurinol 100 mg Oral Q12H   apixaban 5 mg Oral Q12H   calcium  carb-cholecalciferol 1 tablet Oral BID With Meals   cholecalciferol 50,000 Units Oral Q7 Days   conjugated estrogens 0.75 application Vaginal Once per day on Tue   diltiaZEM  mg Oral Daily   donepezil 10 mg Oral Nightly   famotidine 40 mg Oral Nightly   furosemide 40 mg Oral Daily   glipiZIDE 5 mg Oral Daily With Breakfast   guaiFENesin 1,200 mg Oral BID   iron polysaccharides 150 mg Oral BID   isosorbide dinitrate 10 mg Oral Q12H   ketotifen 1 drop Both Eyes BID   levETIRAcetam 500 mg Oral Q12H   levothyroxine 150 mcg Oral Q AM   linagliptin 5 mg Oral Daily   metoprolol succinate  mg Oral Q12H   miconazole  Topical Q12H   multivitamin with minerals 1 tablet Oral Daily   potassium chloride 20 mEq Oral Daily   sennosides-docusate sodium 1 tablet Oral Q12H   sertraline 100 mg Oral Nightly                  Labs:    Results from last 7 days  Lab Units 09/07/17  1412   WBC 10*3/mm3 8.19   HEMOGLOBIN g/dL 13.2   HEMATOCRIT % 41.1   PLATELETS 10*3/mm3 192       Results from last 7 days  Lab Units 09/04/17  0638   SODIUM mmol/L 136   POTASSIUM mmol/L 4.3   CHLORIDE mmol/L 98   CO2 mmol/L 25.8   BUN mg/dL 28*   CREATININE mg/dL 1.02*   CALCIUM mg/dL 11.9*   GLUCOSE mg/dL 126*           Lab Results (last 24 hours)     Procedure Component Value Units Date/Time    POC Glucose Fingerstick [[id number]]  (Abnormal) Collected:  09/07/17 1352    Specimen:  Blood Updated:  09/07/17 1400     Glucose 134 (H) mg/dL     Narrative:       Meter: [id number] : [id number] Wanda Hunter LPN    CBC & Differential [[id number]] Collected:  09/07/17 1412    Specimen:  Blood Updated:  09/07/17 1429    Narrative:       The following orders were created for panel order CBC & Differential.  Procedure                               Abnormality         Status                     ---------                               -----------         ------                     CBC Auto Differential[[id number]]        Abnormal            Final result                  Please view results for these tests on the individual orders.    CBC Auto Differential [[id number]]  (Abnormal) Collected:  09/07/17 1412    Specimen:  Blood Updated:  09/07/17 1429     WBC 8.19 10*3/mm3      RBC 4.20 10*6/mm3      Hemoglobin 13.2 g/dL      Hematocrit 41.1 %      MCV 97.9 fL      MCH 31.4 (H) pg      MCHC 32.1 g/dL      RDW 14.5 %      RDW-SD 51.2 fl      MPV 11.1 (H) fL      Platelets 192 10*3/mm3      Neutrophil % 66.0 %      Lymphocyte % 17.5 (L) %      Monocyte % 8.8 (H) %      Eosinophil % 2.7 %      Basophil % 1.0 %      Immature Grans % 4.0 (H) %      Neutrophils, Absolute 5.41 10*3/mm3      Lymphocytes, Absolute 1.43 10*3/mm3      Monocytes, Absolute 0.72 10*3/mm3      Eosinophils, Absolute 0.22 10*3/mm3      Basophils, Absolute 0.08 10*3/mm3      Immature Grans, Absolute 0.33 (H) 10*3/mm3      nRBC 0.2 (H) /100 WBC     Urinalysis With / Microscopic If Indicated [[id number]]  (Abnormal) Collected:  09/07/17 1403    Specimen:  Urine from Urine, Clean Catch Updated:  09/07/17 1600     Color, UA Yellow     Appearance, UA Cloudy (A)     pH, UA <=5.0     Specific Gravity, UA 1.015     Glucose, UA Negative     Ketones, UA Negative     Bilirubin, UA Negative     Blood, UA Trace (A)     Protein, UA 30 mg/dL (1+) (A)     Leuk Esterase, UA Large (3+) (A)     Nitrite, UA Positive (A)     Urobilinogen, UA 0.2 E.U./dL    Urinalysis, Microscopic Only [[id number]]  (Abnormal) Collected:  09/07/17 1403    Specimen:  Urine from Urine, Clean Catch Updated:  09/07/17 1600     RBC, UA 6-12 (A) /HPF      WBC, UA Too Numerous to Count (A) /HPF      Bacteria, UA 4+ (A) /HPF      Squamous Epithelial Cells, UA 7-12 (A) /HPF      Hyaline Casts, UA None Seen /LPF      Methodology Manual Light Microscopy    POC Glucose Fingerstick [[id number]]  (Abnormal) Collected:  09/07/17 1653    Specimen:  Blood Updated:  09/07/17 1703     Glucose 170 (H) mg/dL     Narrative:       Meter: [id number] : [id number] Snow  Dale SAMANIEGO    POC Glucose Fingerstick [[id number]]  (Normal) Collected:  09/08/17 0607    Specimen:  Blood Updated:  09/08/17 0616     Glucose 128 mg/dL     Narrative:       Meter: [id number] : [id number] Panfilo Cox CNA              Radiology:  Imaging Results (last 24 hours)     ** No results found for the last 24 hours. **          Cardiology:  ECG/EMG Results (last 24 hours)     ** No results found for the last 24 hours. **          I have reviewed consult notes.    Assessment and Plan:        1.  Atrial flutter. Stable continue present medications    2.  Kidney disease stage III with intermittent increase in bun and cr. Will continue to monitor    3.  Hypertension is well controlled no changes in medications    4.  Anemia chronic disease is at baseline will monitor H&H is periodically    5.  Diabetes mellitus continue Accu-Cheks sliding insulin    6.  Chronic diastolic congestive heart failure is well compensated    7.  Metabolic encephalopathy secondary to urinary tract infection will start by mouth antibiotics await cultures

## 2017-09-08 NOTE — PLAN OF CARE
Problem: Skin Integrity Impairment, Risk/Actual (Adult)  Goal: Skin Integrity/Wound Healing  Outcome: Ongoing (interventions implemented as appropriate)    09/08/17 1138   Skin Integrity Impairment, Risk/Actual (Adult)   Skin Integrity/Wound Healing achieves outcome         Problem: Fall Risk (Adult)  Goal: Absence of Falls  Outcome: Ongoing (interventions implemented as appropriate)    09/08/17 1138   Fall Risk (Adult)   Absence of Falls achieves outcome

## 2017-09-08 NOTE — PLAN OF CARE
Problem: Skin Integrity Impairment, Risk/Actual (Adult)  Goal: Skin Integrity/Wound Healing  Outcome: Ongoing (interventions implemented as appropriate)    09/08/17 1137   Skin Integrity Impairment, Risk/Actual (Adult)   Skin Integrity/Wound Healing achieves outcome         09/08/17 1137   Skin Integrity Impairment, Risk/Actual (Adult)   Skin Integrity/Wound Healing achieves outcome         Problem: Fall Risk (Adult)  Goal: Absence of Falls  Outcome: Ongoing (interventions implemented as appropriate)    09/08/17 1137   Fall Risk (Adult)   Absence of Falls achieves outcome

## 2017-09-09 PROBLEM — R41.82 ALTERED MENTAL STATUS: Status: ACTIVE | Noted: 2017-01-01

## 2017-09-09 NOTE — PLAN OF CARE
Problem: Skin Integrity Impairment, Risk/Actual (Adult)  Goal: Skin Integrity/Wound Healing  Outcome: Ongoing (interventions implemented as appropriate)    Problem: Fall Risk (Adult)  Goal: Absence of Falls  Outcome: Ongoing (interventions implemented as appropriate)    Problem: Pain, Acute (Adult)  Goal: Acceptable Pain Control/Comfort Level  Outcome: Ongoing (interventions implemented as appropriate)

## 2017-09-19 PROBLEM — N17.9 ACUTE ON CHRONIC RENAL FAILURE (HCC): Status: ACTIVE | Noted: 2017-01-01

## 2017-09-19 PROBLEM — N30.00 ACUTE CYSTITIS: Status: ACTIVE | Noted: 2017-01-01

## 2017-09-19 PROBLEM — N18.9 ACUTE ON CHRONIC RENAL FAILURE (HCC): Status: ACTIVE | Noted: 2017-01-01

## 2017-09-20 PROBLEM — N39.0 UTI (URINARY TRACT INFECTION): Status: ACTIVE | Noted: 2017-01-01

## 2017-10-18 NOTE — ED PROVIDER NOTES
Subjective   Patient is a 77 y.o. female presenting with altered mental status.   History provided by: TCU staff.  History limited by:  Dementia  Altered Mental Status   Presenting symptoms: partial responsiveness    Severity:  Moderate  Most recent episode:  Today  Episode history:  Single  Chronicity:  Recurrent  Context: dementia    Context comment:  As described below.  Patient has a history of Alzheimer's.  Prior similar episodes have been observed.  Associated symptoms comment:  None noted    HPI Narrative:Ms. Cronin is a 78 yo WF who presents secondary to altered mental status.  Patient is a resident in TCU. The nursing staff noted a decrease in patient's mental status.  She had decreased responsiveness.  The nursing staff contacted Dr. Galaviz.  Patient was transferred to the ER for evaluation.        Review of Systems   Unable to perform ROS: Mental status change       Past Medical History:   Diagnosis Date   • Alzheimer disease    • Anxiety    • Arthritis    • Chest pain    • CHF (congestive heart failure)    • Constipation    • Depression    • History of transfusion    • Hypertension    • Hypothyroid    • Myocardial infarction    • Osteoporosis    • Seizure    • UTI (urinary tract infection)        Allergies   Allergen Reactions   • Cefazolin    • Cefprozil    • Cephalosporins        Past Surgical History:   Procedure Laterality Date   • CARDIAC SURGERY     • COLON SURGERY     • COLONOSCOPY     • COLOSTOMY Left        History reviewed. No pertinent family history.    Social History     Social History   • Marital status:      Spouse name: N/A   • Number of children: N/A   • Years of education: N/A     Social History Main Topics   • Smoking status: Never Smoker   • Smokeless tobacco: Never Used   • Alcohol use No   • Drug use: No   • Sexual activity: No     Other Topics Concern   • None     Social History Narrative           Objective   Physical Exam   Constitutional: She appears well-developed  and well-nourished. No distress.   77-year-old white female laying in bed.  Patient appears in fair overall health.  Patient is somewhat familiar to me from prior ER visits. Patient is unresponsive to external stimuli at this time.    HENT:   Head: Normocephalic and atraumatic.   Right Ear: External ear normal.   Left Ear: External ear normal.   Nose: Nose normal.   Mouth/Throat: Oropharynx is clear and moist.   Pupils are small but reactive.   Eyes: Conjunctivae are normal. Pupils are equal, round, and reactive to light.   Neck: Normal range of motion. Neck supple.   Cardiovascular: Normal rate, regular rhythm, normal heart sounds and intact distal pulses.  Exam reveals no gallop and no friction rub.    No murmur heard.  Pulmonary/Chest: Effort normal and breath sounds normal.   Abdominal: Soft. Bowel sounds are normal. She exhibits no distension. There is no tenderness.   Musculoskeletal: She exhibits no deformity.   Neurological:   Patient has decreased responsive to external stimuli.  However gag reflex is intact.   Skin: Skin is warm and dry. She is not diaphoretic.   Psychiatric:   Unable to assess at this time.   Nursing note and vitals reviewed.      ECG 12 Lead    Date/Time: 10/18/2017 10:16 AM  Performed by: DAE CHOW  Authorized by: DAE CHOW   Interpreted by physician  Comparison: compared with previous ECG from 9/18/2017  Similar to previous ECG  Rhythm: atrial fibrillation  Rate: normal  Conduction: conduction normal  ST Segments: ST segments normal  T depression: aVF, III and II  T flattening: V2, V3, V1, V4, V5, V6, aVL and I  Clinical impression: abnormal ECG               ED Course  ED Course   Comment By Time   10/18/17  9:36 AM  Sudden onset of decreased response.  Blood sugar okay.  Giving Narcan.  Will obtain CT head.  Lab work. Dae Chow MD 10/18 0937   10/18/17  10:56 AM  Patient now more responsive.  She tells me her name.  She moves all 4 extremities on command.  CT  shows no change in patient's stroke.  EKG is unchanged.  Lab work shows mild hyperglycemia with glucose 141.  CMP otherwise unremarkable.  CBC notable for mild anemia with hemoglobin 10.7 and hematocrit 32.5.  The UA shows moderate blood and trace leukocyte esterase.  Microscopic shows no bacteria, 3-5 WBCs, 6-12 RBCs and 3-6 squamous epithelial cells.  This appears more consistent with contamination than true UTI. Calling Dr. Galaviz. Donnie Dietrich MD 10/18 1049   10/18/17  11:22 AM  Discussed with Dr. Galaviz.  My workup did not reveal any cause of patient's transient altered mental status.  Will discharge back to TCU as discussed with Dr. Galaviz. Donnie Dietrich MD 10/18 4792      Labs Reviewed   COMPREHENSIVE METABOLIC PANEL - Abnormal; Notable for the following:        Result Value    Glucose 141 (*)     Albumin 3.00 (*)     All other components within normal limits    Narrative:     The MDRD GFR formula is only valid for adults with stable renal function between ages 18 and 70.   PROTIME-INR - Abnormal; Notable for the following:     Protime 15.6 (*)     INR 1.23 (*)     All other components within normal limits    Narrative:     Therapeutic Ranges for INR: 2.0-3.0 (PT 20-30)                              2.5-3.5 (PT 25-34)   URINALYSIS W/ CULTURE IF INDICATED - Abnormal; Notable for the following:     Blood, UA Moderate (2+) (*)     Protein,  mg/dL (2+) (*)     Leuk Esterase, UA Trace (*)     All other components within normal limits   CBC WITH AUTO DIFFERENTIAL - Abnormal; Notable for the following:     RBC 3.41 (*)     Hemoglobin 10.7 (*)     Hematocrit 32.5 (*)     MCH 31.4 (*)     RDW 14.8 (*)     MPV 10.5 (*)     Lymphocyte % 16.9 (*)     Monocyte % 8.3 (*)     Immature Grans % 3.8 (*)     Immature Grans, Absolute 0.29 (*)     All other components within normal limits   URINALYSIS, MICROSCOPIC ONLY - Abnormal; Notable for the following:     RBC, UA 6-12 (*)     WBC, UA 3-5 (*)      Squamous Epithelial Cells, UA 3-6 (*)     All other components within normal limits   POCT GLUCOSE FINGERSTICK - Abnormal; Notable for the following:     Glucose 180 (*)     All other components within normal limits    Narrative:     Meter: [id number] : [id number] Oziel Silva LPN   POCT GLUCOSE FINGERSTICK - Abnormal; Notable for the following:     Glucose 195 (*)     All other components within normal limits    Narrative:     Meter: [id number] : [id number] Panfilo Cox CNA   POCT GLUCOSE FINGERSTICK - Abnormal; Notable for the following:     Glucose 139 (*)     All other components within normal limits    Narrative:     Meter: [id number] : [id number] Senia Ramirez LPN   POCT GLUCOSE FINGERSTICK - Abnormal; Notable for the following:     Glucose 160 (*)     All other components within normal limits    Narrative:     Meter: [id number] : [id number] Mary Gunter CNA   APTT - Normal    Narrative:     PTT = The equivalent PTT values for the therapeutic range of heparin levels at 0.1 to 0.7 U/ml are 53 to 110 seconds.   LACTIC ACID, PLASMA - Normal   CBC AND DIFFERENTIAL    Narrative:     The following orders were created for panel order CBC & Differential.  Procedure                               Abnormality         Status                     ---------                               -----------         ------                     CBC Auto Differential[[id number]]        Abnormal            Final result                 Please view results for these tests on the individual orders.     Ct Head Without Contrast    Result Date: 10/18/2017  Narrative: INDICATION: Mental status change. Nonverbal.  TECHNIQUE: CT head without contrast.  Radiation dose reduction techniques were utilized, including automated exposure control and exposure modulation based on body size.  COMPARISON: CT head 10/17/2017.  FINDINGS: The subacute infarct in the distribution of the left PCA is not significantly changed. No hemorrhagic  transformation. No significant mass effect. There is no new areas of infarct. There is generalized atrophy and chronic small vessel changes. Old lacunar infarcts are noted in the basal ganglia.. The ventricles and basilar cisterns are unchanged. No extra-axial collections.  No acute osseous abnormalities.      Impression:  1. No new findings. 2. Large infarct in the distribution of the left posterior cerebral artery is unchanged from the prior exam.  This report was finalized on 10/18/2017 10:18 AM by Dr. Nishant Arita MD.      Ct Head Without Contrast    Result Date: 10/17/2017  Narrative: INDICATION: Confusion. Left posterior cerebral artery territory infarct. Intracranial hemorrhage. Restaging.  TECHNIQUE: CT head without contrast.  Radiation dose reduction techniques were utilized, including automated exposure control and exposure modulation based on body size.  COMPARISON: MRI brain 10/16/2017.  FINDINGS: The large left PICA distribution infarct is unchanged from the prior study. This involves the left occipital lobe and medial left temporal lobe. There is minimal localized mass effect. No shift. There is no hemorrhagic transformation.  There is generalized atrophy and chronic small vessel changes. Ventricles and basilar cisterns are stable. No intraventricular hemorrhage. No acute osseous abnormalities.      Impression: Large left PCA distribution infarct is unchanged from yesterday's MRI. No hemorrhagic transformation.  This report was finalized on 10/17/2017 2:20 PM by Dr. Nishant Arita MD.      Mri Brain Without Contrast    Result Date: 10/17/2017  Narrative: INDICATION: Dementia. Drowsiness and confusion for 2 days. Prior CVA..  TECHNIQUE: Multiplanar MRI of the brain without contrast.  COMPARISON: CT head 9/9/2017 and 3/30/2015.  FINDINGS: The midline structures and craniocervical junction are within normal limits.  There is a large area of acute to subacute ischemia in the medial left occipital lobe and  medial left temporal lobe distribution of the left posterior cerebral artery. The area measures approximately 8 x 4 cm. This has slightly diminished signal intensity on the ADC map to confirm a late acute or early subacute timeframe. There is no associated mass effect or significant edema.  No acute intracranial hemorrhage. There is extensive T2/FLAIR signal abnormalities within the periventricular and subcortical white matter indicative of chronic small vessel changes. This is most pronounced in the bilateral frontal lobes. There may be an old lacunar infarct in the periventricular white matter of the anterior right frontal lobe and in the basal ganglia/thalami. There is a single GRE blooming artifact along the posterior horn left lateral ventricle. There is a significant amount of motion on this sequence and the relevance is indeterminate. Could potentially represent a small focus of hemorrhage, however there is no evidence of hemorrhage elsewhere on the study.  The ventricles and basilar cisterns are normal in size and configuration. No extra-axial collections are identified.  Vascular flow voids are unremarkable. Visualized paranasal sinuses and mastoid air cells are clear. Soft tissues of the orbit are unremarkable.      Impression:  1. Large late acute to early subacute infarct in the distribution of the left PCA. No mass effect or midline shift. 2. Single focus of gradient echo signal in the left lateral ventricle is indeterminate. This could potentially represent an area of hemorrhage in the ventricle, however this is less likely a suspect this is probably due to choroid plexus calcifications. CT of the head could be utilized to further evaluate. 3. Atrophy and chronic small vessel changes.   Initial interpretation provided by Dr. Day at 18:00 on 10/16/2017.  This report was finalized on 10/17/2017 7:59 AM by Dr. Nishant Arita MD.      Xr Chest 1 View    Result Date: 10/4/2017  Narrative: CHEST X-RAY,  10/04/2017:  HISTORY: PICC placement.  TECHNIQUE: AP portable chest x-ray.  FINDINGS: Newly placed right arm PICC tip in good position the SVC.  Shallow lung expansion with bibasilar pulmonary atelectasis. Upper lungs clear. Stable cardiomegaly. CABG.      Impression: PICC in good position.  This report was finalized on 10/4/2017 1:34 PM by Dr. Michael Brito MD.                  MDM  Number of Diagnoses or Management Options  Decreased level of consciousness: established and worsening     Amount and/or Complexity of Data Reviewed  Clinical lab tests: reviewed and ordered  Tests in the radiology section of CPT®: reviewed and ordered  Tests in the medicine section of CPT®: reviewed and ordered  Discuss the patient with other providers: yes (Dr. Galaviz)    Risk of Complications, Morbidity, and/or Mortality  Presenting problems: moderate  Diagnostic procedures: high  Management options: moderate    Patient Progress  Patient progress: improved      Final diagnoses:   Decreased level of consciousness            Donnie Dietrich MD  10/21/17 0047

## 2017-10-18 NOTE — ED NOTES
Pt responded to asking her name.  X 2.  Responded to last name as well.      Lavelle Guevara  10/18/17 1044

## 2017-11-18 NOTE — ED PROVIDER NOTES
Subjective     History provided by:  Caregiver (TCU staff nurse)  History limited by: Patient baseline confusion, limited verbal skills.    History of Present Illness    Chief complaint: Neck mass, swelling    Location: Left side of neck    Quality/Severity:  Moderate swelling, tenderness    Timing/Duration: Reportedly has developed fairly quickly over the past several hours this afternoon    Modifying Factors: None known    Narrative: This patient is transferred to us from the TCU facility [facility] for evaluation of new onset neck mass and swelling.  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  The staff nurses became concerned this evening when she began to show some new signs of neck swelling on the left side.  They say that it has seemingly progressed quickly over the past several hours.  She has not had any fevers.  She has not had any signs of difficulty breathing.  She has had some report of difficulty swallowing and the staff has been helping to feed her foods recently.  Notably, she has been started on Unasyn recently for UTI concerns and has been receiving a medicine IV today.  Her family member saw her earlier today in the afternoon and they did not happen to note any neck swelling or signs of discomfort at that time.  The remainder of the history is unfortunately quite limited because the patient has very limited verbal skills and is unable to give any quality information independently.    Associated Symptoms: As above    Review of Systems   Unable to perform ROS: Patient nonverbal   Constitutional: Negative for activity change and fever.   Respiratory: Negative for shortness of breath.    Gastrointestinal: Negative for vomiting.   Musculoskeletal: Positive for neck pain.   Neurological: Negative for syncope and headaches.   All other systems reviewed and are negative.      Past Medical History:   Diagnosis Date    • Alzheimer disease    • Anxiety    • ARF (acute renal failure)    • Arthritis    • Atrial fibrillation    • Chest pain    • CHF (congestive heart failure)    • Constipation    • Coronary artery disease    • Depression    • Diabetes mellitus    • Digoxin toxicity    • H/O degenerative disc disease    • History of transfusion    • Hyperlipidemia    • Hypertension    • Hypothyroid    • Myocardial infarction    • Osteoporosis    • Renal disorder    • Seizure    • Sepsis    • Stroke    • UTI (urinary tract infection)        Allergies   Allergen Reactions   • Cefazolin    • Cefprozil    • Cephalosporins        Past Surgical History:   Procedure Laterality Date   • CARDIAC SURGERY     • COLON SURGERY     • COLONOSCOPY     • COLOSTOMY Left    • THYROID SURGERY         History reviewed. No pertinent family history.    Social History     Social History   • Marital status:      Spouse name: N/A   • Number of children: N/A   • Years of education: N/A     Social History Main Topics   • Smoking status: Never Smoker   • Smokeless tobacco: Never Used   • Alcohol use No   • Drug use: No   • Sexual activity: No     Other Topics Concern   • None     Social History Narrative   • None       ED Triage Vitals   Temp Heart Rate Resp BP SpO2   11/17/17 2111 11/17/17 2111 11/17/17 2111 11/17/17 2111 11/17/17 2111   98.6 °F (37 °C) 95 16 124/75 98 %      Temp src Heart Rate Source Patient Position BP Location FiO2 (%)   11/17/17 2111 -- -- -- --   Axillary             Objective   Physical Exam   Constitutional: She appears well-developed and well-nourished.   HENT:   Head: Normocephalic and atraumatic.   Eyes: EOM are normal. Pupils are equal, round, and reactive to light. Right eye exhibits no discharge. Left eye exhibits no discharge.   Neck: No tracheal deviation present.   Large, firm, neck mass easily visible and palpated to the left anterior margin extending below the mandible and down to the level of the hyoid region.  There  are no areas of fluctuance.  There is no crepitance.  There is no pulsatile feeling.  There is some mild erythematous changes at the skin surface.  The soft tissues appear mildly tender to palpation.   Cardiovascular: Normal rate, regular rhythm, normal heart sounds and intact distal pulses.  Exam reveals no gallop and no friction rub.    No murmur heard.  Pulmonary/Chest: Effort normal. No stridor. No respiratory distress. She has no wheezes. She has no rales. She exhibits no tenderness.   Abdominal: Soft. She exhibits no mass. There is no tenderness. There is no rebound and no guarding. No hernia.   Musculoskeletal: Normal range of motion. She exhibits no edema or deformity.   Neurological:   Reported baseline mentation and strength per TCU staff.  Patient will arouse to verbal stimuli and makes him one or 2 word comments occasionally but is not engaging in conversation or following commands   Skin: Skin is warm and dry. No rash noted. She is not diaphoretic. No erythema. No pallor.   Psychiatric: She has a normal mood and affect. Her behavior is normal. Judgment and thought content normal.   Nursing note and vitals reviewed.        Results for orders placed or performed during the hospital encounter of 11/17/17   Comprehensive Metabolic Panel   Result Value Ref Range    Glucose 247 (H) 65 - 99 mg/dL    BUN 73 (H) 8 - 23 mg/dL    Creatinine 1.89 (H) 0.57 - 1.00 mg/dL    Sodium 144 136 - 145 mmol/L    Potassium 4.6 3.5 - 5.2 mmol/L    Chloride 111 (H) 98 - 107 mmol/L    CO2 18.7 (L) 22.0 - 29.0 mmol/L    Calcium 8.7 (L) 8.8 - 10.5 mg/dL    Total Protein 6.4 6.0 - 8.5 g/dL    Albumin 2.60 (L) 3.50 - 5.20 g/dL    ALT (SGPT) 5 5 - 33 U/L    AST (SGOT) 9 5 - 32 U/L    Alkaline Phosphatase 64 40 - 129 U/L    Total Bilirubin 0.2 0.2 - 1.2 mg/dL    eGFR Non African Amer 26 (L) >60 mL/min/1.73    Globulin 3.8 gm/dL    A/G Ratio 0.7 g/dL    BUN/Creatinine Ratio 38.6 (H) 7.0 - 25.0    Anion Gap 14.3 mmol/L   Protime-INR    Result Value Ref Range    Protime 23.0 (H) 12.1 - 15.0 Seconds    INR 2.00 (H) 0.90 - 1.10   aPTT   Result Value Ref Range    PTT 35.2 24.3 - 38.1 seconds   CBC Auto Differential   Result Value Ref Range    WBC 16.96 (H) 4.80 - 10.80 10*3/mm3    RBC 3.31 (L) 4.20 - 5.40 10*6/mm3    Hemoglobin 10.5 (L) 12.0 - 16.0 g/dL    Hematocrit 32.7 (L) 37.0 - 47.0 %    MCV 98.8 81.0 - 99.0 fL    MCH 31.7 (H) 27.0 - 31.0 pg    MCHC 32.1 31.0 - 37.0 g/dL    RDW 15.9 (H) 11.5 - 14.5 %    RDW-SD 57.2 (H) 37.0 - 54.0 fl    MPV 12.6 (H) 7.4 - 10.4 fL    Platelets 173 140 - 500 10*3/mm3    Neutrophil % 83.9 (H) 45.0 - 70.0 %    Lymphocyte % 5.5 (L) 20.0 - 45.0 %    Monocyte % 8.0 3.0 - 8.0 %    Eosinophil % 0.8 0.0 - 4.0 %    Basophil % 0.4 0.0 - 2.0 %    Immature Grans % 1.4 (H) 0.0 - 0.5 %    Neutrophils, Absolute 14.25 (H) 1.50 - 8.30 10*3/mm3    Lymphocytes, Absolute 0.93 0.60 - 4.80 10*3/mm3    Monocytes, Absolute 1.35 (H) 0.00 - 1.00 10*3/mm3    Eosinophils, Absolute 0.14 0.10 - 0.30 10*3/mm3    Basophils, Absolute 0.06 0.00 - 0.20 10*3/mm3    Immature Grans, Absolute 0.23 (H) 0.00 - 0.03 10*3/mm3    nRBC 0.0 0.0 - 0.0 /100 WBC   Lactic Acid, Plasma   Result Value Ref Range    Lactate 2.5 (C) 0.5 - 2.0 mmol/L       RADIOLOGY        Study: CT neck soft tissues    Findings: Findings are consistent with severe acute sialoadenitis involving markedly enlarged and inflamed left submandibular gland tissue.  There is extensive surrounding soft tissue inflammation in the superficial tissues of the left neck but also extending into the deep tissues at the floor of the mouth and parapharyngeal soft tissues on the left with deviating of pharynx to the right.  No visible submandibular duct stone.  No visible abscess.  Thyroidectomy with to regenerate it thyroid nodules in the thyroidectomy bed.  The other salivary glands are normal.    Interpreted contemporaneously with treatment by Dr. Rogers, independently viewed by  me            Procedures         ED Course  ED Course   Comment By Time   I just received the CT report from radiologist.  Findings suggest severe acute sialoadenitis.  I called the patient's primary care doctor to give this report.  He recommends adding vancomycin and calling ENT specialist at [facility] for consultation.  I anticipate that we'll need to transfer the patient to [facility] for further management.  Since she does screen positive for sepsis, we will go ahead and start IV vancomycin and draw blood cultures as well as starting some fluid resuscitation.  She is already receiving IV Unasyn for previous treatment of UTI. Thierno Mcfarlane MD 11/17 2303   Unfortunately, we just learned that [facility] is on total bed diversion.  Will try to transfer to [facility] per family request.  Calling their ICU doc now. Thierno Mcfarlane MD 11/17 7523   Patient accepted to the ICU service at [facility].  Awaiting bed assignment at this time.  Hoping to transfer her via ambulance to [facility] very soon.  She remains hemodynamically stable without any signs of respiratory distress at this time.  Continuing IV fluids now. Thierno Mcfarlane MD 11/18 0011                  MDM  Number of Diagnoses or Management Options  Sepsis, due to unspecified organism:   Sialoadenitis of submandibular gland:      Amount and/or Complexity of Data Reviewed  Clinical lab tests: reviewed and ordered  Tests in the radiology section of CPT®: ordered and reviewed  Decide to obtain previous medical records or to obtain history from someone other than the patient: yes  Obtain history from someone other than the patient: yes (TCU nurse staff)  Review and summarize past medical records: yes  Discuss the patient with other providers: yes (Dr. Guillaume Willis (ENT)  Dr. Dickson (ICU)    )    Risk of Complications, Morbidity, and/or Mortality  Presenting problems: high  Diagnostic procedures:  moderate  Management options: high    Critical Care  Total time providing critical care: 30-74 minutes      Final diagnoses:   Sialoadenitis of submandibular gland   Sepsis, due to unspecified organism            Thierno Mcfarlane MD  11/18/17 0051

## 2017-11-18 NOTE — ED NOTES
Dr. Mcfarlane informed that the patient was now screening simple sepsis. Dr. Mcfarlane did not want the simple sepsis orders placed     Merritt Dietrich RN  11/17/17 4004

## 2017-11-18 NOTE — ED NOTES
[facility], so we are unable to transfer the patient there. Will place calls to other hospitals and try to admit      Merritt Dietrich RN  11/17/17 2386

## 2017-11-18 NOTE — ED NOTES
Dr. Randolph [facility] Intensivist paged      Betty Crowder  11/17/17 3116

## 2018-01-01 ENCOUNTER — HOSPITAL ENCOUNTER (INPATIENT)
Facility: HOSPITAL | Age: 78
LOS: 7 days | End: 2018-01-23
Attending: FAMILY MEDICINE | Admitting: FAMILY MEDICINE

## 2018-01-01 ENCOUNTER — APPOINTMENT (OUTPATIENT)
Dept: ULTRASOUND IMAGING | Facility: HOSPITAL | Age: 78
End: 2018-01-01

## 2018-01-01 ENCOUNTER — APPOINTMENT (OUTPATIENT)
Dept: CT IMAGING | Facility: HOSPITAL | Age: 78
End: 2018-01-01
Attending: FAMILY MEDICINE

## 2018-01-01 ENCOUNTER — APPOINTMENT (OUTPATIENT)
Dept: MRI IMAGING | Facility: HOSPITAL | Age: 78
End: 2018-01-01

## 2018-01-01 ENCOUNTER — APPOINTMENT (OUTPATIENT)
Dept: CT IMAGING | Facility: HOSPITAL | Age: 78
End: 2018-01-01

## 2018-01-01 ENCOUNTER — APPOINTMENT (OUTPATIENT)
Dept: GENERAL RADIOLOGY | Facility: HOSPITAL | Age: 78
End: 2018-01-01

## 2018-01-01 ENCOUNTER — HOSPITAL ENCOUNTER (EMERGENCY)
Facility: HOSPITAL | Age: 78
Discharge: HOME | End: 2018-01-07
Attending: EMERGENCY MEDICINE | Admitting: EMERGENCY MEDICINE

## 2018-01-01 ENCOUNTER — HOSPITAL ENCOUNTER (INPATIENT)
Facility: HOSPITAL | Age: 78
LOS: 7 days | End: 2018-01-30
Attending: FAMILY MEDICINE | Admitting: FAMILY MEDICINE

## 2018-01-01 VITALS
OXYGEN SATURATION: 92 % | BODY MASS INDEX: 34.62 KG/M2 | HEART RATE: 112 BPM | RESPIRATION RATE: 26 BRPM | SYSTOLIC BLOOD PRESSURE: 101 MMHG | HEIGHT: 67 IN | WEIGHT: 220.6 LBS | TEMPERATURE: 98.4 F | DIASTOLIC BLOOD PRESSURE: 71 MMHG

## 2018-01-01 VITALS
TEMPERATURE: 97.5 F | DIASTOLIC BLOOD PRESSURE: 76 MMHG | HEART RATE: 104 BPM | HEIGHT: 67 IN | SYSTOLIC BLOOD PRESSURE: 114 MMHG | BODY MASS INDEX: 37.09 KG/M2 | WEIGHT: 236.31 LBS | OXYGEN SATURATION: 90 % | RESPIRATION RATE: 22 BRPM

## 2018-01-01 VITALS
TEMPERATURE: 98 F | DIASTOLIC BLOOD PRESSURE: 81 MMHG | HEART RATE: 94 BPM | OXYGEN SATURATION: 96 % | SYSTOLIC BLOOD PRESSURE: 141 MMHG | RESPIRATION RATE: 16 BRPM

## 2018-01-01 VITALS
HEIGHT: 67 IN | OXYGEN SATURATION: 98 % | TEMPERATURE: 97.5 F | DIASTOLIC BLOOD PRESSURE: 71 MMHG | BODY MASS INDEX: 37.1 KG/M2 | RESPIRATION RATE: 20 BRPM | SYSTOLIC BLOOD PRESSURE: 97 MMHG | WEIGHT: 236.4 LBS | HEART RATE: 93 BPM

## 2018-01-01 DIAGNOSIS — R22.1 LOCALIZED SWELLING, MASS AND LUMP, NECK: Primary | ICD-10-CM

## 2018-01-01 LAB
ABO + RH BLD: NORMAL
ABO GROUP BLD: NORMAL
ALBUMIN SERPL-MCNC: 2.1 G/DL (ref 3.5–5.2)
ALBUMIN SERPL-MCNC: 2.1 G/DL (ref 3.5–5.2)
ALBUMIN SERPL-MCNC: 2.4 G/DL (ref 3.5–5.2)
ALBUMIN/GLOB SERPL: 0.7 G/DL
ALBUMIN/GLOB SERPL: 0.7 G/DL
ALBUMIN/GLOB SERPL: 0.8 G/DL
ALP SERPL-CCNC: 113 U/L (ref 40–129)
ALP SERPL-CCNC: 193 U/L (ref 40–129)
ALP SERPL-CCNC: 75 U/L (ref 40–129)
ALT SERPL W P-5'-P-CCNC: 6 U/L (ref 5–33)
ALT SERPL W P-5'-P-CCNC: 8 U/L (ref 5–33)
ALT SERPL W P-5'-P-CCNC: 9 U/L (ref 5–33)
AMORPH URATE CRY URNS QL MICRO: ABNORMAL /HPF
ANION GAP SERPL CALCULATED.3IONS-SCNC: 10 MMOL/L
ANION GAP SERPL CALCULATED.3IONS-SCNC: 10.2 MMOL/L
ANION GAP SERPL CALCULATED.3IONS-SCNC: 10.4 MMOL/L
ANION GAP SERPL CALCULATED.3IONS-SCNC: 10.6 MMOL/L
ANION GAP SERPL CALCULATED.3IONS-SCNC: 10.6 MMOL/L
ANION GAP SERPL CALCULATED.3IONS-SCNC: 11.4 MMOL/L
ANION GAP SERPL CALCULATED.3IONS-SCNC: 11.5 MMOL/L
ANION GAP SERPL CALCULATED.3IONS-SCNC: 12.3 MMOL/L
ANION GAP SERPL CALCULATED.3IONS-SCNC: 12.3 MMOL/L
ANION GAP SERPL CALCULATED.3IONS-SCNC: 13 MMOL/L
ANION GAP SERPL CALCULATED.3IONS-SCNC: 13.2 MMOL/L
ANION GAP SERPL CALCULATED.3IONS-SCNC: 13.4 MMOL/L
ANION GAP SERPL CALCULATED.3IONS-SCNC: 13.7 MMOL/L
ANION GAP SERPL CALCULATED.3IONS-SCNC: 13.9 MMOL/L
ANION GAP SERPL CALCULATED.3IONS-SCNC: 14.5 MMOL/L
ANION GAP SERPL CALCULATED.3IONS-SCNC: 14.7 MMOL/L
ANION GAP SERPL CALCULATED.3IONS-SCNC: 15.7 MMOL/L
ANION GAP SERPL CALCULATED.3IONS-SCNC: 8.2 MMOL/L
ANION GAP SERPL CALCULATED.3IONS-SCNC: 9.1 MMOL/L
ANION GAP SERPL CALCULATED.3IONS-SCNC: 9.7 MMOL/L
ANION GAP SERPL CALCULATED.3IONS-SCNC: 9.9 MMOL/L
ARTERIAL PATENCY WRIST A: ABNORMAL
AST SERPL-CCNC: 7 U/L (ref 5–32)
AST SERPL-CCNC: 8 U/L (ref 5–32)
AST SERPL-CCNC: 9 U/L (ref 5–32)
ATMOSPHERIC PRESS: 751 MMHG
BACTERIA SPEC AEROBE CULT: ABNORMAL
BACTERIA SPEC AEROBE CULT: ABNORMAL
BACTERIA SPEC AEROBE CULT: NORMAL
BACTERIA SPEC RESP CULT: ABNORMAL
BACTERIA UR QL AUTO: ABNORMAL /HPF
BACTERIA UR QL AUTO: ABNORMAL /HPF
BASE EXCESS BLDA CALC-SCNC: -4.2 MMOL/L (ref 0–2)
BASOPHILS # BLD AUTO: 0.12 10*3/MM3 (ref 0–0.2)
BASOPHILS NFR BLD AUTO: 1.1 % (ref 0–2)
BDY SITE: ABNORMAL
BH BB BLOOD EXPIRATION DATE: NORMAL
BH BB BLOOD TYPE BARCODE: 7300
BH BB DISPENSE STATUS: NORMAL
BH BB PRODUCT CODE: NORMAL
BH BB UNIT NUMBER: NORMAL
BILIRUB SERPL-MCNC: 0.2 MG/DL (ref 0.2–1.2)
BILIRUB SERPL-MCNC: <0.2 MG/DL (ref 0.2–1.2)
BILIRUB SERPL-MCNC: <0.2 MG/DL (ref 0.2–1.2)
BILIRUB UR QL STRIP: ABNORMAL
BILIRUB UR QL STRIP: NEGATIVE
BLD GP AB SCN SERPL QL: NEGATIVE
BODY TEMPERATURE: 37 C
BUN BLD-MCNC: 30 MG/DL (ref 8–23)
BUN BLD-MCNC: 31 MG/DL (ref 8–23)
BUN BLD-MCNC: 34 MG/DL (ref 8–23)
BUN BLD-MCNC: 35 MG/DL (ref 8–23)
BUN BLD-MCNC: 36 MG/DL (ref 8–23)
BUN BLD-MCNC: 36 MG/DL (ref 8–23)
BUN BLD-MCNC: 38 MG/DL (ref 8–23)
BUN BLD-MCNC: 38 MG/DL (ref 8–23)
BUN BLD-MCNC: 39 MG/DL (ref 8–23)
BUN BLD-MCNC: 40 MG/DL (ref 8–23)
BUN BLD-MCNC: 40 MG/DL (ref 8–23)
BUN BLD-MCNC: 43 MG/DL (ref 8–23)
BUN BLD-MCNC: 43 MG/DL (ref 8–23)
BUN BLD-MCNC: 45 MG/DL (ref 8–23)
BUN BLD-MCNC: 45 MG/DL (ref 8–23)
BUN BLD-MCNC: 49 MG/DL (ref 8–23)
BUN BLD-MCNC: 49 MG/DL (ref 8–23)
BUN BLD-MCNC: 51 MG/DL (ref 8–23)
BUN BLD-MCNC: 54 MG/DL (ref 8–23)
BUN BLD-MCNC: 54 MG/DL (ref 8–23)
BUN BLD-MCNC: 57 MG/DL (ref 8–23)
BUN/CREAT SERPL: 25.1 (ref 7–25)
BUN/CREAT SERPL: 26.8 (ref 7–25)
BUN/CREAT SERPL: 28.3 (ref 7–25)
BUN/CREAT SERPL: 29.3 (ref 7–25)
BUN/CREAT SERPL: 29.4 (ref 7–25)
BUN/CREAT SERPL: 29.8 (ref 7–25)
BUN/CREAT SERPL: 30.3 (ref 7–25)
BUN/CREAT SERPL: 32 (ref 7–25)
BUN/CREAT SERPL: 32.1 (ref 7–25)
BUN/CREAT SERPL: 32.2 (ref 7–25)
BUN/CREAT SERPL: 33 (ref 7–25)
BUN/CREAT SERPL: 36.3 (ref 7–25)
BUN/CREAT SERPL: 38.6 (ref 7–25)
BUN/CREAT SERPL: 44.4 (ref 7–25)
BUN/CREAT SERPL: 45 (ref 7–25)
BUN/CREAT SERPL: 45.4 (ref 7–25)
BUN/CREAT SERPL: 47.8 (ref 7–25)
BUN/CREAT SERPL: 48.2 (ref 7–25)
BUN/CREAT SERPL: 48.3 (ref 7–25)
BUN/CREAT SERPL: 48.8 (ref 7–25)
BUN/CREAT SERPL: 52.1 (ref 7–25)
CALCIUM SPEC-SCNC: 7.4 MG/DL (ref 8.8–10.5)
CALCIUM SPEC-SCNC: 7.5 MG/DL (ref 8.8–10.5)
CALCIUM SPEC-SCNC: 7.6 MG/DL (ref 8.8–10.5)
CALCIUM SPEC-SCNC: 7.6 MG/DL (ref 8.8–10.5)
CALCIUM SPEC-SCNC: 7.7 MG/DL (ref 8.8–10.5)
CALCIUM SPEC-SCNC: 7.8 MG/DL (ref 8.8–10.5)
CALCIUM SPEC-SCNC: 7.8 MG/DL (ref 8.8–10.5)
CALCIUM SPEC-SCNC: 7.9 MG/DL (ref 8.8–10.5)
CALCIUM SPEC-SCNC: 8.1 MG/DL (ref 8.8–10.5)
CALCIUM SPEC-SCNC: 8.2 MG/DL (ref 8.8–10.5)
CALCIUM SPEC-SCNC: 8.2 MG/DL (ref 8.8–10.5)
CALCIUM SPEC-SCNC: 8.5 MG/DL (ref 8.8–10.5)
CALCIUM SPEC-SCNC: 8.7 MG/DL (ref 8.8–10.5)
CALCIUM SPEC-SCNC: 8.8 MG/DL (ref 8.8–10.5)
CALCIUM SPEC-SCNC: 8.9 MG/DL (ref 8.8–10.5)
CALCIUM SPEC-SCNC: 9 MG/DL (ref 8.8–10.5)
CALCIUM SPEC-SCNC: 9.2 MG/DL (ref 8.8–10.5)
CALCIUM SPEC-SCNC: 9.7 MG/DL (ref 8.8–10.5)
CHLORIDE SERPL-SCNC: 101 MMOL/L (ref 98–107)
CHLORIDE SERPL-SCNC: 101 MMOL/L (ref 98–107)
CHLORIDE SERPL-SCNC: 102 MMOL/L (ref 98–107)
CHLORIDE SERPL-SCNC: 103 MMOL/L (ref 98–107)
CHLORIDE SERPL-SCNC: 105 MMOL/L (ref 98–107)
CHLORIDE SERPL-SCNC: 106 MMOL/L (ref 98–107)
CHLORIDE SERPL-SCNC: 106 MMOL/L (ref 98–107)
CHLORIDE SERPL-SCNC: 107 MMOL/L (ref 98–107)
CHLORIDE SERPL-SCNC: 108 MMOL/L (ref 98–107)
CHLORIDE SERPL-SCNC: 109 MMOL/L (ref 98–107)
CHLORIDE SERPL-SCNC: 110 MMOL/L (ref 98–107)
CHLORIDE SERPL-SCNC: 111 MMOL/L (ref 98–107)
CHLORIDE SERPL-SCNC: 111 MMOL/L (ref 98–107)
CHLORIDE SERPL-SCNC: 112 MMOL/L (ref 98–107)
CHLORIDE SERPL-SCNC: 112 MMOL/L (ref 98–107)
CHLORIDE SERPL-SCNC: 113 MMOL/L (ref 98–107)
CHLORIDE SERPL-SCNC: 114 MMOL/L (ref 98–107)
CHLORIDE SERPL-SCNC: 115 MMOL/L (ref 98–107)
CHLORIDE SERPL-SCNC: 95 MMOL/L (ref 98–107)
CLARITY UR: ABNORMAL
CLARITY UR: ABNORMAL
CO2 SERPL-SCNC: 19 MMOL/L (ref 22–29)
CO2 SERPL-SCNC: 19.3 MMOL/L (ref 22–29)
CO2 SERPL-SCNC: 19.5 MMOL/L (ref 22–29)
CO2 SERPL-SCNC: 19.7 MMOL/L (ref 22–29)
CO2 SERPL-SCNC: 20.1 MMOL/L (ref 22–29)
CO2 SERPL-SCNC: 20.3 MMOL/L (ref 22–29)
CO2 SERPL-SCNC: 20.3 MMOL/L (ref 22–29)
CO2 SERPL-SCNC: 20.8 MMOL/L (ref 22–29)
CO2 SERPL-SCNC: 21.6 MMOL/L (ref 22–29)
CO2 SERPL-SCNC: 21.8 MMOL/L (ref 22–29)
CO2 SERPL-SCNC: 23.4 MMOL/L (ref 22–29)
CO2 SERPL-SCNC: 23.6 MMOL/L (ref 22–29)
CO2 SERPL-SCNC: 24 MMOL/L (ref 22–29)
CO2 SERPL-SCNC: 24.5 MMOL/L (ref 22–29)
CO2 SERPL-SCNC: 24.7 MMOL/L (ref 22–29)
CO2 SERPL-SCNC: 25.1 MMOL/L (ref 22–29)
CO2 SERPL-SCNC: 26.3 MMOL/L (ref 22–29)
CO2 SERPL-SCNC: 27.4 MMOL/L (ref 22–29)
CO2 SERPL-SCNC: 27.9 MMOL/L (ref 22–29)
CO2 SERPL-SCNC: 28.6 MMOL/L (ref 22–29)
CO2 SERPL-SCNC: 29.8 MMOL/L (ref 22–29)
COLOR UR: ABNORMAL
COLOR UR: YELLOW
CREAT BLD-MCNC: 0.73 MG/DL (ref 0.57–1)
CREAT BLD-MCNC: 0.8 MG/DL (ref 0.57–1)
CREAT BLD-MCNC: 0.81 MG/DL (ref 0.57–1)
CREAT BLD-MCNC: 0.83 MG/DL (ref 0.57–1)
CREAT BLD-MCNC: 0.89 MG/DL (ref 0.57–1)
CREAT BLD-MCNC: 0.9 MG/DL (ref 0.57–1)
CREAT BLD-MCNC: 1 MG/DL (ref 0.57–1)
CREAT BLD-MCNC: 1.03 MG/DL (ref 0.57–1)
CREAT BLD-MCNC: 1.04 MG/DL (ref 0.57–1)
CREAT BLD-MCNC: 1.06 MG/DL (ref 0.57–1)
CREAT BLD-MCNC: 1.08 MG/DL (ref 0.57–1)
CREAT BLD-MCNC: 1.09 MG/DL (ref 0.57–1)
CREAT BLD-MCNC: 1.18 MG/DL (ref 0.57–1)
CREAT BLD-MCNC: 1.19 MG/DL (ref 0.57–1)
CREAT BLD-MCNC: 1.36 MG/DL (ref 0.57–1)
CREAT BLD-MCNC: 1.4 MG/DL (ref 0.57–1)
CREAT BLD-MCNC: 1.57 MG/DL (ref 0.57–1)
CREAT BLD-MCNC: 1.68 MG/DL (ref 0.57–1)
CREAT BLD-MCNC: 1.69 MG/DL (ref 0.57–1)
CREAT BLD-MCNC: 1.74 MG/DL (ref 0.57–1)
CREAT BLD-MCNC: 1.95 MG/DL (ref 0.57–1)
CREAT UR-MCNC: 178 MG/DL
CROSSMATCH INTERPRETATION: NORMAL
D-LACTATE SERPL-SCNC: 1.6 MMOL/L (ref 0.5–2)
DEPRECATED RDW RBC AUTO: 54 FL (ref 37–54)
DEPRECATED RDW RBC AUTO: 54.7 FL (ref 37–54)
DEPRECATED RDW RBC AUTO: 55.7 FL (ref 37–54)
DEPRECATED RDW RBC AUTO: 55.8 FL (ref 37–54)
DEPRECATED RDW RBC AUTO: 58.2 FL (ref 37–54)
DEPRECATED RDW RBC AUTO: 59.8 FL (ref 37–54)
DEPRECATED RDW RBC AUTO: 61 FL (ref 37–54)
DEPRECATED RDW RBC AUTO: 61.5 FL (ref 37–54)
DEPRECATED RDW RBC AUTO: 61.7 FL (ref 37–54)
DEPRECATED RDW RBC AUTO: 62.1 FL (ref 37–54)
DEPRECATED RDW RBC AUTO: 62.3 FL (ref 37–54)
DEPRECATED RDW RBC AUTO: 62.4 FL (ref 37–54)
DEPRECATED RDW RBC AUTO: 63.8 FL (ref 37–54)
EOSINOPHIL # BLD AUTO: 0.07 10*3/MM3 (ref 0.1–0.3)
EOSINOPHIL NFR BLD AUTO: 0.6 % (ref 0–4)
ERYTHROCYTE [DISTWIDTH] IN BLOOD BY AUTOMATED COUNT: 15.3 % (ref 11.5–14.5)
ERYTHROCYTE [DISTWIDTH] IN BLOOD BY AUTOMATED COUNT: 15.4 % (ref 11.5–14.5)
ERYTHROCYTE [DISTWIDTH] IN BLOOD BY AUTOMATED COUNT: 15.8 % (ref 11.5–14.5)
ERYTHROCYTE [DISTWIDTH] IN BLOOD BY AUTOMATED COUNT: 15.9 % (ref 11.5–14.5)
ERYTHROCYTE [DISTWIDTH] IN BLOOD BY AUTOMATED COUNT: 16 % (ref 11.5–14.5)
ERYTHROCYTE [DISTWIDTH] IN BLOOD BY AUTOMATED COUNT: 16 % (ref 11.5–14.5)
ERYTHROCYTE [DISTWIDTH] IN BLOOD BY AUTOMATED COUNT: 16.2 % (ref 11.5–14.5)
ERYTHROCYTE [DISTWIDTH] IN BLOOD BY AUTOMATED COUNT: 16.3 % (ref 11.5–14.5)
ERYTHROCYTE [DISTWIDTH] IN BLOOD BY AUTOMATED COUNT: 16.3 % (ref 11.5–14.5)
ERYTHROCYTE [DISTWIDTH] IN BLOOD BY AUTOMATED COUNT: 16.4 % (ref 11.5–14.5)
ERYTHROCYTE [DISTWIDTH] IN BLOOD BY AUTOMATED COUNT: 17.6 % (ref 11.5–14.5)
FLUAV AG NPH QL: NEGATIVE
FLUBV AG NPH QL IA: NEGATIVE
GAS FLOW AIRWAY: 5 LPM
GFR SERPL CREATININE-BSD FRML MDRD: 25 ML/MIN/1.73
GFR SERPL CREATININE-BSD FRML MDRD: 28 ML/MIN/1.73
GFR SERPL CREATININE-BSD FRML MDRD: 29 ML/MIN/1.73
GFR SERPL CREATININE-BSD FRML MDRD: 30 ML/MIN/1.73
GFR SERPL CREATININE-BSD FRML MDRD: 32 ML/MIN/1.73
GFR SERPL CREATININE-BSD FRML MDRD: 36 ML/MIN/1.73
GFR SERPL CREATININE-BSD FRML MDRD: 38 ML/MIN/1.73
GFR SERPL CREATININE-BSD FRML MDRD: 44 ML/MIN/1.73
GFR SERPL CREATININE-BSD FRML MDRD: 44 ML/MIN/1.73
GFR SERPL CREATININE-BSD FRML MDRD: 49 ML/MIN/1.73
GFR SERPL CREATININE-BSD FRML MDRD: 49 ML/MIN/1.73
GFR SERPL CREATININE-BSD FRML MDRD: 50 ML/MIN/1.73
GFR SERPL CREATININE-BSD FRML MDRD: 51 ML/MIN/1.73
GFR SERPL CREATININE-BSD FRML MDRD: 52 ML/MIN/1.73
GFR SERPL CREATININE-BSD FRML MDRD: 54 ML/MIN/1.73
GFR SERPL CREATININE-BSD FRML MDRD: 61 ML/MIN/1.73
GFR SERPL CREATININE-BSD FRML MDRD: 62 ML/MIN/1.73
GFR SERPL CREATININE-BSD FRML MDRD: 67 ML/MIN/1.73
GFR SERPL CREATININE-BSD FRML MDRD: 69 ML/MIN/1.73
GFR SERPL CREATININE-BSD FRML MDRD: 70 ML/MIN/1.73
GFR SERPL CREATININE-BSD FRML MDRD: 77 ML/MIN/1.73
GLOBULIN UR ELPH-MCNC: 2.9 GM/DL
GLOBULIN UR ELPH-MCNC: 3 GM/DL
GLOBULIN UR ELPH-MCNC: 3 GM/DL
GLUCOSE BLD-MCNC: 115 MG/DL (ref 65–99)
GLUCOSE BLD-MCNC: 117 MG/DL (ref 65–99)
GLUCOSE BLD-MCNC: 122 MG/DL (ref 65–99)
GLUCOSE BLD-MCNC: 135 MG/DL (ref 65–99)
GLUCOSE BLD-MCNC: 140 MG/DL (ref 65–99)
GLUCOSE BLD-MCNC: 142 MG/DL (ref 65–99)
GLUCOSE BLD-MCNC: 145 MG/DL (ref 65–99)
GLUCOSE BLD-MCNC: 145 MG/DL (ref 65–99)
GLUCOSE BLD-MCNC: 148 MG/DL (ref 65–99)
GLUCOSE BLD-MCNC: 153 MG/DL (ref 65–99)
GLUCOSE BLD-MCNC: 158 MG/DL (ref 65–99)
GLUCOSE BLD-MCNC: 162 MG/DL (ref 65–99)
GLUCOSE BLD-MCNC: 165 MG/DL (ref 65–99)
GLUCOSE BLD-MCNC: 167 MG/DL (ref 65–99)
GLUCOSE BLD-MCNC: 174 MG/DL (ref 65–99)
GLUCOSE BLD-MCNC: 182 MG/DL (ref 65–99)
GLUCOSE BLD-MCNC: 191 MG/DL (ref 65–99)
GLUCOSE BLD-MCNC: 193 MG/DL (ref 65–99)
GLUCOSE BLD-MCNC: 207 MG/DL (ref 65–99)
GLUCOSE BLDC GLUCOMTR-MCNC: 102 MG/DL (ref 70–130)
GLUCOSE BLDC GLUCOMTR-MCNC: 112 MG/DL (ref 70–130)
GLUCOSE BLDC GLUCOMTR-MCNC: 115 MG/DL (ref 70–130)
GLUCOSE BLDC GLUCOMTR-MCNC: 116 MG/DL (ref 70–130)
GLUCOSE BLDC GLUCOMTR-MCNC: 116 MG/DL (ref 70–130)
GLUCOSE BLDC GLUCOMTR-MCNC: 123 MG/DL (ref 70–130)
GLUCOSE BLDC GLUCOMTR-MCNC: 123 MG/DL (ref 70–130)
GLUCOSE BLDC GLUCOMTR-MCNC: 125 MG/DL (ref 70–130)
GLUCOSE BLDC GLUCOMTR-MCNC: 125 MG/DL (ref 70–130)
GLUCOSE BLDC GLUCOMTR-MCNC: 127 MG/DL (ref 70–130)
GLUCOSE BLDC GLUCOMTR-MCNC: 128 MG/DL (ref 70–130)
GLUCOSE BLDC GLUCOMTR-MCNC: 129 MG/DL (ref 70–130)
GLUCOSE BLDC GLUCOMTR-MCNC: 131 MG/DL (ref 70–130)
GLUCOSE BLDC GLUCOMTR-MCNC: 133 MG/DL (ref 70–130)
GLUCOSE BLDC GLUCOMTR-MCNC: 134 MG/DL (ref 70–130)
GLUCOSE BLDC GLUCOMTR-MCNC: 136 MG/DL (ref 70–130)
GLUCOSE BLDC GLUCOMTR-MCNC: 136 MG/DL (ref 70–130)
GLUCOSE BLDC GLUCOMTR-MCNC: 137 MG/DL (ref 70–130)
GLUCOSE BLDC GLUCOMTR-MCNC: 137 MG/DL (ref 70–130)
GLUCOSE BLDC GLUCOMTR-MCNC: 138 MG/DL (ref 70–130)
GLUCOSE BLDC GLUCOMTR-MCNC: 138 MG/DL (ref 70–130)
GLUCOSE BLDC GLUCOMTR-MCNC: 139 MG/DL (ref 70–130)
GLUCOSE BLDC GLUCOMTR-MCNC: 139 MG/DL (ref 70–130)
GLUCOSE BLDC GLUCOMTR-MCNC: 140 MG/DL (ref 70–130)
GLUCOSE BLDC GLUCOMTR-MCNC: 140 MG/DL (ref 70–130)
GLUCOSE BLDC GLUCOMTR-MCNC: 141 MG/DL (ref 70–130)
GLUCOSE BLDC GLUCOMTR-MCNC: 142 MG/DL (ref 70–130)
GLUCOSE BLDC GLUCOMTR-MCNC: 142 MG/DL (ref 70–130)
GLUCOSE BLDC GLUCOMTR-MCNC: 144 MG/DL (ref 70–130)
GLUCOSE BLDC GLUCOMTR-MCNC: 146 MG/DL (ref 70–130)
GLUCOSE BLDC GLUCOMTR-MCNC: 146 MG/DL (ref 70–130)
GLUCOSE BLDC GLUCOMTR-MCNC: 147 MG/DL (ref 70–130)
GLUCOSE BLDC GLUCOMTR-MCNC: 149 MG/DL (ref 70–130)
GLUCOSE BLDC GLUCOMTR-MCNC: 149 MG/DL (ref 70–130)
GLUCOSE BLDC GLUCOMTR-MCNC: 151 MG/DL (ref 70–130)
GLUCOSE BLDC GLUCOMTR-MCNC: 152 MG/DL (ref 70–130)
GLUCOSE BLDC GLUCOMTR-MCNC: 152 MG/DL (ref 70–130)
GLUCOSE BLDC GLUCOMTR-MCNC: 153 MG/DL (ref 70–130)
GLUCOSE BLDC GLUCOMTR-MCNC: 154 MG/DL (ref 70–130)
GLUCOSE BLDC GLUCOMTR-MCNC: 154 MG/DL (ref 70–130)
GLUCOSE BLDC GLUCOMTR-MCNC: 155 MG/DL (ref 70–130)
GLUCOSE BLDC GLUCOMTR-MCNC: 155 MG/DL (ref 70–130)
GLUCOSE BLDC GLUCOMTR-MCNC: 156 MG/DL (ref 70–130)
GLUCOSE BLDC GLUCOMTR-MCNC: 157 MG/DL (ref 70–130)
GLUCOSE BLDC GLUCOMTR-MCNC: 157 MG/DL (ref 70–130)
GLUCOSE BLDC GLUCOMTR-MCNC: 158 MG/DL (ref 70–130)
GLUCOSE BLDC GLUCOMTR-MCNC: 159 MG/DL (ref 70–130)
GLUCOSE BLDC GLUCOMTR-MCNC: 160 MG/DL (ref 70–130)
GLUCOSE BLDC GLUCOMTR-MCNC: 162 MG/DL (ref 70–130)
GLUCOSE BLDC GLUCOMTR-MCNC: 163 MG/DL (ref 70–130)
GLUCOSE BLDC GLUCOMTR-MCNC: 164 MG/DL (ref 70–130)
GLUCOSE BLDC GLUCOMTR-MCNC: 164 MG/DL (ref 70–130)
GLUCOSE BLDC GLUCOMTR-MCNC: 165 MG/DL (ref 70–130)
GLUCOSE BLDC GLUCOMTR-MCNC: 167 MG/DL (ref 70–130)
GLUCOSE BLDC GLUCOMTR-MCNC: 168 MG/DL (ref 70–130)
GLUCOSE BLDC GLUCOMTR-MCNC: 168 MG/DL (ref 70–130)
GLUCOSE BLDC GLUCOMTR-MCNC: 169 MG/DL (ref 70–130)
GLUCOSE BLDC GLUCOMTR-MCNC: 169 MG/DL (ref 70–130)
GLUCOSE BLDC GLUCOMTR-MCNC: 170 MG/DL (ref 70–130)
GLUCOSE BLDC GLUCOMTR-MCNC: 171 MG/DL (ref 70–130)
GLUCOSE BLDC GLUCOMTR-MCNC: 173 MG/DL (ref 70–130)
GLUCOSE BLDC GLUCOMTR-MCNC: 174 MG/DL (ref 70–130)
GLUCOSE BLDC GLUCOMTR-MCNC: 175 MG/DL (ref 70–130)
GLUCOSE BLDC GLUCOMTR-MCNC: 178 MG/DL (ref 70–130)
GLUCOSE BLDC GLUCOMTR-MCNC: 179 MG/DL (ref 70–130)
GLUCOSE BLDC GLUCOMTR-MCNC: 180 MG/DL (ref 70–130)
GLUCOSE BLDC GLUCOMTR-MCNC: 180 MG/DL (ref 70–130)
GLUCOSE BLDC GLUCOMTR-MCNC: 181 MG/DL (ref 70–130)
GLUCOSE BLDC GLUCOMTR-MCNC: 182 MG/DL (ref 70–130)
GLUCOSE BLDC GLUCOMTR-MCNC: 182 MG/DL (ref 70–130)
GLUCOSE BLDC GLUCOMTR-MCNC: 183 MG/DL (ref 70–130)
GLUCOSE BLDC GLUCOMTR-MCNC: 184 MG/DL (ref 70–130)
GLUCOSE BLDC GLUCOMTR-MCNC: 184 MG/DL (ref 70–130)
GLUCOSE BLDC GLUCOMTR-MCNC: 185 MG/DL (ref 70–130)
GLUCOSE BLDC GLUCOMTR-MCNC: 185 MG/DL (ref 70–130)
GLUCOSE BLDC GLUCOMTR-MCNC: 186 MG/DL (ref 70–130)
GLUCOSE BLDC GLUCOMTR-MCNC: 186 MG/DL (ref 70–130)
GLUCOSE BLDC GLUCOMTR-MCNC: 190 MG/DL (ref 70–130)
GLUCOSE BLDC GLUCOMTR-MCNC: 191 MG/DL (ref 70–130)
GLUCOSE BLDC GLUCOMTR-MCNC: 192 MG/DL (ref 70–130)
GLUCOSE BLDC GLUCOMTR-MCNC: 195 MG/DL (ref 70–130)
GLUCOSE BLDC GLUCOMTR-MCNC: 198 MG/DL (ref 70–130)
GLUCOSE BLDC GLUCOMTR-MCNC: 201 MG/DL (ref 70–130)
GLUCOSE BLDC GLUCOMTR-MCNC: 201 MG/DL (ref 70–130)
GLUCOSE BLDC GLUCOMTR-MCNC: 203 MG/DL (ref 70–130)
GLUCOSE BLDC GLUCOMTR-MCNC: 203 MG/DL (ref 70–130)
GLUCOSE BLDC GLUCOMTR-MCNC: 205 MG/DL (ref 70–130)
GLUCOSE BLDC GLUCOMTR-MCNC: 210 MG/DL (ref 70–130)
GLUCOSE BLDC GLUCOMTR-MCNC: 241 MG/DL (ref 70–130)
GLUCOSE BLDC GLUCOMTR-MCNC: 253 MG/DL (ref 70–130)
GLUCOSE BLDC GLUCOMTR-MCNC: 254 MG/DL (ref 70–130)
GLUCOSE BLDC GLUCOMTR-MCNC: 271 MG/DL (ref 70–130)
GLUCOSE BLDC GLUCOMTR-MCNC: 92 MG/DL (ref 70–130)
GLUCOSE UR STRIP-MCNC: NEGATIVE MG/DL
GLUCOSE UR STRIP-MCNC: NEGATIVE MG/DL
GRAM STN SPEC: ABNORMAL
HCO3 BLDA-SCNC: 19.1 MMOL/L (ref 20–26)
HCT VFR BLD AUTO: 23.4 % (ref 37–47)
HCT VFR BLD AUTO: 23.7 % (ref 37–47)
HCT VFR BLD AUTO: 25.7 % (ref 37–47)
HCT VFR BLD AUTO: 27.1 % (ref 37–47)
HCT VFR BLD AUTO: 27.2 % (ref 37–47)
HCT VFR BLD AUTO: 27.5 % (ref 37–47)
HCT VFR BLD AUTO: 28.5 % (ref 37–47)
HCT VFR BLD AUTO: 28.5 % (ref 37–47)
HCT VFR BLD AUTO: 30.6 % (ref 37–47)
HCT VFR BLD AUTO: 31.1 % (ref 37–47)
HCT VFR BLD AUTO: 31.8 % (ref 37–47)
HCT VFR BLD AUTO: 32.4 % (ref 37–47)
HCT VFR BLD AUTO: 32.7 % (ref 37–47)
HCT VFR BLD AUTO: 32.9 % (ref 37–47)
HGB BLD-MCNC: 10 G/DL (ref 12–16)
HGB BLD-MCNC: 10 G/DL (ref 12–16)
HGB BLD-MCNC: 10.5 G/DL (ref 12–16)
HGB BLD-MCNC: 10.7 G/DL (ref 12–16)
HGB BLD-MCNC: 10.8 G/DL (ref 12–16)
HGB BLD-MCNC: 10.8 G/DL (ref 12–16)
HGB BLD-MCNC: 7.4 G/DL (ref 12–16)
HGB BLD-MCNC: 7.4 G/DL (ref 12–16)
HGB BLD-MCNC: 8.2 G/DL (ref 12–16)
HGB BLD-MCNC: 8.6 G/DL (ref 12–16)
HGB BLD-MCNC: 8.6 G/DL (ref 12–16)
HGB BLD-MCNC: 8.7 G/DL (ref 12–16)
HGB BLD-MCNC: 9.1 G/DL (ref 12–16)
HGB BLD-MCNC: 9.1 G/DL (ref 12–16)
HGB BLDA-MCNC: 11.3 G/DL (ref 13.5–17.5)
HGB UR QL STRIP.AUTO: ABNORMAL
HGB UR QL STRIP.AUTO: ABNORMAL
HYALINE CASTS UR QL AUTO: ABNORMAL /LPF
HYALINE CASTS UR QL AUTO: ABNORMAL /LPF
IMM GRANULOCYTES # BLD: 0.78 10*3/MM3 (ref 0–0.03)
IMM GRANULOCYTES NFR BLD: 6.9 % (ref 0–0.5)
KETONES UR QL STRIP: ABNORMAL
KETONES UR QL STRIP: NEGATIVE
LARGE PLATELETS: ABNORMAL
LEUKOCYTE ESTERASE UR QL STRIP.AUTO: ABNORMAL
LEUKOCYTE ESTERASE UR QL STRIP.AUTO: ABNORMAL
LYMPHOCYTES # BLD AUTO: 1.26 10*3/MM3 (ref 0.6–4.8)
LYMPHOCYTES # BLD MANUAL: 0.71 10*3/MM3 (ref 0.6–4.8)
LYMPHOCYTES NFR BLD AUTO: 11.2 % (ref 20–45)
LYMPHOCYTES NFR BLD MANUAL: 5 % (ref 3–8)
LYMPHOCYTES NFR BLD MANUAL: 6 % (ref 20–45)
Lab: ABNORMAL
MCH RBC QN AUTO: 31.9 PG (ref 27–31)
MCH RBC QN AUTO: 32.1 PG (ref 27–31)
MCH RBC QN AUTO: 32.2 PG (ref 27–31)
MCH RBC QN AUTO: 32.5 PG (ref 27–31)
MCH RBC QN AUTO: 32.5 PG (ref 27–31)
MCH RBC QN AUTO: 32.6 PG (ref 27–31)
MCH RBC QN AUTO: 32.7 PG (ref 27–31)
MCH RBC QN AUTO: 32.8 PG (ref 27–31)
MCH RBC QN AUTO: 33.1 PG (ref 27–31)
MCH RBC QN AUTO: 33.1 PG (ref 27–31)
MCH RBC QN AUTO: 33.2 PG (ref 27–31)
MCHC RBC AUTO-ENTMCNC: 31.2 G/DL (ref 31–37)
MCHC RBC AUTO-ENTMCNC: 31.3 G/DL (ref 31–37)
MCHC RBC AUTO-ENTMCNC: 31.7 G/DL (ref 31–37)
MCHC RBC AUTO-ENTMCNC: 31.9 G/DL (ref 31–37)
MCHC RBC AUTO-ENTMCNC: 32 G/DL (ref 31–37)
MCHC RBC AUTO-ENTMCNC: 32.2 G/DL (ref 31–37)
MCHC RBC AUTO-ENTMCNC: 32.5 G/DL (ref 31–37)
MCHC RBC AUTO-ENTMCNC: 32.7 G/DL (ref 31–37)
MCHC RBC AUTO-ENTMCNC: 33 G/DL (ref 31–37)
MCHC RBC AUTO-ENTMCNC: 33 G/DL (ref 31–37)
MCHC RBC AUTO-ENTMCNC: 33.3 G/DL (ref 31–37)
MCV RBC AUTO: 100.6 FL (ref 81–99)
MCV RBC AUTO: 102 FL (ref 81–99)
MCV RBC AUTO: 102.7 FL (ref 81–99)
MCV RBC AUTO: 103.6 FL (ref 81–99)
MCV RBC AUTO: 103.6 FL (ref 81–99)
MCV RBC AUTO: 103.8 FL (ref 81–99)
MCV RBC AUTO: 103.9 FL (ref 81–99)
MCV RBC AUTO: 104 FL (ref 81–99)
MCV RBC AUTO: 97.3 FL (ref 81–99)
MCV RBC AUTO: 97.9 FL (ref 81–99)
MCV RBC AUTO: 98.4 FL (ref 81–99)
MCV RBC AUTO: 98.8 FL (ref 81–99)
MCV RBC AUTO: 99.6 FL (ref 81–99)
MODALITY: ABNORMAL
MONOCYTES # BLD AUTO: 0.59 10*3/MM3 (ref 0–1)
MONOCYTES # BLD AUTO: 0.91 10*3/MM3 (ref 0–1)
MONOCYTES NFR BLD AUTO: 8.1 % (ref 3–8)
NEUTROPHILS # BLD AUTO: 10.56 10*3/MM3 (ref 1.5–8.3)
NEUTROPHILS # BLD AUTO: 8.15 10*3/MM3 (ref 1.5–8.3)
NEUTROPHILS NFR BLD AUTO: 72.1 % (ref 45–70)
NEUTROPHILS NFR BLD MANUAL: 46 % (ref 45–70)
NEUTS BAND NFR BLD MANUAL: 43 % (ref 0–5)
NITRITE UR QL STRIP: NEGATIVE
NITRITE UR QL STRIP: NEGATIVE
NRBC BLD MANUAL-RTO: 0 /100 WBC (ref 0–0)
NT-PROBNP SERPL-MCNC: 5007 PG/ML (ref 5–450)
PCO2 BLDA: 29 MM HG (ref 35–45)
PCO2 TEMP ADJ BLD: 29 MM HG (ref 35–45)
PH BLDA: 7.43 PH UNITS (ref 7.35–7.45)
PH UR STRIP.AUTO: 7 [PH] (ref 4.5–8)
PH UR STRIP.AUTO: >=9 [PH] (ref 4.5–8)
PH, TEMP CORRECTED: 7.43 PH UNITS (ref 7.35–7.45)
PLATELET # BLD AUTO: 158 10*3/MM3 (ref 140–500)
PLATELET # BLD AUTO: 164 10*3/MM3 (ref 140–500)
PLATELET # BLD AUTO: 168 10*3/MM3 (ref 140–500)
PLATELET # BLD AUTO: 174 10*3/MM3 (ref 140–500)
PLATELET # BLD AUTO: 182 10*3/MM3 (ref 140–500)
PLATELET # BLD AUTO: 202 10*3/MM3 (ref 140–500)
PLATELET # BLD AUTO: 224 10*3/MM3 (ref 140–500)
PLATELET # BLD AUTO: 225 10*3/MM3 (ref 140–500)
PLATELET # BLD AUTO: 237 10*3/MM3 (ref 140–500)
PLATELET # BLD AUTO: 242 10*3/MM3 (ref 140–500)
PLATELET # BLD AUTO: 247 10*3/MM3 (ref 140–500)
PLATELET # BLD AUTO: 312 10*3/MM3 (ref 140–500)
PLATELET # BLD AUTO: 350 10*3/MM3 (ref 140–500)
PMV BLD AUTO: 10.4 FL (ref 7.4–10.4)
PMV BLD AUTO: 10.6 FL (ref 7.4–10.4)
PMV BLD AUTO: 10.7 FL (ref 7.4–10.4)
PMV BLD AUTO: 10.8 FL (ref 7.4–10.4)
PMV BLD AUTO: 10.9 FL (ref 7.4–10.4)
PMV BLD AUTO: 11 FL (ref 7.4–10.4)
PMV BLD AUTO: 11.1 FL (ref 7.4–10.4)
PMV BLD AUTO: 11.3 FL (ref 7.4–10.4)
PMV BLD AUTO: 11.4 FL (ref 7.4–10.4)
PMV BLD AUTO: 11.5 FL (ref 7.4–10.4)
PO2 BLDA: 57.8 MM HG (ref 83–108)
PO2 TEMP ADJ BLD: 57.8 MM HG (ref 83–108)
POIKILOCYTOSIS BLD QL SMEAR: ABNORMAL
POTASSIUM BLD-SCNC: 2.9 MMOL/L (ref 3.5–5.2)
POTASSIUM BLD-SCNC: 3 MMOL/L (ref 3.5–5.2)
POTASSIUM BLD-SCNC: 3.1 MMOL/L (ref 3.5–5.2)
POTASSIUM BLD-SCNC: 3.3 MMOL/L (ref 3.5–5.2)
POTASSIUM BLD-SCNC: 3.3 MMOL/L (ref 3.5–5.2)
POTASSIUM BLD-SCNC: 3.5 MMOL/L (ref 3.5–5.2)
POTASSIUM BLD-SCNC: 3.7 MMOL/L (ref 3.5–5.2)
POTASSIUM BLD-SCNC: 3.8 MMOL/L (ref 3.5–5.2)
POTASSIUM BLD-SCNC: 3.8 MMOL/L (ref 3.5–5.2)
POTASSIUM BLD-SCNC: 4 MMOL/L (ref 3.5–5.2)
POTASSIUM BLD-SCNC: 4.2 MMOL/L (ref 3.5–5.2)
POTASSIUM BLD-SCNC: 4.3 MMOL/L (ref 3.5–5.2)
POTASSIUM BLD-SCNC: 4.3 MMOL/L (ref 3.5–5.2)
POTASSIUM BLD-SCNC: 4.6 MMOL/L (ref 3.5–5.2)
POTASSIUM BLD-SCNC: 5.3 MMOL/L (ref 3.5–5.2)
POTASSIUM BLD-SCNC: 5.4 MMOL/L (ref 3.5–5.2)
PROCALCITONIN SERPL-MCNC: 0.54 NG/ML (ref 0.1–0.25)
PROT SERPL-MCNC: 5.1 G/DL (ref 6–8.5)
PROT SERPL-MCNC: 5.1 G/DL (ref 6–8.5)
PROT SERPL-MCNC: 5.3 G/DL (ref 6–8.5)
PROT UR QL STRIP: ABNORMAL
PROT UR QL STRIP: ABNORMAL
RBC # BLD AUTO: 2.28 10*6/MM3 (ref 4.2–5.4)
RBC # BLD AUTO: 2.48 10*6/MM3 (ref 4.2–5.4)
RBC # BLD AUTO: 2.64 10*6/MM3 (ref 4.2–5.4)
RBC # BLD AUTO: 2.65 10*6/MM3 (ref 4.2–5.4)
RBC # BLD AUTO: 2.73 10*6/MM3 (ref 4.2–5.4)
RBC # BLD AUTO: 2.74 10*6/MM3 (ref 4.2–5.4)
RBC # BLD AUTO: 2.75 10*6/MM3 (ref 4.2–5.4)
RBC # BLD AUTO: 3.05 10*6/MM3 (ref 4.2–5.4)
RBC # BLD AUTO: 3.11 10*6/MM3 (ref 4.2–5.4)
RBC # BLD AUTO: 3.22 10*6/MM3 (ref 4.2–5.4)
RBC # BLD AUTO: 3.27 10*6/MM3 (ref 4.2–5.4)
RBC # BLD AUTO: 3.31 10*6/MM3 (ref 4.2–5.4)
RBC # BLD AUTO: 3.36 10*6/MM3 (ref 4.2–5.4)
RBC # UR: ABNORMAL /HPF
RBC # UR: ABNORMAL /HPF
REF LAB TEST METHOD: ABNORMAL
REF LAB TEST METHOD: ABNORMAL
RH BLD: POSITIVE
SAO2 % BLDCOA: 90.8 % (ref 94–99)
SCAN SLIDE: NORMAL
SODIUM BLD-SCNC: 133 MMOL/L (ref 136–145)
SODIUM BLD-SCNC: 137 MMOL/L (ref 136–145)
SODIUM BLD-SCNC: 139 MMOL/L (ref 136–145)
SODIUM BLD-SCNC: 139 MMOL/L (ref 136–145)
SODIUM BLD-SCNC: 140 MMOL/L (ref 136–145)
SODIUM BLD-SCNC: 141 MMOL/L (ref 136–145)
SODIUM BLD-SCNC: 141 MMOL/L (ref 136–145)
SODIUM BLD-SCNC: 142 MMOL/L (ref 136–145)
SODIUM BLD-SCNC: 143 MMOL/L (ref 136–145)
SODIUM BLD-SCNC: 143 MMOL/L (ref 136–145)
SODIUM BLD-SCNC: 144 MMOL/L (ref 136–145)
SODIUM BLD-SCNC: 144 MMOL/L (ref 136–145)
SODIUM BLD-SCNC: 145 MMOL/L (ref 136–145)
SODIUM BLD-SCNC: 146 MMOL/L (ref 136–145)
SODIUM BLD-SCNC: 146 MMOL/L (ref 136–145)
SODIUM BLD-SCNC: 147 MMOL/L (ref 136–145)
SODIUM BLD-SCNC: 149 MMOL/L (ref 136–145)
SODIUM UR-SCNC: <=20 MMOL/L
SP GR UR STRIP: 1.01 (ref 1–1.03)
SP GR UR STRIP: 1.02 (ref 1–1.03)
SQUAMOUS #/AREA URNS HPF: ABNORMAL /HPF
SQUAMOUS #/AREA URNS HPF: ABNORMAL /HPF
TRI-PHOS CRY URNS QL MICRO: ABNORMAL /HPF
TRI-PHOS CRY URNS QL MICRO: ABNORMAL /HPF
UNIT  ABO: NORMAL
UNIT  RH: NORMAL
UROBILINOGEN UR QL STRIP: ABNORMAL
UROBILINOGEN UR QL STRIP: ABNORMAL
VENTILATOR MODE: ABNORMAL
WBC MORPH BLD: NORMAL
WBC NRBC COR # BLD: 10.06 10*3/MM3 (ref 4.8–10.8)
WBC NRBC COR # BLD: 10.14 10*3/MM3 (ref 4.8–10.8)
WBC NRBC COR # BLD: 10.14 10*3/MM3 (ref 4.8–10.8)
WBC NRBC COR # BLD: 10.44 10*3/MM3 (ref 4.8–10.8)
WBC NRBC COR # BLD: 11.29 10*3/MM3 (ref 4.8–10.8)
WBC NRBC COR # BLD: 11.39 10*3/MM3 (ref 4.8–10.8)
WBC NRBC COR # BLD: 11.86 10*3/MM3 (ref 4.8–10.8)
WBC NRBC COR # BLD: 5.99 10*3/MM3 (ref 4.8–10.8)
WBC NRBC COR # BLD: 6.49 10*3/MM3 (ref 4.8–10.8)
WBC NRBC COR # BLD: 7.12 10*3/MM3 (ref 4.8–10.8)
WBC NRBC COR # BLD: 7.26 10*3/MM3 (ref 4.8–10.8)
WBC NRBC COR # BLD: 9.02 10*3/MM3 (ref 4.8–10.8)
WBC NRBC COR # BLD: 9.65 10*3/MM3 (ref 4.8–10.8)
WBC UR QL AUTO: ABNORMAL /HPF
WBC UR QL AUTO: ABNORMAL /HPF

## 2018-01-01 PROCEDURE — 82962 GLUCOSE BLOOD TEST: CPT

## 2018-01-01 PROCEDURE — 94799 UNLISTED PULMONARY SVC/PX: CPT

## 2018-01-01 PROCEDURE — 80053 COMPREHEN METABOLIC PANEL: CPT | Performed by: FAMILY MEDICINE

## 2018-01-01 PROCEDURE — 80048 BASIC METABOLIC PNL TOTAL CA: CPT | Performed by: FAMILY MEDICINE

## 2018-01-01 PROCEDURE — 85027 COMPLETE CBC AUTOMATED: CPT | Performed by: FAMILY MEDICINE

## 2018-01-01 PROCEDURE — P9016 RBC LEUKOCYTES REDUCED: HCPCS

## 2018-01-01 PROCEDURE — 93005 ELECTROCARDIOGRAM TRACING: CPT | Performed by: FAMILY MEDICINE

## 2018-01-01 PROCEDURE — 93010 ELECTROCARDIOGRAM REPORT: CPT | Performed by: INTERNAL MEDICINE

## 2018-01-01 PROCEDURE — 25010000002 CEFTRIAXONE PER 250 MG: Performed by: FAMILY MEDICINE

## 2018-01-01 PROCEDURE — 87040 BLOOD CULTURE FOR BACTERIA: CPT | Performed by: FAMILY MEDICINE

## 2018-01-01 PROCEDURE — 83880 ASSAY OF NATRIURETIC PEPTIDE: CPT | Performed by: FAMILY MEDICINE

## 2018-01-01 PROCEDURE — 63710000001 INSULIN ASPART PER 5 UNITS: Performed by: FAMILY MEDICINE

## 2018-01-01 PROCEDURE — 25010000002 DIGOXIN PER 500 MCG: Performed by: FAMILY MEDICINE

## 2018-01-01 PROCEDURE — 87086 URINE CULTURE/COLONY COUNT: CPT | Performed by: FAMILY MEDICINE

## 2018-01-01 PROCEDURE — 87186 SC STD MICRODIL/AGAR DIL: CPT | Performed by: FAMILY MEDICINE

## 2018-01-01 PROCEDURE — 76775 US EXAM ABDO BACK WALL LIM: CPT

## 2018-01-01 PROCEDURE — 25010000002 LEVETIRACETAM IN NACL 0.82% 500 MG/100ML SOLUTION: Performed by: FAMILY MEDICINE

## 2018-01-01 PROCEDURE — 81001 URINALYSIS AUTO W/SCOPE: CPT | Performed by: FAMILY MEDICINE

## 2018-01-01 PROCEDURE — 85014 HEMATOCRIT: CPT | Performed by: FAMILY MEDICINE

## 2018-01-01 PROCEDURE — 82570 ASSAY OF URINE CREATININE: CPT | Performed by: INTERNAL MEDICINE

## 2018-01-01 PROCEDURE — 83605 ASSAY OF LACTIC ACID: CPT | Performed by: FAMILY MEDICINE

## 2018-01-01 PROCEDURE — 87147 CULTURE TYPE IMMUNOLOGIC: CPT | Performed by: FAMILY MEDICINE

## 2018-01-01 PROCEDURE — 97530 THERAPEUTIC ACTIVITIES: CPT

## 2018-01-01 PROCEDURE — 85025 COMPLETE CBC W/AUTO DIFF WBC: CPT | Performed by: FAMILY MEDICINE

## 2018-01-01 PROCEDURE — 74018 RADEX ABDOMEN 1 VIEW: CPT

## 2018-01-01 PROCEDURE — 25010000002 MEROPENEM PER 100 MG: Performed by: FAMILY MEDICINE

## 2018-01-01 PROCEDURE — 25010000002 MORPHINE PER 10 MG: Performed by: FAMILY MEDICINE

## 2018-01-01 PROCEDURE — 82803 BLOOD GASES ANY COMBINATION: CPT

## 2018-01-01 PROCEDURE — 84300 ASSAY OF URINE SODIUM: CPT | Performed by: INTERNAL MEDICINE

## 2018-01-01 PROCEDURE — 71045 X-RAY EXAM CHEST 1 VIEW: CPT

## 2018-01-01 PROCEDURE — 80048 BASIC METABOLIC PNL TOTAL CA: CPT | Performed by: EMERGENCY MEDICINE

## 2018-01-01 PROCEDURE — 85007 BL SMEAR W/DIFF WBC COUNT: CPT | Performed by: FAMILY MEDICINE

## 2018-01-01 PROCEDURE — 71250 CT THORAX DX C-: CPT

## 2018-01-01 PROCEDURE — 02HV33Z INSERTION OF INFUSION DEVICE INTO SUPERIOR VENA CAVA, PERCUTANEOUS APPROACH: ICD-10-PCS | Performed by: FAMILY MEDICINE

## 2018-01-01 PROCEDURE — 86850 RBC ANTIBODY SCREEN: CPT | Performed by: FAMILY MEDICINE

## 2018-01-01 PROCEDURE — 94640 AIRWAY INHALATION TREATMENT: CPT

## 2018-01-01 PROCEDURE — 25010000002 VANCOMYCIN 1500 MG/250ML SOLUTION: Performed by: FAMILY MEDICINE

## 2018-01-01 PROCEDURE — 87070 CULTURE OTHR SPECIMN AEROBIC: CPT | Performed by: FAMILY MEDICINE

## 2018-01-01 PROCEDURE — C1751 CATH, INF, PER/CENT/MIDLINE: HCPCS

## 2018-01-01 PROCEDURE — 70551 MRI BRAIN STEM W/O DYE: CPT

## 2018-01-01 PROCEDURE — 86920 COMPATIBILITY TEST SPIN: CPT

## 2018-01-01 PROCEDURE — 70490 CT SOFT TISSUE NECK W/O DYE: CPT

## 2018-01-01 PROCEDURE — 86900 BLOOD TYPING SEROLOGIC ABO: CPT

## 2018-01-01 PROCEDURE — 85018 HEMOGLOBIN: CPT | Performed by: FAMILY MEDICINE

## 2018-01-01 PROCEDURE — 25010000002 MORPHINE SULFATE (PF) 2 MG/ML SOLUTION: Performed by: FAMILY MEDICINE

## 2018-01-01 PROCEDURE — 86900 BLOOD TYPING SEROLOGIC ABO: CPT | Performed by: FAMILY MEDICINE

## 2018-01-01 PROCEDURE — 86901 BLOOD TYPING SEROLOGIC RH(D): CPT | Performed by: FAMILY MEDICINE

## 2018-01-01 PROCEDURE — 84145 PROCALCITONIN (PCT): CPT | Performed by: FAMILY MEDICINE

## 2018-01-01 PROCEDURE — 87205 SMEAR GRAM STAIN: CPT | Performed by: FAMILY MEDICINE

## 2018-01-01 PROCEDURE — 36430 TRANSFUSION BLD/BLD COMPNT: CPT

## 2018-01-01 PROCEDURE — 87804 INFLUENZA ASSAY W/OPTIC: CPT | Performed by: FAMILY MEDICINE

## 2018-01-01 PROCEDURE — 85025 COMPLETE CBC W/AUTO DIFF WBC: CPT | Performed by: EMERGENCY MEDICINE

## 2018-01-01 PROCEDURE — 81001 URINALYSIS AUTO W/SCOPE: CPT | Performed by: INTERNAL MEDICINE

## 2018-01-01 PROCEDURE — 25810000003 SODIUM CHLORIDE 0.9 % WITH KCL 20 MEQ 20-0.9 MEQ/L-% SOLUTION: Performed by: FAMILY MEDICINE

## 2018-01-01 PROCEDURE — 87086 URINE CULTURE/COLONY COUNT: CPT | Performed by: INTERNAL MEDICINE

## 2018-01-01 PROCEDURE — 99283 EMERGENCY DEPT VISIT LOW MDM: CPT

## 2018-01-01 PROCEDURE — 86922 COMPATIBILITY TEST ANTIGLOB: CPT

## 2018-01-01 PROCEDURE — 99284 EMERGENCY DEPT VISIT MOD MDM: CPT | Performed by: EMERGENCY MEDICINE

## 2018-01-01 RX ORDER — POTASSIUM CHLORIDE 20 MEQ/1
40 TABLET, EXTENDED RELEASE ORAL ONCE
Status: COMPLETED | OUTPATIENT
Start: 2018-01-01 | End: 2018-01-01

## 2018-01-01 RX ORDER — DIGOXIN 0.25 MG/ML
250 INJECTION INTRAMUSCULAR; INTRAVENOUS ONCE
Status: COMPLETED | OUTPATIENT
Start: 2018-01-01 | End: 2018-01-01

## 2018-01-01 RX ORDER — L.ACID,PARA/B.BIFIDUM/S.THERM 8B CELL
1 CAPSULE ORAL DAILY
Status: DISCONTINUED | OUTPATIENT
Start: 2018-01-01 | End: 2018-01-01 | Stop reason: HOSPADM

## 2018-01-01 RX ORDER — POTASSIUM CHLORIDE 1.5 G/1.77G
20 POWDER, FOR SOLUTION ORAL DAILY
Status: DISCONTINUED | OUTPATIENT
Start: 2018-01-01 | End: 2018-01-01 | Stop reason: SDUPTHER

## 2018-01-01 RX ORDER — LEVOTHYROXINE SODIUM 0.15 MG/1
150 TABLET ORAL
Status: DISCONTINUED | OUTPATIENT
Start: 2018-01-01 | End: 2018-01-01 | Stop reason: HOSPADM

## 2018-01-01 RX ORDER — SENNA PLUS 8.6 MG/1
1 TABLET ORAL NIGHTLY
Status: DISCONTINUED | OUTPATIENT
Start: 2018-01-01 | End: 2018-01-01 | Stop reason: HOSPADM

## 2018-01-01 RX ORDER — GLIPIZIDE 5 MG/1
5 TABLET ORAL
Status: CANCELLED | OUTPATIENT
Start: 2018-01-01

## 2018-01-01 RX ORDER — SODIUM CHLORIDE AND POTASSIUM CHLORIDE 150; 900 MG/100ML; MG/100ML
100 INJECTION, SOLUTION INTRAVENOUS CONTINUOUS
Status: DISCONTINUED | OUTPATIENT
Start: 2018-01-01 | End: 2018-01-01

## 2018-01-01 RX ORDER — LEVOTHYROXINE SODIUM 0.15 MG/1
150 TABLET ORAL
Status: CANCELLED | OUTPATIENT
Start: 2018-01-01

## 2018-01-01 RX ORDER — BUMETANIDE 0.25 MG/ML
2 INJECTION INTRAMUSCULAR; INTRAVENOUS EVERY 12 HOURS
Status: DISCONTINUED | OUTPATIENT
Start: 2018-01-01 | End: 2018-01-01 | Stop reason: HOSPADM

## 2018-01-01 RX ORDER — DONEPEZIL HYDROCHLORIDE 5 MG/1
10 TABLET, FILM COATED ORAL NIGHTLY
Status: DISCONTINUED | OUTPATIENT
Start: 2018-01-01 | End: 2018-01-01 | Stop reason: HOSPADM

## 2018-01-01 RX ORDER — SODIUM CHLORIDE 450 MG/100ML
INJECTION, SOLUTION INTRAVENOUS
Status: COMPLETED
Start: 2018-01-01 | End: 2018-01-01

## 2018-01-01 RX ORDER — AMIODARONE HYDROCHLORIDE 200 MG/1
200 TABLET ORAL
Status: DISCONTINUED | OUTPATIENT
Start: 2018-01-01 | End: 2018-01-01 | Stop reason: HOSPADM

## 2018-01-01 RX ORDER — SCOLOPAMINE TRANSDERMAL SYSTEM 1 MG/1
1 PATCH, EXTENDED RELEASE TRANSDERMAL
Status: CANCELLED | OUTPATIENT
Start: 2018-01-01

## 2018-01-01 RX ORDER — GLIPIZIDE 5 MG/1
5 TABLET ORAL
Status: DISCONTINUED | OUTPATIENT
Start: 2018-01-01 | End: 2018-01-01 | Stop reason: HOSPADM

## 2018-01-01 RX ORDER — DILTIAZEM HYDROCHLORIDE 60 MG/1
60 TABLET, FILM COATED ORAL EVERY 8 HOURS SCHEDULED
Status: DISCONTINUED | OUTPATIENT
Start: 2018-01-01 | End: 2018-01-01

## 2018-01-01 RX ORDER — LEVETIRACETAM 100 MG/ML
500 SOLUTION ORAL DAILY
Status: DISCONTINUED | OUTPATIENT
Start: 2018-01-01 | End: 2018-01-01 | Stop reason: HOSPADM

## 2018-01-01 RX ORDER — TRAMADOL HYDROCHLORIDE 50 MG/1
50 TABLET ORAL EVERY 6 HOURS PRN
Status: CANCELLED | OUTPATIENT
Start: 2018-01-01

## 2018-01-01 RX ORDER — IRON POLYSACCHARIDE COMPLEX 150 MG
150 CAPSULE ORAL 2 TIMES DAILY
Status: CANCELLED | OUTPATIENT
Start: 2018-01-01

## 2018-01-01 RX ORDER — TRAMADOL HYDROCHLORIDE 50 MG/1
50 TABLET ORAL EVERY 6 HOURS PRN
Status: DISCONTINUED | OUTPATIENT
Start: 2018-01-01 | End: 2018-01-01 | Stop reason: HOSPADM

## 2018-01-01 RX ORDER — MORPHINE SULFATE 2 MG/ML
1 INJECTION, SOLUTION INTRAMUSCULAR; INTRAVENOUS
Status: DISCONTINUED | OUTPATIENT
Start: 2018-01-01 | End: 2018-01-01 | Stop reason: HOSPADM

## 2018-01-01 RX ORDER — CLINDAMYCIN HYDROCHLORIDE 150 MG/1
150 CAPSULE ORAL EVERY 8 HOURS SCHEDULED
Status: DISCONTINUED | OUTPATIENT
Start: 2018-01-01 | End: 2018-01-01 | Stop reason: HOSPADM

## 2018-01-01 RX ORDER — BUMETANIDE 0.25 MG/ML
2 INJECTION INTRAMUSCULAR; INTRAVENOUS ONCE
Status: COMPLETED | OUTPATIENT
Start: 2018-01-01 | End: 2018-01-01

## 2018-01-01 RX ORDER — MORPHINE SULFATE 2 MG/ML
1 INJECTION, SOLUTION INTRAMUSCULAR; INTRAVENOUS
Status: DISCONTINUED | OUTPATIENT
Start: 2018-01-01 | End: 2018-01-01

## 2018-01-01 RX ORDER — L.ACID,PARA/B.BIFIDUM/S.THERM 8B CELL
1 CAPSULE ORAL 2 TIMES DAILY
Status: DISCONTINUED | OUTPATIENT
Start: 2018-01-01 | End: 2018-01-01 | Stop reason: HOSPADM

## 2018-01-01 RX ORDER — IPRATROPIUM BROMIDE AND ALBUTEROL SULFATE 2.5; .5 MG/3ML; MG/3ML
3 SOLUTION RESPIRATORY (INHALATION)
Status: DISCONTINUED | OUTPATIENT
Start: 2018-01-01 | End: 2018-01-01 | Stop reason: HOSPADM

## 2018-01-01 RX ORDER — GLYCOPYRROLATE 0.2 MG/ML
INJECTION INTRAMUSCULAR; INTRAVENOUS
Status: COMPLETED
Start: 2018-01-01 | End: 2018-01-01

## 2018-01-01 RX ORDER — SCOLOPAMINE TRANSDERMAL SYSTEM 1 MG/1
1 PATCH, EXTENDED RELEASE TRANSDERMAL
Status: DISCONTINUED | OUTPATIENT
Start: 2018-01-01 | End: 2018-01-01

## 2018-01-01 RX ORDER — METOPROLOL TARTRATE 50 MG/1
50 TABLET, FILM COATED ORAL EVERY 12 HOURS SCHEDULED
Status: DISCONTINUED | OUTPATIENT
Start: 2018-01-01 | End: 2018-01-01 | Stop reason: HOSPADM

## 2018-01-01 RX ORDER — CLINDAMYCIN HYDROCHLORIDE 150 MG/1
150 CAPSULE ORAL EVERY 8 HOURS SCHEDULED
Status: COMPLETED | OUTPATIENT
Start: 2018-01-01 | End: 2018-01-01

## 2018-01-01 RX ORDER — BUMETANIDE 0.25 MG/ML
2 INJECTION INTRAMUSCULAR; INTRAVENOUS
COMMUNITY

## 2018-01-01 RX ORDER — SCOLOPAMINE TRANSDERMAL SYSTEM 1 MG/1
1 PATCH, EXTENDED RELEASE TRANSDERMAL
COMMUNITY

## 2018-01-01 RX ORDER — POTASSIUM CHLORIDE 20 MEQ/1
20 TABLET, EXTENDED RELEASE ORAL DAILY
Status: DISCONTINUED | OUTPATIENT
Start: 2018-01-01 | End: 2018-01-01 | Stop reason: SDUPTHER

## 2018-01-01 RX ORDER — DILTIAZEM HYDROCHLORIDE 60 MG/1
120 TABLET, FILM COATED ORAL EVERY 8 HOURS SCHEDULED
Status: DISCONTINUED | OUTPATIENT
Start: 2018-01-01 | End: 2018-01-01 | Stop reason: HOSPADM

## 2018-01-01 RX ORDER — CLINDAMYCIN HYDROCHLORIDE 150 MG/1
150 CAPSULE ORAL EVERY 8 HOURS
COMMUNITY

## 2018-01-01 RX ORDER — AMIODARONE HYDROCHLORIDE 200 MG/1
200 TABLET ORAL
Status: CANCELLED | OUTPATIENT
Start: 2018-01-01

## 2018-01-01 RX ORDER — IRON POLYSACCHARIDE COMPLEX 150 MG
150 CAPSULE ORAL 2 TIMES DAILY
Status: DISCONTINUED | OUTPATIENT
Start: 2018-01-01 | End: 2018-01-01 | Stop reason: HOSPADM

## 2018-01-01 RX ORDER — SCOLOPAMINE TRANSDERMAL SYSTEM 1 MG/1
1 PATCH, EXTENDED RELEASE TRANSDERMAL
Status: DISCONTINUED | OUTPATIENT
Start: 2018-01-01 | End: 2018-01-01 | Stop reason: HOSPADM

## 2018-01-01 RX ORDER — POTASSIUM CHLORIDE 20MEQ/15ML
40 LIQUID (ML) ORAL DAILY
Status: DISCONTINUED | OUTPATIENT
Start: 2018-01-01 | End: 2018-01-01 | Stop reason: CLARIF

## 2018-01-01 RX ORDER — POTASSIUM CHLORIDE 20MEQ/15ML
20 LIQUID (ML) ORAL DAILY
Status: DISCONTINUED | OUTPATIENT
Start: 2018-01-01 | End: 2018-01-01 | Stop reason: SDUPTHER

## 2018-01-01 RX ORDER — FAMOTIDINE 20 MG/1
20 TABLET, FILM COATED ORAL 2 TIMES DAILY
Status: CANCELLED | OUTPATIENT
Start: 2018-01-01

## 2018-01-01 RX ORDER — SODIUM CHLORIDE 450 MG/100ML
50 INJECTION, SOLUTION INTRAVENOUS CONTINUOUS
Status: DISCONTINUED | OUTPATIENT
Start: 2018-01-01 | End: 2018-01-01

## 2018-01-01 RX ORDER — LEVETIRACETAM 5 MG/ML
500 INJECTION INTRAVASCULAR EVERY 12 HOURS SCHEDULED
Status: DISCONTINUED | OUTPATIENT
Start: 2018-01-01 | End: 2018-01-01

## 2018-01-01 RX ORDER — LEVOTHYROXINE SODIUM 0.15 MG/1
150 TABLET ORAL DAILY
Status: DISCONTINUED | OUTPATIENT
Start: 2018-01-01 | End: 2018-01-01 | Stop reason: SDUPTHER

## 2018-01-01 RX ORDER — SODIUM CHLORIDE 9 MG/ML
75 INJECTION, SOLUTION INTRAVENOUS CONTINUOUS
Status: DISCONTINUED | OUTPATIENT
Start: 2018-01-01 | End: 2018-01-01

## 2018-01-01 RX ORDER — AMIODARONE HYDROCHLORIDE 200 MG/1
200 TABLET ORAL
Status: DISCONTINUED | OUTPATIENT
Start: 2018-01-01 | End: 2018-01-01

## 2018-01-01 RX ORDER — ONDANSETRON 4 MG/1
4 TABLET, FILM COATED ORAL EVERY 6 HOURS PRN
COMMUNITY

## 2018-01-01 RX ORDER — SENNA PLUS 8.6 MG/1
1 TABLET ORAL NIGHTLY
Status: CANCELLED | OUTPATIENT
Start: 2018-01-01

## 2018-01-01 RX ORDER — FAMOTIDINE 20 MG/1
20 TABLET, FILM COATED ORAL 2 TIMES DAILY
Status: DISCONTINUED | OUTPATIENT
Start: 2018-01-01 | End: 2018-01-01 | Stop reason: HOSPADM

## 2018-01-01 RX ORDER — CLINDAMYCIN HYDROCHLORIDE 150 MG/1
150 CAPSULE ORAL EVERY 8 HOURS SCHEDULED
Status: CANCELLED | OUTPATIENT
Start: 2018-01-01 | End: 2018-01-01

## 2018-01-01 RX ORDER — DILTIAZEM HYDROCHLORIDE 60 MG/1
120 TABLET, FILM COATED ORAL EVERY 8 HOURS SCHEDULED
Status: CANCELLED | OUTPATIENT
Start: 2018-01-01

## 2018-01-01 RX ORDER — GLYCOPYRROLATE 0.2 MG/ML
0.4 INJECTION INTRAMUSCULAR; INTRAVENOUS EVERY 4 HOURS PRN
Status: DISCONTINUED | OUTPATIENT
Start: 2018-01-01 | End: 2018-01-01 | Stop reason: HOSPADM

## 2018-01-01 RX ORDER — DILTIAZEM HYDROCHLORIDE 120 MG/1
120 TABLET, FILM COATED ORAL EVERY 12 HOURS
Status: ON HOLD | COMMUNITY
End: 2018-01-01

## 2018-01-01 RX ORDER — LEVETIRACETAM 500 MG/1
500 TABLET ORAL EVERY 12 HOURS SCHEDULED
Status: CANCELLED | OUTPATIENT
Start: 2018-01-01

## 2018-01-01 RX ORDER — BUMETANIDE 0.25 MG/ML
INJECTION INTRAMUSCULAR; INTRAVENOUS
Status: DISPENSED
Start: 2018-01-01 | End: 2018-01-01

## 2018-01-01 RX ORDER — BISACODYL 5 MG/1
5 TABLET, DELAYED RELEASE ORAL DAILY PRN
COMMUNITY

## 2018-01-01 RX ORDER — DILTIAZEM HYDROCHLORIDE 120 MG/1
240 CAPSULE, COATED, EXTENDED RELEASE ORAL EVERY OTHER DAY
Status: DISCONTINUED | OUTPATIENT
Start: 2018-01-01 | End: 2018-01-01

## 2018-01-01 RX ORDER — ASPIRIN 81 MG/1
81 TABLET, CHEWABLE ORAL DAILY
Status: DISCONTINUED | OUTPATIENT
Start: 2018-01-01 | End: 2018-01-01 | Stop reason: HOSPADM

## 2018-01-01 RX ORDER — ACETAMINOPHEN 325 MG/1
650 TABLET ORAL EVERY 6 HOURS PRN
COMMUNITY

## 2018-01-01 RX ORDER — DILTIAZEM HYDROCHLORIDE 120 MG/1
120 TABLET, FILM COATED ORAL EVERY 8 HOURS
COMMUNITY

## 2018-01-01 RX ORDER — LEVOTHYROXINE SODIUM 0.15 MG/1
150 TABLET ORAL DAILY
Status: CANCELLED | OUTPATIENT
Start: 2018-01-01

## 2018-01-01 RX ORDER — ASPIRIN 81 MG/1
81 TABLET, CHEWABLE ORAL DAILY
Status: CANCELLED | OUTPATIENT
Start: 2018-01-01

## 2018-01-01 RX ORDER — LEVETIRACETAM 500 MG/1
500 TABLET ORAL EVERY 12 HOURS SCHEDULED
Status: DISCONTINUED | OUTPATIENT
Start: 2018-01-01 | End: 2018-01-01

## 2018-01-01 RX ORDER — DILTIAZEM HYDROCHLORIDE 60 MG/1
120 TABLET, FILM COATED ORAL EVERY 12 HOURS SCHEDULED
Status: CANCELLED | OUTPATIENT
Start: 2018-01-01

## 2018-01-01 RX ORDER — ALLOPURINOL 100 MG/1
100 TABLET ORAL 2 TIMES DAILY
Status: DISCONTINUED | OUTPATIENT
Start: 2018-01-01 | End: 2018-01-01 | Stop reason: HOSPADM

## 2018-01-01 RX ORDER — POTASSIUM CHLORIDE 1.5 G/1.77G
40 POWDER, FOR SOLUTION ORAL DAILY
Status: DISCONTINUED | OUTPATIENT
Start: 2018-01-01 | End: 2018-01-01

## 2018-01-01 RX ORDER — BUMETANIDE 0.25 MG/ML
2 INJECTION INTRAMUSCULAR; INTRAVENOUS EVERY 12 HOURS
Status: COMPLETED | OUTPATIENT
Start: 2018-01-01 | End: 2018-01-01

## 2018-01-01 RX ORDER — BUMETANIDE 0.25 MG/ML
2 INJECTION INTRAMUSCULAR; INTRAVENOUS EVERY 12 HOURS
Status: CANCELLED | OUTPATIENT
Start: 2018-01-01 | End: 2018-01-01

## 2018-01-01 RX ORDER — LEVETIRACETAM 500 MG/5ML
INJECTION, SOLUTION, CONCENTRATE INTRAVENOUS
Status: DISPENSED
Start: 2018-01-01 | End: 2018-01-01

## 2018-01-01 RX ORDER — IPRATROPIUM BROMIDE AND ALBUTEROL SULFATE 2.5; .5 MG/3ML; MG/3ML
3 SOLUTION RESPIRATORY (INHALATION)
Status: CANCELLED | OUTPATIENT
Start: 2018-01-01

## 2018-01-01 RX ORDER — LEVETIRACETAM 100 MG/ML
500 SOLUTION ORAL DAILY
Status: CANCELLED | OUTPATIENT
Start: 2018-01-01

## 2018-01-01 RX ORDER — ONDANSETRON HYDROCHLORIDE 4 MG/5ML
SOLUTION ORAL ONCE
COMMUNITY

## 2018-01-01 RX ORDER — DILTIAZEM HYDROCHLORIDE 60 MG/1
120 TABLET, FILM COATED ORAL EVERY 12 HOURS SCHEDULED
Status: DISCONTINUED | OUTPATIENT
Start: 2018-01-01 | End: 2018-01-01

## 2018-01-01 RX ORDER — POTASSIUM CHLORIDE 1500 MG/1
40 TABLET, FILM COATED, EXTENDED RELEASE ORAL ONCE
Status: COMPLETED | OUTPATIENT
Start: 2018-01-01 | End: 2018-01-01

## 2018-01-01 RX ORDER — METOPROLOL TARTRATE 50 MG/1
50 TABLET, FILM COATED ORAL EVERY 12 HOURS SCHEDULED
Status: CANCELLED | OUTPATIENT
Start: 2018-01-01

## 2018-01-01 RX ORDER — IRON POLYSACCHARIDE COMPLEX 150 MG
150 CAPSULE ORAL 2 TIMES DAILY
Status: DISCONTINUED | OUTPATIENT
Start: 2018-01-01 | End: 2018-01-01 | Stop reason: CLARIF

## 2018-01-01 RX ORDER — VANCOMYCIN HCL-SODIUM CHLORIDE IV SOLN 1.5 GM/250ML-0.9% 1.5-0.9/25 GM/ML-%
1500 SOLUTION INTRAVENOUS EVERY 24 HOURS
Status: DISCONTINUED | OUTPATIENT
Start: 2018-01-01 | End: 2018-01-01

## 2018-01-01 RX ORDER — L.ACID,PARA/B.BIFIDUM/S.THERM 8B CELL
1 CAPSULE ORAL 2 TIMES DAILY
Status: CANCELLED | OUTPATIENT
Start: 2018-01-01

## 2018-01-01 RX ORDER — IPRATROPIUM BROMIDE AND ALBUTEROL SULFATE 2.5; .5 MG/3ML; MG/3ML
3 SOLUTION RESPIRATORY (INHALATION) EVERY 4 HOURS
COMMUNITY

## 2018-01-01 RX ORDER — METOPROLOL TARTRATE 50 MG/1
50 TABLET, FILM COATED ORAL EVERY 12 HOURS SCHEDULED
COMMUNITY

## 2018-01-01 RX ORDER — ALLOPURINOL 100 MG/1
100 TABLET ORAL 2 TIMES DAILY
Status: CANCELLED | OUTPATIENT
Start: 2018-01-01

## 2018-01-01 RX ORDER — FERROUS SULFATE 300 MG/5ML
300 LIQUID (ML) ORAL 2 TIMES DAILY
Status: DISCONTINUED | OUTPATIENT
Start: 2018-01-01 | End: 2018-01-01 | Stop reason: HOSPADM

## 2018-01-01 RX ORDER — LEVOTHYROXINE SODIUM 0.15 MG/1
150 TABLET ORAL DAILY
Status: DISCONTINUED | OUTPATIENT
Start: 2018-01-01 | End: 2018-01-01 | Stop reason: HOSPADM

## 2018-01-01 RX ADMIN — ASPIRIN 81 MG: 81 TABLET, COATED ORAL at 09:59

## 2018-01-01 RX ADMIN — METOPROLOL TARTRATE 75 MG: 25 TABLET, FILM COATED ORAL at 08:04

## 2018-01-01 RX ADMIN — METOPROLOL TARTRATE 75 MG: 25 TABLET, FILM COATED ORAL at 20:21

## 2018-01-01 RX ADMIN — SENNOSIDES 1 TABLET: 8.6 TABLET, FILM COATED ORAL at 20:57

## 2018-01-01 RX ADMIN — SERTRALINE HYDROCHLORIDE 75 MG: 50 TABLET ORAL at 20:39

## 2018-01-01 RX ADMIN — SODIUM CHLORIDE AND POTASSIUM CHLORIDE 100 ML/HR: 9; 1.49 INJECTION, SOLUTION INTRAVENOUS at 09:05

## 2018-01-01 RX ADMIN — INSULIN ASPART 3 UNITS: 100 INJECTION, SOLUTION INTRAVENOUS; SUBCUTANEOUS at 22:23

## 2018-01-01 RX ADMIN — APIXABAN 2.5 MG: 2.5 TABLET, FILM COATED ORAL at 08:55

## 2018-01-01 RX ADMIN — ALLOPURINOL 100 MG: 100 TABLET ORAL at 08:48

## 2018-01-01 RX ADMIN — IPRATROPIUM BROMIDE AND ALBUTEROL SULFATE 3 ML: .5; 3 SOLUTION RESPIRATORY (INHALATION) at 11:48

## 2018-01-01 RX ADMIN — DILTIAZEM HYDROCHLORIDE 120 MG: 60 TABLET, FILM COATED ORAL at 08:32

## 2018-01-01 RX ADMIN — SENNOSIDES 1 TABLET: 8.6 TABLET, FILM COATED ORAL at 22:40

## 2018-01-01 RX ADMIN — METOPROLOL TARTRATE 75 MG: 25 TABLET, FILM COATED ORAL at 20:22

## 2018-01-01 RX ADMIN — CLINDAMYCIN HYDROCHLORIDE 150 MG: 150 CAPSULE ORAL at 21:49

## 2018-01-01 RX ADMIN — MORPHINE SULFATE 1 MG: 2 INJECTION, SOLUTION INTRAMUSCULAR; INTRAVENOUS at 08:37

## 2018-01-01 RX ADMIN — DILTIAZEM HYDROCHLORIDE 120 MG: 60 TABLET, FILM COATED ORAL at 06:30

## 2018-01-01 RX ADMIN — IPRATROPIUM BROMIDE AND ALBUTEROL SULFATE 3 ML: .5; 3 SOLUTION RESPIRATORY (INHALATION) at 15:09

## 2018-01-01 RX ADMIN — Medication 150 MG: at 08:41

## 2018-01-01 RX ADMIN — SODIUM CHLORIDE 250 ML: 9 INJECTION, SOLUTION INTRAVENOUS at 19:41

## 2018-01-01 RX ADMIN — FAMOTIDINE 20 MG: 20 TABLET ORAL at 17:51

## 2018-01-01 RX ADMIN — APIXABAN 2.5 MG: 2.5 TABLET, FILM COATED ORAL at 21:35

## 2018-01-01 RX ADMIN — INSULIN ASPART 3 UNITS: 100 INJECTION, SOLUTION INTRAVENOUS; SUBCUTANEOUS at 11:56

## 2018-01-01 RX ADMIN — LEVETIRACETAM 500 MG: 500 TABLET ORAL at 14:13

## 2018-01-01 RX ADMIN — FAMOTIDINE 20 MG: 20 TABLET ORAL at 08:05

## 2018-01-01 RX ADMIN — LEVOTHYROXINE SODIUM 150 MCG: 150 TABLET ORAL at 08:33

## 2018-01-01 RX ADMIN — GLIPIZIDE 5 MG: 5 TABLET ORAL at 18:01

## 2018-01-01 RX ADMIN — VANCOMYCIN HCL-SODIUM CHLORIDE IV SOLN 1.5 GM/250ML-0.9% 1500 MG: 1.5-0.9/25 SOLUTION at 14:01

## 2018-01-01 RX ADMIN — LEVOTHYROXINE SODIUM 150 MCG: 150 TABLET ORAL at 05:51

## 2018-01-01 RX ADMIN — FAMOTIDINE 20 MG: 20 TABLET, FILM COATED ORAL at 09:16

## 2018-01-01 RX ADMIN — DILTIAZEM HYDROCHLORIDE 60 MG: 60 TABLET, FILM COATED ORAL at 13:46

## 2018-01-01 RX ADMIN — LEVETIRACETAM 500 MG: 500 TABLET, FILM COATED ORAL at 20:40

## 2018-01-01 RX ADMIN — Medication 1 TABLET: at 17:29

## 2018-01-01 RX ADMIN — INSULIN ASPART 3 UNITS: 100 INJECTION, SOLUTION INTRAVENOUS; SUBCUTANEOUS at 17:41

## 2018-01-01 RX ADMIN — LEVOTHYROXINE SODIUM 150 MCG: 150 TABLET ORAL at 08:44

## 2018-01-01 RX ADMIN — DILTIAZEM HYDROCHLORIDE 60 MG: 60 TABLET, FILM COATED ORAL at 06:20

## 2018-01-01 RX ADMIN — DILTIAZEM HYDROCHLORIDE 60 MG: 60 TABLET, FILM COATED ORAL at 15:07

## 2018-01-01 RX ADMIN — Medication 1 CAPSULE: at 08:16

## 2018-01-01 RX ADMIN — ALLOPURINOL 100 MG: 100 TABLET ORAL at 21:13

## 2018-01-01 RX ADMIN — METOPROLOL TARTRATE 75 MG: 25 TABLET, FILM COATED ORAL at 21:06

## 2018-01-01 RX ADMIN — SERTRALINE HYDROCHLORIDE 50 MG: 50 TABLET ORAL at 20:56

## 2018-01-01 RX ADMIN — LEVETIRACETAM 500 MG: 500 TABLET, FILM COATED ORAL at 22:31

## 2018-01-01 RX ADMIN — LEVETIRACETAM 500 MG: 100 SOLUTION ORAL at 08:12

## 2018-01-01 RX ADMIN — GLIPIZIDE 5 MG: 5 TABLET ORAL at 08:55

## 2018-01-01 RX ADMIN — APIXABAN 2.5 MG: 2.5 TABLET, FILM COATED ORAL at 20:33

## 2018-01-01 RX ADMIN — Medication 1 TABLET: at 09:50

## 2018-01-01 RX ADMIN — Medication 150 MG: at 08:05

## 2018-01-01 RX ADMIN — LINAGLIPTIN 5 MG: 5 TABLET, FILM COATED ORAL at 08:16

## 2018-01-01 RX ADMIN — INSULIN ASPART 3 UNITS: 100 INJECTION, SOLUTION INTRAVENOUS; SUBCUTANEOUS at 20:59

## 2018-01-01 RX ADMIN — METOPROLOL TARTRATE 75 MG: 25 TABLET, FILM COATED ORAL at 10:21

## 2018-01-01 RX ADMIN — IPRATROPIUM BROMIDE AND ALBUTEROL SULFATE 3 ML: .5; 3 SOLUTION RESPIRATORY (INHALATION) at 04:37

## 2018-01-01 RX ADMIN — INSULIN ASPART 3 UNITS: 100 INJECTION, SOLUTION INTRAVENOUS; SUBCUTANEOUS at 06:18

## 2018-01-01 RX ADMIN — Medication 150 MG: at 22:32

## 2018-01-01 RX ADMIN — MICONAZOLE NITRATE 1 APPLICATION: 20 POWDER TOPICAL at 15:53

## 2018-01-01 RX ADMIN — DILTIAZEM HYDROCHLORIDE 120 MG: 60 TABLET, FILM COATED ORAL at 06:31

## 2018-01-01 RX ADMIN — FAMOTIDINE 20 MG: 20 TABLET ORAL at 17:16

## 2018-01-01 RX ADMIN — IPRATROPIUM BROMIDE AND ALBUTEROL SULFATE 3 ML: .5; 3 SOLUTION RESPIRATORY (INHALATION) at 10:48

## 2018-01-01 RX ADMIN — ASPIRIN 81 MG: 81 TABLET, COATED ORAL at 10:13

## 2018-01-01 RX ADMIN — CEFTRIAXONE 1 G: 1 INJECTION, POWDER, FOR SOLUTION INTRAMUSCULAR; INTRAVENOUS at 09:37

## 2018-01-01 RX ADMIN — SERTRALINE HYDROCHLORIDE 75 MG: 50 TABLET ORAL at 20:23

## 2018-01-01 RX ADMIN — DILTIAZEM HYDROCHLORIDE 120 MG: 60 TABLET, FILM COATED ORAL at 10:14

## 2018-01-01 RX ADMIN — DILTIAZEM HYDROCHLORIDE 60 MG: 60 TABLET, FILM COATED ORAL at 21:03

## 2018-01-01 RX ADMIN — DILTIAZEM HYDROCHLORIDE 120 MG: 60 TABLET, FILM COATED ORAL at 21:34

## 2018-01-01 RX ADMIN — ALLOPURINOL 100 MG: 100 TABLET ORAL at 08:05

## 2018-01-01 RX ADMIN — METOPROLOL TARTRATE 50 MG: 50 TABLET, FILM COATED ORAL at 08:13

## 2018-01-01 RX ADMIN — ASPIRIN 81 MG: 81 TABLET, COATED ORAL at 08:35

## 2018-01-01 RX ADMIN — GLYCOPYRROLATE 0.4 MG: 0.2 INJECTION INTRAMUSCULAR; INTRAVENOUS at 05:52

## 2018-01-01 RX ADMIN — DILTIAZEM HYDROCHLORIDE 120 MG: 60 TABLET, FILM COATED ORAL at 21:19

## 2018-01-01 RX ADMIN — LEVETIRACETAM 500 MG: 100 SOLUTION ORAL at 08:17

## 2018-01-01 RX ADMIN — MEGESTROL ACETATE 200 MG: 40 SUSPENSION ORAL at 21:08

## 2018-01-01 RX ADMIN — METOPROLOL TARTRATE 75 MG: 25 TABLET, FILM COATED ORAL at 10:38

## 2018-01-01 RX ADMIN — ASPIRIN 81 MG CHEWABLE TABLET 81 MG: 81 TABLET CHEWABLE at 10:04

## 2018-01-01 RX ADMIN — GLIPIZIDE 5 MG: 5 TABLET ORAL at 17:54

## 2018-01-01 RX ADMIN — AMIODARONE HYDROCHLORIDE 200 MG: 200 TABLET ORAL at 09:50

## 2018-01-01 RX ADMIN — ALLOPURINOL 100 MG: 100 TABLET ORAL at 20:22

## 2018-01-01 RX ADMIN — MORPHINE SULFATE 1 MG: 2 INJECTION, SOLUTION INTRAMUSCULAR; INTRAVENOUS at 21:26

## 2018-01-01 RX ADMIN — LEVOTHYROXINE SODIUM 150 MCG: 150 TABLET ORAL at 06:19

## 2018-01-01 RX ADMIN — LEVOTHYROXINE SODIUM 150 MCG: 150 TABLET ORAL at 04:49

## 2018-01-01 RX ADMIN — INSULIN ASPART 3 UNITS: 100 INJECTION, SOLUTION INTRAVENOUS; SUBCUTANEOUS at 17:29

## 2018-01-01 RX ADMIN — DILTIAZEM HYDROCHLORIDE 120 MG: 60 TABLET, FILM COATED ORAL at 15:50

## 2018-01-01 RX ADMIN — APIXABAN 5 MG: 2.5 TABLET, FILM COATED ORAL at 08:09

## 2018-01-01 RX ADMIN — IPRATROPIUM BROMIDE AND ALBUTEROL SULFATE 3 ML: .5; 3 SOLUTION RESPIRATORY (INHALATION) at 23:30

## 2018-01-01 RX ADMIN — KETOTIFEN FUMARATE 1 DROP: 0.35 SOLUTION/ DROPS OPHTHALMIC at 22:23

## 2018-01-01 RX ADMIN — DILTIAZEM HYDROCHLORIDE 60 MG: 60 TABLET, FILM COATED ORAL at 08:09

## 2018-01-01 RX ADMIN — MORPHINE SULFATE 1 MG: 2 INJECTION, SOLUTION INTRAMUSCULAR; INTRAVENOUS at 14:37

## 2018-01-01 RX ADMIN — INSULIN ASPART 3 UNITS: 100 INJECTION, SOLUTION INTRAVENOUS; SUBCUTANEOUS at 20:43

## 2018-01-01 RX ADMIN — IPRATROPIUM BROMIDE AND ALBUTEROL SULFATE 3 ML: .5; 3 SOLUTION RESPIRATORY (INHALATION) at 23:15

## 2018-01-01 RX ADMIN — FAMOTIDINE 20 MG: 20 TABLET ORAL at 08:42

## 2018-01-01 RX ADMIN — LEVOTHYROXINE SODIUM 150 MCG: 150 TABLET ORAL at 06:10

## 2018-01-01 RX ADMIN — FAMOTIDINE 20 MG: 20 TABLET ORAL at 08:33

## 2018-01-01 RX ADMIN — SERTRALINE HYDROCHLORIDE 75 MG: 50 TABLET ORAL at 21:19

## 2018-01-01 RX ADMIN — IPRATROPIUM BROMIDE AND ALBUTEROL SULFATE 3 ML: .5; 3 SOLUTION RESPIRATORY (INHALATION) at 18:23

## 2018-01-01 RX ADMIN — KETOTIFEN FUMARATE 1 DROP: 0.35 SOLUTION/ DROPS OPHTHALMIC at 10:17

## 2018-01-01 RX ADMIN — INSULIN ASPART 8 UNITS: 100 INJECTION, SOLUTION INTRAVENOUS; SUBCUTANEOUS at 00:24

## 2018-01-01 RX ADMIN — ASPIRIN 81 MG 81 MG: 81 TABLET ORAL at 08:55

## 2018-01-01 RX ADMIN — FAMOTIDINE 20 MG: 20 TABLET ORAL at 08:26

## 2018-01-01 RX ADMIN — LINAGLIPTIN 5 MG: 5 TABLET, FILM COATED ORAL at 08:26

## 2018-01-01 RX ADMIN — AMIODARONE HYDROCHLORIDE 200 MG: 200 TABLET ORAL at 10:39

## 2018-01-01 RX ADMIN — DILTIAZEM HYDROCHLORIDE 120 MG: 60 TABLET, FILM COATED ORAL at 06:14

## 2018-01-01 RX ADMIN — DILTIAZEM HYDROCHLORIDE 120 MG: 60 TABLET, FILM COATED ORAL at 08:06

## 2018-01-01 RX ADMIN — MORPHINE SULFATE 1 MG: 2 INJECTION, SOLUTION INTRAMUSCULAR; INTRAVENOUS at 02:41

## 2018-01-01 RX ADMIN — Medication 150 MG: at 20:22

## 2018-01-01 RX ADMIN — MORPHINE SULFATE 1 MG: 2 INJECTION, SOLUTION INTRAMUSCULAR; INTRAVENOUS at 04:38

## 2018-01-01 RX ADMIN — Medication 150 MG: at 20:40

## 2018-01-01 RX ADMIN — LEVETIRACETAM 500 MG: 500 TABLET, FILM COATED ORAL at 08:45

## 2018-01-01 RX ADMIN — AMIODARONE HYDROCHLORIDE 200 MG: 200 TABLET ORAL at 20:21

## 2018-01-01 RX ADMIN — MORPHINE SULFATE 1 MG: 2 INJECTION, SOLUTION INTRAMUSCULAR; INTRAVENOUS at 23:43

## 2018-01-01 RX ADMIN — AMIODARONE HYDROCHLORIDE 200 MG: 200 TABLET ORAL at 17:29

## 2018-01-01 RX ADMIN — BUMETANIDE 2 MG: 0.25 INJECTION INTRAMUSCULAR; INTRAVENOUS at 21:13

## 2018-01-01 RX ADMIN — SENNOSIDES 1 TABLET: 8.6 TABLET, FILM COATED ORAL at 20:46

## 2018-01-01 RX ADMIN — Medication 1 TABLET: at 08:26

## 2018-01-01 RX ADMIN — ALLOPURINOL 100 MG: 100 TABLET ORAL at 09:50

## 2018-01-01 RX ADMIN — STANDARDIZED SENNA CONCENTRATE 1 TABLET: 8.6 TABLET ORAL at 21:31

## 2018-01-01 RX ADMIN — SCOPALAMINE 1 PATCH: 1 PATCH, EXTENDED RELEASE TRANSDERMAL at 08:12

## 2018-01-01 RX ADMIN — IPRATROPIUM BROMIDE AND ALBUTEROL SULFATE 3 ML: .5; 3 SOLUTION RESPIRATORY (INHALATION) at 07:36

## 2018-01-01 RX ADMIN — ALLOPURINOL 100 MG: 100 TABLET ORAL at 08:00

## 2018-01-01 RX ADMIN — IPRATROPIUM BROMIDE AND ALBUTEROL SULFATE 3 ML: .5; 3 SOLUTION RESPIRATORY (INHALATION) at 11:25

## 2018-01-01 RX ADMIN — ALLOPURINOL 100 MG: 100 TABLET ORAL at 20:40

## 2018-01-01 RX ADMIN — ASPIRIN 81 MG: 81 TABLET, COATED ORAL at 09:51

## 2018-01-01 RX ADMIN — Medication 1 TABLET: at 08:51

## 2018-01-01 RX ADMIN — LEVOTHYROXINE SODIUM 150 MCG: 150 TABLET ORAL at 08:56

## 2018-01-01 RX ADMIN — APIXABAN 5 MG: 2.5 TABLET, FILM COATED ORAL at 20:52

## 2018-01-01 RX ADMIN — LINAGLIPTIN 5 MG: 5 TABLET, FILM COATED ORAL at 08:55

## 2018-01-01 RX ADMIN — Medication 150 MG: at 20:46

## 2018-01-01 RX ADMIN — Medication 1 CAPSULE: at 20:37

## 2018-01-01 RX ADMIN — INSULIN ASPART 3 UNITS: 100 INJECTION, SOLUTION INTRAVENOUS; SUBCUTANEOUS at 12:14

## 2018-01-01 RX ADMIN — LEVETIRACETAM 500 MG: 500 SOLUTION ORAL at 08:55

## 2018-01-01 RX ADMIN — MEGESTROL ACETATE 200 MG: 40 SUSPENSION ORAL at 08:41

## 2018-01-01 RX ADMIN — INSULIN ASPART 2 UNITS: 100 INJECTION, SOLUTION INTRAVENOUS; SUBCUTANEOUS at 17:39

## 2018-01-01 RX ADMIN — Medication 150 MG: at 22:40

## 2018-01-01 RX ADMIN — TRAMADOL HYDROCHLORIDE 50 MG: 50 TABLET, FILM COATED ORAL at 00:50

## 2018-01-01 RX ADMIN — SERTRALINE HYDROCHLORIDE 75 MG: 50 TABLET ORAL at 20:40

## 2018-01-01 RX ADMIN — AMIODARONE HYDROCHLORIDE 200 MG: 200 TABLET ORAL at 08:00

## 2018-01-01 RX ADMIN — MEGESTROL ACETATE 200 MG: 40 SUSPENSION ORAL at 08:26

## 2018-01-01 RX ADMIN — IPRATROPIUM BROMIDE AND ALBUTEROL SULFATE 3 ML: .5; 3 SOLUTION RESPIRATORY (INHALATION) at 15:12

## 2018-01-01 RX ADMIN — METOPROLOL TARTRATE 50 MG: 50 TABLET, FILM COATED ORAL at 08:09

## 2018-01-01 RX ADMIN — KETOTIFEN FUMARATE 1 DROP: 0.35 SOLUTION/ DROPS OPHTHALMIC at 08:06

## 2018-01-01 RX ADMIN — ASPIRIN 81 MG 81 MG: 81 TABLET ORAL at 09:18

## 2018-01-01 RX ADMIN — POTASSIUM CHLORIDE 40 MEQ: 1500 TABLET, EXTENDED RELEASE ORAL at 17:06

## 2018-01-01 RX ADMIN — ASPIRIN 81 MG CHEWABLE TABLET 81 MG: 81 TABLET CHEWABLE at 08:49

## 2018-01-01 RX ADMIN — FAMOTIDINE 20 MG: 20 TABLET, FILM COATED ORAL at 08:13

## 2018-01-01 RX ADMIN — FAMOTIDINE 20 MG: 20 TABLET, FILM COATED ORAL at 21:53

## 2018-01-01 RX ADMIN — IPRATROPIUM BROMIDE AND ALBUTEROL SULFATE 3 ML: .5; 3 SOLUTION RESPIRATORY (INHALATION) at 15:30

## 2018-01-01 RX ADMIN — DILTIAZEM HYDROCHLORIDE 120 MG: 60 TABLET, FILM COATED ORAL at 21:53

## 2018-01-01 RX ADMIN — METOPROLOL TARTRATE 50 MG: 50 TABLET, FILM COATED ORAL at 20:42

## 2018-01-01 RX ADMIN — ASPIRIN 81 MG: 81 TABLET, COATED ORAL at 08:32

## 2018-01-01 RX ADMIN — LEVETIRACETAM 500 MG: 100 SOLUTION ORAL at 08:49

## 2018-01-01 RX ADMIN — LEVETIRACETAM 500 MG: 5 INJECTION INTRAVENOUS at 20:46

## 2018-01-01 RX ADMIN — LEVETIRACETAM 500 MG: 500 TABLET, FILM COATED ORAL at 09:59

## 2018-01-01 RX ADMIN — MEGESTROL ACETATE 200 MG: 40 SUSPENSION ORAL at 10:16

## 2018-01-01 RX ADMIN — ASPIRIN 81 MG CHEWABLE TABLET 81 MG: 81 TABLET CHEWABLE at 08:18

## 2018-01-01 RX ADMIN — METOPROLOL TARTRATE 50 MG: 50 TABLET, FILM COATED ORAL at 20:33

## 2018-01-01 RX ADMIN — Medication 150 MG: at 08:26

## 2018-01-01 RX ADMIN — Medication 1 CAPSULE: at 08:00

## 2018-01-01 RX ADMIN — KETOTIFEN FUMARATE 1 DROP: 0.35 SOLUTION/ DROPS OPHTHALMIC at 09:51

## 2018-01-01 RX ADMIN — LINAGLIPTIN 5 MG: 5 TABLET, FILM COATED ORAL at 09:51

## 2018-01-01 RX ADMIN — INSULIN ASPART 3 UNITS: 100 INJECTION, SOLUTION INTRAVENOUS; SUBCUTANEOUS at 20:17

## 2018-01-01 RX ADMIN — APIXABAN 2.5 MG: 2.5 TABLET, FILM COATED ORAL at 09:16

## 2018-01-01 RX ADMIN — ACETAMINOPHEN 650 MG: 325 TABLET, FILM COATED ORAL at 22:29

## 2018-01-01 RX ADMIN — IPRATROPIUM BROMIDE AND ALBUTEROL SULFATE 3 ML: .5; 3 SOLUTION RESPIRATORY (INHALATION) at 21:14

## 2018-01-01 RX ADMIN — INSULIN ASPART 3 UNITS: 100 INJECTION, SOLUTION INTRAVENOUS; SUBCUTANEOUS at 20:12

## 2018-01-01 RX ADMIN — LINAGLIPTIN 5 MG: 5 TABLET, FILM COATED ORAL at 09:49

## 2018-01-01 RX ADMIN — LEVOTHYROXINE SODIUM 150 MCG: 150 TABLET ORAL at 05:43

## 2018-01-01 RX ADMIN — FAMOTIDINE 20 MG: 20 TABLET ORAL at 17:36

## 2018-01-01 RX ADMIN — Medication 1 CAPSULE: at 20:22

## 2018-01-01 RX ADMIN — DONEPEZIL HYDROCHLORIDE 10 MG: 5 TABLET ORAL at 20:22

## 2018-01-01 RX ADMIN — ALLOPURINOL 100 MG: 100 TABLET ORAL at 10:03

## 2018-01-01 RX ADMIN — Medication 150 MG: at 10:38

## 2018-01-01 RX ADMIN — SENNOSIDES 1 TABLET: 8.6 TABLET, FILM COATED ORAL at 20:32

## 2018-01-01 RX ADMIN — METOPROLOL TARTRATE 75 MG: 25 TABLET, FILM COATED ORAL at 10:06

## 2018-01-01 RX ADMIN — SERTRALINE HYDROCHLORIDE 75 MG: 50 TABLET ORAL at 20:19

## 2018-01-01 RX ADMIN — AMIODARONE HYDROCHLORIDE 200 MG: 200 TABLET ORAL at 14:13

## 2018-01-01 RX ADMIN — IPRATROPIUM BROMIDE AND ALBUTEROL SULFATE 3 ML: .5; 3 SOLUTION RESPIRATORY (INHALATION) at 19:18

## 2018-01-01 RX ADMIN — IPRATROPIUM BROMIDE AND ALBUTEROL SULFATE 3 ML: .5; 3 SOLUTION RESPIRATORY (INHALATION) at 07:00

## 2018-01-01 RX ADMIN — TRAMADOL HYDROCHLORIDE 50 MG: 50 TABLET, FILM COATED ORAL at 02:50

## 2018-01-01 RX ADMIN — STANDARDIZED SENNA CONCENTRATE 1 TABLET: 8.6 TABLET ORAL at 08:41

## 2018-01-01 RX ADMIN — AMIODARONE HYDROCHLORIDE 200 MG: 200 TABLET ORAL at 08:45

## 2018-01-01 RX ADMIN — ASPIRIN 81 MG 81 MG: 81 TABLET ORAL at 08:09

## 2018-01-01 RX ADMIN — Medication 150 MG: at 20:21

## 2018-01-01 RX ADMIN — CLINDAMYCIN HYDROCHLORIDE 150 MG: 150 CAPSULE ORAL at 21:01

## 2018-01-01 RX ADMIN — Medication 1 TABLET: at 18:05

## 2018-01-01 RX ADMIN — LEVETIRACETAM 500 MG: 500 TABLET, FILM COATED ORAL at 20:27

## 2018-01-01 RX ADMIN — SODIUM CHLORIDE 100 ML/HR: 450 INJECTION, SOLUTION INTRAVENOUS at 20:51

## 2018-01-01 RX ADMIN — CLINDAMYCIN HYDROCHLORIDE 150 MG: 150 CAPSULE ORAL at 21:02

## 2018-01-01 RX ADMIN — MORPHINE SULFATE 1 MG: 2 INJECTION, SOLUTION INTRAMUSCULAR; INTRAVENOUS at 02:42

## 2018-01-01 RX ADMIN — DILTIAZEM HYDROCHLORIDE 120 MG: 60 TABLET, FILM COATED ORAL at 21:25

## 2018-01-01 RX ADMIN — MEROPENEM 1 G: 1 INJECTION, POWDER, FOR SOLUTION INTRAVENOUS at 19:00

## 2018-01-01 RX ADMIN — GLYCOPYRROLATE 0.4 MG: 0.2 INJECTION INTRAMUSCULAR; INTRAVENOUS at 21:28

## 2018-01-01 RX ADMIN — GLYCOPYRROLATE 0.4 MG: 0.2 INJECTION INTRAMUSCULAR; INTRAVENOUS at 21:17

## 2018-01-01 RX ADMIN — AMIODARONE HYDROCHLORIDE 200 MG: 200 TABLET ORAL at 08:26

## 2018-01-01 RX ADMIN — METOPROLOL TARTRATE 75 MG: 25 TABLET, FILM COATED ORAL at 20:40

## 2018-01-01 RX ADMIN — MICONAZOLE NITRATE 1 APPLICATION: 20 POWDER TOPICAL at 08:42

## 2018-01-01 RX ADMIN — METOPROLOL TARTRATE 75 MG: 25 TABLET, FILM COATED ORAL at 08:48

## 2018-01-01 RX ADMIN — ASPIRIN 81 MG: 81 TABLET, COATED ORAL at 08:51

## 2018-01-01 RX ADMIN — BUMETANIDE 2 MG: 0.25 INJECTION INTRAMUSCULAR; INTRAVENOUS at 08:16

## 2018-01-01 RX ADMIN — IPRATROPIUM BROMIDE AND ALBUTEROL SULFATE 3 ML: .5; 3 SOLUTION RESPIRATORY (INHALATION) at 15:41

## 2018-01-01 RX ADMIN — INSULIN ASPART 5 UNITS: 100 INJECTION, SOLUTION INTRAVENOUS; SUBCUTANEOUS at 13:18

## 2018-01-01 RX ADMIN — MORPHINE SULFATE 1 MG: 2 INJECTION, SOLUTION INTRAMUSCULAR; INTRAVENOUS at 11:44

## 2018-01-01 RX ADMIN — APIXABAN 2.5 MG: 2.5 TABLET, FILM COATED ORAL at 08:49

## 2018-01-01 RX ADMIN — MORPHINE SULFATE 1 MG: 2 INJECTION, SOLUTION INTRAMUSCULAR; INTRAVENOUS at 17:53

## 2018-01-01 RX ADMIN — AMIODARONE HYDROCHLORIDE 200 MG: 200 TABLET ORAL at 08:41

## 2018-01-01 RX ADMIN — METOPROLOL TARTRATE 50 MG: 50 TABLET, FILM COATED ORAL at 08:44

## 2018-01-01 RX ADMIN — DILTIAZEM HYDROCHLORIDE 120 MG: 60 TABLET, FILM COATED ORAL at 20:23

## 2018-01-01 RX ADMIN — LEVETIRACETAM 500 MG: 500 TABLET, FILM COATED ORAL at 08:05

## 2018-01-01 RX ADMIN — ALLOPURINOL 100 MG: 100 TABLET ORAL at 21:20

## 2018-01-01 RX ADMIN — LEVETIRACETAM 500 MG: 5 INJECTION INTRAVENOUS at 09:17

## 2018-01-01 RX ADMIN — DILTIAZEM HYDROCHLORIDE 120 MG: 60 TABLET, FILM COATED ORAL at 15:28

## 2018-01-01 RX ADMIN — LEVOTHYROXINE SODIUM 150 MCG: 150 TABLET ORAL at 06:01

## 2018-01-01 RX ADMIN — LINAGLIPTIN 5 MG: 5 TABLET, FILM COATED ORAL at 08:33

## 2018-01-01 RX ADMIN — APIXABAN 2.5 MG: 2.5 TABLET, FILM COATED ORAL at 10:22

## 2018-01-01 RX ADMIN — INSULIN ASPART 3 UNITS: 100 INJECTION, SOLUTION INTRAVENOUS; SUBCUTANEOUS at 09:37

## 2018-01-01 RX ADMIN — Medication 1 CAPSULE: at 08:05

## 2018-01-01 RX ADMIN — SERTRALINE HYDROCHLORIDE 75 MG: 50 TABLET ORAL at 21:06

## 2018-01-01 RX ADMIN — IPRATROPIUM BROMIDE AND ALBUTEROL SULFATE 3 ML: .5; 3 SOLUTION RESPIRATORY (INHALATION) at 18:01

## 2018-01-01 RX ADMIN — AMIODARONE HYDROCHLORIDE 200 MG: 200 TABLET ORAL at 08:11

## 2018-01-01 RX ADMIN — LEVETIRACETAM 500 MG: 5 INJECTION INTRAVENOUS at 20:42

## 2018-01-01 RX ADMIN — IPRATROPIUM BROMIDE AND ALBUTEROL SULFATE 3 ML: .5; 3 SOLUTION RESPIRATORY (INHALATION) at 06:37

## 2018-01-01 RX ADMIN — FAMOTIDINE 20 MG: 20 TABLET ORAL at 08:32

## 2018-01-01 RX ADMIN — SODIUM CHLORIDE 75 ML/HR: 9 INJECTION, SOLUTION INTRAVENOUS at 03:15

## 2018-01-01 RX ADMIN — CLINDAMYCIN HYDROCHLORIDE 150 MG: 150 CAPSULE ORAL at 15:57

## 2018-01-01 RX ADMIN — LEVETIRACETAM 500 MG: 500 TABLET, FILM COATED ORAL at 10:13

## 2018-01-01 RX ADMIN — CEFTRIAXONE 1 G: 1 INJECTION, POWDER, FOR SOLUTION INTRAMUSCULAR; INTRAVENOUS at 10:22

## 2018-01-01 RX ADMIN — IPRATROPIUM BROMIDE AND ALBUTEROL SULFATE 3 ML: .5; 3 SOLUTION RESPIRATORY (INHALATION) at 02:50

## 2018-01-01 RX ADMIN — METOPROLOL TARTRATE 50 MG: 50 TABLET, FILM COATED ORAL at 21:25

## 2018-01-01 RX ADMIN — MEGESTROL ACETATE 200 MG: 40 SUSPENSION ORAL at 17:20

## 2018-01-01 RX ADMIN — IPRATROPIUM BROMIDE AND ALBUTEROL SULFATE 3 ML: .5; 3 SOLUTION RESPIRATORY (INHALATION) at 19:22

## 2018-01-01 RX ADMIN — ALLOPURINOL 100 MG: 100 TABLET ORAL at 20:37

## 2018-01-01 RX ADMIN — ALLOPURINOL 100 MG: 100 TABLET ORAL at 20:21

## 2018-01-01 RX ADMIN — Medication 1 TABLET: at 08:32

## 2018-01-01 RX ADMIN — LINAGLIPTIN 5 MG: 5 TABLET, FILM COATED ORAL at 08:35

## 2018-01-01 RX ADMIN — INSULIN ASPART 3 UNITS: 100 INJECTION, SOLUTION INTRAVENOUS; SUBCUTANEOUS at 04:05

## 2018-01-01 RX ADMIN — ALLOPURINOL 100 MG: 100 TABLET ORAL at 08:35

## 2018-01-01 RX ADMIN — Medication 1 CAPSULE: at 08:56

## 2018-01-01 RX ADMIN — LEVOTHYROXINE SODIUM 150 MCG: 150 TABLET ORAL at 06:20

## 2018-01-01 RX ADMIN — MINERAL SUPPLEMENT IRON 300 MG / 5 ML STRENGTH LIQUID 100 PER BOX UNFLAVORED 300 MG: at 21:53

## 2018-01-01 RX ADMIN — KETOTIFEN FUMARATE 1 DROP: 0.35 SOLUTION/ DROPS OPHTHALMIC at 08:32

## 2018-01-01 RX ADMIN — TRAMADOL HYDROCHLORIDE 50 MG: 50 TABLET, FILM COATED ORAL at 20:40

## 2018-01-01 RX ADMIN — Medication 1 TABLET: at 17:42

## 2018-01-01 RX ADMIN — INSULIN ASPART 5 UNITS: 100 INJECTION, SOLUTION INTRAVENOUS; SUBCUTANEOUS at 18:03

## 2018-01-01 RX ADMIN — Medication 1 CAPSULE: at 08:12

## 2018-01-01 RX ADMIN — DILTIAZEM HYDROCHLORIDE 120 MG: 60 TABLET, FILM COATED ORAL at 14:12

## 2018-01-01 RX ADMIN — LEVETIRACETAM 500 MG: 5 INJECTION INTRAVENOUS at 10:47

## 2018-01-01 RX ADMIN — STANDARDIZED SENNA CONCENTRATE 1 TABLET: 8.6 TABLET ORAL at 17:29

## 2018-01-01 RX ADMIN — DILTIAZEM HYDROCHLORIDE 60 MG: 60 TABLET, FILM COATED ORAL at 06:18

## 2018-01-01 RX ADMIN — IPRATROPIUM BROMIDE AND ALBUTEROL SULFATE 3 ML: .5; 3 SOLUTION RESPIRATORY (INHALATION) at 08:23

## 2018-01-01 RX ADMIN — Medication 1 CAPSULE: at 09:51

## 2018-01-01 RX ADMIN — Medication 1 CAPSULE: at 20:46

## 2018-01-01 RX ADMIN — MEROPENEM 1 G: 1 INJECTION, POWDER, FOR SOLUTION INTRAVENOUS at 11:00

## 2018-01-01 RX ADMIN — LEVETIRACETAM 500 MG: 500 TABLET, FILM COATED ORAL at 09:50

## 2018-01-01 RX ADMIN — METOPROLOL TARTRATE 75 MG: 25 TABLET, FILM COATED ORAL at 20:28

## 2018-01-01 RX ADMIN — LEVETIRACETAM 500 MG: 500 TABLET, FILM COATED ORAL at 10:21

## 2018-01-01 RX ADMIN — Medication 150 MG: at 10:22

## 2018-01-01 RX ADMIN — CONJUGATED ESTROGENS 0.75 APPLICATOR: 0.62 CREAM VAGINAL at 21:32

## 2018-01-01 RX ADMIN — ALLOPURINOL 100 MG: 100 TABLET ORAL at 17:13

## 2018-01-01 RX ADMIN — AMIODARONE HYDROCHLORIDE 200 MG: 200 TABLET ORAL at 08:17

## 2018-01-01 RX ADMIN — APIXABAN 2.5 MG: 2.5 TABLET, FILM COATED ORAL at 08:13

## 2018-01-01 RX ADMIN — DILTIAZEM HYDROCHLORIDE 120 MG: 60 TABLET, FILM COATED ORAL at 08:26

## 2018-01-01 RX ADMIN — ALLOPURINOL 100 MG: 100 TABLET ORAL at 21:53

## 2018-01-01 RX ADMIN — INSULIN ASPART 3 UNITS: 100 INJECTION, SOLUTION INTRAVENOUS; SUBCUTANEOUS at 17:58

## 2018-01-01 RX ADMIN — AMIODARONE HYDROCHLORIDE 200 MG: 200 TABLET ORAL at 22:31

## 2018-01-01 RX ADMIN — SERTRALINE HYDROCHLORIDE 75 MG: 50 TABLET ORAL at 22:39

## 2018-01-01 RX ADMIN — POTASSIUM CHLORIDE 40 MEQ: 1500 TABLET, EXTENDED RELEASE ORAL at 09:15

## 2018-01-01 RX ADMIN — SCOPALAMINE 1 PATCH: 1 PATCH, EXTENDED RELEASE TRANSDERMAL at 08:55

## 2018-01-01 RX ADMIN — INSULIN ASPART 5 UNITS: 100 INJECTION, SOLUTION INTRAVENOUS; SUBCUTANEOUS at 12:40

## 2018-01-01 RX ADMIN — Medication 150 MG: at 08:13

## 2018-01-01 RX ADMIN — LEVOTHYROXINE SODIUM 150 MCG: 150 TABLET ORAL at 15:42

## 2018-01-01 RX ADMIN — MEGESTROL ACETATE 200 MG: 40 SUSPENSION ORAL at 20:22

## 2018-01-01 RX ADMIN — GLYCOPYRROLATE 0.4 MG: 0.2 INJECTION INTRAMUSCULAR; INTRAVENOUS at 21:53

## 2018-01-01 RX ADMIN — AMIODARONE HYDROCHLORIDE 200 MG: 200 TABLET ORAL at 08:12

## 2018-01-01 RX ADMIN — LEVETIRACETAM 500 MG: 500 TABLET, FILM COATED ORAL at 08:41

## 2018-01-01 RX ADMIN — STANDARDIZED SENNA CONCENTRATE 1 TABLET: 8.6 TABLET ORAL at 08:32

## 2018-01-01 RX ADMIN — AMIODARONE HYDROCHLORIDE 200 MG: 200 TABLET ORAL at 09:17

## 2018-01-01 RX ADMIN — IPRATROPIUM BROMIDE AND ALBUTEROL SULFATE 3 ML: .5; 3 SOLUTION RESPIRATORY (INHALATION) at 22:20

## 2018-01-01 RX ADMIN — STANDARDIZED SENNA CONCENTRATE 1 TABLET: 8.6 TABLET ORAL at 21:53

## 2018-01-01 RX ADMIN — Medication 150 MG: at 21:53

## 2018-01-01 RX ADMIN — INSULIN ASPART 8 UNITS: 100 INJECTION, SOLUTION INTRAVENOUS; SUBCUTANEOUS at 12:36

## 2018-01-01 RX ADMIN — METOPROLOL TARTRATE 75 MG: 25 TABLET, FILM COATED ORAL at 09:50

## 2018-01-01 RX ADMIN — IPRATROPIUM BROMIDE AND ALBUTEROL SULFATE 3 ML: .5; 3 SOLUTION RESPIRATORY (INHALATION) at 06:30

## 2018-01-01 RX ADMIN — ASPIRIN 81 MG: 81 TABLET, COATED ORAL at 08:05

## 2018-01-01 RX ADMIN — STANDARDIZED SENNA CONCENTRATE 1 TABLET: 8.6 TABLET ORAL at 20:27

## 2018-01-01 RX ADMIN — IPRATROPIUM BROMIDE AND ALBUTEROL SULFATE 3 ML: .5; 3 SOLUTION RESPIRATORY (INHALATION) at 08:17

## 2018-01-01 RX ADMIN — INSULIN ASPART 8 UNITS: 100 INJECTION, SOLUTION INTRAVENOUS; SUBCUTANEOUS at 12:21

## 2018-01-01 RX ADMIN — TRAMADOL HYDROCHLORIDE 50 MG: 50 TABLET, FILM COATED ORAL at 16:11

## 2018-01-01 RX ADMIN — IPRATROPIUM BROMIDE AND ALBUTEROL SULFATE 3 ML: .5; 3 SOLUTION RESPIRATORY (INHALATION) at 11:17

## 2018-01-01 RX ADMIN — ASPIRIN 81 MG: 81 TABLET, COATED ORAL at 08:01

## 2018-01-01 RX ADMIN — Medication 1 TABLET: at 17:28

## 2018-01-01 RX ADMIN — LEVOTHYROXINE SODIUM 150 MCG: 150 TABLET ORAL at 06:21

## 2018-01-01 RX ADMIN — Medication 1 TABLET: at 08:35

## 2018-01-01 RX ADMIN — ALLOPURINOL 100 MG: 100 TABLET ORAL at 10:14

## 2018-01-01 RX ADMIN — MORPHINE SULFATE 1 MG: 2 INJECTION, SOLUTION INTRAMUSCULAR; INTRAVENOUS at 18:23

## 2018-01-01 RX ADMIN — Medication 150 MG: at 17:30

## 2018-01-01 RX ADMIN — INSULIN ASPART 3 UNITS: 100 INJECTION, SOLUTION INTRAVENOUS; SUBCUTANEOUS at 18:04

## 2018-01-01 RX ADMIN — DILTIAZEM HYDROCHLORIDE 120 MG: 60 TABLET, FILM COATED ORAL at 20:41

## 2018-01-01 RX ADMIN — METOPROLOL TARTRATE 50 MG: 50 TABLET, FILM COATED ORAL at 21:13

## 2018-01-01 RX ADMIN — ALLOPURINOL 100 MG: 100 TABLET ORAL at 22:31

## 2018-01-01 RX ADMIN — CLINDAMYCIN HYDROCHLORIDE 150 MG: 150 CAPSULE ORAL at 15:53

## 2018-01-01 RX ADMIN — IPRATROPIUM BROMIDE AND ALBUTEROL SULFATE 3 ML: .5; 3 SOLUTION RESPIRATORY (INHALATION) at 02:42

## 2018-01-01 RX ADMIN — MORPHINE SULFATE 1 MG: 2 INJECTION, SOLUTION INTRAMUSCULAR; INTRAVENOUS at 09:41

## 2018-01-01 RX ADMIN — METOPROLOL TARTRATE 50 MG: 50 TABLET, FILM COATED ORAL at 20:56

## 2018-01-01 RX ADMIN — Medication 150 MG: at 20:20

## 2018-01-01 RX ADMIN — DILTIAZEM HYDROCHLORIDE 120 MG: 60 TABLET, FILM COATED ORAL at 10:21

## 2018-01-01 RX ADMIN — MORPHINE SULFATE 1 MG: 2 INJECTION, SOLUTION INTRAMUSCULAR; INTRAVENOUS at 21:55

## 2018-01-01 RX ADMIN — MICONAZOLE NITRATE 1 APPLICATION: 2 POWDER TOPICAL at 08:17

## 2018-01-01 RX ADMIN — Medication 150 MG: at 17:28

## 2018-01-01 RX ADMIN — LEVETIRACETAM 500 MG: 5 INJECTION INTRAVENOUS at 10:44

## 2018-01-01 RX ADMIN — AMIODARONE HYDROCHLORIDE 200 MG: 200 TABLET ORAL at 08:13

## 2018-01-01 RX ADMIN — DILTIAZEM HYDROCHLORIDE 120 MG: 60 TABLET, FILM COATED ORAL at 14:48

## 2018-01-01 RX ADMIN — Medication 1 TABLET: at 17:27

## 2018-01-01 RX ADMIN — Medication 1 TABLET: at 10:40

## 2018-01-01 RX ADMIN — CONJUGATED ESTROGENS 0.75 APPLICATOR: 0.62 CREAM VAGINAL at 22:13

## 2018-01-01 RX ADMIN — SERTRALINE HYDROCHLORIDE 75 MG: 50 TABLET ORAL at 20:20

## 2018-01-01 RX ADMIN — INSULIN ASPART 3 UNITS: 100 INJECTION, SOLUTION INTRAVENOUS; SUBCUTANEOUS at 11:49

## 2018-01-01 RX ADMIN — FAMOTIDINE 20 MG: 20 TABLET ORAL at 08:01

## 2018-01-01 RX ADMIN — IPRATROPIUM BROMIDE AND ALBUTEROL SULFATE 3 ML: .5; 3 SOLUTION RESPIRATORY (INHALATION) at 21:32

## 2018-01-01 RX ADMIN — Medication 1 TABLET: at 10:14

## 2018-01-01 RX ADMIN — INSULIN ASPART 3 UNITS: 100 INJECTION, SOLUTION INTRAVENOUS; SUBCUTANEOUS at 12:16

## 2018-01-01 RX ADMIN — DILTIAZEM HYDROCHLORIDE 120 MG: 60 TABLET, FILM COATED ORAL at 14:23

## 2018-01-01 RX ADMIN — LEVETIRACETAM 500 MG: 500 TABLET, FILM COATED ORAL at 08:01

## 2018-01-01 RX ADMIN — SERTRALINE HYDROCHLORIDE 50 MG: 50 TABLET ORAL at 20:33

## 2018-01-01 RX ADMIN — DILTIAZEM HYDROCHLORIDE 120 MG: 60 TABLET, FILM COATED ORAL at 15:57

## 2018-01-01 RX ADMIN — Medication 150 MG: at 20:42

## 2018-01-01 RX ADMIN — Medication 1 TABLET: at 17:36

## 2018-01-01 RX ADMIN — METOPROLOL TARTRATE 50 MG: 50 TABLET, FILM COATED ORAL at 21:53

## 2018-01-01 RX ADMIN — TRAMADOL HYDROCHLORIDE 50 MG: 50 TABLET, FILM COATED ORAL at 17:36

## 2018-01-01 RX ADMIN — Medication 1 TABLET: at 17:16

## 2018-01-01 RX ADMIN — AMIODARONE HYDROCHLORIDE 200 MG: 200 TABLET ORAL at 20:40

## 2018-01-01 RX ADMIN — SERTRALINE HYDROCHLORIDE 75 MG: 50 TABLET ORAL at 20:11

## 2018-01-01 RX ADMIN — APIXABAN 2.5 MG: 2.5 TABLET, FILM COATED ORAL at 10:02

## 2018-01-01 RX ADMIN — MORPHINE SULFATE 1 MG: 2 INJECTION, SOLUTION INTRAMUSCULAR; INTRAVENOUS at 01:02

## 2018-01-01 RX ADMIN — Medication 1 CAPSULE: at 08:41

## 2018-01-01 RX ADMIN — Medication 1 CAPSULE: at 20:57

## 2018-01-01 RX ADMIN — Medication 150 MG: at 08:32

## 2018-01-01 RX ADMIN — STANDARDIZED SENNA CONCENTRATE 1 TABLET: 8.6 TABLET ORAL at 21:12

## 2018-01-01 RX ADMIN — SERTRALINE HYDROCHLORIDE 75 MG: 50 TABLET ORAL at 20:21

## 2018-01-01 RX ADMIN — DONEPEZIL HYDROCHLORIDE 10 MG: 5 TABLET ORAL at 20:56

## 2018-01-01 RX ADMIN — IPRATROPIUM BROMIDE AND ALBUTEROL SULFATE 3 ML: .5; 3 SOLUTION RESPIRATORY (INHALATION) at 16:03

## 2018-01-01 RX ADMIN — KETOTIFEN FUMARATE 1 DROP: 0.35 SOLUTION/ DROPS OPHTHALMIC at 10:40

## 2018-01-01 RX ADMIN — Medication 1 TABLET: at 17:06

## 2018-01-01 RX ADMIN — Medication 150 MG: at 20:12

## 2018-01-01 RX ADMIN — APIXABAN 2.5 MG: 2.5 TABLET, FILM COATED ORAL at 21:25

## 2018-01-01 RX ADMIN — FAMOTIDINE 20 MG: 20 TABLET, FILM COATED ORAL at 22:42

## 2018-01-01 RX ADMIN — IPRATROPIUM BROMIDE AND ALBUTEROL SULFATE 3 ML: .5; 3 SOLUTION RESPIRATORY (INHALATION) at 23:13

## 2018-01-01 RX ADMIN — INSULIN ASPART 5 UNITS: 100 INJECTION, SOLUTION INTRAVENOUS; SUBCUTANEOUS at 10:54

## 2018-01-01 RX ADMIN — GLIPIZIDE 5 MG: 5 TABLET ORAL at 08:13

## 2018-01-01 RX ADMIN — BUMETANIDE 2 MG: 0.25 INJECTION INTRAMUSCULAR; INTRAVENOUS at 21:26

## 2018-01-01 RX ADMIN — CLINDAMYCIN HYDROCHLORIDE 150 MG: 150 CAPSULE ORAL at 06:31

## 2018-01-01 RX ADMIN — MEGESTROL ACETATE 200 MG: 40 SUSPENSION ORAL at 17:28

## 2018-01-01 RX ADMIN — IPRATROPIUM BROMIDE AND ALBUTEROL SULFATE 3 ML: .5; 3 SOLUTION RESPIRATORY (INHALATION) at 11:44

## 2018-01-01 RX ADMIN — IPRATROPIUM BROMIDE AND ALBUTEROL SULFATE 3 ML: .5; 3 SOLUTION RESPIRATORY (INHALATION) at 10:24

## 2018-01-01 RX ADMIN — DILTIAZEM HYDROCHLORIDE 120 MG: 60 TABLET, FILM COATED ORAL at 08:45

## 2018-01-01 RX ADMIN — APIXABAN 2.5 MG: 2.5 TABLET, FILM COATED ORAL at 08:12

## 2018-01-01 RX ADMIN — SENNOSIDES 1 TABLET: 8.6 TABLET, FILM COATED ORAL at 20:52

## 2018-01-01 RX ADMIN — STANDARDIZED SENNA CONCENTRATE 1 TABLET: 8.6 TABLET ORAL at 20:19

## 2018-01-01 RX ADMIN — KETOTIFEN FUMARATE 1 DROP: 0.35 SOLUTION/ DROPS OPHTHALMIC at 17:28

## 2018-01-01 RX ADMIN — FAMOTIDINE 20 MG: 20 TABLET, FILM COATED ORAL at 08:34

## 2018-01-01 RX ADMIN — LEVETIRACETAM 500 MG: 5 INJECTION INTRAVENOUS at 22:30

## 2018-01-01 RX ADMIN — METOPROLOL TARTRATE 50 MG: 50 TABLET, FILM COATED ORAL at 20:11

## 2018-01-01 RX ADMIN — FAMOTIDINE 20 MG: 20 TABLET ORAL at 10:39

## 2018-01-01 RX ADMIN — APIXABAN 2.5 MG: 2.5 TABLET, FILM COATED ORAL at 20:42

## 2018-01-01 RX ADMIN — LEVETIRACETAM 500 MG: 5 INJECTION INTRAVENOUS at 20:33

## 2018-01-01 RX ADMIN — STANDARDIZED SENNA CONCENTRATE 1 TABLET: 8.6 TABLET ORAL at 20:21

## 2018-01-01 RX ADMIN — MEGESTROL ACETATE 200 MG: 40 SUSPENSION ORAL at 08:46

## 2018-01-01 RX ADMIN — IPRATROPIUM BROMIDE AND ALBUTEROL SULFATE 3 ML: .5; 3 SOLUTION RESPIRATORY (INHALATION) at 12:26

## 2018-01-01 RX ADMIN — AMIODARONE HYDROCHLORIDE 200 MG: 200 TABLET ORAL at 08:35

## 2018-01-01 RX ADMIN — KETOTIFEN FUMARATE 1 DROP: 0.35 SOLUTION/ DROPS OPHTHALMIC at 08:35

## 2018-01-01 RX ADMIN — LINAGLIPTIN 5 MG: 5 TABLET, FILM COATED ORAL at 10:00

## 2018-01-01 RX ADMIN — LINAGLIPTIN 5 MG: 5 TABLET, FILM COATED ORAL at 10:22

## 2018-01-01 RX ADMIN — AMIODARONE HYDROCHLORIDE 200 MG: 200 TABLET ORAL at 09:59

## 2018-01-01 RX ADMIN — STANDARDIZED SENNA CONCENTRATE 1 TABLET: 8.6 TABLET ORAL at 17:13

## 2018-01-01 RX ADMIN — DIGOXIN 250 MCG: 0.25 INJECTION INTRAMUSCULAR; INTRAVENOUS at 07:55

## 2018-01-01 RX ADMIN — LEVOTHYROXINE SODIUM 150 MCG: 150 TABLET ORAL at 06:15

## 2018-01-01 RX ADMIN — CONJUGATED ESTROGENS 0.75 APPLICATOR: 0.62 CREAM VAGINAL at 20:27

## 2018-01-01 RX ADMIN — MICONAZOLE NITRATE 1 APPLICATION: 20 POWDER TOPICAL at 08:02

## 2018-01-01 RX ADMIN — DILTIAZEM HYDROCHLORIDE 120 MG: 60 TABLET, FILM COATED ORAL at 08:01

## 2018-01-01 RX ADMIN — MEGESTROL ACETATE 200 MG: 40 SUSPENSION ORAL at 20:29

## 2018-01-01 RX ADMIN — LINAGLIPTIN 5 MG: 5 TABLET, FILM COATED ORAL at 08:41

## 2018-01-01 RX ADMIN — METOPROLOL TARTRATE 50 MG: 50 TABLET, FILM COATED ORAL at 08:12

## 2018-01-01 RX ADMIN — STANDARDIZED SENNA CONCENTRATE 1 TABLET: 8.6 TABLET ORAL at 21:23

## 2018-01-01 RX ADMIN — APIXABAN 2.5 MG: 2.5 TABLET, FILM COATED ORAL at 08:11

## 2018-01-01 RX ADMIN — METOPROLOL TARTRATE 50 MG: 50 TABLET, FILM COATED ORAL at 08:30

## 2018-01-01 RX ADMIN — Medication 150 MG: at 10:04

## 2018-01-01 RX ADMIN — INSULIN ASPART 3 UNITS: 100 INJECTION, SOLUTION INTRAVENOUS; SUBCUTANEOUS at 17:47

## 2018-01-01 RX ADMIN — Medication 150 MG: at 08:35

## 2018-01-01 RX ADMIN — LINAGLIPTIN 5 MG: 5 TABLET, FILM COATED ORAL at 08:01

## 2018-01-01 RX ADMIN — INSULIN ASPART 5 UNITS: 100 INJECTION, SOLUTION INTRAVENOUS; SUBCUTANEOUS at 08:36

## 2018-01-01 RX ADMIN — STANDARDIZED SENNA CONCENTRATE 1 TABLET: 8.6 TABLET ORAL at 21:06

## 2018-01-01 RX ADMIN — FAMOTIDINE 20 MG: 20 TABLET ORAL at 17:29

## 2018-01-01 RX ADMIN — Medication 150 MG: at 20:33

## 2018-01-01 RX ADMIN — MEROPENEM 1 G: 1 INJECTION, POWDER, FOR SOLUTION INTRAVENOUS at 01:12

## 2018-01-01 RX ADMIN — BUMETANIDE 2 MG: 0.25 INJECTION, SOLUTION INTRAMUSCULAR; INTRAVENOUS at 21:26

## 2018-01-01 RX ADMIN — TRAMADOL HYDROCHLORIDE 50 MG: 50 TABLET, FILM COATED ORAL at 08:42

## 2018-01-01 RX ADMIN — INSULIN ASPART 3 UNITS: 100 INJECTION, SOLUTION INTRAVENOUS; SUBCUTANEOUS at 17:11

## 2018-01-01 RX ADMIN — GLIPIZIDE 5 MG: 5 TABLET ORAL at 10:02

## 2018-01-01 RX ADMIN — LEVETIRACETAM 500 MG: 500 TABLET, FILM COATED ORAL at 17:28

## 2018-01-01 RX ADMIN — METOPROLOL TARTRATE 75 MG: 25 TABLET, FILM COATED ORAL at 20:23

## 2018-01-01 RX ADMIN — IPRATROPIUM BROMIDE AND ALBUTEROL SULFATE 3 ML: .5; 3 SOLUTION RESPIRATORY (INHALATION) at 11:26

## 2018-01-01 RX ADMIN — CEFTRIAXONE 1 G: 1 INJECTION, POWDER, FOR SOLUTION INTRAMUSCULAR; INTRAVENOUS at 09:09

## 2018-01-01 RX ADMIN — FAMOTIDINE 20 MG: 20 TABLET, FILM COATED ORAL at 08:16

## 2018-01-01 RX ADMIN — STANDARDIZED SENNA CONCENTRATE 1 TABLET: 8.6 TABLET ORAL at 22:31

## 2018-01-01 RX ADMIN — APIXABAN 2.5 MG: 2.5 TABLET, FILM COATED ORAL at 20:32

## 2018-01-01 RX ADMIN — FAMOTIDINE 20 MG: 20 TABLET ORAL at 17:06

## 2018-01-01 RX ADMIN — FAMOTIDINE 20 MG: 20 TABLET, FILM COATED ORAL at 20:56

## 2018-01-01 RX ADMIN — METOPROLOL TARTRATE 50 MG: 50 TABLET, FILM COATED ORAL at 08:56

## 2018-01-01 RX ADMIN — ALLOPURINOL 100 MG: 100 TABLET ORAL at 09:59

## 2018-01-01 RX ADMIN — TRAMADOL HYDROCHLORIDE 50 MG: 50 TABLET, FILM COATED ORAL at 08:05

## 2018-01-01 RX ADMIN — LEVETIRACETAM 500 MG: 5 INJECTION INTRAVENOUS at 20:56

## 2018-01-01 RX ADMIN — LINAGLIPTIN 5 MG: 5 TABLET, FILM COATED ORAL at 08:05

## 2018-01-01 RX ADMIN — INSULIN ASPART 5 UNITS: 100 INJECTION, SOLUTION INTRAVENOUS; SUBCUTANEOUS at 17:13

## 2018-01-01 RX ADMIN — MEGESTROL ACETATE 200 MG: 40 SUSPENSION ORAL at 09:49

## 2018-01-01 RX ADMIN — BUMETANIDE 2 MG: 0.25 INJECTION INTRAMUSCULAR; INTRAVENOUS at 08:33

## 2018-01-01 RX ADMIN — CLINDAMYCIN HYDROCHLORIDE 150 MG: 150 CAPSULE ORAL at 14:00

## 2018-01-01 RX ADMIN — LEVETIRACETAM 500 MG: 100 SOLUTION ORAL at 10:02

## 2018-01-01 RX ADMIN — DONEPEZIL HYDROCHLORIDE 10 MG: 5 TABLET ORAL at 20:32

## 2018-01-01 RX ADMIN — ASPIRIN 81 MG: 81 TABLET, COATED ORAL at 10:39

## 2018-01-01 RX ADMIN — LINAGLIPTIN 5 MG: 5 TABLET, FILM COATED ORAL at 08:13

## 2018-01-01 RX ADMIN — CLINDAMYCIN HYDROCHLORIDE 150 MG: 150 CAPSULE ORAL at 14:23

## 2018-01-01 RX ADMIN — Medication 1 CAPSULE: at 20:42

## 2018-01-01 RX ADMIN — Medication 150 MG: at 20:38

## 2018-01-01 RX ADMIN — SERTRALINE HYDROCHLORIDE 75 MG: 50 TABLET ORAL at 22:32

## 2018-01-01 RX ADMIN — INSULIN ASPART 3 UNITS: 100 INJECTION, SOLUTION INTRAVENOUS; SUBCUTANEOUS at 21:53

## 2018-01-01 RX ADMIN — ASPIRIN 81 MG: 81 TABLET, COATED ORAL at 08:41

## 2018-01-01 RX ADMIN — APIXABAN 2.5 MG: 2.5 TABLET, FILM COATED ORAL at 20:38

## 2018-01-01 RX ADMIN — INSULIN ASPART 5 UNITS: 100 INJECTION, SOLUTION INTRAVENOUS; SUBCUTANEOUS at 21:36

## 2018-01-01 RX ADMIN — IPRATROPIUM BROMIDE AND ALBUTEROL SULFATE 3 ML: .5; 3 SOLUTION RESPIRATORY (INHALATION) at 15:29

## 2018-01-01 RX ADMIN — FAMOTIDINE 20 MG: 20 TABLET, FILM COATED ORAL at 08:12

## 2018-01-01 RX ADMIN — FAMOTIDINE 20 MG: 20 TABLET ORAL at 17:43

## 2018-01-01 RX ADMIN — DILTIAZEM HYDROCHLORIDE 120 MG: 60 TABLET, FILM COATED ORAL at 20:39

## 2018-01-01 RX ADMIN — DILTIAZEM HYDROCHLORIDE 60 MG: 60 TABLET, FILM COATED ORAL at 23:00

## 2018-01-01 RX ADMIN — KETOTIFEN FUMARATE 1 DROP: 0.35 SOLUTION/ DROPS OPHTHALMIC at 08:26

## 2018-01-01 RX ADMIN — Medication 150 MG: at 08:49

## 2018-01-01 RX ADMIN — Medication 1 TABLET: at 10:21

## 2018-01-01 RX ADMIN — SODIUM CHLORIDE 75 ML/HR: 9 INJECTION, SOLUTION INTRAVENOUS at 21:15

## 2018-01-01 RX ADMIN — AMIODARONE HYDROCHLORIDE 200 MG: 200 TABLET ORAL at 08:47

## 2018-01-01 RX ADMIN — AMIODARONE HYDROCHLORIDE 200 MG: 200 TABLET ORAL at 10:22

## 2018-01-01 RX ADMIN — Medication 1 TABLET: at 07:54

## 2018-01-01 RX ADMIN — CONJUGATED ESTROGENS 0.75 APPLICATOR: 0.62 CREAM VAGINAL at 20:28

## 2018-01-01 RX ADMIN — DILTIAZEM HYDROCHLORIDE 120 MG: 60 TABLET, FILM COATED ORAL at 05:50

## 2018-01-01 RX ADMIN — CEFTRIAXONE 1 G: 1 INJECTION, POWDER, FOR SOLUTION INTRAMUSCULAR; INTRAVENOUS at 09:05

## 2018-01-01 RX ADMIN — FAMOTIDINE 20 MG: 20 TABLET, FILM COATED ORAL at 20:10

## 2018-01-01 RX ADMIN — ACETAMINOPHEN 650 MG: 325 TABLET, FILM COATED ORAL at 10:23

## 2018-01-01 RX ADMIN — CLINDAMYCIN HYDROCHLORIDE 150 MG: 150 CAPSULE ORAL at 06:14

## 2018-01-01 RX ADMIN — DILTIAZEM HYDROCHLORIDE 120 MG: 60 TABLET, FILM COATED ORAL at 21:01

## 2018-01-01 RX ADMIN — Medication 1 CAPSULE: at 21:33

## 2018-01-01 RX ADMIN — DILTIAZEM HYDROCHLORIDE 120 MG: 60 TABLET, FILM COATED ORAL at 20:22

## 2018-01-01 RX ADMIN — ALLOPURINOL 100 MG: 100 TABLET ORAL at 08:12

## 2018-01-01 RX ADMIN — LEVOTHYROXINE SODIUM 150 MCG: 150 TABLET ORAL at 08:12

## 2018-01-01 RX ADMIN — LEVOTHYROXINE SODIUM 150 MCG: 150 TABLET ORAL at 06:18

## 2018-01-01 RX ADMIN — STANDARDIZED SENNA CONCENTRATE 1 TABLET: 8.6 TABLET ORAL at 08:00

## 2018-01-01 RX ADMIN — METOPROLOL TARTRATE 75 MG: 25 TABLET, FILM COATED ORAL at 08:41

## 2018-01-01 RX ADMIN — SERTRALINE HYDROCHLORIDE 50 MG: 50 TABLET ORAL at 20:22

## 2018-01-01 RX ADMIN — LEVOTHYROXINE SODIUM 150 MCG: 150 TABLET ORAL at 06:34

## 2018-01-01 RX ADMIN — LEVETIRACETAM 500 MG: 500 TABLET, FILM COATED ORAL at 08:35

## 2018-01-01 RX ADMIN — GLIPIZIDE 5 MG: 5 TABLET ORAL at 16:28

## 2018-01-01 RX ADMIN — CEFTRIAXONE 1 G: 1 INJECTION, POWDER, FOR SOLUTION INTRAMUSCULAR; INTRAVENOUS at 08:27

## 2018-01-01 RX ADMIN — METOPROLOL TARTRATE 75 MG: 25 TABLET, FILM COATED ORAL at 09:49

## 2018-01-01 RX ADMIN — MORPHINE SULFATE 1 MG: 2 INJECTION, SOLUTION INTRAMUSCULAR; INTRAVENOUS at 09:50

## 2018-01-01 RX ADMIN — IPRATROPIUM BROMIDE AND ALBUTEROL SULFATE 3 ML: .5; 3 SOLUTION RESPIRATORY (INHALATION) at 03:24

## 2018-01-01 RX ADMIN — Medication 150 MG: at 08:45

## 2018-01-01 RX ADMIN — ACETAMINOPHEN 650 MG: 325 TABLET, FILM COATED ORAL at 20:20

## 2018-01-01 RX ADMIN — DILTIAZEM HYDROCHLORIDE 120 MG: 60 TABLET, FILM COATED ORAL at 10:07

## 2018-01-01 RX ADMIN — ALLOPURINOL 100 MG: 100 TABLET ORAL at 20:27

## 2018-01-01 RX ADMIN — METOPROLOL TARTRATE 75 MG: 25 TABLET, FILM COATED ORAL at 14:13

## 2018-01-01 RX ADMIN — LEVETIRACETAM 500 MG: 500 TABLET, FILM COATED ORAL at 08:31

## 2018-01-01 RX ADMIN — FAMOTIDINE 20 MG: 20 TABLET ORAL at 17:12

## 2018-01-01 RX ADMIN — FAMOTIDINE 20 MG: 20 TABLET ORAL at 17:20

## 2018-01-01 RX ADMIN — METOPROLOL TARTRATE 75 MG: 25 TABLET, FILM COATED ORAL at 17:30

## 2018-01-01 RX ADMIN — LEVETIRACETAM 500 MG: 500 TABLET, FILM COATED ORAL at 10:38

## 2018-01-01 RX ADMIN — LEVOTHYROXINE SODIUM 150 MCG: 150 TABLET ORAL at 05:50

## 2018-01-01 RX ADMIN — IPRATROPIUM BROMIDE AND ALBUTEROL SULFATE 3 ML: .5; 3 SOLUTION RESPIRATORY (INHALATION) at 08:12

## 2018-01-01 RX ADMIN — MEGESTROL ACETATE 200 MG: 40 SUSPENSION ORAL at 20:40

## 2018-01-01 RX ADMIN — METOPROLOL TARTRATE 75 MG: 25 TABLET, FILM COATED ORAL at 08:35

## 2018-01-01 RX ADMIN — FAMOTIDINE 20 MG: 20 TABLET ORAL at 17:27

## 2018-01-01 RX ADMIN — ASPIRIN 81 MG 81 MG: 81 TABLET ORAL at 15:42

## 2018-01-01 RX ADMIN — INSULIN ASPART 3 UNITS: 100 INJECTION, SOLUTION INTRAVENOUS; SUBCUTANEOUS at 12:05

## 2018-01-01 RX ADMIN — GLIPIZIDE 5 MG: 5 TABLET ORAL at 10:36

## 2018-01-01 RX ADMIN — Medication 150 MG: at 09:16

## 2018-01-01 RX ADMIN — INSULIN ASPART 3 UNITS: 100 INJECTION, SOLUTION INTRAVENOUS; SUBCUTANEOUS at 18:05

## 2018-01-01 RX ADMIN — ASPIRIN 81 MG 81 MG: 81 TABLET ORAL at 08:33

## 2018-01-01 RX ADMIN — AMIODARONE HYDROCHLORIDE 200 MG: 200 TABLET ORAL at 08:31

## 2018-01-01 RX ADMIN — Medication 150 MG: at 08:12

## 2018-01-01 RX ADMIN — STANDARDIZED SENNA CONCENTRATE 1 TABLET: 8.6 TABLET ORAL at 09:49

## 2018-01-01 RX ADMIN — ALLOPURINOL 100 MG: 100 TABLET ORAL at 21:35

## 2018-01-01 RX ADMIN — TRAMADOL HYDROCHLORIDE 50 MG: 50 TABLET, FILM COATED ORAL at 21:20

## 2018-01-01 RX ADMIN — CLINDAMYCIN HYDROCHLORIDE 150 MG: 150 CAPSULE ORAL at 14:37

## 2018-01-01 RX ADMIN — DONEPEZIL HYDROCHLORIDE 10 MG: 5 TABLET ORAL at 22:40

## 2018-01-01 RX ADMIN — METOPROLOL TARTRATE 50 MG: 50 TABLET, FILM COATED ORAL at 21:31

## 2018-01-01 RX ADMIN — CLINDAMYCIN HYDROCHLORIDE 150 MG: 150 CAPSULE ORAL at 21:46

## 2018-01-01 RX ADMIN — DILTIAZEM HYDROCHLORIDE 120 MG: 60 TABLET, FILM COATED ORAL at 10:38

## 2018-01-01 RX ADMIN — CONJUGATED ESTROGENS 0.75 APPLICATOR: 0.62 CREAM VAGINAL at 21:06

## 2018-01-01 RX ADMIN — STANDARDIZED SENNA CONCENTRATE 1 TABLET: 8.6 TABLET ORAL at 21:19

## 2018-01-01 RX ADMIN — ALLOPURINOL 100 MG: 100 TABLET ORAL at 08:41

## 2018-01-01 RX ADMIN — DILTIAZEM HYDROCHLORIDE 120 MG: 60 TABLET, FILM COATED ORAL at 21:14

## 2018-01-01 RX ADMIN — SERTRALINE HYDROCHLORIDE 75 MG: 50 TABLET ORAL at 20:45

## 2018-01-01 RX ADMIN — ALLOPURINOL 100 MG: 100 TABLET ORAL at 20:12

## 2018-01-01 RX ADMIN — Medication 1 CAPSULE: at 08:32

## 2018-01-01 RX ADMIN — Medication 1 TABLET: at 17:12

## 2018-01-01 RX ADMIN — Medication 150 MG: at 20:56

## 2018-01-01 RX ADMIN — MEGESTROL ACETATE 200 MG: 40 SUSPENSION ORAL at 08:00

## 2018-01-01 RX ADMIN — Medication 1 CAPSULE: at 09:59

## 2018-01-01 RX ADMIN — AMIODARONE HYDROCHLORIDE 200 MG: 200 TABLET ORAL at 20:27

## 2018-01-01 RX ADMIN — INSULIN ASPART 3 UNITS: 100 INJECTION, SOLUTION INTRAVENOUS; SUBCUTANEOUS at 17:14

## 2018-01-01 RX ADMIN — STANDARDIZED SENNA CONCENTRATE 1 TABLET: 8.6 TABLET ORAL at 21:54

## 2018-01-01 RX ADMIN — AMIODARONE HYDROCHLORIDE 200 MG: 200 TABLET ORAL at 10:14

## 2018-01-01 RX ADMIN — FAMOTIDINE 20 MG: 20 TABLET, FILM COATED ORAL at 20:46

## 2018-01-01 RX ADMIN — KETOTIFEN FUMARATE 1 DROP: 0.35 SOLUTION/ DROPS OPHTHALMIC at 09:49

## 2018-01-01 RX ADMIN — LEVETIRACETAM 500 MG: 5 INJECTION INTRAVENOUS at 09:20

## 2018-01-01 RX ADMIN — INSULIN ASPART 3 UNITS: 100 INJECTION, SOLUTION INTRAVENOUS; SUBCUTANEOUS at 12:34

## 2018-01-01 RX ADMIN — DILTIAZEM HYDROCHLORIDE 120 MG: 60 TABLET, FILM COATED ORAL at 21:06

## 2018-01-01 RX ADMIN — SERTRALINE HYDROCHLORIDE 75 MG: 50 TABLET ORAL at 20:28

## 2018-01-01 RX ADMIN — MORPHINE SULFATE 1 MG: 2 INJECTION, SOLUTION INTRAMUSCULAR; INTRAVENOUS at 10:04

## 2018-01-01 RX ADMIN — MORPHINE SULFATE 1 MG: 2 INJECTION, SOLUTION INTRAMUSCULAR; INTRAVENOUS at 00:23

## 2018-01-01 RX ADMIN — CONJUGATED ESTROGENS 0.75 APPLICATOR: 0.62 CREAM VAGINAL at 20:26

## 2018-01-01 RX ADMIN — CLINDAMYCIN HYDROCHLORIDE 150 MG: 150 CAPSULE ORAL at 06:21

## 2018-01-01 RX ADMIN — SENNOSIDES 1 TABLET: 8.6 TABLET, FILM COATED ORAL at 20:42

## 2018-01-01 RX ADMIN — Medication 150 MG: at 10:13

## 2018-01-01 RX ADMIN — Medication 1 CAPSULE: at 09:50

## 2018-01-01 RX ADMIN — APIXABAN 5 MG: 2.5 TABLET, FILM COATED ORAL at 22:40

## 2018-01-01 RX ADMIN — Medication 1 CAPSULE: at 10:03

## 2018-01-01 RX ADMIN — BUMETANIDE 2 MG: 0.25 INJECTION INTRAMUSCULAR; INTRAVENOUS at 09:40

## 2018-01-01 RX ADMIN — STANDARDIZED SENNA CONCENTRATE 1 TABLET: 8.6 TABLET ORAL at 08:26

## 2018-01-01 RX ADMIN — FAMOTIDINE 20 MG: 20 TABLET ORAL at 17:28

## 2018-01-01 RX ADMIN — FAMOTIDINE 20 MG: 20 TABLET, FILM COATED ORAL at 08:10

## 2018-01-01 RX ADMIN — GLYCOPYRROLATE 0.4 MG: 0.2 INJECTION INTRAMUSCULAR; INTRAVENOUS at 12:04

## 2018-01-01 RX ADMIN — LEVETIRACETAM 500 MG: 500 TABLET, FILM COATED ORAL at 20:18

## 2018-01-01 RX ADMIN — METOPROLOL TARTRATE 50 MG: 50 TABLET, FILM COATED ORAL at 10:22

## 2018-01-01 RX ADMIN — ALLOPURINOL 100 MG: 100 TABLET ORAL at 08:32

## 2018-01-01 RX ADMIN — CONJUGATED ESTROGENS 0.75 APPLICATOR: 0.62 CREAM VAGINAL at 20:41

## 2018-01-01 RX ADMIN — LEVETIRACETAM 500 MG: 500 TABLET, FILM COATED ORAL at 21:06

## 2018-01-01 RX ADMIN — INSULIN ASPART 3 UNITS: 100 INJECTION, SOLUTION INTRAVENOUS; SUBCUTANEOUS at 12:06

## 2018-01-01 RX ADMIN — SODIUM CHLORIDE 500 ML: 900 INJECTION, SOLUTION INTRAVENOUS at 00:00

## 2018-01-01 RX ADMIN — INSULIN ASPART 3 UNITS: 100 INJECTION, SOLUTION INTRAVENOUS; SUBCUTANEOUS at 00:11

## 2018-01-01 RX ADMIN — MICONAZOLE NITRATE 1 APPLICATION: 20 POWDER TOPICAL at 08:35

## 2018-01-01 RX ADMIN — ALLOPURINOL 100 MG: 100 TABLET ORAL at 20:42

## 2018-01-01 RX ADMIN — INSULIN ASPART 3 UNITS: 100 INJECTION, SOLUTION INTRAVENOUS; SUBCUTANEOUS at 17:28

## 2018-01-01 RX ADMIN — ALLOPURINOL 100 MG: 100 TABLET ORAL at 08:26

## 2018-01-01 RX ADMIN — MORPHINE SULFATE 1 MG: 2 INJECTION, SOLUTION INTRAMUSCULAR; INTRAVENOUS at 06:21

## 2018-01-01 RX ADMIN — MEGESTROL ACETATE 200 MG: 40 SUSPENSION ORAL at 17:29

## 2018-01-01 RX ADMIN — AMIODARONE HYDROCHLORIDE 200 MG: 200 TABLET ORAL at 20:23

## 2018-01-01 RX ADMIN — ALLOPURINOL 100 MG: 100 TABLET ORAL at 10:40

## 2018-01-01 RX ADMIN — CLINDAMYCIN HYDROCHLORIDE 150 MG: 150 CAPSULE ORAL at 05:50

## 2018-01-01 RX ADMIN — IPRATROPIUM BROMIDE AND ALBUTEROL SULFATE 3 ML: .5; 3 SOLUTION RESPIRATORY (INHALATION) at 12:52

## 2018-01-01 RX ADMIN — DONEPEZIL HYDROCHLORIDE 10 MG: 5 TABLET ORAL at 20:52

## 2018-01-01 RX ADMIN — FAMOTIDINE 20 MG: 20 TABLET ORAL at 10:16

## 2018-01-01 RX ADMIN — VANCOMYCIN HCL-SODIUM CHLORIDE IV SOLN 1.5 GM/250ML-0.9% 1500 MG: 1.5-0.9/25 SOLUTION at 15:15

## 2018-01-01 RX ADMIN — GLIPIZIDE 5 MG: 5 TABLET ORAL at 08:49

## 2018-01-01 RX ADMIN — SERTRALINE HYDROCHLORIDE 50 MG: 50 TABLET ORAL at 20:42

## 2018-01-01 RX ADMIN — KETOTIFEN FUMARATE 1 DROP: 0.35 SOLUTION/ DROPS OPHTHALMIC at 20:21

## 2018-01-01 RX ADMIN — METOPROLOL TARTRATE 75 MG: 25 TABLET, FILM COATED ORAL at 08:26

## 2018-01-01 RX ADMIN — INSULIN ASPART 3 UNITS: 100 INJECTION, SOLUTION INTRAVENOUS; SUBCUTANEOUS at 12:28

## 2018-01-01 RX ADMIN — BUMETANIDE 2 MG: 0.25 INJECTION INTRAMUSCULAR; INTRAVENOUS at 08:22

## 2018-01-01 RX ADMIN — IPRATROPIUM BROMIDE AND ALBUTEROL SULFATE 3 ML: .5; 3 SOLUTION RESPIRATORY (INHALATION) at 23:12

## 2018-01-01 RX ADMIN — GLIPIZIDE 5 MG: 5 TABLET ORAL at 17:51

## 2018-01-01 RX ADMIN — Medication 150 MG: at 21:20

## 2018-01-01 RX ADMIN — KETOTIFEN FUMARATE 1 DROP: 0.35 SOLUTION/ DROPS OPHTHALMIC at 10:07

## 2018-01-01 RX ADMIN — ASPIRIN 81 MG: 81 TABLET, COATED ORAL at 10:21

## 2018-01-01 RX ADMIN — METOPROLOL TARTRATE 50 MG: 50 TABLET, FILM COATED ORAL at 20:36

## 2018-01-01 RX ADMIN — Medication 150 MG: at 21:06

## 2018-01-01 RX ADMIN — IPRATROPIUM BROMIDE AND ALBUTEROL SULFATE 3 ML: .5; 3 SOLUTION RESPIRATORY (INHALATION) at 03:45

## 2018-01-01 RX ADMIN — AMIODARONE HYDROCHLORIDE 200 MG: 200 TABLET ORAL at 08:05

## 2018-01-01 RX ADMIN — MEGESTROL ACETATE 200 MG: 40 SUSPENSION ORAL at 22:30

## 2018-01-01 RX ADMIN — Medication 1 TABLET: at 08:05

## 2018-01-01 RX ADMIN — ALLOPURINOL 100 MG: 100 TABLET ORAL at 08:10

## 2018-01-01 RX ADMIN — SERTRALINE HYDROCHLORIDE 75 MG: 50 TABLET ORAL at 20:27

## 2018-01-01 RX ADMIN — DILTIAZEM HYDROCHLORIDE 120 MG: 60 TABLET, FILM COATED ORAL at 20:21

## 2018-01-01 RX ADMIN — METOPROLOL TARTRATE 75 MG: 25 TABLET, FILM COATED ORAL at 17:13

## 2018-01-01 RX ADMIN — TRAMADOL HYDROCHLORIDE 50 MG: 50 TABLET, FILM COATED ORAL at 21:39

## 2018-01-01 RX ADMIN — FAMOTIDINE 20 MG: 20 TABLET, FILM COATED ORAL at 08:55

## 2018-01-01 RX ADMIN — MORPHINE SULFATE 1 MG: 2 INJECTION, SOLUTION INTRAMUSCULAR; INTRAVENOUS at 08:17

## 2018-01-01 RX ADMIN — LEVETIRACETAM 500 MG: 500 TABLET, FILM COATED ORAL at 21:52

## 2018-01-01 RX ADMIN — KETOTIFEN FUMARATE 1 DROP: 0.35 SOLUTION/ DROPS OPHTHALMIC at 20:41

## 2018-01-01 RX ADMIN — CONJUGATED ESTROGENS 0.75 APPLICATOR: 0.62 CREAM VAGINAL at 20:21

## 2018-01-01 RX ADMIN — LINAGLIPTIN 5 MG: 5 TABLET, FILM COATED ORAL at 09:59

## 2018-01-01 RX ADMIN — SERTRALINE HYDROCHLORIDE 50 MG: 50 TABLET ORAL at 20:52

## 2018-01-01 RX ADMIN — METOPROLOL TARTRATE 75 MG: 25 TABLET, FILM COATED ORAL at 08:01

## 2018-01-01 RX ADMIN — Medication 150 MG: at 09:51

## 2018-01-01 RX ADMIN — IPRATROPIUM BROMIDE AND ALBUTEROL SULFATE 3 ML: .5; 3 SOLUTION RESPIRATORY (INHALATION) at 23:11

## 2018-01-01 RX ADMIN — METOPROLOL TARTRATE 75 MG: 25 TABLET, FILM COATED ORAL at 08:32

## 2018-01-01 RX ADMIN — DILTIAZEM HYDROCHLORIDE 120 MG: 60 TABLET, FILM COATED ORAL at 09:51

## 2018-01-01 RX ADMIN — Medication 1 TABLET: at 09:59

## 2018-01-01 RX ADMIN — DILTIAZEM HYDROCHLORIDE 120 MG: 60 TABLET, FILM COATED ORAL at 08:41

## 2018-01-01 RX ADMIN — GLYCOPYRROLATE 0.4 MG: 0.2 INJECTION INTRAMUSCULAR; INTRAVENOUS at 06:23

## 2018-01-01 RX ADMIN — INSULIN ASPART 3 UNITS: 100 INJECTION, SOLUTION INTRAVENOUS; SUBCUTANEOUS at 20:33

## 2018-01-01 RX ADMIN — Medication 150 MG: at 09:50

## 2018-01-01 RX ADMIN — LINAGLIPTIN 5 MG: 5 TABLET, FILM COATED ORAL at 10:37

## 2018-01-01 RX ADMIN — DILTIAZEM HYDROCHLORIDE 120 MG: 60 TABLET, FILM COATED ORAL at 04:48

## 2018-01-01 RX ADMIN — LEVOTHYROXINE SODIUM 150 MCG: 150 TABLET ORAL at 10:23

## 2018-01-01 RX ADMIN — KETOTIFEN FUMARATE 1 DROP: 0.35 SOLUTION/ DROPS OPHTHALMIC at 08:44

## 2018-01-01 RX ADMIN — FAMOTIDINE 20 MG: 20 TABLET, FILM COATED ORAL at 20:42

## 2018-01-01 RX ADMIN — KETOTIFEN FUMARATE 1 DROP: 0.35 SOLUTION/ DROPS OPHTHALMIC at 22:32

## 2018-01-01 RX ADMIN — DONEPEZIL HYDROCHLORIDE 10 MG: 5 TABLET ORAL at 20:46

## 2018-01-01 RX ADMIN — MEGESTROL ACETATE 200 MG: 40 SUSPENSION ORAL at 21:56

## 2018-01-01 RX ADMIN — KETOTIFEN FUMARATE 1 DROP: 0.35 SOLUTION/ DROPS OPHTHALMIC at 21:20

## 2018-01-01 RX ADMIN — LEVOTHYROXINE SODIUM 150 MCG: 150 TABLET ORAL at 06:31

## 2018-01-01 RX ADMIN — ALLOPURINOL 100 MG: 100 TABLET ORAL at 08:49

## 2018-01-01 RX ADMIN — APIXABAN 2.5 MG: 2.5 TABLET, FILM COATED ORAL at 08:30

## 2018-01-01 RX ADMIN — ALLOPURINOL 100 MG: 100 TABLET ORAL at 09:16

## 2018-01-01 RX ADMIN — LEVOTHYROXINE SODIUM 150 MCG: 150 TABLET ORAL at 06:11

## 2018-01-01 RX ADMIN — STANDARDIZED SENNA CONCENTRATE 1 TABLET: 8.6 TABLET ORAL at 10:04

## 2018-01-01 RX ADMIN — MORPHINE SULFATE 1 MG: 2 INJECTION, SOLUTION INTRAMUSCULAR; INTRAVENOUS at 15:50

## 2018-01-01 RX ADMIN — MEGESTROL ACETATE 200 MG: 40 SUSPENSION ORAL at 10:21

## 2018-01-01 RX ADMIN — IPRATROPIUM BROMIDE AND ALBUTEROL SULFATE 3 ML: .5; 3 SOLUTION RESPIRATORY (INHALATION) at 08:47

## 2018-01-01 RX ADMIN — METOPROLOL TARTRATE 50 MG: 50 TABLET, FILM COATED ORAL at 20:46

## 2018-01-01 RX ADMIN — ASPIRIN 81 MG: 81 TABLET, COATED ORAL at 08:26

## 2018-01-01 RX ADMIN — MEGESTROL ACETATE 200 MG: 40 SUSPENSION ORAL at 08:32

## 2018-01-01 RX ADMIN — IPRATROPIUM BROMIDE AND ALBUTEROL SULFATE 3 ML: .5; 3 SOLUTION RESPIRATORY (INHALATION) at 02:35

## 2018-01-01 RX ADMIN — IPRATROPIUM BROMIDE AND ALBUTEROL SULFATE 3 ML: .5; 3 SOLUTION RESPIRATORY (INHALATION) at 16:01

## 2018-01-01 RX ADMIN — AMIODARONE HYDROCHLORIDE 200 MG: 200 TABLET ORAL at 17:13

## 2018-01-01 RX ADMIN — APIXABAN 2.5 MG: 2.5 TABLET, FILM COATED ORAL at 20:46

## 2018-01-01 RX ADMIN — Medication 1 CAPSULE: at 10:23

## 2018-01-01 RX ADMIN — METOPROLOL TARTRATE 50 MG: 50 TABLET, FILM COATED ORAL at 10:03

## 2018-01-01 RX ADMIN — KETOTIFEN FUMARATE 1 DROP: 0.35 SOLUTION/ DROPS OPHTHALMIC at 21:08

## 2018-01-01 RX ADMIN — Medication 150 MG: at 08:56

## 2018-01-01 RX ADMIN — ALLOPURINOL 100 MG: 100 TABLET ORAL at 08:56

## 2018-01-01 RX ADMIN — SODIUM CHLORIDE 100 ML/HR: 4.5 INJECTION, SOLUTION INTRAVENOUS at 20:51

## 2018-01-01 RX ADMIN — LEVETIRACETAM 500 MG: 5 INJECTION INTRAVENOUS at 20:51

## 2018-01-01 RX ADMIN — STANDARDIZED SENNA CONCENTRATE 1 TABLET: 8.6 TABLET ORAL at 08:45

## 2018-01-01 RX ADMIN — STANDARDIZED SENNA CONCENTRATE 1 TABLET: 8.6 TABLET ORAL at 10:22

## 2018-01-01 RX ADMIN — FAMOTIDINE 20 MG: 20 TABLET ORAL at 09:51

## 2018-01-01 RX ADMIN — LINAGLIPTIN 5 MG: 5 TABLET, FILM COATED ORAL at 08:49

## 2018-01-01 RX ADMIN — Medication 1 CAPSULE: at 08:30

## 2018-01-01 RX ADMIN — FAMOTIDINE 20 MG: 20 TABLET ORAL at 09:50

## 2018-01-01 RX ADMIN — INSULIN ASPART 3 UNITS: 100 INJECTION, SOLUTION INTRAVENOUS; SUBCUTANEOUS at 17:38

## 2018-01-01 RX ADMIN — ASPIRIN 81 MG 81 MG: 81 TABLET ORAL at 10:22

## 2018-01-01 RX ADMIN — Medication 1 CAPSULE: at 10:39

## 2018-01-01 RX ADMIN — IPRATROPIUM BROMIDE AND ALBUTEROL SULFATE 3 ML: .5; 3 SOLUTION RESPIRATORY (INHALATION) at 18:53

## 2018-01-01 RX ADMIN — MEGESTROL ACETATE 200 MG: 40 SUSPENSION ORAL at 08:06

## 2018-01-01 RX ADMIN — LEVETIRACETAM 500 MG: 5 INJECTION INTRAVENOUS at 10:53

## 2018-01-01 RX ADMIN — KETOTIFEN FUMARATE 1 DROP: 0.35 SOLUTION/ DROPS OPHTHALMIC at 20:32

## 2018-01-01 RX ADMIN — MORPHINE SULFATE 1 MG: 2 INJECTION, SOLUTION INTRAMUSCULAR; INTRAVENOUS at 20:21

## 2018-01-01 RX ADMIN — MORPHINE SULFATE 1 MG: 2 INJECTION, SOLUTION INTRAMUSCULAR; INTRAVENOUS at 18:22

## 2018-01-01 RX ADMIN — LINAGLIPTIN 5 MG: 5 TABLET, FILM COATED ORAL at 13:46

## 2018-01-01 RX ADMIN — LEVETIRACETAM 500 MG: 5 INJECTION INTRAVENOUS at 23:19

## 2018-01-01 RX ADMIN — APIXABAN 2.5 MG: 2.5 TABLET, FILM COATED ORAL at 21:54

## 2018-01-01 RX ADMIN — Medication 150 MG: at 08:02

## 2018-01-01 RX ADMIN — DILTIAZEM HYDROCHLORIDE 60 MG: 60 TABLET, FILM COATED ORAL at 05:42

## 2018-01-01 RX ADMIN — POTASSIUM CHLORIDE 40 MEQ: 1.5 POWDER, FOR SOLUTION ORAL at 08:44

## 2018-01-01 RX ADMIN — IPRATROPIUM BROMIDE AND ALBUTEROL SULFATE 3 ML: .5; 3 SOLUTION RESPIRATORY (INHALATION) at 03:21

## 2018-01-01 RX ADMIN — Medication 1 CAPSULE: at 08:35

## 2018-01-01 RX ADMIN — DILTIAZEM HYDROCHLORIDE 120 MG: 60 TABLET, FILM COATED ORAL at 08:35

## 2018-01-01 RX ADMIN — TRAMADOL HYDROCHLORIDE 50 MG: 50 TABLET, FILM COATED ORAL at 17:28

## 2018-01-01 RX ADMIN — DILTIAZEM HYDROCHLORIDE 120 MG: 60 TABLET, FILM COATED ORAL at 15:52

## 2018-01-01 RX ADMIN — MORPHINE SULFATE 1 MG: 2 INJECTION, SOLUTION INTRAMUSCULAR; INTRAVENOUS at 03:33

## 2018-01-01 RX ADMIN — METOPROLOL TARTRATE 75 MG: 25 TABLET, FILM COATED ORAL at 21:53

## 2018-01-01 RX ADMIN — STANDARDIZED SENNA CONCENTRATE 1 TABLET: 8.6 TABLET ORAL at 20:41

## 2018-01-01 RX ADMIN — MINERAL SUPPLEMENT IRON 300 MG / 5 ML STRENGTH LIQUID 100 PER BOX UNFLAVORED 300 MG: at 21:13

## 2018-01-01 RX ADMIN — IPRATROPIUM BROMIDE AND ALBUTEROL SULFATE 3 ML: .5; 3 SOLUTION RESPIRATORY (INHALATION) at 14:48

## 2018-01-01 RX ADMIN — SERTRALINE HYDROCHLORIDE 50 MG: 50 TABLET ORAL at 20:46

## 2018-01-01 RX ADMIN — Medication 150 MG: at 10:03

## 2018-01-01 RX ADMIN — GLIPIZIDE 5 MG: 5 TABLET ORAL at 08:16

## 2018-01-01 RX ADMIN — ALLOPURINOL 100 MG: 100 TABLET ORAL at 20:51

## 2018-01-01 RX ADMIN — Medication 1 CAPSULE: at 21:25

## 2018-01-01 RX ADMIN — FAMOTIDINE 20 MG: 20 TABLET ORAL at 18:05

## 2018-01-01 RX ADMIN — STANDARDIZED SENNA CONCENTRATE 1 TABLET: 8.6 TABLET ORAL at 08:35

## 2018-01-01 RX ADMIN — IPRATROPIUM BROMIDE AND ALBUTEROL SULFATE 3 ML: .5; 3 SOLUTION RESPIRATORY (INHALATION) at 03:07

## 2018-01-01 RX ADMIN — Medication 1 CAPSULE: at 21:15

## 2018-01-01 RX ADMIN — TRAMADOL HYDROCHLORIDE 50 MG: 50 TABLET, FILM COATED ORAL at 20:34

## 2018-01-01 RX ADMIN — LINAGLIPTIN 5 MG: 5 TABLET, FILM COATED ORAL at 08:31

## 2018-01-01 RX ADMIN — FAMOTIDINE 20 MG: 20 TABLET, FILM COATED ORAL at 10:03

## 2018-01-01 RX ADMIN — IPRATROPIUM BROMIDE AND ALBUTEROL SULFATE 3 ML: .5; 3 SOLUTION RESPIRATORY (INHALATION) at 17:14

## 2018-01-01 RX ADMIN — GLYCOPYRROLATE 0.4 MG: 0.2 INJECTION INTRAMUSCULAR; INTRAVENOUS at 16:28

## 2018-01-01 RX ADMIN — TRAMADOL HYDROCHLORIDE 50 MG: 50 TABLET, FILM COATED ORAL at 03:38

## 2018-01-01 RX ADMIN — POTASSIUM CHLORIDE 40 MEQ: 1500 TABLET, EXTENDED RELEASE ORAL at 14:18

## 2018-01-01 RX ADMIN — ALLOPURINOL 100 MG: 100 TABLET ORAL at 08:17

## 2018-01-01 RX ADMIN — ALLOPURINOL 100 MG: 100 TABLET ORAL at 10:22

## 2018-01-01 RX ADMIN — Medication 150 MG: at 21:25

## 2018-01-01 RX ADMIN — FAMOTIDINE 20 MG: 20 TABLET, FILM COATED ORAL at 20:32

## 2018-01-01 RX ADMIN — LEVETIRACETAM 500 MG: 500 TABLET, FILM COATED ORAL at 21:31

## 2018-01-01 RX ADMIN — LEVOTHYROXINE SODIUM 150 MCG: 150 TABLET ORAL at 06:14

## 2018-01-01 RX ADMIN — Medication 1 CAPSULE: at 10:21

## 2018-01-01 RX ADMIN — IPRATROPIUM BROMIDE AND ALBUTEROL SULFATE 3 ML: .5; 3 SOLUTION RESPIRATORY (INHALATION) at 19:50

## 2018-01-01 RX ADMIN — IPRATROPIUM BROMIDE AND ALBUTEROL SULFATE 3 ML: .5; 3 SOLUTION RESPIRATORY (INHALATION) at 23:16

## 2018-01-01 RX ADMIN — DILTIAZEM HYDROCHLORIDE 60 MG: 60 TABLET, FILM COATED ORAL at 15:11

## 2018-01-01 RX ADMIN — Medication 1 CAPSULE: at 08:26

## 2018-01-01 RX ADMIN — BUMETANIDE 2 MG: 0.25 INJECTION INTRAMUSCULAR; INTRAVENOUS at 13:46

## 2018-01-01 RX ADMIN — Medication 150 MG: at 20:18

## 2018-01-01 RX ADMIN — LEVETIRACETAM 500 MG: 500 TABLET, FILM COATED ORAL at 17:29

## 2018-01-01 RX ADMIN — ASPIRIN 81 MG 81 MG: 81 TABLET ORAL at 08:12

## 2018-01-01 RX ADMIN — CONJUGATED ESTROGENS 0.75 APPLICATOR: 0.62 CREAM VAGINAL at 20:11

## 2018-01-01 RX ADMIN — FAMOTIDINE 20 MG: 20 TABLET, FILM COATED ORAL at 20:37

## 2018-01-01 RX ADMIN — ASPIRIN 81 MG: 81 TABLET, COATED ORAL at 09:50

## 2018-01-01 RX ADMIN — IPRATROPIUM BROMIDE AND ALBUTEROL SULFATE 3 ML: .5; 3 SOLUTION RESPIRATORY (INHALATION) at 11:45

## 2018-01-01 RX ADMIN — MEGESTROL ACETATE 200 MG: 40 SUSPENSION ORAL at 08:52

## 2018-01-01 RX ADMIN — DILTIAZEM HYDROCHLORIDE 120 MG: 60 TABLET, FILM COATED ORAL at 06:20

## 2018-01-01 RX ADMIN — IPRATROPIUM BROMIDE AND ALBUTEROL SULFATE 3 ML: .5; 3 SOLUTION RESPIRATORY (INHALATION) at 03:52

## 2018-01-01 RX ADMIN — KETOTIFEN FUMARATE 1 DROP: 0.35 SOLUTION/ DROPS OPHTHALMIC at 08:01

## 2018-01-01 RX ADMIN — INSULIN ASPART 3 UNITS: 100 INJECTION, SOLUTION INTRAVENOUS; SUBCUTANEOUS at 20:42

## 2018-01-01 RX ADMIN — CLINDAMYCIN HYDROCHLORIDE 150 MG: 150 CAPSULE ORAL at 21:25

## 2018-01-01 RX ADMIN — DILTIAZEM HYDROCHLORIDE 120 MG: 60 TABLET, FILM COATED ORAL at 22:32

## 2018-01-01 RX ADMIN — Medication 1 CAPSULE: at 08:50

## 2018-01-01 RX ADMIN — MICONAZOLE NITRATE 1 APPLICATION: 20 POWDER TOPICAL at 10:15

## 2018-01-01 RX ADMIN — LINAGLIPTIN 5 MG: 5 TABLET, FILM COATED ORAL at 10:03

## 2018-01-01 RX ADMIN — GLIPIZIDE 5 MG: 5 TABLET ORAL at 17:25

## 2018-01-01 RX ADMIN — MEGESTROL ACETATE 200 MG: 40 SUSPENSION ORAL at 10:04

## 2018-01-01 RX ADMIN — ALLOPURINOL 100 MG: 100 TABLET ORAL at 20:46

## 2018-01-01 RX ADMIN — Medication 150 MG: at 08:09

## 2018-01-01 RX ADMIN — GLIPIZIDE 5 MG: 5 TABLET ORAL at 08:12

## 2018-01-01 RX ADMIN — DILTIAZEM HYDROCHLORIDE 120 MG: 60 TABLET, FILM COATED ORAL at 14:37

## 2018-01-01 RX ADMIN — IPRATROPIUM BROMIDE AND ALBUTEROL SULFATE 3 ML: .5; 3 SOLUTION RESPIRATORY (INHALATION) at 21:55

## 2018-01-01 RX ADMIN — MEROPENEM 1 G: 1 INJECTION, POWDER, FOR SOLUTION INTRAVENOUS at 17:44

## 2018-01-01 RX ADMIN — FAMOTIDINE 20 MG: 20 TABLET, FILM COATED ORAL at 21:13

## 2018-01-01 RX ADMIN — TRAMADOL HYDROCHLORIDE 50 MG: 50 TABLET, FILM COATED ORAL at 22:33

## 2018-01-01 RX ADMIN — DILTIAZEM HYDROCHLORIDE 120 MG: 60 TABLET, FILM COATED ORAL at 17:29

## 2018-01-01 RX ADMIN — BUMETANIDE 2 MG: 0.25 INJECTION INTRAMUSCULAR; INTRAVENOUS at 09:15

## 2018-01-01 RX ADMIN — ACETAMINOPHEN 500 MG: 500 TABLET, FILM COATED ORAL at 20:33

## 2018-01-01 RX ADMIN — BUMETANIDE 2 MG: 0.25 INJECTION INTRAMUSCULAR; INTRAVENOUS at 10:22

## 2018-01-01 RX ADMIN — ALLOPURINOL 100 MG: 100 TABLET ORAL at 08:33

## 2018-01-01 RX ADMIN — LEVETIRACETAM 500 MG: 500 TABLET, FILM COATED ORAL at 17:13

## 2018-01-01 RX ADMIN — STANDARDIZED SENNA CONCENTRATE 1 TABLET: 8.6 TABLET ORAL at 20:32

## 2018-01-01 RX ADMIN — DILTIAZEM HYDROCHLORIDE 60 MG: 60 TABLET, FILM COATED ORAL at 21:16

## 2018-01-01 RX ADMIN — CLINDAMYCIN HYDROCHLORIDE 150 MG: 150 CAPSULE ORAL at 21:34

## 2018-01-01 RX ADMIN — FAMOTIDINE 20 MG: 20 TABLET, FILM COATED ORAL at 10:36

## 2018-01-01 RX ADMIN — ASPIRIN 81 MG CHEWABLE TABLET 81 MG: 81 TABLET CHEWABLE at 08:12

## 2018-01-01 RX ADMIN — MICONAZOLE NITRATE 1 APPLICATION: 20 POWDER TOPICAL at 09:21

## 2018-01-01 RX ADMIN — IPRATROPIUM BROMIDE AND ALBUTEROL SULFATE 3 ML: .5; 3 SOLUTION RESPIRATORY (INHALATION) at 20:10

## 2018-01-01 RX ADMIN — POTASSIUM CHLORIDE 40 MEQ: 1500 TABLET, EXTENDED RELEASE ORAL at 17:51

## 2018-01-01 RX ADMIN — KETOTIFEN FUMARATE 1 DROP: 0.35 SOLUTION/ DROPS OPHTHALMIC at 17:11

## 2018-01-01 RX ADMIN — FAMOTIDINE 20 MG: 20 TABLET, FILM COATED ORAL at 21:25

## 2018-01-01 RX ADMIN — LEVOTHYROXINE SODIUM 150 MCG: 150 TABLET ORAL at 08:09

## 2018-01-01 RX ADMIN — LINAGLIPTIN 5 MG: 5 TABLET, FILM COATED ORAL at 08:45

## 2018-01-01 RX ADMIN — Medication 1 CAPSULE: at 21:55

## 2018-01-01 RX ADMIN — IPRATROPIUM BROMIDE AND ALBUTEROL SULFATE 3 ML: .5; 3 SOLUTION RESPIRATORY (INHALATION) at 15:50

## 2018-01-01 RX ADMIN — ALLOPURINOL 100 MG: 100 TABLET ORAL at 17:30

## 2018-01-01 RX ADMIN — STANDARDIZED SENNA CONCENTRATE 1 TABLET: 8.6 TABLET ORAL at 08:04

## 2018-01-01 RX ADMIN — FAMOTIDINE 20 MG: 20 TABLET ORAL at 10:22

## 2018-01-01 RX ADMIN — INSULIN ASPART 5 UNITS: 100 INJECTION, SOLUTION INTRAVENOUS; SUBCUTANEOUS at 18:58

## 2018-01-01 RX ADMIN — FAMOTIDINE 20 MG: 20 TABLET ORAL at 10:06

## 2018-01-01 RX ADMIN — LEVOTHYROXINE SODIUM 150 MCG: 150 TABLET ORAL at 09:18

## 2018-01-01 RX ADMIN — LEVOTHYROXINE SODIUM 150 MCG: 150 TABLET ORAL at 06:17

## 2018-01-01 RX ADMIN — MICONAZOLE NITRATE 1 APPLICATION: 20 POWDER TOPICAL at 08:26

## 2018-01-01 RX ADMIN — STANDARDIZED SENNA CONCENTRATE 1 TABLET: 8.6 TABLET ORAL at 20:38

## 2018-01-01 RX ADMIN — LEVOTHYROXINE SODIUM 150 MCG: 150 TABLET ORAL at 06:33

## 2018-01-01 RX ADMIN — DILTIAZEM HYDROCHLORIDE 120 MG: 60 TABLET, FILM COATED ORAL at 17:13

## 2018-01-01 RX ADMIN — CLINDAMYCIN HYDROCHLORIDE 150 MG: 150 CAPSULE ORAL at 04:49

## 2018-01-01 RX ADMIN — APIXABAN 2.5 MG: 2.5 TABLET, FILM COATED ORAL at 20:12

## 2018-01-01 RX ADMIN — LEVETIRACETAM 500 MG: 500 TABLET, FILM COATED ORAL at 20:20

## 2018-01-01 RX ADMIN — MEROPENEM 1 G: 1 INJECTION, POWDER, FOR SOLUTION INTRAVENOUS at 00:05

## 2018-01-01 RX ADMIN — APIXABAN 2.5 MG: 2.5 TABLET, FILM COATED ORAL at 20:56

## 2018-01-01 RX ADMIN — ALLOPURINOL 100 MG: 100 TABLET ORAL at 09:51

## 2018-01-01 RX ADMIN — ALLOPURINOL 100 MG: 100 TABLET ORAL at 08:13

## 2018-01-01 RX ADMIN — LINAGLIPTIN 5 MG: 5 TABLET, FILM COATED ORAL at 08:12

## 2018-01-01 RX ADMIN — LEVETIRACETAM 500 MG: 500 TABLET, FILM COATED ORAL at 20:21

## 2018-01-01 RX ADMIN — AMIODARONE HYDROCHLORIDE 200 MG: 200 TABLET ORAL at 08:32

## 2018-01-01 RX ADMIN — IPRATROPIUM BROMIDE AND ALBUTEROL SULFATE 3 ML: .5; 3 SOLUTION RESPIRATORY (INHALATION) at 03:16

## 2018-01-01 RX ADMIN — ALLOPURINOL 100 MG: 100 TABLET ORAL at 21:54

## 2018-01-01 RX ADMIN — Medication 1 CAPSULE: at 20:32

## 2018-01-01 RX ADMIN — LEVETIRACETAM 500 MG: 500 TABLET, FILM COATED ORAL at 08:32

## 2018-01-01 RX ADMIN — METOPROLOL TARTRATE 50 MG: 50 TABLET, FILM COATED ORAL at 20:37

## 2018-01-01 RX ADMIN — ALLOPURINOL 100 MG: 100 TABLET ORAL at 20:33

## 2018-01-01 RX ADMIN — DONEPEZIL HYDROCHLORIDE 10 MG: 5 TABLET ORAL at 20:42

## 2018-01-01 RX ADMIN — Medication 1 TABLET: at 08:41

## 2018-01-01 RX ADMIN — INSULIN ASPART 3 UNITS: 100 INJECTION, SOLUTION INTRAVENOUS; SUBCUTANEOUS at 18:21

## 2018-01-01 RX ADMIN — ASPIRIN 81 MG 81 MG: 81 TABLET ORAL at 08:44

## 2018-01-01 RX ADMIN — IPRATROPIUM BROMIDE AND ALBUTEROL SULFATE 3 ML: .5; 3 SOLUTION RESPIRATORY (INHALATION) at 17:25

## 2018-01-01 RX ADMIN — MORPHINE SULFATE 1 MG: 2 INJECTION, SOLUTION INTRAMUSCULAR; INTRAVENOUS at 11:54

## 2018-01-01 RX ADMIN — ALLOPURINOL 100 MG: 100 TABLET ORAL at 20:57

## 2018-01-01 RX ADMIN — Medication 150 MG: at 20:32

## 2018-01-01 RX ADMIN — CONJUGATED ESTROGENS 0.75 APPLICATOR: 0.62 CREAM VAGINAL at 20:40

## 2018-01-01 RX ADMIN — Medication 150 MG: at 08:44

## 2018-01-01 RX ADMIN — LEVOTHYROXINE SODIUM 150 MCG: 150 TABLET ORAL at 05:59

## 2018-01-01 RX ADMIN — GLIPIZIDE 5 MG: 5 TABLET ORAL at 17:14

## 2018-01-01 RX ADMIN — MEGESTROL ACETATE 200 MG: 40 SUSPENSION ORAL at 10:38

## 2018-01-01 RX ADMIN — TRAMADOL HYDROCHLORIDE 50 MG: 50 TABLET, FILM COATED ORAL at 21:06

## 2018-01-01 RX ADMIN — METOPROLOL TARTRATE 75 MG: 25 TABLET, FILM COATED ORAL at 17:28

## 2018-01-01 RX ADMIN — DILTIAZEM HYDROCHLORIDE 120 MG: 60 TABLET, FILM COATED ORAL at 14:00

## 2018-01-01 RX ADMIN — MEGESTROL ACETATE 200 MG: 40 SUSPENSION ORAL at 09:51

## 2018-01-01 RX ADMIN — INSULIN ASPART 3 UNITS: 100 INJECTION, SOLUTION INTRAVENOUS; SUBCUTANEOUS at 08:18

## 2018-01-01 RX ADMIN — STANDARDIZED SENNA CONCENTRATE 1 TABLET: 8.6 TABLET ORAL at 08:34

## 2018-01-01 RX ADMIN — BUMETANIDE 2 MG: 0.25 INJECTION INTRAMUSCULAR; INTRAVENOUS at 08:55

## 2018-01-01 RX ADMIN — INSULIN ASPART 3 UNITS: 100 INJECTION, SOLUTION INTRAVENOUS; SUBCUTANEOUS at 12:02

## 2018-01-01 RX ADMIN — STANDARDIZED SENNA CONCENTRATE 1 TABLET: 8.6 TABLET ORAL at 20:12

## 2018-01-01 RX ADMIN — CEFTRIAXONE 1 G: 1 INJECTION, POWDER, FOR SOLUTION INTRAMUSCULAR; INTRAVENOUS at 08:00

## 2018-01-01 RX ADMIN — MORPHINE SULFATE 1 MG: 2 INJECTION, SOLUTION INTRAMUSCULAR; INTRAVENOUS at 21:11

## 2018-01-01 RX ADMIN — ALLOPURINOL 100 MG: 100 TABLET ORAL at 21:07

## 2018-01-01 RX ADMIN — DILTIAZEM HYDROCHLORIDE 60 MG: 60 TABLET, FILM COATED ORAL at 15:24

## 2018-01-01 RX ADMIN — INSULIN ASPART 3 UNITS: 100 INJECTION, SOLUTION INTRAVENOUS; SUBCUTANEOUS at 06:38

## 2018-01-01 RX ADMIN — IPRATROPIUM BROMIDE AND ALBUTEROL SULFATE 3 ML: .5; 3 SOLUTION RESPIRATORY (INHALATION) at 00:00

## 2018-01-01 RX ADMIN — LEVETIRACETAM 500 MG: 500 TABLET, FILM COATED ORAL at 09:49

## 2018-01-01 RX ADMIN — CLINDAMYCIN HYDROCHLORIDE 150 MG: 150 CAPSULE ORAL at 14:48

## 2018-01-01 RX ADMIN — Medication 1 CAPSULE: at 08:11

## 2018-01-01 RX ADMIN — METOPROLOL TARTRATE 75 MG: 25 TABLET, FILM COATED ORAL at 22:30

## 2018-01-01 RX ADMIN — Medication 150 MG: at 20:52

## 2018-01-01 RX ADMIN — METOPROLOL TARTRATE 50 MG: 50 TABLET, FILM COATED ORAL at 08:16

## 2018-01-01 RX ADMIN — BUMETANIDE 2 MG: 0.25 INJECTION INTRAMUSCULAR; INTRAVENOUS at 21:53

## 2018-01-01 RX ADMIN — STANDARDIZED SENNA CONCENTRATE 1 TABLET: 8.6 TABLET ORAL at 20:40

## 2018-01-01 RX ADMIN — ALLOPURINOL 100 MG: 100 TABLET ORAL at 20:26

## 2018-01-01 RX ADMIN — AMIODARONE HYDROCHLORIDE 200 MG: 200 TABLET ORAL at 08:33

## 2018-01-01 RX ADMIN — IPRATROPIUM BROMIDE AND ALBUTEROL SULFATE 3 ML: .5; 3 SOLUTION RESPIRATORY (INHALATION) at 10:03

## 2018-01-01 RX ADMIN — LEVETIRACETAM 500 MG: 5 INJECTION INTRAVENOUS at 08:37

## 2018-01-01 RX ADMIN — ALLOPURINOL 100 MG: 100 TABLET ORAL at 20:32

## 2018-01-01 RX ADMIN — STANDARDIZED SENNA CONCENTRATE 1 TABLET: 8.6 TABLET ORAL at 10:13

## 2018-01-01 RX ADMIN — Medication 150 MG: at 21:32

## 2018-01-01 RX ADMIN — METOPROLOL TARTRATE 75 MG: 25 TABLET, FILM COATED ORAL at 08:33

## 2018-01-01 RX ADMIN — Medication 150 MG: at 17:12

## 2018-01-01 RX ADMIN — TRAMADOL HYDROCHLORIDE 50 MG: 50 TABLET, FILM COATED ORAL at 09:59

## 2018-01-01 RX ADMIN — TRAMADOL HYDROCHLORIDE 50 MG: 50 TABLET, FILM COATED ORAL at 09:26

## 2018-01-01 RX ADMIN — MEGESTROL ACETATE 200 MG: 40 SUSPENSION ORAL at 20:19

## 2018-01-01 RX ADMIN — IPRATROPIUM BROMIDE AND ALBUTEROL SULFATE 3 ML: .5; 3 SOLUTION RESPIRATORY (INHALATION) at 17:54

## 2018-01-01 RX ADMIN — MORPHINE SULFATE 1 MG: 2 INJECTION, SOLUTION INTRAMUSCULAR; INTRAVENOUS at 20:33

## 2018-01-01 RX ADMIN — POTASSIUM CHLORIDE 40 MEQ: 1.5 POWDER, FOR SOLUTION ORAL at 08:09

## 2018-01-01 RX ADMIN — FAMOTIDINE 20 MG: 20 TABLET, FILM COATED ORAL at 20:33

## 2018-01-01 RX ADMIN — IPRATROPIUM BROMIDE AND ALBUTEROL SULFATE 3 ML: .5; 3 SOLUTION RESPIRATORY (INHALATION) at 06:56

## 2018-01-01 RX ADMIN — CLINDAMYCIN HYDROCHLORIDE 150 MG: 150 CAPSULE ORAL at 15:28

## 2018-01-01 RX ADMIN — Medication 150 MG: at 08:22

## 2018-01-01 RX ADMIN — MORPHINE SULFATE 1 MG: 2 INJECTION, SOLUTION INTRAMUSCULAR; INTRAVENOUS at 08:13

## 2018-01-01 RX ADMIN — Medication 150 MG: at 20:27

## 2018-01-01 RX ADMIN — CLINDAMYCIN HYDROCHLORIDE 150 MG: 150 CAPSULE ORAL at 06:34

## 2018-01-01 RX ADMIN — MORPHINE SULFATE 1 MG: 2 INJECTION, SOLUTION INTRAMUSCULAR; INTRAVENOUS at 00:18

## 2018-01-01 RX ADMIN — Medication 1 TABLET: at 17:51

## 2018-01-01 RX ADMIN — STANDARDIZED SENNA CONCENTRATE 1 TABLET: 8.6 TABLET ORAL at 09:51

## 2018-01-01 RX ADMIN — ASPIRIN 81 MG CHEWABLE TABLET 81 MG: 81 TABLET CHEWABLE at 08:13

## 2018-01-01 RX ADMIN — FAMOTIDINE 20 MG: 20 TABLET, FILM COATED ORAL at 12:36

## 2018-01-01 RX ADMIN — FAMOTIDINE 20 MG: 20 TABLET, FILM COATED ORAL at 20:22

## 2018-01-01 RX ADMIN — ALLOPURINOL 100 MG: 100 TABLET ORAL at 21:25

## 2018-01-01 RX ADMIN — MEGESTROL ACETATE 200 MG: 40 SUSPENSION ORAL at 08:35

## 2018-01-01 RX ADMIN — DILTIAZEM HYDROCHLORIDE 120 MG: 60 TABLET, FILM COATED ORAL at 06:33

## 2018-01-01 RX ADMIN — DILTIAZEM HYDROCHLORIDE 60 MG: 60 TABLET, FILM COATED ORAL at 05:35

## 2018-01-01 RX ADMIN — MORPHINE SULFATE 1 MG: 2 INJECTION, SOLUTION INTRAMUSCULAR; INTRAVENOUS at 14:00

## 2018-01-01 RX ADMIN — APIXABAN 2.5 MG: 2.5 TABLET, FILM COATED ORAL at 08:16

## 2018-01-01 RX ADMIN — STANDARDIZED SENNA CONCENTRATE 1 TABLET: 8.6 TABLET ORAL at 17:28

## 2018-01-01 RX ADMIN — SERTRALINE HYDROCHLORIDE 75 MG: 50 TABLET ORAL at 21:52

## 2018-01-01 RX ADMIN — AMIODARONE HYDROCHLORIDE 200 MG: 200 TABLET ORAL at 21:53

## 2018-01-01 RX ADMIN — ALLOPURINOL 100 MG: 100 TABLET ORAL at 22:40

## 2018-01-01 RX ADMIN — DILTIAZEM HYDROCHLORIDE 120 MG: 60 TABLET, FILM COATED ORAL at 21:45

## 2018-01-01 RX ADMIN — FAMOTIDINE 20 MG: 20 TABLET, FILM COATED ORAL at 08:44

## 2018-01-01 RX ADMIN — APIXABAN 2.5 MG: 2.5 TABLET, FILM COATED ORAL at 08:44

## 2018-01-01 RX ADMIN — AMIODARONE HYDROCHLORIDE 200 MG: 200 TABLET ORAL at 08:09

## 2018-01-01 RX ADMIN — FAMOTIDINE 20 MG: 20 TABLET, FILM COATED ORAL at 08:49

## 2018-01-01 RX ADMIN — FAMOTIDINE 20 MG: 20 TABLET ORAL at 08:35

## 2018-01-01 RX ADMIN — DILTIAZEM HYDROCHLORIDE 120 MG: 60 TABLET, FILM COATED ORAL at 20:28

## 2018-01-01 RX ADMIN — BUMETANIDE 2 MG: 0.25 INJECTION INTRAMUSCULAR; INTRAVENOUS at 08:00

## 2018-01-01 RX ADMIN — AMIODARONE HYDROCHLORIDE 200 MG: 200 TABLET ORAL at 10:02

## 2018-01-01 RX ADMIN — ALLOPURINOL 100 MG: 100 TABLET ORAL at 08:44

## 2018-01-01 RX ADMIN — Medication 1 CAPSULE: at 09:20

## 2018-01-01 RX ADMIN — MINERAL SUPPLEMENT IRON 300 MG / 5 ML STRENGTH LIQUID 100 PER BOX UNFLAVORED 300 MG: at 08:17

## 2018-01-01 RX ADMIN — FAMOTIDINE 20 MG: 20 TABLET, FILM COATED ORAL at 21:34

## 2018-01-01 RX ADMIN — BUMETANIDE 2 MG: 0.25 INJECTION, SOLUTION INTRAMUSCULAR; INTRAVENOUS at 08:31

## 2018-01-01 RX ADMIN — AMIODARONE HYDROCHLORIDE 200 MG: 200 TABLET ORAL at 10:23

## 2018-01-01 RX ADMIN — APIXABAN 2.5 MG: 2.5 TABLET, FILM COATED ORAL at 20:22

## 2018-01-01 RX ADMIN — DILTIAZEM HYDROCHLORIDE 120 MG: 60 TABLET, FILM COATED ORAL at 20:27

## 2018-01-01 RX ADMIN — LEVETIRACETAM 500 MG: 500 TABLET, FILM COATED ORAL at 08:26

## 2018-01-01 RX ADMIN — LINAGLIPTIN 5 MG: 5 TABLET, FILM COATED ORAL at 10:13

## 2018-01-01 RX ADMIN — POTASSIUM CHLORIDE 40 MEQ: 1.5 POWDER, FOR SOLUTION ORAL at 08:11

## 2018-01-01 RX ADMIN — AMIODARONE HYDROCHLORIDE 200 MG: 200 TABLET ORAL at 21:06

## 2018-01-01 RX ADMIN — BUMETANIDE 2 MG: 0.25 INJECTION INTRAMUSCULAR; INTRAVENOUS at 21:02

## 2018-01-01 RX ADMIN — IPRATROPIUM BROMIDE AND ALBUTEROL SULFATE 3 ML: .5; 3 SOLUTION RESPIRATORY (INHALATION) at 22:45

## 2018-01-01 RX ADMIN — Medication 1 TABLET: at 17:13

## 2018-01-01 RX ADMIN — LINAGLIPTIN 5 MG: 5 TABLET, FILM COATED ORAL at 10:39

## 2018-01-01 RX ADMIN — AMIODARONE HYDROCHLORIDE 200 MG: 200 TABLET ORAL at 21:32

## 2018-01-01 RX ADMIN — MEGESTROL ACETATE 200 MG: 40 SUSPENSION ORAL at 20:21

## 2018-01-01 RX ADMIN — STANDARDIZED SENNA CONCENTRATE 1 TABLET: 8.6 TABLET ORAL at 10:40

## 2018-01-01 RX ADMIN — Medication 1 CAPSULE: at 08:45

## 2018-01-01 RX ADMIN — METOPROLOL TARTRATE 50 MG: 50 TABLET, FILM COATED ORAL at 09:16

## 2018-01-01 RX ADMIN — IPRATROPIUM BROMIDE AND ALBUTEROL SULFATE 3 ML: .5; 3 SOLUTION RESPIRATORY (INHALATION) at 06:54

## 2018-01-01 RX ADMIN — DILTIAZEM HYDROCHLORIDE 120 MG: 60 TABLET, FILM COATED ORAL at 21:49

## 2018-01-01 RX ADMIN — IPRATROPIUM BROMIDE AND ALBUTEROL SULFATE 3 ML: .5; 3 SOLUTION RESPIRATORY (INHALATION) at 02:24

## 2018-01-01 RX ADMIN — DILTIAZEM HYDROCHLORIDE 120 MG: 60 TABLET, FILM COATED ORAL at 09:50

## 2018-01-01 RX ADMIN — Medication 1 CAPSULE: at 20:12

## 2018-01-01 RX ADMIN — AMIODARONE HYDROCHLORIDE 200 MG: 200 TABLET ORAL at 08:56

## 2018-01-01 RX ADMIN — APIXABAN 2.5 MG: 2.5 TABLET, FILM COATED ORAL at 21:13

## 2018-01-01 RX ADMIN — ALLOPURINOL 100 MG: 100 TABLET ORAL at 17:29

## 2018-01-01 RX ADMIN — DILTIAZEM HYDROCHLORIDE 120 MG: 60 TABLET, FILM COATED ORAL at 21:02

## 2018-01-01 RX ADMIN — LEVOTHYROXINE SODIUM 150 MCG: 150 TABLET ORAL at 05:35

## 2018-01-01 RX ADMIN — ACETAMINOPHEN 500 MG: 500 TABLET, FILM COATED ORAL at 10:23

## 2018-01-01 RX ADMIN — Medication 1 CAPSULE: at 10:13

## 2018-01-01 RX ADMIN — MICONAZOLE NITRATE 1 APPLICATION: 20 POWDER TOPICAL at 09:20

## 2018-01-01 RX ADMIN — TUBERCULIN PURIFIED PROTEIN DERIVATIVE 5 UNITS: 5 INJECTION INTRADERMAL at 03:27

## 2018-01-01 RX ADMIN — FAMOTIDINE 20 MG: 20 TABLET, FILM COATED ORAL at 20:52

## 2018-01-01 RX ADMIN — MORPHINE SULFATE 1 MG: 2 INJECTION, SOLUTION INTRAMUSCULAR; INTRAVENOUS at 05:50

## 2018-01-01 RX ADMIN — FAMOTIDINE 20 MG: 20 TABLET ORAL at 08:52

## 2018-01-01 RX ADMIN — KETOTIFEN FUMARATE 1 DROP: 0.35 SOLUTION/ DROPS OPHTHALMIC at 20:18

## 2018-01-01 RX ADMIN — LEVOTHYROXINE SODIUM 150 MCG: 150 TABLET ORAL at 06:23

## 2018-01-01 RX ADMIN — TRAMADOL HYDROCHLORIDE 50 MG: 50 TABLET, FILM COATED ORAL at 12:04

## 2018-01-01 RX ADMIN — ALLOPURINOL 100 MG: 100 TABLET ORAL at 10:23

## 2018-01-01 RX ADMIN — TRAMADOL HYDROCHLORIDE 50 MG: 50 TABLET, FILM COATED ORAL at 20:42

## 2018-01-01 RX ADMIN — DILTIAZEM HYDROCHLORIDE 60 MG: 60 TABLET, FILM COATED ORAL at 22:00

## 2018-01-07 NOTE — ED PROVIDER NOTES
Subjective     History provided by:  Medical records (TCU staff)    History of Present Illness    · Chief complaint: Swollen area on neck    · Location: Anterior neck just below the jaw    · Quality/Severity: The patient says swelling under the neck and reported intermittent fever and reportedly is been complaining of a sore throat    · Timing/Onset: The TCU reports this is been going on for 3 days.    · Modifying Factors: None known    · Associated symptoms: Reportedly intermittent fever.    · Narrative: The patient is a 77-year-old white female sent over from the TCU for submental swelling and intermittent fever with reported complaints of sore throat for the last 3 days.  The patient has advanced dementia and is a verbal to my questions.  She was evaluated here November 17 for left submandibular sialadenitis with cellulitis and admitted for a couple of days at [facility].  The sialadenitis was treated with clindamycin.  She was transferred back here to the TCU for rehabilitation.  Nursing staff reports she's complaining of a sore throat and a noticed a swelling below her mandible and are concerned that she may have recurrence of her sialadenitis.  Her past medical history is also significant for a CVA in October 2007 T9 on the posterior cerebellar artery, chronic renal failure stage III, history of seizures, hypothyroidism, chronic UTIs, hypertension, hypercholesterolemia, and anemia chronic disease.  Past surgical history significant for colon surgery and colonoscopy, thyroid surgery, and reported cardiac surgery.  Social history the patient is currently a resident of the TCU was never smoked or drank alcohol.    ED Triage Vitals   Temp Heart Rate Resp BP SpO2   01/07/18 0007 01/07/18 0007 01/07/18 0007 01/07/18 0007 01/07/18 0007   98 °F (36.7 °C) 94 16 141/81 96 %      Temp src Heart Rate Source Patient Position BP Location FiO2 (%)   01/07/18 0007 -- -- -- --   Oral           Review of  Systems   Reason unable to perform ROS: The patient has dementia and is a verbal in our emergency department.   Constitutional: Positive for fever.   HENT: Positive for sore throat.        Past Medical History:   Diagnosis Date   • Alzheimer disease    • Anxiety    • ARF (acute renal failure)    • Arthritis    • Atrial fibrillation    • Chest pain    • CHF (congestive heart failure)    • Constipation    • Coronary artery disease    • Depression    • Diabetes mellitus    • Digoxin toxicity    • H/O degenerative disc disease    • History of transfusion    • Hyperlipidemia    • Hypertension    • Hypothyroid    • Myocardial infarction    • Osteoporosis    • Renal disorder    • Seizure    • Sepsis    • Stroke    • UTI (urinary tract infection)        Allergies   Allergen Reactions   • Cefazolin    • Cefprozil    • Cephalosporins        Past Surgical History:   Procedure Laterality Date   • CARDIAC SURGERY     • COLON SURGERY     • COLONOSCOPY     • COLOSTOMY Left    • THYROID SURGERY         History reviewed. No pertinent family history.    Social History     Social History   • Marital status:      Spouse name: N/A   • Number of children: N/A   • Years of education: N/A     Social History Main Topics   • Smoking status: Never Smoker   • Smokeless tobacco: Never Used   • Alcohol use No   • Drug use: No   • Sexual activity: No     Other Topics Concern   • None     Social History Narrative           Objective   Physical Exam   Constitutional:   Patient doesn't appear toxic here.  She is afebrile.  Vital signs are stable.   HENT:   Head: Normocephalic and atraumatic.   Right Ear: External ear normal.   Left Ear: External ear normal.   Nose: Nose normal.   Mouth/Throat: Oropharynx is clear and moist.   Pt has no intraoral swelling.  She's is dentureless.   Eyes: Conjunctivae are normal. Pupils are equal, round, and reactive to light.   Neck: Normal range of motion. Neck supple. No tracheal deviation present.  Thyromegaly present.   The patient has a submental anterior fullness of the neck without tenderness.  There is no asymmetrical swelling of the neck.  There is no stridor or respiratory compromise.   Cardiovascular: Normal rate and regular rhythm.    Pulmonary/Chest: Effort normal and breath sounds normal. No stridor.   Abdominal: Soft. There is no tenderness.   Musculoskeletal: She exhibits no tenderness.   Lymphadenopathy:     She has no cervical adenopathy.   Neurological: She is alert.   The patient will look at me but does not attempt to follow commands.     Skin: Skin is warm and dry. No erythema.   Psychiatric:   Mental status is consistent with advanced dementia.  She is alert but doesn't answer questions or follow commands.   Nursing note and vitals reviewed.      Procedures         ED Course  ED Course   Comment By Time   The patient's CT scan shows resolution of her left submandibular gland abscess with residual subtle inflammation.  Her white count was slightly elevated at 11.3.  Her CMP showed renal insufficiency which is chronic. Darci Gilbert MD 01/07 0224   02:22 the patient was discussed with her PCP Dr. Galaviz requested the patient be sent back to the TCU. Darci Gilbert MD 01/07 0225                  MDM  Number of Diagnoses or Management Options  Localized swelling, mass and lump, neck: new and requires workup     Amount and/or Complexity of Data Reviewed  Clinical lab tests: ordered and reviewed  Tests in the radiology section of CPT®: ordered and reviewed  Obtain history from someone other than the patient: yes  Discuss the patient with other providers: yes  Independent visualization of images, tracings, or specimens: yes    Risk of Complications, Morbidity, and/or Mortality  Presenting problems: high  Diagnostic procedures: high  Management options: high    Patient Progress  Patient progress: stable      Final diagnoses:   Localized swelling, mass and lump, neck           Labs Reviewed    BASIC METABOLIC PANEL - Abnormal; Notable for the following:        Result Value    Glucose 148 (*)     BUN 38 (*)     Creatinine 1.18 (*)     eGFR Non African Amer 44 (*)     BUN/Creatinine Ratio 32.2 (*)     All other components within normal limits    Narrative:     The MDRD GFR formula is only valid for adults with stable renal function between ages 18 and 70.   CBC WITH AUTO DIFFERENTIAL - Abnormal; Notable for the following:     WBC 11.29 (*)     RBC 3.31 (*)     Hemoglobin 10.8 (*)     Hematocrit 32.4 (*)     MCH 32.6 (*)     RDW 15.4 (*)     RDW-SD 55.8 (*)     Neutrophil % 72.1 (*)     Lymphocyte % 11.2 (*)     Monocyte % 8.1 (*)     Immature Grans % 6.9 (*)     Eosinophils, Absolute 0.07 (*)     Immature Grans, Absolute 0.78 (*)     All other components within normal limits   CBC AND DIFFERENTIAL    Narrative:     The following orders were created for panel order CBC & Differential.  Procedure                               Abnormality         Status                     ---------                               -----------         ------                     Scan Slide[[id number]]                                                                  CBC Auto Differential[[id number]]        Abnormal            Final result                 Please view results for these tests on the individual orders.     CT Soft Tissue Neck Without Contrast   ED Interpretation   Marked improvement since 11/17/17.  Minimal enlargement of left   submandibular gland, likely subtle residual inflammation, but no   adjacent fluid collection, adenopathy or abscess.  Stable   asymmetry of the thyroid lobes.  Per Dr. Vanessa             Medication List      Notice     No changes were made to your prescriptions during this visit.             Darci Gilbert MD  01/07/18 0453

## 2018-01-16 NOTE — PLAN OF CARE
Problem: Pressure Ulcer (Adult)  Goal: Signs and Symptoms of Listed Potential Problems Will be Absent or Manageable (Pressure Ulcer)  Outcome: Ongoing (interventions implemented as appropriate)   01/16/18 2806   Pressure Ulcer   Problems Assessed (Pressure Ulcer) all   Problems Present (Pressure Ulcer) wound complications  (mepilex to pressure ulcer on coccx area ) pt turned q 2 hrs, heels floating on pillows , skin maintained clean & dry . Micro guard powder applied to skin folds . jeanmarie to raul anal area for moisture prevention

## 2018-01-16 NOTE — NURSING NOTE
dobhoff placed without complication, secured at 55 cm. Advanced 10 cm and re-secured at 65cm at this time, per radiologist recommendation. Waiting xray confirmation prior to use.     Noted decreased uop this shift. Dr connor notified

## 2018-01-16 NOTE — H&P
HISTORY AND PHYSICAL      Patient Care Team:  Tiana Galaviz MD as PCP - General (Family Medicine)  Tiana Galaviz MD as PCP - Family Medicine    CHIEF COMPLAINT:  Hypoxia and rapid heart rate    HISTORY OF PRESENT ILLNESS:    77-year-old white female who became suddenly hypoxic this  morning sats drop 85 percent rapid response was called which I attended -patient found very somnolent more than normal and tachycardic with a heart rate of 140 sats were 85% and on her usual 2 L.  Patient has been treated for a urinary tract infection on IV Merrem which started yesterday.  Patient has been more lethargic than normal and has no several doses of her by mouth antiarrhythmics likely the cause of her A. fib with rapid ventricular response.  Because of her tachyarrhythmia and hypoxemia she is being transferred emergently to ICU.      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  Social History   Substance Use Topics   • Smoking status: Never Smoker   • Smokeless tobacco: Never Used   • Alcohol use No     Prescriptions Prior to Admission   Medication Sig Dispense Refill Last Dose   • acetaminophen (TYLENOL) 500 MG tablet Take 500 mg by mouth every 4 (four) hours as needed for mild pain (1-3) or fever.   11/17/2017 at Unknown time   • allopurinol  (ZYLOPRIM) 100 MG tablet Take 100 mg by mouth 2 (two) times a day.   11/17/2017 at Unknown time   • amiodarone (PACERONE) 200 MG tablet Take 200 mg by mouth 2 (Two) Times a Day.   11/17/2017 at Unknown time   • aspirin 81 MG tablet Take 81 mg by mouth daily.   11/17/2017 at Unknown time   • benztropine (COGENTIN) 0.5 MG tablet Take 0.25 mg by mouth 2 (two) times a day.   11/20/2017 at Unknown time   • calcium carbonate (TUMS) 500 MG chewable tablet Chew 1 tablet every 4 (four) hours as needed for indigestion or heartburn.   Unknown at Unknown time   • calcium-vitamin D (OSCAL-500) 500-200 MG-UNIT per tablet Take 1 tablet by mouth 2 (two) times a day.   Unknown at Unknown time   • Cholecalciferol 73807 UNITS tablet Take 50,000 Units by mouth 1 (one) time per week. On Tuesday   Past Week at Unknown time   • clindamycin (CLEOCIN) 150 MG capsule Take 450 mg by mouth 3 (Three) Times a Day.   11/20/2017 at Unknown time   • conjugated estrogens (PREMARIN) vaginal cream Insert 0.625 g into the vagina daily.   11/17/2017 at Unknown time   • diclofenac (VOLTAREN) 1 % gel gel Apply 4 g topically 3 (three) times a day as needed (for left knee or foot pain).   11/17/2017 at Unknown time   • diltiazem CD (CARDIZEM CD) 240 MG 24 hr capsule Take 240 mg by mouth every other day.   Past Week at Unknown time   • donepezil (ARICEPT) 10 MG tablet Take 10 mg by mouth every night.   11/17/2017 at Unknown time   • doxazosin (CARDURA) 2 MG tablet Take 2 mg by mouth 2 (two) times a day.   2/11/2017 at Unknown time   • famotidine (PEPCID) 20 MG tablet Take 20 mg by mouth 2 (Two) Times a Day.   11/17/2017 at Unknown time   • fluconazole (DIFLUCAN) 100 MG tablet Take 100 mg by mouth Daily.      • glipiZIDE (GLUCOTROL) 5 MG tablet Take 5 mg by mouth.   11/17/2017 at Unknown time   • guaifenesin (ROBITUSSIN) 100 MG/5ML liquid Take 200 mg by mouth 6 (six) times a day.   Unknown at Unknown time   • HYDROcodone-acetaminophen (NORCO) 5-325 MG per  tablet Take 1-2 tablets by mouth every 6 (six) hours as needed for moderate pain (4-6).   Unknown at Unknown time   • ibandronate (BONIVA) 150 MG tablet Take 150 mg by mouth every 30 (thirty) days.   Past Month at Unknown time   • ketotifen (ZADITOR) 0.025 % ophthalmic solution Administer 1 drop to both eyes 2 (Two) Times a Day.   11/17/2017 at Unknown time   • lactobacillus acidophilus (RISAQUAD) capsule capsule Take 1 capsule by mouth daily.   11/17/2017 at Unknown time   • levETIRAcetam (KEPPRA) 500 MG tablet Take 500 mg by mouth 2 (two) times a day.   11/17/2017 at Unknown time   • levothyroxine (SYNTHROID, LEVOTHROID) 150 MCG tablet Take 150 mcg by mouth daily.   11/17/2017 at Unknown time   • linagliptin (TRADJENTA) 5 MG tablet tablet Take 5 mg by mouth Daily.   11/17/2017 at Unknown time   • lisinopril (PRINIVIL,ZESTRIL) 20 MG tablet Take 20 mg by mouth 2 (two) times a day.   2/11/2017 at Unknown time   • LORazepam (ATIVAN) 0.5 MG tablet Take 0.5 mg by mouth every 8 (eight) hours as needed for anxiety.   Unknown at Unknown time   • magnesium hydroxide (MILK OF MAGNESIA) 400 MG/5ML suspension Take 30 mL by mouth at night as needed for constipation.   Unknown at Unknown time   • megestrol (MEGACE) 40 MG/ML suspension Take 200 mg by mouth 2 (Two) Times a Day.   11/17/2017 at Unknown time   • metoprolol tartrate (LOPRESSOR) 25 MG tablet Take 75 mg by mouth 2 (Two) Times a Day.   11/17/2017 at Unknown time   • miconazole (MICOTIN) 2 % powder Apply 1 application topically as needed for itching.   11/17/2017 at Unknown time   • Multiple Vitamin (MULTIVITAMIN) tablet Take 1 tablet by mouth daily.   11/17/2017 at Unknown time   • nitroglycerin (NITROSTAT) 0.4 MG SL tablet Place 0.4 mg under the tongue every 5 (five) minutes as needed for chest pain. Take no more than 3 doses in 15 minutes.   Unknown at Unknown time   • olopatadine (PATANOL) 0.1 % ophthalmic solution Administer 1 drop to both eyes 4 (four) times a day  as needed for allergies.   Unknown at Unknown time   • potassium chloride (K-DUR,KLOR-CON) 10 MEQ CR tablet Take 10 mEq by mouth 2 (Two) Times a Day.   11/17/2017 at Unknown time   • ranitidine (ZANTAC) 300 MG tablet Take 300 mg by mouth every night.   2/11/2017 at Unknown time   • senna (SENOKOT) 8.6 MG tablet Take 1 tablet by mouth 2 (two) times a day.   11/17/2017 at Unknown time   • sertraline (ZOLOFT) 100 MG tablet Take 100 mg by mouth every night.   11/17/2017 at Unknown time   • traMADol (ULTRAM) 50 MG tablet Take 50 mg by mouth.   11/17/2017 at Unknown time     Allergies:  Cefazolin; Cefprozil; and Cephalosporins     Review of Systems   Unable to perform ROS: Mental status change       Vital Signs  Temp:  [97.3 °F (36.3 °C)-99.8 °F (37.7 °C)] 97.3 °F (36.3 °C)  Heart Rate:  [102-132] 107  Resp:  [20-30] 24  BP: (104-156)/(60-97) 112/87           Physical Exam   Physical Exam   Constitutional: She appears well-developed and well-nourished. She appears lethargic.   HENT:   Head: Normocephalic.   Mouth/Throat: Oropharynx is clear and moist.   Eyes: Conjunctivae are normal.   Neck: Normal range of motion. No JVD present. No thyromegaly present.   No swelling no nodes   Cardiovascular: Normal rate.  An irregularly irregular rhythm present.  Tachycardic  Murmur heard.   Systolic murmur is present with a grade of 2/6   Pulmonary/Chest: Effort normal. No respiratory distress. She has no wheezes. She has no rales.   Abdominal: Soft. She exhibits no distension. There is no tenderness. There is no guarding.       Colostomy present   Neurological: She has normal strength and normal reflexes. She appears lethargic. No cranial nerve deficit or sensory deficit.   Does open her eyes does not follow any commands   Skin: Skin is warm and dry. No rash noted.   Stage 3 decubitus      Results Review:    I reviewed the patient's new clinical results.  Lab Results (most recent)     None          Imaging Results (most recent)      None        reviewed    ECG/EMG Results (most recent)     None        reviewed    Assessment/Plan     1.  Escherichia coli urinary tract infection likely ESBL continue Merrem     2.  Toxic metabolic encephalopathy secondary #1 continue supportive care     3.  Atrial flutter with rapid ventricular response patient responded 1 dose of IV digoxin heart rate is down the 110.   She'll continue home medications she is not taking well by mouth and therefore a call was made to daughter who is agreeable to a Dobbhoff tube placement.  This will be done later today and resume her medications     4.  Hypertension well controlled continue medications     5.  Chronic kidney disease stage III at baseline     6.  Possible sepsis progressed slightly elevated lactic acid is normal continue with broad-spectrum IV antibiotics patient is hemodynamic stable     7.  Posterior cerebral circulation CVA  on 10/2017 stable     8.  Stage III sacral decubitus.  Continue present management as improving slowly     9.  Diabetes mellitus continue Accu-Cheks sliding scale insulin       I discussed the patients findings and my recommendations with patient.     Tiana Galaviz MD  01/16/18  8:50 AM

## 2018-01-16 NOTE — NURSING NOTE
Pt transferred to ICU . Presented tachycardic and soa with sats upper 80's on 5 liters NC. Digoxin 250 mcg iv administered , HR now 107. sats are now 90-92% on 5 liters. resp 22. RUL lung sounds corse, rhonchi noted.  Spoke with daughter Criss, she has given permission to place dobhoff tube for po med administration.     Noted K+ is at 2.0. Pro viviane elevated at 0.54. Dr connor notified of above.

## 2018-01-16 NOTE — NURSING NOTE
Stat bmp done to eval renal function for decline due to decreased UOP this shift. Next shift to call results to MD. Per kemperajurs

## 2018-01-16 NOTE — PROGRESS NOTES
Daily Progress Note:    Subjective:   77-year-old white female who became suddenly hypoxic this  morning sats drop 85 percent rapid response was called which I attended -patient found very somnolent more than normal and tachycardic with a heart rate of 140 sats were 85% and on her usual 2 L.  Patient has been treated for a urinary tract infection on IV Merrem which started yesterday.  Patient has been more lethargic than normal and has no several doses of her by mouth antiarrhythmics likely the cause of her A. fib with rapid ventricular response.  Because of her tachyarrhythmia and hypoxemia she is being transferred emergently to ICU      Patient Vitals for the past 24 hrs:   BP Temp Temp src Pulse Resp SpO2   01/16/18 0827 - - - - - 90 %   01/16/18 0826 - - - 107 - -   01/16/18 0759 - 97.3 °F (36.3 °C) Oral 118 24 92 %   01/16/18 0755 112/87 - - 119 24 92 %   01/16/18 0741 - - - 113 25 90 %   01/16/18 0728 124/97 - - 107 26 (!) 87 %            No intake or output data in the 24 hours ending 01/16/18 0849    Review of Systems   Unable to perform ROS: Mental status change       Physical Exam   Constitutional: She appears well-developed and well-nourished. She appears lethargic.   HENT:   Head: Normocephalic.   Mouth/Throat: Oropharynx is clear and moist.   Eyes: Conjunctivae are normal.   Neck: Normal range of motion. No JVD present. No thyromegaly present.   No swelling no nodes   Cardiovascular: Normal rate.  An irregularly irregular rhythm present.   Murmur heard.   Systolic murmur is present with a grade of 2/6   Pulmonary/Chest: Effort normal. No respiratory distress. She has no wheezes. She has no rales.   Abdominal: Soft. She exhibits no distension. There is no tenderness. There is no guarding.       Colostomy present   Neurological: She has normal strength and normal reflexes. She appears lethargic. No cranial nerve deficit or sensory deficit.   Does open her eyes does not follow any commands   Skin: Skin is  warm and dry. No rash noted.   Stage 3 decubitus    Nursing note and vitals reviewed.          Medication Review:   I have reviewed the patient's current medication list      amiodarone 200 mg Oral Q24H   diltiaZEM 120 mg Oral Q12H   levETIRAcetam 500 mg Oral Q12H   [START ON 1/17/2018] levothyroxine 150 mcg Oral Q AM   metoprolol tartrate 75 mg Oral Q12H   potassium chloride 40 mEq Oral Once                  Labs:    Results from last 7 days  Lab Units 01/16/18  0732 01/14/18  1322 01/10/18  0354   WBC 10*3/mm3 11.86* 10.14 10.06   HEMOGLOBIN g/dL 10.7* 10.5* 10.0*   HEMATOCRIT % 32.9* 31.8* 30.6*   PLATELETS 10*3/mm3 202 225 237   MONOCYTES % % 5.0  --   --        Results from last 7 days  Lab Units 01/16/18  0732 01/14/18  1322 01/10/18  0354   SODIUM mmol/L 144 139 141   POTASSIUM mmol/L 2.9* 3.0* 3.3*   CHLORIDE mmol/L 109* 105 106   CO2 mmol/L 20.3* 20.3* 21.8*   BUN mg/dL 35* 31* 34*   CREATININE mg/dL 1.09* 1.04* 1.03*   CALCIUM mg/dL 8.2* 8.2* 8.8   BILIRUBIN mg/dL 0.2  --   --    ALK PHOS U/L 75  --   --    ALT (SGPT) U/L 8  --   --    AST (SGOT) U/L 7  --   --    GLUCOSE mg/dL 182* 165* 140*           Lab Results (last 24 hours)     ** No results found for the last 24 hours. **              Radiology:  Imaging Results (last 24 hours)     ** No results found for the last 24 hours. **          Cardiology:  ECG/EMG Results (last 24 hours)     ** No results found for the last 24 hours. **          I have reviewed consult notes.    Assessment and Plan:    1.  Escherichia coli urinary tract infection likely ESBL continue Merrem    2.  Toxic metabolic encephalopathy secondary #1 continue supportive care    3.  Atrial flutter with rapid ventricular response patient responded.  She'll continue home medications she is not taking well by mouth and therefore a call was made to daughter who is agreeable to a Dobbhoff tube placement.  This will be done later today and resume her medications    4.  Hypertension well  controlled continue medications    5.  Chronic kidney disease stage III at baseline    6.  Possible sepsis  lactic acid is normal continue with broad-spectrum IV antibiotics patient is hemodynamic stable    7.  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  Stage III sacral decubitus.  Continue present management as improving slowly    9.  Diabetes mellitus continue Accu-Cheks sliding scale insulin

## 2018-01-16 NOTE — PLAN OF CARE
Problem: Patient Care Overview (Adult)  Goal: Plan of Care Review  Outcome: Ongoing (interventions implemented as appropriate)   01/16/18 1600   Coping/Psychosocial Response Interventions   Plan Of Care Reviewed With daughter   Patient Care Overview   Progress unable to show any progress toward functional goals   Outcome Evaluation   Outcome Summary/Follow up Plan spoke with daughter on phone to update      Goal: Adult Individualization and Mutuality  Outcome: Ongoing (interventions implemented as appropriate)   01/16/18 1600   Individualization   Patient Specific Goals hemodynamic stability, monitor for UOP closely      Goal: Discharge Needs Assessment  Outcome: Ongoing (interventions implemented as appropriate)      Problem: Sepsis (Adult)  Goal: Signs and Symptoms of Listed Potential Problems Will be Absent or Manageable (Sepsis)  Outcome: Ongoing (interventions implemented as appropriate)   01/16/18 1600   Sepsis   Problems Assessed (Sepsis) all   Problems Present (Sepsis) hypoperfusion/hemodynamic instability;progression of infection  (noted elevated HR on adm, slight low grade fever. wbc trending up) decreased UOP, known UTI per md. resp labored but now improved. Case reviewed with MD, will cont to                                           mon uop, hydration, renal function, vital signs, abx therapy . No IVF bolus ordered at this time as BP is WNL since adm.        Problem: Dysphagia (Adult)  Goal: Identify Related Risk Factors and Signs and Symptoms  Outcome: Ongoing (interventions implemented as appropriate)   01/16/18 1600   Dysphagia   Dysphagia: Related Risk Factors functional decline;mental status altered   Pharyngeal Phase Dysphagia Signs and Symptoms (Dysphagia) breathing pattern/quality changes;vital signs/oxygenation changes     Goal: Functional/Safe Swallow  Outcome: Ongoing (interventions implemented as appropriate)   01/16/18 1600   Dysphagia (Adult)   Functional/Safe Swallow making progress  toward outcome  (ptis not getting anything po as she has not been responsive or alert enough to follow directions  )     Goal: Compensatory Techniques to Improve Safety/Function with Swallowing  Outcome: Ongoing (interventions implemented as appropriate)   01/16/18 1600   Dysphagia (Adult)   Compensatory Techniques to Improve Safety/Function with Swallowing making progress toward outcome  (pt is lethargic this shift, reviewed case w/ dr connor, will eval for swallow possibly tomorrow if pt is more alert ) domathew placed this shift for po med administration

## 2018-01-17 NOTE — PROGRESS NOTES
"Daily Progress Note:    Subjective: Patient is poorly responsive does open eyes does not follow any commands    Flowsheet Rows         First Filed Value    Admission Height  171.4 cm (67.48\") Documented at 01/16/2018 1049    Admission Weight  101 kg (222 lb) Documented at 01/16/2018 1049          Patient Vitals for the past 24 hrs:   BP Temp Temp src Pulse Resp SpO2 Height Weight   01/17/18 0603 104/59 97.3 °F (36.3 °C) Axillary 98 18 93 % - 101 kg (223 lb 6.4 oz)   01/17/18 0532 99/59 - - 88 - 92 % - -   01/17/18 0502 99/65 - - 100 - 93 % - -   01/17/18 0433 105/63 - - 94 - 94 % - -   01/17/18 0403 115/72 - - 96 - 93 % - -   01/17/18 0332 97/56 98.4 °F (36.9 °C) Axillary 88 20 91 % - -   01/17/18 0322 97/57 - - 93 - (!) 88 % - -   01/17/18 0303 - - - 95 - (!) 87 % - -   01/17/18 0302 (!) 88/62 - - 92 - 93 % - -   01/17/18 0233 108/57 - - 88 - 94 % - -   01/17/18 0203 95/66 - - 88 - 91 % - -   01/17/18 0133 107/54 - - 85 - (!) 89 % - -   01/17/18 0102 104/54 - - 94 - 90 % - -   01/17/18 0032 100/57 - - 94 - (!) 89 % - -   01/17/18 0003 106/64 98.4 °F (36.9 °C) Axillary 92 20 (!) 89 % - -   01/16/18 2333 90/55 - - 94 - (!) 88 % - -   01/16/18 2302 102/65 - - 85 - 92 % - -   01/16/18 2232 92/55 - - 85 - 92 % - -   01/16/18 2224 (!) 86/58 - - 91 - (!) 87 % - -   01/16/18 2218 (!) 84/57 - - 90 - 91 % - -   01/16/18 2203 (!) 89/56 - - 92 - 91 % - -   01/16/18 2133 (!) 91/39 - - 87 - 92 % - -   01/16/18 2103 99/85 - - 92 - (!) 84 % - -   01/16/18 2032 98/62 96.8 °F (36 °C) Axillary 94 18 93 % - -   01/16/18 1804 106/41 98.6 °F (37 °C) Axillary 79 20 90 % - -   01/16/18 1727 112/54 - - 86 - 94 % - -   01/16/18 1722 - - - 79 - 93 % - -   01/16/18 1702 94/52 - - 73 - 94 % - -   01/16/18 1603 102/61 - - 80 - 93 % - -   01/16/18 1558 - 99 °F (37.2 °C) Axillary 86 20 93 % - -   01/16/18 1549 - - - 77 - (!) 89 % - -   01/16/18 1538 - - - 91 - 90 % - -   01/16/18 1532 122/60 - - 90 - 90 % - -   01/16/18 1502 120/59 - - 97 - (!) " "89 % - -   01/16/18 1438 - - - 108 - 93 % - -   01/16/18 1434 126/60 - - 113 - 94 % - -   01/16/18 1412 140/79 - - 108 - - - -   01/16/18 1233 102/77 - - 110 - 92 % - -   01/16/18 1202 129/63 - - 108 - 92 % - -   01/16/18 1049 152/68 98.3 °F (36.8 °C) Axillary 107 20 92 % 171.4 cm (67.48\") 101 kg (222 lb)   01/16/18 1033 152/68 - - 109 - 94 % - -   01/16/18 1002 144/92 - - 109 - (!) 89 % - -   01/16/18 0932 125/76 - - 115 - 92 % - -   01/16/18 0903 107/70 - - 115 - 91 % - -   01/16/18 0827 - - - - - 90 % - -   01/16/18 0826 - - - 107 - - - -   01/16/18 0759 - 97.3 °F (36.3 °C) Oral 118 24 92 % - -   01/16/18 0755 112/87 - - 119 24 92 % - -   01/16/18 0741 - - - 113 25 90 % - -   01/16/18 0728 124/97 - - 107 26 (!) 87 % - -       101 kg (223 lb 6.4 oz)      Intake/Output Summary (Last 24 hours) at 01/17/18 0724  Last data filed at 01/17/18 0603   Gross per 24 hour   Intake           1810.5 ml   Output              995 ml   Net            815.5 ml       Review of Systems   Unable to perform ROS: Mental status change       Physical Exam   Constitutional: She appears well-developed and well-nourished. She appears lethargic.   HENT:   Head: Normocephalic.   Mouth/Throat: Oropharynx is clear and moist.   Eyes: Conjunctivae are normal.   Neck: Normal range of motion. No JVD present. No thyromegaly present.   No swelling no nodes   Cardiovascular: Normal rate.  An irregularly irregular rhythm present.   Murmur heard.   Systolic murmur is present with a grade of 2/6   Pulmonary/Chest: Effort normal. No respiratory distress. She has no wheezes. She has no rales.   Abdominal: Soft. She exhibits no distension. There is no tenderness. There is no guarding.       Colostomy present   Neurological: She has normal strength and normal reflexes. She appears lethargic. No cranial nerve deficit or sensory deficit.   Does open her eyes does not follow any commands   Skin: Skin is warm and dry. No rash noted.   Stage 3 decubitus  "   Nursing note and vitals reviewed.          Medication Review:   I have reviewed the patient's current medication list      allopurinol 100 mg Oral BID   amiodarone 200 mg Oral Q24H   apixaban 5 mg Oral Q12H   aspirin 81 mg Oral Daily   diltiaZEM 120 mg Oral Q12H   donepezil 10 mg Oral Nightly   famotidine 20 mg Oral BID   iron polysaccharides 150 mg Oral BID   levETIRAcetam      levETIRAcetam 500 mg Intravenous Q12H   levothyroxine 150 mcg Oral Q AM   levothyroxine 150 mcg Oral Daily   meropenem 1 g Intravenous Q8H   metoprolol tartrate 75 mg Oral Q12H   potassium chloride 40 mEq Oral Daily   senna 1 tablet Oral Nightly   sertraline 75 mg Oral Nightly           custom IV infusion builder            Labs:    Results from last 7 days  Lab Units 01/17/18  0519 01/16/18  0732 01/14/18  1322   WBC 10*3/mm3 11.39* 11.86* 10.14   HEMOGLOBIN g/dL 10.0* 10.7* 10.5*   HEMATOCRIT % 31.1* 32.9* 31.8*   PLATELETS 10*3/mm3 174 202 225   MONOCYTES % %  --  5.0  --        Results from last 7 days  Lab Units 01/17/18  0519 01/16/18  1846 01/16/18  0732 01/14/18  1322   SODIUM mmol/L 145 142 144 139   POTASSIUM mmol/L 3.1* 3.8 2.9* 3.0*   CHLORIDE mmol/L 111* 108* 109* 105   CO2 mmol/L 19.5* 20.1* 20.3* 20.3*   BUN mg/dL 45* 40* 35* 31*   CREATININE mg/dL 1.68* 1.36* 1.09* 1.04*   CALCIUM mg/dL 7.7* 7.9* 8.2* 8.2*   BILIRUBIN mg/dL  --   --  0.2  --    ALK PHOS U/L  --   --  75  --    ALT (SGPT) U/L  --   --  8  --    AST (SGOT) U/L  --   --  7  --    GLUCOSE mg/dL 174* 193* 182* 165*           Lab Results (last 24 hours)     Procedure Component Value Units Date/Time    Lactic Acid, Plasma [[id number]]  (Normal) Collected:  01/16/18 1030    Specimen:  Blood Updated:  01/16/18 1241     Lactate 1.6 mmol/L     Narrative:       Testing performed and completed at 1045. Original order was cancelled. New order just received.     POC Glucose Once [[id number]]  (Abnormal) Collected:  01/16/18 1249    Specimen:  Blood Updated:  01/16/18 1301      Glucose 184 (H) mg/dL     Narrative:       Meter: [id number] : [id number] Myles Madera RN    POC Glucose Once [[id number]]  (Abnormal) Collected:  01/16/18 1801    Specimen:  Blood Updated:  01/16/18 1807     Glucose 203 (H) mg/dL     Narrative:       Meter: [id number] : [id number] Myles Madera RN    Basic Metabolic Panel [[id number]]  (Abnormal) Collected:  01/16/18 1846    Specimen:  Blood Updated:  01/16/18 1911     Glucose 193 (H) mg/dL      BUN 40 (H) mg/dL      Creatinine 1.36 (H) mg/dL      Sodium 142 mmol/L      Potassium 3.8 mmol/L      Chloride 108 (H) mmol/L      CO2 20.1 (L) mmol/L      Calcium 7.9 (L) mg/dL      eGFR Non African Amer 38 (L) mL/min/1.73      BUN/Creatinine Ratio 29.4 (H)     Anion Gap 13.9 mmol/L     Narrative:       The MDRD GFR formula is only valid for adults with stable renal function between ages 18 and 70.    POC Glucose Once [[id number]]  (Abnormal) Collected:  01/16/18 2206    Specimen:  Blood Updated:  01/16/18 2212     Glucose 133 (H) mg/dL     Narrative:       Meter: [id number] : [id number] Rui Guillen RN    CBC (No Diff) [[id number]]  (Abnormal) Collected:  01/17/18 0519    Specimen:  Blood Updated:  01/17/18 0529     WBC 11.39 (H) 10*3/mm3      RBC 3.05 (L) 10*6/mm3      Hemoglobin 10.0 (L) g/dL      Hematocrit 31.1 (L) %      .0 (H) fL      MCH 32.8 (H) pg      MCHC 32.2 g/dL      RDW 15.9 (H) %      RDW-SD 59.8 (H) fl      MPV 11.4 (H) fL      Platelets 174 10*3/mm3     Basic Metabolic Panel [[id number]]  (Abnormal) Collected:  01/17/18 0519    Specimen:  Blood Updated:  01/17/18 0554     Glucose 174 (H) mg/dL      BUN 45 (H) mg/dL      Creatinine 1.68 (H) mg/dL      Sodium 145 mmol/L      Potassium 3.1 (L) mmol/L      Chloride 111 (H) mmol/L      CO2 19.5 (L) mmol/L      Calcium 7.7 (L) mg/dL      eGFR Non African Amer 30 (L) mL/min/1.73      BUN/Creatinine Ratio 26.8 (H)     Anion Gap 14.5 mmol/L     Narrative:       The MDRD GFR  formula is only valid for adults with stable renal function between ages 18 and 70.              Radiology:  Imaging Results (last 24 hours)     Procedure Component Value Units Date/Time    XR Abdomen KUB [[id number]] Collected:  01/16/18 1130     Updated:  01/16/18 1133    Narrative:       ABDOMEN, 01/16/2018 (11:21 HOURS):     HISTORY:   Dobbhoff tube placement.     TECHNIQUE:  Single AP portable radiograph of the upper abdomen.     FINDINGS:  Newly placed Dobbhoff tube tip is in the upper stomach just below the  esophagogastric junction at 1121 hours on 01/16/2018. Consider advancing  the tube 8-10 cm for placement in the distal stomach.        This report was finalized on 1/16/2018 11:31 AM by Dr. Michael Brito MD.       XR Abdomen KUB [[id number]] Collected:  01/16/18 1306     Updated:  01/16/18 1309    Narrative:       AP ABDOMEN, 01/16/2018 (12:45 HOURS):     HISTORY:   Repositioned Dobbhoff tube.     TECHNIQUE:  AP portable radiograph of the upper abdomen.     FINDINGS:  Repositioned Dobbhoff tube is in good position with tip in the distal  stomach at 1245 hours on 01/16/2018.       Impression:       Dobbhoff tube in good position.     This report was finalized on 1/16/2018 1:06 PM by Dr. Michael Brito MD.       XR Abdomen KUB [[id number]] Collected:  01/17/18 0649     Updated:  01/17/18 0652    Narrative:       PORTABLE ABDOMEN, 01/16/2018 (21:13 HOURS):     HISTORY:   Dobbhoff tube placement.     TECHNIQUE:  AP portable radiograph of the upper abdomen.     FINDINGS:  Newly replaced Dobbhoff tube tip is in the midportion of the stomach at  21:13 hours on 01/16/2018.       Impression:       1. Dobbhoff tube in the mid stomach.  2. Requested initial stat report to the ordering service from Dr. Mario Ron at 2123 hours on 01/16/2018.     This report was finalized on 1/17/2018 6:50 AM by Dr. Michael Brito MD.             Cardiology:  ECG/EMG Results (last 24 hours)     ** No results found for  the last 24 hours. **          I have reviewed consult notes.    Assessment and Plan:      1.  Urinary tract infection secondary to Proteus will change To IV Rocephin.      2.  Toxic metabolic encephalopathy secondary #1 continue supportive care    3.  Atrial flutter with rapid ventricular response heart rate better continue present medications    4.  Hypertension patient became hypotensive we'll hold some of her by mouth medications until    5.  Injury on chronic kidney disease with oliguria likely due to hypotension from urinary tract infection sepsis we will continue fluids supportive management    6.  Sepsis continue IV antibiotics and supportive management    7.  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  Stage III sacral decubitus.  Continue present management as improving slowly    9.  Diabetes mellitus continue Accu-Cheks sliding scale insulin

## 2018-01-17 NOTE — PROGRESS NOTES
"Adult Nutrition  Assessment/PES    Patient Name:  Yamile Cronin  YOB: 1940  MRN: [id number]  Admit Date:  1/16/2018    Assessment Date:  1/17/2018    Comments:  Tube feeding at goal rate meets 90% of energy needs, 95% of protein needs.  Recommend change goal rate on tube feeding to 67 ml/hour (provides 1,930 kcals, 96 grams protein, 1,294 ml free water-1,894ml with flushes)  May want to discontinue flushes while IV fluids running due to history of CHF.          Reason for Assessment       01/17/18 0938    Reason for Assessment    Reason For Assessment/Visit physician consult;nurse consult    Cardiac CHF    Endocrine DM;Hypothyroid    Neurological Alzheimer's;Dementia    Renal SOLANGE;CKD    Other diagnosis toxic metabolic encephalopathy, UTI, sepsis, stage III coccyx                Anthropometrics       01/17/18 0944    Anthropometrics    Height 171.4 cm (67.48\")    RD Documented Weight on Admission 101 kg (223 lb 6 oz)    Anthropometrics (Special Considerations)    RD Calculated BMI (kg/m2) 34.49    Ideal Body Weight (IBW)    Ideal Body Weight (IBW), Female 63.36    Usual Body Weight (UBW)    Usual Body Weight --   wt 225.8# 12-17-17    Body Mass Index (BMI)    BMI Grade 30 - 34.9- obesity - grade I            Labs/Tests/Procedures/Meds       01/17/18 0947    Labs/Tests/Procedures/Meds    Labs/Tests Review Reviewed    Medication Review Reviewed, pertinent            Physical Findings       01/17/18 0947    Physical Findings/Assessment    Additional Documentation --   pt. poorly responsive    Physical Appearance    Tubes nasogastric tube    Skin pressure ulcer(s)            Estimated/Assessed Needs       01/17/18 0948    Calculation Measurements    Weight Used For Calculations 101 kg (223 lb 6 oz)    Height Used for Calculations 1.714 m (5' 7.48\")    Estimated/Assessed Energy Needs    Age 77    RMR ([place]-St[place] Jeor Equation) 1538.46    Activity Factors ([place] St Jeor)  --   1.25 for critical care    " Total estimated needs ([place]) 1,923    Estimated/Assessed Protein Needs    Weight Used for Protein Calculation 101 kg (223 lb 6 oz)    Protein (gm/kg) 0.9    0.9 Gm Protein (gm) 91.19    Estimated/Assessed Fluid Needs    Fluid Need Method --   1,400-1,900 due to history of CHF            Nutrition Prescription Ordered       01/17/18 1019    Nutrition Prescription PO    Current PO Diet --   no diet order other than tube feeding    Nutrition Prescription EN    Product Glucerna 1.2 viviane    TF Delivery Method Continuous    Continuous TF Goal Rate (mL/hr) 60 mL/hr    Continuous TF Current Rate (mL/hr) 50 mL/hr    Water flush (mL)  100 mL    Water Flush Frequency Every 4 hours            Evaluation of Received Nutrient/Fluid Intake       01/17/18 1024    EN Evaluation    Number of Days EN Intake Evaluated 1 day   provides 1,728 kcals, 86 grams protein, 1,159 ml free water in tube feeding (1,759 with flushes)            Problem/Interventions:        Problem 1       01/17/18 1057    Nutrition Diagnoses Problem 1    Problem 1 Needs Alternate Route    Etiology (related to) Factors Affecting Nutrition    Functional Diagnosis Other (comment)   pt. not alert enough to take po    Signs/Symptoms (evidenced by) Report/Observation            Problem 2       01/17/18 1100    Nutrition Diagnoses Problem 2    Problem 2 Increased Nutrient Needs    Macronutrient Kcal;Protein    Signs/Symptoms (evidenced by) Report/Observation   skin breakdown                  Intervention Goal       01/17/18 1059    Intervention Goal    TF/PN Adjust TF/PN            Nutrition Intervention       01/17/18 1101    Nutrition Intervention    RD/Tech Action Follow Tx progress            Nutrition Prescription       01/17/18 1101    Nutrition Prescription EN    Enteral Prescription Other (comment)   change goal rate on tube feeding to 67 ml/hour            Education/Evaluation       01/17/18 1101    Education    Education Education not appropriate at  this time    Monitor/Evaluation    Monitor I&O;Pertinent labs;TF delivery/tolerance;Weight;Skin status        Electronically signed by:  Cuca Sarmiento RD  01/17/18 11:02 AM

## 2018-01-17 NOTE — NURSING NOTE
Continued Stay Note  MARTHA Mallory     Patient Name: Yamile Cronin  MRN: [id number]  Today's Date: 1/17/2018    Admit Date: 1/16/2018          Discharge Plan       01/17/18 1327    Case Management/Social Work Plan    Additional Comments Patient sleeping soundly, no family present. Call placed to patients daughter, Criss Sheriff, to discuss plan of care. Voice message left to return call. Will continue to follow.              Discharge Codes     None            Romeo Fair RN

## 2018-01-17 NOTE — NURSING NOTE
01/17/18 1701   Pressure Ulcer 01/02/18 1122 coccyx Stage III   Date first assessed/Time first assessed: 01/02/18 1122   Present On Admission (Pressure Ulcer): picture taken  Location: coccyx  Stage: Stage III   Dressing Appearance dry;intact   Pressure Ulcer Appearance pink;moist;clean   Periwound Area pink;intact;dry;blanchable   Length (Pressure Ulcer) (cm) 1.7   Width (Pressure Ulcer) (cm) 0.8   Depth (Pressure Ulcer) (cm) 0.2   Pressure Ulcer Wound Care cleansed with;sterile normal saline   Dressing Dressing changed;low-adherent;other (see comments)  (Hydrofera blue to wound bed, cover with Optifoam dressing)   Picture taken yes       CWON follow up for colostomy and stage 3 pressure injury to coccyx.  See measurements above.  Wound is indeed showing signs of healing with small dimensions, no drainage present, and no s/s of infection.  Wound is moist and clean.  Continue same wound care.  Next dressing change is due 1/20/18.  Colostomy pouch intact with no leaking.  Ostomy and wound supplies left at bedside.  Please do not hesitate to call with any questions.

## 2018-01-17 NOTE — PLAN OF CARE
Problem: Patient Care Overview (Adult)  Goal: Plan of Care Review  Outcome: Ongoing (interventions implemented as appropriate)   01/17/18 9545   Coping/Psychosocial Response Interventions   Plan Of Care Reviewed With patient   Patient Care Overview   Progress no change   Outcome Evaluation   Outcome Summary/Follow up Plan Pt remains lethargic and minimally responsive. Tube feeds at goal rate of 60ml/hr. Nephrology consulted d/t minimal UO-pt has had 70cc out for this shift. Suctioned a large amount of thick yellow sputum from pts throat. Breathing seems to be improved after suctioning. Vitals stable; a-flutter. Will continue to monitor.        Problem: Sepsis (Adult)  Goal: Signs and Symptoms of Listed Potential Problems Will be Absent or Manageable (Sepsis)  Outcome: Ongoing (interventions implemented as appropriate)      Problem: Pressure Ulcer (Adult)  Goal: Signs and Symptoms of Listed Potential Problems Will be Absent or Manageable (Pressure Ulcer)  Outcome: Ongoing (interventions implemented as appropriate)      Problem: Fall Risk (Adult)  Goal: Identify Related Risk Factors and Signs and Symptoms  Outcome: Ongoing (interventions implemented as appropriate)      Problem: Respiratory Insufficiency (Adult)  Goal: Identify Related Risk Factors and Signs and Symptoms  Outcome: Ongoing (interventions implemented as appropriate)      Problem: Feeding Dysfunction with/without Swallowing Impairment (Infant)  Goal: Identify Related Risk Factors and Signs and Symptoms  Outcome: Ongoing (interventions implemented as appropriate)    Goal: Adequate Nutrition and Hydration  Outcome: Ongoing (interventions implemented as appropriate)

## 2018-01-17 NOTE — PLAN OF CARE
Problem: Patient Care Overview (Adult)  Goal: Plan of Care Review  Outcome: Ongoing (interventions implemented as appropriate)   01/17/18 0787   Coping/Psychosocial Response Interventions   Plan Of Care Reviewed With patient   Outcome Evaluation   Outcome Summary/Follow up Plan Pt is very lethargic, no urine output this shift, O2 via nc at 5L, hypotensive at times, aflutter on monitor with hr 90's-100's.     Goal: Adult Individualization and Mutuality  Outcome: Ongoing (interventions implemented as appropriate)    Goal: Discharge Needs Assessment  Outcome: Ongoing (interventions implemented as appropriate)      Problem: Sepsis (Adult)  Goal: Signs and Symptoms of Listed Potential Problems Will be Absent or Manageable (Sepsis)  Outcome: Ongoing (interventions implemented as appropriate)      Problem: Dysphagia (Adult)  Goal: Identify Related Risk Factors and Signs and Symptoms  Outcome: Ongoing (interventions implemented as appropriate)    Goal: Functional/Safe Swallow  Outcome: Ongoing (interventions implemented as appropriate)    Goal: Compensatory Techniques to Improve Safety/Function with Swallowing  Outcome: Ongoing (interventions implemented as appropriate)      Problem: Pressure Ulcer (Adult)  Goal: Signs and Symptoms of Listed Potential Problems Will be Absent or Manageable (Pressure Ulcer)  Outcome: Ongoing (interventions implemented as appropriate)

## 2018-01-17 NOTE — CONSULTS
Patient Care Team:  Tiana Galaviz MD as PCP - General (Family Medicine)  Tiana Galaviz MD as PCP - Family Medicine    Chief complaint: SOLANGE      History of Present Illness  Patient is a 78 yo WF who had been at rehab when she developed hypoxia and atrial fibrillation as well a encephalopathy. She was transferred to ICU for closer monitoring. She is found to have SOLANGE and at this time she is oliguric. She is currently being treated for sepsis due to UTI. She is on abx and NS at 100 ml/hr. However, her mentation has not improved.  Her urine Na is <20 and ABG with normal ph and PCO2 on the lower side.  Vitals have been stable.    Her baseline Cr seems to be around 0.9, but she has had several AKIs in the recent past    Review of Systems   Unable to obtain due to encephalopathy    Past Medical History:   Diagnosis Date   • Alzheimer disease    • Anxiety    • ARF (acute renal failure)    • Arthritis    • Atrial fibrillation    • Chest pain    • CHF (congestive heart failure)    • Constipation    • Coronary artery disease    • Depression    • Diabetes mellitus    • Digoxin toxicity    • H/O degenerative disc disease    • History of transfusion    • Hyperlipidemia    • Hypertension    • Hypothyroid    • Myocardial infarction    • Osteoporosis    • Renal disorder    • Seizure    • Sepsis    • Stroke    • UTI (urinary tract infection)    ,   Past Surgical History:   Procedure Laterality Date   • CARDIAC SURGERY     • COLON SURGERY     • COLONOSCOPY     • COLOSTOMY Left    • THYROID SURGERY     , No family history on file.,   Social History   Substance Use Topics   • Smoking status: Never Smoker   • Smokeless tobacco: Never Used   • Alcohol use No   ,   Prescriptions Prior to Admission   Medication Sig Dispense Refill Last Dose   • acetaminophen (TYLENOL) 325 MG tablet Take 650 mg by mouth Every 6 (Six) Hours As Needed for Mild Pain .      • allopurinol (ZYLOPRIM) 100 MG tablet Take 100 mg by mouth 2  (two) times a day.   1/15/2018 at Unknown time   • amiodarone (PACERONE) 200 MG tablet Take 200 mg by mouth 2 (Two) Times a Day.   1/15/2018 at Unknown time   • aspirin 81 MG tablet Take 81 mg by mouth daily.   1/15/2018 at Unknown time   • bisacodyl (DULCOLAX) 5 MG EC tablet Take 5 mg by mouth Daily As Needed for Constipation.      • calcium carbonate (TUMS) 500 MG chewable tablet Chew 1 tablet every 4 (four) hours as needed for indigestion or heartburn.   Past Month at Unknown time   • calcium-vitamin D (OSCAL-500) 500-200 MG-UNIT per tablet Take 1 tablet by mouth 2 (two) times a day.   1/15/2018 at Unknown time   • Cholecalciferol 83905 UNITS tablet Take 50,000 Units by mouth 1 (one) time per week. On Tuesday   1/15/2018 at Unknown time   • conjugated estrogens (PREMARIN) vaginal cream Insert 0.625 g into the vagina daily.   1/15/2018 at Unknown time   • donepezil (ARICEPT) 10 MG tablet Take 10 mg by mouth every night.   1/15/2018 at Unknown time   • famotidine (PEPCID) 20 MG tablet Take 20 mg by mouth 2 (Two) Times a Day.   1/15/2018 at Unknown time   • glipiZIDE (GLUCOTROL) 5 MG tablet Take 5 mg by mouth 2 (Two) Times a Day Before Meals.   1/15/2018 at Unknown time   • insulin aspart (novoLOG) 100 UNIT/ML injection Inject 0-14 Units under the skin 3 (Three) Times a Day Before Meals. Per sliding scale as needed      • Iron Combinations (NIFEREX PO) Take 150 mg by mouth 2 (Two) Times a Day.      • ketotifen (ZADITOR) 0.025 % ophthalmic solution Administer 1 drop to both eyes 2 (Two) Times a Day.   1/15/2018 at Unknown time   • lactobacillus acidophilus (RISAQUAD) capsule capsule Take 1 capsule by mouth daily.   1/15/2018 at Unknown time   • levETIRAcetam (KEPPRA) 500 MG tablet Take 500 mg by mouth 2 (two) times a day.   1/15/2018 at Unknown time   • levothyroxine (SYNTHROID, LEVOTHROID) 150 MCG tablet Take 150 mcg by mouth daily.   1/15/2018 at Unknown time   • linagliptin (TRADJENTA) 5 MG tablet tablet Take 5 mg  by mouth Daily.   1/15/2018 at Unknown time   • megestrol (MEGACE) 40 MG/ML suspension Take 200 mg by mouth 2 (Two) Times a Day.   1/15/2018 at Unknown time   • meropenem (MERREM) 1 g/100 mL 0.9% NS VTB (mbp) Infuse 1 g into a venous catheter Every 8 (Eight) Hours.      • metoprolol tartrate (LOPRESSOR) 25 MG tablet Take 75 mg by mouth 2 (Two) Times a Day.   1/15/2018 at Unknown time   • miconazole (MICOTIN) 2 % powder Apply 1 application topically as needed for itching.   1/15/2018 at Unknown time   • ondansetron (ZOFRAN) 4 MG tablet Take 4 mg by mouth Every 6 (Six) Hours As Needed for Nausea or Vomiting.      • ondansetron (ZOFRAN) 4 MG/5ML solution Take  by mouth 1 (One) Time.      • potassium chloride (K-DUR,KLOR-CON) 10 MEQ CR tablet Take 20 mEq by mouth Daily.   1/15/2018 at Unknown time   • diltiazem CD (CARDIZEM CD) 240 MG 24 hr capsule Take 240 mg by mouth every other day.   Unknown at Unknown time   • LORazepam (ATIVAN) 0.5 MG tablet Take 0.5 mg by mouth every 8 (eight) hours as needed for anxiety.   Unknown at Unknown time   • magnesium hydroxide (MILK OF MAGNESIA) 400 MG/5ML suspension Take 30 mL by mouth at night as needed for constipation.   Unknown at Unknown time   • nitroglycerin (NITROSTAT) 0.4 MG SL tablet Place 0.4 mg under the tongue every 5 (five) minutes as needed for chest pain. Take no more than 3 doses in 15 minutes.   Unknown at Unknown time   • senna (SENOKOT) 8.6 MG tablet Take 1 tablet by mouth 2 (two) times a day.   Unknown at Unknown time   • sertraline (ZOLOFT) 100 MG tablet Take 100 mg by mouth every night.   Unknown at Unknown time   • traMADol (ULTRAM) 50 MG tablet Take 50 mg by mouth.   Unknown at Unknown time   , Scheduled Meds:    allopurinol 100 mg Oral BID   amiodarone 200 mg Oral Q24H   apixaban 5 mg Oral Q12H   aspirin 81 mg Oral Daily   ceftriaxone 1 g Intravenous Q24H   diltiaZEM 60 mg Oral Q8H   donepezil 10 mg Oral Nightly   famotidine 20 mg Oral BID   iron  polysaccharides 150 mg Oral BID   levETIRAcetam 500 mg Intravenous Q12H   levothyroxine 150 mcg Oral Q AM   levothyroxine 150 mcg Oral Daily   metoprolol tartrate 50 mg Oral Q12H   potassium chloride 40 mEq Oral Daily   senna 1 tablet Oral Nightly   sertraline 50 mg Oral Nightly   , Continuous Infusions:    sodium chloride 0.9 % with KCl 20 mEq 125 mL/hr Last Rate: 100 mL/hr (01/17/18 0905)   , PRN Meds:  glucagon (human recombinant)  •  insulin aspart  •  magnesium hydroxide  •  miconazole  •  traMADol and Allergies:  Cefazolin; Cefprozil; and Cephalosporins    Objective     Vital Signs  Temp:  [96.8 °F (36 °C)-99 °F (37.2 °C)] 98.6 °F (37 °C)  Heart Rate:  [] 97  Resp:  [18-26] 20  BP: ()/(39-85) 112/81    I/O this shift:  In: 140 [Other:140]  Out: 290 [Urine:40; Stool:250]  I/O last 3 completed shifts:  In: 1810.5 [I.V.:217.5; Other:410; NG/GT:203; IV Piggyback:950]  Out: 995 [Urine:220; Stool:775]    Physical Exam  Physical Examination: General appearance - ill-appearing and chronically ill appearing  Mental status - non responsive  Nose - normal and patent, no erythema, discharge or polyps  Mouth - dry oral mucosa, no lesions  Neck - supple, no significant adenopathy  Chest - no crackles on exam, + rhonchi, but seems to be mostly upper airway  Heart - normal rate, regular rhythm, normal S1, S2, no murmurs, rubs, clicks or gallops  Abdomen - soft, nontender, nondistended, no masses or organomegaly  Neurological - lethargic, unable to assess further  Musculoskeletal - + edema, no cyanosis    Results Review:    I reviewed the patient's new clinical results.    WBC WBC   Date Value Ref Range Status   01/17/2018 11.39 (H) 4.80 - 10.80 10*3/mm3 Final   01/16/2018 11.86 (H) 4.80 - 10.80 10*3/mm3 Final      HGB Hemoglobin   Date Value Ref Range Status   01/17/2018 10.0 (L) 12.0 - 16.0 g/dL Final   01/16/2018 10.7 (L) 12.0 - 16.0 g/dL Final      HCT Hematocrit   Date Value Ref Range Status   01/17/2018 31.1  (L) 37.0 - 47.0 % Final   01/16/2018 32.9 (L) 37.0 - 47.0 % Final      Platlets No results found for: LABPLAT   MCV MCV   Date Value Ref Range Status   01/17/2018 102.0 (H) 81.0 - 99.0 fL Final   01/16/2018 100.6 (H) 81.0 - 99.0 fL Final          Sodium Sodium   Date Value Ref Range Status   01/17/2018 145 136 - 145 mmol/L Final   01/16/2018 142 136 - 145 mmol/L Final   01/16/2018 144 136 - 145 mmol/L Final      Potassium Potassium   Date Value Ref Range Status   01/17/2018 3.1 (L) 3.5 - 5.2 mmol/L Final   01/16/2018 3.8 3.5 - 5.2 mmol/L Final   01/16/2018 2.9 (L) 3.5 - 5.2 mmol/L Final      Chloride Chloride   Date Value Ref Range Status   01/17/2018 111 (H) 98 - 107 mmol/L Final   01/16/2018 108 (H) 98 - 107 mmol/L Final   01/16/2018 109 (H) 98 - 107 mmol/L Final      CO2 CO2   Date Value Ref Range Status   01/17/2018 19.5 (L) 22.0 - 29.0 mmol/L Final   01/16/2018 20.1 (L) 22.0 - 29.0 mmol/L Final   01/16/2018 20.3 (L) 22.0 - 29.0 mmol/L Final      BUN BUN   Date Value Ref Range Status   01/17/2018 45 (H) 8 - 23 mg/dL Final   01/16/2018 40 (H) 8 - 23 mg/dL Final   01/16/2018 35 (H) 8 - 23 mg/dL Final      Creatinine Creatinine   Date Value Ref Range Status   01/17/2018 1.68 (H) 0.57 - 1.00 mg/dL Final   01/16/2018 1.36 (H) 0.57 - 1.00 mg/dL Final   01/16/2018 1.09 (H) 0.57 - 1.00 mg/dL Final      Calcium Calcium   Date Value Ref Range Status   01/17/2018 7.7 (L) 8.8 - 10.5 mg/dL Final   01/16/2018 7.9 (L) 8.8 - 10.5 mg/dL Final   01/16/2018 8.2 (L) 8.8 - 10.5 mg/dL Final      PO4 No results found for: CAPO4   Albumin Albumin   Date Value Ref Range Status   01/16/2018 2.40 (L) 3.50 - 5.20 g/dL Final      Magnesium No results found for: MG   Uric Acid No results found for: URICACID         Assessment/Plan     Active Problems:    Sepsis      Assessment & Plan  1. SOLANGE  - due to poor renal perfusion secondary to a fib with RVR. Possible also volume depletion or ATN  - urine Na very low, which can be seen in early atn  and volume depletion  - oliguric currently  - renal US ok. UA consistent with infection  - on IVF, continue for now  - renally dose medications for GFR <10  - avoid nephrotoxins  - strict ins/outs    2. Combine acid-base status  - abg from yesterday showed a ph more towards  alkalosis side, mainly respiratory alkalosis with low PCO2, most likely due to  Sepsis  - also with an AG metabolic acidosis: combination of SOLANGE and ketoacidosis  - continue IVF for now  - on tube feeds    3. Hypokalemia  - repleted this am  - K with fluids currently. Monitor given that she is oliguric  - check BMP in am.    4. Hypernatremia  - recommend free water per tube: 200 TID to start, Na trending up and she is encephalopathic    5. Sepsis due to UTI  - on abx  - vitals stable    Thank you for this referral. Will continue to follow.     I discussed the patients findings and my recommendations with nursing staff    Janet Riddle MD  01/17/18  3:26 PM    Time: Critical care 32 min

## 2018-01-18 NOTE — PLAN OF CARE
Problem: Respiratory Insufficiency (Adult)  Intervention: Provide Oxygenation/Ventilation/Perfusion Support   01/18/18 0740   Positioning   Head Of Bed (HOB) Position HOB at 30-45 degrees

## 2018-01-18 NOTE — PROGRESS NOTES
Renal function stable, slightly improved today  Noted concern for worsening volume overload this morning and she was given Bumex IV times one  Will hold IV fluids for now, recheck labs in the morning and reassess at that time      Marv Wade M.D.  [facility]  [phone]

## 2018-01-18 NOTE — PLAN OF CARE
Problem: Patient Care Overview (Adult)  Goal: Plan of Care Review  Outcome: Ongoing (interventions implemented as appropriate)   01/18/18 0312   Coping/Psychosocial Response Interventions   Plan Of Care Reviewed With patient   Patient Care Overview   Progress declining   Outcome Evaluation   Outcome Summary/Follow up Plan Pt only withdraws and moans with pain when moved otherwise unresponsive. Respirations increasing, more labored breathing, rhonchi and coarse crackles bilaterally, Febrile with temp of 100.0 axillary, Used oral suction to remove copious amounts of thick yellow sputum from back of throat and mouth, Mouth extremely dry and cracked, Used vent oral care kit which seemed to help, BP normal, HR from 90's - 100's, 150 cc output this shift in alba,      Goal: Adult Individualization and Mutuality  Outcome: Ongoing (interventions implemented as appropriate)    Goal: Discharge Needs Assessment  Outcome: Ongoing (interventions implemented as appropriate)      Problem: Sepsis (Adult)  Goal: Signs and Symptoms of Listed Potential Problems Will be Absent or Manageable (Sepsis)  Outcome: Ongoing (interventions implemented as appropriate)      Problem: Pressure Ulcer (Adult)  Goal: Signs and Symptoms of Listed Potential Problems Will be Absent or Manageable (Pressure Ulcer)  Outcome: Ongoing (interventions implemented as appropriate)      Problem: Fall Risk (Adult)  Goal: Identify Related Risk Factors and Signs and Symptoms  Outcome: Ongoing (interventions implemented as appropriate)    Goal: Absence of Falls  Outcome: Ongoing (interventions implemented as appropriate)      Problem: Respiratory Insufficiency (Adult)  Goal: Identify Related Risk Factors and Signs and Symptoms  Outcome: Ongoing (interventions implemented as appropriate)    Goal: Acid/Base Balance  Outcome: Ongoing (interventions implemented as appropriate)    Goal: Effective Ventilation  Outcome: Ongoing (interventions implemented as  appropriate)

## 2018-01-18 NOTE — NURSING NOTE
Continued Stay Note  MARTHA Mallory     Patient Name: Yamile Cronin  MRN: [id number]  Today's Date: 1/18/2018    Admit Date: 1/16/2018          Discharge Plan       01/18/18 1327    Case Management/Social Work Plan    Additional Comments Patient unresponsive except to pain.  Voice mail left for daughter concerning IMM signature.  Will continue to follow.              Discharge Codes     None            Danisha Ontiveros RN

## 2018-01-18 NOTE — PROGRESS NOTES
"Daily Progress Note:    Subjective: Patient poorly responsive.  Does open her eyes    Flowsheet Rows         First Filed Value    Admission Height  171.4 cm (67.48\") Documented at 01/16/2018 1049    Admission Weight  101 kg (222 lb) Documented at 01/16/2018 1049          Patient Vitals for the past 24 hrs:   BP Temp Temp src Pulse Resp SpO2 Height   01/18/18 0620 (!) 120/38 - - 102 - - -   01/18/18 0403 124/43 - - 100 - 95 % -   01/18/18 0353 - 99.1 °F (37.3 °C) Axillary 101 - 97 % -   01/18/18 0333 128/65 - - 100 - 96 % -   01/18/18 0302 121/55 - - 91 - 95 % -   01/18/18 0255 - 100 °F (37.8 °C) Axillary 95 24 94 % -   01/18/18 0232 - - - 97 - 95 % -   01/18/18 0203 127/77 - - 92 - 95 % -   01/18/18 0132 130/60 - - 92 - 95 % -   01/18/18 0102 122/50 - - 101 28 95 % -   01/18/18 0033 116/63 - - 90 - 95 % -   01/18/18 0003 108/71 99.3 °F (37.4 °C) Axillary 90 22 96 % -   01/17/18 2332 110/63 - - 82 - 96 % -   01/17/18 2317 129/54 - - 97 - 96 % -   01/17/18 2233 (!) 125/104 - - 89 - 95 % -   01/17/18 2132 120/80 - - 92 - 96 % -   01/17/18 2103 116/58 - - 101 - 94 % -   01/17/18 2033 (!) 83/60 - - 97 - 94 % -   01/17/18 2002 120/80 - - 101 - 96 % -   01/17/18 1932 136/78 99.4 °F (37.4 °C) Oral 102 22 96 % -   01/17/18 1514 112/81 98.6 °F (37 °C) Axillary 97 20 - -   01/17/18 1100 103/74 - - 83 - 96 % -   01/17/18 0944 - - - - - - 171.4 cm (67.48\")   01/17/18 0800 109/68 - - 94 26 97 % -       101 kg (223 lb 6.4 oz)      Intake/Output Summary (Last 24 hours) at 01/18/18 0712  Last data filed at 01/18/18 0404   Gross per 24 hour   Intake          2943.67 ml   Output              640 ml   Net          2303.67 ml       Review of Systems   Unable to perform ROS: Mental status change       Physical Exam   Constitutional: She appears well-developed and well-nourished. She appears lethargic.   HENT:   Head: Normocephalic.   Mouth/Throat: Oropharynx is clear and moist.   Eyes: Conjunctivae are normal.   Neck: Normal range of " motion. No JVD present. No thyromegaly present.   No swelling no nodes   Cardiovascular: Normal rate.  An irregularly irregular rhythm present.   Murmur heard.   Systolic murmur is present with a grade of 2/6   Pulmonary/Chest: Accessory muscle usage present. Tachypnea noted. No respiratory distress. She has no wheezes. She has rhonchi in the right upper field, the right middle field, the right lower field, the left upper field, the left middle field and the left lower field. She has no rales.   Abdominal: Soft. She exhibits no distension. There is no tenderness. There is no guarding.       Colostomy present   Neurological: She has normal strength and normal reflexes. She appears lethargic. No cranial nerve deficit or sensory deficit.   Does open her eyes does not follow any commands   Skin: Skin is warm and dry. No rash noted.   Stage 3 decubitus    Nursing note and vitals reviewed.          Medication Review:   I have reviewed the patient's current medication list      allopurinol 100 mg Oral BID   amiodarone 200 mg Oral Q24H   apixaban 5 mg Oral Q12H   aspirin 81 mg Oral Daily   bumetanide 2 mg Intravenous Once   ceftriaxone 1 g Intravenous Q24H   diltiaZEM 60 mg Oral Q8H   donepezil 10 mg Oral Nightly   famotidine 20 mg Oral BID   ipratropium-albuterol 3 mL Nebulization Q4H - RT   iron polysaccharides 150 mg Oral BID   lactobacillus acidophilus 1 capsule Oral BID   levETIRAcetam 500 mg Intravenous Q12H   levothyroxine 150 mcg Oral Q AM   levothyroxine 150 mcg Oral Daily   metoprolol tartrate 50 mg Oral Q12H   potassium chloride 40 mEq Oral Daily   senna 1 tablet Oral Nightly   sertraline 50 mg Oral Nightly           sodium chloride 50 mL/hr Last Rate: 100 mL/hr (01/17/18 2051)           Labs:    Results from last 7 days  Lab Units 01/18/18  0352 01/17/18  0519 01/16/18  0732 01/14/18  1322   WBC 10*3/mm3 9.02 11.39* 11.86* 10.14   HEMOGLOBIN g/dL 9.1* 10.0* 10.7* 10.5*   HEMATOCRIT % 28.5* 31.1* 32.9* 31.8*    PLATELETS 10*3/mm3 164 174 202 225   MONOCYTES % %  --   --  5.0  --        Results from last 7 days  Lab Units 01/18/18  0352 01/17/18  1759 01/17/18  0519 01/16/18  1846 01/16/18  0732 01/14/18  1322   SODIUM mmol/L 145 146* 145 142 144 139   POTASSIUM mmol/L 3.7 4.2 3.1* 3.8 2.9* 3.0*   CHLORIDE mmol/L 113* 111* 111* 108* 109* 105   CO2 mmol/L 19.0* 19.3* 19.5* 20.1* 20.3* 20.3*   BUN mg/dL 51* 49* 45* 40* 35* 31*   CREATININE mg/dL 1.74* 1.95* 1.68* 1.36* 1.09* 1.04*   CALCIUM mg/dL 7.5* 7.8* 7.7* 7.9* 8.2* 8.2*   BILIRUBIN mg/dL <0.2*  --   --   --  0.2  --    ALK PHOS U/L 113  --   --   --  75  --    ALT (SGPT) U/L 6  --   --   --  8  --    AST (SGOT) U/L 8  --   --   --  7  --    GLUCOSE mg/dL 167* 207* 174* 193* 182* 165*           Lab Results (last 24 hours)     Procedure Component Value Units Date/Time    Urinalysis With / Culture If Indicated - Urine, Clean Catch [[id number]]  (Abnormal) Collected:  01/17/18 1203    Specimen:  Urine from Urine, Clean Catch Updated:  01/17/18 1225     Color, UA Dark Yellow (A)     Appearance, UA Cloudy (A)     pH, UA 7.0     Specific Gravity, UA 1.025     Glucose, UA Negative     Ketones, UA 15 mg/dL (1+) (A)     Bilirubin, UA Small (1+) (A)     Blood, UA Trace (A)     Protein,  mg/dL (2+) (A)     Leuk Esterase, UA Trace (A)     Nitrite, UA Negative     Urobilinogen, UA 0.2 E.U./dL    Urine Culture - Urine, Urine, Clean Catch [[id number]] Collected:  01/17/18 1203    Specimen:  Urine from Urine, Clean Catch Updated:  01/17/18 1234    Creatinine, Urine, Random - Urine, Clean Catch [[id number]] Collected:  01/17/18 1203    Specimen:  Urine from Urine, Clean Catch Updated:  01/17/18 1234     Creatinine, Urine 178.0 mg/dL     Urinalysis, Microscopic Only - Urine, Clean Catch [[id number]]  (Abnormal) Collected:  01/17/18 1203    Specimen:  Urine from Urine, Clean Catch Updated:  01/17/18 1236     RBC, UA 6-12 (A) /HPF      WBC, UA 0-2 (A) /HPF      Bacteria, UA 1+ (A) /HPF       Squamous Epithelial Cells, UA 0-2 /HPF      Hyaline Casts, UA None Seen /LPF      Triple Phosphate Crystals, UA Small/1+ /HPF      Methodology Manual Light Microscopy    Sodium, Urine, Random - Urine, Clean Catch [[id number]] Collected:  01/17/18 1203    Specimen:  Urine from Urine, Clean Catch Updated:  01/17/18 1237     Sodium, Urine <=20 mmol/L     Basic Metabolic Panel [[id number]]  (Abnormal) Collected:  01/17/18 1759    Specimen:  Blood Updated:  01/17/18 1828     Glucose 207 (H) mg/dL      BUN 49 (H) mg/dL      Creatinine 1.95 (H) mg/dL      Sodium 146 (H) mmol/L      Potassium 4.2 mmol/L      Chloride 111 (H) mmol/L      CO2 19.3 (L) mmol/L      Calcium 7.8 (L) mg/dL      eGFR Non African Amer 25 (L) mL/min/1.73      BUN/Creatinine Ratio 25.1 (H)     Anion Gap 15.7 mmol/L     Narrative:       The MDRD GFR formula is only valid for adults with stable renal function between ages 18 and 70.    POC Glucose Once [[id number]]  (Abnormal) Collected:  01/18/18 0043    Specimen:  Blood Updated:  01/18/18 0049     Glucose 157 (H) mg/dL     Narrative:       Meter: [id number] : [id number] Rui Guillen RN    CBC (No Diff) [[id number]]  (Abnormal) Collected:  01/18/18 0352    Specimen:  Blood Updated:  01/18/18 0400     WBC 9.02 10*3/mm3      RBC 2.75 (L) 10*6/mm3      Hemoglobin 9.1 (L) g/dL      Hematocrit 28.5 (L) %      .6 (H) fL      MCH 33.1 (H) pg      MCHC 31.9 g/dL      RDW 16.0 (H) %      RDW-SD 61.5 (H) fl      MPV 11.4 (H) fL      Platelets 164 10*3/mm3     Comprehensive Metabolic Panel [[id number]]  (Abnormal) Collected:  01/18/18 0352    Specimen:  Blood Updated:  01/18/18 0428     Glucose 167 (H) mg/dL      BUN 51 (H) mg/dL      Creatinine 1.74 (H) mg/dL      Sodium 145 mmol/L      Potassium 3.7 mmol/L      Chloride 113 (H) mmol/L      CO2 19.0 (L) mmol/L      Calcium 7.5 (L) mg/dL      Total Protein 5.1 (L) g/dL      Albumin 2.10 (L) g/dL      ALT (SGPT) 6 U/L      AST (SGOT) 8 U/L       Alkaline Phosphatase 113 U/L      Total Bilirubin <0.2 (L) mg/dL      eGFR Non African Amer 28 (L) mL/min/1.73      Globulin 3.0 gm/dL      A/G Ratio 0.7 g/dL      BUN/Creatinine Ratio 29.3 (H)     Anion Gap 13.0 mmol/L     Narrative:       The MDRD GFR formula is only valid for adults with stable renal function between ages 18 and 70.              Radiology:  Imaging Results (last 24 hours)     Procedure Component Value Units Date/Time    US Renal Bilateral [[id number]] Collected:  01/17/18 1201     Updated:  01/17/18 1208    Narrative:       BILATERAL RENAL ULTRASOUND,     HISTORY:  77-year-old female hospital inpatient with sepsis and urinary tract  infection. Acute kidney injury on chronic kidney disease.     TECHNIQUE:  Grayscale ultrasound imaging of both kidneys and the urinary bladder.     COMPARISON:  *  Bilateral renal ultrasound, 09/17/2017.     FINDINGS:  There is no evidence of upper urinary tract obstruction.     The kidneys are normal in size with renal length measuring 10.2 cm on  the right and 12.0 cm on the left. Two small benign-appearing right  renal cysts are noted. No visible nephrolithiasis or perinephric fluid  collection. The urinary bladder is decompressed with a catheter and is  not visualized.       Impression:       Negative renal ultrasound examination with no evidence of upper urinary  tract obstruction. No change since 09/17/2017.     This report was finalized on 1/17/2018 12:05 PM by Dr. Michael Brito MD.             Cardiology:  ECG/EMG Results (last 24 hours)     ** No results found for the last 24 hours. **          I have reviewed consult notes.    Assessment and Plan:        1.  Urinary tract infection secondary to Proteus continue Rocephin.      2.  Toxic metabolic encephalopathy secondary #1 continue supportive care    3.  Atrial flutter with rapid ventricular response heart rate better continue present medications    4.  Hypertension is hypotensive but has improved  secondary to intravascular depletion     5.  Acute kidney Injury on chronic kidney disease with oliguria likely due to hypotension from urinary tract infection sepsis. Persistently oligurig we will continue fluids supportive management nephrology is following    6.  Sepsis continue IV antibiotics and supportive management    7.  Likely fluid overload and/or pneumonia patient having more respiratory distress check chest x-ray and start mini nebs suctioning    8.  Acute hypoxic respiratory failure with increasing respiratory distress vital continue with oxygen and mini nebs  9.  Diabetes mellitus continue Accu-Cheks sliding scale insulin

## 2018-01-19 PROBLEM — E87.6 HYPOKALEMIA: Status: ACTIVE | Noted: 2018-01-01

## 2018-01-19 PROBLEM — E87.0 HYPERNATREMIA: Status: ACTIVE | Noted: 2018-01-01

## 2018-01-19 NOTE — PLAN OF CARE
Problem: Respiratory Insufficiency (Adult)  Intervention: Provide Oxygenation/Ventilation/Perfusion Support   01/19/18 1806   Positioning   Head Of Bed (HOB) Position HOB elevated   Respiratory Interventions   Airway/Ventilation Management airway patency maintained     Intervention: Optimize/Manage Energy Expenditure   01/19/18 1806   Pain/Comfort/Sleep Interventions   Sleep/Rest Enhancement family presence promoted

## 2018-01-19 NOTE — PROGRESS NOTES
"   LOS: 3 days   Patient Care Team:  Tiana Galaviz MD as PCP - General (Family Medicine)  Tiana Galaviz MD as PCP - Family Medicine    Chief Complaint/ Reason for encounter: Acute renal failure/chronic kidney disease        Subjective     History of Present Illness    Subjective:  Symptoms:  Stable.  She reports weakness.  No shortness of breath or chest pain.  (Patient remains obtunded, confused  History per chart review).    Diet:  NPO.    Activity level: Impaired due to weakness.    Pain:  She reports no pain.          History taken from: Patient and chart    Objective     Vital Signs  Temp:  [97.4 °F (36.3 °C)-99.6 °F (37.6 °C)] 99.6 °F (37.6 °C)  Heart Rate:  [] 97  Resp:  [24-28] 24  BP: (107-155)/(39-94) 133/65       Wt Readings from Last 1 Encounters:   01/17/18 0603 101 kg (223 lb 6.4 oz)   01/16/18 1049 101 kg (222 lb)       Objective:  General Appearance:  Comfortable, in no acute distress, not in pain and ill-appearing (Nonverbal, obtunded).    Vital signs: (most recent): Blood pressure 133/65, pulse 97, temperature 99.6 °F (37.6 °C), temperature source Axillary, resp. rate 24, height 171.4 cm (67.48\"), weight 101 kg (223 lb 6.4 oz), SpO2 93 %.  Vital signs are normal.  No fever.    Output: Producing urine.    HEENT: Normal HEENT exam.    Lungs:  Normal respiratory rate and normal effort.  She is not in respiratory distress.  Breath sounds clear to auscultation.  There are wheezes and decreased breath sounds.  No rales or rhonchi.    Heart: Normal rate.  Regular rhythm.  S1 normal.  No murmur.   Abdomen: Abdomen is soft and non-distended.  Bowel sounds are normal.   There is no abdominal tenderness.  There is no epigastric area or no suprapubic area tenderness.     Extremities: Normal range of motion.  There is no deformity or dependent edema.    Pulses: Distal pulses are intact.    Skin:  Warm and dry.  No rash or cyanosis.             Results Review:    Past Medical " History: reviewed and updated  Past Medical History:   Diagnosis Date   • Alzheimer disease    • Anxiety    • ARF (acute renal failure)    • Arthritis    • Atrial fibrillation    • Chest pain    • CHF (congestive heart failure)    • Constipation    • Coronary artery disease    • Depression    • Diabetes mellitus    • Digoxin toxicity    • H/O degenerative disc disease    • History of transfusion    • Hyperlipidemia    • Hypertension    • Hypothyroid    • Myocardial infarction    • Osteoporosis    • Renal disorder    • Seizure    • Sepsis    • Stroke    • UTI (urinary tract infection)          Allergies:  Allergies   Allergen Reactions   • Cefazolin    • Cefprozil    • Cephalosporins        Intake/Output:     Intake/Output Summary (Last 24 hours) at 01/19/18 1352  Last data filed at 01/19/18 1050   Gross per 24 hour   Intake          3439.83 ml   Output             1450 ml   Net          1989.83 ml         DATA:  Interval chart, labs and notes reviewed.    Labs:   Recent Results (from the past 24 hour(s))   POC Glucose Once    Collection Time: 01/18/18  5:32 PM   Result Value Ref Range    Glucose 170 (H) 70 - 130 mg/dL   CBC (No Diff)    Collection Time: 01/19/18  4:36 AM   Result Value Ref Range    WBC 7.12 4.80 - 10.80 10*3/mm3    RBC 2.74 (L) 4.20 - 5.40 10*6/mm3    Hemoglobin 9.1 (L) 12.0 - 16.0 g/dL    Hematocrit 28.5 (L) 37.0 - 47.0 %    .0 (H) 81.0 - 99.0 fL    MCH 33.2 (H) 27.0 - 31.0 pg    MCHC 31.9 31.0 - 37.0 g/dL    RDW 16.3 (H) 11.5 - 14.5 %    RDW-SD 62.3 (H) 37.0 - 54.0 fl    MPV 11.5 (H) 7.4 - 10.4 fL    Platelets 168 140 - 500 10*3/mm3   Basic Metabolic Panel    Collection Time: 01/19/18  4:36 AM   Result Value Ref Range    Glucose 182 (H) 65 - 99 mg/dL    BUN 54 (H) 8 - 23 mg/dL    Creatinine 1.69 (H) 0.57 - 1.00 mg/dL    Sodium 144 136 - 145 mmol/L    Potassium 4.0 3.5 - 5.2 mmol/L    Chloride 112 (H) 98 - 107 mmol/L    CO2 19.7 (L) 22.0 - 29.0 mmol/L    Calcium 7.6 (L) 8.8 - 10.5 mg/dL     eGFR Non African Amer 29 (L) >60 mL/min/1.73    BUN/Creatinine Ratio 32.0 (H) 7.0 - 25.0    Anion Gap 12.3 mmol/L   POC Glucose Once    Collection Time: 01/19/18  8:29 AM   Result Value Ref Range    Glucose 203 (H) 70 - 130 mg/dL   POC Glucose Once    Collection Time: 01/19/18 12:09 PM   Result Value Ref Range    Glucose 182 (H) 70 - 130 mg/dL       Radiology:  Imaging Results (last 24 hours)     ** No results found for the last 24 hours. **             Medications have been reviewed:  Current Facility-Administered Medications   Medication Dose Route Frequency Provider Last Rate Last Dose   • allopurinol (ZYLOPRIM) tablet 100 mg  100 mg Oral BID Tiana Galaviz MD   100 mg at 01/19/18 0812   • amiodarone (PACERONE) tablet 200 mg  200 mg Oral Q24H Tiana Galaviz MD   200 mg at 01/19/18 0811   • apixaban (ELIQUIS) tablet 2.5 mg  2.5 mg Oral Q12H Tiana Galaviz MD   2.5 mg at 01/19/18 0811   • aspirin chewable tablet 81 mg  81 mg Oral Daily Tiana Galaviz MD   81 mg at 01/19/18 0812   • cefTRIAXone (ROCEPHIN) 1 g in sodium chloride 0.9 % 100 mL IVPB  1 g Intravenous Q24H Tiana Galaviz MD   Stopped at 01/19/18 0832   • diltiaZEM (CARDIZEM) tablet 60 mg  60 mg Oral Q8H Tiana Galaviz MD   60 mg at 01/19/18 0535   • donepezil (ARICEPT) tablet 10 mg  10 mg Oral Nightly Tiana Galaviz MD   10 mg at 01/18/18 2032   • famotidine (PEPCID) tablet 20 mg  20 mg Oral BID Tiana Galaviz MD   20 mg at 01/19/18 1236   • glucagon (GLUCAGEN) injection 1 mg  1 mg Subcutaneous Q15 Min PRN Tiana Galaviz MD       • insulin aspart (novoLOG) injection 0-14 Units  0-14 Units Subcutaneous PRN Tiana Galaviz MD   3 Units at 01/19/18 1234   • ipratropium-albuterol (DUO-NEB) nebulizer solution 3 mL  3 mL Nebulization Q4H - RT Tiana Galaviz MD   3 mL at 01/19/18 1024   • iron polysaccharides (NIFEREX) capsule 150 mg  150 mg  Oral BID Tiana Galaviz MD   150 mg at 01/19/18 0812   • lactobacillus acidophilus (RISAQUAD) capsule 1 capsule  1 capsule Oral BID Tiana Galaviz MD   1 capsule at 01/19/18 0811   • levETIRAcetam in NaCl 0.82% (KEPPRA) IVPB 500 mg  500 mg Intravenous Q12H Tiana Galaviz MD 0 mL/hr at 01/16/18 2340 500 mg at 01/19/18 0837   • levothyroxine (SYNTHROID, LEVOTHROID) tablet 150 mcg  150 mcg Oral Q AM Tiana Galaviz MD   150 mcg at 01/19/18 0535   • levothyroxine (SYNTHROID, LEVOTHROID) tablet 150 mcg  150 mcg Oral Daily Tiana Galaviz MD   150 mcg at 01/19/18 0812   • magnesium hydroxide (MILK OF MAGNESIA) 400 MG/5ML suspension 30 mL  30 mL Oral Nightly PRN Tiana Galaviz MD       • metoprolol tartrate (LOPRESSOR) tablet 50 mg  50 mg Oral Q12H Tiana Galaviz MD   50 mg at 01/19/18 0812   • miconazole (MICOTIN) 2 % powder 1 application  1 application Topical PRN Tiana Galaviz MD   1 application at 01/19/18 0802   • senna (SENOKOT) tablet 1 tablet  1 tablet Oral Nightly Tiana Galaviz MD   1 tablet at 01/18/18 2032   • sertraline (ZOLOFT) tablet 50 mg  50 mg Oral Nightly Tiana Galaviz MD   50 mg at 01/18/18 2033   • traMADol (ULTRAM) tablet 50 mg  50 mg Oral Q6H PRN Tiana Galaviz MD           Assessment/Plan     Active Problems:    Sepsis      Assessment:  (Assessment:  Acute renal failure, due to renal hypoperfusion from cardiac arrhythmias, volume depletion.  Nonoliguric slowly improving  Metabolic acidosis, stable  Hypokalemia, repleted  Hypernatremia, improved with free water per tube feeds  Metabolic encephalopathy, minimal improvement  Sepsis from UTI, improved with antibiotics  Dehydration   Hypotension, improved with fluids  Anemia from acute illness/chronic kidney disease  Stage II to 3 chronic kidney disease, baseline creatinine 1.0    Plan:  Renal function continues to slowly improve  She  is a fairly euvolemic on exam  Continue tube feeds  Diuretics as needed, not needed today  Potassium level is stable, discontinue daily supplements  CODE STATUS has been discussed with family, change to palliative may be indicated if mental status does not improve  Avoid any potential nephrotoxins as kidneys recover  Continue free water with tube feeds  Poor prognosis).             Continue to monitor renal function, electroytes and volume closely   Please call me with any questions or concerns      Marv Wade MD   [facility]   [phone]    01/19/18  1:52 PM      Dictation performed using Dragon dictation software

## 2018-01-19 NOTE — PROGRESS NOTES
"Daily Progress Note:    Subjective: Responds only to pain    Flowsheet Rows         First Filed Value    Admission Height  171.4 cm (67.48\") Documented at 01/16/2018 1049    Admission Weight  101 kg (222 lb) Documented at 01/16/2018 1049          Patient Vitals for the past 24 hrs:   BP Temp Temp src Pulse Resp SpO2   01/19/18 0656 - - - - 24 -   01/19/18 0603 113/59 97.4 °F (36.3 °C) Axillary 107 24 92 %   01/19/18 0317 - - - 100 26 96 %   01/19/18 0003 116/62 99.2 °F (37.3 °C) Axillary 97 24 95 %   01/18/18 2312 - - - 88 26 96 %   01/18/18 1922 - - - 102 26 96 %   01/18/18 1903 129/64 - - 95 - 95 %   01/18/18 1833 120/53 - - 102 - 95 %   01/18/18 1803 116/85 - - 105 - 96 %   01/18/18 1735 124/55 - - 96 - 96 %   01/18/18 1633 113/94 98.4 °F (36.9 °C) Axillary 102 26 95 %   01/18/18 1500 - - - - 26 -   01/18/18 1235 107/81 99.7 °F (37.6 °C) Axillary 100 25 97 %   01/18/18 1125 - - - - 24 94 %   01/18/18 0900 111/73 - - 96 - 95 %   01/18/18 0833 107/67 - - 92 (!) 29 95 %   01/18/18 0736 - - - 89 24 -       101 kg (223 lb 6.4 oz)      Intake/Output Summary (Last 24 hours) at 01/19/18 0720  Last data filed at 01/19/18 0600   Gross per 24 hour   Intake          2879.83 ml   Output             1620 ml   Net          1259.83 ml       Review of Systems   Unable to perform ROS: Mental status change       Physical Exam   Constitutional: She appears well-developed and well-nourished.   HENT:   Head: Normocephalic.   Mouth/Throat: Oropharynx is clear and moist.   Eyes: Conjunctivae are normal.   Neck: Normal range of motion. No JVD present. No thyromegaly present.   Cardiovascular: Normal rate.  An irregularly irregular rhythm present.   Murmur heard.   Systolic murmur is present with a grade of 2/6   Pulmonary/Chest: Accessory muscle usage present. Tachypnea noted. No respiratory distress. She has no wheezes. She has no rales.   Expiratory stridor   Abdominal: Soft. She exhibits no distension. There is no tenderness. There is " no guarding.       Colostomy present   Musculoskeletal: She exhibits edema (generalized).   Neurological: She has normal strength and normal reflexes. She is unresponsive. No cranial nerve deficit or sensory deficit.   Skin: Skin is warm and dry. No rash noted.   Stage 3 decubitus    Nursing note and vitals reviewed.          Medication Review:   I have reviewed the patient's current medication list      allopurinol 100 mg Oral BID   amiodarone 200 mg Oral Q24H   apixaban 2.5 mg Oral Q12H   aspirin 81 mg Oral Daily   ceftriaxone 1 g Intravenous Q24H   diltiaZEM 60 mg Oral Q8H   donepezil 10 mg Oral Nightly   famotidine 20 mg Oral BID   ipratropium-albuterol 3 mL Nebulization Q4H - RT   iron polysaccharides 150 mg Oral BID   lactobacillus acidophilus 1 capsule Oral BID   levETIRAcetam 500 mg Intravenous Q12H   levothyroxine 150 mcg Oral Q AM   levothyroxine 150 mcg Oral Daily   metoprolol tartrate 50 mg Oral Q12H   potassium chloride 40 mEq Oral Daily   senna 1 tablet Oral Nightly   sertraline 50 mg Oral Nightly                  Labs:    Results from last 7 days  Lab Units 01/19/18  0436 01/18/18  0352 01/17/18  0519 01/16/18  0732 01/14/18  1322   WBC 10*3/mm3 7.12 9.02 11.39* 11.86* 10.14   HEMOGLOBIN g/dL 9.1* 9.1* 10.0* 10.7* 10.5*   HEMATOCRIT % 28.5* 28.5* 31.1* 32.9* 31.8*   PLATELETS 10*3/mm3 168 164 174 202 225   MONOCYTES % %  --   --   --  5.0  --        Results from last 7 days  Lab Units 01/19/18  0436 01/18/18  0352 01/17/18  1759 01/17/18  0519 01/16/18  1846 01/16/18  0732 01/14/18  1322   SODIUM mmol/L 144 145 146* 145 142 144 139   POTASSIUM mmol/L 4.0 3.7 4.2 3.1* 3.8 2.9* 3.0*   CHLORIDE mmol/L 112* 113* 111* 111* 108* 109* 105   CO2 mmol/L 19.7* 19.0* 19.3* 19.5* 20.1* 20.3* 20.3*   BUN mg/dL 54* 51* 49* 45* 40* 35* 31*   CREATININE mg/dL 1.69* 1.74* 1.95* 1.68* 1.36* 1.09* 1.04*   CALCIUM mg/dL 7.6* 7.5* 7.8* 7.7* 7.9* 8.2* 8.2*   BILIRUBIN mg/dL  --  <0.2*  --   --   --  0.2  --    ALK PHOS U/L   --  113  --   --   --  75  --    ALT (SGPT) U/L  --  6  --   --   --  8  --    AST (SGOT) U/L  --  8  --   --   --  7  --    GLUCOSE mg/dL 182* 167* 207* 174* 193* 182* 165*           Lab Results (last 24 hours)     Procedure Component Value Units Date/Time    Urine Culture - Urine, Urine, Clean Catch [[id number]]  (Normal) Collected:  01/17/18 1203    Specimen:  Urine from Urine, Clean Catch Updated:  01/18/18 0735     Urine Culture Culture in progress    Respiratory Culture - Sputum, Alveoli [[id number]] Collected:  01/18/18 0753    Specimen:  Sputum from Alveoli Updated:  01/18/18 0800    BNP [[id number]]  (Abnormal) Collected:  01/18/18 0352    Specimen:  Blood Updated:  01/18/18 0810     proBNP 5007.0 (H) pg/mL     Narrative:       Among patients with dyspnea, NT-proBNP is highly sensitive for the detection of acute congestive heart failure. In addition NT-proBNP of <300 pg/ml effectively rules out acute congestive heart failure with 99% negative predictive value.    POC Glucose Once [[id number]]  (Abnormal) Collected:  01/18/18 1213    Specimen:  Blood Updated:  01/18/18 1724     Glucose 168 (H) mg/dL     Narrative:       Meter: [id number] : [id number] Tanja Lubin RN    POC Glucose Once [[id number]]  (Abnormal) Collected:  01/18/18 1732    Specimen:  Blood Updated:  01/18/18 1742     Glucose 170 (H) mg/dL     Narrative:       Meter: [id number] : [id number] Rui Guillen RN    CBC (No Diff) [[id number]]  (Abnormal) Collected:  01/19/18 0436    Specimen:  Blood Updated:  01/19/18 0502     WBC 7.12 10*3/mm3      RBC 2.74 (L) 10*6/mm3      Hemoglobin 9.1 (L) g/dL      Hematocrit 28.5 (L) %      .0 (H) fL      MCH 33.2 (H) pg      MCHC 31.9 g/dL      RDW 16.3 (H) %      RDW-SD 62.3 (H) fl      MPV 11.5 (H) fL      Platelets 168 10*3/mm3     Basic Metabolic Panel [[id number]]  (Abnormal) Collected:  01/19/18 0436    Specimen:  Blood Updated:  01/19/18 0518     Glucose 182 (H) mg/dL      BUN  54 (H) mg/dL      Creatinine 1.69 (H) mg/dL      Sodium 144 mmol/L      Potassium 4.0 mmol/L      Chloride 112 (H) mmol/L      CO2 19.7 (L) mmol/L      Calcium 7.6 (L) mg/dL      eGFR Non African Amer 29 (L) mL/min/1.73      BUN/Creatinine Ratio 32.0 (H)     Anion Gap 12.3 mmol/L     Narrative:       The MDRD GFR formula is only valid for adults with stable renal function between ages 18 and 70.              Radiology:  Imaging Results (last 24 hours)     Procedure Component Value Units Date/Time    XR Chest 1 View [[id number]] Collected:  01/18/18 0814     Updated:  01/18/18 0821    Narrative:       CHEST X-RAY, 01/18/2018:     HISTORY:   77-year-old female in the ICU with sepsis, urinary tract infection.     TECHNIQUE:  AP portable chest x-ray.     FINDINGS:  Although lung volumes remain low, lung expansion has improved since  01/16/2018. Mild infiltrates remain present in both lung bases. Upper  lungs clear. No visible airspace consolidation or definite pleural  effusion.     Stable mild cardiomegaly. CABG. Tortuous thoracic aorta. PICC and  Dobbhoff tube in good position.       Impression:       1. Improved lung expansion since 01/16/2018. Persistent mild bibasilar  pulmonary infiltrate.  2. CABG. Stable mild cardiomegaly.  3. Support equipment in good position.     This report was finalized on 1/18/2018 8:19 AM by Dr. Michael Brito MD.             Cardiology:  ECG/EMG Results (last 24 hours)     ** No results found for the last 24 hours. **          I have reviewed consult notes.    Assessment and Plan:      1.  Urinary tract infection secondary to Proteus continue Rocephin.      2.  Toxic metabolic encephalopathy secondary #1 continue supportive care will check MRI rule out recurrent CVA    3.  Atrial flutter with rapid ventricular response heart rate better continue present medications    4.  Hypertension is hypotensive but has improved secondary to intravascular depletion     5.  Acute kidney Injury on  chronic kidney disease with oliguria is improving her urine outputt  is improved patient fluid overloaded iv diuretic    6.  Sepsis continue IV antibiotics and supportive management    7.  fluid overload and/or pneumonia patient having more respiratory distress continue supportive care discussed with daughter at length today about making her DNR she still undecided and will continue present treatment    8.  Acute hypoxic respiratory failure with increasing respiratory distress vital continue with oxygen and mini nebs    9.  Diabetes mellitus continue Accu-Cheks sliding scale insulin

## 2018-01-19 NOTE — NURSING NOTE
Talked with Criss (daughter) on phone.  She wants us to call her at work today if she is needed.  Dr. Galaviz requested to speak with Criss.  I called Criss back and she is to call Dr. Galaviz.

## 2018-01-19 NOTE — PLAN OF CARE
Problem: Patient Care Overview (Adult)  Goal: Plan of Care Review  Outcome: Ongoing (interventions implemented as appropriate)   01/19/18 0254   Coping/Psychosocial Response Interventions   Plan Of Care Reviewed With patient   Patient Care Overview   Progress progress towards functional goals is fair   Outcome Evaluation   Outcome Summary/Follow up Plan Pt. did not sleep well, afebrile, suctioned 3 times to help with labored breathing, vss, better output through entire shift.     Goal: Adult Individualization and Mutuality  Outcome: Ongoing (interventions implemented as appropriate)    Goal: Discharge Needs Assessment  Outcome: Ongoing (interventions implemented as appropriate)      Problem: Sepsis (Adult)  Goal: Signs and Symptoms of Listed Potential Problems Will be Absent or Manageable (Sepsis)  Outcome: Ongoing (interventions implemented as appropriate)      Problem: Pressure Ulcer (Adult)  Goal: Signs and Symptoms of Listed Potential Problems Will be Absent or Manageable (Pressure Ulcer)  Outcome: Ongoing (interventions implemented as appropriate)      Problem: Fall Risk (Adult)  Goal: Identify Related Risk Factors and Signs and Symptoms  Outcome: Ongoing (interventions implemented as appropriate)    Goal: Absence of Falls  Outcome: Ongoing (interventions implemented as appropriate)      Problem: Respiratory Insufficiency (Adult)  Goal: Identify Related Risk Factors and Signs and Symptoms  Outcome: Ongoing (interventions implemented as appropriate)    Goal: Acid/Base Balance  Outcome: Ongoing (interventions implemented as appropriate)    Goal: Effective Ventilation  Outcome: Ongoing (interventions implemented as appropriate)

## 2018-01-19 NOTE — NURSING NOTE
Criss (daughter) and Ashley (granddaughter) came to visit.  Pt did squeeze daughter's hand.  No other response noted.  Daughter stated she would like to see how her mother did over the next two days and she had discussed this via phone with Dr. Galaviz.  Will contact daughter if any changes in patient.

## 2018-01-19 NOTE — PROGRESS NOTES
Adult Nutrition  Assessment/PES    Patient Name:  Yamile Cronin  YOB: 1940  MRN: [id number]  Admit Date:  1/16/2018    Assessment Date:  1/19/2018    Comments:  Pt tolerating tube feedings at current goal rate ordered.   Recommend Increase goal to Glucerna 1.2 67ml/hr to better meet estimated needs   (1930 kcal, 96 gm pro, 183 gm CHO,100% DRI,  1295 ml free water + 600 ml free water flushes)   Called and left message for MD at office re: TF.          Reason for Assessment       01/19/18 1328    Reason for Assessment    Reason For Assessment/Visit follow up protocol    Cardiac CHF    Endocrine DM Type 2    Infectious Disease Sepsis    Neurological Dementia;Metabolic encephalopathy    Renal CKD            Data Eval/ Notes       01/19/18 1329     Notes    Note Pt only responsive to pain, per RN has called in grandchildren but no decision changing care at this time                Labs/Tests/Procedures/Meds       01/19/18 1330    Labs/Tests/Procedures/Meds    Labs/Tests Review Reviewed    Medication Review Reviewed, pertinent            Physical Findings       01/19/18 1330    Physical Appearance    Tubes nasogastric tube    Skin pressure ulcer(s)   coccyx stage 3              Nutrition Prescription Ordered       01/19/18 1331    Nutrition Prescription PO    Current PO Diet Other (comment)   no diet order, TF only    Nutrition Prescription EN    Product Glucerna 1.2 viviane    Continuous TF Current Rate (mL/hr) 60 mL/hr    Water flush (mL)  100 mL    Water Flush Frequency Every 4 hours            Evaluation of Received Nutrient/Fluid Intake       01/19/18 1331    Fluid Intake Evaluation    IV Fluid (mL) 1639    Total Fluid Intake (mL) 1639    % Fluid Needs 100%     PO Evaluation    % PO Intake no diet order at this time    EN Evaluation    Number of Days EN Intake Evaluated 2 days    EN Average Volume Delivered (mL/day) 1223 mL/day            Problem/Interventions:        Problem 1        01/19/18 1333    Nutrition Diagnoses Problem 1    Problem 1 Needs Alternate    Etiology (related to) Factors Affecting Nutrition    Signs/Symptoms (evidenced by) Report/Observation   pt unable to take po, only responsive to pain            Problem 2       01/19/18 1334    Nutrition Diagnoses Problem 2    Problem 2 Inadequate Nutrient Intake    Macronutrient Kcal;Protein    Signs/Symptoms (evidenced by) Other (comment)   pressure ulcer on coccyx                  Intervention Goal       01/19/18 1334    Intervention Goal    TF/PN Adjust TF/PN            Nutrition Intervention       01/19/18 1335    Nutrition Intervention    RD/Tech Action Follow Tx progress              Education/Evaluation       01/19/18 1335    Education    Education Education not appropriate at this time    Monitor/Evaluation    Monitor TF delivery/tolerance;Symptoms;Per protocol;I&O;Pertinent labs;Skin status;Weight        Electronically signed by:  Nhung Tolliver RD  01/19/18 1:35 PM

## 2018-01-19 NOTE — PLAN OF CARE
Problem: Respiratory Insufficiency (Adult)  Intervention: Provide Oxygenation/Ventilation/Perfusion Support   01/18/18 1927   Activity   Activity Type activity adjusted per tolerance;bedrest   Respiratory Interventions   Airway/Ventilation Management airway patency maintained;pulmonary hygiene promoted       Goal: Identify Related Risk Factors and Signs and Symptoms  Outcome: Ongoing (interventions implemented as appropriate)   01/18/18 1927   Respiratory Insufficiency   Related Risk Factors (Respiratory Insufficiency) activity intolerance   Signs and Symptoms (Respiratory Insufficiency) abnormal breath sounds;respiratory rate alterations     Goal: Acid/Base Balance  Outcome: Ongoing (interventions implemented as appropriate)    Goal: Effective Ventilation  Outcome: Ongoing (interventions implemented as appropriate)   01/18/18 1927   Respiratory Insufficiency (Adult)   Effective Ventilation making progress toward outcome

## 2018-01-19 NOTE — NURSING NOTE
Continued Stay Note  MARTHA Mallory     Patient Name: Yamile Cronin  MRN: [id number]  Today's Date: 1/19/2018    Admit Date: 1/16/2018          Discharge Plan       01/19/18 0919    Case Management/Social Work Plan    Additional Comments spoke with daughter at bedside. signed IMM. emotional support provided. offered  visit and she would like to think about it. will continue to follow.              Discharge Codes     None            Audrey Velasquez RN

## 2018-01-19 NOTE — PROGRESS NOTES
Pharmacy was consulted to dose Vancomycin for pneumonia.    Wt= 101 kg  SCr= 1.69; CrCl= 34.3mL/min  WBC= 7.12    Will start Vancomycin 1500mg IV q24h.  Pharmacy will continue to follow.    Chanda Burrows, PharmD  1/19/2018  2:14 PM

## 2018-01-20 NOTE — PLAN OF CARE
Problem: Respiratory Insufficiency (Adult)  Intervention: Provide Oxygenation/Ventilation/Perfusion Support   01/20/18 1602   Positioning   Head Of Bed (HOB) Position HOB at 45 degrees   Respiratory Interventions   Airway/Ventilation Management airway patency maintained;calming measures promoted;pulmonary hygiene promoted

## 2018-01-20 NOTE — PROGRESS NOTES
Events noted, chart reviewed  Renal function continues to slowly improve  Continue tube feeds , PRN diuretics, free water with tube feeds  Poor prognosis  MRI shows an evolving infarct         Marv Wade M.D.  [facility]  [phone]

## 2018-01-20 NOTE — NURSING NOTE
Dr connor: daughter is unable to be at bedside today , please call her on her cell to review MRI results.

## 2018-01-20 NOTE — PROGRESS NOTES
"Daily Progress Note:    Subjective: Overall the same.  LIttle  improvement continues to be to Expiratory stridor    Flowsheet Rows         First Filed Value    Admission Height  171.4 cm (67.48\") Documented at 01/16/2018 1049    Admission Weight  101 kg (222 lb) Documented at 01/16/2018 1049          Patient Vitals for the past 24 hrs:   BP Temp Temp src Pulse Resp SpO2   01/20/18 1203 108/62 98.1 °F (36.7 °C) Axillary 111 22 96 %   01/20/18 1117 - - - 104 20 94 %   01/20/18 0915 118/51 - - 97 - 94 %   01/20/18 0839 - - - (!) 132 - 94 %   01/20/18 0836 - - - (!) 160 - 95 %   01/20/18 0802 107/63 98.3 °F (36.8 °C) Axillary (!) 121 22 94 %   01/20/18 0630 - - - 106 24 95 %   01/20/18 0500 119/51 98.5 °F (36.9 °C) Axillary 114 22 94 %   01/20/18 0326 111/53 99.2 °F (37.3 °C) Axillary 104 24 95 %   01/20/18 0321 - - - 97 24 96 %   01/19/18 2355 109/51 99.3 °F (37.4 °C) Axillary 105 22 93 %   01/19/18 2332 - - - 105 - 94 %   01/19/18 2316 - - - 105 24 93 %   01/19/18 2005 113/84 - - 103 - 97 %   01/19/18 2000 - 100 °F (37.8 °C) Axillary 103 22 99 %   01/19/18 1918 - - - 98 24 98 %   01/19/18 1833 115/63 - - 98 - 95 %   01/19/18 1803 137/93 100.4 °F (38 °C) Oral 100 25 93 %   01/19/18 1732 147/80 98.9 °F (37.2 °C) Axillary 100 22 93 %   01/19/18 1512 - - - 103 24 95 %   01/19/18 1503 117/62 99.7 °F (37.6 °C) Axillary 102 24 94 %       101 kg (223 lb 6.4 oz)      Intake/Output Summary (Last 24 hours) at 01/20/18 1245  Last data filed at 01/20/18 0917   Gross per 24 hour   Intake             2046 ml   Output             1975 ml   Net               71 ml       Review of Systems    Physical Exam   Constitutional: She appears well-developed and well-nourished.   HENT:   Head: Normocephalic.   Mouth/Throat: Oropharynx is clear and moist.   Eyes: Conjunctivae are normal.   Neck: Normal range of motion. No JVD present. No thyromegaly present.   Cardiovascular: Normal rate.  An irregularly irregular rhythm present.   Murmur " heard.   Systolic murmur is present with a grade of 2/6   Pulmonary/Chest: Accessory muscle usage present. Tachypnea noted. No respiratory distress. She has no wheezes. She has no rales.   Expiratory stridor   Abdominal: Soft. She exhibits no distension. There is no tenderness. There is no guarding.       Colostomy present   Musculoskeletal: She exhibits edema (generalized).   Neurological: She has normal strength and normal reflexes. She is unresponsive. No cranial nerve deficit or sensory deficit.   Skin: Skin is warm and dry. No rash noted.   Stage 3 decubitus    Nursing note and vitals reviewed.          Medication Review:   I have reviewed the patient's current medication list      allopurinol 100 mg Oral BID   amiodarone 200 mg Oral Q24H   apixaban 2.5 mg Oral Q12H   aspirin 81 mg Oral Daily   ceftriaxone 1 g Intravenous Q24H   diltiaZEM 60 mg Oral Q8H   donepezil 10 mg Oral Nightly   famotidine 20 mg Oral BID   ipratropium-albuterol 3 mL Nebulization Q4H - RT   iron polysaccharides 150 mg Oral BID   lactobacillus acidophilus 1 capsule Oral BID   levETIRAcetam 500 mg Intravenous Q12H   levothyroxine 150 mcg Oral Q AM   levothyroxine 150 mcg Oral Daily   linagliptin 5 mg Oral Daily   metoprolol tartrate 50 mg Oral Q12H   senna 1 tablet Oral Nightly   sertraline 50 mg Oral Nightly   vancomycin 1,500 mg Intravenous Q24H           Pharmacy to dose vancomycin            Labs:    Results from last 7 days  Lab Units 01/20/18  0419 01/19/18  0436 01/18/18  0352 01/17/18  0519 01/16/18  0732 01/14/18  1322   WBC 10*3/mm3 5.99 7.12 9.02 11.39* 11.86* 10.14   HEMOGLOBIN g/dL 8.6* 9.1* 9.1* 10.0* 10.7* 10.5*   HEMATOCRIT % 27.5* 28.5* 28.5* 31.1* 32.9* 31.8*   PLATELETS 10*3/mm3 158 168 164 174 202 225   MONOCYTES % %  --   --   --   --  5.0  --        Results from last 7 days  Lab Units 01/20/18  0419 01/19/18  0436 01/18/18  0352 01/17/18  1759 01/17/18  0519 01/16/18  1846 01/16/18  0732   SODIUM mmol/L 146* 144 145  146* 145 142 144   POTASSIUM mmol/L 4.0 4.0 3.7 4.2 3.1* 3.8 2.9*   CHLORIDE mmol/L 115* 112* 113* 111* 111* 108* 109*   CO2 mmol/L 20.8* 19.7* 19.0* 19.3* 19.5* 20.1* 20.3*   BUN mg/dL 57* 54* 51* 49* 45* 40* 35*   CREATININE mg/dL 1.57* 1.69* 1.74* 1.95* 1.68* 1.36* 1.09*   CALCIUM mg/dL 7.5* 7.6* 7.5* 7.8* 7.7* 7.9* 8.2*   BILIRUBIN mg/dL  --   --  <0.2*  --   --   --  0.2   ALK PHOS U/L  --   --  113  --   --   --  75   ALT (SGPT) U/L  --   --  6  --   --   --  8   AST (SGOT) U/L  --   --  8  --   --   --  7   GLUCOSE mg/dL 142* 182* 167* 207* 174* 193* 182*           Lab Results (last 24 hours)     Procedure Component Value Units Date/Time    POC Glucose Once [[id number]]  (Abnormal) Collected:  01/19/18 1835    Specimen:  Blood Updated:  01/19/18 1844     Glucose 198 (H) mg/dL     Narrative:       Meter: [id number] : [id number] Myles Madera RN    POC Glucose Once [[id number]]  (Abnormal) Collected:  01/19/18 2355    Specimen:  Blood Updated:  01/20/18 0005     Glucose 254 (H) mg/dL     Narrative:       Meter: [id number] : [id number] Aureliano Swann CNA    CBC (No Diff) [[id number]]  (Abnormal) Collected:  01/20/18 0419    Specimen:  Blood Updated:  01/20/18 0513     WBC 5.99 10*3/mm3      RBC 2.65 (L) 10*6/mm3      Hemoglobin 8.6 (L) g/dL      Hematocrit 27.5 (L) %      .8 (H) fL      MCH 32.5 (H) pg      MCHC 31.3 g/dL      RDW 16.2 (H) %      RDW-SD 62.4 (H) fl      MPV 11.4 (H) fL      Platelets 158 10*3/mm3     POC Glucose Once [[id number]]  (Abnormal) Collected:  01/20/18 0556    Specimen:  Blood Updated:  01/20/18 0602     Glucose 182 (H) mg/dL     Narrative:       Meter: [id number] : [id number] Shira TYLERA-OCTAVIO    Basic Metabolic Panel [[id number]]  (Abnormal) Collected:  01/20/18 0419    Specimen:  Blood Updated:  01/20/18 0604     Glucose 142 (H) mg/dL      BUN 57 (H) mg/dL      Creatinine 1.57 (H) mg/dL      Sodium 146 (H) mmol/L      Potassium 4.0 mmol/L      Chloride 115 (H)  "mmol/L      CO2 20.8 (L) mmol/L      Calcium 7.5 (L) mg/dL      eGFR Non African Amer 32 (L) mL/min/1.73      BUN/Creatinine Ratio 36.3 (H)     Anion Gap 10.2 mmol/L     Narrative:       The MDRD GFR formula is only valid for adults with stable renal function between ages 18 and 70.    POC Glucose Once [[id number]]  (Abnormal) Collected:  01/20/18 1154    Specimen:  Blood Updated:  01/20/18 1202     Glucose 210 (H) mg/dL     Narrative:       Meter: [id number] : [id number] Myles Madera RN    Respiratory Culture - Sputum, Alveoli [[id number]]  (Abnormal)  (Susceptibility) Collected:  01/18/18 0753    Specimen:  Sputum from Alveoli Updated:  01/20/18 1234     Respiratory Culture --      Heavy growth (4+) Staphylococcus aureus, MRSA (A)        Methicillin resistant Staphylococcus aureus, Patient may be an isolation risk.  D test is negative. Isolate does not exhibit \"inducible\" resistance to Clindamycin (isolate remains susceptible to Clindamycin).           Rare Normal Respiratory Vikki     Gram Stain Result Moderate (3+) Epithelial cells per low power field      Few (2+) WBCs seen      No organisms seen    Susceptibility      Staphylococcus aureus, MRSA     JAH     Clindamycin <=0.25 ug/ml Susceptible     Erythromycin <=0.25 ug/ml Susceptible     Oxacillin >=4 ug/ml Resistant     Penicillin G >=0.5 ug/ml Resistant     Rifampin <=0.5 ug/ml Susceptible     Tetracycline <=1 ug/ml Susceptible     Trimethoprim + Sulfamethoxazole <=10 ug/ml Susceptible     Vancomycin 1 ug/ml Susceptible                              Radiology:  Imaging Results (last 24 hours)     Procedure Component Value Units Date/Time    MRI Brain Without Contrast [[id number]] Resulted:  01/19/18 1606     Updated:  01/19/18 1613    Narrative:         MRI BRAIN WITHOUT CONTRAST, 01/19/2018    INDICATION: Unresponsive patient today. History of hypertension, diabetes.   Previous stroke.     PROCEDURE: Multiplanar, multisequence MR imaging of the brain " without the   administration of contrast.     COMPARISON: 11/17/2017.    FINDINGS: There has been interval progression of increased T2 signal   intensity in the posterior left temporal lobe and throughout the left   occipital lobe. This has associated volume loss. This is superimposed on   extensive background of chronic small vessel ischemic change. There is no   evidence for hemorrhage. No midline shift, extraaxial collection or   hydrocephalus. There are multiple air-fluid levels in the paranasal   sinuses.       Impression:        Significant progression of abnormal T2 signal intensity in the   left temporal lobe and left occipital lobe since the 11/172017 study. This   is favored to represent interval evolution and progression of the   patient's previously demonstrated infarct. Probably represents a   combination of chronic and subacute infarct. There is no significant   associated mass effect or evidence for hemorrhage.     Extensive changes of chronic small vessel ischemic disease.               Cardiology:  ECG/EMG Results (last 24 hours)     ** No results found for the last 24 hours. **          I have reviewed consult notes.    Assessment and Plan:    1.  Urinary tract infection secondary to Proteus continue Rocephin.    2.  Toxic metabolic encephalopathy secondary #1 MRI shows no new infarct    3.  Atrial flutter heart rate mostly controlled with some periods of tachycardia will continue as is    4.  Hypertension is hypotensive but has improved secondary to intravascular depletion     5.  Acute kidney Injury on chronic kidney disease with oliguria is improving     6.  Sepsis continue IV antibiotics and supportive management vancomycin added for MRSA    7. fluid overload and/or pneumonia patient having more respiratory distress discussed with daughter today she is agreeable to making her DNR     8.  Acute hypoxic respiratory failure with increasing respiratory distress vital continue with oxygen and mini  nebs    9.  Diabetes mellitus continue Accu-Cheks sliding scale insulin

## 2018-01-20 NOTE — NURSING NOTE
Pt had witnessed episode of respiratory distress with sats rapidly droping down to 78%. Agonal breathing pattern noted, tachycardia increased 120's. Pt was placed in upright position, suctioned with no results, placed on oximyzer requiring 9 liters to saturate. Breathing tx given per RT. After 10 mins pt slowly began to re-oxygenate requiring less oxygen. Now on 2 liter oximyzer with sats 96%. Will wean back to nasal canula as previously . Daughter stephanie called and updated on episode and current status. Also noted that Pt is now a conditional code.

## 2018-01-21 NOTE — PLAN OF CARE
Problem: Respiratory Insufficiency (Adult)  Intervention: Provide Oxygenation/Ventilation/Perfusion Support   01/21/18 1533   Positioning   Head Of Bed (HOB) Position HOB at 30-45 degrees   Respiratory Interventions   Airway/Ventilation Management airway patency maintained;pulmonary hygiene promoted;calming measures promoted     Intervention: Optimize/Manage Energy Expenditure   01/21/18 1533   Coping/Psychosocial Interventions   Environmental Support calm environment promoted

## 2018-01-21 NOTE — PROGRESS NOTES
"Daily Progress Note:    Subjective: Overall about the same.  Edema appears be better response to pain only    Flowsheet Rows         First Filed Value    Admission Height  171.4 cm (67.48\") Documented at 01/16/2018 1049    Admission Weight  101 kg (222 lb) Documented at 01/16/2018 1049          Patient Vitals for the past 24 hrs:   BP Temp Temp src Pulse Resp SpO2   01/21/18 0802 120/69 - Axillary 112 24 98 %   01/21/18 0637 - - - 111 24 100 %   01/21/18 0605 119/73 - - (!) 124 - 99 %   01/21/18 0405 107/70 99 °F (37.2 °C) Axillary 99 20 98 %   01/21/18 0224 - - - 101 24 96 %   01/21/18 0000 104/68 99 °F (37.2 °C) Axillary 91 - 100 %   01/20/18 2312 - - - 84 22 100 %   01/20/18 1955 120/67 98.7 °F (37.1 °C) - (!) 123 24 99 %   01/20/18 1853 - - - 116 24 98 %   01/20/18 1803 123/64 98.6 °F (37 °C) Axillary 120 24 94 %   01/20/18 1718 - - - 118 - 90 %   01/20/18 1716 - - - 120 - (!) 88 %   01/20/18 1702 111/74 - - 119 - 90 %   01/20/18 1656 - - - 106 - (!) 82 %   01/20/18 1637 - - - - - 96 %   01/20/18 1610 - - - 112 - 97 %   01/20/18 1526 - - - 103 - 98 %   01/20/18 1518 - - - 105 - 96 %   01/20/18 1506 130/64 - - 105 - 95 %   01/20/18 1502 - - - 104 - (!) 89 %   01/20/18 1454 - - - - - 92 %   01/20/18 1448 - - - 103 24 (!) 89 %   01/20/18 1442 - - - - - 90 %   01/20/18 1433 - - - - - (!) 88 %   01/20/18 1430 - - - - - (!) 87 %   01/20/18 1427 - - - - - (!) 83 %   01/20/18 1425 - - - - - (!) 77 %   01/20/18 1403 104/85 - - 114 22 96 %   01/20/18 1358 99/72 98.3 °F (36.8 °C) Axillary 108 24 96 %   01/20/18 1203 108/62 98.1 °F (36.7 °C) Axillary 111 22 96 %   01/20/18 1117 - - - 104 20 94 %   01/20/18 0915 118/51 - - 97 - 94 %       101 kg (223 lb 6.4 oz)      Intake/Output Summary (Last 24 hours) at 01/21/18 0855  Last data filed at 01/21/18 0400   Gross per 24 hour   Intake             2169 ml   Output             2375 ml   Net             -206 ml       Review of Systems   Unable to perform ROS: Mental status change "       Physical Exam   Constitutional: She appears well-developed and well-nourished.   HENT:   Head: Normocephalic.   Mouth/Throat: Oropharynx is clear and moist.   Eyes: Conjunctivae are normal.   Neck: Normal range of motion. No JVD present. No thyromegaly present.   Cardiovascular: Normal rate.  An irregularly irregular rhythm present.   Murmur heard.   Systolic murmur is present with a grade of 2/6   Pulmonary/Chest: Tachypnea noted. No respiratory distress. She has no wheezes. She has no rales.   Abdominal: Soft. She exhibits no distension. There is no tenderness. There is no guarding.       Colostomy present   Musculoskeletal: She exhibits edema (generalized).   Neurological: She has normal strength and normal reflexes. She is unresponsive. No cranial nerve deficit or sensory deficit.   Skin: Skin is warm and dry. No rash noted.   Stage 3 decubitus    Nursing note and vitals reviewed.          Medication Review:   I have reviewed the patient's current medication list      allopurinol 100 mg Oral BID   amiodarone 200 mg Oral Q24H   apixaban 2.5 mg Oral Q12H   aspirin 81 mg Oral Daily   ceftriaxone 1 g Intravenous Q24H   diltiaZEM 60 mg Oral Q8H   donepezil 10 mg Oral Nightly   famotidine 20 mg Oral BID   ipratropium-albuterol 3 mL Nebulization Q4H - RT   iron polysaccharides 150 mg Oral BID   lactobacillus acidophilus 1 capsule Oral BID   levETIRAcetam 500 mg Intravenous Q12H   levothyroxine 150 mcg Oral Q AM   levothyroxine 150 mcg Oral Daily   linagliptin 5 mg Oral Daily   metoprolol tartrate 50 mg Oral Q12H   senna 1 tablet Oral Nightly   sertraline 50 mg Oral Nightly   vancomycin 1,500 mg Intravenous Q24H           Pharmacy to dose vancomycin            Labs:    Results from last 7 days  Lab Units 01/21/18  0452 01/20/18  0419 01/19/18  0436 01/18/18  0352 01/17/18  0519 01/16/18  0732 01/14/18  1322   WBC 10*3/mm3 6.49 5.99 7.12 9.02 11.39* 11.86* 10.14   HEMOGLOBIN g/dL 8.6* 8.6* 9.1* 9.1* 10.0* 10.7*  "10.5*   HEMATOCRIT % 27.1* 27.5* 28.5* 28.5* 31.1* 32.9* 31.8*   PLATELETS 10*3/mm3 182 158 168 164 174 202 225   MONOCYTES % %  --   --   --   --   --  5.0  --        Results from last 7 days  Lab Units 01/21/18  0452 01/20/18  0419 01/19/18  0436 01/18/18  0352 01/17/18  1759 01/17/18  0519 01/16/18  1846 01/16/18  0732   SODIUM mmol/L 147* 146* 144 145 146* 145 142 144   POTASSIUM mmol/L 3.5 4.0 4.0 3.7 4.2 3.1* 3.8 2.9*   CHLORIDE mmol/L 114* 115* 112* 113* 111* 111* 108* 109*   CO2 mmol/L 21.6* 20.8* 19.7* 19.0* 19.3* 19.5* 20.1* 20.3*   BUN mg/dL 54* 57* 54* 51* 49* 45* 40* 35*   CREATININE mg/dL 1.40* 1.57* 1.69* 1.74* 1.95* 1.68* 1.36* 1.09*   CALCIUM mg/dL 7.5* 7.5* 7.6* 7.5* 7.8* 7.7* 7.9* 8.2*   BILIRUBIN mg/dL  --   --   --  <0.2*  --   --   --  0.2   ALK PHOS U/L  --   --   --  113  --   --   --  75   ALT (SGPT) U/L  --   --   --  6  --   --   --  8   AST (SGOT) U/L  --   --   --  8  --   --   --  7   GLUCOSE mg/dL 191* 142* 182* 167* 207* 174* 193* 182*           Lab Results (last 24 hours)     Procedure Component Value Units Date/Time    POC Glucose Once [[id number]]  (Abnormal) Collected:  01/20/18 1154    Specimen:  Blood Updated:  01/20/18 1202     Glucose 210 (H) mg/dL     Narrative:       Meter: [id number] : [id number] Myles Madera RN    Respiratory Culture - Sputum, Alveoli [[id number]]  (Abnormal)  (Susceptibility) Collected:  01/18/18 0753    Specimen:  Sputum from Alveoli Updated:  01/20/18 1234     Respiratory Culture --      Heavy growth (4+) Staphylococcus aureus, MRSA (A)        Methicillin resistant Staphylococcus aureus, Patient may be an isolation risk.  D test is negative. Isolate does not exhibit \"inducible\" resistance to Clindamycin (isolate remains susceptible to Clindamycin).           Rare Normal Respiratory Vikki     Gram Stain Result Moderate (3+) Epithelial cells per low power field      Few (2+) WBCs seen      No organisms seen    Susceptibility      Staphylococcus " aureus, MRSA     JAH     Clindamycin <=0.25 ug/ml Susceptible     Erythromycin <=0.25 ug/ml Susceptible     Oxacillin >=4 ug/ml Resistant     Penicillin G >=0.5 ug/ml Resistant     Rifampin <=0.5 ug/ml Susceptible     Tetracycline <=1 ug/ml Susceptible     Trimethoprim + Sulfamethoxazole <=10 ug/ml Susceptible     Vancomycin 1 ug/ml Susceptible                    POC Glucose Once [[id number]]  (Abnormal) Collected:  01/20/18 1711    Specimen:  Blood Updated:  01/20/18 1719     Glucose 160 (H) mg/dL     Narrative:       Meter: [id number] : [id number] Myles Madera RN    CBC (No Diff) [[id number]]  (Abnormal) Collected:  01/21/18 0452    Specimen:  Blood Updated:  01/21/18 0508     WBC 6.49 10*3/mm3      RBC 2.64 (L) 10*6/mm3      Hemoglobin 8.6 (L) g/dL      Hematocrit 27.1 (L) %      .7 (H) fL      MCH 32.6 (H) pg      MCHC 31.7 g/dL      RDW 16.0 (H) %      RDW-SD 61.0 (H) fl      MPV 11.3 (H) fL      Platelets 182 10*3/mm3     Basic Metabolic Panel [[id number]]  (Abnormal) Collected:  01/21/18 0452    Specimen:  Blood Updated:  01/21/18 0537     Glucose 191 (H) mg/dL      BUN 54 (H) mg/dL      Creatinine 1.40 (H) mg/dL      Sodium 147 (H) mmol/L      Potassium 3.5 mmol/L      Chloride 114 (H) mmol/L      CO2 21.6 (L) mmol/L      Calcium 7.5 (L) mg/dL      eGFR Non African Amer 36 (L) mL/min/1.73      BUN/Creatinine Ratio 38.6 (H)     Anion Gap 11.4 mmol/L     Narrative:       The MDRD GFR formula is only valid for adults with stable renal function between ages 18 and 70.              Radiology:  Imaging Results (last 24 hours)     ** No results found for the last 24 hours. **          Cardiology:  ECG/EMG Results (last 24 hours)     ** No results found for the last 24 hours. **          I have reviewed consult notes.    Assessment and Plan:        1.  Urinary tract infection secondary to Proteus has improved Rocephin completes tomorrow    2.  Toxic metabolic encephalopathy secondary #1 MRI shows no new  infarct    3.  Atrial flutter still tachycardic will increase metoprolol    4.  Hypertension stable    5.  Acute kidney Injury on chronic kidney disease rapid improving hyponatremic will continue to monitor    6.  MRSA bronchitis/pneumonia we'll change over to by mouth clindamycin    7. fluid overload has improved edema is improved we'll continue monitor    8.  Acute hypoxic respiratory failure has improved and stabilized.  We'll continue with breathing treatments and O2 supplementation    9.  Diabetes mellitus continue Accu-Cheks sliding scale insulin

## 2018-01-21 NOTE — PLAN OF CARE
Problem: Patient Care Overview (Adult)  Goal: Discharge Needs Assessment   01/16/18 1014 01/17/18 0745 01/18/18 0312   Discharge Needs Assessment   Concerns To Be Addressed --  --  --    Community Agency Name(S) --  --  pt is from TCU   Equipment Needed After Discharge --  --  colostomy/ostomy supplies;hospital bed;lift device;nutrition supplies;oxygen;respiratory supplies;suction equipment;wound care supplies   Current Health   Outpatient/Agency/Support Group Needs --  --  skilled nursing facility (specify)   Anticipated Changes Related to Illness --  inability to care for self --    Self-Care   Equipment Currently Used at Home --  --  colostomy/ostomy supplies;hospital bed;lift device;oxygen;respiratory supplies;suction equipment;wound care supplies   Living Environment   Transportation Available other (see comments) --  --     01/19/18 0254   Discharge Needs Assessment   Concerns To Be Addressed no discharge needs identified   Community Agency Name(S) --    Equipment Needed After Discharge --    Current Health   Outpatient/Agency/Support Group Needs --    Anticipated Changes Related to Illness --    Self-Care   Equipment Currently Used at Home --    Living Environment   Transportation Available --        Problem: Sepsis (Adult)  Goal: Signs and Symptoms of Listed Potential Problems Will be Absent or Manageable (Sepsis)  Outcome: Ongoing (interventions implemented as appropriate)   01/21/18 1600   Sepsis   Problems Assessed (Sepsis) all   Problems Present (Sepsis) glycemic control, impaired;hypoperfusion/hemodynamic instability;hypoxia/hypoxemia;progression of infection;situational response  (overall status unchanged, edema improved in BUE) uop less than prev shifts, noted NA elevated, will cont to monitor       Problem: Dysphagia (Adult)  Goal: Compensatory Techniques to Improve Safety/Function with Swallowing  Outcome: Ongoing (interventions implemented as appropriate)   01/21/18 1600   Dysphagia (Adult)    Compensatory Techniques to Improve Safety/Function with Swallowing unable to achieve outcome       Problem: Fall Risk (Adult)  Goal: Identify Related Risk Factors and Signs and Symptoms  Outcome: Ongoing (interventions implemented as appropriate)    Goal: Absence of Falls  Outcome: Ongoing (interventions implemented as appropriate)   01/21/18 1600   Fall Risk (Adult)   Absence of Falls making progress toward outcome  (bedbound, unresponsive, no fall risk identified )       Problem: Respiratory Insufficiency (Adult)  Goal: Acid/Base Balance  Outcome: Ongoing (interventions implemented as appropriate)   01/21/18 1600   Respiratory Insufficiency (Adult)   Acid/Base Balance unable to achieve outcome     Goal: Effective Ventilation  Outcome: Ongoing (interventions implemented as appropriate)   01/21/18 1600   Respiratory Insufficiency (Adult)   Effective Ventilation unable to achieve outcome  (on 2 l nc, resp status cont to be unchanged. )       Problem: Feeding Dysfunction with/without Swallowing Impairment (Infant)  Goal: Identify Related Risk Factors and Signs and Symptoms  Outcome: Ongoing (interventions implemented as appropriate)   01/21/18 1600   Feeding Dysfunction/Swallowing Impairment Pediatric   Related Risk Factors (Feeding Dysfunction) age/developmental level;disease process   Signs and Symptoms (Feeding Dysfunction) decreased muscle tone/function of involved muscles;other (see comments)  (unresponsive, unable to eat po )     Goal: Adequate Nutrition and Hydration  Outcome: Ongoing (interventions implemented as appropriate)   01/21/18 1600   Feeding Dysfunction with/without Swallowing Impairment (Infant)   Adequate Nutrition and Hydration making progress toward outcome  (tf at goal infusing glucerna 1.2 viviane at 60 ml/h, tolerating )       Pt placed on regular ICU bed due to need to be able to obtain a daily weight for better I & O balance and monitoring , per Dr Han. Specialty bed that was brought  was not able to weigh patient effectively. Waffle mattress was placed on ICU bed and strict skin care for stage 3 coccyx wound implemented . Pt turned Q 2 hrs with adequate support off of wound.

## 2018-01-21 NOTE — PLAN OF CARE
Problem: Respiratory Insufficiency (Adult)  Goal: Identify Related Risk Factors and Signs and Symptoms  Outcome: Ongoing (interventions implemented as appropriate)  Nt sx to clear airway for ineffective cough   01/21/18 0342   Respiratory Insufficiency   Signs and Symptoms (Respiratory Insufficiency) abnormal breath sounds;cough (productive/ineffective)

## 2018-01-22 NOTE — PROGRESS NOTES
"Daily Progress Note:    Subjective: Azucena the last 24 hours.  Responds only to pain.    Flowsheet Rows         First Filed Value    Admission Height  171.4 cm (67.48\") Documented at 01/16/2018 1049    Admission Weight  101 kg (222 lb) Documented at 01/16/2018 1049          Patient Vitals for the past 24 hrs:   BP Temp Temp src Pulse Resp SpO2 Weight   01/22/18 0654 - - - 111 16 91 % -   01/22/18 0602 131/77 - - 104 - 92 % -   01/22/18 0403 134/65 - - 92 - 94 % -   01/22/18 0349 128/71 98.4 °F (36.9 °C) Axillary 90 20 98 % 106 kg (234 lb 6 oz)   01/22/18 0324 - - - 94 20 93 % -   01/22/18 0203 136/67 - - 96 - 94 % -   01/21/18 2358 137/76 99 °F (37.2 °C) Axillary 80 20 93 % -   01/21/18 2315 - - - 76 20 100 % -   01/21/18 2202 133/71 - - 76 - 98 % -   01/21/18 2101 136/86 - - 97 - - -   01/21/18 2035 118/59 99.1 °F (37.3 °C) Axillary 98 24 95 % -   01/21/18 1950 - - - 119 24 96 % -   01/21/18 1724 - - - 88 - 96 % -   01/21/18 1603 125/64 98.6 °F (37 °C) Axillary 97 24 98 % -   01/21/18 1529 - - - 84 20 95 % -   01/21/18 1457 118/68 - - 89 24 96 % 106 kg (233 lb 12.8 oz)   01/21/18 1311 128/58 - - 80 22 95 % -   01/21/18 1117 - - - 94 24 98 % -   01/21/18 0936 - - - 112 - 96 % -   01/21/18 0924 - - - (!) 124 - 97 % -   01/21/18 0802 120/69 - Axillary 112 24 98 % -       106 kg (234 lb 6 oz)      Intake/Output Summary (Last 24 hours) at 01/22/18 0717  Last data filed at 01/22/18 0638   Gross per 24 hour   Intake             2706 ml   Output             1275 ml   Net             1431 ml       Review of Systems   Unable to perform ROS: Mental status change       Physical Exam   Constitutional: She appears well-developed and well-nourished.   HENT:   Head: Normocephalic.   Mouth/Throat: Oropharynx is clear and moist.   Eyes: Conjunctivae are normal.   Neck: Normal range of motion. No JVD present. No thyromegaly present.   Cardiovascular: Normal rate.  An irregularly irregular rhythm present.   Murmur heard.   Systolic " murmur is present with a grade of 2/6   Pulmonary/Chest: Tachypnea noted. No respiratory distress. She has wheezes (exp with secretions) in the right middle field, the right lower field, the left middle field and the left lower field. She has no rales.   Abdominal: Soft. She exhibits no distension. There is no tenderness. There is no guarding.       Colostomy present   Musculoskeletal: She exhibits edema (generalized).   Neurological: She has normal strength and normal reflexes. She is unresponsive. No cranial nerve deficit or sensory deficit.   Skin: Skin is warm and dry. No rash noted.   Stage 3 decubitus    Nursing note and vitals reviewed.          Medication Review:   I have reviewed the patient's current medication list      allopurinol 100 mg Oral BID   amiodarone 200 mg Oral Q24H   apixaban 2.5 mg Oral Q12H   aspirin 81 mg Oral Daily   ceftriaxone 1 g Intravenous Q24H   clindamycin 150 mg Oral Q8H   diltiaZEM 120 mg Oral Q8H   donepezil 10 mg Oral Nightly   famotidine 20 mg Oral BID   ipratropium-albuterol 3 mL Nebulization Q4H - RT   iron polysaccharides 150 mg Oral BID   lactobacillus acidophilus 1 capsule Oral BID   levETIRAcetam 500 mg Intravenous Q12H   levothyroxine 150 mcg Oral Q AM   levothyroxine 150 mcg Oral Daily   linagliptin 5 mg Oral Daily   metoprolol tartrate 50 mg Oral Q12H   senna 1 tablet Oral Nightly   sertraline 50 mg Oral Nightly                  Labs:    Results from last 7 days  Lab Units 01/22/18  0402 01/21/18  0452 01/20/18  0419 01/19/18  0436 01/18/18  0352 01/17/18  0519 01/16/18  0732   WBC 10*3/mm3 7.26 6.49 5.99 7.12 9.02 11.39* 11.86*   HEMOGLOBIN g/dL 8.2* 8.6* 8.6* 9.1* 9.1* 10.0* 10.7*   HEMATOCRIT % 25.7* 27.1* 27.5* 28.5* 28.5* 31.1* 32.9*   PLATELETS 10*3/mm3 224 182 158 168 164 174 202   MONOCYTES % %  --   --   --   --   --   --  5.0       Results from last 7 days  Lab Units 01/22/18  0402 01/21/18  0452 01/20/18  0419 01/19/18  0436 01/18/18  0352 01/17/18  1759  01/17/18  0519  01/16/18  0732   SODIUM mmol/L 147* 147* 146* 144 145 146* 145  < > 144   POTASSIUM mmol/L 3.3* 3.5 4.0 4.0 3.7 4.2 3.1*  < > 2.9*   CHLORIDE mmol/L 113* 114* 115* 112* 113* 111* 111*  < > 109*   CO2 mmol/L 23.4 21.6* 20.8* 19.7* 19.0* 19.3* 19.5*  < > 20.3*   BUN mg/dL 49* 54* 57* 54* 51* 49* 45*  < > 35*   CREATININE mg/dL 1.08* 1.40* 1.57* 1.69* 1.74* 1.95* 1.68*  < > 1.09*   CALCIUM mg/dL 7.4* 7.5* 7.5* 7.6* 7.5* 7.8* 7.7*  < > 8.2*   BILIRUBIN mg/dL <0.2*  --   --   --  <0.2*  --   --   --  0.2   ALK PHOS U/L 193*  --   --   --  113  --   --   --  75   ALT (SGPT) U/L 9  --   --   --  6  --   --   --  8   AST (SGOT) U/L 9  --   --   --  8  --   --   --  7   GLUCOSE mg/dL 182* 191* 142* 182* 167* 207* 174*  < > 182*   < > = values in this interval not displayed.        Lab Results (last 24 hours)     Procedure Component Value Units Date/Time    POC Glucose Once [[id number]]  (Abnormal) Collected:  01/21/18 1136    Specimen:  Blood Updated:  01/21/18 1142     Glucose 173 (H) mg/dL     Narrative:       Meter: [id number] : [id number] Myles Madera RN    POC Glucose Once [[id number]]  (Abnormal) Collected:  01/21/18 1711    Specimen:  Blood Updated:  01/21/18 1717     Glucose 241 (H) mg/dL     Narrative:       Meter: [id number] : [id number] Myles Madera RN    POC Glucose Once [[id number]]  (Abnormal) Collected:  01/22/18 0024    Specimen:  Blood Updated:  01/22/18 0030     Glucose 195 (H) mg/dL     Narrative:       Meter: [id number] : [id number] Ahsan Yarbrough NURSING ASSISTANT    CBC (No Diff) [[id number]]  (Abnormal) Collected:  01/22/18 0402    Specimen:  Blood from Arm, Right Updated:  01/22/18 0533     WBC 7.26 10*3/mm3      RBC 2.48 (L) 10*6/mm3      Hemoglobin 8.2 (L) g/dL      Hematocrit 25.7 (L) %      .6 (H) fL      MCH 33.1 (H) pg      MCHC 31.9 g/dL      RDW 16.4 (H) %      RDW-SD 62.1 (H) fl      MPV 11.4 (H) fL      Platelets 224 10*3/mm3     Comprehensive Metabolic  Panel [[id number]]  (Abnormal) Collected:  01/22/18 0402    Specimen:  Blood from Arm, Right Updated:  01/22/18 0602     Glucose 182 (H) mg/dL      BUN 49 (H) mg/dL      Creatinine 1.08 (H) mg/dL      Sodium 147 (H) mmol/L      Potassium 3.3 (L) mmol/L      Chloride 113 (H) mmol/L      CO2 23.4 mmol/L      Calcium 7.4 (L) mg/dL      Total Protein 5.1 (L) g/dL      Albumin 2.10 (L) g/dL      ALT (SGPT) 9 U/L      AST (SGOT) 9 U/L      Alkaline Phosphatase 193 (H) U/L      Total Bilirubin <0.2 (L) mg/dL      eGFR Non African Amer 49 (L) mL/min/1.73      Globulin 3.0 gm/dL      A/G Ratio 0.7 g/dL      BUN/Creatinine Ratio 45.4 (H)     Anion Gap 10.6 mmol/L     Narrative:       The MDRD GFR formula is only valid for adults with stable renal function between ages 18 and 70.    POC Glucose Once [[id number]]  (Abnormal) Collected:  01/22/18 0634    Specimen:  Blood Updated:  01/22/18 0640     Glucose 192 (H) mg/dL     Narrative:       Meter: [id number] : [id number] Og Brooks RN              Radiology:  Imaging Results (last 24 hours)     ** No results found for the last 24 hours. **          Cardiology:  ECG/EMG Results (last 24 hours)     ** No results found for the last 24 hours. **          I have reviewed consult notes.    Assessment and Plan:    1.  Urinary tract infection secondary to Proteus Rocephin completed     2.  Toxic metabolic encephalopathy not much improvement we'll discuss with daughter about making her care and comfort    3.  Atrial flutter rate better continue present medications    4.  Hypertension stable    5.  Acute kidney Injury on chronic kidney disease rapid improving hypernatremic will continue to monitor    6.  MRSA bronchitis/pneumonia we'll change over to by mouth clindamycin    7. fluid overload has improved edema is improved we'll continue monitor    8.  Acute hypoxic respiratory failure has improved and stabilized.  We'll continue with breathing treatments and O2 supplementation    9.   Diabetes mellitus is hyperglycemic will adjust medications

## 2018-01-22 NOTE — PROGRESS NOTES
Adult Nutrition  Assessment/PES    Patient Name:  Yamile Cronin  YOB: 1940  MRN: [id number]  Admit Date:  1/16/2018    Assessment Date:  1/22/2018    Comments: Noted no increase in TF ordered per previous recommendations. Previous message left at   MD office and sent to in-basket message. Will cont to follow and monitor plan for care goals.          Reason for Assessment       01/22/18 1420    Reason for Assessment    Reason For Assessment/Visit follow up protocol    Cardiac CHF    Endocrine DM    Infectious Disease Sepsis    Neurological Dementia;Metabolic encephalopathy    Renal CKD            Data Eval/ Notes       01/22/18 1421     Notes    Note Spoke w RN, pt now conditional code but hoping for family decision, prognosis noted poor per notes.               Anthropometrics       01/22/18 1421    Anthropometrics    RD Documented Current Weight  106 kg (233 lb 11 oz)    Anthropometrics (Special Considerations)    RD Calculated BMI (kg/m2) 36.1    Body Mass Index (BMI)    BMI Grade 35 - 39.9 - obesity - grade II            Labs/Tests/Procedures/Meds       01/22/18 1422    Labs/Tests/Procedures/Meds    Labs/Tests Review Reviewed;Glucose;K+;BUN;Creat    Medication Review Reviewed, pertinent;Diuretic;Diabetic Oral Agent            Physical Findings       01/22/18 1422    Physical Appearance    Tubes nasogastric tube    Skin pressure ulcer(s)   coccyx stage 3              Nutrition Prescription Ordered       01/22/18 1423    Nutrition Prescription PO    Current PO Diet NPO    Nutrition Prescription EN    Product Glucerna 1.2 viviane    Continuous TF Current Rate (mL/hr) 60 mL/hr    Water flush (mL)  40 mL    Water Flush Frequency Per hour            Evaluation of Received Nutrient/Fluid Intake       01/22/18 1424    Fluid Intake Evaluation    Enteral  Fluid (mL) 1440    Free Water Flush Fluid (mL) 960    Total Fluid Intake (mL) 2400    % Fluid Needs if pt receives 100% of TF/Free  water = 2400 ml fluid per day     PO Evaluation    % PO Intake NPO    EN Evaluation    EN Average Volume Delivered (mL/day) 1208 mL/day            Problem/Interventions:        Problem 1       01/22/18 1428    Nutrition Diagnoses Problem 1    Problem 1 Needs Alternate Route    Etiology (related to) Factors Affecting Nutrition    Signs/Symptoms (evidenced by) Report/Observation                    Intervention Goal       01/22/18 1430    Intervention Goal    TF/PN Adjust TF/PN            Nutrition Intervention       01/22/18 1430    Nutrition Intervention    RD/Tech Action Follow Tx progress            Nutrition Prescription       01/22/18 1430    Other Orders    Other No TF goal rate increase noted, FREDDIE left message and sent an in-basket message x 2            Education/Evaluation       01/22/18 1431    Education    Education Education not appropriate at this time    Monitor/Evaluation    Monitor Per protocol;I&O;TF delivery/tolerance;Weight        Electronically signed by:  Nhung Tolliver RD  01/22/18 2:33 PM

## 2018-01-22 NOTE — PLAN OF CARE
Problem: Respiratory Insufficiency (Adult)  Intervention: Provide Oxygenation/Ventilation/Perfusion Support  NT sx to promote cough to clear airway. Weak cough noted   01/22/18 0327   Respiratory Interventions   Airway/Ventilation Management airway patency maintained;pulmonary hygiene promoted

## 2018-01-22 NOTE — PROGRESS NOTES
"   LOS: 6 days   Patient Care Team:  Tiana Galaviz MD as PCP - General (Family Medicine)  Tiana Galaviz MD as PCP - Family Medicine    Chief Complaint/ Reason for encounter: Acute renal failure/chronic kidney disease        Subjective     History of Present Illness    Subjective:  Symptoms:  Stable.  She reports weakness.  No shortness of breath or chest pain.  (Patient remains obtunded, confused  History per chart review  Remains on tube feeds, free water, tolerating well  Now conditional code).    Diet:  NPO.    Activity level: Impaired due to weakness.    Pain:  She reports no pain.          History taken from: Patient and chart    Objective     Vital Signs  Temp:  [97.4 °F (36.3 °C)-99.1 °F (37.3 °C)] 97.4 °F (36.3 °C)  Heart Rate:  [] 110  Resp:  [16-26] 26  BP: (118-147)/(58-86) 146/81       Wt Readings from Last 1 Encounters:   01/22/18 0349 106 kg (234 lb 6 oz)   01/21/18 1457 106 kg (233 lb 12.8 oz)   01/17/18 0603 101 kg (223 lb 6.4 oz)   01/16/18 1049 101 kg (222 lb)       Objective:  General Appearance:  Comfortable, in no acute distress, not in pain and ill-appearing (Nonverbal, obtunded).    Vital signs: (most recent): Blood pressure 146/81, pulse 110, temperature 97.4 °F (36.3 °C), temperature source Axillary, resp. rate 26, height 171.4 cm (67.48\"), weight 106 kg (234 lb 6 oz), SpO2 100 %.  Vital signs are normal.  No fever.    Output: Producing urine.    HEENT: Normal HEENT exam.    Lungs:  Normal respiratory rate and normal effort.  She is not in respiratory distress.  Breath sounds clear to auscultation.  There are decreased breath sounds.  No wheezes, rales or rhonchi.    Heart: Normal rate.  Regular rhythm.  S1 normal.  No murmur.   Abdomen: Abdomen is soft and non-distended.  Bowel sounds are normal.   There is no abdominal tenderness.  There is no epigastric area or no suprapubic area tenderness.     Extremities: Normal range of motion.  There is no deformity or " dependent edema.    Pulses: Distal pulses are intact.    Skin:  Warm and dry.  No rash or cyanosis.             Results Review:    Past Medical History: reviewed and updated  Past Medical History:   Diagnosis Date   • Alzheimer disease    • Anxiety    • ARF (acute renal failure)    • Arthritis    • Atrial fibrillation    • Chest pain    • CHF (congestive heart failure)    • Constipation    • Coronary artery disease    • Depression    • Diabetes mellitus    • Digoxin toxicity    • H/O degenerative disc disease    • History of transfusion    • Hyperlipidemia    • Hypertension    • Hypothyroid    • Myocardial infarction    • Osteoporosis    • Renal disorder    • Seizure    • Sepsis    • Stroke    • UTI (urinary tract infection)          Allergies:  Allergies   Allergen Reactions   • Cefazolin    • Cefprozil    • Cephalosporins        Intake/Output:     Intake/Output Summary (Last 24 hours) at 01/22/18 1238  Last data filed at 01/22/18 0638   Gross per 24 hour   Intake             2177 ml   Output             1050 ml   Net             1127 ml         DATA:  Interval chart, labs and notes reviewed.    Labs:   Recent Results (from the past 24 hour(s))   POC Glucose Once    Collection Time: 01/21/18  5:11 PM   Result Value Ref Range    Glucose 241 (H) 70 - 130 mg/dL   POC Glucose Once    Collection Time: 01/22/18 12:24 AM   Result Value Ref Range    Glucose 195 (H) 70 - 130 mg/dL   CBC (No Diff)    Collection Time: 01/22/18  4:02 AM   Result Value Ref Range    WBC 7.26 4.80 - 10.80 10*3/mm3    RBC 2.48 (L) 4.20 - 5.40 10*6/mm3    Hemoglobin 8.2 (L) 12.0 - 16.0 g/dL    Hematocrit 25.7 (L) 37.0 - 47.0 %    .6 (H) 81.0 - 99.0 fL    MCH 33.1 (H) 27.0 - 31.0 pg    MCHC 31.9 31.0 - 37.0 g/dL    RDW 16.4 (H) 11.5 - 14.5 %    RDW-SD 62.1 (H) 37.0 - 54.0 fl    MPV 11.4 (H) 7.4 - 10.4 fL    Platelets 224 140 - 500 10*3/mm3   Comprehensive Metabolic Panel    Collection Time: 01/22/18  4:02 AM   Result Value Ref Range     Glucose 182 (H) 65 - 99 mg/dL    BUN 49 (H) 8 - 23 mg/dL    Creatinine 1.08 (H) 0.57 - 1.00 mg/dL    Sodium 147 (H) 136 - 145 mmol/L    Potassium 3.3 (L) 3.5 - 5.2 mmol/L    Chloride 113 (H) 98 - 107 mmol/L    CO2 23.4 22.0 - 29.0 mmol/L    Calcium 7.4 (L) 8.8 - 10.5 mg/dL    Total Protein 5.1 (L) 6.0 - 8.5 g/dL    Albumin 2.10 (L) 3.50 - 5.20 g/dL    ALT (SGPT) 9 5 - 33 U/L    AST (SGOT) 9 5 - 32 U/L    Alkaline Phosphatase 193 (H) 40 - 129 U/L    Total Bilirubin <0.2 (L) 0.2 - 1.2 mg/dL    eGFR Non African Amer 49 (L) >60 mL/min/1.73    Globulin 3.0 gm/dL    A/G Ratio 0.7 g/dL    BUN/Creatinine Ratio 45.4 (H) 7.0 - 25.0    Anion Gap 10.6 mmol/L   POC Glucose Once    Collection Time: 01/22/18  6:34 AM   Result Value Ref Range    Glucose 192 (H) 70 - 130 mg/dL   POC Glucose Once    Collection Time: 01/22/18 11:35 AM   Result Value Ref Range    Glucose 271 (H) 70 - 130 mg/dL       Radiology:  Imaging Results (last 24 hours)     Procedure Component Value Units Date/Time    CT Chest Without Contrast [[id number]] Collected:  01/22/18 0957     Updated:  01/22/18 1005    Narrative:       CT CHEST WO CONTRAST-: 1/22/2018 9:10 AM     INDICATION: Respiratory distress. Pneumonia. Pulmonary infiltrates.     TECHNIQUE: CT of the thorax without contrast. Coronal and sagittal  reconstructions were obtained.  Radiation dose reduction techniques were  utilized, including automated exposure control and exposure modulation  based on body size.     COMPARISON: Chest radiograph 1/18/2018 and 1/16/2018.  Number of CT scans in the past 12 months: 5, myocardial perfusion scans:  0.     FINDINGS:  The exam is degraded by motion.     There is patchy lower lobe predominant airspace opacities, right greater  than left. There is background emphysema. Partial collapse of the  trachea and bronchi is indicative of tracheobronchomalacia.     The heart is enlarged. There is extensive coronary artery  calcifications. Calcifications at the aortic  valve suggesting aortic  valvular stenosis. There is postsurgical change of CABG the main  pulmonary artery is dilated measuring 4.2 cm. Both left and right  pulmonary arteries are dilated.     No enlarged mediastinal or hilar lymph nodes.     No acute osseous abnormalities.  A right PICC terminates over the SVC.  An enteric tube is in the stomach.       Impression:          1. Patchy, bilateral, lower lobe predominant airspace opacities  consistent with pneumonia.  2. Tracheobronchomalacia.  3. 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  4. Cardiomegaly. Status post CABG.     This report was finalized on 1/22/2018 10:03 AM by Dr. Nishant Arita MD.                Medications have been reviewed:  Current Facility-Administered Medications   Medication Dose Route Frequency Provider Last Rate Last Dose   • allopurinol (ZYLOPRIM) tablet 100 mg  100 mg Oral BID Tiana Galaviz MD   100 mg at 01/22/18 1023   • amiodarone (PACERONE) tablet 200 mg  200 mg Oral Q24H Tiana Galaviz MD   200 mg at 01/22/18 1023   • apixaban (ELIQUIS) tablet 2.5 mg  2.5 mg Oral Q12H Tiana Galaviz MD   2.5 mg at 01/22/18 1022   • aspirin chewable tablet 81 mg  81 mg Oral Daily Tiana Galaviz MD   81 mg at 01/22/18 1022   • cefTRIAXone (ROCEPHIN) 1 g in sodium chloride 0.9 % 100 mL IVPB  1 g Intravenous Q24H Tiana Galaivz  mL/hr at 01/22/18 1022 1 g at 01/22/18 1022   • clindamycin (CLEOCIN) capsule 150 mg  150 mg Oral Q8H Tiana Galaviz MD   150 mg at 01/22/18 0631   • diltiaZEM (CARDIZEM) tablet 120 mg  120 mg Oral Q8H Tiana Galaviz MD   120 mg at 01/22/18 0631   • donepezil (ARICEPT) tablet 10 mg  10 mg Oral Nightly Tiana Galaviz MD   10 mg at 01/21/18 2046   • famotidine (PEPCID) tablet 20 mg  20 mg Oral BID Tiana Galaviz MD   20 mg at 01/22/18 1036   • glipiZIDE (GLUCOTROL) tablet 5 mg  5 mg Oral  BID AC Tiana Galaviz MD   5 mg at 01/22/18 1036   • glucagon (GLUCAGEN) injection 1 mg  1 mg Subcutaneous Q15 Min PRN Tiana Galaviz MD       • insulin aspart (novoLOG) injection 0-14 Units  0-14 Units Subcutaneous PRN Tiana Galaviz MD   8 Units at 01/22/18 1236   • ipratropium-albuterol (DUO-NEB) nebulizer solution 3 mL  3 mL Nebulization Q4H - RT Tiana Galaviz MD   3 mL at 01/22/18 1048   • iron polysaccharides (NIFEREX) capsule 150 mg  150 mg Oral BID Tiana Galaviz MD   150 mg at 01/22/18 1022   • lactobacillus acidophilus (RISAQUAD) capsule 1 capsule  1 capsule Oral BID Tiana Galaviz MD   1 capsule at 01/22/18 1023   • lactobacillus acidophilus (RISAQUAD) capsule 1 capsule  1 capsule Oral Daily Tiana Galaviz MD       • levETIRAcetam in NaCl 0.82% (KEPPRA) IVPB 500 mg  500 mg Intravenous Q12H Tiana Galaviz MD 0 mL/hr at 01/21/18 2102 500 mg at 01/22/18 1047   • levothyroxine (SYNTHROID, LEVOTHROID) tablet 150 mcg  150 mcg Oral Q AM Tiana Galaviz MD   150 mcg at 01/22/18 0631   • levothyroxine (SYNTHROID, LEVOTHROID) tablet 150 mcg  150 mcg Oral Daily Tiana Galaviz MD   150 mcg at 01/22/18 1023   • linagliptin (TRADJENTA) tablet 5 mg  5 mg Oral Daily Tiana Galaviz MD   5 mg at 01/22/18 1037   • magnesium hydroxide (MILK OF MAGNESIA) 400 MG/5ML suspension 30 mL  30 mL Oral Nightly PRN Tiana Galaviz MD       • metoprolol tartrate (LOPRESSOR) tablet 50 mg  50 mg Oral Q12H Tiana Galaviz MD   50 mg at 01/22/18 1022   • miconazole (MICOTIN) 2 % powder 1 application  1 application Topical PRN Tiana Galaviz MD   1 application at 01/21/18 0921   • senna (SENOKOT) tablet 1 tablet  1 tablet Oral Nightly Tiana Galaviz MD   1 tablet at 01/21/18 2046   • sertraline (ZOLOFT) tablet 50 mg  50 mg Oral Nightly Tiana Galaviz MD   50 mg at  01/21/18 2046   • traMADol (ULTRAM) tablet 50 mg  50 mg Oral Q6H PRN Tiana Galaviz MD   50 mg at 01/21/18 1611       Assessment/Plan     Active Problems:    Sepsis    Hypernatremia    Hypokalemia      Assessment:  (Assessment:  Acute renal failure, due to renal hypoperfusion, slowly improving, may be near baseline  Metabolic acidosis, stable  Hypokalemia, repleted  Hypernatremia, improved with free water per tube feeds  Metabolic encephalopathy, minimal improvement  Sepsis from UTI, improved with antibiotics  Dehydration   Hypotension, improved with fluids  Anemia from acute illness/chronic kidney disease  Stage II to 3 chronic kidney disease, baseline creatinine 1.0        Plan:  Renal function continues to slowly improve, likely near baseline  She is fairly euvolemic on exam  Continue tube feeds, increase free water  Diuretics as needed  Replace potassium per feeding tube  Poor prognosis, now conditional code  She would be palliative care appropriate if family is agreeable).             Continue to monitor renal function, electroytes and volume closely   Please call me with any questions or concerns      Marv Wade MD   [facility]   [phone]    01/22/18  12:38 PM      Dictation performed using Dragon dictation software

## 2018-01-22 NOTE — PLAN OF CARE
Problem: Patient Care Overview (Adult)  Goal: Plan of Care Review  Outcome: Ongoing (interventions implemented as appropriate)   01/22/18 0307   Coping/Psychosocial Response Interventions   Plan Of Care Reviewed With patient   Patient Care Overview   Progress unable to show any progress toward functional goals   Outcome Evaluation   Outcome Summary/Follow up Plan VSS; pt rested well overnight; tube feeding continues. Labs ordered in am CBC & CMP     Goal: Adult Individualization and Mutuality  Outcome: Ongoing (interventions implemented as appropriate)    Goal: Discharge Needs Assessment  Outcome: Ongoing (interventions implemented as appropriate)      Problem: Sepsis (Adult)  Goal: Signs and Symptoms of Listed Potential Problems Will be Absent or Manageable (Sepsis)  Outcome: Ongoing (interventions implemented as appropriate)   01/22/18 0307   Sepsis   Problems Assessed (Sepsis) all   Problems Present (Sepsis) progression of infection;situational response       Problem: Pressure Ulcer (Adult)  Goal: Signs and Symptoms of Listed Potential Problems Will be Absent or Manageable (Pressure Ulcer)  Outcome: Ongoing (interventions implemented as appropriate)   01/22/18 0307   Pressure Ulcer   Problems Assessed (Pressure Ulcer) all   Problems Present (Pressure Ulcer) none       Problem: Fall Risk (Adult)  Goal: Identify Related Risk Factors and Signs and Symptoms  Outcome: Outcome(s) achieved Date Met: 01/22/18 01/22/18 0307   Fall Risk   Fall Risk: Related Risk Factors environment unfamiliar   Fall Risk: Signs and Symptoms presence of risk factors     Goal: Absence of Falls  Outcome: Ongoing (interventions implemented as appropriate)   01/22/18 0307   Fall Risk (Adult)   Absence of Falls making progress toward outcome       Problem: Respiratory Insufficiency (Adult)  Goal: Identify Related Risk Factors and Signs and Symptoms  Outcome: Outcome(s) achieved Date Met: 01/22/18 01/22/18 0307   Respiratory Insufficiency    Related Risk Factors (Respiratory Insufficiency) bedrest;obesity;physiological factors   Signs and Symptoms (Respiratory Insufficiency) abnormal breath sounds;breathing pattern changes;decreased oxygen saturation;level of consciousness change;respiratory rate alterations;shortness of breath     Goal: Acid/Base Balance  Outcome: Ongoing (interventions implemented as appropriate)   01/22/18 0307   Respiratory Insufficiency (Adult)   Acid/Base Balance making progress toward outcome     Goal: Effective Ventilation  Outcome: Ongoing (interventions implemented as appropriate)   01/22/18 0307   Respiratory Insufficiency (Adult)   Effective Ventilation making progress toward outcome       Problem: Feeding Dysfunction with/without Swallowing Impairment (Infant)  Goal: Identify Related Risk Factors and Signs and Symptoms  Outcome: Outcome(s) achieved Date Met: 01/22/18 01/22/18 0307   Feeding Dysfunction/Swallowing Impairment Pediatric   Related Risk Factors (Feeding Dysfunction) disease process   Signs and Symptoms (Feeding Dysfunction) other (see comments)  (pt unresponsive)     Goal: Adequate Nutrition and Hydration  Outcome: Ongoing (interventions implemented as appropriate)   01/22/18 0307   Feeding Dysfunction with/without Swallowing Impairment (Infant)   Adequate Nutrition and Hydration making progress toward outcome

## 2018-01-22 NOTE — PLAN OF CARE
Problem: Patient Care Overview (Adult)  Goal: Plan of Care Review  Outcome: Ongoing (interventions implemented as appropriate)      Problem: Sepsis (Adult)  Goal: Signs and Symptoms of Listed Potential Problems Will be Absent or Manageable (Sepsis)  Outcome: Ongoing (interventions implemented as appropriate)      Problem: Pressure Ulcer (Adult)  Goal: Signs and Symptoms of Listed Potential Problems Will be Absent or Manageable (Pressure Ulcer)  Outcome: Ongoing (interventions implemented as appropriate)      Problem: Fall Risk (Adult)  Goal: Absence of Falls  Outcome: Ongoing (interventions implemented as appropriate)      Problem: Respiratory Insufficiency (Adult)  Goal: Effective Ventilation  Outcome: Ongoing (interventions implemented as appropriate)      Problem: Feeding Dysfunction with/without Swallowing Impairment (Infant)  Goal: Adequate Nutrition and Hydration  Outcome: Ongoing (interventions implemented as appropriate)

## 2018-01-23 NOTE — PROGRESS NOTES
"   LOS: 7 days   Patient Care Team:  Tiana Galaviz MD as PCP - General (Family Medicine)  Tiana Galaviz MD as PCP - Family Medicine    Chief Complaint/ Reason for encounter: Acute renal failure/chronic kidney disease        Subjective     History of Present Illness    Subjective:  Symptoms:  Stable.  She reports weakness.  No shortness of breath or chest pain.  (Patient remains obtunded)  History per chart review and talking to RN  Remains on tube feeds, free water, tolerating well  Now conditional code).    Diet:  NPO.  on tube feeds. Free water 40 ml per hour  Activity level: Impaired due to weakness.    Pain:  She reports no pain.          History taken from: Patient and chart    Objective     Vital Signs  Temp:  [97.8 °F (36.6 °C)-99.7 °F (37.6 °C)] 97.8 °F (36.6 °C)  Heart Rate:  [] 100  Resp:  [16-28] 20  BP: (105-131)/(52-82) 122/71       Wt Readings from Last 1 Encounters:   01/23/18 0613 107 kg (236 lb 6.4 oz)   01/22/18 0349 106 kg (234 lb 6 oz)   01/21/18 1457 106 kg (233 lb 12.8 oz)   01/17/18 0603 101 kg (223 lb 6.4 oz)   01/16/18 1049 101 kg (222 lb)       Objective:  General Appearance:  Comfortable, in no acute distress, not in pain and ill-appearing (Nonverbal, obtunded).    Vital signs: (most recent): Blood pressure 146/81, pulse 110, temperature 97.4 °F (36.3 °C), temperature source Axillary, resp. rate 26, height 171.4 cm (67.48\"), weight 106 kg (234 lb 6 oz), SpO2 100 %.  Vital signs are normal.  No fever.    Output: Producing urine.    HEENT: NC/AT, DHT in place  Lungs:  Normal respiratory rate and normal effort.  She is not in respiratory distress.  Breath sounds clear to auscultation.  There are decreased breath sounds.  No wheezes, rales or rhonchi.  + gargling  Heart: Normal rate.  Regular rhythm.  S1 normal.  No murmur.   Abdomen: Abdomen is soft and non-distended.  Bowel sounds are normal.   There is no abdominal tenderness.  There is no epigastric area or no " suprapubic area tenderness.     Extremities: Normal range of motion.  There is no deformity or dependent edema.    Pulses: Distal pulses are intact.    Skin:  Warm and dry.  No rash or cyanosis.             Results Review:    Past Medical History: reviewed and updated  Past Medical History:   Diagnosis Date   • Alzheimer disease    • Anxiety    • ARF (acute renal failure)    • Arthritis    • Atrial fibrillation    • Chest pain    • CHF (congestive heart failure)    • Constipation    • Coronary artery disease    • Depression    • Diabetes mellitus    • Digoxin toxicity    • H/O degenerative disc disease    • History of transfusion    • Hyperlipidemia    • Hypertension    • Hypothyroid    • Myocardial infarction    • Osteoporosis    • Renal disorder    • Seizure    • Sepsis    • Stroke    • UTI (urinary tract infection)          Allergies:  Allergies   Allergen Reactions   • Cefazolin    • Cefprozil    • Cephalosporins        Intake/Output:     Intake/Output Summary (Last 24 hours) at 01/23/18 1146  Last data filed at 01/23/18 1126   Gross per 24 hour   Intake             2605 ml   Output             2075 ml   Net              530 ml         DATA:  Interval chart, labs and notes reviewed.    Labs:   Recent Results (from the past 24 hour(s))   POC Glucose Once    Collection Time: 01/22/18  7:13 PM   Result Value Ref Range    Glucose 164 (H) 70 - 130 mg/dL   POC Glucose Once    Collection Time: 01/23/18 12:12 AM   Result Value Ref Range    Glucose 171 (H) 70 - 130 mg/dL   Basic Metabolic Panel    Collection Time: 01/23/18  5:14 AM   Result Value Ref Range    Glucose 162 (H) 65 - 99 mg/dL    BUN 45 (H) 8 - 23 mg/dL    Creatinine 1.00 0.57 - 1.00 mg/dL    Sodium 147 (H) 136 - 145 mmol/L    Potassium 4.3 3.5 - 5.2 mmol/L    Chloride 113 (H) 98 - 107 mmol/L    CO2 24.0 22.0 - 29.0 mmol/L    Calcium 7.5 (L) 8.8 - 10.5 mg/dL    eGFR Non African Amer 54 (L) >60 mL/min/1.73    BUN/Creatinine Ratio 45.0 (H) 7.0 - 25.0    Anion  Gap 10.0 mmol/L       Radiology:  Imaging Results (last 24 hours)     Procedure Component Value Units Date/Time    SCANNED - IMAGING [[id number]] Resulted:  01/16/18      Updated:  01/22/18 1450             Medications have been reviewed:  Current Facility-Administered Medications   Medication Dose Route Frequency Provider Last Rate Last Dose   • allopurinol (ZYLOPRIM) tablet 100 mg  100 mg Oral BID Tiana Galaviz MD   100 mg at 01/23/18 0856   • amiodarone (PACERONE) tablet 200 mg  200 mg Oral Q24H Tiana Galaviz MD   200 mg at 01/23/18 0856   • apixaban (ELIQUIS) tablet 2.5 mg  2.5 mg Oral Q12H Tiana Galaviz MD   2.5 mg at 01/23/18 0855   • aspirin chewable tablet 81 mg  81 mg Oral Daily Tiana Galaviz MD   81 mg at 01/23/18 0855   • bumetanide (BUMEX) injection 2 mg  2 mg Intravenous Q12H Tiana Galaviz MD   2 mg at 01/23/18 0855   • clindamycin (CLEOCIN) capsule 150 mg  150 mg Oral Q8H Tiana Galaviz MD   150 mg at 01/23/18 0550   • diltiaZEM (CARDIZEM) tablet 120 mg  120 mg Oral Q8H Tiana Galaviz MD   120 mg at 01/23/18 0550   • donepezil (ARICEPT) tablet 10 mg  10 mg Oral Nightly Tiana Galaviz MD   10 mg at 01/22/18 2056   • famotidine (PEPCID) tablet 20 mg  20 mg Oral BID Tiana Galaviz MD   20 mg at 01/23/18 0855   • glipiZIDE (GLUCOTROL) tablet 5 mg  5 mg Oral BID AC Tiana Galaviz MD   5 mg at 01/23/18 0855   • glucagon (GLUCAGEN) injection 1 mg  1 mg Subcutaneous Q15 Min PRN Tiana Galaviz MD       • insulin aspart (novoLOG) injection 0-14 Units  0-14 Units Subcutaneous PRN Tiana Galaviz MD   8 Units at 01/22/18 1236   • ipratropium-albuterol (DUO-NEB) nebulizer solution 3 mL  3 mL Nebulization Q4H - RT Tiana Galaviz MD   3 mL at 01/23/18 1126   • iron polysaccharides (NIFEREX) capsule 150 mg  150 mg Oral BID Tiana Galaviz MD   150 mg at 01/23/18  0856   • lactobacillus acidophilus (RISAQUAD) capsule 1 capsule  1 capsule Oral BID Tiana Galaviz MD   1 capsule at 01/23/18 0856   • lactobacillus acidophilus (RISAQUAD) capsule 1 capsule  1 capsule Oral Daily Tiana Galaviz MD       • levETIRAcetam (KEPPRA) 100 MG/ML solution 500 mg  500 mg Oral Daily Tiana Galaviz MD   500 mg at 01/23/18 0855   • levothyroxine (SYNTHROID, LEVOTHROID) tablet 150 mcg  150 mcg Oral Q AM Tiana Galaviz MD   150 mcg at 01/23/18 0550   • levothyroxine (SYNTHROID, LEVOTHROID) tablet 150 mcg  150 mcg Oral Daily Tiana Galaviz MD   150 mcg at 01/23/18 0856   • linagliptin (TRADJENTA) tablet 5 mg  5 mg Oral Daily Tiana Galaviz MD   5 mg at 01/23/18 0855   • magnesium hydroxide (MILK OF MAGNESIA) 400 MG/5ML suspension 30 mL  30 mL Oral Nightly PRN Tiana Galaviz MD       • metoprolol tartrate (LOPRESSOR) tablet 50 mg  50 mg Oral Q12H Tiana Galaviz MD   50 mg at 01/23/18 0856   • miconazole (MICOTIN) 2 % powder 1 application  1 application Topical PRN Tiana Galaviz MD   1 application at 01/21/18 0921   • Scopolamine (TRANSDERM-SCOP) 1.5 MG/3DAYS patch 1 patch  1 patch Transdermal Q72H Tiana Galaviz MD   1 patch at 01/23/18 0855   • senna (SENOKOT) tablet 1 tablet  1 tablet Oral Nightly Tiana Galaviz MD   1 tablet at 01/22/18 2057   • sertraline (ZOLOFT) tablet 50 mg  50 mg Oral Nightly Tiana Galaviz MD   50 mg at 01/22/18 2056   • traMADol (ULTRAM) tablet 50 mg  50 mg Oral Q6H PRN Tiana Galaviz MD   50 mg at 01/23/18 0050       Assessment/Plan     Active Problems:    Sepsis    Hypernatremia    Hypokalemia      Assessment:  (Assessment:  Acute renal failure, due to renal hypoperfusion, slowly improving, may be near baseline  Metabolic acidosis, stable  Hypokalemia, repleted  Hypernatremia, improved with free water per tube feeds  Metabolic  encephalopathy, minimal improvement  Sepsis from UTI, improved with antibiotics  Dehydration   Hypotension, improved with fluids  Anemia from acute illness/chronic kidney disease  Stage II to 3 chronic kidney disease, baseline creatinine 1.0        Plan:  Renal function stable today.   She is fairly euvolemic on exam  Continue tube feeds, increase free water to 50 ml/hr (Na 147 and unchanged in the past few days)  Diuretics as needed  Replace potassium per feeding tube  Poor prognosis, now conditional code  She would be palliative care appropriate if family is agreeable).       Continue to monitor renal function, electroytes and volume closely   Please call me with any questions or concerns      Janet Riddle MD  [facility]   [phone]    01/23/18  11:46 AM

## 2018-01-23 NOTE — PROGRESS NOTES
"Daily Progress Note:    Subjective: Improvement.  Overall the same requiring more oxygen to maintain her sats above 90%    Flowsheet Rows         First Filed Value    Admission Height  171.4 cm (67.48\") Documented at 01/16/2018 1049    Admission Weight  101 kg (222 lb) Documented at 01/16/2018 1049          Patient Vitals for the past 24 hrs:   BP Temp Temp src Pulse Resp SpO2 Weight   01/23/18 0700 - - - 90 20 91 % -   01/23/18 0613 - - - - - - 107 kg (236 lb 6.4 oz)   01/23/18 0600 122/71 99.4 °F (37.4 °C) Oral 104 22 91 % -   01/23/18 0550 127/65 - - 110 - - -   01/23/18 0328 105/79 99.7 °F (37.6 °C) Oral 102 22 96 % -   01/23/18 0235 - - - 94 24 94 % -   01/23/18 0055 - - - - - 93 % -   01/23/18 0013 122/52 99.2 °F (37.3 °C) Axillary 89 22 93 % -   01/22/18 2251 - - - - - 90 % -   01/22/18 2220 - - - 80 24 93 % -   01/22/18 1943 131/74 - - 104 - 93 % -   01/22/18 1942 131/74 98.5 °F (36.9 °C) Oral 102 20 93 % -   01/22/18 1922 - - - 101 28 94 % -   01/22/18 1615 124/82 98.1 °F (36.7 °C) Axillary (!) 125 22 - -   01/22/18 1511 - - - 110 16 91 % -   01/22/18 1132 146/81 97.4 °F (36.3 °C) Axillary 110 26 - -   01/22/18 1049 - - - 92 24 100 % -   01/22/18 1022 147/70 - - 94 - - -   01/22/18 0751 - - - 94 - (!) 86 % -   01/22/18 0749 - - - 102 - (!) 84 % -       107 kg (236 lb 6.4 oz)      Intake/Output Summary (Last 24 hours) at 01/23/18 0726  Last data filed at 01/23/18 0600   Gross per 24 hour   Intake             2605 ml   Output             1875 ml   Net              730 ml       Review of Systems   Unable to perform ROS: Mental status change       Physical Exam   Constitutional: She appears well-developed and well-nourished.   HENT:   Head: Normocephalic.   Mouth/Throat: Oropharynx is clear and moist.   Eyes: Conjunctivae are normal.   Neck: Normal range of motion. No JVD present. No thyromegaly present.   Cardiovascular: Normal rate.  An irregularly irregular rhythm present.   Murmur heard.   Systolic murmur " is present with a grade of 2/6   Pulmonary/Chest: Tachypnea noted. No respiratory distress. She has wheezes (exp with secretions) in the right middle field, the right lower field, the left middle field and the left lower field. She has no rales.   Abdominal: Soft. She exhibits no distension. There is no tenderness. There is no guarding.       Colostomy present   Musculoskeletal: She exhibits edema (generalized).   Neurological: She has normal strength and normal reflexes. She is unresponsive. No cranial nerve deficit or sensory deficit.   Skin: Skin is warm and dry. No rash noted.   Stage 3 decubitus    Nursing note and vitals reviewed.          Medication Review:   I have reviewed the patient's current medication list      allopurinol 100 mg Oral BID   amiodarone 200 mg Oral Q24H   apixaban 2.5 mg Oral Q12H   aspirin 81 mg Oral Daily   bumetanide 2 mg Intravenous Q12H   clindamycin 150 mg Oral Q8H   diltiaZEM 120 mg Oral Q8H   donepezil 10 mg Oral Nightly   famotidine 20 mg Oral BID   glipiZIDE 5 mg Oral BID AC   ipratropium-albuterol 3 mL Nebulization Q4H - RT   iron polysaccharides 150 mg Oral BID   lactobacillus acidophilus 1 capsule Oral BID   lactobacillus acidophilus 1 capsule Oral Daily   levETIRAcetam 500 mg Intravenous Q12H   levothyroxine 150 mcg Oral Q AM   levothyroxine 150 mcg Oral Daily   linagliptin 5 mg Oral Daily   metoprolol tartrate 50 mg Oral Q12H   Scopolamine 1 patch Transdermal Q72H   senna 1 tablet Oral Nightly   sertraline 50 mg Oral Nightly                  Labs:    Results from last 7 days  Lab Units 01/22/18  0402 01/21/18  0452 01/20/18  0419 01/19/18  0436 01/18/18  0352 01/17/18  0519 01/16/18  0732   WBC 10*3/mm3 7.26 6.49 5.99 7.12 9.02 11.39* 11.86*   HEMOGLOBIN g/dL 8.2* 8.6* 8.6* 9.1* 9.1* 10.0* 10.7*   HEMATOCRIT % 25.7* 27.1* 27.5* 28.5* 28.5* 31.1* 32.9*   PLATELETS 10*3/mm3 224 182 158 168 164 174 202   MONOCYTES % %  --   --   --   --   --   --  5.0       Results from last 7  days  Lab Units 01/23/18  0514 01/22/18  0402 01/21/18  0452 01/20/18  0419 01/19/18  0436 01/18/18  0352 01/17/18  1759  01/16/18  0732   SODIUM mmol/L 147* 147* 147* 146* 144 145 146*  < > 144   POTASSIUM mmol/L 4.3 3.3* 3.5 4.0 4.0 3.7 4.2  < > 2.9*   CHLORIDE mmol/L 113* 113* 114* 115* 112* 113* 111*  < > 109*   CO2 mmol/L 24.0 23.4 21.6* 20.8* 19.7* 19.0* 19.3*  < > 20.3*   BUN mg/dL 45* 49* 54* 57* 54* 51* 49*  < > 35*   CREATININE mg/dL 1.00 1.08* 1.40* 1.57* 1.69* 1.74* 1.95*  < > 1.09*   CALCIUM mg/dL 7.5* 7.4* 7.5* 7.5* 7.6* 7.5* 7.8*  < > 8.2*   BILIRUBIN mg/dL  --  <0.2*  --   --   --  <0.2*  --   --  0.2   ALK PHOS U/L  --  193*  --   --   --  113  --   --  75   ALT (SGPT) U/L  --  9  --   --   --  6  --   --  8   AST (SGOT) U/L  --  9  --   --   --  8  --   --  7   GLUCOSE mg/dL 162* 182* 191* 142* 182* 167* 207*  < > 182*   < > = values in this interval not displayed.        Lab Results (last 24 hours)     Procedure Component Value Units Date/Time    POC Glucose Once [[id number]]  (Abnormal) Collected:  01/22/18 1135    Specimen:  Blood Updated:  01/22/18 1144     Glucose 271 (H) mg/dL     Narrative:       Meter: [id number] : [id number] Zara Andrews RN    POC Glucose Once [[id number]]  (Abnormal) Collected:  01/22/18 1913    Specimen:  Blood Updated:  01/22/18 1919     Glucose 164 (H) mg/dL     Narrative:       Meter: [id number] : [id number] Zara Andrews RN    POC Glucose Once [[id number]]  (Abnormal) Collected:  01/23/18 0012    Specimen:  Blood Updated:  01/23/18 0019     Glucose 171 (H) mg/dL     Narrative:       Meter: [id number] : [id number] Og Brooks RN    Basic Metabolic Panel [[id number]]  (Abnormal) Collected:  01/23/18 0514    Specimen:  Blood Updated:  01/23/18 0641     Glucose 162 (H) mg/dL      BUN 45 (H) mg/dL      Creatinine 1.00 mg/dL      Sodium 147 (H) mmol/L      Potassium 4.3 mmol/L      Chloride 113 (H) mmol/L      CO2 24.0 mmol/L      Calcium 7.5 (L) mg/dL       eGFR Non African Amer 54 (L) mL/min/1.73      BUN/Creatinine Ratio 45.0 (H)     Anion Gap 10.0 mmol/L     Narrative:       The MDRD GFR formula is only valid for adults with stable renal function between ages 18 and 70.              Radiology:  Imaging Results (last 24 hours)     Procedure Component Value Units Date/Time    CT Chest Without Contrast [[id number]] Collected:  01/22/18 0957     Updated:  01/22/18 1005    Narrative:       CT CHEST WO CONTRAST-: 1/22/2018 9:10 AM     INDICATION: Respiratory distress. Pneumonia. Pulmonary infiltrates.     TECHNIQUE: CT of the thorax without contrast. Coronal and sagittal  reconstructions were obtained.  Radiation dose reduction techniques were  utilized, including automated exposure control and exposure modulation  based on body size.     COMPARISON: Chest radiograph 1/18/2018 and 1/16/2018.  Number of CT scans in the past 12 months: 5, myocardial perfusion scans:  0.     FINDINGS:  The exam is degraded by motion.     There is patchy lower lobe predominant airspace opacities, right greater  than left. There is background emphysema. Partial collapse of the  trachea and bronchi is indicative of tracheobronchomalacia.     The heart is enlarged. There is extensive coronary artery  calcifications. Calcifications at the aortic valve suggesting aortic  valvular stenosis. There is postsurgical change of CABG the main  pulmonary artery is dilated measuring 4.2 cm. Both left and right  pulmonary arteries are dilated.     No enlarged mediastinal or hilar lymph nodes.     No acute osseous abnormalities.  A right PICC terminates over the SVC.  An enteric tube is in the stomach.       Impression:          1. Patchy, bilateral, lower lobe predominant airspace opacities  consistent with pneumonia.  2. Tracheobronchomalacia.  3. 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  4. Cardiomegaly. Status post CABG.     This report was finalized on 1/22/2018  10:03 AM by Dr. Nishant Arita MD.       SCANNED - IMAGING [[id number]] Resulted:  01/16/18      Updated:  01/22/18 1450          Cardiology:  ECG/EMG Results (last 24 hours)     ** No results found for the last 24 hours. **          I have reviewed consult notes.    Assessment and Plan:    1.  Acute kidney injury chronic kidney disease resolved renal function are baseline but I think she is volume overloaded we'll continue with IV diuretics    2.  Toxic metabolic encephalopathy not much improvement family does not want palliative care they did make her care and comfort with everything else done that were doing now.  Prognosis is poor    3.  Atrial flutter rate better continue present medications    4.  Hypertension stable    5.  Diabetes mellitus blood sugars slightly elevated medications been adjusted    6.  MRSA bronchitis/pneumonia on by mouth clindamycin    7.  Acute hypoxic respiratory failure is persistent continue with when necessary suctioning respiratory treatments and oxygen supplementation

## 2018-01-23 NOTE — PLAN OF CARE
Problem: Patient Care Overview (Adult)  Goal: Plan of Care Review  Outcome: Ongoing (interventions implemented as appropriate)   01/23/18 0424   Coping/Psychosocial Response Interventions   Plan Of Care Reviewed With patient;family   Patient Care Overview   Progress unable to show any progress toward functional goals   Outcome Evaluation   Outcome Summary/Follow up Plan pt remians on tube feeds; Sats droped overnight and pt was placed on oxymizer @ 6L to maintain sats greater 90%.      Goal: Adult Individualization and Mutuality  Outcome: Ongoing (interventions implemented as appropriate)    Goal: Discharge Needs Assessment  Outcome: Ongoing (interventions implemented as appropriate)      Problem: Sepsis (Adult)  Goal: Signs and Symptoms of Listed Potential Problems Will be Absent or Manageable (Sepsis)  Outcome: Ongoing (interventions implemented as appropriate)   01/23/18 0424   Sepsis   Problems Assessed (Sepsis) all   Problems Present (Sepsis) hypoxia/hypoxemia;situational response       Problem: Pressure Ulcer (Adult)  Goal: Signs and Symptoms of Listed Potential Problems Will be Absent or Manageable (Pressure Ulcer)  Outcome: Ongoing (interventions implemented as appropriate)   01/23/18 0424   Pressure Ulcer   Problems Assessed (Pressure Ulcer) all   Problems Present (Pressure Ulcer) none       Problem: Fall Risk (Adult)  Goal: Absence of Falls  Outcome: Ongoing (interventions implemented as appropriate)   01/23/18 0424   Fall Risk (Adult)   Absence of Falls making progress toward outcome       Problem: Respiratory Insufficiency (Adult)  Goal: Acid/Base Balance  Outcome: Ongoing (interventions implemented as appropriate)   01/23/18 0424   Respiratory Insufficiency (Adult)   Acid/Base Balance making progress toward outcome     Goal: Effective Ventilation  Outcome: Ongoing (interventions implemented as appropriate)   01/23/18 0424   Respiratory Insufficiency (Adult)   Effective Ventilation making progress toward  outcome

## 2018-01-23 NOTE — NURSING NOTE
Continued Stay Note   Ana Paula Hooker     Patient Name: Yamile Cronin  MRN: [id number]  Today's Date: 1/23/2018    Admit Date: 1/16/2018          Discharge Plan       01/23/18 1524    Case Management/Social Work Plan    Additional Comments spoke with daughter via telephone and she is aware of transfer to [facility] rehab unit today. updated IMM and she verbalized understanding. will continue to follow.               Discharge Codes     None        Expected Discharge Date and Time     Expected Discharge Date Expected Discharge Time    Jan 23, 2018             Audrey Velasquez RN

## 2018-01-23 NOTE — PLAN OF CARE
Problem: Respiratory Insufficiency (Adult)  Intervention: Provide Oxygenation/Ventilation/Perfusion Support   01/23/18 0326   Respiratory Interventions   Airway/Ventilation Management airway patency maintained;pulmonary hygiene promoted

## 2018-01-24 NOTE — NURSING NOTE
Case Management Discharge Note    Final Note: discharged to [facility] rehab unit skilled level of care.     Discharge Placement     Facility/Agency Request Status Selected? Address Phone Number Fax Number    [facility] Accepted    Yes [id number] [id number] [phone]             Discharge Codes: 03  Discharged/transferred to skilled nursing facility (SNF) with Medicare certification in anticipation of skilled care

## 2018-01-27 NOTE — DISCHARGE SUMMARY
Yamile Cronin  1940  [id number]        Discharge Summary    Date of Admission: 1/16/2018  Date of Discharge:  1/24/2018    Primary Discharge Diagnoses:   Escherichia coli ESBL UTI  Sepsis  A. 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  Toxic metabolic encephalopathy multifactorial  Hypothyroidism  Chronic diastolic congestive heart failure    PCP  Patient Care Team:  Tiana Galaviz MD as PCP - General (Family Medicine)  Tiana Galaviz MD as PCP - Family Medicine    Consults:   Consults     Date and Time Order Name Status Description    1/17/2018 0940 Inpatient Consult to Nephrology Completed             History of Present Illness:    77-year-old white female who became suddenly hypoxic this  morning sats drop 85 percent rapid response was called which I attended -patient found very somnolent more than normal and tachycardic with a heart rate of 140 sats were 85% and on her usual 2 L.  Patient has been treated for a urinary tract infection on IV Merrem which started yesterday.  Patient has been more lethargic than normal and has no several doses of her by mouth antiarrhythmics likely the cause of her A. fib with rapid ventricular response.  Because of her tachyarrhythmia and hypoxemia she is being transferred emergently to ICU.     Hospital Course     Patient admitted to the ICU.  She was given 1 dose of IV digoxin and her rate did come down to 110s.  She remained intermittently tachycardic but never went into rapid A. fib again.  Patient's mental status since the [facility] was very poor she opened her eyes at times to voice but most of time she remain unresponsive.  She was not able take by mouth medications.  After discussion with patient's  "daughter she didn't want a Dobbhoff which was inserted.  She started receiving feedings as well as all medications through feeding tube.    She developed acute oliguric renal failure likely secondary to hypotension from UTI and sepsis.  This was treated conservatively and renal was consult and she responded to conservative management urine output did improve renal functions also improved.  Her urine cultures did come back with ESBL Escherichia coli which was sensitive to Rocephin and Merrem was discontinued.    Patient's mental status never improved.  After multiple discussions with the patient's daughter was the power of  she agreed to make her care and comfort but did want anything else done.  Did develop some intermittent respiratory distress secretions and of course cough.  This was treated with respiratory treatments NT suctioning and oxygen supplementation.  CT showed bilateral lower lobe atelectasis and/or infiltrates.  Her sputum cultures came back with MRSA and she was started on vancomycin.    She did develop some anasarca associated with IV diuretics and her edema improved.  She is transferred back to TCU for long-term care prognosis is extremely poor    Operations and Procedures Performed:       Ct Soft Tissue Neck Without Contrast    Result Date: 1/8/2018  Narrative: CT SOFT TISSUE NECK, NONCONTRAST, 01/07/2018:  HISTORY: 77-year-old female with recent prior history of acute sialoadenitis involving the left submandibular gland imaged here November 2017. She returns to the ED today complaining of new onset \"throat swelling\" today.  TECHNIQUE: CT examination of the neck without IV contrast from the skull base through the thoracic inlet. Radiation dose reduction techniques were utilized, including automated exposure control and exposure modulation based on body size. Previous CT and nuclear cardiology imaging studies here this year: 4.  FINDINGS: Previous inflammation involving the left " submandibular gland has resolved. The parotid and submandibular glands are normal in size and appearance today. No fluid collection or adenopathy is seen within the neck. There is no epiglottis enlargement or visible soft tissue narrowing.  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 This should not be significant unless the patient's previous thyroidectomy was for thyroid cancer. Correlate clinically.      Impression: 1. No acute inflammatory process is seen within the neck. 2. Resolution of left submandibular sialoadenitis since 11/17/2017. 3. 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 See above. 4. Initial stat report to the ED from Dr. Kodi Vanessa at 0149 hours on 01/07/2018.  This report was finalized on 1/8/2018 9:59 AM by Dr. Michael Brito MD.      Ct Chest Without Contrast    Result Date: 1/22/2018  Narrative: CT CHEST WO CONTRAST-: 1/22/2018 9:10 AM  INDICATION: Respiratory distress. Pneumonia. Pulmonary infiltrates.  TECHNIQUE: CT of the thorax without contrast. Coronal and sagittal reconstructions were obtained.  Radiation dose reduction techniques were utilized, including automated exposure control and exposure modulation based on body size.  COMPARISON: Chest radiograph 1/18/2018 and 1/16/2018. Number of CT scans in the past 12 months: 5, myocardial perfusion scans: 0.  FINDINGS: The exam is degraded by motion.  There is patchy lower lobe predominant airspace opacities, right greater than left. There is background emphysema. Partial collapse of the trachea and bronchi is indicative of tracheobronchomalacia.  The heart is enlarged. There is extensive coronary artery calcifications. Calcifications at the aortic valve suggesting aortic valvular stenosis. There is postsurgical change of CABG the main pulmonary artery is  dilated measuring 4.2 cm. Both left and right pulmonary arteries are dilated.  No enlarged mediastinal or hilar lymph nodes.  No acute osseous abnormalities.  A right PICC terminates over the SVC. An enteric tube is in the stomach.      Impression:  1. Patchy, bilateral, lower lobe predominant airspace opacities consistent with pneumonia. 2. Tracheobronchomalacia. 3. Dilated main pulmonary artery consistent with a pulmonary arterial hypertension. 4. Cardiomegaly. Status post CABG.  This report was finalized on 1/22/2018 10:03 AM by Dr. Nishant Arita MD.      Mri Brain Without Contrast    Result Date: 1/24/2018  Narrative: MRI BRAIN WITHOUT CONTRAST, 01/19/2018 INDICATION: Unresponsive patient today. History of hypertension, diabetes. Previous stroke. PROCEDURE: Multiplanar, multisequence MR imaging of the brain without the administration of contrast. COMPARISON: 11/17/2017. FINDINGS: There has been interval progression of increased T2 signal intensity in the posterior left temporal lobe and throughout the left occipital lobe. This has associated volume loss. This is superimposed on extensive background of chronic small vessel ischemic change. There is no evidence for hemorrhage. No midline shift, extraaxial collection or hydrocephalus. There are multiple air-fluid levels in the paranasal sinuses.     Impression:  Significant progression of abnormal T2 signal intensity in the left temporal lobe and left occipital lobe since the 11/172017 study. This is favored to represent interval evolution and progression of the patient's previously demonstrated infarct. Probably represents a combination of chronic and subacute infarct. There is no significant associated mass effect or evidence for hemorrhage. Extensive changes of chronic small vessel ischemic disease.     Xr Chest 1 View    Result Date: 1/18/2018  Narrative: CHEST X-RAY, 01/18/2018:  HISTORY: 77-year-old female in the ICU with sepsis, urinary tract infection.   TECHNIQUE: AP portable chest x-ray.  FINDINGS: Although lung volumes remain low, lung expansion has improved since 01/16/2018. Mild infiltrates remain present in both lung bases. Upper lungs clear. No visible airspace consolidation or definite pleural effusion.  Stable mild cardiomegaly. CABG. Tortuous thoracic aorta. PICC and Dobbhoff tube in good position.      Impression: 1. Improved lung expansion since 01/16/2018. Persistent mild bibasilar pulmonary infiltrate. 2. CABG. Stable mild cardiomegaly. 3. Support equipment in good position.  This report was finalized on 1/18/2018 8:19 AM by Dr. Michael Brito MD.      Xr Chest 1 View    Result Date: 1/16/2018  Narrative: CHEST X-RAY, 01/16/2018:  HISTORY: 77-year-old female long-term skilled nursing facility patient with atrial flutter, chronic atrial fibrillation, stage III chronic kidney disease, diabetes, stroke, sacral decubitus wound and urinary tract infection. She is now acutely transferred to the ICU today with unresponsiveness, tachycardia and shortness of air.  TECHNIQUE: AP portable chest x-ray.  COMPARISON: *  Chest x-ray, 01/15/2018.  FINDINGS: Very shallow lung expansion with infiltrate or atelectasis in the lung bases, greater on the right, new since yesterday. Mid and upper lungs remain clear. Mild cardiomegaly is stable, pulmonary vascularity remains normal. Median sternotomy. PICC in good position.      Impression: 1. Very shallow lung expansion with increasing atelectasis or infiltrate in the lung bases today when compared with yesterday's study. 2. Prior heart surgery with sternotomy.  This report was finalized on 1/16/2018 9:02 AM by Dr. Michael Brito MD.      Xr Chest 1 View    Result Date: 1/16/2018  Narrative: CHEST X-RAY, 01/15/2018:  HISTORY: PICC placement.  TECHNIQUE: AP portable chest x-ray.  FINDINGS: Newly placed right arm PICC tip is in good position in the mid SVC.  Stable mild cardiomegaly. Prominent central pulmonary arteries.  Lung volumes are low. Pulmonary vascularity is normal today. Minimal basilar atelectasis. Median sternotomy. Stable tortuous thoracic aorta.      Impression: 1. PICC in good position. 2. Initial stat interpretation to the ordering service from Dr. Charles Bejarano at 1822 hours on 01/15/2018.  This report was finalized on 1/16/2018 6:23 AM by Dr. Michael rBito MD.      Xr Chest 1 View    Result Date: 12/31/2017  Narrative: EXAM: AP portable chest.  DATE: 12/30/2017 at 1257  HISTORY: Cough and right lower lung crackles today. Diabetes. Coronary artery disease with prior CABG. Obesity. Obstructive sleep apnea. Chronic kidney disease stage II.  COMPARISON: AP chest 10/04/2017, 09/17/2017, 09/10/2017.  FINDINGS: Low volume inspiration. Stable generalized cardiac mediastinal enlargement with CABG changes. Central pulmonary vasculature is enlarged and indistinct, with fine central interstitial opacities, potentially reflecting changes of mild central interstitial edema. No dense lung consolidations are identified. Mild bilateral hilar prominence is unchanged from 09/10/2017, thought to represent prominence of pulmonary vasculature. No pleural effusion or pneumothorax is seen. Benign calcified granuloma is present in the periphery of the left midlung.      Impression: 1. Features of central vascular congestion and mild central interstitial pulmonary edema. 2. Stable cardiomegaly with CABG changes. 3. Preliminary report provided by Dr. Waldrop on 12/30/2017 at 1311.  This report was finalized on 12/31/2017 6:27 AM by Dr. Jennifer Waldrop MD.      Us Renal Bilateral    Result Date: 1/17/2018  Narrative: BILATERAL RENAL ULTRASOUND,  HISTORY: 77-year-old female hospital inpatient with sepsis and urinary tract infection. Acute kidney injury on chronic kidney disease.  TECHNIQUE: Grayscale ultrasound imaging of both kidneys and the urinary bladder.  COMPARISON: *  Bilateral renal ultrasound, 09/17/2017.  FINDINGS: There is no  evidence of upper urinary tract obstruction.  The kidneys are normal in size with renal length measuring 10.2 cm on the right and 12.0 cm on the left. Two small benign-appearing right renal cysts are noted. No visible nephrolithiasis or perinephric fluid collection. The urinary bladder is decompressed with a catheter and is not visualized.      Impression: Negative renal ultrasound examination with no evidence of upper urinary tract obstruction. No change since 09/17/2017.  This report was finalized on 1/17/2018 12:05 PM by Dr. Michael Brito MD.      Xr Abdomen Kub    Result Date: 1/17/2018  Narrative: PORTABLE ABDOMEN, 01/16/2018 (21:13 HOURS):  HISTORY: Dobbhoff tube placement.  TECHNIQUE: AP portable radiograph of the upper abdomen.  FINDINGS: Newly replaced Dobbhoff tube tip is in the midportion of the stomach at 21:13 hours on 01/16/2018.      Impression: 1. Dobbhoff tube in the mid stomach. 2. Requested initial stat report to the ordering service from Dr. Mario Ron at 2123 hours on 01/16/2018.  This report was finalized on 1/17/2018 6:50 AM by Dr. Michael Brito MD.      Xr Abdomen Kub    Result Date: 1/16/2018  Narrative: AP ABDOMEN, 01/16/2018 (12:45 HOURS):  HISTORY: Repositioned Dobbhoff tube.  TECHNIQUE: AP portable radiograph of the upper abdomen.  FINDINGS: Repositioned Dobbhoff tube is in good position with tip in the distal stomach at 1245 hours on 01/16/2018.      Impression: Dobbhoff tube in good position.  This report was finalized on 1/16/2018 1:06 PM by Dr. Michael Brito MD.      Xr Abdomen Kub    Result Date: 1/16/2018  Narrative: ABDOMEN, 01/16/2018 (11:21 HOURS):  HISTORY: Dobbhoff tube placement.  TECHNIQUE: Single AP portable radiograph of the upper abdomen.  FINDINGS: Newly placed Dobbhoff tube tip is in the upper stomach just below the esophagogastric junction at 1121 hours on 01/16/2018. Consider advancing the tube 8-10 cm for placement in the distal stomach.   This report  was finalized on 1/16/2018 11:31 AM by Dr. Michael Brito MD.        Last Lab Results:     Results from last 7 days  Lab Units 01/26/18  0520 01/25/18  1017 01/25/18  0532 01/22/18  0402 01/21/18  0452   WBC 10*3/mm3 10.14  --  10.44 7.26 6.49   HEMOGLOBIN g/dL 8.7* 7.4* 7.4* 8.2* 8.6*   HEMATOCRIT % 27.2* 23.4* 23.7* 25.7* 27.1*   PLATELETS 10*3/mm3 350  --  312 224 182       Results from last 7 days  Lab Units 01/27/18  0528 01/26/18  0520 01/25/18  0532 01/24/18  0558 01/23/18  0514 01/22/18  0402 01/21/18  0452   SODIUM mmol/L 143 143 145 149* 147* 147* 147*   POTASSIUM mmol/L 4.6 4.0 4.0 3.8 4.3 3.3* 3.5   CHLORIDE mmol/L 106 107 110* 112* 113* 113* 114*   CO2 mmol/L 27.9 26.3 25.1 24.7 24.0 23.4 21.6*   BUN mg/dL 38* 40* 43* 43* 45* 49* 54*   CREATININE mg/dL 0.73 0.83 0.89 0.90 1.00 1.08* 1.40*   CALCIUM mg/dL 8.5* 8.1* 7.8* 7.6* 7.5* 7.4* 7.5*   BILIRUBIN mg/dL  --   --   --   --   --  <0.2*  --    ALK PHOS U/L  --   --   --   --   --  193*  --    ALT (SGPT) U/L  --   --   --   --   --  9  --    AST (SGOT) U/L  --   --   --   --   --  9  --    GLUCOSE mg/dL 145* 153* 115* 158* 162* 182* 191*           Lab Results (last 24 hours)     Procedure Component Value Units Date/Time    POC Glucose Once [[id number]]  (Abnormal) Collected:  01/22/18 1913    Specimen:  Blood Updated:  01/22/18 1919     Glucose 164 (H) mg/dL     Narrative:       Meter: [id number] : [id number] Zara Andrews RN    POC Glucose Once [[id number]]  (Abnormal) Collected:  01/23/18 0012    Specimen:  Blood Updated:  01/23/18 0019     Glucose 171 (H) mg/dL     Narrative:       Meter: [id number] : [id number] Og Brooks RN    Basic Metabolic Panel [[id number]]  (Abnormal) Collected:  01/23/18 0514    Specimen:  Blood Updated:  01/23/18 0641     Glucose 162 (H) mg/dL      BUN 45 (H) mg/dL      Creatinine 1.00 mg/dL      Sodium 147 (H) mmol/L      Potassium 4.3 mmol/L      Chloride 113 (H) mmol/L      CO2 24.0 mmol/L      Calcium 7.5  (L) mg/dL      eGFR Non African Amer 54 (L) mL/min/1.73      BUN/Creatinine Ratio 45.0 (H)     Anion Gap 10.0 mmol/L     Narrative:       The MDRD GFR formula is only valid for adults with stable renal function between ages 18 and 70.    POC Glucose Once [[id number]]  (Abnormal) Collected:  01/23/18 1147    Specimen:  Blood Updated:  01/23/18 1158     Glucose 253 (H) mg/dL     Narrative:       Meter: [id number] : [id number] Miguel Angel BHAKTA CERT.              PROCEDURES          Allergies:  is allergic to cefazolin; cefprozil; and cephalosporins.    [facility]  reviewed    Discharge Medications:   Yamile Cronin   Home Medication Instructions REANNA:[id number]    Printed on:01/27/18 1323   Medication Information                      acetaminophen (TYLENOL) 325 MG tablet  Take 650 mg by mouth Every 6 (Six) Hours As Needed for Mild Pain .             allopurinol (ZYLOPRIM) 100 MG tablet  Take 100 mg by mouth 2 (two) times a day.             amiodarone (PACERONE) 200 MG tablet  Take 200 mg by mouth 2 (Two) Times a Day.             aspirin 81 MG tablet  Take 81 mg by mouth daily.             bisacodyl (DULCOLAX) 5 MG EC tablet  Take 5 mg by mouth Daily As Needed for Constipation.             calcium carbonate (TUMS) 500 MG chewable tablet  Chew 1 tablet every 4 (four) hours as needed for indigestion or heartburn.             calcium-vitamin D (OSCAL-500) 500-200 MG-UNIT per tablet  Take 1 tablet by mouth 2 (two) times a day.             Cholecalciferol 14058 UNITS tablet  Take 50,000 Units by mouth 1 (one) time per week. On Tuesday             conjugated estrogens (PREMARIN) vaginal cream  Insert 0.625 g into the vagina daily.             diltiazem CD (CARDIZEM CD) 240 MG 24 hr capsule  Take 240 mg by mouth every other day.             donepezil (ARICEPT) 10 MG tablet  Take 10 mg by mouth every night.             famotidine (PEPCID) 20 MG tablet  Take 20 mg by mouth 2 (Two) Times a Day.             glipiZIDE  (GLUCOTROL) 5 MG tablet  Take 5 mg by mouth 2 (Two) Times a Day Before Meals.             insulin aspart (novoLOG) 100 UNIT/ML injection  Inject 0-14 Units under the skin 3 (Three) Times a Day Before Meals. Per sliding scale as needed             Iron Combinations (NIFEREX PO)  Take 150 mg by mouth 2 (Two) Times a Day.             ketotifen (ZADITOR) 0.025 % ophthalmic solution  Administer 1 drop to both eyes 2 (Two) Times a Day.             lactobacillus acidophilus (RISAQUAD) capsule capsule  Take 1 capsule by mouth daily.             levETIRAcetam (KEPPRA) 500 MG tablet  Take 500 mg by mouth 2 (two) times a day.             levothyroxine (SYNTHROID, LEVOTHROID) 150 MCG tablet  Take 150 mcg by mouth daily.             linagliptin (TRADJENTA) 5 MG tablet tablet  Take 5 mg by mouth Daily.             LORazepam (ATIVAN) 0.5 MG tablet  Take 0.5 mg by mouth every 8 (eight) hours as needed for anxiety.             magnesium hydroxide (MILK OF MAGNESIA) 400 MG/5ML suspension  Take 30 mL by mouth at night as needed for constipation.             megestrol (MEGACE) 40 MG/ML suspension  Take 200 mg by mouth 2 (Two) Times a Day.             meropenem (MERREM) 1 g/100 mL 0.9% NS VTB (mbp)  Infuse 1 g into a venous catheter Every 8 (Eight) Hours.             metoprolol tartrate (LOPRESSOR) 25 MG tablet  Take 75 mg by mouth 2 (Two) Times a Day.             miconazole (MICOTIN) 2 % powder  Apply 1 application topically as needed for itching.             nitroglycerin (NITROSTAT) 0.4 MG SL tablet  Place 0.4 mg under the tongue every 5 (five) minutes as needed for chest pain. Take no more than 3 doses in 15 minutes.             ondansetron (ZOFRAN) 4 MG tablet  Take 4 mg by mouth Every 6 (Six) Hours As Needed for Nausea or Vomiting.             ondansetron (ZOFRAN) 4 MG/5ML solution  Take  by mouth 1 (One) Time.             potassium chloride (K-DUR,KLOR-CON) 10 MEQ CR tablet  Take 20 mEq by mouth Daily.             senna (SENOKOT)  8.6 MG tablet  Take 1 tablet by mouth 2 (two) times a day.             sertraline (ZOLOFT) 100 MG tablet  Take 100 mg by mouth every night.             traMADol (ULTRAM) 50 MG tablet  Take 50 mg by mouth.                   Condition on Discharge:  Stable     Tiana Galaviz MD  01/27/18  1:24 PM

## 2019-10-14 NOTE — NURSING NOTE
2/9/17 @ 2469 I SPOKE WITH MAINOR FROM [facility] REHAB- PATIENT IS LONG TERM CARE RESIDENT AT THEIR FACILITY AND IS A SELF PAY STATUS- SHE IS ABLE TO RETURN TO THEM AT DISCHARGE- AWAIT DISCHARGE DATE AND TIME  
Called Dr. Larson patient's heart rate is elevated in the 140's.I explained that  had her Lopressor at 0638 and her 2100 dose was canceled.Dr. Larsno requested to speak with on call cardiology.Cardiology was contact to call Dr. Larson.  
WOCN note, follow patient weekly on Rehab unit here, was transferred to /S and now in ICU. Patient has history of a stage 3 sacral pressure ulcer that was present on admission to facility in (approx.) 2011, and had a diverting colostomy placed for wound healing. Ulcer healed in approximately one year, with occasional skin tear developing around the scar, in linear fashion; treatment orders ongoing for onset tear with each episode. Patient's colostomy remains, and is viable with soft brown/green effluent. Will add small soft Optifoam silicone border dressing to orders for additional cushioning/protection of vulnerable/fragile area & due to urinary incontinence, and continue to monitor skin weekly.  
spoke with Jessi Hadley @ [facility] rehab unit and patient does not have a skillable service so patient will return private pay/intermediate level of care. Jessi Hadley is anticipating transfer back to [facility] this weekend. will continue to follow.  
spoke with patient at bedside. she is from [facility] rehab and skilled care unit. spoke with Jessi Hadley and patient is private pay and they will accept patient when medically stable. patient may need a precert from [facility] prior to transfer. will continue to follow.  
no

## 2020-05-15 NOTE — PROGRESS NOTES
"Daily Progress Note:    Subjective: Feels better.  Still with some abdominal discomfort.  Cough is improved shortness of breath is improved diarrhea has improved    Flowsheet Rows         First Filed Value    Admission Height  65\" (165.1 cm) Documented at 02/03/2017 1044    Admission Weight  243 lb (110 kg) Documented at 02/03/2017 1044          Patient Vitals for the past 24 hrs:   BP Temp Temp src Pulse Resp SpO2 Weight   02/05/17 1132 140/81 98.3 °F (36.8 °C) Oral 80 24 95 % -   02/05/17 0915 - - - 106 24 90 % -   02/05/17 0836 144/74 - - 102 - - -   02/05/17 0600 128/67 98 °F (36.7 °C) Oral 68 18 96 % 249 lb 8 oz (113 kg)   02/05/17 0312 134/73 98.1 °F (36.7 °C) Oral 107 20 98 % -   02/05/17 0309 - - - 110 20 97 % -   02/04/17 2326 128/79 99.4 °F (37.4 °C) Oral (!) 125 20 95 % -   02/04/17 2259 - - - (!) 125 20 96 % -   02/04/17 1913 - - - 106 20 96 % -   02/04/17 1830 109/59 - - 107 20 95 % -   02/04/17 1531 111/66 97.9 °F (36.6 °C) Oral - 20 - -   02/04/17 1504 - - - 102 24 97 % -   02/04/17 1311 120/78 - - 106 20 96 % -         Intake/Output Summary (Last 24 hours) at 02/05/17 1145  Last data filed at 02/05/17 0919   Gross per 24 hour   Intake 2818.31 ml   Output      0 ml   Net 2818.31 ml       Review of Systems   Constitutional: Positive for activity change and fatigue. Negative for appetite change.   Respiratory: Positive for cough. Negative for chest tightness, shortness of breath and wheezing.    Cardiovascular: Negative for chest pain.   Gastrointestinal: Positive for diarrhea. Negative for abdominal distention, abdominal pain, nausea and vomiting.   Genitourinary: Negative for frequency.   Skin: Negative for rash.   Psychiatric/Behavioral: Negative for agitation.       Physical Exam   Constitutional: She appears well-developed and well-nourished.   HENT:   Head: Normocephalic.   Mouth/Throat: Oropharynx is clear and moist.   Eyes: Conjunctivae are normal.   Neck: Normal range of motion. No JVD present. " Patient notified and she is agreeable.   No thyromegaly present.   Cardiovascular: Normal rate.  An irregularly irregular rhythm present.   Murmur heard.   Systolic murmur is present with a grade of 2/6   Pulmonary/Chest: Effort normal and breath sounds normal. No respiratory distress. She has no wheezes. She has no rales.   Abdominal: Soft. She exhibits no distension. Bowel sounds are decreased. There is no tenderness. There is no guarding.       Colostomy present   Neurological: She is alert.   Skin: Skin is warm and dry. No rash noted.   Nursing note and vitals reviewed.          Medication Review:   I have reviewed the patient's current medication list      acetaminophen 650 mg Oral Nightly   allopurinol 100 mg Oral Q12H   budesonide 0.5 mg Nebulization BID - RT   calcium-vitamin D 500 mg Oral Q12H   [START ON 2/7/2017] conjugated estrogens 0.75 application Vaginal Once per day on Tue   diltiaZEM  mg Oral Daily   donepezil 10 mg Oral Nightly   doxazosin 2 mg Oral Q12H   enoxaparin 1 mg/kg Subcutaneous Q12H   famotidine 40 mg Oral Nightly   guaiFENesin 1,200 mg Oral BID   [START ON 2/12/2017] ibandronate 150 mg Oral Q30 Days   ipratropium-albuterol 3 mL Nebulization Q4H - RT   isosorbide dinitrate 10 mg Oral Q12H   ketotifen 1 drop Both Eyes BID   lactobacillus acidophilus 1 capsule Oral Daily   levETIRAcetam 500 mg Oral Q12H   levoFLOXacin 750 mg Intravenous Q24H   levothyroxine 150 mcg Oral Q AM   metoprolol tartrate 25 mg Oral Q12H   miconazole  Topical Q12H   multivitamin with minerals 1 tablet Oral Daily   pantoprazole 40 mg Intravenous Q AM   sennosides-docusate sodium 1 tablet Oral Q12H   sertraline 100 mg Oral Nightly           Labs:    Results from last 7 days  Lab Units 02/04/17  0818 02/03/17  0622 02/02/17  0056   WBC 10*3/mm3 6.04 5.38 6.41   HEMOGLOBIN g/dL 8.8* 9.3* 9.4*   HEMATOCRIT % 28.1* 29.9* 30.0*   PLATELETS 10*3/mm3 177 168 169   MONOCYTES % %  --   --  9.0*       Results from last 7 days  Lab Units 02/05/17  0513  "02/04/17  0819 02/03/17  0622   SODIUM mmol/L 138 139 139   POTASSIUM mmol/L 3.4* 3.9 3.8   CHLORIDE mmol/L 101 103 100   TOTAL CO2 mmol/L 25.7 24.3 25.9   BUN mg/dL 29* 35* 40*   CREATININE mg/dL 0.84 0.91 1.11*   CALCIUM mg/dL 8.2* 8.6* 9.4   GLUCOSE mg/dL 156* 144* 178*           Lab Results (last 24 hours)     Procedure Component Value Units Date/Time    Procalcitonin [[id number]]  (Normal) Collected:  02/04/17 0818    Specimen:  Blood Updated:  02/04/17 1212     Procalcitonin 0.13 ng/mL     Narrative:       As a Marker for Sepsis (Non-Neonates):   1. <0.5 ng/mL represents a low risk of severe sepsis and/or septic shock.  1. >2 ng/mL represents a high risk of severe sepsis and/or septic shock.    As a Marker for Lower Respiratory Tract Infections that require antibiotic therapy:  PCT on Admission     Antibiotic Therapy             6-12 Hrs later  > 0.5                Strongly Recommended            >0.25 - <0.5         Recommended  0.1 - 0.25           Discouraged                   Remeasure/reassess PCT  <0.1                 Strongly Discouraged          Remeasure/reassess PCT      As 28 day mortality risk marker: \"Change in Procalcitonin Result\" (> 80 % or <=80 %) if Day 0 (or Day 1) and Day 4 values are available. Refer to http://www.[facility]-pct-calculator.com/   Change in PCT <=80 %   A decrease of PCT levels below or equal to 80 % defines a positive change in PCT test result representing a higher risk for 28-day all-cause mortality of patients diagnosed with severe sepsis or septic shock.  Change in PCT > 80 %   A decrease of PCT levels of more than 80 % defines a negative change in PCT result representing a lower risk for 28-day all-cause mortality of patients diagnosed with severe sepsis or septic shock.                Blood Culture [[id number]]  (Normal) Collected:  02/03/17 1731    Specimen:  Blood from Arm, Left Updated:  02/04/17 1801     Blood Culture No growth at 24 hours     Blood Culture [[id number]]  " (Normal) Collected:  02/03/17 1731    Specimen:  Blood from Arm, Right Updated:  02/04/17 1801     Blood Culture No growth at 24 hours     Basic Metabolic Panel [[id number]]  (Abnormal) Collected:  02/05/17 0513    Specimen:  Blood Updated:  02/05/17 0558     Glucose 156 (H) mg/dL      BUN 29 (H) mg/dL      Creatinine 0.84 mg/dL      Sodium 138 mmol/L      Potassium 3.4 (L) mmol/L      Chloride 101 mmol/L      CO2 25.7 mmol/L      Calcium 8.2 (L) mg/dL      eGFR Non African Amer 66 mL/min/1.73      BUN/Creatinine Ratio 34.5 (H)      Anion Gap 11.3 mmol/L     Narrative:       The MDRD GFR formula is only valid for adults with stable renal function between ages 18 and 70.              Radiology:  Imaging Results (last 24 hours)     ** No results found for the last 24 hours. **          Cardiology:  ECG/EMG Results (last 24 hours)     ** No results found for the last 24 hours. **          I have reviewed consult notes.    Assessment and Plan:    1.  Sepsis secondary to Haemophilus influenza pneumonia has improved we'll continue IV Levaquin    2.  Urinary tract infection secondary to ESBL on appropriate antibiotic coverage as been ordered we'll continue monitor no changes    3.  Hospital-acquired pneumonia continue present antibiotics    4.  Paroxysmal atrial flutter on amiodarone drip rate better controlled on by mouth Cardizem and beta blocker likely discontinue amiodarone tomorrow. On Lovenox questionable change to oral anticoagulation 7 will discuss with cardiology    5.  Acute kidney injury on chronic kidney disease resolved continue maintenance IV fluids    6.  Anemia iron deficiency and chronic disease is at baseline continue monitor H&H is    7.  Hypertension well controlled continue present management no changes    8.  Diabetes mellitus well controlled Accu-Cheks and continue sliding scale insulin    9.  Acute hypoxic respiratory failure secondary to pneumonia still requiring O2 supplementation will recheck chest  x-ray done absolute budesonide primary toilet will recheck chest x-ray